# Patient Record
Sex: MALE | Race: WHITE | Employment: OTHER
[De-identification: names, ages, dates, MRNs, and addresses within clinical notes are randomized per-mention and may not be internally consistent; named-entity substitution may affect disease eponyms.]

---

## 2017-01-06 RX ORDER — ATORVASTATIN CALCIUM 80 MG/1
TABLET, FILM COATED ORAL
Qty: 90 TABLET | Refills: 0 | Status: SHIPPED | OUTPATIENT
Start: 2017-01-06 | End: 2017-04-12 | Stop reason: SDUPTHER

## 2017-01-09 ENCOUNTER — CARE COORDINATION (OUTPATIENT)
Dept: CARE COORDINATION | Facility: CLINIC | Age: 72
End: 2017-01-09

## 2017-01-17 ENCOUNTER — OFFICE VISIT (OUTPATIENT)
Dept: NEUROSURGERY | Facility: CLINIC | Age: 72
End: 2017-01-17

## 2017-01-17 VITALS
BODY MASS INDEX: 44.1 KG/M2 | DIASTOLIC BLOOD PRESSURE: 92 MMHG | SYSTOLIC BLOOD PRESSURE: 141 MMHG | WEIGHT: 315 LBS | HEIGHT: 71 IN | HEART RATE: 111 BPM

## 2017-01-17 DIAGNOSIS — M51.9 LUMBAR DISC DISEASE: Primary | ICD-10-CM

## 2017-01-17 PROCEDURE — 99024 POSTOP FOLLOW-UP VISIT: CPT | Performed by: NEUROLOGICAL SURGERY

## 2017-01-17 PROCEDURE — 1036F TOBACCO NON-USER: CPT | Performed by: NEUROLOGICAL SURGERY

## 2017-02-20 ENCOUNTER — OFFICE VISIT (OUTPATIENT)
Dept: UROLOGY | Facility: CLINIC | Age: 72
End: 2017-02-20

## 2017-02-20 VITALS
SYSTOLIC BLOOD PRESSURE: 122 MMHG | BODY MASS INDEX: 42.66 KG/M2 | DIASTOLIC BLOOD PRESSURE: 78 MMHG | HEIGHT: 72 IN | WEIGHT: 315 LBS

## 2017-02-20 DIAGNOSIS — N40.1 BPH WITH OBSTRUCTION/LOWER URINARY TRACT SYMPTOMS: Primary | ICD-10-CM

## 2017-02-20 DIAGNOSIS — N13.8 BPH WITH OBSTRUCTION/LOWER URINARY TRACT SYMPTOMS: Primary | ICD-10-CM

## 2017-02-20 DIAGNOSIS — N39.3 MALE STRESS INCONTINENCE: ICD-10-CM

## 2017-02-20 PROCEDURE — 1123F ACP DISCUSS/DSCN MKR DOCD: CPT | Performed by: UROLOGY

## 2017-02-20 PROCEDURE — 1036F TOBACCO NON-USER: CPT | Performed by: UROLOGY

## 2017-02-20 PROCEDURE — G8484 FLU IMMUNIZE NO ADMIN: HCPCS | Performed by: UROLOGY

## 2017-02-20 PROCEDURE — G8417 CALC BMI ABV UP PARAM F/U: HCPCS | Performed by: UROLOGY

## 2017-02-20 PROCEDURE — G8599 NO ASA/ANTIPLAT THER USE RNG: HCPCS | Performed by: UROLOGY

## 2017-02-20 PROCEDURE — G8427 DOCREV CUR MEDS BY ELIG CLIN: HCPCS | Performed by: UROLOGY

## 2017-02-20 PROCEDURE — 99213 OFFICE O/P EST LOW 20 MIN: CPT | Performed by: UROLOGY

## 2017-02-20 PROCEDURE — 4040F PNEUMOC VAC/ADMIN/RCVD: CPT | Performed by: UROLOGY

## 2017-02-20 PROCEDURE — 3017F COLORECTAL CA SCREEN DOC REV: CPT | Performed by: UROLOGY

## 2017-02-20 PROCEDURE — 51798 US URINE CAPACITY MEASURE: CPT | Performed by: UROLOGY

## 2017-02-20 ASSESSMENT — ENCOUNTER SYMPTOMS
EYE PAIN: 0
BACK PAIN: 0
VOMITING: 0
SHORTNESS OF BREATH: 0
COLOR CHANGE: 0
NAUSEA: 0
WHEEZING: 0
EYE REDNESS: 0
COUGH: 0
ABDOMINAL PAIN: 0

## 2017-02-22 ENCOUNTER — TELEPHONE (OUTPATIENT)
Dept: UROLOGY | Facility: CLINIC | Age: 72
End: 2017-02-22

## 2017-02-22 RX ORDER — SULFAMETHOXAZOLE AND TRIMETHOPRIM 800; 160 MG/1; MG/1
1 TABLET ORAL 2 TIMES DAILY
Qty: 10 TABLET | Refills: 0 | Status: SHIPPED | OUTPATIENT
Start: 2017-02-22 | End: 2017-02-27

## 2017-03-03 ENCOUNTER — PROCEDURE VISIT (OUTPATIENT)
Dept: UROLOGY | Facility: CLINIC | Age: 72
End: 2017-03-03

## 2017-03-03 VITALS
SYSTOLIC BLOOD PRESSURE: 146 MMHG | WEIGHT: 315 LBS | BODY MASS INDEX: 42.66 KG/M2 | HEIGHT: 72 IN | DIASTOLIC BLOOD PRESSURE: 82 MMHG

## 2017-03-03 DIAGNOSIS — N39.3 MALE STRESS INCONTINENCE: Primary | ICD-10-CM

## 2017-03-03 PROCEDURE — 52000 CYSTOURETHROSCOPY: CPT | Performed by: UROLOGY

## 2017-03-03 PROCEDURE — G8599 NO ASA/ANTIPLAT THER USE RNG: HCPCS | Performed by: UROLOGY

## 2017-03-03 PROCEDURE — G8484 FLU IMMUNIZE NO ADMIN: HCPCS | Performed by: UROLOGY

## 2017-03-03 PROCEDURE — 1123F ACP DISCUSS/DSCN MKR DOCD: CPT | Performed by: UROLOGY

## 2017-03-03 PROCEDURE — 1036F TOBACCO NON-USER: CPT | Performed by: UROLOGY

## 2017-03-03 PROCEDURE — 99213 OFFICE O/P EST LOW 20 MIN: CPT | Performed by: UROLOGY

## 2017-03-03 PROCEDURE — G8417 CALC BMI ABV UP PARAM F/U: HCPCS | Performed by: UROLOGY

## 2017-03-03 PROCEDURE — 3017F COLORECTAL CA SCREEN DOC REV: CPT | Performed by: UROLOGY

## 2017-03-03 PROCEDURE — G8427 DOCREV CUR MEDS BY ELIG CLIN: HCPCS | Performed by: UROLOGY

## 2017-03-03 PROCEDURE — 4040F PNEUMOC VAC/ADMIN/RCVD: CPT | Performed by: UROLOGY

## 2017-03-03 ASSESSMENT — ENCOUNTER SYMPTOMS
WHEEZING: 0
EYE PAIN: 0
EYE REDNESS: 0
BACK PAIN: 0
COUGH: 0
NAUSEA: 0
VOMITING: 0
COLOR CHANGE: 0
ABDOMINAL PAIN: 0
SHORTNESS OF BREATH: 0

## 2017-03-09 ENCOUNTER — CARE COORDINATION (OUTPATIENT)
Dept: CARE COORDINATION | Facility: CLINIC | Age: 72
End: 2017-03-09

## 2017-03-13 ENCOUNTER — HOSPITAL ENCOUNTER (OUTPATIENT)
Dept: PHYSICAL THERAPY | Age: 72
Setting detail: THERAPIES SERIES
Discharge: HOME | End: 2017-03-13
Admitting: NEUROLOGICAL SURGERY
Payer: MEDICARE

## 2017-03-13 PROCEDURE — 97113 AQUATIC THERAPY/EXERCISES: CPT

## 2017-03-13 ASSESSMENT — PAIN DESCRIPTION - LOCATION: LOCATION: BACK

## 2017-03-13 ASSESSMENT — PAIN SCALES - GENERAL: PAINLEVEL_OUTOF10: 1

## 2017-03-13 ASSESSMENT — PAIN DESCRIPTION - ORIENTATION: ORIENTATION: LOWER

## 2017-03-16 ENCOUNTER — HOSPITAL ENCOUNTER (OUTPATIENT)
Dept: PHYSICAL THERAPY | Age: 72
Setting detail: THERAPIES SERIES
Discharge: HOME | End: 2017-03-16
Payer: MEDICARE

## 2017-03-16 PROCEDURE — 97113 AQUATIC THERAPY/EXERCISES: CPT

## 2017-03-16 ASSESSMENT — PAIN DESCRIPTION - LOCATION: LOCATION: BACK

## 2017-03-16 ASSESSMENT — PAIN DESCRIPTION - ORIENTATION: ORIENTATION: LOWER

## 2017-03-16 ASSESSMENT — PAIN SCALES - GENERAL: PAINLEVEL_OUTOF10: 2

## 2017-03-17 ENCOUNTER — HOSPITAL ENCOUNTER (OUTPATIENT)
Dept: PHYSICAL THERAPY | Age: 72
Setting detail: THERAPIES SERIES
Discharge: HOME | End: 2017-03-17
Admitting: NEUROLOGICAL SURGERY
Payer: MEDICARE

## 2017-03-17 PROCEDURE — 97113 AQUATIC THERAPY/EXERCISES: CPT

## 2017-03-17 ASSESSMENT — PAIN DESCRIPTION - LOCATION: LOCATION: BACK

## 2017-03-17 ASSESSMENT — PAIN DESCRIPTION - ORIENTATION: ORIENTATION: LOWER;POSTERIOR;RIGHT

## 2017-03-20 ENCOUNTER — HOSPITAL ENCOUNTER (OUTPATIENT)
Dept: PHYSICAL THERAPY | Age: 72
Setting detail: THERAPIES SERIES
Discharge: HOME | End: 2017-03-20
Admitting: NEUROLOGICAL SURGERY
Payer: MEDICARE

## 2017-03-20 PROCEDURE — 97113 AQUATIC THERAPY/EXERCISES: CPT

## 2017-03-20 ASSESSMENT — PAIN SCALES - GENERAL: PAINLEVEL_OUTOF10: 1

## 2017-03-20 ASSESSMENT — PAIN DESCRIPTION - ORIENTATION: ORIENTATION: LOWER;MID

## 2017-03-20 ASSESSMENT — PAIN DESCRIPTION - LOCATION: LOCATION: BACK

## 2017-03-21 ENCOUNTER — OFFICE VISIT (OUTPATIENT)
Dept: NEUROSURGERY | Age: 72
End: 2017-03-21
Payer: MEDICARE

## 2017-03-21 ENCOUNTER — OFFICE VISIT (OUTPATIENT)
Dept: UROLOGY | Age: 72
End: 2017-03-21
Payer: MEDICARE

## 2017-03-21 VITALS
WEIGHT: 315 LBS | HEIGHT: 71 IN | BODY MASS INDEX: 44.1 KG/M2 | HEART RATE: 78 BPM | DIASTOLIC BLOOD PRESSURE: 86 MMHG | SYSTOLIC BLOOD PRESSURE: 142 MMHG

## 2017-03-21 VITALS
DIASTOLIC BLOOD PRESSURE: 78 MMHG | WEIGHT: 315 LBS | BODY MASS INDEX: 42.66 KG/M2 | SYSTOLIC BLOOD PRESSURE: 122 MMHG | HEIGHT: 72 IN

## 2017-03-21 DIAGNOSIS — N39.3 STRESS INCONTINENCE, MALE: ICD-10-CM

## 2017-03-21 DIAGNOSIS — M62.81 MUSCLE WEAKNESS: Primary | ICD-10-CM

## 2017-03-21 DIAGNOSIS — N40.1 BPH WITH OBSTRUCTION/LOWER URINARY TRACT SYMPTOMS: ICD-10-CM

## 2017-03-21 DIAGNOSIS — N13.8 BPH WITH OBSTRUCTION/LOWER URINARY TRACT SYMPTOMS: ICD-10-CM

## 2017-03-21 DIAGNOSIS — M51.9 LUMBAR DISC DISEASE: Primary | ICD-10-CM

## 2017-03-21 PROCEDURE — G8484 FLU IMMUNIZE NO ADMIN: HCPCS | Performed by: PHYSICIAN ASSISTANT

## 2017-03-21 PROCEDURE — 1036F TOBACCO NON-USER: CPT | Performed by: PHYSICIAN ASSISTANT

## 2017-03-21 PROCEDURE — 1036F TOBACCO NON-USER: CPT | Performed by: NEUROLOGICAL SURGERY

## 2017-03-21 PROCEDURE — 99212 OFFICE O/P EST SF 10 MIN: CPT | Performed by: NEUROLOGICAL SURGERY

## 2017-03-21 PROCEDURE — 51784 ANAL/URINARY MUSCLE STUDY: CPT | Performed by: PHYSICIAN ASSISTANT

## 2017-03-21 PROCEDURE — 91122 PR ELECTRICAL STIMULATION: CPT | Performed by: PHYSICIAN ASSISTANT

## 2017-03-21 PROCEDURE — 4040F PNEUMOC VAC/ADMIN/RCVD: CPT | Performed by: PHYSICIAN ASSISTANT

## 2017-03-21 PROCEDURE — 3017F COLORECTAL CA SCREEN DOC REV: CPT | Performed by: PHYSICIAN ASSISTANT

## 2017-03-21 PROCEDURE — G8599 NO ASA/ANTIPLAT THER USE RNG: HCPCS | Performed by: PHYSICIAN ASSISTANT

## 2017-03-21 PROCEDURE — 97750 PHYSICAL PERFORMANCE TEST: CPT | Performed by: PHYSICIAN ASSISTANT

## 2017-03-21 PROCEDURE — G8427 DOCREV CUR MEDS BY ELIG CLIN: HCPCS | Performed by: PHYSICIAN ASSISTANT

## 2017-03-21 PROCEDURE — 97032 APPL MODALITY 1+ESTIM EA 15: CPT | Performed by: PHYSICIAN ASSISTANT

## 2017-03-21 PROCEDURE — G8417 CALC BMI ABV UP PARAM F/U: HCPCS | Performed by: PHYSICIAN ASSISTANT

## 2017-03-21 PROCEDURE — 99214 OFFICE O/P EST MOD 30 MIN: CPT | Performed by: PHYSICIAN ASSISTANT

## 2017-03-21 PROCEDURE — 1123F ACP DISCUSS/DSCN MKR DOCD: CPT | Performed by: PHYSICIAN ASSISTANT

## 2017-03-23 ENCOUNTER — APPOINTMENT (OUTPATIENT)
Dept: PHYSICAL THERAPY | Age: 72
End: 2017-03-23
Payer: MEDICARE

## 2017-03-24 ENCOUNTER — APPOINTMENT (OUTPATIENT)
Dept: PHYSICAL THERAPY | Age: 72
End: 2017-03-24
Payer: MEDICARE

## 2017-03-28 ENCOUNTER — OFFICE VISIT (OUTPATIENT)
Dept: UROLOGY | Age: 72
End: 2017-03-28
Payer: MEDICARE

## 2017-03-28 VITALS — WEIGHT: 315 LBS | SYSTOLIC BLOOD PRESSURE: 138 MMHG | DIASTOLIC BLOOD PRESSURE: 98 MMHG | BODY MASS INDEX: 45.8 KG/M2

## 2017-03-28 DIAGNOSIS — N40.1 BPH WITH OBSTRUCTION/LOWER URINARY TRACT SYMPTOMS: ICD-10-CM

## 2017-03-28 DIAGNOSIS — N39.3 STRESS INCONTINENCE, MALE: Primary | ICD-10-CM

## 2017-03-28 DIAGNOSIS — N13.8 BPH WITH OBSTRUCTION/LOWER URINARY TRACT SYMPTOMS: ICD-10-CM

## 2017-03-28 DIAGNOSIS — M62.81 MUSCLE WEAKNESS: ICD-10-CM

## 2017-03-28 PROCEDURE — G8599 NO ASA/ANTIPLAT THER USE RNG: HCPCS | Performed by: PHYSICIAN ASSISTANT

## 2017-03-28 PROCEDURE — 91122 PR ANAL/URINARY MUSCLE STUDY: CPT | Performed by: PHYSICIAN ASSISTANT

## 2017-03-28 PROCEDURE — 99213 OFFICE O/P EST LOW 20 MIN: CPT | Performed by: PHYSICIAN ASSISTANT

## 2017-03-28 PROCEDURE — 51784 ANAL/URINARY MUSCLE STUDY: CPT | Performed by: PHYSICIAN ASSISTANT

## 2017-03-28 PROCEDURE — G8417 CALC BMI ABV UP PARAM F/U: HCPCS | Performed by: PHYSICIAN ASSISTANT

## 2017-03-28 PROCEDURE — 1123F ACP DISCUSS/DSCN MKR DOCD: CPT | Performed by: PHYSICIAN ASSISTANT

## 2017-03-28 PROCEDURE — 97032 APPL MODALITY 1+ESTIM EA 15: CPT | Performed by: PHYSICIAN ASSISTANT

## 2017-03-28 PROCEDURE — G8484 FLU IMMUNIZE NO ADMIN: HCPCS | Performed by: PHYSICIAN ASSISTANT

## 2017-03-28 PROCEDURE — 4040F PNEUMOC VAC/ADMIN/RCVD: CPT | Performed by: PHYSICIAN ASSISTANT

## 2017-03-28 PROCEDURE — 97750 PHYSICAL PERFORMANCE TEST: CPT | Performed by: PHYSICIAN ASSISTANT

## 2017-03-28 PROCEDURE — 3017F COLORECTAL CA SCREEN DOC REV: CPT | Performed by: PHYSICIAN ASSISTANT

## 2017-03-28 PROCEDURE — 1036F TOBACCO NON-USER: CPT | Performed by: PHYSICIAN ASSISTANT

## 2017-03-28 PROCEDURE — G8427 DOCREV CUR MEDS BY ELIG CLIN: HCPCS | Performed by: PHYSICIAN ASSISTANT

## 2017-03-29 ENCOUNTER — CARE COORDINATION (OUTPATIENT)
Dept: FAMILY MEDICINE CLINIC | Age: 72
End: 2017-03-29

## 2017-03-29 DIAGNOSIS — I10 ESSENTIAL HYPERTENSION: ICD-10-CM

## 2017-03-29 RX ORDER — HYDROCHLOROTHIAZIDE 25 MG/1
25 TABLET ORAL DAILY
Qty: 90 TABLET | Refills: 0 | Status: SHIPPED | OUTPATIENT
Start: 2017-03-29 | End: 2017-07-11 | Stop reason: SDUPTHER

## 2017-03-29 RX ORDER — ALBUTEROL SULFATE 90 UG/1
2 AEROSOL, METERED RESPIRATORY (INHALATION) EVERY 4 HOURS PRN
Qty: 1 INHALER | Refills: 1 | Status: SHIPPED | OUTPATIENT
Start: 2017-03-29 | End: 2018-06-08 | Stop reason: SDUPTHER

## 2017-04-04 ENCOUNTER — PROCEDURE VISIT (OUTPATIENT)
Dept: UROLOGY | Age: 72
End: 2017-04-04
Payer: MEDICARE

## 2017-04-04 VITALS
BODY MASS INDEX: 42.66 KG/M2 | DIASTOLIC BLOOD PRESSURE: 86 MMHG | HEIGHT: 72 IN | WEIGHT: 315 LBS | SYSTOLIC BLOOD PRESSURE: 124 MMHG

## 2017-04-04 DIAGNOSIS — E66.01 MORBID OBESITY DUE TO EXCESS CALORIES (HCC): ICD-10-CM

## 2017-04-04 DIAGNOSIS — M62.81 MUSCLE WEAKNESS: ICD-10-CM

## 2017-04-04 DIAGNOSIS — N40.1 BPH WITH OBSTRUCTION/LOWER URINARY TRACT SYMPTOMS: ICD-10-CM

## 2017-04-04 DIAGNOSIS — N39.3 STRESS INCONTINENCE, MALE: Primary | ICD-10-CM

## 2017-04-04 DIAGNOSIS — N13.8 BPH WITH OBSTRUCTION/LOWER URINARY TRACT SYMPTOMS: ICD-10-CM

## 2017-04-04 PROCEDURE — 97032 APPL MODALITY 1+ESTIM EA 15: CPT | Performed by: PHYSICIAN ASSISTANT

## 2017-04-04 PROCEDURE — G8427 DOCREV CUR MEDS BY ELIG CLIN: HCPCS | Performed by: PHYSICIAN ASSISTANT

## 2017-04-04 PROCEDURE — 51784 ANAL/URINARY MUSCLE STUDY: CPT | Performed by: PHYSICIAN ASSISTANT

## 2017-04-04 PROCEDURE — 97750 PHYSICAL PERFORMANCE TEST: CPT | Performed by: PHYSICIAN ASSISTANT

## 2017-04-04 PROCEDURE — 91122 PR ANAL/URINARY MUSCLE STUDY: CPT | Performed by: PHYSICIAN ASSISTANT

## 2017-04-04 PROCEDURE — 1036F TOBACCO NON-USER: CPT | Performed by: PHYSICIAN ASSISTANT

## 2017-04-04 PROCEDURE — 3017F COLORECTAL CA SCREEN DOC REV: CPT | Performed by: PHYSICIAN ASSISTANT

## 2017-04-04 PROCEDURE — G8417 CALC BMI ABV UP PARAM F/U: HCPCS | Performed by: PHYSICIAN ASSISTANT

## 2017-04-04 PROCEDURE — 4040F PNEUMOC VAC/ADMIN/RCVD: CPT | Performed by: PHYSICIAN ASSISTANT

## 2017-04-04 PROCEDURE — 1123F ACP DISCUSS/DSCN MKR DOCD: CPT | Performed by: PHYSICIAN ASSISTANT

## 2017-04-04 PROCEDURE — G8599 NO ASA/ANTIPLAT THER USE RNG: HCPCS | Performed by: PHYSICIAN ASSISTANT

## 2017-04-04 PROCEDURE — 99213 OFFICE O/P EST LOW 20 MIN: CPT | Performed by: PHYSICIAN ASSISTANT

## 2017-04-11 ENCOUNTER — PROCEDURE VISIT (OUTPATIENT)
Dept: UROLOGY | Age: 72
End: 2017-04-11
Payer: MEDICARE

## 2017-04-11 VITALS
BODY MASS INDEX: 40.43 KG/M2 | SYSTOLIC BLOOD PRESSURE: 122 MMHG | WEIGHT: 315 LBS | DIASTOLIC BLOOD PRESSURE: 82 MMHG | HEIGHT: 74 IN

## 2017-04-11 DIAGNOSIS — M62.81 MUSCLE WEAKNESS: ICD-10-CM

## 2017-04-11 DIAGNOSIS — N39.3 STRESS INCONTINENCE, MALE: Primary | ICD-10-CM

## 2017-04-11 DIAGNOSIS — N40.1 BPH WITH OBSTRUCTION/LOWER URINARY TRACT SYMPTOMS: ICD-10-CM

## 2017-04-11 DIAGNOSIS — N13.8 BPH WITH OBSTRUCTION/LOWER URINARY TRACT SYMPTOMS: ICD-10-CM

## 2017-04-11 PROCEDURE — 91122 PR ANAL/URINARY MUSCLE STUDY: CPT | Performed by: PHYSICIAN ASSISTANT

## 2017-04-11 PROCEDURE — G8417 CALC BMI ABV UP PARAM F/U: HCPCS | Performed by: PHYSICIAN ASSISTANT

## 2017-04-11 PROCEDURE — 1123F ACP DISCUSS/DSCN MKR DOCD: CPT | Performed by: PHYSICIAN ASSISTANT

## 2017-04-11 PROCEDURE — 97750 PHYSICAL PERFORMANCE TEST: CPT | Performed by: PHYSICIAN ASSISTANT

## 2017-04-11 PROCEDURE — 51784 ANAL/URINARY MUSCLE STUDY: CPT | Performed by: PHYSICIAN ASSISTANT

## 2017-04-11 PROCEDURE — 4040F PNEUMOC VAC/ADMIN/RCVD: CPT | Performed by: PHYSICIAN ASSISTANT

## 2017-04-11 PROCEDURE — 1036F TOBACCO NON-USER: CPT | Performed by: PHYSICIAN ASSISTANT

## 2017-04-11 PROCEDURE — 3017F COLORECTAL CA SCREEN DOC REV: CPT | Performed by: PHYSICIAN ASSISTANT

## 2017-04-11 PROCEDURE — 97032 APPL MODALITY 1+ESTIM EA 15: CPT | Performed by: PHYSICIAN ASSISTANT

## 2017-04-11 PROCEDURE — G8599 NO ASA/ANTIPLAT THER USE RNG: HCPCS | Performed by: PHYSICIAN ASSISTANT

## 2017-04-11 PROCEDURE — 99212 OFFICE O/P EST SF 10 MIN: CPT | Performed by: PHYSICIAN ASSISTANT

## 2017-04-11 PROCEDURE — G8427 DOCREV CUR MEDS BY ELIG CLIN: HCPCS | Performed by: PHYSICIAN ASSISTANT

## 2017-04-12 RX ORDER — ATORVASTATIN CALCIUM 80 MG/1
TABLET, FILM COATED ORAL
Qty: 90 TABLET | Refills: 0 | Status: SHIPPED | OUTPATIENT
Start: 2017-04-12 | End: 2017-07-11 | Stop reason: SDUPTHER

## 2017-04-13 ENCOUNTER — TELEPHONE (OUTPATIENT)
Dept: FAMILY MEDICINE CLINIC | Age: 72
End: 2017-04-13

## 2017-04-13 ENCOUNTER — TELEPHONE (OUTPATIENT)
Dept: UROLOGY | Age: 72
End: 2017-04-13

## 2017-04-13 DIAGNOSIS — E66.01 MORBID OBESITY DUE TO EXCESS CALORIES (HCC): ICD-10-CM

## 2017-04-13 DIAGNOSIS — G47.33 OSA ON CPAP: ICD-10-CM

## 2017-04-13 DIAGNOSIS — Z99.89 OSA ON CPAP: ICD-10-CM

## 2017-04-13 DIAGNOSIS — Z95.1 HX OF CABG: ICD-10-CM

## 2017-04-13 DIAGNOSIS — R06.09 DYSPNEA ON EXERTION: Primary | ICD-10-CM

## 2017-04-13 DIAGNOSIS — I25.10 CORONARY ARTERY DISEASE INVOLVING NATIVE CORONARY ARTERY OF NATIVE HEART WITHOUT ANGINA PECTORIS: ICD-10-CM

## 2017-04-17 ENCOUNTER — OFFICE VISIT (OUTPATIENT)
Dept: UROLOGY | Age: 72
End: 2017-04-17
Payer: MEDICARE

## 2017-04-17 VITALS — DIASTOLIC BLOOD PRESSURE: 72 MMHG | SYSTOLIC BLOOD PRESSURE: 107 MMHG | BODY MASS INDEX: 42.63 KG/M2 | WEIGHT: 315 LBS

## 2017-04-17 DIAGNOSIS — N13.8 BPH WITH OBSTRUCTION/LOWER URINARY TRACT SYMPTOMS: Primary | ICD-10-CM

## 2017-04-17 DIAGNOSIS — N39.3 STRESS INCONTINENCE, MALE: ICD-10-CM

## 2017-04-17 DIAGNOSIS — N50.819 TESTICULAR PAIN: ICD-10-CM

## 2017-04-17 DIAGNOSIS — M62.81 MUSCLE WEAKNESS: ICD-10-CM

## 2017-04-17 DIAGNOSIS — N50.89 TESTICULAR SWELLING: ICD-10-CM

## 2017-04-17 DIAGNOSIS — N40.1 BPH WITH OBSTRUCTION/LOWER URINARY TRACT SYMPTOMS: Primary | ICD-10-CM

## 2017-04-17 PROCEDURE — 1123F ACP DISCUSS/DSCN MKR DOCD: CPT | Performed by: UROLOGY

## 2017-04-17 PROCEDURE — 3017F COLORECTAL CA SCREEN DOC REV: CPT | Performed by: UROLOGY

## 2017-04-17 PROCEDURE — 1036F TOBACCO NON-USER: CPT | Performed by: UROLOGY

## 2017-04-17 PROCEDURE — 4040F PNEUMOC VAC/ADMIN/RCVD: CPT | Performed by: UROLOGY

## 2017-04-17 PROCEDURE — G8427 DOCREV CUR MEDS BY ELIG CLIN: HCPCS | Performed by: UROLOGY

## 2017-04-17 PROCEDURE — G8417 CALC BMI ABV UP PARAM F/U: HCPCS | Performed by: UROLOGY

## 2017-04-17 PROCEDURE — G8599 NO ASA/ANTIPLAT THER USE RNG: HCPCS | Performed by: UROLOGY

## 2017-04-17 PROCEDURE — 99214 OFFICE O/P EST MOD 30 MIN: CPT | Performed by: UROLOGY

## 2017-04-17 RX ORDER — LEVOFLOXACIN 500 MG/1
500 TABLET, FILM COATED ORAL DAILY
Qty: 10 TABLET | Refills: 0 | Status: SHIPPED | OUTPATIENT
Start: 2017-04-17 | End: 2017-04-27

## 2017-04-17 ASSESSMENT — ENCOUNTER SYMPTOMS
SHORTNESS OF BREATH: 0
COLOR CHANGE: 0
VOMITING: 0
COUGH: 0
EYE REDNESS: 0
ABDOMINAL PAIN: 0
BACK PAIN: 0
WHEEZING: 0
NAUSEA: 0
EYE PAIN: 0

## 2017-04-20 ENCOUNTER — HOSPITAL ENCOUNTER (OUTPATIENT)
Dept: ULTRASOUND IMAGING | Age: 72
Discharge: HOME | End: 2017-04-20
Payer: MEDICARE

## 2017-04-20 DIAGNOSIS — N50.89 TESTICULAR SWELLING: ICD-10-CM

## 2017-04-20 DIAGNOSIS — N50.819 TESTICULAR PAIN: ICD-10-CM

## 2017-04-20 PROCEDURE — 76870 US EXAM SCROTUM: CPT

## 2017-04-21 ENCOUNTER — HOSPITAL ENCOUNTER (OUTPATIENT)
Dept: PULMONOLOGY | Age: 72
Discharge: HOME | End: 2017-04-21
Payer: MEDICARE

## 2017-04-21 DIAGNOSIS — G47.33 OSA ON CPAP: ICD-10-CM

## 2017-04-21 DIAGNOSIS — E66.01 MORBID OBESITY DUE TO EXCESS CALORIES (HCC): ICD-10-CM

## 2017-04-21 DIAGNOSIS — Z95.1 HX OF CABG: ICD-10-CM

## 2017-04-21 DIAGNOSIS — I25.10 CORONARY ARTERY DISEASE INVOLVING NATIVE CORONARY ARTERY OF NATIVE HEART WITHOUT ANGINA PECTORIS: ICD-10-CM

## 2017-04-21 DIAGNOSIS — Z99.89 OSA ON CPAP: ICD-10-CM

## 2017-04-21 DIAGNOSIS — R06.09 DYSPNEA ON EXERTION: ICD-10-CM

## 2017-04-21 PROCEDURE — 94620 HC 6-MINUTE WALK TEST/PULM STRESS TEST SIMPLE: CPT

## 2017-04-25 ENCOUNTER — PROCEDURE VISIT (OUTPATIENT)
Dept: UROLOGY | Age: 72
End: 2017-04-25
Payer: MEDICARE

## 2017-04-25 VITALS
WEIGHT: 315 LBS | DIASTOLIC BLOOD PRESSURE: 76 MMHG | SYSTOLIC BLOOD PRESSURE: 120 MMHG | BODY MASS INDEX: 42.66 KG/M2 | HEIGHT: 72 IN

## 2017-04-25 DIAGNOSIS — M62.81 MUSCLE WEAKNESS: ICD-10-CM

## 2017-04-25 DIAGNOSIS — N50.89 TESTICULAR SWELLING: ICD-10-CM

## 2017-04-25 DIAGNOSIS — N50.819 TESTICULAR PAIN: ICD-10-CM

## 2017-04-25 DIAGNOSIS — N39.3 STRESS INCONTINENCE, MALE: Primary | ICD-10-CM

## 2017-04-25 PROCEDURE — 3017F COLORECTAL CA SCREEN DOC REV: CPT | Performed by: PHYSICIAN ASSISTANT

## 2017-04-25 PROCEDURE — 4040F PNEUMOC VAC/ADMIN/RCVD: CPT | Performed by: PHYSICIAN ASSISTANT

## 2017-04-25 PROCEDURE — G8417 CALC BMI ABV UP PARAM F/U: HCPCS | Performed by: PHYSICIAN ASSISTANT

## 2017-04-25 PROCEDURE — 91122 PR ANAL/URINARY MUSCLE STUDY: CPT | Performed by: PHYSICIAN ASSISTANT

## 2017-04-25 PROCEDURE — 1036F TOBACCO NON-USER: CPT | Performed by: PHYSICIAN ASSISTANT

## 2017-04-25 PROCEDURE — 99213 OFFICE O/P EST LOW 20 MIN: CPT | Performed by: PHYSICIAN ASSISTANT

## 2017-04-25 PROCEDURE — 97032 APPL MODALITY 1+ESTIM EA 15: CPT | Performed by: PHYSICIAN ASSISTANT

## 2017-04-25 PROCEDURE — 51784 ANAL/URINARY MUSCLE STUDY: CPT | Performed by: PHYSICIAN ASSISTANT

## 2017-04-25 PROCEDURE — G8427 DOCREV CUR MEDS BY ELIG CLIN: HCPCS | Performed by: PHYSICIAN ASSISTANT

## 2017-04-25 PROCEDURE — G8599 NO ASA/ANTIPLAT THER USE RNG: HCPCS | Performed by: PHYSICIAN ASSISTANT

## 2017-04-25 PROCEDURE — 97750 PHYSICAL PERFORMANCE TEST: CPT | Performed by: PHYSICIAN ASSISTANT

## 2017-04-25 PROCEDURE — 1123F ACP DISCUSS/DSCN MKR DOCD: CPT | Performed by: PHYSICIAN ASSISTANT

## 2017-05-01 ENCOUNTER — OFFICE VISIT (OUTPATIENT)
Dept: UROLOGY | Age: 72
End: 2017-05-01
Payer: MEDICARE

## 2017-05-01 VITALS
SYSTOLIC BLOOD PRESSURE: 110 MMHG | DIASTOLIC BLOOD PRESSURE: 74 MMHG | BODY MASS INDEX: 44.83 KG/M2 | TEMPERATURE: 98.5 F | WEIGHT: 315 LBS

## 2017-05-01 DIAGNOSIS — N50.89 TESTICULAR SWELLING: ICD-10-CM

## 2017-05-01 DIAGNOSIS — N50.819 TESTICULAR PAIN: Primary | ICD-10-CM

## 2017-05-01 PROCEDURE — 3017F COLORECTAL CA SCREEN DOC REV: CPT | Performed by: UROLOGY

## 2017-05-01 PROCEDURE — 4040F PNEUMOC VAC/ADMIN/RCVD: CPT | Performed by: UROLOGY

## 2017-05-01 PROCEDURE — G8427 DOCREV CUR MEDS BY ELIG CLIN: HCPCS | Performed by: UROLOGY

## 2017-05-01 PROCEDURE — 1036F TOBACCO NON-USER: CPT | Performed by: UROLOGY

## 2017-05-01 PROCEDURE — 1123F ACP DISCUSS/DSCN MKR DOCD: CPT | Performed by: UROLOGY

## 2017-05-01 PROCEDURE — 99213 OFFICE O/P EST LOW 20 MIN: CPT | Performed by: UROLOGY

## 2017-05-01 PROCEDURE — G8599 NO ASA/ANTIPLAT THER USE RNG: HCPCS | Performed by: UROLOGY

## 2017-05-01 PROCEDURE — G8417 CALC BMI ABV UP PARAM F/U: HCPCS | Performed by: UROLOGY

## 2017-05-01 ASSESSMENT — ENCOUNTER SYMPTOMS
NAUSEA: 0
ABDOMINAL PAIN: 0
COLOR CHANGE: 0
EYE PAIN: 0
COUGH: 0
WHEEZING: 0
VOMITING: 0
BACK PAIN: 0
EYE REDNESS: 0
SHORTNESS OF BREATH: 0

## 2017-05-08 ENCOUNTER — TELEPHONE (OUTPATIENT)
Dept: CARDIOLOGY CLINIC | Age: 72
End: 2017-05-08

## 2017-05-08 DIAGNOSIS — Z99.89 OSA ON CPAP: ICD-10-CM

## 2017-05-08 DIAGNOSIS — I25.10 CORONARY ARTERY DISEASE INVOLVING NATIVE CORONARY ARTERY OF NATIVE HEART WITHOUT ANGINA PECTORIS: ICD-10-CM

## 2017-05-08 DIAGNOSIS — E66.01 MORBID OBESITY DUE TO EXCESS CALORIES (HCC): ICD-10-CM

## 2017-05-08 DIAGNOSIS — Z95.1 HX OF CABG: ICD-10-CM

## 2017-05-08 DIAGNOSIS — G47.33 OSA ON CPAP: ICD-10-CM

## 2017-05-08 DIAGNOSIS — E55.9 VITAMIN D DEFICIENCY DISEASE: ICD-10-CM

## 2017-05-08 DIAGNOSIS — Z98.62 HISTORY OF ANGIOPLASTY: ICD-10-CM

## 2017-05-08 DIAGNOSIS — I10 ESSENTIAL HYPERTENSION: Primary | ICD-10-CM

## 2017-05-08 DIAGNOSIS — E78.2 MIXED HYPERLIPIDEMIA: ICD-10-CM

## 2017-05-09 ENCOUNTER — HOSPITAL ENCOUNTER (OUTPATIENT)
Age: 72
Discharge: HOME | End: 2017-05-09
Payer: MEDICARE

## 2017-05-09 ENCOUNTER — PROCEDURE VISIT (OUTPATIENT)
Dept: UROLOGY | Age: 72
End: 2017-05-09
Payer: MEDICARE

## 2017-05-09 ENCOUNTER — HOSPITAL ENCOUNTER (OUTPATIENT)
Dept: GENERAL RADIOLOGY | Age: 72
Discharge: HOME | End: 2017-05-09
Payer: MEDICARE

## 2017-05-09 VITALS
WEIGHT: 315 LBS | SYSTOLIC BLOOD PRESSURE: 128 MMHG | HEIGHT: 71 IN | BODY MASS INDEX: 44.1 KG/M2 | DIASTOLIC BLOOD PRESSURE: 78 MMHG

## 2017-05-09 DIAGNOSIS — G47.33 OSA ON CPAP: ICD-10-CM

## 2017-05-09 DIAGNOSIS — Z98.62 HISTORY OF ANGIOPLASTY: ICD-10-CM

## 2017-05-09 DIAGNOSIS — N40.1 BPH WITH OBSTRUCTION/LOWER URINARY TRACT SYMPTOMS: ICD-10-CM

## 2017-05-09 DIAGNOSIS — Z99.89 OSA ON CPAP: ICD-10-CM

## 2017-05-09 DIAGNOSIS — I25.10 CORONARY ARTERY DISEASE INVOLVING NATIVE CORONARY ARTERY OF NATIVE HEART WITHOUT ANGINA PECTORIS: ICD-10-CM

## 2017-05-09 DIAGNOSIS — E66.01 MORBID OBESITY DUE TO EXCESS CALORIES (HCC): ICD-10-CM

## 2017-05-09 DIAGNOSIS — E78.2 MIXED HYPERLIPIDEMIA: ICD-10-CM

## 2017-05-09 DIAGNOSIS — M62.81 MUSCLE WEAKNESS: ICD-10-CM

## 2017-05-09 DIAGNOSIS — Z95.1 HX OF CABG: ICD-10-CM

## 2017-05-09 DIAGNOSIS — I10 ESSENTIAL HYPERTENSION: ICD-10-CM

## 2017-05-09 DIAGNOSIS — E55.9 VITAMIN D DEFICIENCY DISEASE: ICD-10-CM

## 2017-05-09 DIAGNOSIS — N13.8 BPH WITH OBSTRUCTION/LOWER URINARY TRACT SYMPTOMS: ICD-10-CM

## 2017-05-09 DIAGNOSIS — N39.3 STRESS INCONTINENCE, MALE: Primary | ICD-10-CM

## 2017-05-09 LAB
ABSOLUTE EOS #: 0.2 K/UL (ref 0–0.4)
ABSOLUTE LYMPH #: 1.3 K/UL (ref 0.9–2.5)
ABSOLUTE MONO #: 0.5 K/UL (ref 0–1)
ALBUMIN SERPL-MCNC: 3.9 G/DL (ref 3.5–5.2)
ALBUMIN/GLOBULIN RATIO: ABNORMAL (ref 1–2.5)
ALP BLD-CCNC: 95 U/L (ref 40–129)
ALT SERPL-CCNC: 19 U/L (ref 5–41)
ANION GAP SERPL CALCULATED.3IONS-SCNC: 15 MMOL/L (ref 9–17)
AST SERPL-CCNC: 29 U/L
BASOPHILS # BLD: 1 %
BASOPHILS ABSOLUTE: 0 K/UL (ref 0–0.2)
BILIRUB SERPL-MCNC: 0.98 MG/DL (ref 0.3–1.2)
BUN BLDV-MCNC: 12 MG/DL (ref 8–23)
BUN/CREAT BLD: 15 (ref 9–20)
CALCIUM SERPL-MCNC: 9.1 MG/DL (ref 8.6–10.4)
CHLORIDE BLD-SCNC: 104 MMOL/L (ref 98–107)
CHOLESTEROL/HDL RATIO: 1.9
CHOLESTEROL: 120 MG/DL
CO2: 19 MMOL/L (ref 20–31)
CREAT SERPL-MCNC: 0.81 MG/DL (ref 0.7–1.2)
DIFFERENTIAL TYPE: YES
EOSINOPHILS RELATIVE PERCENT: 5 %
GFR AFRICAN AMERICAN: >60 ML/MIN
GFR NON-AFRICAN AMERICAN: >60 ML/MIN
GFR SERPL CREATININE-BSD FRML MDRD: ABNORMAL ML/MIN/{1.73_M2}
GFR SERPL CREATININE-BSD FRML MDRD: ABNORMAL ML/MIN/{1.73_M2}
GLUCOSE BLD-MCNC: 114 MG/DL (ref 70–99)
HCT VFR BLD CALC: 39.1 % (ref 41–53)
HDLC SERPL-MCNC: 62 MG/DL
HEMOGLOBIN: 13.3 G/DL (ref 13.5–17.5)
LDL CHOLESTEROL: 35 MG/DL (ref 0–130)
LYMPHOCYTES # BLD: 27 %
MCH RBC QN AUTO: 27.2 PG (ref 26–34)
MCHC RBC AUTO-ENTMCNC: 33.9 G/DL (ref 31–37)
MCV RBC AUTO: 80.2 FL (ref 80–100)
MONOCYTES # BLD: 10 %
PATIENT FASTING?: YES
PDW BLD-RTO: 14.9 % (ref 12.1–15.2)
PLATELET # BLD: 147 K/UL (ref 140–450)
PLATELET ESTIMATE: ABNORMAL
PMV BLD AUTO: ABNORMAL FL (ref 6–12)
POTASSIUM SERPL-SCNC: 4 MMOL/L (ref 3.7–5.3)
RBC # BLD: 4.87 M/UL (ref 4.5–5.9)
RBC # BLD: ABNORMAL 10*6/UL
SEG NEUTROPHILS: 57 %
SEGMENTED NEUTROPHILS ABSOLUTE COUNT: 2.7 K/UL (ref 2.1–6.5)
SODIUM BLD-SCNC: 138 MMOL/L (ref 135–144)
TOTAL PROTEIN: 6.4 G/DL (ref 6.4–8.3)
TRIGL SERPL-MCNC: 115 MG/DL
TSH SERPL DL<=0.05 MIU/L-ACNC: 1.75 MIU/L (ref 0.3–5)
VITAMIN D 25-HYDROXY: 24.8 NG/ML (ref 30–100)
VLDLC SERPL CALC-MCNC: NORMAL MG/DL (ref 1–30)
WBC # BLD: 4.8 K/UL (ref 3.5–11)
WBC # BLD: ABNORMAL 10*3/UL

## 2017-05-09 PROCEDURE — 93005 ELECTROCARDIOGRAM TRACING: CPT

## 2017-05-09 PROCEDURE — 71020 XR CHEST STANDARD TWO VW: CPT

## 2017-05-09 PROCEDURE — 1123F ACP DISCUSS/DSCN MKR DOCD: CPT | Performed by: PHYSICIAN ASSISTANT

## 2017-05-09 PROCEDURE — 97750 PHYSICAL PERFORMANCE TEST: CPT | Performed by: PHYSICIAN ASSISTANT

## 2017-05-09 PROCEDURE — 3017F COLORECTAL CA SCREEN DOC REV: CPT | Performed by: PHYSICIAN ASSISTANT

## 2017-05-09 PROCEDURE — 84443 ASSAY THYROID STIM HORMONE: CPT

## 2017-05-09 PROCEDURE — 4040F PNEUMOC VAC/ADMIN/RCVD: CPT | Performed by: PHYSICIAN ASSISTANT

## 2017-05-09 PROCEDURE — 85025 COMPLETE CBC W/AUTO DIFF WBC: CPT

## 2017-05-09 PROCEDURE — G8599 NO ASA/ANTIPLAT THER USE RNG: HCPCS | Performed by: PHYSICIAN ASSISTANT

## 2017-05-09 PROCEDURE — 80053 COMPREHEN METABOLIC PANEL: CPT

## 2017-05-09 PROCEDURE — 97032 APPL MODALITY 1+ESTIM EA 15: CPT | Performed by: PHYSICIAN ASSISTANT

## 2017-05-09 PROCEDURE — 51784 ANAL/URINARY MUSCLE STUDY: CPT | Performed by: PHYSICIAN ASSISTANT

## 2017-05-09 PROCEDURE — 82306 VITAMIN D 25 HYDROXY: CPT

## 2017-05-09 PROCEDURE — 1036F TOBACCO NON-USER: CPT | Performed by: PHYSICIAN ASSISTANT

## 2017-05-09 PROCEDURE — 36415 COLL VENOUS BLD VENIPUNCTURE: CPT

## 2017-05-09 PROCEDURE — G8427 DOCREV CUR MEDS BY ELIG CLIN: HCPCS | Performed by: PHYSICIAN ASSISTANT

## 2017-05-09 PROCEDURE — 80061 LIPID PANEL: CPT

## 2017-05-09 PROCEDURE — 99212 OFFICE O/P EST SF 10 MIN: CPT | Performed by: PHYSICIAN ASSISTANT

## 2017-05-09 PROCEDURE — 91122 PR ANAL/URINARY MUSCLE STUDY: CPT | Performed by: PHYSICIAN ASSISTANT

## 2017-05-09 PROCEDURE — G8417 CALC BMI ABV UP PARAM F/U: HCPCS | Performed by: PHYSICIAN ASSISTANT

## 2017-05-12 ENCOUNTER — OFFICE VISIT (OUTPATIENT)
Dept: CARDIOLOGY CLINIC | Age: 72
End: 2017-05-12
Payer: MEDICARE

## 2017-05-12 VITALS
DIASTOLIC BLOOD PRESSURE: 84 MMHG | BODY MASS INDEX: 45.19 KG/M2 | SYSTOLIC BLOOD PRESSURE: 120 MMHG | HEART RATE: 63 BPM | WEIGHT: 315 LBS | OXYGEN SATURATION: 96 %

## 2017-05-12 DIAGNOSIS — I25.10 CORONARY ARTERY DISEASE INVOLVING NATIVE CORONARY ARTERY OF NATIVE HEART WITHOUT ANGINA PECTORIS: ICD-10-CM

## 2017-05-12 DIAGNOSIS — Z95.1 HX OF CABG: ICD-10-CM

## 2017-05-12 DIAGNOSIS — E78.2 MIXED HYPERLIPIDEMIA: ICD-10-CM

## 2017-05-12 DIAGNOSIS — R06.02 SOB (SHORTNESS OF BREATH): ICD-10-CM

## 2017-05-12 DIAGNOSIS — E55.9 VITAMIN D DEFICIENCY DISEASE: ICD-10-CM

## 2017-05-12 DIAGNOSIS — Z98.62 HISTORY OF ANGIOPLASTY: Primary | ICD-10-CM

## 2017-05-12 DIAGNOSIS — I10 ESSENTIAL HYPERTENSION: ICD-10-CM

## 2017-05-12 PROCEDURE — 99214 OFFICE O/P EST MOD 30 MIN: CPT | Performed by: INTERNAL MEDICINE

## 2017-05-12 PROCEDURE — 4040F PNEUMOC VAC/ADMIN/RCVD: CPT | Performed by: INTERNAL MEDICINE

## 2017-05-12 PROCEDURE — 3017F COLORECTAL CA SCREEN DOC REV: CPT | Performed by: INTERNAL MEDICINE

## 2017-05-12 PROCEDURE — G8417 CALC BMI ABV UP PARAM F/U: HCPCS | Performed by: INTERNAL MEDICINE

## 2017-05-12 PROCEDURE — G8427 DOCREV CUR MEDS BY ELIG CLIN: HCPCS | Performed by: INTERNAL MEDICINE

## 2017-05-12 PROCEDURE — 1123F ACP DISCUSS/DSCN MKR DOCD: CPT | Performed by: INTERNAL MEDICINE

## 2017-05-12 PROCEDURE — G8599 NO ASA/ANTIPLAT THER USE RNG: HCPCS | Performed by: INTERNAL MEDICINE

## 2017-05-12 PROCEDURE — 1036F TOBACCO NON-USER: CPT | Performed by: INTERNAL MEDICINE

## 2017-05-15 LAB
EKG ATRIAL RATE: 70 BPM
EKG Q-T INTERVAL: 380 MS
EKG QRS DURATION: 106 MS
EKG QTC CALCULATION (BAZETT): 438 MS
EKG R AXIS: 16 DEGREES
EKG T AXIS: 24 DEGREES
EKG VENTRICULAR RATE: 80 BPM

## 2017-05-22 ENCOUNTER — HOSPITAL ENCOUNTER (OUTPATIENT)
Dept: NON INVASIVE DIAGNOSTICS | Age: 72
Discharge: HOME | End: 2017-05-22
Payer: MEDICARE

## 2017-05-22 ENCOUNTER — HOSPITAL ENCOUNTER (OUTPATIENT)
Dept: PULMONOLOGY | Age: 72
Discharge: HOME | End: 2017-05-22
Payer: MEDICARE

## 2017-05-22 DIAGNOSIS — Z98.62 HISTORY OF ANGIOPLASTY: ICD-10-CM

## 2017-05-22 DIAGNOSIS — R06.02 SOB (SHORTNESS OF BREATH): ICD-10-CM

## 2017-05-22 DIAGNOSIS — I10 ESSENTIAL HYPERTENSION: ICD-10-CM

## 2017-05-22 DIAGNOSIS — E78.2 MIXED HYPERLIPIDEMIA: ICD-10-CM

## 2017-05-22 DIAGNOSIS — I25.10 CORONARY ARTERY DISEASE INVOLVING NATIVE CORONARY ARTERY OF NATIVE HEART WITHOUT ANGINA PECTORIS: ICD-10-CM

## 2017-05-22 DIAGNOSIS — Z95.1 HX OF CABG: ICD-10-CM

## 2017-05-22 DIAGNOSIS — E55.9 VITAMIN D DEFICIENCY DISEASE: ICD-10-CM

## 2017-05-22 DIAGNOSIS — F32.A DEPRESSION, UNSPECIFIED DEPRESSION TYPE: ICD-10-CM

## 2017-05-22 LAB
LV EF: 60 %
LVEF MODALITY: NORMAL

## 2017-05-22 PROCEDURE — 6360000002 HC RX W HCPCS: Performed by: INTERNAL MEDICINE

## 2017-05-22 PROCEDURE — 94726 PLETHYSMOGRAPHY LUNG VOLUMES: CPT

## 2017-05-22 PROCEDURE — 94060 EVALUATION OF WHEEZING: CPT

## 2017-05-22 PROCEDURE — 94729 DIFFUSING CAPACITY: CPT

## 2017-05-22 PROCEDURE — 93306 TTE W/DOPPLER COMPLETE: CPT

## 2017-05-22 RX ORDER — ALBUTEROL SULFATE 2.5 MG/3ML
2.5 SOLUTION RESPIRATORY (INHALATION) ONCE
Status: COMPLETED | OUTPATIENT
Start: 2017-05-22 | End: 2017-05-22

## 2017-05-22 RX ORDER — ESCITALOPRAM OXALATE 10 MG/1
10 TABLET ORAL DAILY
Qty: 90 TABLET | Refills: 1 | Status: SHIPPED | OUTPATIENT
Start: 2017-05-22 | End: 2017-12-12 | Stop reason: SDUPTHER

## 2017-05-22 RX ORDER — METOPROLOL SUCCINATE 25 MG/1
TABLET, EXTENDED RELEASE ORAL
Qty: 90 TABLET | Refills: 0 | OUTPATIENT
Start: 2017-05-22

## 2017-05-22 RX ORDER — METOPROLOL SUCCINATE 25 MG/1
25 TABLET, EXTENDED RELEASE ORAL DAILY
Qty: 90 TABLET | Refills: 3 | Status: SHIPPED | OUTPATIENT
Start: 2017-05-22 | End: 2018-06-08 | Stop reason: SDUPTHER

## 2017-05-22 RX ORDER — ESCITALOPRAM OXALATE 10 MG/1
TABLET ORAL
Qty: 90 TABLET | Refills: 0 | OUTPATIENT
Start: 2017-05-22

## 2017-05-22 RX ADMIN — ALBUTEROL SULFATE 2.5 MG: 2.5 SOLUTION RESPIRATORY (INHALATION) at 12:30

## 2017-05-23 ENCOUNTER — OFFICE VISIT (OUTPATIENT)
Dept: FAMILY MEDICINE CLINIC | Age: 72
End: 2017-05-23
Payer: MEDICARE

## 2017-05-23 VITALS
SYSTOLIC BLOOD PRESSURE: 128 MMHG | BODY MASS INDEX: 42.66 KG/M2 | WEIGHT: 315 LBS | RESPIRATION RATE: 18 BRPM | DIASTOLIC BLOOD PRESSURE: 80 MMHG | HEART RATE: 70 BPM | HEIGHT: 72 IN

## 2017-05-23 DIAGNOSIS — Z00.00 ROUTINE GENERAL MEDICAL EXAMINATION AT A HEALTH CARE FACILITY: Primary | ICD-10-CM

## 2017-05-23 DIAGNOSIS — Z23 NEED FOR PROPHYLACTIC VACCINATION AGAINST STREPTOCOCCUS PNEUMONIAE (PNEUMOCOCCUS): ICD-10-CM

## 2017-05-23 DIAGNOSIS — E66.01 MORBID OBESITY DUE TO EXCESS CALORIES (HCC): ICD-10-CM

## 2017-05-23 DIAGNOSIS — E78.2 MIXED HYPERLIPIDEMIA: ICD-10-CM

## 2017-05-23 DIAGNOSIS — T84.218S FRACTURED STERNAL WIRES, SEQUELA: ICD-10-CM

## 2017-05-23 DIAGNOSIS — M54.40 ACUTE RIGHT-SIDED LOW BACK PAIN WITH SCIATICA, SCIATICA LATERALITY UNSPECIFIED: ICD-10-CM

## 2017-05-23 DIAGNOSIS — I10 ESSENTIAL HYPERTENSION: ICD-10-CM

## 2017-05-23 DIAGNOSIS — Z95.1 HX OF CABG: ICD-10-CM

## 2017-05-23 PROCEDURE — G0009 ADMIN PNEUMOCOCCAL VACCINE: HCPCS | Performed by: NURSE PRACTITIONER

## 2017-05-23 PROCEDURE — 90670 PCV13 VACCINE IM: CPT | Performed by: NURSE PRACTITIONER

## 2017-05-23 PROCEDURE — G0438 PPPS, INITIAL VISIT: HCPCS | Performed by: NURSE PRACTITIONER

## 2017-05-23 ASSESSMENT — LIFESTYLE VARIABLES
HOW OFTEN DO YOU HAVE SIX OR MORE DRINKS ON ONE OCCASION: 0
HOW OFTEN DURING THE LAST YEAR HAVE YOU BEEN UNABLE TO REMEMBER WHAT HAPPENED THE NIGHT BEFORE BECAUSE YOU HAD BEEN DRINKING: 0
HOW MANY STANDARD DRINKS CONTAINING ALCOHOL DO YOU HAVE ON A TYPICAL DAY: 0
AUDIT-C TOTAL SCORE: 1
HOW OFTEN DO YOU HAVE A DRINK CONTAINING ALCOHOL: 1
AUDIT TOTAL SCORE: 1
HOW OFTEN DURING THE LAST YEAR HAVE YOU FOUND THAT YOU WERE NOT ABLE TO STOP DRINKING ONCE YOU HAD STARTED: 0
HAVE YOU OR SOMEONE ELSE BEEN INJURED AS A RESULT OF YOUR DRINKING: 0
HOW OFTEN DURING THE LAST YEAR HAVE YOU HAD A FEELING OF GUILT OR REMORSE AFTER DRINKING: 0
HOW OFTEN DURING THE LAST YEAR HAVE YOU NEEDED AN ALCOHOLIC DRINK FIRST THING IN THE MORNING TO GET YOURSELF GOING AFTER A NIGHT OF HEAVY DRINKING: 0
HAS A RELATIVE, FRIEND, DOCTOR, OR ANOTHER HEALTH PROFESSIONAL EXPRESSED CONCERN ABOUT YOUR DRINKING OR SUGGESTED YOU CUT DOWN: 0
HOW OFTEN DURING THE LAST YEAR HAVE YOU FAILED TO DO WHAT WAS NORMALLY EXPECTED FROM YOU BECAUSE OF DRINKING: 0

## 2017-05-23 ASSESSMENT — ANXIETY QUESTIONNAIRES: GAD7 TOTAL SCORE: 2

## 2017-05-26 ENCOUNTER — TELEPHONE (OUTPATIENT)
Dept: CARDIOLOGY CLINIC | Age: 72
End: 2017-05-26

## 2017-05-30 ENCOUNTER — TELEPHONE (OUTPATIENT)
Dept: CARDIOLOGY CLINIC | Age: 72
End: 2017-05-30

## 2017-05-30 DIAGNOSIS — R06.02 SOB (SHORTNESS OF BREATH): Primary | ICD-10-CM

## 2017-05-30 DIAGNOSIS — I25.10 CORONARY ARTERY DISEASE INVOLVING NATIVE CORONARY ARTERY OF NATIVE HEART WITHOUT ANGINA PECTORIS: ICD-10-CM

## 2017-05-30 DIAGNOSIS — I10 ESSENTIAL HYPERTENSION: ICD-10-CM

## 2017-06-05 ENCOUNTER — HOSPITAL ENCOUNTER (OUTPATIENT)
Dept: NUCLEAR MEDICINE | Age: 72
Discharge: HOME | End: 2017-06-05
Payer: MEDICARE

## 2017-06-05 ENCOUNTER — HOSPITAL ENCOUNTER (OUTPATIENT)
Dept: NON INVASIVE DIAGNOSTICS | Age: 72
Discharge: HOME | End: 2017-06-05
Payer: MEDICARE

## 2017-06-05 VITALS — SYSTOLIC BLOOD PRESSURE: 116 MMHG | HEART RATE: 78 BPM | DIASTOLIC BLOOD PRESSURE: 70 MMHG

## 2017-06-05 DIAGNOSIS — R06.02 SOB (SHORTNESS OF BREATH): ICD-10-CM

## 2017-06-05 DIAGNOSIS — I25.10 CORONARY ARTERY DISEASE INVOLVING NATIVE CORONARY ARTERY OF NATIVE HEART WITHOUT ANGINA PECTORIS: ICD-10-CM

## 2017-06-05 DIAGNOSIS — I10 ESSENTIAL HYPERTENSION: ICD-10-CM

## 2017-06-05 PROCEDURE — 3430000000 HC RX DIAGNOSTIC RADIOPHARMACEUTICAL: Performed by: INTERNAL MEDICINE

## 2017-06-05 PROCEDURE — A9500 TC99M SESTAMIBI: HCPCS | Performed by: INTERNAL MEDICINE

## 2017-06-05 PROCEDURE — 6360000002 HC RX W HCPCS: Performed by: INTERNAL MEDICINE

## 2017-06-05 PROCEDURE — 78452 HT MUSCLE IMAGE SPECT MULT: CPT

## 2017-06-05 PROCEDURE — 93017 CV STRESS TEST TRACING ONLY: CPT

## 2017-06-05 PROCEDURE — 2580000003 HC RX 258: Performed by: INTERNAL MEDICINE

## 2017-06-05 RX ORDER — AMINOPHYLLINE DIHYDRATE 25 MG/ML
50 INJECTION, SOLUTION INTRAVENOUS
Status: DISPENSED | OUTPATIENT
Start: 2017-06-05 | End: 2017-06-05

## 2017-06-05 RX ORDER — 0.9 % SODIUM CHLORIDE 0.9 %
10 VIAL (ML) INJECTION PRN
Status: DISCONTINUED | OUTPATIENT
Start: 2017-06-05 | End: 2017-06-06 | Stop reason: HOSPADM

## 2017-06-05 RX ORDER — AMINOPHYLLINE DIHYDRATE 25 MG/ML
100 INJECTION, SOLUTION INTRAVENOUS
Status: ACTIVE | OUTPATIENT
Start: 2017-06-05 | End: 2017-06-05

## 2017-06-05 RX ADMIN — REGADENOSON 0.4 MG: 0.08 INJECTION, SOLUTION INTRAVENOUS at 10:31

## 2017-06-05 RX ADMIN — SODIUM CHLORIDE, PRESERVATIVE FREE 10 ML: 5 INJECTION INTRAVENOUS at 10:31

## 2017-06-05 RX ADMIN — TETRAKIS(2-METHOXYISOBUTYLISOCYANIDE)COPPER(I) TETRAFLUOROBORATE 30 MILLICURIE: 1 INJECTION, POWDER, LYOPHILIZED, FOR SOLUTION INTRAVENOUS at 10:31

## 2017-06-05 RX ADMIN — TETRAKIS(2-METHOXYISOBUTYLISOCYANIDE)COPPER(I) TETRAFLUOROBORATE 10 MILLICURIE: 1 INJECTION, POWDER, LYOPHILIZED, FOR SOLUTION INTRAVENOUS at 09:20

## 2017-06-09 ENCOUNTER — TELEPHONE (OUTPATIENT)
Dept: CARDIOLOGY CLINIC | Age: 72
End: 2017-06-09

## 2017-06-09 RX ORDER — CLOPIDOGREL BISULFATE 75 MG/1
75 TABLET ORAL DAILY
Qty: 30 TABLET | Refills: 3 | Status: SHIPPED | OUTPATIENT
Start: 2017-06-09 | End: 2017-10-18 | Stop reason: SDUPTHER

## 2017-06-09 RX ORDER — ISOSORBIDE MONONITRATE 30 MG/1
30 TABLET, EXTENDED RELEASE ORAL DAILY
Qty: 30 TABLET | Refills: 3 | Status: SHIPPED | OUTPATIENT
Start: 2017-06-09 | End: 2017-10-18 | Stop reason: SDUPTHER

## 2017-06-13 ENCOUNTER — PROCEDURE VISIT (OUTPATIENT)
Dept: UROLOGY | Age: 72
End: 2017-06-13
Payer: MEDICARE

## 2017-06-13 VITALS
HEIGHT: 71 IN | DIASTOLIC BLOOD PRESSURE: 78 MMHG | SYSTOLIC BLOOD PRESSURE: 120 MMHG | BODY MASS INDEX: 44.1 KG/M2 | WEIGHT: 315 LBS

## 2017-06-13 DIAGNOSIS — R35.0 URINARY FREQUENCY: ICD-10-CM

## 2017-06-13 DIAGNOSIS — R39.15 URINARY URGENCY: ICD-10-CM

## 2017-06-13 DIAGNOSIS — M62.81 MUSCLE WEAKNESS: ICD-10-CM

## 2017-06-13 DIAGNOSIS — N39.3 STRESS INCONTINENCE, MALE: Primary | ICD-10-CM

## 2017-06-13 PROCEDURE — 1036F TOBACCO NON-USER: CPT | Performed by: PHYSICIAN ASSISTANT

## 2017-06-13 PROCEDURE — 99213 OFFICE O/P EST LOW 20 MIN: CPT | Performed by: PHYSICIAN ASSISTANT

## 2017-06-13 PROCEDURE — 97750 PHYSICAL PERFORMANCE TEST: CPT | Performed by: PHYSICIAN ASSISTANT

## 2017-06-13 PROCEDURE — G8417 CALC BMI ABV UP PARAM F/U: HCPCS | Performed by: PHYSICIAN ASSISTANT

## 2017-06-13 PROCEDURE — 4040F PNEUMOC VAC/ADMIN/RCVD: CPT | Performed by: PHYSICIAN ASSISTANT

## 2017-06-13 PROCEDURE — G8427 DOCREV CUR MEDS BY ELIG CLIN: HCPCS | Performed by: PHYSICIAN ASSISTANT

## 2017-06-13 PROCEDURE — G8598 ASA/ANTIPLAT THER USED: HCPCS | Performed by: PHYSICIAN ASSISTANT

## 2017-06-13 PROCEDURE — 97032 APPL MODALITY 1+ESTIM EA 15: CPT | Performed by: PHYSICIAN ASSISTANT

## 2017-06-13 PROCEDURE — 91122 PR ANAL/URINARY MUSCLE STUDY: CPT | Performed by: PHYSICIAN ASSISTANT

## 2017-06-13 PROCEDURE — 3017F COLORECTAL CA SCREEN DOC REV: CPT | Performed by: PHYSICIAN ASSISTANT

## 2017-06-13 PROCEDURE — 51784 ANAL/URINARY MUSCLE STUDY: CPT | Performed by: PHYSICIAN ASSISTANT

## 2017-06-13 PROCEDURE — 1123F ACP DISCUSS/DSCN MKR DOCD: CPT | Performed by: PHYSICIAN ASSISTANT

## 2017-07-11 RX ORDER — ATORVASTATIN CALCIUM 80 MG/1
TABLET, FILM COATED ORAL
Qty: 90 TABLET | Refills: 0 | Status: SHIPPED | OUTPATIENT
Start: 2017-07-11 | End: 2017-10-10 | Stop reason: SDUPTHER

## 2017-07-12 DIAGNOSIS — I10 ESSENTIAL HYPERTENSION: ICD-10-CM

## 2017-07-12 RX ORDER — LOSARTAN POTASSIUM 25 MG/1
25 TABLET ORAL DAILY
Qty: 90 TABLET | Refills: 3 | Status: SHIPPED | OUTPATIENT
Start: 2017-07-12 | End: 2018-07-24 | Stop reason: SDUPTHER

## 2017-07-14 ENCOUNTER — OFFICE VISIT (OUTPATIENT)
Dept: CARDIOLOGY CLINIC | Age: 72
End: 2017-07-14
Payer: MEDICARE

## 2017-07-14 VITALS
OXYGEN SATURATION: 96 % | HEART RATE: 88 BPM | BODY MASS INDEX: 44.49 KG/M2 | SYSTOLIC BLOOD PRESSURE: 130 MMHG | WEIGHT: 315 LBS | DIASTOLIC BLOOD PRESSURE: 70 MMHG

## 2017-07-14 DIAGNOSIS — E78.2 MIXED HYPERLIPIDEMIA: ICD-10-CM

## 2017-07-14 DIAGNOSIS — Z95.1 HX OF CABG: ICD-10-CM

## 2017-07-14 DIAGNOSIS — I25.10 CORONARY ARTERY DISEASE INVOLVING NATIVE CORONARY ARTERY OF NATIVE HEART WITHOUT ANGINA PECTORIS: ICD-10-CM

## 2017-07-14 DIAGNOSIS — E55.9 VITAMIN D DEFICIENCY DISEASE: ICD-10-CM

## 2017-07-14 DIAGNOSIS — Z98.62 HISTORY OF ANGIOPLASTY: Primary | ICD-10-CM

## 2017-07-14 PROCEDURE — G8417 CALC BMI ABV UP PARAM F/U: HCPCS | Performed by: INTERNAL MEDICINE

## 2017-07-14 PROCEDURE — G8427 DOCREV CUR MEDS BY ELIG CLIN: HCPCS | Performed by: INTERNAL MEDICINE

## 2017-07-14 PROCEDURE — G8598 ASA/ANTIPLAT THER USED: HCPCS | Performed by: INTERNAL MEDICINE

## 2017-07-14 PROCEDURE — 1123F ACP DISCUSS/DSCN MKR DOCD: CPT | Performed by: INTERNAL MEDICINE

## 2017-07-14 PROCEDURE — 99213 OFFICE O/P EST LOW 20 MIN: CPT | Performed by: INTERNAL MEDICINE

## 2017-07-14 PROCEDURE — 1036F TOBACCO NON-USER: CPT | Performed by: INTERNAL MEDICINE

## 2017-07-14 PROCEDURE — 4040F PNEUMOC VAC/ADMIN/RCVD: CPT | Performed by: INTERNAL MEDICINE

## 2017-07-14 PROCEDURE — 3017F COLORECTAL CA SCREEN DOC REV: CPT | Performed by: INTERNAL MEDICINE

## 2017-07-14 RX ORDER — BUDESONIDE AND FORMOTEROL FUMARATE DIHYDRATE 160; 4.5 UG/1; UG/1
2 AEROSOL RESPIRATORY (INHALATION) 2 TIMES DAILY
Qty: 1 INHALER | Refills: 3 | Status: SHIPPED | OUTPATIENT
Start: 2017-07-14 | End: 2019-12-09

## 2017-10-06 ENCOUNTER — NURSE ONLY (OUTPATIENT)
Dept: FAMILY MEDICINE CLINIC | Age: 72
End: 2017-10-06
Payer: MEDICARE

## 2017-10-06 DIAGNOSIS — Z23 INFLUENZA VACCINE NEEDED: Primary | ICD-10-CM

## 2017-10-06 PROCEDURE — G0008 ADMIN INFLUENZA VIRUS VAC: HCPCS | Performed by: NURSE PRACTITIONER

## 2017-10-06 PROCEDURE — 90686 IIV4 VACC NO PRSV 0.5 ML IM: CPT | Performed by: NURSE PRACTITIONER

## 2017-10-06 NOTE — PROGRESS NOTES
After obtaining consent, and per orders of Donnell Varner CNP, injection of  Fluzone given in Right Deltoid by Sharee Price. Patient instructed to remain in clinic for 20 minutes afterwards, and to report any adverse reaction to me immediately. No reaction noted.

## 2017-10-10 RX ORDER — ATORVASTATIN CALCIUM 80 MG/1
TABLET, FILM COATED ORAL
Qty: 90 TABLET | Refills: 0 | Status: SHIPPED | OUTPATIENT
Start: 2017-10-10 | End: 2018-01-10 | Stop reason: SDUPTHER

## 2017-10-19 RX ORDER — ISOSORBIDE MONONITRATE 30 MG/1
TABLET, EXTENDED RELEASE ORAL
Qty: 30 TABLET | Refills: 3 | Status: SHIPPED | OUTPATIENT
Start: 2017-10-19 | End: 2017-10-19 | Stop reason: SDUPTHER

## 2017-10-19 RX ORDER — CLOPIDOGREL BISULFATE 75 MG/1
TABLET ORAL
Qty: 30 TABLET | Refills: 3 | Status: SHIPPED | OUTPATIENT
Start: 2017-10-19 | End: 2018-11-28 | Stop reason: ALTCHOICE

## 2017-10-19 RX ORDER — ISOSORBIDE MONONITRATE 30 MG/1
TABLET, EXTENDED RELEASE ORAL
Qty: 90 TABLET | Refills: 3 | Status: SHIPPED | OUTPATIENT
Start: 2017-10-19 | End: 2018-11-30 | Stop reason: DRUGHIGH

## 2017-10-30 ENCOUNTER — OFFICE VISIT (OUTPATIENT)
Dept: CARDIOLOGY CLINIC | Age: 72
End: 2017-10-30
Payer: MEDICARE

## 2017-10-30 VITALS
DIASTOLIC BLOOD PRESSURE: 70 MMHG | BODY MASS INDEX: 45.61 KG/M2 | WEIGHT: 315 LBS | SYSTOLIC BLOOD PRESSURE: 120 MMHG | OXYGEN SATURATION: 96 % | HEART RATE: 98 BPM

## 2017-10-30 DIAGNOSIS — R06.02 SOB (SHORTNESS OF BREATH): ICD-10-CM

## 2017-10-30 DIAGNOSIS — E55.9 VITAMIN D DEFICIENCY DISEASE: ICD-10-CM

## 2017-10-30 DIAGNOSIS — I25.10 CORONARY ARTERY DISEASE INVOLVING NATIVE CORONARY ARTERY OF NATIVE HEART WITHOUT ANGINA PECTORIS: ICD-10-CM

## 2017-10-30 DIAGNOSIS — I10 ESSENTIAL HYPERTENSION: Primary | ICD-10-CM

## 2017-10-30 DIAGNOSIS — E78.2 MIXED HYPERLIPIDEMIA: ICD-10-CM

## 2017-10-30 PROCEDURE — G8417 CALC BMI ABV UP PARAM F/U: HCPCS | Performed by: INTERNAL MEDICINE

## 2017-10-30 PROCEDURE — G8484 FLU IMMUNIZE NO ADMIN: HCPCS | Performed by: INTERNAL MEDICINE

## 2017-10-30 PROCEDURE — 4040F PNEUMOC VAC/ADMIN/RCVD: CPT | Performed by: INTERNAL MEDICINE

## 2017-10-30 PROCEDURE — 1123F ACP DISCUSS/DSCN MKR DOCD: CPT | Performed by: INTERNAL MEDICINE

## 2017-10-30 PROCEDURE — G8427 DOCREV CUR MEDS BY ELIG CLIN: HCPCS | Performed by: INTERNAL MEDICINE

## 2017-10-30 PROCEDURE — 99213 OFFICE O/P EST LOW 20 MIN: CPT | Performed by: INTERNAL MEDICINE

## 2017-10-30 PROCEDURE — 1036F TOBACCO NON-USER: CPT | Performed by: INTERNAL MEDICINE

## 2017-10-30 PROCEDURE — 3017F COLORECTAL CA SCREEN DOC REV: CPT | Performed by: INTERNAL MEDICINE

## 2017-10-30 PROCEDURE — G8598 ASA/ANTIPLAT THER USED: HCPCS | Performed by: INTERNAL MEDICINE

## 2017-10-30 NOTE — PROGRESS NOTES
Ov Dr Mohan Bernard 3 mth follow up  Seen DR Lev Damon cleared   Sob still same  No hospitalizations or procedures   Since seen  Sharon Robvikki about half a block twice a day  Sometimes able to go further   Does better in cooler weather  Has back pain too difficulty walking  Sees  in [place] for back  Stop imdur  When walking if no difference stay off  Med  Encourage to use rescue inhaler prior  To walking instead of waiting to get sob  Will see in 1 year.

## 2017-10-30 NOTE — LETTER
oriented to person, place and time. Answered questions appropriately. SKIN:  No unusual skin changes. HEENT:  The pupils are equally round and reactive to light and accommodation. Extraocular movements were intact. Mucous membranes were dry. NECK:  No JVD. Good carotid pulses. No carotid bruits. No lymphadenopathy or thyromegaly. CARDIOVASCULAR EXAM:  S1 and S2 were normal.  No S3 or S4. Soft systolic blowing type murmur. No diastolic murmur. PMI was normal.  No lifts, thrusts or pericardial friction rub. LUNGS:  Quite clear to auscultation and percussion. ABDOMEN:  Soft and nontender. Good bowel sounds. Makayla Sprung EXTREMITIES:  Good femoral, good pedal pulses. Mild pedal edema. He will therefore not do any sternal wiring per discussion with Dr. Ileana Daugherty. His energy level is about the same, limited by his weight and inactivity. He is walking half a block to a block twice a day as he walks down to the bus. His breathing occasionally will make him stop. I asked him to take his albuterol inhaler just before he started his walk to see if he might be able to walk slightly further. I think otherwise his cardiac status is stable. I will see him in one year in followup. If he should have any unusual shortness of breath before then, I will be glad to see him at any time. Thank you very much for allowing me the privilege of seeing Mr. Caron Norris. Any questions on my thoughts, please do not hesitate to contact me.     Sincerely,        Onofre Samaniego    D: 10/30/2017 9:30:55       T: 10/30/2017 14:45:50     [email or web address]  Job#: [id number]     Doc#: [id number]

## 2017-11-01 NOTE — PROGRESS NOTES
Yaw Mendiola M.D. [address] [place] [address] [phone]          2017          Alyce Walter, DAPHNIE  Jael Carbone [address]      RE:  Hany Mathis  : 1945    Dear Aayush Flores:    CHIEF COMPLAINT:  1. Shortness of breath. 2.  Rupture of sternal wires with mobile sternum. HISTORY OF PRESENT ILLNESS:  I met with Mr. Leydi Britton on 10/30/2017. As you know, he has had shortness of breath with minimal walking. I did pulmonary functions tests, which showed some reversibility in his lung functions, and therefore, we placed him on Symbicort at two puffs b.i.d.  I also gave him a rescue inhaler to use as needed. In addition, I sent him to Dr. Barrera Valderrama for consideration for rewiring his sternum. Dr. Shahla Anne felt that it would not be necessary unless he was having more pain in his chest.  Mr. Leydi Britton has adjusted very well to his completely mobile sternum and is functioning quite well. He goes for a walk twice a day down to the bus stop to get his grandson. This is approximately half a block. He walks down and gets a ride back. He states his shortness of breath is about at baseline. His back also limits his activity. He has had no exertional chest pain. Energy level is fairly stable. MEDICATIONS:  His medications today are albuterol p.r.n., Janki aspirin 5 grains daily, Lipitor 80 mg daily, Symbicort two puffs b.i.d., Plavix 75 mg daily, Colace 100 mg p.r.n., Lexapro 10 mg daily, HydroDIURIL 25 mg daily, Imdur 30 mg daily, Cozaar 25 mg daily, metoprolol XL 25 mg daily, Prilosec 20 mg daily, Percocet p.r.n. PHYSICAL EXAMINATION:   VITAL SIGNS:  His blood pressure was 130/70 with a heart rate of 60 and regular. Respiratory rate 18. O2 sat 96%. Weight 227 pounds. GENERAL:  He is a pleasant 51-year-old gentleman. Denied pain. He was oriented to person, place and time.   Answered questions appropriately. SKIN:  No unusual skin changes. HEENT:  The pupils are equally round and reactive to light and accommodation. Extraocular movements were intact. Mucous membranes were dry. NECK:  No JVD. Good carotid pulses. No carotid bruits. No lymphadenopathy or thyromegaly. CARDIOVASCULAR EXAM:  S1 and S2 were normal.  No S3 or S4. Soft systolic blowing type murmur. No diastolic murmur. PMI was normal.  No lifts, thrusts or pericardial friction rub. LUNGS:  Quite clear to auscultation and percussion. ABDOMEN:  Soft and nontender. Good bowel sounds. Donice Britt EXTREMITIES:  Good femoral, good pedal pulses. Mild pedal edema. He will therefore not do any sternal wiring per discussion with Dr. Shahla Anne. His energy level is about the same, limited by his weight and inactivity. He is walking half a block to a block twice a day as he walks down to the bus. His breathing occasionally will make him stop. I asked him to take his albuterol inhaler just before he started his walk to see if he might be able to walk slightly further. I think otherwise his cardiac status is stable. I will see him in one year in followup. If he should have any unusual shortness of breath before then, I will be glad to see him at any time. Thank you very much for allowing me the privilege of seeing Mr. Leydi Britton. Any questions on my thoughts, please do not hesitate to contact me.     Sincerely,        Lucius Thao    D: 10/30/2017 9:30:55       T: 10/30/2017 14:45:50     [email or web address]  Job#: [id number]     Doc#: [id number]

## 2017-11-17 ENCOUNTER — OFFICE VISIT (OUTPATIENT)
Dept: FAMILY MEDICINE CLINIC | Age: 72
End: 2017-11-17
Payer: MEDICARE

## 2017-11-17 VITALS
BODY MASS INDEX: 42.66 KG/M2 | OXYGEN SATURATION: 97 % | WEIGHT: 315 LBS | RESPIRATION RATE: 18 BRPM | HEART RATE: 67 BPM | DIASTOLIC BLOOD PRESSURE: 80 MMHG | SYSTOLIC BLOOD PRESSURE: 128 MMHG | HEIGHT: 72 IN

## 2017-11-17 DIAGNOSIS — I10 ESSENTIAL HYPERTENSION: Primary | ICD-10-CM

## 2017-11-17 DIAGNOSIS — Z13.31 POSITIVE DEPRESSION SCREENING: ICD-10-CM

## 2017-11-17 DIAGNOSIS — Z51.81 ENCOUNTER FOR MONITORING STATIN THERAPY: ICD-10-CM

## 2017-11-17 DIAGNOSIS — Z12.5 SCREENING FOR PROSTATE CANCER: ICD-10-CM

## 2017-11-17 DIAGNOSIS — E78.2 MIXED HYPERLIPIDEMIA: ICD-10-CM

## 2017-11-17 DIAGNOSIS — I25.83 CORONARY ARTERY DISEASE DUE TO LIPID RICH PLAQUE: ICD-10-CM

## 2017-11-17 DIAGNOSIS — Z79.899 ENCOUNTER FOR MONITORING STATIN THERAPY: ICD-10-CM

## 2017-11-17 DIAGNOSIS — I25.10 CORONARY ARTERY DISEASE DUE TO LIPID RICH PLAQUE: ICD-10-CM

## 2017-11-17 PROCEDURE — 1123F ACP DISCUSS/DSCN MKR DOCD: CPT | Performed by: NURSE PRACTITIONER

## 2017-11-17 PROCEDURE — G8431 POS CLIN DEPRES SCRN F/U DOC: HCPCS | Performed by: NURSE PRACTITIONER

## 2017-11-17 PROCEDURE — G8598 ASA/ANTIPLAT THER USED: HCPCS | Performed by: NURSE PRACTITIONER

## 2017-11-17 PROCEDURE — 99214 OFFICE O/P EST MOD 30 MIN: CPT | Performed by: NURSE PRACTITIONER

## 2017-11-17 PROCEDURE — 1036F TOBACCO NON-USER: CPT | Performed by: NURSE PRACTITIONER

## 2017-11-17 PROCEDURE — G8417 CALC BMI ABV UP PARAM F/U: HCPCS | Performed by: NURSE PRACTITIONER

## 2017-11-17 PROCEDURE — 4040F PNEUMOC VAC/ADMIN/RCVD: CPT | Performed by: NURSE PRACTITIONER

## 2017-11-17 PROCEDURE — G8427 DOCREV CUR MEDS BY ELIG CLIN: HCPCS | Performed by: NURSE PRACTITIONER

## 2017-11-17 PROCEDURE — G8484 FLU IMMUNIZE NO ADMIN: HCPCS | Performed by: NURSE PRACTITIONER

## 2017-11-17 PROCEDURE — 3017F COLORECTAL CA SCREEN DOC REV: CPT | Performed by: NURSE PRACTITIONER

## 2017-11-17 NOTE — PROGRESS NOTES
On the basis of positive PHQ-9 screening ( ), the following plan was implemented: patient declines further evaluation/treatment for depression. Patient will follow-up in 6 month(s) with PCP.

## 2017-11-17 NOTE — PROGRESS NOTES
cancer screen colonoscopy  10/26/2025    Zostavax vaccine  Completed    Flu vaccine  Completed    Pneumococcal low/med risk  Completed    Hepatitis C screen  Completed       HPI:   Ryder Wu is a 81 y.o. male who presents today for his medical conditions/complaints as noted below. Ryder Wu is c/o of Check-Up (HTN )      HPI    Check up hypertension no chest pain, no dizziness, hx sternal wires broke with minimal separation but click is present when turning head to right. Do not add salt to diet. On losartan, hctz, metoprolol. Hyperlipidemia on statin tolerating without side effects. lipitor 80 mg daily    Chronic back pain with hx lumbar surgery, no brace. Obesity present. CAD follows with cardiology on ASA and imdur. Obesity limited in activity and deconditioned. Prostate denies LUTS    Past Medical History:   Diagnosis Date    Arthritis     Ascending cholangitis 10/2/14     at [facility] assisted gastric remnant to open    Blood in stool 2011    CAD (coronary artery disease)     Chronic back pain     DDD    Heart disease 5-9-12    History of angioplasty 7/2008    2 stents placed    Hx of CABG     x1 in 2015    Hyperlipidemia     Hypertension     onset age 41    Obesity     Stress incontinence, male 3/21/2017    Transfusion history     Unspecified sleep apnea     cpap-DOES NOT USE MACHINE      Past Surgical History:   Procedure Laterality Date   Randolph Grande  2005    Jaylan Cook BARIATRIC SURGERY  2001    Cecy Hatch -EFRAIN at [address] Left 05/09/12    Left main normal.  Left anterior descending artery:  Plaque disease. Ramus: Plaque disease Circumflex:nondominant, plaque disease. OM1 normal. Right coronoary artery:  dominant & luminal irregularities. Left ventricular ejection fraction 60. Mitral valve normal with no insufficinecy of the mitral valve. Aortic valve normal with no stenosis of the aortic valve.   Edp was normal.    CARDIAC CATHETERIZATION Left 03/04/15    Dr. Ramirez --Severe CAD, 80% in-stent stenosis in the left anterior descending coronary artery in a rather long segment, very eccentric,extending almost to the ostium of the left anterior descending coronary artery. 70% in-stent stenosis of a very large dominant right coronary artery. Unremarkable small circumflex. Normal left ventricular function, ejection fraction of 60%. [address]  2008    2 stents    CHOLECYSTECTOMY  05/30/2014    open [facility] Dr. [place] COLONOSCOPY  10/2015    repeat in 2022    [facility]      2 vessel March 3th 2015    CYSTOSCOPY  05/10/2016    with laser TURP    ERCP  10/2/14    Dr. Na Angelo with laparotomy    HERNIA REPAIR  9/2003    Hiatal    JOINT REPLACEMENT Left 03-    Hip     KNEE ARTHROSCOPY Right 9/26/13    Dr. Manisha Mota [facility] SURGERY  11/02/2016    rt. L3-4, L4-5 decompression.  L4-5 discectomy and fusion    SHOULDER SURGERY Left 2000    arthroscopy    SHOULDER SURGERY Left 2007    replaced humeral head    TOTAL HIP ARTHROPLASTY Right     02/03/2016       Family History   Problem Relation Age of Onset    Diabetes Mother     Heart Disease Mother     Arthritis Mother     High Blood Pressure Mother     High Cholesterol Mother     Cancer Father      lung black lung from coal mines    Heart Disease Brother      leaky heart    Liver Disease Paternal Grandfather      black lung    Obesity Sister      gastric bypass    Cancer Sister      lung, smoker       Social History   Substance Use Topics    Smoking status: Never Smoker    Smokeless tobacco: Never Used    Alcohol use Yes      Comment: Rare; LESS THEN 1X PER MONTH      Current Outpatient Prescriptions   Medication Sig Dispense Refill    clopidogrel (PLAVIX) 75 MG tablet TAKE ONE TABLET BY MOUTH ONCE DAILY 30 tablet 3    atorvastatin (LIPITOR) 80 MG tablet TAKE ONE TABLET BY MOUTH

## 2017-11-19 ASSESSMENT — ENCOUNTER SYMPTOMS
COUGH: 0
RHINORRHEA: 0
SHORTNESS OF BREATH: 0
ABDOMINAL DISTENTION: 0
EYES NEGATIVE: 1
SINUS PRESSURE: 0

## 2017-12-12 DIAGNOSIS — F32.A DEPRESSION, UNSPECIFIED DEPRESSION TYPE: ICD-10-CM

## 2017-12-12 RX ORDER — ESCITALOPRAM OXALATE 10 MG/1
TABLET ORAL
Qty: 90 TABLET | Refills: 1 | Status: SHIPPED | OUTPATIENT
Start: 2017-12-12 | End: 2018-08-15 | Stop reason: SDUPTHER

## 2018-01-10 RX ORDER — ATORVASTATIN CALCIUM 80 MG/1
TABLET, FILM COATED ORAL
Qty: 90 TABLET | Refills: 0 | Status: SHIPPED | OUTPATIENT
Start: 2018-01-10 | End: 2018-04-12 | Stop reason: SDUPTHER

## 2018-02-13 DIAGNOSIS — I10 ESSENTIAL HYPERTENSION: ICD-10-CM

## 2018-02-14 RX ORDER — HYDROCHLOROTHIAZIDE 25 MG/1
TABLET ORAL
Qty: 90 TABLET | Refills: 1 | Status: SHIPPED | OUTPATIENT
Start: 2018-02-14 | End: 2018-09-11 | Stop reason: SDUPTHER

## 2018-02-24 DIAGNOSIS — K21.9 GASTROESOPHAGEAL REFLUX DISEASE, ESOPHAGITIS PRESENCE NOT SPECIFIED: ICD-10-CM

## 2018-02-26 RX ORDER — OMEPRAZOLE 20 MG/1
CAPSULE, DELAYED RELEASE ORAL
Qty: 90 CAPSULE | Refills: 1 | Status: SHIPPED | OUTPATIENT
Start: 2018-02-26 | End: 2018-09-11 | Stop reason: SDUPTHER

## 2018-03-29 ENCOUNTER — HOSPITAL ENCOUNTER (OUTPATIENT)
Dept: GENERAL RADIOLOGY | Age: 73
Discharge: HOME | End: 2018-03-31
Payer: MEDICARE

## 2018-03-29 ENCOUNTER — HOSPITAL ENCOUNTER (OUTPATIENT)
Age: 73
Discharge: HOME | End: 2018-03-31
Payer: MEDICARE

## 2018-03-29 DIAGNOSIS — M25.562 LEFT KNEE PAIN, UNSPECIFIED CHRONICITY: ICD-10-CM

## 2018-03-29 DIAGNOSIS — S89.91XA INJURY OF RIGHT KNEE, INITIAL ENCOUNTER: ICD-10-CM

## 2018-03-29 DIAGNOSIS — S89.92XA INJURY OF LEFT KNEE, INITIAL ENCOUNTER: ICD-10-CM

## 2018-03-29 PROCEDURE — 73562 X-RAY EXAM OF KNEE 3: CPT

## 2018-04-12 RX ORDER — ATORVASTATIN CALCIUM 80 MG/1
TABLET, FILM COATED ORAL
Qty: 90 TABLET | Refills: 3 | Status: SHIPPED | OUTPATIENT
Start: 2018-04-12 | End: 2019-05-06 | Stop reason: SDUPTHER

## 2018-05-18 ENCOUNTER — OFFICE VISIT (OUTPATIENT)
Dept: FAMILY MEDICINE CLINIC | Age: 73
End: 2018-05-18
Payer: MEDICARE

## 2018-05-18 VITALS
RESPIRATION RATE: 18 BRPM | HEART RATE: 73 BPM | OXYGEN SATURATION: 98 % | DIASTOLIC BLOOD PRESSURE: 80 MMHG | BODY MASS INDEX: 44.1 KG/M2 | HEIGHT: 71 IN | WEIGHT: 315 LBS | SYSTOLIC BLOOD PRESSURE: 124 MMHG

## 2018-05-18 DIAGNOSIS — R73.01 IFG (IMPAIRED FASTING GLUCOSE): ICD-10-CM

## 2018-05-18 DIAGNOSIS — E66.01 MORBID OBESITY WITH BMI OF 45.0-49.9, ADULT (HCC): ICD-10-CM

## 2018-05-18 DIAGNOSIS — Z95.1 HX OF CABG: ICD-10-CM

## 2018-05-18 DIAGNOSIS — N40.1 BPH WITH OBSTRUCTION/LOWER URINARY TRACT SYMPTOMS: ICD-10-CM

## 2018-05-18 DIAGNOSIS — Z13.29 THYROID DISORDER SCREENING: ICD-10-CM

## 2018-05-18 DIAGNOSIS — N13.8 BPH WITH OBSTRUCTION/LOWER URINARY TRACT SYMPTOMS: ICD-10-CM

## 2018-05-18 DIAGNOSIS — I10 ESSENTIAL HYPERTENSION: Primary | ICD-10-CM

## 2018-05-18 DIAGNOSIS — E55.9 VITAMIN D DEFICIENCY DISEASE: ICD-10-CM

## 2018-05-18 DIAGNOSIS — I25.10 CORONARY ARTERY DISEASE INVOLVING NATIVE CORONARY ARTERY OF NATIVE HEART WITHOUT ANGINA PECTORIS: ICD-10-CM

## 2018-05-18 DIAGNOSIS — Z12.5 PROSTATE CANCER SCREENING: ICD-10-CM

## 2018-05-18 PROCEDURE — G8427 DOCREV CUR MEDS BY ELIG CLIN: HCPCS | Performed by: NURSE PRACTITIONER

## 2018-05-18 PROCEDURE — 1123F ACP DISCUSS/DSCN MKR DOCD: CPT | Performed by: NURSE PRACTITIONER

## 2018-05-18 PROCEDURE — G8598 ASA/ANTIPLAT THER USED: HCPCS | Performed by: NURSE PRACTITIONER

## 2018-05-18 PROCEDURE — G8417 CALC BMI ABV UP PARAM F/U: HCPCS | Performed by: NURSE PRACTITIONER

## 2018-05-18 PROCEDURE — 3017F COLORECTAL CA SCREEN DOC REV: CPT | Performed by: NURSE PRACTITIONER

## 2018-05-18 PROCEDURE — 1036F TOBACCO NON-USER: CPT | Performed by: NURSE PRACTITIONER

## 2018-05-18 PROCEDURE — 99214 OFFICE O/P EST MOD 30 MIN: CPT | Performed by: NURSE PRACTITIONER

## 2018-05-18 PROCEDURE — 4040F PNEUMOC VAC/ADMIN/RCVD: CPT | Performed by: NURSE PRACTITIONER

## 2018-05-18 RX ORDER — MELOXICAM 15 MG/1
TABLET ORAL DAILY
COMMUNITY
Start: 2018-04-26 | End: 2019-06-04

## 2018-05-20 PROBLEM — E66.01 MORBID OBESITY WITH BMI OF 45.0-49.9, ADULT (HCC): Status: ACTIVE | Noted: 2018-05-20

## 2018-05-20 ASSESSMENT — ENCOUNTER SYMPTOMS
ABDOMINAL DISTENTION: 0
COUGH: 0
SORE THROAT: 0
CHEST TIGHTNESS: 0
SHORTNESS OF BREATH: 0

## 2018-05-23 ENCOUNTER — HOSPITAL ENCOUNTER (OUTPATIENT)
Age: 73
Discharge: HOME | End: 2018-05-23
Payer: MEDICARE

## 2018-05-23 DIAGNOSIS — Z13.29 THYROID DISORDER SCREENING: ICD-10-CM

## 2018-05-23 DIAGNOSIS — I10 ESSENTIAL HYPERTENSION: ICD-10-CM

## 2018-05-23 DIAGNOSIS — E55.9 VITAMIN D DEFICIENCY DISEASE: ICD-10-CM

## 2018-05-23 DIAGNOSIS — Z12.5 PROSTATE CANCER SCREENING: ICD-10-CM

## 2018-05-23 DIAGNOSIS — I25.10 CORONARY ARTERY DISEASE INVOLVING NATIVE CORONARY ARTERY OF NATIVE HEART WITHOUT ANGINA PECTORIS: ICD-10-CM

## 2018-05-23 DIAGNOSIS — R73.01 IFG (IMPAIRED FASTING GLUCOSE): ICD-10-CM

## 2018-05-23 LAB
ABSOLUTE EOS #: 0.4 K/UL (ref 0–0.4)
ABSOLUTE IMMATURE GRANULOCYTE: ABNORMAL K/UL (ref 0–0.3)
ABSOLUTE LYMPH #: 1.2 K/UL (ref 1–4.8)
ABSOLUTE MONO #: 0.4 K/UL (ref 0–1)
ALBUMIN SERPL-MCNC: 4.1 G/DL (ref 3.5–5.2)
ALBUMIN/GLOBULIN RATIO: NORMAL (ref 1–2.5)
ALP BLD-CCNC: 102 U/L (ref 40–129)
ALT SERPL-CCNC: 21 U/L (ref 5–41)
ANION GAP SERPL CALCULATED.3IONS-SCNC: 12 MMOL/L (ref 9–17)
AST SERPL-CCNC: 26 U/L
BASOPHILS # BLD: 0 % (ref 0–2)
BASOPHILS ABSOLUTE: 0 K/UL (ref 0–0.2)
BILIRUB SERPL-MCNC: 0.86 MG/DL (ref 0.3–1.2)
BILIRUBIN DIRECT: 0.25 MG/DL
BILIRUBIN, INDIRECT: 0.61 MG/DL (ref 0–1)
BUN BLDV-MCNC: 20 MG/DL (ref 8–23)
BUN/CREAT BLD: 18 (ref 9–20)
CALCIUM SERPL-MCNC: 9.5 MG/DL (ref 8.6–10.4)
CHLORIDE BLD-SCNC: 99 MMOL/L (ref 98–107)
CHOLESTEROL/HDL RATIO: 2.2
CHOLESTEROL: 126 MG/DL
CO2: 28 MMOL/L (ref 20–31)
CREAT SERPL-MCNC: 1.09 MG/DL (ref 0.7–1.2)
DIFFERENTIAL TYPE: YES
EOSINOPHILS RELATIVE PERCENT: 8 % (ref 0–5)
ESTIMATED AVERAGE GLUCOSE: 126 MG/DL
GFR AFRICAN AMERICAN: >60 ML/MIN
GFR NON-AFRICAN AMERICAN: >60 ML/MIN
GFR SERPL CREATININE-BSD FRML MDRD: ABNORMAL ML/MIN/{1.73_M2}
GFR SERPL CREATININE-BSD FRML MDRD: ABNORMAL ML/MIN/{1.73_M2}
GLOBULIN: NORMAL G/DL (ref 1.5–3.8)
GLUCOSE BLD-MCNC: 129 MG/DL (ref 70–99)
HBA1C MFR BLD: 6 % (ref 4.8–5.9)
HCT VFR BLD CALC: 42.3 % (ref 41–53)
HDLC SERPL-MCNC: 57 MG/DL
HEMOGLOBIN: 14.5 G/DL (ref 13.5–17.5)
IMMATURE GRANULOCYTES: ABNORMAL %
LDL CHOLESTEROL: 41 MG/DL (ref 0–130)
LYMPHOCYTES # BLD: 23 % (ref 13–44)
MCH RBC QN AUTO: 28.5 PG (ref 26–34)
MCHC RBC AUTO-ENTMCNC: 34.2 G/DL (ref 31–37)
MCV RBC AUTO: 83.5 FL (ref 80–100)
MONOCYTES # BLD: 8 % (ref 5–9)
NRBC AUTOMATED: ABNORMAL PER 100 WBC
PATIENT FASTING?: YES
PDW BLD-RTO: 14.6 % (ref 12.1–15.2)
PLATELET # BLD: 178 K/UL (ref 140–450)
PLATELET ESTIMATE: ABNORMAL
PMV BLD AUTO: ABNORMAL FL (ref 6–12)
POTASSIUM SERPL-SCNC: 4.7 MMOL/L (ref 3.7–5.3)
PROSTATE SPECIFIC ANTIGEN: 0.99 UG/L
RBC # BLD: 5.07 M/UL (ref 4.5–5.9)
RBC # BLD: ABNORMAL 10*6/UL
SEG NEUTROPHILS: 61 % (ref 39–75)
SEGMENTED NEUTROPHILS ABSOLUTE COUNT: 3.2 K/UL (ref 2.1–6.5)
SODIUM BLD-SCNC: 139 MMOL/L (ref 135–144)
TOTAL PROTEIN: 6.5 G/DL (ref 6.4–8.3)
TRIGL SERPL-MCNC: 140 MG/DL
TSH SERPL DL<=0.05 MIU/L-ACNC: 2.58 MIU/L (ref 0.3–5)
VITAMIN D 25-HYDROXY: 33.2 NG/ML (ref 30–100)
VLDLC SERPL CALC-MCNC: NORMAL MG/DL (ref 1–30)
WBC # BLD: 5.3 K/UL (ref 3.5–11)
WBC # BLD: ABNORMAL 10*3/UL

## 2018-05-23 PROCEDURE — 80061 LIPID PANEL: CPT

## 2018-05-23 PROCEDURE — 82306 VITAMIN D 25 HYDROXY: CPT

## 2018-05-23 PROCEDURE — G0103 PSA SCREENING: HCPCS

## 2018-05-23 PROCEDURE — 36415 COLL VENOUS BLD VENIPUNCTURE: CPT

## 2018-05-23 PROCEDURE — 80076 HEPATIC FUNCTION PANEL: CPT

## 2018-05-23 PROCEDURE — 83036 HEMOGLOBIN GLYCOSYLATED A1C: CPT

## 2018-05-23 PROCEDURE — 85025 COMPLETE CBC W/AUTO DIFF WBC: CPT

## 2018-05-23 PROCEDURE — 80048 BASIC METABOLIC PNL TOTAL CA: CPT

## 2018-05-23 PROCEDURE — 84443 ASSAY THYROID STIM HORMONE: CPT

## 2018-07-24 DIAGNOSIS — I10 ESSENTIAL HYPERTENSION: ICD-10-CM

## 2018-07-24 RX ORDER — LOSARTAN POTASSIUM 25 MG/1
TABLET ORAL
Qty: 90 TABLET | Refills: 3 | Status: SHIPPED | OUTPATIENT
Start: 2018-07-24 | End: 2019-06-10 | Stop reason: SDUPTHER

## 2018-08-15 DIAGNOSIS — F32.A DEPRESSION, UNSPECIFIED DEPRESSION TYPE: ICD-10-CM

## 2018-08-15 RX ORDER — ESCITALOPRAM OXALATE 10 MG/1
TABLET ORAL
Qty: 90 TABLET | Refills: 1 | Status: SHIPPED | OUTPATIENT
Start: 2018-08-15 | End: 2019-06-10 | Stop reason: SDUPTHER

## 2018-08-22 DIAGNOSIS — R05.9 COUGH: ICD-10-CM

## 2018-08-22 RX ORDER — FLUTICASONE PROPIONATE 50 MCG
SPRAY, SUSPENSION (ML) NASAL
Qty: 2 BOTTLE | Refills: 2 | Status: SHIPPED | OUTPATIENT
Start: 2018-08-22 | End: 2019-11-19 | Stop reason: SDUPTHER

## 2018-09-11 DIAGNOSIS — I10 ESSENTIAL HYPERTENSION: ICD-10-CM

## 2018-09-11 DIAGNOSIS — K21.9 GASTROESOPHAGEAL REFLUX DISEASE, ESOPHAGITIS PRESENCE NOT SPECIFIED: ICD-10-CM

## 2018-09-11 RX ORDER — HYDROCHLOROTHIAZIDE 25 MG/1
TABLET ORAL
Qty: 90 TABLET | Refills: 1 | Status: SHIPPED | OUTPATIENT
Start: 2018-09-11 | End: 2019-04-08 | Stop reason: SDUPTHER

## 2018-09-11 RX ORDER — OMEPRAZOLE 20 MG/1
CAPSULE, DELAYED RELEASE ORAL
Qty: 90 CAPSULE | Refills: 1 | Status: SHIPPED | OUTPATIENT
Start: 2018-09-11 | End: 2019-04-08 | Stop reason: SDUPTHER

## 2018-09-21 ENCOUNTER — TELEPHONE (OUTPATIENT)
Dept: CARDIOLOGY CLINIC | Age: 73
End: 2018-09-21

## 2018-09-21 ENCOUNTER — TELEPHONE (OUTPATIENT)
Dept: FAMILY MEDICINE CLINIC | Age: 73
End: 2018-09-21

## 2018-09-21 DIAGNOSIS — Z95.1 HX OF CABG: ICD-10-CM

## 2018-09-21 DIAGNOSIS — R06.02 SOB (SHORTNESS OF BREATH): ICD-10-CM

## 2018-09-21 DIAGNOSIS — I25.10 CORONARY ARTERY DISEASE INVOLVING NATIVE CORONARY ARTERY OF NATIVE HEART WITHOUT ANGINA PECTORIS: ICD-10-CM

## 2018-09-21 DIAGNOSIS — Z98.62 HISTORY OF ANGIOPLASTY: ICD-10-CM

## 2018-09-21 DIAGNOSIS — E55.9 VITAMIN D DEFICIENCY DISEASE: ICD-10-CM

## 2018-09-21 DIAGNOSIS — I10 ESSENTIAL HYPERTENSION: Primary | ICD-10-CM

## 2018-09-21 DIAGNOSIS — E78.49 OTHER HYPERLIPIDEMIA: ICD-10-CM

## 2018-09-21 DIAGNOSIS — R06.02 SHORTNESS OF BREATH: Primary | ICD-10-CM

## 2018-09-22 ENCOUNTER — HOSPITAL ENCOUNTER (OUTPATIENT)
Age: 73
Discharge: HOME | End: 2018-09-22
Payer: MEDICARE

## 2018-09-22 ENCOUNTER — HOSPITAL ENCOUNTER (OUTPATIENT)
Dept: GENERAL RADIOLOGY | Age: 73
Discharge: HOME | End: 2018-09-24
Payer: MEDICARE

## 2018-09-22 ENCOUNTER — HOSPITAL ENCOUNTER (OUTPATIENT)
Age: 73
Discharge: HOME | End: 2018-09-24
Payer: MEDICARE

## 2018-09-22 DIAGNOSIS — I50.21 ACUTE SYSTOLIC CONGESTIVE HEART FAILURE (HCC): Primary | ICD-10-CM

## 2018-09-22 DIAGNOSIS — R06.02 SHORTNESS OF BREATH: ICD-10-CM

## 2018-09-22 LAB
ANION GAP SERPL CALCULATED.3IONS-SCNC: 15 MMOL/L (ref 9–17)
BNP INTERPRETATION: ABNORMAL
BUN BLDV-MCNC: 21 MG/DL (ref 8–23)
BUN/CREAT BLD: 18 (ref 9–20)
CALCIUM SERPL-MCNC: 9.3 MG/DL (ref 8.6–10.4)
CHLORIDE BLD-SCNC: 105 MMOL/L (ref 98–107)
CO2: 23 MMOL/L (ref 20–31)
CREAT SERPL-MCNC: 1.16 MG/DL (ref 0.7–1.2)
GFR AFRICAN AMERICAN: >60 ML/MIN
GFR NON-AFRICAN AMERICAN: >60 ML/MIN
GFR SERPL CREATININE-BSD FRML MDRD: ABNORMAL ML/MIN/{1.73_M2}
GFR SERPL CREATININE-BSD FRML MDRD: ABNORMAL ML/MIN/{1.73_M2}
GLUCOSE BLD-MCNC: 108 MG/DL (ref 70–99)
POTASSIUM SERPL-SCNC: 4.1 MMOL/L (ref 3.7–5.3)
PRO-BNP: 591 PG/ML
SODIUM BLD-SCNC: 143 MMOL/L (ref 135–144)

## 2018-09-22 PROCEDURE — 83880 ASSAY OF NATRIURETIC PEPTIDE: CPT

## 2018-09-22 PROCEDURE — 36415 COLL VENOUS BLD VENIPUNCTURE: CPT

## 2018-09-22 PROCEDURE — 71046 X-RAY EXAM CHEST 2 VIEWS: CPT

## 2018-09-22 PROCEDURE — 80048 BASIC METABOLIC PNL TOTAL CA: CPT

## 2018-09-22 RX ORDER — FUROSEMIDE 20 MG/1
TABLET ORAL
Qty: 10 TABLET | Refills: 0 | Status: SHIPPED | OUTPATIENT
Start: 2018-09-22 | End: 2018-12-27 | Stop reason: ALTCHOICE

## 2018-09-22 NOTE — TELEPHONE ENCOUNTER
Patient calls c/o shortness of breath recently, hard to bend over feels like breath taken away. Hx copd, concern for chf.     Requested testing labs, xray at hospital  Denies any chest pain. Would like to change pulmonologist from [facility] to someone else Dr. Regi Booth at [facility] requests referral     Will call with results when seen. Checked for results at 5 pm 9/21/18 not done?

## 2018-09-23 NOTE — TELEPHONE ENCOUNTER
Patient did well with lasix breathing is easier. Weight 340.      Breathing is easier, will see me in office Monday [address] at 11:15 am.

## 2018-09-24 ENCOUNTER — OFFICE VISIT (OUTPATIENT)
Dept: FAMILY MEDICINE CLINIC | Age: 73
End: 2018-09-24
Payer: MEDICARE

## 2018-09-24 VITALS
DIASTOLIC BLOOD PRESSURE: 92 MMHG | HEIGHT: 72 IN | BODY MASS INDEX: 42.66 KG/M2 | SYSTOLIC BLOOD PRESSURE: 140 MMHG | OXYGEN SATURATION: 96 % | HEART RATE: 94 BPM | TEMPERATURE: 97.9 F | WEIGHT: 315 LBS

## 2018-09-24 DIAGNOSIS — I50.22 CHRONIC SYSTOLIC CONGESTIVE HEART FAILURE (HCC): ICD-10-CM

## 2018-09-24 DIAGNOSIS — J44.1 COPD WITH ACUTE EXACERBATION (HCC): Primary | ICD-10-CM

## 2018-09-24 PROCEDURE — G8926 SPIRO NO PERF OR DOC: HCPCS | Performed by: NURSE PRACTITIONER

## 2018-09-24 PROCEDURE — 3023F SPIROM DOC REV: CPT | Performed by: NURSE PRACTITIONER

## 2018-09-24 PROCEDURE — G8427 DOCREV CUR MEDS BY ELIG CLIN: HCPCS | Performed by: NURSE PRACTITIONER

## 2018-09-24 PROCEDURE — 99213 OFFICE O/P EST LOW 20 MIN: CPT | Performed by: NURSE PRACTITIONER

## 2018-09-24 PROCEDURE — 1101F PT FALLS ASSESS-DOCD LE1/YR: CPT | Performed by: NURSE PRACTITIONER

## 2018-09-24 PROCEDURE — G8598 ASA/ANTIPLAT THER USED: HCPCS | Performed by: NURSE PRACTITIONER

## 2018-09-24 PROCEDURE — 1036F TOBACCO NON-USER: CPT | Performed by: NURSE PRACTITIONER

## 2018-09-24 PROCEDURE — 1123F ACP DISCUSS/DSCN MKR DOCD: CPT | Performed by: NURSE PRACTITIONER

## 2018-09-24 PROCEDURE — 4040F PNEUMOC VAC/ADMIN/RCVD: CPT | Performed by: NURSE PRACTITIONER

## 2018-09-24 PROCEDURE — G8417 CALC BMI ABV UP PARAM F/U: HCPCS | Performed by: NURSE PRACTITIONER

## 2018-09-24 PROCEDURE — 3017F COLORECTAL CA SCREEN DOC REV: CPT | Performed by: NURSE PRACTITIONER

## 2018-09-24 RX ORDER — PREDNISONE 20 MG/1
40 TABLET ORAL DAILY
Qty: 6 TABLET | Refills: 0 | Status: SHIPPED | OUTPATIENT
Start: 2018-09-24 | End: 2019-06-04 | Stop reason: ALTCHOICE

## 2018-09-24 ASSESSMENT — PATIENT HEALTH QUESTIONNAIRE - PHQ9
SUM OF ALL RESPONSES TO PHQ QUESTIONS 1-9: 1
1. LITTLE INTEREST OR PLEASURE IN DOING THINGS: 0
SUM OF ALL RESPONSES TO PHQ QUESTIONS 1-9: 1
SUM OF ALL RESPONSES TO PHQ9 QUESTIONS 1 & 2: 1
2. FEELING DOWN, DEPRESSED OR HOPELESS: 1

## 2018-09-24 NOTE — PROGRESS NOTES
Use Topics    Smoking status: Never Smoker    Smokeless tobacco: Never Used    Alcohol use Yes      Comment: Rare; LESS THEN 1X PER MONTH        Vitals:    09/24/18 1122 09/24/18 1202   BP: (!) 140/92    Site: Right Upper Arm    Position: Sitting    Cuff Size: Large Adult    Pulse: 94    Temp: 97.9 °F (36.6 °C)    TempSrc: Oral    SpO2: (!) 89% 96%   Weight: (!) 334 lb (151.5 kg)    Height: 6' (1.829 m)      Estimated body mass index is 45.3 kg/m² as calculated from the following:    Height as of this encounter: 6' (1.829 m). Weight as of this encounter: 334 lb (151.5 kg). Physical Exam   Constitutional: He is oriented to person, place, and time. He appears well-developed and well-nourished. No distress. HENT:   Head: Normocephalic and atraumatic. Right Ear: External ear normal.   Left Ear: External ear normal.   Mouth/Throat: Oropharynx is clear and moist. No oropharyngeal exudate. Eyes: Pupils are equal, round, and reactive to light. Neck: Normal range of motion. Neck supple. Cardiovascular: Normal rate, regular rhythm, normal heart sounds, intact distal pulses and normal pulses. No murmur heard. Pulmonary/Chest: Effort normal. No respiratory distress. He has wheezes. Abdominal: Soft. Bowel sounds are normal. He exhibits no mass. There is no tenderness. Musculoskeletal: Normal range of motion. He exhibits edema (pavel legs non pitting to mid shin). Lymphadenopathy:     He has no cervical adenopathy. Neurological: He is alert and oriented to person, place, and time. Skin: Skin is warm and dry. No rash noted. Psychiatric: He has a normal mood and affect. His behavior is normal. Judgment and thought content normal.   Nursing note and vitals reviewed. ASSESSMENT/PLAN:  1. COPD with acute exacerbation (HCC)  Use inhalers  Monitor response  Take prednisone with food. - predniSONE (DELTASONE) 20 MG tablet;  Take 2 tablets by mouth daily Take with food for copd exacerbation

## 2018-09-24 NOTE — PATIENT INSTRUCTIONS
Patient Education        Chronic Obstructive Pulmonary Disease (COPD): Care Instructions  Your Care Instructions    Chronic obstructive pulmonary disease (COPD) is a general term for a group of lung diseases, including emphysema and chronic bronchitis. People with COPD have decreased airflow in and out of the lungs, which makes it hard to breathe. The airways also can get clogged with thick mucus. Cigarette smoking is a major cause of COPD. Although there is no cure for COPD, you can slow its progress. Following your treatment plan and taking care of yourself can help you feel better and live longer. Follow-up care is a key part of your treatment and safety. Be sure to make and go to all appointments, and call your doctor if you are having problems. It's also a good idea to know your test results and keep a list of the medicines you take. How can you care for yourself at home?   Staying healthy    · Do not smoke. This is the most important step you can take to prevent more damage to your lungs. If you need help quitting, talk to your doctor about stop-smoking programs and medicines. These can increase your chances of quitting for good.     · Avoid colds and flu. Get a pneumococcal vaccine shot. If you have had one before, ask your doctor whether you need a second dose. Get the flu vaccine every fall. If you must be around people with colds or the flu, wash your hands often.     · Avoid secondhand smoke, air pollution, and high altitudes. Also avoid cold, dry air and hot, humid air. Stay at home with your windows closed when air pollution is bad.    Medicines and oxygen therapy    · Take your medicines exactly as prescribed. Call your doctor if you think you are having a problem with your medicine.     · You may be taking medicines such as:  ¨ Bronchodilators. These help open your airways and make breathing easier. Bronchodilators are either short-acting (work for 6 to 9 hours) or long-acting (work for 24 hours). You inhale most bronchodilators, so they start to act quickly. Always carry your quick-relief inhaler with you in case you need it while you are away from home. ¨ Corticosteroids (prednisone, budesonide). These reduce airway inflammation. They come in pill or inhaled form. You must take these medicines every day for them to work well.     · A spacer may help you get more inhaled medicine to your lungs. Ask your doctor or pharmacist if a spacer is right for you. If it is, ask how to use it properly.     · Do not take any vitamins, over-the-counter medicine, or herbal products without talking to your doctor first.     · If your doctor prescribed antibiotics, take them as directed. Do not stop taking them just because you feel better. You need to take the full course of antibiotics.     · Oxygen therapy boosts the amount of oxygen in your blood and helps you breathe easier. Use the flow rate your doctor has recommended, and do not change it without talking to your doctor first.   Activity    · Get regular exercise. Walking is an easy way to get exercise. Start out slowly, and walk a little more each day.     · Pay attention to your breathing. You are exercising too hard if you cannot talk while you are exercising.     · Take short rest breaks when doing household chores and other activities.     · Learn breathing methods-such as breathing through pursed lips-to help you become less short of breath.     · If your doctor has not set you up with a pulmonary rehabilitation program, talk to him or her about whether rehab is right for you. Rehab includes exercise programs, education about your disease and how to manage it, help with diet and other changes, and emotional support. Diet    · Eat regular, healthy meals. Use bronchodilators about 1 hour before you eat to make it easier to eat. Eat several small meals instead of three large ones.  Drink beverages at the end of the meal. Avoid foods that are hard to chew.

## 2018-09-25 PROBLEM — J44.1 COPD WITH ACUTE EXACERBATION (HCC): Status: ACTIVE | Noted: 2018-09-25

## 2018-09-25 PROBLEM — I50.22 CHRONIC SYSTOLIC CONGESTIVE HEART FAILURE (HCC): Status: ACTIVE | Noted: 2018-09-25

## 2018-09-25 ASSESSMENT — ENCOUNTER SYMPTOMS
SORE THROAT: 0
CONSTIPATION: 0
COUGH: 1
BLOOD IN STOOL: 0
CHEST TIGHTNESS: 0
SHORTNESS OF BREATH: 1
EYES NEGATIVE: 1
ABDOMINAL DISTENTION: 0
VOICE CHANGE: 0
WHEEZING: 1

## 2018-11-05 ENCOUNTER — OFFICE VISIT (OUTPATIENT)
Dept: PULMONOLOGY | Age: 73
End: 2018-11-05
Payer: MEDICARE

## 2018-11-05 VITALS
RESPIRATION RATE: 16 BRPM | HEART RATE: 73 BPM | HEIGHT: 72 IN | SYSTOLIC BLOOD PRESSURE: 162 MMHG | OXYGEN SATURATION: 95 % | BODY MASS INDEX: 42.66 KG/M2 | WEIGHT: 315 LBS | DIASTOLIC BLOOD PRESSURE: 110 MMHG | TEMPERATURE: 99 F

## 2018-11-05 DIAGNOSIS — I25.10 CORONARY ARTERY DISEASE INVOLVING NATIVE CORONARY ARTERY OF NATIVE HEART WITHOUT ANGINA PECTORIS: ICD-10-CM

## 2018-11-05 DIAGNOSIS — I50.32 CHRONIC DIASTOLIC (CONGESTIVE) HEART FAILURE (HCC): ICD-10-CM

## 2018-11-05 DIAGNOSIS — E66.01 MORBID OBESITY DUE TO EXCESS CALORIES (HCC): ICD-10-CM

## 2018-11-05 DIAGNOSIS — G47.33 OSA (OBSTRUCTIVE SLEEP APNEA): ICD-10-CM

## 2018-11-05 DIAGNOSIS — J44.9 CHRONIC OBSTRUCTIVE PULMONARY DISEASE, UNSPECIFIED COPD TYPE (HCC): Primary | ICD-10-CM

## 2018-11-05 PROCEDURE — G8417 CALC BMI ABV UP PARAM F/U: HCPCS | Performed by: INTERNAL MEDICINE

## 2018-11-05 PROCEDURE — G8926 SPIRO NO PERF OR DOC: HCPCS | Performed by: INTERNAL MEDICINE

## 2018-11-05 PROCEDURE — G8484 FLU IMMUNIZE NO ADMIN: HCPCS | Performed by: INTERNAL MEDICINE

## 2018-11-05 PROCEDURE — G8427 DOCREV CUR MEDS BY ELIG CLIN: HCPCS | Performed by: INTERNAL MEDICINE

## 2018-11-05 PROCEDURE — 3023F SPIROM DOC REV: CPT | Performed by: INTERNAL MEDICINE

## 2018-11-05 PROCEDURE — 1101F PT FALLS ASSESS-DOCD LE1/YR: CPT | Performed by: INTERNAL MEDICINE

## 2018-11-05 PROCEDURE — 3017F COLORECTAL CA SCREEN DOC REV: CPT | Performed by: INTERNAL MEDICINE

## 2018-11-05 PROCEDURE — 99204 OFFICE O/P NEW MOD 45 MIN: CPT | Performed by: INTERNAL MEDICINE

## 2018-11-09 ENCOUNTER — HOSPITAL ENCOUNTER (OUTPATIENT)
Dept: PULMONOLOGY | Age: 73
Discharge: HOME | End: 2018-11-09
Payer: MEDICARE

## 2018-11-09 DIAGNOSIS — J44.9 CHRONIC OBSTRUCTIVE PULMONARY DISEASE, UNSPECIFIED COPD TYPE (HCC): ICD-10-CM

## 2018-11-09 LAB
DISTANCE WALKED: NORMAL FT
SPO2: NORMAL %

## 2018-11-09 PROCEDURE — 94618 PULMONARY STRESS TESTING: CPT

## 2018-11-12 ENCOUNTER — HOSPITAL ENCOUNTER (OUTPATIENT)
Age: 73
Discharge: HOME | End: 2018-11-12
Payer: MEDICARE

## 2018-11-12 DIAGNOSIS — Z95.1 HX OF CABG: ICD-10-CM

## 2018-11-12 DIAGNOSIS — Z98.62 HISTORY OF ANGIOPLASTY: ICD-10-CM

## 2018-11-12 DIAGNOSIS — I10 ESSENTIAL HYPERTENSION: ICD-10-CM

## 2018-11-12 DIAGNOSIS — E55.9 VITAMIN D DEFICIENCY DISEASE: ICD-10-CM

## 2018-11-12 DIAGNOSIS — I25.10 CORONARY ARTERY DISEASE INVOLVING NATIVE CORONARY ARTERY OF NATIVE HEART WITHOUT ANGINA PECTORIS: ICD-10-CM

## 2018-11-12 DIAGNOSIS — R06.02 SOB (SHORTNESS OF BREATH): ICD-10-CM

## 2018-11-12 DIAGNOSIS — E78.49 OTHER HYPERLIPIDEMIA: ICD-10-CM

## 2018-11-12 LAB
ABSOLUTE EOS #: 0.3 K/UL (ref 0–0.4)
ABSOLUTE IMMATURE GRANULOCYTE: ABNORMAL K/UL (ref 0–0.3)
ABSOLUTE LYMPH #: 1.1 K/UL (ref 1–4.8)
ABSOLUTE MONO #: 0.4 K/UL (ref 0–1)
ALBUMIN SERPL-MCNC: 4.2 G/DL (ref 3.5–5.2)
ALBUMIN/GLOBULIN RATIO: ABNORMAL (ref 1–2.5)
ALP BLD-CCNC: 87 U/L (ref 40–129)
ALT SERPL-CCNC: 24 U/L (ref 5–41)
ANION GAP SERPL CALCULATED.3IONS-SCNC: 11 MMOL/L (ref 9–17)
AST SERPL-CCNC: 33 U/L
BASOPHILS # BLD: 1 % (ref 0–2)
BASOPHILS ABSOLUTE: 0 K/UL (ref 0–0.2)
BILIRUB SERPL-MCNC: 1 MG/DL (ref 0.3–1.2)
BUN BLDV-MCNC: 24 MG/DL (ref 8–23)
BUN/CREAT BLD: 25 (ref 9–20)
CALCIUM SERPL-MCNC: 9.3 MG/DL (ref 8.6–10.4)
CHLORIDE BLD-SCNC: 102 MMOL/L (ref 98–107)
CHOLESTEROL/HDL RATIO: 2.4
CHOLESTEROL: 120 MG/DL
CO2: 26 MMOL/L (ref 20–31)
CREAT SERPL-MCNC: 0.97 MG/DL (ref 0.7–1.2)
DIFFERENTIAL TYPE: YES
EOSINOPHILS RELATIVE PERCENT: 6 % (ref 0–5)
GFR AFRICAN AMERICAN: >60 ML/MIN
GFR NON-AFRICAN AMERICAN: >60 ML/MIN
GFR SERPL CREATININE-BSD FRML MDRD: ABNORMAL ML/MIN/{1.73_M2}
GFR SERPL CREATININE-BSD FRML MDRD: ABNORMAL ML/MIN/{1.73_M2}
GLUCOSE BLD-MCNC: 122 MG/DL (ref 70–99)
HCT VFR BLD CALC: 43.3 % (ref 41–53)
HDLC SERPL-MCNC: 49 MG/DL
HEMOGLOBIN: 14.5 G/DL (ref 13.5–17.5)
IMMATURE GRANULOCYTES: ABNORMAL %
LDL CHOLESTEROL: 46 MG/DL (ref 0–130)
LYMPHOCYTES # BLD: 24 % (ref 13–44)
MAGNESIUM: 2 MG/DL (ref 1.6–2.6)
MCH RBC QN AUTO: 29 PG (ref 26–34)
MCHC RBC AUTO-ENTMCNC: 33.4 G/DL (ref 31–37)
MCV RBC AUTO: 86.8 FL (ref 80–100)
MONOCYTES # BLD: 9 % (ref 5–9)
NRBC AUTOMATED: ABNORMAL PER 100 WBC
PATIENT FASTING?: YES
PDW BLD-RTO: 14.2 % (ref 12.1–15.2)
PLATELET # BLD: 162 K/UL (ref 140–450)
PLATELET ESTIMATE: ABNORMAL
PMV BLD AUTO: ABNORMAL FL (ref 6–12)
POTASSIUM SERPL-SCNC: 4.3 MMOL/L (ref 3.7–5.3)
RBC # BLD: 4.98 M/UL (ref 4.5–5.9)
RBC # BLD: ABNORMAL 10*6/UL
SEG NEUTROPHILS: 60 % (ref 39–75)
SEGMENTED NEUTROPHILS ABSOLUTE COUNT: 2.9 K/UL (ref 2.1–6.5)
SODIUM BLD-SCNC: 139 MMOL/L (ref 135–144)
TOTAL PROTEIN: 6.7 G/DL (ref 6.4–8.3)
TRIGL SERPL-MCNC: 125 MG/DL
TSH SERPL DL<=0.05 MIU/L-ACNC: 2.35 MIU/L (ref 0.3–5)
VITAMIN D 25-HYDROXY: 31.3 NG/ML (ref 30–100)
VLDLC SERPL CALC-MCNC: NORMAL MG/DL (ref 1–30)
WBC # BLD: 4.7 K/UL (ref 3.5–11)
WBC # BLD: ABNORMAL 10*3/UL

## 2018-11-12 PROCEDURE — 80061 LIPID PANEL: CPT

## 2018-11-12 PROCEDURE — 83735 ASSAY OF MAGNESIUM: CPT

## 2018-11-12 PROCEDURE — 80053 COMPREHEN METABOLIC PANEL: CPT

## 2018-11-12 PROCEDURE — 82306 VITAMIN D 25 HYDROXY: CPT

## 2018-11-12 PROCEDURE — 36415 COLL VENOUS BLD VENIPUNCTURE: CPT

## 2018-11-12 PROCEDURE — 85025 COMPLETE CBC W/AUTO DIFF WBC: CPT

## 2018-11-12 PROCEDURE — 84443 ASSAY THYROID STIM HORMONE: CPT

## 2018-11-13 ENCOUNTER — HOSPITAL ENCOUNTER (OUTPATIENT)
Age: 73
Discharge: HOME | End: 2018-11-15
Payer: MEDICARE

## 2018-11-13 ENCOUNTER — HOSPITAL ENCOUNTER (OUTPATIENT)
Dept: GENERAL RADIOLOGY | Age: 73
Discharge: HOME | End: 2018-11-15
Payer: MEDICARE

## 2018-11-13 ENCOUNTER — HOSPITAL ENCOUNTER (OUTPATIENT)
Age: 73
Discharge: HOME | End: 2018-11-13
Payer: MEDICARE

## 2018-11-13 DIAGNOSIS — I25.10 CORONARY ARTERY DISEASE INVOLVING NATIVE CORONARY ARTERY OF NATIVE HEART WITHOUT ANGINA PECTORIS: ICD-10-CM

## 2018-11-13 DIAGNOSIS — Z98.62 HISTORY OF ANGIOPLASTY: ICD-10-CM

## 2018-11-13 DIAGNOSIS — R06.02 SOB (SHORTNESS OF BREATH): ICD-10-CM

## 2018-11-13 DIAGNOSIS — Z95.1 HX OF CABG: ICD-10-CM

## 2018-11-13 DIAGNOSIS — I10 ESSENTIAL HYPERTENSION: ICD-10-CM

## 2018-11-13 LAB
EKG ATRIAL RATE: 66 BPM
EKG P AXIS: 83 DEGREES
EKG P-R INTERVAL: 290 MS
EKG Q-T INTERVAL: 396 MS
EKG QRS DURATION: 100 MS
EKG QTC CALCULATION (BAZETT): 415 MS
EKG R AXIS: -32 DEGREES
EKG T AXIS: 50 DEGREES
EKG VENTRICULAR RATE: 66 BPM

## 2018-11-13 PROCEDURE — 93005 ELECTROCARDIOGRAM TRACING: CPT

## 2018-11-13 PROCEDURE — 71046 X-RAY EXAM CHEST 2 VIEWS: CPT

## 2018-11-28 ENCOUNTER — HOSPITAL ENCOUNTER (OUTPATIENT)
Age: 73
Setting detail: SPECIMEN
Discharge: HOME | End: 2018-11-28
Payer: MEDICARE

## 2018-11-28 ENCOUNTER — OFFICE VISIT (OUTPATIENT)
Dept: UROLOGY | Age: 73
End: 2018-11-28
Payer: MEDICARE

## 2018-11-28 VITALS — BODY MASS INDEX: 45.84 KG/M2 | DIASTOLIC BLOOD PRESSURE: 72 MMHG | WEIGHT: 315 LBS | SYSTOLIC BLOOD PRESSURE: 118 MMHG

## 2018-11-28 DIAGNOSIS — R35.0 FREQUENCY OF URINATION: ICD-10-CM

## 2018-11-28 DIAGNOSIS — N13.8 BPH WITH OBSTRUCTION/LOWER URINARY TRACT SYMPTOMS: Primary | ICD-10-CM

## 2018-11-28 DIAGNOSIS — R35.1 NOCTURIA: ICD-10-CM

## 2018-11-28 DIAGNOSIS — R30.0 DYSURIA: ICD-10-CM

## 2018-11-28 DIAGNOSIS — N40.1 BPH WITH OBSTRUCTION/LOWER URINARY TRACT SYMPTOMS: Primary | ICD-10-CM

## 2018-11-28 DIAGNOSIS — N13.8 BPH WITH OBSTRUCTION/LOWER URINARY TRACT SYMPTOMS: ICD-10-CM

## 2018-11-28 DIAGNOSIS — R39.15 URGENCY OF URINATION: ICD-10-CM

## 2018-11-28 DIAGNOSIS — N40.1 BPH WITH OBSTRUCTION/LOWER URINARY TRACT SYMPTOMS: ICD-10-CM

## 2018-11-28 DIAGNOSIS — B37.2 YEAST INFECTION OF THE SKIN: ICD-10-CM

## 2018-11-28 LAB
-: ABNORMAL
AMORPHOUS: ABNORMAL
BACTERIA: ABNORMAL
BILIRUBIN URINE: NEGATIVE
CASTS UA: ABNORMAL /LPF
COLOR: YELLOW
COMMENT UA: ABNORMAL
CRYSTALS, UA: ABNORMAL /HPF
EPITHELIAL CELLS UA: ABNORMAL /HPF (ref 0–5)
GLUCOSE URINE: NEGATIVE
KETONES, URINE: NEGATIVE
LEUKOCYTE ESTERASE, URINE: NEGATIVE
MUCUS: ABNORMAL
NITRITE, URINE: NEGATIVE
OTHER OBSERVATIONS UA: ABNORMAL
PH UA: 6.5 (ref 5–9)
PROTEIN UA: NEGATIVE
RBC UA: ABNORMAL /HPF (ref 0–2)
RENAL EPITHELIAL, UA: ABNORMAL /HPF
SPECIFIC GRAVITY UA: 1.02 (ref 1.01–1.02)
TRICHOMONAS: ABNORMAL
TURBIDITY: CLEAR
URINE HGB: NEGATIVE
UROBILINOGEN, URINE: NORMAL
WBC UA: ABNORMAL /HPF (ref 0–5)
YEAST: ABNORMAL

## 2018-11-28 PROCEDURE — G8598 ASA/ANTIPLAT THER USED: HCPCS | Performed by: NURSE PRACTITIONER

## 2018-11-28 PROCEDURE — 81001 URINALYSIS AUTO W/SCOPE: CPT

## 2018-11-28 PROCEDURE — 51798 US URINE CAPACITY MEASURE: CPT | Performed by: NURSE PRACTITIONER

## 2018-11-28 PROCEDURE — 87086 URINE CULTURE/COLONY COUNT: CPT

## 2018-11-28 PROCEDURE — 87186 SC STD MICRODIL/AGAR DIL: CPT

## 2018-11-28 PROCEDURE — G8417 CALC BMI ABV UP PARAM F/U: HCPCS | Performed by: NURSE PRACTITIONER

## 2018-11-28 PROCEDURE — 87077 CULTURE AEROBIC IDENTIFY: CPT

## 2018-11-28 PROCEDURE — G8484 FLU IMMUNIZE NO ADMIN: HCPCS | Performed by: NURSE PRACTITIONER

## 2018-11-28 PROCEDURE — 4040F PNEUMOC VAC/ADMIN/RCVD: CPT | Performed by: NURSE PRACTITIONER

## 2018-11-28 PROCEDURE — 1036F TOBACCO NON-USER: CPT | Performed by: NURSE PRACTITIONER

## 2018-11-28 PROCEDURE — G8427 DOCREV CUR MEDS BY ELIG CLIN: HCPCS | Performed by: NURSE PRACTITIONER

## 2018-11-28 PROCEDURE — 1101F PT FALLS ASSESS-DOCD LE1/YR: CPT | Performed by: NURSE PRACTITIONER

## 2018-11-28 PROCEDURE — 1123F ACP DISCUSS/DSCN MKR DOCD: CPT | Performed by: NURSE PRACTITIONER

## 2018-11-28 PROCEDURE — 3017F COLORECTAL CA SCREEN DOC REV: CPT | Performed by: NURSE PRACTITIONER

## 2018-11-28 PROCEDURE — 99214 OFFICE O/P EST MOD 30 MIN: CPT | Performed by: NURSE PRACTITIONER

## 2018-11-28 RX ORDER — FLUCONAZOLE 100 MG/1
100 TABLET ORAL DAILY
Qty: 10 TABLET | Refills: 0 | Status: SHIPPED | OUTPATIENT
Start: 2018-11-28 | End: 2018-12-08

## 2018-11-28 RX ORDER — NYSTATIN 100000 [USP'U]/G
POWDER TOPICAL
Qty: 45 G | Refills: 1 | Status: SHIPPED | OUTPATIENT
Start: 2018-11-28 | End: 2019-06-04

## 2018-11-28 ASSESSMENT — ENCOUNTER SYMPTOMS
NAUSEA: 0
COUGH: 0
WHEEZING: 0
EYE PAIN: 0
EYE REDNESS: 0
BACK PAIN: 0
COLOR CHANGE: 0
CONSTIPATION: 0
VOMITING: 0
ABDOMINAL PAIN: 0
SHORTNESS OF BREATH: 0

## 2018-11-28 NOTE — PROGRESS NOTES
Subjective:      Patient ID: Mikey Guzman is a 72 y.o. male. HPI  Patient is a 72 y.o. male in no acute distress and alert and oriented to person, place and time. History  5/2016 PVP greenlight     3/2017 cystoscopy showed normal anatomy. PFR recommended. 6/2017 Completed seven PFR treatments. Daytime frequency improved from q1hr to q1-2 hrs, nocturia improved from 0-3 to 0-1, severe urgency improved to mild, LIBBY improved from 5-10x per day to 3-4x per day. Today  Here today with complaints of frequency, urgency, dysuria, and urge incontinence. He reports his symptoms started one month ago. He denies gross hematuria, fevers, nausea, vomiting, flank or abdominal pain. He denies constipation. He does consume a large amount of coffee daily. He also complaints of a yeast infection to the bilateral groin area. Recent A1c is 6.0. PVR 17ml. He also has untreated sleep apnea and is scheduled for a sleep study in December. Past Medical History:   Diagnosis Date    Arthritis     Ascending cholangitis 10/2/14     at [facility] assisted gastric remnant to open    Blood in stool 2011    CAD (coronary artery disease)     Chronic back pain     DDD    Chronic diastolic (congestive) heart failure ([address] 11/5/2018    Chronic systolic congestive heart failure ([address] 9/25/2018    COPD with acute exacerbation ([facility] [address] [address] 9/25/2018    Heart disease 5-9-12    History of angioplasty 7/2008    2 stents placed    Hx of CABG     x1 in 2015    Hyperlipidemia     Hypertension     onset age 41    Obesity     Stress incontinence, male 3/21/2017    Transfusion history     Unspecified sleep apnea     cpap-DOES NOT USE MACHINE     Past Surgical History:   Procedure Laterality Date   Kimberly Goldstein  2005    Debbi Stafford Press BARIATRIC SURGERY  2001    Chip HENRY at [facility]   [address] Left 05/09/12    Left main normal.  Left anterior descending artery:  Plaque disease.   Ramus: Plaque disease Circumflex:nondominant, plaque disease. OM1 normal. Right coronoary artery:  dominant & luminal irregularities. Left ventricular ejection fraction 60. Mitral valve normal with no insufficinecy of the mitral valve. Aortic valve normal with no stenosis of the aortic valve. Edp was normal.    CARDIAC CATHETERIZATION Left 03/04/15    Dr. Ramirez --Severe CAD, 80% in-stent stenosis in the left anterior descending coronary artery in a rather long segment, very eccentric,extending almost to the ostium of the left anterior descending coronary artery. 70% in-stent stenosis of a very large dominant right coronary artery. Unremarkable small circumflex. Normal left ventricular function, ejection fraction of 60%. [address]  2008    2 stents    CHOLECYSTECTOMY  05/30/2014    open [facility] Dr. Noemy Alegre COLONOSCOPY  10/2015    repeat in 2022    [facility]      2 vessel March 3th 2015    CYSTOSCOPY  05/10/2016    with laser TURP    ERCP  10/2/14    Dr. Jonas Hale with laparotomy    HERNIA REPAIR  9/2003    Hiatal    JOINT REPLACEMENT Left 03-    Hip     KNEE ARTHROSCOPY Right 9/26/13    Dr. Paola Craig - [facility] SURGERY  11/02/2016    rt. L3-4, L4-5 decompression.  L4-5 discectomy and fusion    SHOULDER SURGERY Left 2000    arthroscopy    SHOULDER SURGERY Left 2007    replaced humeral head    TOTAL HIP ARTHROPLASTY Right     02/03/2016     Family History   Problem Relation Age of Onset    Diabetes Mother     Heart Disease Mother     Arthritis Mother     High Blood Pressure Mother     High Cholesterol Mother     Cancer Father         lung black lung from coal mines    Heart Disease Brother         leaky heart    Liver Disease Paternal Grandfather         black lung    Obesity Sister         gastric bypass    Cancer Sister         lung, smoker     Current Outpatient Prescriptions on File Prior to Visit   Medication Sig Dispense Refill   

## 2018-11-29 LAB
CULTURE: ABNORMAL
Lab: ABNORMAL
ORGANISM: ABNORMAL
SPECIMEN DESCRIPTION: ABNORMAL
STATUS: ABNORMAL

## 2018-11-30 ENCOUNTER — TELEPHONE (OUTPATIENT)
Dept: UROLOGY | Age: 73
End: 2018-11-30

## 2018-11-30 ENCOUNTER — OFFICE VISIT (OUTPATIENT)
Dept: CARDIOLOGY CLINIC | Age: 73
End: 2018-11-30
Payer: MEDICARE

## 2018-11-30 VITALS
WEIGHT: 315 LBS | SYSTOLIC BLOOD PRESSURE: 120 MMHG | BODY MASS INDEX: 45.57 KG/M2 | OXYGEN SATURATION: 95 % | DIASTOLIC BLOOD PRESSURE: 86 MMHG | HEART RATE: 111 BPM

## 2018-11-30 DIAGNOSIS — R06.02 SOB (SHORTNESS OF BREATH): Primary | ICD-10-CM

## 2018-11-30 DIAGNOSIS — I27.20 PULMONARY HTN (HCC): ICD-10-CM

## 2018-11-30 PROCEDURE — G8417 CALC BMI ABV UP PARAM F/U: HCPCS | Performed by: INTERNAL MEDICINE

## 2018-11-30 PROCEDURE — 1036F TOBACCO NON-USER: CPT | Performed by: INTERNAL MEDICINE

## 2018-11-30 PROCEDURE — 1101F PT FALLS ASSESS-DOCD LE1/YR: CPT | Performed by: INTERNAL MEDICINE

## 2018-11-30 PROCEDURE — 1123F ACP DISCUSS/DSCN MKR DOCD: CPT | Performed by: INTERNAL MEDICINE

## 2018-11-30 PROCEDURE — G8598 ASA/ANTIPLAT THER USED: HCPCS | Performed by: INTERNAL MEDICINE

## 2018-11-30 PROCEDURE — 99214 OFFICE O/P EST MOD 30 MIN: CPT | Performed by: INTERNAL MEDICINE

## 2018-11-30 PROCEDURE — G8484 FLU IMMUNIZE NO ADMIN: HCPCS | Performed by: INTERNAL MEDICINE

## 2018-11-30 PROCEDURE — 3017F COLORECTAL CA SCREEN DOC REV: CPT | Performed by: INTERNAL MEDICINE

## 2018-11-30 PROCEDURE — G8427 DOCREV CUR MEDS BY ELIG CLIN: HCPCS | Performed by: INTERNAL MEDICINE

## 2018-11-30 PROCEDURE — 4040F PNEUMOC VAC/ADMIN/RCVD: CPT | Performed by: INTERNAL MEDICINE

## 2018-11-30 RX ORDER — ISOSORBIDE MONONITRATE 30 MG/1
30 TABLET, EXTENDED RELEASE ORAL 2 TIMES DAILY
Status: SHIPPED | COMMUNITY
Start: 2018-11-30 | End: 2018-12-27 | Stop reason: ALTCHOICE

## 2018-11-30 RX ORDER — SULFAMETHOXAZOLE AND TRIMETHOPRIM 800; 160 MG/1; MG/1
1 TABLET ORAL 2 TIMES DAILY
Qty: 20 TABLET | Refills: 0 | Status: SHIPPED | OUTPATIENT
Start: 2018-11-30 | End: 2018-12-10

## 2018-11-30 NOTE — LETTER
MEDICATIONS AT HOME:  He is currently on Bactrim DS b.i.d., Imdur 30 mg daily, Diflucan 100 mg daily, Ventolin inhaler daily, Lasix 20 mg p.r.n., Prilosec 20 mg daily, HydroDIURIL 25 mg daily, Flonase daily, Lexapro 10 mg daily, Cozaar 25 mg daily, metoprolol XL 25 mg daily, Lipitor 80 mg daily, Mobic 15 mg daily, Symbicort 2 puffs b.i.d. PAST MEDICAL HISTORY:  1. He had left shoulder surgery in  in [place]. 2.  Bariatric surgery in  at [facility] with a Laura-en-Y repair with a 100-pound weight loss, which he has gained back. 3.  He has sleep apnea, not wearing a CPAP mask, although he is getting another sleep study on . 4.  Back surgery in  by  _____ in [facility]. 5.  He had left hip replacement on 2013 by Dr. Rosalind Avendano. 6.  Right knee arthroscopic surgery on 2013. 7.  Cholecystectomy on 2014.  8. Sphincterotomy in 10/2014 for obstructive bile duct. 9.  Prostate surgery with a GreenLight procedure by Dr. Rhoda Bingham. 10.  He had open heart surgery as stated previously with subsequent breakage of all the sternal wires and therefore, his chest/sternum is loose. 11. Arthroscopic surgery on 2016. 12.  GreenLight procedure on 05/10/2016. 13.  Lumbar disk herniation after lifting a truck off his son on 2016. 14.  Hernia repair in 2003. 15.  Lumbar spine surgery on 2016 with L3-L4 and L4-L5 decompression with diskectomy and fusion. 16.  He had left shoulder replaced humeral head in . 17.  His right hip replacement was on 2016. FAMILY HISTORY:  Mother had CAD and  at 68. Father  at 61 of black lung. SOCIAL HISTORY:  He is 72years old. Has 4 children, with a son that works at [employer], one son is a junkman, 2 daughters. He does not smoke, does not drink alcohol. He is . He has a 8year-old grandson by the name of Goldie Pratt, who stays with him and his wife most of the time.   He

## 2018-12-04 ENCOUNTER — HOSPITAL ENCOUNTER (OUTPATIENT)
Dept: NON INVASIVE DIAGNOSTICS | Age: 73
Discharge: HOME | End: 2018-12-04
Payer: MEDICARE

## 2018-12-04 DIAGNOSIS — R06.02 SOB (SHORTNESS OF BREATH): ICD-10-CM

## 2018-12-04 DIAGNOSIS — I27.20 PULMONARY HTN (HCC): ICD-10-CM

## 2018-12-04 LAB
LV EF: 60 %
LVEF MODALITY: NORMAL

## 2018-12-04 PROCEDURE — 93306 TTE W/DOPPLER COMPLETE: CPT

## 2018-12-12 ENCOUNTER — HOSPITAL ENCOUNTER (OUTPATIENT)
Dept: SLEEP CENTER | Age: 73
Discharge: HOME | End: 2018-12-12
Payer: MEDICARE

## 2018-12-12 DIAGNOSIS — G47.33 OSA (OBSTRUCTIVE SLEEP APNEA): ICD-10-CM

## 2018-12-12 PROCEDURE — 95810 POLYSOM 6/> YRS 4/> PARAM: CPT

## 2018-12-12 ASSESSMENT — SLEEP AND FATIGUE QUESTIONNAIRES
HOW LIKELY ARE YOU TO NOD OFF OR FALL ASLEEP WHILE SITTING AND READING: 3
HOW LIKELY ARE YOU TO NOD OFF OR FALL ASLEEP WHILE WATCHING TV: 1
HOW LIKELY ARE YOU TO NOD OFF OR FALL ASLEEP WHILE SITTING INACTIVE IN A PUBLIC PLACE: 3
HOW LIKELY ARE YOU TO NOD OFF OR FALL ASLEEP WHILE SITTING AND TALKING TO SOMEONE: 0
ESS TOTAL SCORE: 12
HOW LIKELY ARE YOU TO NOD OFF OR FALL ASLEEP WHILE SITTING QUIETLY AFTER LUNCH WITHOUT ALCOHOL: 1
HOW LIKELY ARE YOU TO NOD OFF OR FALL ASLEEP WHILE LYING DOWN TO REST IN THE AFTERNOON WHEN CIRCUMSTANCES PERMIT: 3
HOW LIKELY ARE YOU TO NOD OFF OR FALL ASLEEP WHEN YOU ARE A PASSENGER IN A CAR FOR AN HOUR WITHOUT A BREAK: 1
HOW LIKELY ARE YOU TO NOD OFF OR FALL ASLEEP IN A CAR, WHILE STOPPED FOR A FEW MINUTES IN TRAFFIC: 0

## 2018-12-17 NOTE — PROGRESS NOTES
The patient arrived for a diagnostic sleep study. The procedure was explained and all questions answered. The patient wore a Resmed F10 in a size large during CPAP training pt did well with mask.
evaluation revealed the patient had a total of 22  respiratory events during the night, out of which 9 were complete, 13  were partial obstruction. The apnea-hypopnea index was 3 per hour. The  lowest oxygen saturation during the night being 85%. Movement disorder evaluation revealed severe degree of periodic leg  movements with PLM index accompanied by arousals being 35 per hour. IMPRESSION:  1. Sleep study does not reveal any significant apnea. 2.  Periodic leg movements. 3.  Obesity. 4.  Coronary artery disease. 5.  Heart failure. RECOMMENDATIONS:  1. The patient does not have any significant apnea. The patient should  be encouraged to lose weight. If the patient has significant snoring,  then ENT evaluation may be requested. 2.  If the patient's sleep is not refreshing and he is feeling sleepy  during the daytime, then treatment of periodic leg movements may be  instituted, which may help the quality of sleep and relieve the daytime  symptoms. 3.  The patient's treatment of coronary artery disease and heart failure  should be continued.         Kirill Schilling    D:12/15/2018 10:08:10               T: 12/15/2018 20:28:11      [email or web address]  Job#: [id number]     Doc#: [id number]

## 2018-12-27 ENCOUNTER — OFFICE VISIT (OUTPATIENT)
Dept: UROLOGY | Age: 73
End: 2018-12-27
Payer: MEDICARE

## 2018-12-27 ENCOUNTER — HOSPITAL ENCOUNTER (OUTPATIENT)
Age: 73
Setting detail: SPECIMEN
Discharge: HOME | End: 2018-12-27
Payer: MEDICARE

## 2018-12-27 VITALS
SYSTOLIC BLOOD PRESSURE: 136 MMHG | BODY MASS INDEX: 42.66 KG/M2 | DIASTOLIC BLOOD PRESSURE: 76 MMHG | HEIGHT: 72 IN | WEIGHT: 315 LBS

## 2018-12-27 DIAGNOSIS — N13.8 BPH WITH OBSTRUCTION/LOWER URINARY TRACT SYMPTOMS: Primary | ICD-10-CM

## 2018-12-27 DIAGNOSIS — N13.8 BPH WITH OBSTRUCTION/LOWER URINARY TRACT SYMPTOMS: ICD-10-CM

## 2018-12-27 DIAGNOSIS — N40.1 BPH WITH OBSTRUCTION/LOWER URINARY TRACT SYMPTOMS: ICD-10-CM

## 2018-12-27 DIAGNOSIS — N40.1 BPH WITH OBSTRUCTION/LOWER URINARY TRACT SYMPTOMS: Primary | ICD-10-CM

## 2018-12-27 DIAGNOSIS — R35.1 NOCTURIA: ICD-10-CM

## 2018-12-27 LAB
-: ABNORMAL
AMORPHOUS: ABNORMAL
BACTERIA: ABNORMAL
BILIRUBIN URINE: NEGATIVE
CASTS UA: ABNORMAL /LPF
COLOR: YELLOW
COMMENT UA: ABNORMAL
CRYSTALS, UA: ABNORMAL /HPF
EPITHELIAL CELLS UA: ABNORMAL /HPF (ref 0–5)
GLUCOSE URINE: NEGATIVE
KETONES, URINE: NEGATIVE
LEUKOCYTE ESTERASE, URINE: NEGATIVE
MUCUS: ABNORMAL
NITRITE, URINE: NEGATIVE
OTHER OBSERVATIONS UA: ABNORMAL
PH UA: 6 (ref 5–9)
PROTEIN UA: NEGATIVE
RBC UA: ABNORMAL /HPF (ref 0–2)
RENAL EPITHELIAL, UA: ABNORMAL /HPF
SPECIFIC GRAVITY UA: 1.02 (ref 1.01–1.02)
TRICHOMONAS: ABNORMAL
TURBIDITY: CLEAR
URINE HGB: NEGATIVE
UROBILINOGEN, URINE: NORMAL
WBC UA: ABNORMAL /HPF (ref 0–5)
YEAST: ABNORMAL

## 2018-12-27 PROCEDURE — 87086 URINE CULTURE/COLONY COUNT: CPT

## 2018-12-27 PROCEDURE — 87077 CULTURE AEROBIC IDENTIFY: CPT

## 2018-12-27 PROCEDURE — G8427 DOCREV CUR MEDS BY ELIG CLIN: HCPCS | Performed by: NURSE PRACTITIONER

## 2018-12-27 PROCEDURE — 51798 US URINE CAPACITY MEASURE: CPT | Performed by: NURSE PRACTITIONER

## 2018-12-27 PROCEDURE — G8417 CALC BMI ABV UP PARAM F/U: HCPCS | Performed by: NURSE PRACTITIONER

## 2018-12-27 PROCEDURE — 3017F COLORECTAL CA SCREEN DOC REV: CPT | Performed by: NURSE PRACTITIONER

## 2018-12-27 PROCEDURE — 87186 SC STD MICRODIL/AGAR DIL: CPT

## 2018-12-27 PROCEDURE — 1101F PT FALLS ASSESS-DOCD LE1/YR: CPT | Performed by: NURSE PRACTITIONER

## 2018-12-27 PROCEDURE — 1123F ACP DISCUSS/DSCN MKR DOCD: CPT | Performed by: NURSE PRACTITIONER

## 2018-12-27 PROCEDURE — G8598 ASA/ANTIPLAT THER USED: HCPCS | Performed by: NURSE PRACTITIONER

## 2018-12-27 PROCEDURE — 1036F TOBACCO NON-USER: CPT | Performed by: NURSE PRACTITIONER

## 2018-12-27 PROCEDURE — 4040F PNEUMOC VAC/ADMIN/RCVD: CPT | Performed by: NURSE PRACTITIONER

## 2018-12-27 PROCEDURE — G8484 FLU IMMUNIZE NO ADMIN: HCPCS | Performed by: NURSE PRACTITIONER

## 2018-12-27 PROCEDURE — 99213 OFFICE O/P EST LOW 20 MIN: CPT | Performed by: NURSE PRACTITIONER

## 2018-12-27 PROCEDURE — 81001 URINALYSIS AUTO W/SCOPE: CPT

## 2018-12-27 ASSESSMENT — ENCOUNTER SYMPTOMS
WHEEZING: 0
CONSTIPATION: 0
NAUSEA: 0
EYE REDNESS: 0
BACK PAIN: 0
SHORTNESS OF BREATH: 0
COLOR CHANGE: 0
ABDOMINAL PAIN: 0
COUGH: 0
VOMITING: 0
EYE PAIN: 0

## 2018-12-27 NOTE — PROGRESS NOTES
Bladders[place] performed in office today:  Pt voided - 100 mL, PVR - 28 mL
urine volume, difficulty urinating, discharge, dysuria, flank pain, frequency, hematuria, penile pain, scrotal swelling, testicular pain and urgency. Musculoskeletal: Negative for back pain, joint swelling and myalgias. Skin: Negative for color change, rash and wound. Neurological: Negative for dizziness, tremors and numbness. Hematological: Negative for adenopathy. Does not bruise/bleed easily. Objective:   Physical Exam    Assessment:       Diagnosis Orders   1. BPH with obstruction/lower urinary tract symptoms  Urinalysis with Microscopic    Urine culture clean catch   2. Nocturia  Urinalysis with Microscopic    Urine culture clean catch           Plan: We will recheck a UA C&S today. If this is negative we will start him on Flomax daily. Discussed bladder irritants thoroughly. Patient instructed to avoid/minimize intake of food/ drinks such as: coffee, tea, caffeine, alcohol, carbonated beverages, soda pop, spicy/acidic foods. Was sent home with a extensive list, including non-irritating alternatives.          Bubba Flores, APRN - CNP

## 2018-12-29 LAB
CULTURE: ABNORMAL
Lab: ABNORMAL
ORGANISM: ABNORMAL
SPECIMEN DESCRIPTION: ABNORMAL
STATUS: ABNORMAL

## 2018-12-31 ENCOUNTER — TELEPHONE (OUTPATIENT)
Dept: UROLOGY | Age: 73
End: 2018-12-31

## 2018-12-31 RX ORDER — CEFDINIR 300 MG/1
300 CAPSULE ORAL 2 TIMES DAILY
Qty: 20 CAPSULE | Refills: 0 | Status: SHIPPED | OUTPATIENT
Start: 2018-12-31 | End: 2019-01-10

## 2019-02-04 ENCOUNTER — OFFICE VISIT (OUTPATIENT)
Dept: PULMONOLOGY | Age: 74
End: 2019-02-04
Payer: MEDICARE

## 2019-02-04 VITALS
HEIGHT: 72 IN | TEMPERATURE: 97.7 F | BODY MASS INDEX: 42.66 KG/M2 | SYSTOLIC BLOOD PRESSURE: 128 MMHG | WEIGHT: 315 LBS | RESPIRATION RATE: 20 BRPM | DIASTOLIC BLOOD PRESSURE: 75 MMHG | HEART RATE: 61 BPM

## 2019-02-04 DIAGNOSIS — I50.32 CHRONIC DIASTOLIC (CONGESTIVE) HEART FAILURE (HCC): ICD-10-CM

## 2019-02-04 DIAGNOSIS — E66.01 MORBID OBESITY DUE TO EXCESS CALORIES (HCC): ICD-10-CM

## 2019-02-04 DIAGNOSIS — J44.9 CHRONIC OBSTRUCTIVE PULMONARY DISEASE, UNSPECIFIED COPD TYPE (HCC): Primary | ICD-10-CM

## 2019-02-04 DIAGNOSIS — G47.61 PERIODIC LIMB MOVEMENT SLEEP DISORDER: ICD-10-CM

## 2019-02-04 DIAGNOSIS — I25.10 CORONARY ARTERY DISEASE INVOLVING NATIVE CORONARY ARTERY OF NATIVE HEART WITHOUT ANGINA PECTORIS: ICD-10-CM

## 2019-02-04 DIAGNOSIS — R63.8 OTHER SYMPTOMS AND SIGNS CONCERNING FOOD AND FLUID INTAKE: ICD-10-CM

## 2019-02-04 PROCEDURE — 1101F PT FALLS ASSESS-DOCD LE1/YR: CPT | Performed by: INTERNAL MEDICINE

## 2019-02-04 PROCEDURE — G8926 SPIRO NO PERF OR DOC: HCPCS | Performed by: INTERNAL MEDICINE

## 2019-02-04 PROCEDURE — 1036F TOBACCO NON-USER: CPT | Performed by: INTERNAL MEDICINE

## 2019-02-04 PROCEDURE — G8427 DOCREV CUR MEDS BY ELIG CLIN: HCPCS | Performed by: INTERNAL MEDICINE

## 2019-02-04 PROCEDURE — 3023F SPIROM DOC REV: CPT | Performed by: INTERNAL MEDICINE

## 2019-02-04 PROCEDURE — G8417 CALC BMI ABV UP PARAM F/U: HCPCS | Performed by: INTERNAL MEDICINE

## 2019-02-04 PROCEDURE — 4040F PNEUMOC VAC/ADMIN/RCVD: CPT | Performed by: INTERNAL MEDICINE

## 2019-02-04 PROCEDURE — G8599 NO ASA/ANTIPLAT THER USE RNG: HCPCS | Performed by: INTERNAL MEDICINE

## 2019-02-04 PROCEDURE — 3017F COLORECTAL CA SCREEN DOC REV: CPT | Performed by: INTERNAL MEDICINE

## 2019-02-04 PROCEDURE — 1123F ACP DISCUSS/DSCN MKR DOCD: CPT | Performed by: INTERNAL MEDICINE

## 2019-02-04 PROCEDURE — G8484 FLU IMMUNIZE NO ADMIN: HCPCS | Performed by: INTERNAL MEDICINE

## 2019-02-04 PROCEDURE — 99214 OFFICE O/P EST MOD 30 MIN: CPT | Performed by: INTERNAL MEDICINE

## 2019-02-07 ENCOUNTER — HOSPITAL ENCOUNTER (OUTPATIENT)
Age: 74
Discharge: HOME | End: 2019-02-07
Payer: MEDICARE

## 2019-02-07 DIAGNOSIS — G47.61 PERIODIC LIMB MOVEMENT SLEEP DISORDER: ICD-10-CM

## 2019-02-07 DIAGNOSIS — R63.8 OTHER SYMPTOMS AND SIGNS CONCERNING FOOD AND FLUID INTAKE: ICD-10-CM

## 2019-02-07 LAB — FERRITIN: 34 UG/L (ref 30–400)

## 2019-02-07 PROCEDURE — 82728 ASSAY OF FERRITIN: CPT

## 2019-02-07 PROCEDURE — 36415 COLL VENOUS BLD VENIPUNCTURE: CPT

## 2019-02-21 ENCOUNTER — HOSPITAL ENCOUNTER (OUTPATIENT)
Dept: GENERAL RADIOLOGY | Age: 74
Discharge: HOME | End: 2019-02-23
Payer: MEDICARE

## 2019-02-21 ENCOUNTER — HOSPITAL ENCOUNTER (OUTPATIENT)
Age: 74
Discharge: HOME | End: 2019-02-23
Payer: MEDICARE

## 2019-02-21 DIAGNOSIS — M17.0 OSTEOARTHRITIS OF BOTH KNEES, UNSPECIFIED OSTEOARTHRITIS TYPE: ICD-10-CM

## 2019-02-21 PROCEDURE — 73562 X-RAY EXAM OF KNEE 3: CPT

## 2019-04-03 ENCOUNTER — OFFICE VISIT (OUTPATIENT)
Dept: UROLOGY | Age: 74
End: 2019-04-03
Payer: MEDICARE

## 2019-04-03 ENCOUNTER — HOSPITAL ENCOUNTER (OUTPATIENT)
Age: 74
Setting detail: SPECIMEN
Discharge: HOME | End: 2019-04-03
Payer: MEDICARE

## 2019-04-03 VITALS
BODY MASS INDEX: 42.66 KG/M2 | WEIGHT: 315 LBS | SYSTOLIC BLOOD PRESSURE: 138 MMHG | DIASTOLIC BLOOD PRESSURE: 82 MMHG | HEIGHT: 72 IN

## 2019-04-03 DIAGNOSIS — R39.12 WEAK URINARY STREAM: ICD-10-CM

## 2019-04-03 DIAGNOSIS — N39.43 POST-VOID DRIBBLING: ICD-10-CM

## 2019-04-03 DIAGNOSIS — N40.1 BPH WITH OBSTRUCTION/LOWER URINARY TRACT SYMPTOMS: Primary | ICD-10-CM

## 2019-04-03 DIAGNOSIS — N13.8 BPH WITH OBSTRUCTION/LOWER URINARY TRACT SYMPTOMS: ICD-10-CM

## 2019-04-03 DIAGNOSIS — N13.8 BPH WITH OBSTRUCTION/LOWER URINARY TRACT SYMPTOMS: Primary | ICD-10-CM

## 2019-04-03 DIAGNOSIS — N39.41 URGE INCONTINENCE: ICD-10-CM

## 2019-04-03 DIAGNOSIS — N40.1 BPH WITH OBSTRUCTION/LOWER URINARY TRACT SYMPTOMS: ICD-10-CM

## 2019-04-03 LAB
-: NORMAL
AMORPHOUS: NORMAL
BACTERIA: NORMAL
BILIRUBIN URINE: NEGATIVE
CASTS UA: NORMAL /LPF
COLOR: YELLOW
COMMENT UA: NORMAL
CRYSTALS, UA: NORMAL /HPF
EPITHELIAL CELLS UA: NORMAL /HPF (ref 0–5)
GLUCOSE URINE: NEGATIVE
KETONES, URINE: NEGATIVE
LEUKOCYTE ESTERASE, URINE: NEGATIVE
MUCUS: NORMAL
NITRITE, URINE: NEGATIVE
OTHER OBSERVATIONS UA: NORMAL
PH UA: 7 (ref 5–9)
PROTEIN UA: NEGATIVE
RBC UA: NORMAL /HPF (ref 0–2)
RENAL EPITHELIAL, UA: NORMAL /HPF
SPECIFIC GRAVITY UA: 1.01 (ref 1.01–1.02)
TRICHOMONAS: NORMAL
TURBIDITY: CLEAR
URINE HGB: NEGATIVE
UROBILINOGEN, URINE: NORMAL
WBC UA: NORMAL /HPF (ref 0–5)
YEAST: NORMAL

## 2019-04-03 PROCEDURE — G8417 CALC BMI ABV UP PARAM F/U: HCPCS | Performed by: NURSE PRACTITIONER

## 2019-04-03 PROCEDURE — 3017F COLORECTAL CA SCREEN DOC REV: CPT | Performed by: NURSE PRACTITIONER

## 2019-04-03 PROCEDURE — 1036F TOBACCO NON-USER: CPT | Performed by: NURSE PRACTITIONER

## 2019-04-03 PROCEDURE — 4040F PNEUMOC VAC/ADMIN/RCVD: CPT | Performed by: NURSE PRACTITIONER

## 2019-04-03 PROCEDURE — G8599 NO ASA/ANTIPLAT THER USE RNG: HCPCS | Performed by: NURSE PRACTITIONER

## 2019-04-03 PROCEDURE — 99214 OFFICE O/P EST MOD 30 MIN: CPT | Performed by: NURSE PRACTITIONER

## 2019-04-03 PROCEDURE — 51798 US URINE CAPACITY MEASURE: CPT | Performed by: NURSE PRACTITIONER

## 2019-04-03 PROCEDURE — G8427 DOCREV CUR MEDS BY ELIG CLIN: HCPCS | Performed by: NURSE PRACTITIONER

## 2019-04-03 PROCEDURE — 1123F ACP DISCUSS/DSCN MKR DOCD: CPT | Performed by: NURSE PRACTITIONER

## 2019-04-03 PROCEDURE — 87086 URINE CULTURE/COLONY COUNT: CPT

## 2019-04-03 PROCEDURE — 81001 URINALYSIS AUTO W/SCOPE: CPT

## 2019-04-03 RX ORDER — TAMSULOSIN HYDROCHLORIDE 0.4 MG/1
0.4 CAPSULE ORAL EVERY EVENING
Qty: 30 CAPSULE | Refills: 11 | Status: SHIPPED | OUTPATIENT
Start: 2019-04-03 | End: 2019-07-08 | Stop reason: SDUPTHER

## 2019-04-03 ASSESSMENT — ENCOUNTER SYMPTOMS
EYE REDNESS: 0
VOMITING: 0
ABDOMINAL PAIN: 0
NAUSEA: 0
BACK PAIN: 0
EYE PAIN: 0
SHORTNESS OF BREATH: 0
CONSTIPATION: 0
WHEEZING: 0
COUGH: 0
COLOR CHANGE: 0

## 2019-04-03 NOTE — PROGRESS NOTES
HPI:    Patient is a 72 y.o. male in no acute distress. He is alert and oriented to person, place, and time. History  5/2016 PVP greenlight     3/2017 cystoscopy showed normal anatomy. PFR recommended. 6/2017 Completed seven PFR treatments. Daytime frequency improved from q1hr to q1-2 hrs, nocturia improved from 0-3 to 0-1, severe urgency improved to mild, LIBBY improved from 5-10x per day to 3-4x per day. Today  Here today to follow-up for BPH. UACS was positive for e.coli. He was treated with culture specific omnicef. He is urinating every 3 hours during the day and has nocturia 1-2 times per night. His biggest issues are weak stream, post-void dribbling and urge incontinence. He is wearing one pad per day. IPSS Questionnaire (AUA-7): Over the past month    1)  How often have you had a sensation of not emptying your bladder completely after you finish urinating? 1 - Less than 1 time in 5   2)  How often have you had to urinate again less than two hours after you finished urinating? 2 - Less than half the time   3)  How often have you found you stopped and started again several times when you urinated? 2 - Less than half the time   4) How difficult have you found it to postpone urination? 1 - Less than 1 time in 5   5) How often have you had a weak urinary stream?  2 - Less than half the time   6) How often have you had to push or strain to begin urination? 1 - Less than 1 time in 5   7) How many times did you most typically get up to urinate from the time you went to bed until the time you got up in the morning?   1 - 1 time   Total 10   QOL 3           Past Medical History:   Diagnosis Date    Arthritis     Ascending cholangitis 10/2/14     at [facility] assisted gastric remnant to open    Blood in stool 2011    CAD (coronary artery disease)     Chronic back pain     DDD    Chronic diastolic (congestive) heart failure ([address] 11/5/2018    Chronic systolic congestive heart failure ([address] 9/25/2018    COPD with acute exacerbation ([address] 9/25/2018    Heart disease 5-9-12    History of angioplasty 7/2008    2 stents placed    Hx of CABG     x1 in 2015    Hyperlipidemia     Hypertension     onset age 41    Obesity     Stress incontinence, male 3/21/2017    Transfusion history     Unspecified sleep apnea     cpap-DOES NOT USE MACHINE     Past Surgical History:   Procedure Laterality Date   Jenna Shepard BARIATRIC SURGERY  2001    Coletta Roots [facility] at [facility]   [address] Left 05/09/12    Left main normal.  Left anterior descending artery:  Plaque disease. Ramus: Plaque disease Circumflex:nondominant, plaque disease. OM1 normal. Right coronoary artery:  dominant & luminal irregularities. Left ventricular ejection fraction 60. Mitral valve normal with no insufficinecy of the mitral valve. Aortic valve normal with no stenosis of the aortic valve. Edp was normal.    CARDIAC CATHETERIZATION Left 03/04/15    Dr. Ramirez --Severe CAD, 80% in-stent stenosis in the left anterior descending coronary artery in a rather long segment, very eccentric,extending almost to the ostium of the left anterior descending coronary artery. 70% in-stent stenosis of a very large dominant right coronary artery. Unremarkable small circumflex. Normal left ventricular function, ejection fraction of 60%.     CARDIAC CATHETERIZATION Left 12/05/2018    Dr. Cornelio Levy @ [facility]--Risk Trenerys [place] [address] specific cardiac intervention   [address]  2008    2 stents    CHOLECYSTECTOMY  05/30/2014    open [facility] Dr. Winnie Conway COLONOSCOPY  10/2015    repeat in 2022   [address] GRAFT      2 vessel March 3th 2015    CYSTOSCOPY  05/10/2016    with laser TURP    ERCP  10/2/14    Dr. Herbie Villalta with laparotomy    HERNIA REPAIR  9/2003    Hiatal    JOINT REPLACEMENT Left 03-    Hip     KNEE ARTHROSCOPY Right 9/26/13    Dr. Phil Tucker [address]) 8261-256 MG KIT 1 Applicatorful by Nasal route 2 times daily (Patient taking differently: 1 Applicatorful by Nasal route 2 times daily PRN) 1 kit 3    Multiple Vitamins-Minerals (CENTRUM SILVER ULTRA MENS) TABS Take 1 tablet by mouth daily       No facility-administered encounter medications on file as of 4/3/2019.        Current Outpatient Medications on File Prior to Visit   Medication Sig Dispense Refill    nystatin (MYCOSTATIN) 619673 UNIT/GM powder Apply 3 times daily if needed 45 g 1    VENTOLIN  (90 Base) MCG/ACT inhaler INHALE 2 PUFFS BY MOUTH EVERY 4 HOURS AS NEEDED FOR WHEEZING 1 Inhaler 3    predniSONE (DELTASONE) 20 MG tablet Take 2 tablets by mouth daily Take with food for copd exacerbation 6 tablet 0    omeprazole (PRILOSEC) 20 MG delayed release capsule TAKE 1 CAPSULE BY MOUTH ONCE DAILY 90 capsule 1    hydrochlorothiazide (HYDRODIURIL) 25 MG tablet TAKE 1 TABLET BY MOUTH ONCE DAILY 90 tablet 1    fluticasone (FLONASE) 50 MCG/ACT nasal spray USE ONE SPRAY(S) IN EACH NOSTRIL ONCE DAILY 2 Bottle 2    escitalopram (LEXAPRO) 10 MG tablet TAKE ONE TABLET BY MOUTH ONCE DAILY 90 tablet 1    losartan (COZAAR) 25 MG tablet TAKE ONE TABLET BY MOUTH ONCE DAILY 90 tablet 3    metoprolol succinate (TOPROL XL) 25 MG extended release tablet TAKE ONE TABLET BY MOUTH ONCE DAILY 90 tablet 3    meloxicam (MOBIC) 15 MG tablet Take by mouth daily       atorvastatin (LIPITOR) 80 MG tablet TAKE ONE TABLET BY MOUTH ONCE DAILY 90 tablet 3    budesonide-formoterol (SYMBICORT) 160-4.5 MCG/ACT AERO Inhale 2 puffs into the lungs 2 times daily 1 Inhaler 3    Diclofenac Sodium  MG TB24 daily       NONFORMULARY Indications: vitamin D unknown dosage      NONFORMULARY Indications: Vitamin C takes once a day unknown dosage      docusate sodium (COLACE) 100 MG capsule Take 1 capsule by mouth daily as needed for Constipation 40 capsule 0    aspirin (MARCUS ASPIRIN) 325 MG tablet Take 325 mg by mouth daily  30 tablet 3    Sodium Chloride-Sodium Bicarb (NETI POT SINUS WASH) 6348-087 MG KIT 1 Applicatorful by Nasal route 2 times daily (Patient taking differently: 1 Applicatorful by Nasal route 2 times daily PRN) 1 kit 3    Multiple Vitamins-Minerals (CENTRUM SILVER ULTRA MENS) TABS Take 1 tablet by mouth daily       No current facility-administered medications on file prior to visit. Patient has no known allergies. Family History   Problem Relation Age of Onset    Diabetes Mother     Heart Disease Mother     Arthritis Mother     High Blood Pressure Mother     High Cholesterol Mother     Cancer Father         lung black lung from coal mines    Heart Disease Brother         leaky heart    Liver Disease Paternal Grandfather         black lung    Obesity Sister         gastric bypass    Cancer Sister         lung, smoker     Social History     Tobacco Use   Smoking Status Never Smoker   Smokeless Tobacco Never Used       Social History     Substance and Sexual Activity   Alcohol Use Yes    Comment: Rare; LESS THEN 1X PER MONTH       Review of Systems   Constitutional: Negative for appetite change, chills and fever. Eyes: Negative for pain, redness and visual disturbance. Respiratory: Negative for cough, shortness of breath and wheezing. Cardiovascular: Negative for chest pain and leg swelling. Gastrointestinal: Negative for abdominal pain, constipation, nausea and vomiting. Genitourinary: Negative for decreased urine volume, difficulty urinating, discharge, dysuria, enuresis, flank pain, frequency, hematuria, penile pain, scrotal swelling, testicular pain and urgency. Musculoskeletal: Negative for back pain, joint swelling and myalgias. Skin: Negative for color change, rash and wound. Neurological: Negative for dizziness, tremors and numbness. Hematological: Negative for adenopathy. Does not bruise/bleed easily.        /82   Ht 6' (1.829 m)   Wt (!) 337 lb (152.9 kg)   BMI 45.71 kg/m²       PHYSICAL EXAM:  Constitutional: Patient in no acute distress; Neuro: alert and oriented to person place and time. Psych: Mood and affect normal.  Skin: Normal  Lungs: Respiratory effort normal  Cardiovascular:  Normal peripheral pulses  Abdomen: Soft, non-tender, non-distended with no CVA, flank pain, hepatosplenomegaly or hernia. Kidneys normal.  Bladder non-tender and not distended. Lymphatics: no palpable lymphadenopathy  Penis normal  Urethral meatus normal  Scrotal exam normal  Testicles normal bilaterally  Epididymis normal bilaterally  No evidence of inguinal hernia        Lab Results   Component Value Date    BUN 24 (H) 11/12/2018     Lab Results   Component Value Date    CREATININE 0.97 11/12/2018     Lab Results   Component Value Date    PSA 0.99 05/23/2018    PSA 0.58 08/29/2016    PSA 1.92 10/31/2014       ASSESSMENT:   Diagnosis Orders   1. BPH with obstruction/lower urinary tract symptoms  VA MEASUREMENT,POST-VOID RESIDUAL VOLUME BY US,NON-IMAGING   2. Weak urinary stream  VA MEASUREMENT,POST-VOID RESIDUAL VOLUME BY US,NON-IMAGING   3. Post-void dribbling  VA MEASUREMENT,POST-VOID RESIDUAL VOLUME BY US,NON-IMAGING   4. Urge incontinence  VA MEASUREMENT,POST-VOID RESIDUAL VOLUME BY US,NON-IMAGING           PLAN:  We will start flomax daily. We will also check a UACS. He will return in 3 months or sooner if needed.

## 2019-04-04 LAB
CULTURE: NORMAL
Lab: NORMAL
SPECIMEN DESCRIPTION: NORMAL

## 2019-04-05 ENCOUNTER — TELEPHONE (OUTPATIENT)
Dept: UROLOGY | Age: 74
End: 2019-04-05

## 2019-04-08 DIAGNOSIS — K21.9 GASTROESOPHAGEAL REFLUX DISEASE, ESOPHAGITIS PRESENCE NOT SPECIFIED: ICD-10-CM

## 2019-04-08 DIAGNOSIS — I10 ESSENTIAL HYPERTENSION: ICD-10-CM

## 2019-04-08 RX ORDER — HYDROCHLOROTHIAZIDE 25 MG/1
TABLET ORAL
Qty: 90 TABLET | Refills: 1 | Status: SHIPPED | OUTPATIENT
Start: 2019-04-08 | End: 2019-10-07 | Stop reason: SDUPTHER

## 2019-04-08 RX ORDER — OMEPRAZOLE 20 MG/1
CAPSULE, DELAYED RELEASE ORAL
Qty: 90 CAPSULE | Refills: 1 | Status: SHIPPED | OUTPATIENT
Start: 2019-04-08 | End: 2019-10-07 | Stop reason: SDUPTHER

## 2019-05-06 RX ORDER — ATORVASTATIN CALCIUM 80 MG/1
TABLET, FILM COATED ORAL
Qty: 90 TABLET | Refills: 1 | Status: SHIPPED | OUTPATIENT
Start: 2019-05-06 | End: 2020-01-13

## 2019-06-04 ENCOUNTER — OFFICE VISIT (OUTPATIENT)
Dept: FAMILY MEDICINE CLINIC | Age: 74
End: 2019-06-04
Payer: MEDICARE

## 2019-06-04 VITALS
SYSTOLIC BLOOD PRESSURE: 138 MMHG | HEART RATE: 50 BPM | WEIGHT: 315 LBS | DIASTOLIC BLOOD PRESSURE: 94 MMHG | BODY MASS INDEX: 44.32 KG/M2 | TEMPERATURE: 97.9 F | OXYGEN SATURATION: 94 %

## 2019-06-04 DIAGNOSIS — Z79.899 ENCOUNTER FOR MONITORING STATIN THERAPY: ICD-10-CM

## 2019-06-04 DIAGNOSIS — E78.49 OTHER HYPERLIPIDEMIA: ICD-10-CM

## 2019-06-04 DIAGNOSIS — Z95.1 HX OF CABG: ICD-10-CM

## 2019-06-04 DIAGNOSIS — E66.01 MORBID OBESITY WITH BMI OF 45.0-49.9, ADULT (HCC): ICD-10-CM

## 2019-06-04 DIAGNOSIS — R42 DIZZINESS: ICD-10-CM

## 2019-06-04 DIAGNOSIS — Z51.81 ENCOUNTER FOR MONITORING STATIN THERAPY: ICD-10-CM

## 2019-06-04 DIAGNOSIS — I25.10 CORONARY ARTERY DISEASE INVOLVING NATIVE CORONARY ARTERY OF NATIVE HEART WITHOUT ANGINA PECTORIS: ICD-10-CM

## 2019-06-04 DIAGNOSIS — I10 ESSENTIAL HYPERTENSION: Primary | ICD-10-CM

## 2019-06-04 DIAGNOSIS — Z12.5 PROSTATE CANCER SCREENING: ICD-10-CM

## 2019-06-04 DIAGNOSIS — Z98.84 HX OF GASTRIC BYPASS: ICD-10-CM

## 2019-06-04 DIAGNOSIS — I50.22 CHRONIC SYSTOLIC CONGESTIVE HEART FAILURE (HCC): ICD-10-CM

## 2019-06-04 DIAGNOSIS — M54.17 LUMBOSACRAL RADICULOPATHY AT L4: ICD-10-CM

## 2019-06-04 PROCEDURE — 4040F PNEUMOC VAC/ADMIN/RCVD: CPT | Performed by: NURSE PRACTITIONER

## 2019-06-04 PROCEDURE — G8417 CALC BMI ABV UP PARAM F/U: HCPCS | Performed by: NURSE PRACTITIONER

## 2019-06-04 PROCEDURE — G8510 SCR DEP NEG, NO PLAN REQD: HCPCS | Performed by: NURSE PRACTITIONER

## 2019-06-04 PROCEDURE — 3017F COLORECTAL CA SCREEN DOC REV: CPT | Performed by: NURSE PRACTITIONER

## 2019-06-04 PROCEDURE — G8427 DOCREV CUR MEDS BY ELIG CLIN: HCPCS | Performed by: NURSE PRACTITIONER

## 2019-06-04 PROCEDURE — 1123F ACP DISCUSS/DSCN MKR DOCD: CPT | Performed by: NURSE PRACTITIONER

## 2019-06-04 PROCEDURE — 99214 OFFICE O/P EST MOD 30 MIN: CPT | Performed by: NURSE PRACTITIONER

## 2019-06-04 PROCEDURE — 1036F TOBACCO NON-USER: CPT | Performed by: NURSE PRACTITIONER

## 2019-06-04 PROCEDURE — G8599 NO ASA/ANTIPLAT THER USE RNG: HCPCS | Performed by: NURSE PRACTITIONER

## 2019-06-04 ASSESSMENT — PATIENT HEALTH QUESTIONNAIRE - PHQ9
SUM OF ALL RESPONSES TO PHQ QUESTIONS 1-9: 0
1. LITTLE INTEREST OR PLEASURE IN DOING THINGS: 0
SUM OF ALL RESPONSES TO PHQ QUESTIONS 1-9: 0
2. FEELING DOWN, DEPRESSED OR HOPELESS: 0
SUM OF ALL RESPONSES TO PHQ9 QUESTIONS 1 & 2: 0

## 2019-06-04 ASSESSMENT — ENCOUNTER SYMPTOMS
EYES NEGATIVE: 1
VOMITING: 0
CONSTIPATION: 0
SINUS PRESSURE: 0
SINUS PAIN: 0
DIARRHEA: 0
SHORTNESS OF BREATH: 1
NAUSEA: 0

## 2019-06-04 NOTE — PROGRESS NOTES
2019     Justine Reeves (:  1945) is a 72 y.o. male, here for evaluation of the following medical concerns:    HPI  Routine check up on chronic conditions: morbid obesity BMI 44 with hx gastric bypass, essential htn, cabg, copd, hyperlipidemia      COPD  SOB, uses rescue inhaler BID  No cough, decreased sputum production, throat irritations  Flovent inhaler  Symbicort nebulizer  No recent oral steroids   Hot weather and smoke worsens symptoms; careful in Summer time  PFT done May 22 2017   Saw Dr. Josh Middleton in Nov    HLD  No leg cramping, weakness, sensory loss  Low carb diet  Discussed bad fats; encouraged to stay away from  Lab Results   Component Value Date    LDLCHOLESTEROL 46 2018       HTN  No chest pain, heart palpitations  Has leg swelling  Hx CABG, valve repair  Eye appt to be scheduled for cataract surgery; last annual 1 year ago  Sees Dr. Jim Rosa semi annually     ETOH: 2 beers per month    Frequent urination; sees Dr. Husam Bowling in [place]  Does prostate screening    Dizziness on occasion; lasts a few seconds and goes away on own  No palpitations or chest pain  No headaches    Pt c/o right foot goes numb after driving for 30 minutes. Hx lumbar ddd, morbid obesity and is concerned about having to stop and reposition to feel driving foot. Would like to see pain management to discuss further. Had evaluation in past for back but weight loss was recommended first which pt has not been successful. 10/4/16 CT lumbar spine  ssion       1. Normal alignment. No acute compression fracture. There is severe    degenerative disc disease at L3-L4 and L4-L5.       2. At L4-L5, there is a large right paracentral extrusion that migrates above    the level of the disc space and extends into the right neural foramen. This    results in severe stenosis of the right lateral recess with compression of the    right L4 nerve root.       3.  At L3-L4, there appears to be a right paracentral disc coronary artery of native heart without angina pectoris  No chest pain   Chronic looseness of sternum due to prior surgical wires broke and clicks with movement. 6. Chronic systolic congestive heart failure (HCC)  stable  - Comprehensive Metabolic Panel; Future    7. Morbid obesity with BMI of 45.0-49.9, adult ([facility]    - Comprehensive Metabolic Panel; Future  - CBC With Auto Differential; Future    8. Prostate cancer screening    - Psa screening; Future    9. Hx of gastric bypass  Not taking vitamin  - Vitamin B12 & Folate; Future    10. Lumbosacral radiculopathy at L4  Known chronic  May benefit from physical therapy and wt loss. 11. Dizziness  No prior doppler carotids due to wavering dizziness will do. Also slight bp elevation  bp in 1 week please and start recording at home. - VL DUP CAROTID BILATERAL; Future      Return in about 6 months (around 12/4/2019) for HTN check, hypercholesterolemia. An electronic signature was used to authenticate this note.     --BESS Brooks - CNP on 6/4/2019 at 11:05 AM

## 2019-06-04 NOTE — PATIENT INSTRUCTIONS
SURVEY:    You may be receiving a survey from [employer] regarding your visit today. Please complete the survey to enable us to provide the highest quality of care to you and your family. If you cannot score us a very good on any question, please call the office to discuss how we could have made your experience a very good one. Thank you.

## 2019-06-10 ENCOUNTER — HOSPITAL ENCOUNTER (OUTPATIENT)
Age: 74
Discharge: HOME | End: 2019-06-10
Payer: MEDICARE

## 2019-06-10 DIAGNOSIS — F32.A DEPRESSION, UNSPECIFIED DEPRESSION TYPE: ICD-10-CM

## 2019-06-10 DIAGNOSIS — I10 ESSENTIAL HYPERTENSION: ICD-10-CM

## 2019-06-10 DIAGNOSIS — E66.01 MORBID OBESITY WITH BMI OF 45.0-49.9, ADULT (HCC): ICD-10-CM

## 2019-06-10 DIAGNOSIS — I50.22 CHRONIC SYSTOLIC CONGESTIVE HEART FAILURE (HCC): ICD-10-CM

## 2019-06-10 DIAGNOSIS — E78.49 OTHER HYPERLIPIDEMIA: ICD-10-CM

## 2019-06-10 DIAGNOSIS — Z51.81 ENCOUNTER FOR MONITORING STATIN THERAPY: ICD-10-CM

## 2019-06-10 DIAGNOSIS — Z98.84 HX OF GASTRIC BYPASS: ICD-10-CM

## 2019-06-10 DIAGNOSIS — Z79.899 ENCOUNTER FOR MONITORING STATIN THERAPY: ICD-10-CM

## 2019-06-10 DIAGNOSIS — Z12.5 PROSTATE CANCER SCREENING: ICD-10-CM

## 2019-06-10 DIAGNOSIS — Z95.1 HX OF CABG: ICD-10-CM

## 2019-06-10 LAB
ABSOLUTE EOS #: 0.4 K/UL (ref 0–0.4)
ABSOLUTE IMMATURE GRANULOCYTE: ABNORMAL K/UL (ref 0–0.3)
ABSOLUTE LYMPH #: 1.6 K/UL (ref 1–4.8)
ABSOLUTE MONO #: 0.6 K/UL (ref 0–1)
ALBUMIN SERPL-MCNC: 4.5 G/DL (ref 3.5–5.2)
ALBUMIN/GLOBULIN RATIO: ABNORMAL (ref 1–2.5)
ALP BLD-CCNC: 96 U/L (ref 40–129)
ALT SERPL-CCNC: 25 U/L (ref 5–41)
ANION GAP SERPL CALCULATED.3IONS-SCNC: 15 MMOL/L (ref 9–17)
AST SERPL-CCNC: 30 U/L
BASOPHILS # BLD: 0 % (ref 0–2)
BASOPHILS ABSOLUTE: 0 K/UL (ref 0–0.2)
BILIRUB SERPL-MCNC: 1.02 MG/DL (ref 0.3–1.2)
BUN BLDV-MCNC: 17 MG/DL (ref 8–23)
BUN/CREAT BLD: 17 (ref 9–20)
CALCIUM SERPL-MCNC: 9.7 MG/DL (ref 8.6–10.4)
CHLORIDE BLD-SCNC: 102 MMOL/L (ref 98–107)
CO2: 23 MMOL/L (ref 20–31)
CREAT SERPL-MCNC: 1.03 MG/DL (ref 0.7–1.2)
DIFFERENTIAL TYPE: YES
EOSINOPHILS RELATIVE PERCENT: 7 % (ref 0–5)
FOLATE: >20 NG/ML
GFR AFRICAN AMERICAN: >60 ML/MIN
GFR NON-AFRICAN AMERICAN: >60 ML/MIN
GFR SERPL CREATININE-BSD FRML MDRD: ABNORMAL ML/MIN/{1.73_M2}
GFR SERPL CREATININE-BSD FRML MDRD: ABNORMAL ML/MIN/{1.73_M2}
GLUCOSE BLD-MCNC: 112 MG/DL (ref 70–99)
HCT VFR BLD CALC: 41.3 % (ref 41–53)
HEMOGLOBIN: 14.2 G/DL (ref 13.5–17.5)
IMMATURE GRANULOCYTES: ABNORMAL %
LYMPHOCYTES # BLD: 26 % (ref 13–44)
MCH RBC QN AUTO: 29.4 PG (ref 26–34)
MCHC RBC AUTO-ENTMCNC: 34.3 G/DL (ref 31–37)
MCV RBC AUTO: 85.7 FL (ref 80–100)
MONOCYTES # BLD: 10 % (ref 5–9)
NRBC AUTOMATED: ABNORMAL PER 100 WBC
PDW BLD-RTO: 14.5 % (ref 12.1–15.2)
PLATELET # BLD: 171 K/UL (ref 140–450)
PLATELET ESTIMATE: ABNORMAL
PMV BLD AUTO: ABNORMAL FL (ref 6–12)
POTASSIUM SERPL-SCNC: 4.3 MMOL/L (ref 3.7–5.3)
PROSTATE SPECIFIC ANTIGEN: 1 UG/L
RBC # BLD: 4.82 M/UL (ref 4.5–5.9)
RBC # BLD: ABNORMAL 10*6/UL
SEG NEUTROPHILS: 57 % (ref 39–75)
SEGMENTED NEUTROPHILS ABSOLUTE COUNT: 3.5 K/UL (ref 2.1–6.5)
SODIUM BLD-SCNC: 140 MMOL/L (ref 135–144)
TOTAL PROTEIN: 6.5 G/DL (ref 6.4–8.3)
VITAMIN B-12: 1136 PG/ML (ref 232–1245)
WBC # BLD: 6.1 K/UL (ref 3.5–11)
WBC # BLD: ABNORMAL 10*3/UL

## 2019-06-10 PROCEDURE — 82746 ASSAY OF FOLIC ACID SERUM: CPT

## 2019-06-10 PROCEDURE — 80053 COMPREHEN METABOLIC PANEL: CPT

## 2019-06-10 PROCEDURE — G0103 PSA SCREENING: HCPCS

## 2019-06-10 PROCEDURE — 82607 VITAMIN B-12: CPT

## 2019-06-10 PROCEDURE — 85025 COMPLETE CBC W/AUTO DIFF WBC: CPT

## 2019-06-10 PROCEDURE — 36415 COLL VENOUS BLD VENIPUNCTURE: CPT

## 2019-06-11 RX ORDER — LOSARTAN POTASSIUM 25 MG/1
TABLET ORAL
Qty: 90 TABLET | Refills: 3 | Status: SHIPPED | OUTPATIENT
Start: 2019-06-11 | End: 2020-05-27 | Stop reason: SDUPTHER

## 2019-06-11 RX ORDER — METOPROLOL SUCCINATE 25 MG/1
TABLET, EXTENDED RELEASE ORAL
Qty: 90 TABLET | Refills: 3 | Status: SHIPPED | OUTPATIENT
Start: 2019-06-11 | End: 2020-05-27 | Stop reason: SDUPTHER

## 2019-06-11 RX ORDER — ESCITALOPRAM OXALATE 10 MG/1
TABLET ORAL
Qty: 90 TABLET | Refills: 1 | Status: SHIPPED | OUTPATIENT
Start: 2019-06-11 | End: 2020-01-14 | Stop reason: SDUPTHER

## 2019-06-18 ENCOUNTER — HOSPITAL ENCOUNTER (OUTPATIENT)
Dept: VASCULAR LAB | Age: 74
Discharge: HOME | End: 2019-06-20
Payer: MEDICARE

## 2019-06-18 ENCOUNTER — NURSE ONLY (OUTPATIENT)
Dept: FAMILY MEDICINE CLINIC | Age: 74
End: 2019-06-18

## 2019-06-18 VITALS — DIASTOLIC BLOOD PRESSURE: 88 MMHG | SYSTOLIC BLOOD PRESSURE: 112 MMHG

## 2019-06-18 DIAGNOSIS — I10 ESSENTIAL HYPERTENSION: ICD-10-CM

## 2019-06-18 DIAGNOSIS — Z95.1 HX OF CABG: ICD-10-CM

## 2019-06-18 DIAGNOSIS — I10 ESSENTIAL HYPERTENSION: Primary | ICD-10-CM

## 2019-06-18 DIAGNOSIS — R42 DIZZINESS: ICD-10-CM

## 2019-06-18 PROCEDURE — 93880 EXTRACRANIAL BILAT STUDY: CPT

## 2019-06-18 NOTE — PATIENT INSTRUCTIONS
SURVEY:    You may be receiving a survey from [employer] regarding your visit today. Please complete the survey to enable us to provide the highest quality of care to you and your family. If you cannot score us a very good on any question, please call the office to discuss how we could have made your experience a very good one. Thank you.

## 2019-07-08 ENCOUNTER — OFFICE VISIT (OUTPATIENT)
Dept: UROLOGY | Age: 74
End: 2019-07-08
Payer: MEDICARE

## 2019-07-08 VITALS
WEIGHT: 315 LBS | BODY MASS INDEX: 45.71 KG/M2 | DIASTOLIC BLOOD PRESSURE: 82 MMHG | HEART RATE: 81 BPM | SYSTOLIC BLOOD PRESSURE: 130 MMHG

## 2019-07-08 DIAGNOSIS — R35.1 NOCTURIA: ICD-10-CM

## 2019-07-08 DIAGNOSIS — N40.1 BPH WITH OBSTRUCTION/LOWER URINARY TRACT SYMPTOMS: ICD-10-CM

## 2019-07-08 DIAGNOSIS — Z12.5 SCREENING PSA (PROSTATE SPECIFIC ANTIGEN): ICD-10-CM

## 2019-07-08 DIAGNOSIS — N39.3 STRESS INCONTINENCE: Primary | ICD-10-CM

## 2019-07-08 DIAGNOSIS — R35.0 FREQUENCY OF URINATION: ICD-10-CM

## 2019-07-08 DIAGNOSIS — N13.8 BPH WITH OBSTRUCTION/LOWER URINARY TRACT SYMPTOMS: ICD-10-CM

## 2019-07-08 PROCEDURE — 51798 US URINE CAPACITY MEASURE: CPT | Performed by: NURSE PRACTITIONER

## 2019-07-08 PROCEDURE — 3017F COLORECTAL CA SCREEN DOC REV: CPT | Performed by: NURSE PRACTITIONER

## 2019-07-08 PROCEDURE — G8428 CUR MEDS NOT DOCUMENT: HCPCS | Performed by: NURSE PRACTITIONER

## 2019-07-08 PROCEDURE — 1036F TOBACCO NON-USER: CPT | Performed by: NURSE PRACTITIONER

## 2019-07-08 PROCEDURE — G8417 CALC BMI ABV UP PARAM F/U: HCPCS | Performed by: NURSE PRACTITIONER

## 2019-07-08 PROCEDURE — 4040F PNEUMOC VAC/ADMIN/RCVD: CPT | Performed by: NURSE PRACTITIONER

## 2019-07-08 PROCEDURE — G8599 NO ASA/ANTIPLAT THER USE RNG: HCPCS | Performed by: NURSE PRACTITIONER

## 2019-07-08 PROCEDURE — 1123F ACP DISCUSS/DSCN MKR DOCD: CPT | Performed by: NURSE PRACTITIONER

## 2019-07-08 PROCEDURE — 99214 OFFICE O/P EST MOD 30 MIN: CPT | Performed by: NURSE PRACTITIONER

## 2019-07-08 RX ORDER — TAMSULOSIN HYDROCHLORIDE 0.4 MG/1
0.4 CAPSULE ORAL EVERY EVENING
Qty: 90 CAPSULE | Refills: 3 | Status: SHIPPED | OUTPATIENT
Start: 2019-07-08 | End: 2020-07-10 | Stop reason: SDUPTHER

## 2019-07-08 ASSESSMENT — ENCOUNTER SYMPTOMS
CONSTIPATION: 0
SHORTNESS OF BREATH: 0
BACK PAIN: 0
COLOR CHANGE: 0
ABDOMINAL PAIN: 0
COUGH: 0
EYE REDNESS: 0
WHEEZING: 0
EYE PAIN: 0
VOMITING: 0
NAUSEA: 0

## 2019-07-08 NOTE — PROGRESS NOTES
normal. Right coronoary artery:  dominant & luminal irregularities. Left ventricular ejection fraction 60. Mitral valve normal with no insufficinecy of the mitral valve. Aortic valve normal with no stenosis of the aortic valve. Edp was normal.    CARDIAC CATHETERIZATION Left 03/04/15    Dr. Ramirez --Severe CAD, 80% in-stent stenosis in the left anterior descending coronary artery in a rather long segment, very eccentric,extending almost to the ostium of the left anterior descending coronary artery. 70% in-stent stenosis of a very large dominant right coronary artery. Unremarkable small circumflex. Normal left ventricular function, ejection fraction of 60%.  CARDIAC CATHETERIZATION Left 12/05/2018    Dr. Adolfo Razo @ [facility]--Risk Trenerys [place] [address] specific cardiac intervention   Mayi Baeza  2008    2 stents    CHOLECYSTECTOMY  05/30/2014    open [facility] Dr. Hughes Heimlich COLONOSCOPY  10/2015    repeat in 2022    [address] [place] [address]      2 vessel March 3th 2015    CYSTOSCOPY  05/10/2016    with laser TURP    ERCP  10/2/14    Dr. Gerda Boeck with laparotomy    HERNIA REPAIR  9/2003    Hiatal    JOINT REPLACEMENT Left 03-    Hip     KNEE ARTHROSCOPY Right 9/26/13    Dr. Way  - Areta Lupe SURGERY  11/02/2016    rt. L3-4, L4-5 decompression.  L4-5 discectomy and fusion    SHOULDER SURGERY Left 2000    arthroscopy    SHOULDER SURGERY Left 2007    replaced humeral head    TOTAL HIP ARTHROPLASTY Right     02/03/2016     Outpatient Encounter Medications as of 7/8/2019   Medication Sig Dispense Refill    losartan (COZAAR) 25 MG tablet TAKE 1 TABLET BY MOUTH ONCE DAILY 90 tablet 3    metoprolol succinate (TOPROL XL) 25 MG extended release tablet TAKE 1 TABLET BY MOUTH ONCE DAILY 90 tablet 3    escitalopram (LEXAPRO) 10 MG tablet TAKE 1 TABLET BY MOUTH ONCE DAILY 90 tablet 1    atorvastatin (LIPITOR) 80 MG tablet TAKE 1 TABLET BY MOUTH ONCE DAILY 90 tablet tremors and numbness. Hematological: Negative for adenopathy. Does not bruise/bleed easily. /82 (Site: Right Upper Arm, Position: Sitting, Cuff Size: Large Adult)   Pulse 81   Wt (!) 337 lb (152.9 kg)   BMI 45.71 kg/m²       PHYSICAL EXAM:  Constitutional: Patient in no acute distress; Neuro: alert and oriented to person place and time. Psych: Mood and affect normal.  Skin: Normal  Lungs: Respiratory effort normal  Cardiovascular:  Normal peripheral pulses  Abdomen: Soft, non-tender, non-distended with no CVA, flank pain, hepatosplenomegaly or hernia. Kidneys normal.  Bladder non-tender and not distended. Lymphatics: no palpable lymphadenopathy  Penis normal  Urethral meatus normal  Scrotal exam normal  Testicles normal bilaterally  Epididymis normal bilaterally  No evidence of inguinal hernia      Lab Results   Component Value Date    BUN 17 06/10/2019     Lab Results   Component Value Date    CREATININE 1.03 06/10/2019     Lab Results   Component Value Date    PSA 1.00 06/10/2019    PSA 0.99 05/23/2018    PSA 0.58 08/29/2016       ASSESSMENT:   Diagnosis Orders   1. Stress incontinence     2. Frequency of urination  NM MEASUREMENT,POST-VOID RESIDUAL VOLUME BY US,NON-IMAGING   3. Nocturia  NM MEASUREMENT,POST-VOID RESIDUAL VOLUME BY US,NON-IMAGING   4. BPH with obstruction/lower urinary tract symptoms  NM MEASUREMENT,POST-VOID RESIDUAL VOLUME BY US,NON-IMAGING   5. Screening PSA (prostate specific antigen)  Psa screening           PLAN:  Discussed risks (including worsening symptoms, infection, pain) and benefits of pelvic floor rehab to help with LIBBY. He did have significant improvement the last time he completed PFR, but he is adamantly opposed to this. He will continue flomax daily. We will see him on an annual basis with a PSA prior or sooner if needed for new or worsening symptoms.

## 2019-09-04 ENCOUNTER — TELEPHONE (OUTPATIENT)
Dept: FAMILY MEDICINE CLINIC | Age: 74
End: 2019-09-04

## 2019-09-16 ENCOUNTER — OFFICE VISIT (OUTPATIENT)
Dept: PULMONOLOGY | Age: 74
End: 2019-09-16
Payer: MEDICARE

## 2019-09-16 VITALS
HEIGHT: 72 IN | WEIGHT: 314 LBS | DIASTOLIC BLOOD PRESSURE: 87 MMHG | BODY MASS INDEX: 42.53 KG/M2 | RESPIRATION RATE: 16 BRPM | SYSTOLIC BLOOD PRESSURE: 137 MMHG | HEART RATE: 62 BPM | TEMPERATURE: 96.7 F | OXYGEN SATURATION: 97 %

## 2019-09-16 DIAGNOSIS — I25.10 CORONARY ARTERY DISEASE INVOLVING NATIVE CORONARY ARTERY OF NATIVE HEART WITHOUT ANGINA PECTORIS: ICD-10-CM

## 2019-09-16 DIAGNOSIS — J44.9 CHRONIC OBSTRUCTIVE PULMONARY DISEASE, UNSPECIFIED COPD TYPE (HCC): Primary | ICD-10-CM

## 2019-09-16 DIAGNOSIS — I50.32 CHRONIC DIASTOLIC (CONGESTIVE) HEART FAILURE (HCC): ICD-10-CM

## 2019-09-16 DIAGNOSIS — E66.01 MORBID OBESITY DUE TO EXCESS CALORIES (HCC): ICD-10-CM

## 2019-09-16 DIAGNOSIS — Z95.1 HX OF CABG: ICD-10-CM

## 2019-09-16 DIAGNOSIS — G47.61 PERIODIC LIMB MOVEMENT SLEEP DISORDER: ICD-10-CM

## 2019-09-16 PROCEDURE — G8926 SPIRO NO PERF OR DOC: HCPCS | Performed by: INTERNAL MEDICINE

## 2019-09-16 PROCEDURE — G8427 DOCREV CUR MEDS BY ELIG CLIN: HCPCS | Performed by: INTERNAL MEDICINE

## 2019-09-16 PROCEDURE — 99214 OFFICE O/P EST MOD 30 MIN: CPT | Performed by: INTERNAL MEDICINE

## 2019-09-16 PROCEDURE — 1123F ACP DISCUSS/DSCN MKR DOCD: CPT | Performed by: INTERNAL MEDICINE

## 2019-09-16 PROCEDURE — G8599 NO ASA/ANTIPLAT THER USE RNG: HCPCS | Performed by: INTERNAL MEDICINE

## 2019-09-16 PROCEDURE — 3023F SPIROM DOC REV: CPT | Performed by: INTERNAL MEDICINE

## 2019-09-16 PROCEDURE — 4040F PNEUMOC VAC/ADMIN/RCVD: CPT | Performed by: INTERNAL MEDICINE

## 2019-09-16 PROCEDURE — 3017F COLORECTAL CA SCREEN DOC REV: CPT | Performed by: INTERNAL MEDICINE

## 2019-09-16 PROCEDURE — G8417 CALC BMI ABV UP PARAM F/U: HCPCS | Performed by: INTERNAL MEDICINE

## 2019-09-16 PROCEDURE — 1036F TOBACCO NON-USER: CPT | Performed by: INTERNAL MEDICINE

## 2019-10-07 DIAGNOSIS — I10 ESSENTIAL HYPERTENSION: ICD-10-CM

## 2019-10-07 DIAGNOSIS — K21.9 GASTROESOPHAGEAL REFLUX DISEASE, ESOPHAGITIS PRESENCE NOT SPECIFIED: ICD-10-CM

## 2019-10-07 RX ORDER — OMEPRAZOLE 20 MG/1
CAPSULE, DELAYED RELEASE ORAL
Qty: 90 CAPSULE | Refills: 1 | Status: SHIPPED | OUTPATIENT
Start: 2019-10-07 | End: 2020-04-24

## 2019-10-07 RX ORDER — HYDROCHLOROTHIAZIDE 25 MG/1
TABLET ORAL
Qty: 90 TABLET | Refills: 1 | Status: SHIPPED | OUTPATIENT
Start: 2019-10-07 | End: 2020-04-09

## 2019-10-22 ENCOUNTER — HOSPITAL ENCOUNTER (OUTPATIENT)
Age: 74
Discharge: HOME | End: 2019-10-22
Payer: MEDICARE

## 2019-10-22 LAB
ALBUMIN SERPL-MCNC: 4.2 G/DL (ref 3.5–5.2)
ALBUMIN/GLOBULIN RATIO: NORMAL (ref 1–2.5)
ALP BLD-CCNC: 99 U/L (ref 40–129)
ALT SERPL-CCNC: 30 U/L (ref 5–41)
AST SERPL-CCNC: 37 U/L
BILIRUB SERPL-MCNC: 1.03 MG/DL (ref 0.3–1.2)
BILIRUBIN DIRECT: 0.28 MG/DL
BILIRUBIN, INDIRECT: 0.75 MG/DL (ref 0–1)
BUN BLDV-MCNC: 22 MG/DL (ref 8–23)
CREAT SERPL-MCNC: 1.13 MG/DL (ref 0.7–1.2)
GFR AFRICAN AMERICAN: >60 ML/MIN
GFR NON-AFRICAN AMERICAN: >60 ML/MIN
GFR SERPL CREATININE-BSD FRML MDRD: NORMAL ML/MIN/{1.73_M2}
GFR SERPL CREATININE-BSD FRML MDRD: NORMAL ML/MIN/{1.73_M2}
GLOBULIN: NORMAL G/DL (ref 1.5–3.8)
TOTAL PROTEIN: 6.6 G/DL (ref 6.4–8.3)

## 2019-10-22 PROCEDURE — 82565 ASSAY OF CREATININE: CPT

## 2019-10-22 PROCEDURE — 36415 COLL VENOUS BLD VENIPUNCTURE: CPT

## 2019-10-22 PROCEDURE — 84520 ASSAY OF UREA NITROGEN: CPT

## 2019-10-22 PROCEDURE — 80076 HEPATIC FUNCTION PANEL: CPT

## 2019-10-31 ENCOUNTER — HOSPITAL ENCOUNTER (OUTPATIENT)
Age: 74
Discharge: HOME | End: 2019-11-02
Payer: MEDICARE

## 2019-10-31 ENCOUNTER — HOSPITAL ENCOUNTER (OUTPATIENT)
Dept: GENERAL RADIOLOGY | Age: 74
Discharge: HOME | End: 2019-11-02
Payer: MEDICARE

## 2019-10-31 DIAGNOSIS — M17.0 PRIMARY OSTEOARTHRITIS OF KNEES, BILATERAL: ICD-10-CM

## 2019-10-31 PROCEDURE — 73564 X-RAY EXAM KNEE 4 OR MORE: CPT

## 2019-11-05 ENCOUNTER — TELEPHONE (OUTPATIENT)
Dept: CARDIOLOGY CLINIC | Age: 74
End: 2019-11-05

## 2019-11-05 DIAGNOSIS — E78.5 HYPERLIPIDEMIA, UNSPECIFIED HYPERLIPIDEMIA TYPE: ICD-10-CM

## 2019-11-05 DIAGNOSIS — R06.02 SOB (SHORTNESS OF BREATH): ICD-10-CM

## 2019-11-05 DIAGNOSIS — I10 ESSENTIAL HYPERTENSION: ICD-10-CM

## 2019-11-05 DIAGNOSIS — J44.1 COPD WITH ACUTE EXACERBATION (HCC): ICD-10-CM

## 2019-11-05 DIAGNOSIS — Z98.62 HISTORY OF ANGIOPLASTY: Primary | ICD-10-CM

## 2019-11-05 DIAGNOSIS — I25.10 CORONARY ARTERY DISEASE INVOLVING NATIVE CORONARY ARTERY OF NATIVE HEART WITHOUT ANGINA PECTORIS: ICD-10-CM

## 2019-11-05 DIAGNOSIS — E55.9 VITAMIN D DEFICIENCY DISEASE: ICD-10-CM

## 2019-11-13 ENCOUNTER — APPOINTMENT (OUTPATIENT)
Dept: CT IMAGING | Age: 74
End: 2019-11-13
Payer: OTHER MISCELLANEOUS

## 2019-11-13 ENCOUNTER — APPOINTMENT (OUTPATIENT)
Dept: CT IMAGING | Age: 74
DRG: 551 | End: 2019-11-13
Payer: OTHER MISCELLANEOUS

## 2019-11-13 ENCOUNTER — HOSPITAL ENCOUNTER (EMERGENCY)
Age: 74
Discharge: ANOTHER HOSPITAL | End: 2019-11-13
Attending: INTERNAL MEDICINE
Payer: OTHER MISCELLANEOUS

## 2019-11-13 ENCOUNTER — HOSPITAL ENCOUNTER (INPATIENT)
Age: 74
LOS: 1 days | Discharge: HOME | DRG: 551 | End: 2019-11-15
Attending: EMERGENCY MEDICINE | Admitting: SURGERY
Payer: OTHER MISCELLANEOUS

## 2019-11-13 ENCOUNTER — APPOINTMENT (OUTPATIENT)
Dept: GENERAL RADIOLOGY | Age: 74
DRG: 551 | End: 2019-11-13
Payer: OTHER MISCELLANEOUS

## 2019-11-13 VITALS
WEIGHT: 315 LBS | SYSTOLIC BLOOD PRESSURE: 128 MMHG | DIASTOLIC BLOOD PRESSURE: 93 MMHG | OXYGEN SATURATION: 97 % | HEIGHT: 71 IN | BODY MASS INDEX: 44.1 KG/M2 | TEMPERATURE: 97.8 F | HEART RATE: 91 BPM | RESPIRATION RATE: 16 BRPM
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
## 2019-11-13 DIAGNOSIS — S12.9XXA: Primary | ICD-10-CM
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
## 2019-11-13 DIAGNOSIS — S15.109A: ICD-10-CM

## 2019-11-13 DIAGNOSIS — I10 ESSENTIAL HYPERTENSION: ICD-10-CM

## 2019-11-13 DIAGNOSIS — S12.9XXA: ICD-10-CM

## 2019-11-13 LAB
ABO/RH: NORMAL
ABSOLUTE EOS #: 0.3 K/UL (ref 0–0.4)
ABSOLUTE IMMATURE GRANULOCYTE: NORMAL K/UL (ref 0–0.3)
ABSOLUTE LYMPH #: 1.3 K/UL (ref 1–4.8)
ABSOLUTE MONO #: 0.7 K/UL (ref 0–1)
ALBUMIN SERPL-MCNC: 4.2 G/DL (ref 3.5–5.2)
ALBUMIN/GLOBULIN RATIO: ABNORMAL (ref 1–2.5)
ALLEN TEST: ABNORMAL
ALP BLD-CCNC: 102 U/L (ref 40–129)
ALT SERPL-CCNC: 29 U/L (ref 5–41)
ANION GAP SERPL CALCULATED.3IONS-SCNC: 15 MMOL/L (ref 9–17)
ANION GAP SERPL CALCULATED.3IONS-SCNC: 16 MMOL/L (ref 9–17)
ANTIBODY SCREEN: NEGATIVE
ARM BAND NUMBER: NORMAL
AST SERPL-CCNC: 39 U/L
BASOPHILS # BLD: 0 % (ref 0–2)
BASOPHILS ABSOLUTE: 0 K/UL (ref 0–0.2)
BILIRUB SERPL-MCNC: 1.04 MG/DL (ref 0.3–1.2)
BLOOD BANK SPECIMEN: ABNORMAL
BUN BLDV-MCNC: 17 MG/DL (ref 8–23)
BUN BLDV-MCNC: 17 MG/DL (ref 8–23)
BUN/CREAT BLD: 17 (ref 9–20)
CALCIUM SERPL-MCNC: 9.8 MG/DL (ref 8.6–10.4)
CARBOXYHEMOGLOBIN: 0.9 % (ref 0–5)
CHLORIDE BLD-SCNC: 102 MMOL/L (ref 98–107)
CHLORIDE BLD-SCNC: 99 MMOL/L (ref 98–107)
CO2: 21 MMOL/L (ref 20–31)
CO2: 21 MMOL/L (ref 20–31)
CREAT SERPL-MCNC: 0.87 MG/DL (ref 0.7–1.2)
CREAT SERPL-MCNC: 1.01 MG/DL (ref 0.7–1.2)
DIFFERENTIAL TYPE: YES
EOSINOPHILS RELATIVE PERCENT: 4 % (ref 0–5)
ETHANOL PERCENT: <0.01 %
ETHANOL: <10 MG/DL
EXPIRATION DATE: NORMAL
FIO2: ABNORMAL
GFR AFRICAN AMERICAN: >60 ML/MIN
GFR AFRICAN AMERICAN: >60 ML/MIN
GFR NON-AFRICAN AMERICAN: >60 ML/MIN
GFR NON-AFRICAN AMERICAN: >60 ML/MIN
GFR SERPL CREATININE-BSD FRML MDRD: ABNORMAL ML/MIN/{1.73_M2}
GLUCOSE BLD-MCNC: 153 MG/DL (ref 70–99)
GLUCOSE BLD-MCNC: 167 MG/DL (ref 70–99)
HCG QUALITATIVE: ABNORMAL
HCO3 VENOUS: 23.5 MMOL/L (ref 24–30)
HCT VFR BLD CALC: 43 % (ref 41–53)
HCT VFR BLD CALC: 43.2 % (ref 40.7–50.3)
HEMOGLOBIN: 14.3 G/DL (ref 13–17)
HEMOGLOBIN: 14.6 G/DL (ref 13.5–17.5)
IMMATURE GRANULOCYTES: NORMAL %
INR BLD: 1.1
LYMPHOCYTES # BLD: 14 % (ref 13–44)
MAGNESIUM: 2 MG/DL (ref 1.6–2.6)
MCH RBC QN AUTO: 29.2 PG (ref 25.2–33.5)
MCH RBC QN AUTO: 29.3 PG (ref 26–34)
MCHC RBC AUTO-ENTMCNC: 33.1 G/DL (ref 28.4–34.8)
MCHC RBC AUTO-ENTMCNC: 34 G/DL (ref 31–37)
MCV RBC AUTO: 86.3 FL (ref 80–100)
MCV RBC AUTO: 88.3 FL (ref 82.6–102.9)
METHEMOGLOBIN: ABNORMAL % (ref 0–1.5)
MODE: ABNORMAL
MONOCYTES # BLD: 8 % (ref 5–9)
NEGATIVE BASE EXCESS, VEN: 1.6 MMOL/L (ref 0–2)
NOTIFICATION TIME: ABNORMAL
NOTIFICATION: ABNORMAL
NRBC AUTOMATED: 0 PER 100 WBC
NRBC AUTOMATED: NORMAL PER 100 WBC
O2 DEVICE/FLOW/%: ABNORMAL
O2 SAT, VEN: 91.9 % (ref 60–85)
OXYHEMOGLOBIN: ABNORMAL % (ref 95–98)
PARTIAL THROMBOPLASTIN TIME: 22 SEC (ref 20.5–30.5)
PATIENT TEMP: 37
PCO2, VEN, TEMP ADJ: ABNORMAL MMHG (ref 39–55)
PCO2, VEN: 43.3 (ref 39–55)
PDW BLD-RTO: 14 % (ref 11.8–14.4)
PDW BLD-RTO: 14.6 % (ref 12.1–15.2)
PEEP/CPAP: ABNORMAL
PH VENOUS: 7.35 (ref 7.32–7.42)
PH, VEN, TEMP ADJ: ABNORMAL (ref 7.32–7.42)
PLATELET # BLD: 157 K/UL (ref 138–453)
PLATELET # BLD: 166 K/UL (ref 140–450)
PLATELET ESTIMATE: NORMAL
PMV BLD AUTO: 10.4 FL (ref 8.1–13.5)
PMV BLD AUTO: NORMAL FL (ref 6–12)
PO2, VEN, TEMP ADJ: ABNORMAL MMHG (ref 30–50)
PO2, VEN: 67.2 (ref 30–50)
POSITIVE BASE EXCESS, VEN: ABNORMAL MMOL/L (ref 0–2)
POTASSIUM SERPL-SCNC: 3.5 MMOL/L (ref 3.7–5.3)
POTASSIUM SERPL-SCNC: 4 MMOL/L (ref 3.7–5.3)
PROTHROMBIN TIME: 11.1 SEC (ref 9–12)
PSV: ABNORMAL
PT. POSITION: ABNORMAL
RBC # BLD: 4.89 M/UL (ref 4.21–5.77)
RBC # BLD: 4.98 M/UL (ref 4.5–5.9)
RBC # BLD: NORMAL 10*6/UL
RESPIRATORY RATE: ABNORMAL
SAMPLE SITE: ABNORMAL
SEG NEUTROPHILS: 74 % (ref 39–75)
SEGMENTED NEUTROPHILS ABSOLUTE COUNT: 6.5 K/UL (ref 2.1–6.5)
SET RATE: ABNORMAL
SODIUM BLD-SCNC: 136 MMOL/L (ref 135–144)
SODIUM BLD-SCNC: 138 MMOL/L (ref 135–144)
TEXT FOR RESPIRATORY: ABNORMAL
TOTAL HB: ABNORMAL G/DL (ref 12–16)
TOTAL PROTEIN: 7 G/DL (ref 6.4–8.3)
TOTAL RATE: ABNORMAL
VT: ABNORMAL
WBC # BLD: 8.8 K/UL (ref 3.5–11)
WBC # BLD: 9.1 K/UL (ref 3.5–11.3)
WBC # BLD: NORMAL 10*3/UL

## 2019-11-13 PROCEDURE — 36415 COLL VENOUS BLD VENIPUNCTURE: CPT

## 2019-11-13 PROCEDURE — 72125 CT NECK SPINE W/O DYE: CPT

## 2019-11-13 PROCEDURE — 80051 ELECTROLYTE PANEL: CPT

## 2019-11-13 PROCEDURE — 96375 TX/PRO/DX INJ NEW DRUG ADDON: CPT

## 2019-11-13 PROCEDURE — 96374 THER/PROPH/DIAG INJ IV PUSH: CPT

## 2019-11-13 PROCEDURE — 96376 TX/PRO/DX INJ SAME DRUG ADON: CPT

## 2019-11-13 PROCEDURE — 86900 BLOOD TYPING SEROLOGIC ABO: CPT

## 2019-11-13 PROCEDURE — 82565 ASSAY OF CREATININE: CPT

## 2019-11-13 PROCEDURE — 86850 RBC ANTIBODY SCREEN: CPT

## 2019-11-13 PROCEDURE — 99223 1ST HOSP IP/OBS HIGH 75: CPT | Performed by: NEUROLOGICAL SURGERY

## 2019-11-13 PROCEDURE — 74177 CT ABD & PELVIS W/CONTRAST: CPT

## 2019-11-13 PROCEDURE — 84703 CHORIONIC GONADOTROPIN ASSAY: CPT

## 2019-11-13 PROCEDURE — 6360000004 HC RX CONTRAST MEDICATION: Performed by: STUDENT IN AN ORGANIZED HEALTH CARE EDUCATION/TRAINING PROGRAM

## 2019-11-13 PROCEDURE — 85610 PROTHROMBIN TIME: CPT

## 2019-11-13 PROCEDURE — 2580000003 HC RX 258: Performed by: INTERNAL MEDICINE

## 2019-11-13 PROCEDURE — 99285 EMERGENCY DEPT VISIT HI MDM: CPT

## 2019-11-13 PROCEDURE — 72131 CT LUMBAR SPINE W/O DYE: CPT

## 2019-11-13 PROCEDURE — 72128 CT CHEST SPINE W/O DYE: CPT

## 2019-11-13 PROCEDURE — 80307 DRUG TEST PRSMV CHEM ANLYZR: CPT

## 2019-11-13 PROCEDURE — 86901 BLOOD TYPING SEROLOGIC RH(D): CPT

## 2019-11-13 PROCEDURE — 71045 X-RAY EXAM CHEST 1 VIEW: CPT

## 2019-11-13 PROCEDURE — 70498 CT ANGIOGRAPHY NECK: CPT

## 2019-11-13 PROCEDURE — 84520 ASSAY OF UREA NITROGEN: CPT

## 2019-11-13 PROCEDURE — 70450 CT HEAD/BRAIN W/O DYE: CPT

## 2019-11-13 PROCEDURE — 82805 BLOOD GASES W/O2 SATURATION: CPT

## 2019-11-13 PROCEDURE — 85730 THROMBOPLASTIN TIME PARTIAL: CPT

## 2019-11-13 PROCEDURE — 94761 N-INVAS EAR/PLS OXIMETRY MLT: CPT

## 2019-11-13 PROCEDURE — 6360000002 HC RX W HCPCS: Performed by: INTERNAL MEDICINE

## 2019-11-13 PROCEDURE — 85027 COMPLETE CBC AUTOMATED: CPT

## 2019-11-13 PROCEDURE — 82947 ASSAY GLUCOSE BLOOD QUANT: CPT

## 2019-11-13 PROCEDURE — 2700000000 HC OXYGEN THERAPY PER DAY

## 2019-11-13 PROCEDURE — 80053 COMPREHEN METABOLIC PANEL: CPT

## 2019-11-13 PROCEDURE — 85025 COMPLETE CBC W/AUTO DIFF WBC: CPT

## 2019-11-13 PROCEDURE — 83735 ASSAY OF MAGNESIUM: CPT

## 2019-11-13 PROCEDURE — G0480 DRUG TEST DEF 1-7 CLASSES: HCPCS

## 2019-11-13 RX ORDER — MORPHINE SULFATE 4 MG/ML
4 INJECTION, SOLUTION INTRAMUSCULAR; INTRAVENOUS ONCE
Status: COMPLETED | OUTPATIENT
Start: 2019-11-13 | End: 2019-11-13

## 2019-11-13 RX ORDER — SODIUM CHLORIDE 9 MG/ML
1000 INJECTION, SOLUTION INTRAVENOUS CONTINUOUS
Status: DISCONTINUED | OUTPATIENT
Start: 2019-11-13 | End: 2019-11-13 | Stop reason: HOSPADM

## 2019-11-13 RX ORDER — ONDANSETRON 2 MG/ML
4 INJECTION INTRAMUSCULAR; INTRAVENOUS ONCE
Status: COMPLETED | OUTPATIENT
Start: 2019-11-13 | End: 2019-11-13

## 2019-11-13 RX ORDER — MELOXICAM 15 MG/1
15 TABLET ORAL PRN
Refills: 2 | COMMUNITY
Start: 2019-10-23 | End: 2020-10-13 | Stop reason: ALTCHOICE

## 2019-11-13 RX ADMIN — IOHEXOL 130 ML: 350 INJECTION, SOLUTION INTRAVENOUS at 23:24

## 2019-11-13 RX ADMIN — MORPHINE SULFATE 4 MG: 4 INJECTION INTRAVENOUS at 20:54

## 2019-11-13 RX ADMIN — ONDANSETRON 4 MG: 2 INJECTION, SOLUTION INTRAMUSCULAR; INTRAVENOUS at 18:10

## 2019-11-13 RX ADMIN — MORPHINE SULFATE 4 MG: 4 INJECTION INTRAVENOUS at 18:11

## 2019-11-13 RX ADMIN — SODIUM CHLORIDE 1000 ML: 9 INJECTION, SOLUTION INTRAVENOUS at 20:10

## 2019-11-13 ASSESSMENT — PAIN SCALES - GENERAL
PAINLEVEL_OUTOF10: 6

## 2019-11-13 ASSESSMENT — PAIN DESCRIPTION - LOCATION: LOCATION: NECK

## 2019-11-13 ASSESSMENT — PAIN DESCRIPTION - PAIN TYPE: TYPE: ACUTE PAIN
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
## 2019-11-14 LAB
-: ABNORMAL
AMORPHOUS: ABNORMAL
AMPHETAMINE SCREEN URINE: NEGATIVE
ANION GAP SERPL CALCULATED.3IONS-SCNC: 13 MMOL/L (ref 9–17)
BACTERIA: ABNORMAL
BARBITURATE SCREEN URINE: NEGATIVE
BENZODIAZEPINE SCREEN, URINE: NEGATIVE
BILIRUBIN URINE: NEGATIVE
BUN BLDV-MCNC: 16 MG/DL (ref 8–23)
BUN/CREAT BLD: ABNORMAL (ref 9–20)
BUPRENORPHINE URINE: ABNORMAL
CALCIUM SERPL-MCNC: 8.4 MG/DL (ref 8.6–10.4)
CANNABINOID SCREEN URINE: NEGATIVE
CASTS UA: ABNORMAL /LPF (ref 0–8)
CHLORIDE BLD-SCNC: 104 MMOL/L (ref 98–107)
CO2: 22 MMOL/L (ref 20–31)
COCAINE METABOLITE, URINE: NEGATIVE
COLOR: YELLOW
COMMENT UA: ABNORMAL
CREAT SERPL-MCNC: 0.82 MG/DL (ref 0.7–1.2)
CRYSTALS, UA: ABNORMAL /HPF
DIRECT EXAM: NORMAL
EPITHELIAL CELLS UA: ABNORMAL /HPF (ref 0–5)
GFR AFRICAN AMERICAN: >60 ML/MIN
GFR NON-AFRICAN AMERICAN: >60 ML/MIN
GFR SERPL CREATININE-BSD FRML MDRD: ABNORMAL ML/MIN/{1.73_M2}
GFR SERPL CREATININE-BSD FRML MDRD: ABNORMAL ML/MIN/{1.73_M2}
GLUCOSE BLD-MCNC: 148 MG/DL (ref 70–99)
GLUCOSE URINE: NEGATIVE
HCT VFR BLD CALC: 40.8 % (ref 40.7–50.3)
HEMOGLOBIN: 13.3 G/DL (ref 13–17)
KETONES, URINE: ABNORMAL
LEUKOCYTE ESTERASE, URINE: ABNORMAL
Lab: NORMAL
MCH RBC QN AUTO: 29.6 PG (ref 25.2–33.5)
MCHC RBC AUTO-ENTMCNC: 32.6 G/DL (ref 28.4–34.8)
MCV RBC AUTO: 90.7 FL (ref 82.6–102.9)
MDMA URINE: ABNORMAL
METHADONE SCREEN, URINE: NEGATIVE
METHAMPHETAMINE, URINE: ABNORMAL
MUCUS: ABNORMAL
NITRITE, URINE: POSITIVE
NRBC AUTOMATED: 0 PER 100 WBC
OPIATES, URINE: POSITIVE
OTHER OBSERVATIONS UA: ABNORMAL
OXYCODONE SCREEN URINE: NEGATIVE
PARTIAL THROMBOPLASTIN TIME: 26.7 SEC (ref 20.5–30.5)
PARTIAL THROMBOPLASTIN TIME: 46.8 SEC (ref 20.5–30.5)
PDW BLD-RTO: 14.2 % (ref 11.8–14.4)
PH UA: 5 (ref 5–8)
PHENCYCLIDINE, URINE: NEGATIVE
PLATELET # BLD: 206 K/UL (ref 138–453)
PMV BLD AUTO: 11 FL (ref 8.1–13.5)
POTASSIUM SERPL-SCNC: 3.7 MMOL/L (ref 3.7–5.3)
PROPOXYPHENE, URINE: ABNORMAL
PROTEIN UA: NEGATIVE
RBC # BLD: 4.5 M/UL (ref 4.21–5.77)
RBC UA: ABNORMAL /HPF (ref 0–4)
RENAL EPITHELIAL, UA: ABNORMAL /HPF
SODIUM BLD-SCNC: 139 MMOL/L (ref 135–144)
SPECIFIC GRAVITY UA: 1.03 (ref 1–1.03)
SPECIMEN DESCRIPTION: NORMAL
TEST INFORMATION: ABNORMAL
TRICHOMONAS: ABNORMAL
TRICYCLIC ANTIDEPRESSANTS, UR: ABNORMAL
TURBIDITY: CLEAR
URINE HGB: NEGATIVE
UROBILINOGEN, URINE: NORMAL
WBC # BLD: 7.2 K/UL (ref 3.5–11.3)
WBC UA: ABNORMAL /HPF (ref 0–5)
YEAST: ABNORMAL

## 2019-11-14 PROCEDURE — 6360000002 HC RX W HCPCS: Performed by: STUDENT IN AN ORGANIZED HEALTH CARE EDUCATION/TRAINING PROGRAM

## 2019-11-14 PROCEDURE — 6370000000 HC RX 637 (ALT 250 FOR IP): Performed by: NURSE PRACTITIONER

## 2019-11-14 PROCEDURE — 6370000000 HC RX 637 (ALT 250 FOR IP): Performed by: STUDENT IN AN ORGANIZED HEALTH CARE EDUCATION/TRAINING PROGRAM

## 2019-11-14 PROCEDURE — 1200000000 HC SEMI PRIVATE

## 2019-11-14 PROCEDURE — 81001 URINALYSIS AUTO W/SCOPE: CPT

## 2019-11-14 PROCEDURE — 94640 AIRWAY INHALATION TREATMENT: CPT

## 2019-11-14 PROCEDURE — 87641 MR-STAPH DNA AMP PROBE: CPT

## 2019-11-14 PROCEDURE — 80048 BASIC METABOLIC PNL TOTAL CA: CPT

## 2019-11-14 PROCEDURE — 2580000003 HC RX 258: Performed by: STUDENT IN AN ORGANIZED HEALTH CARE EDUCATION/TRAINING PROGRAM

## 2019-11-14 PROCEDURE — 85027 COMPLETE CBC AUTOMATED: CPT

## 2019-11-14 PROCEDURE — 36415 COLL VENOUS BLD VENIPUNCTURE: CPT

## 2019-11-14 PROCEDURE — 97166 OT EVAL MOD COMPLEX 45 MIN: CPT

## 2019-11-14 PROCEDURE — 85730 THROMBOPLASTIN TIME PARTIAL: CPT

## 2019-11-14 PROCEDURE — 97535 SELF CARE MNGMENT TRAINING: CPT

## 2019-11-14 PROCEDURE — 99223 1ST HOSP IP/OBS HIGH 75: CPT | Performed by: PSYCHIATRY & NEUROLOGY

## 2019-11-14 PROCEDURE — 97162 PT EVAL MOD COMPLEX 30 MIN: CPT

## 2019-11-14 RX ORDER — LOSARTAN POTASSIUM 25 MG/1
25 TABLET ORAL DAILY
Status: DISCONTINUED | OUTPATIENT
Start: 2019-11-14 | End: 2019-11-16 | Stop reason: HOSPADM

## 2019-11-14 RX ORDER — CYCLOBENZAPRINE HCL 10 MG
10 TABLET ORAL 3 TIMES DAILY
Status: DISCONTINUED | OUTPATIENT
Start: 2019-11-14 | End: 2019-11-16 | Stop reason: HOSPADM

## 2019-11-14 RX ORDER — HEPARIN SODIUM 10000 [USP'U]/100ML
12 INJECTION, SOLUTION INTRAVENOUS CONTINUOUS
Status: DISCONTINUED | OUTPATIENT
Start: 2019-11-14 | End: 2019-11-14

## 2019-11-14 RX ORDER — ATORVASTATIN CALCIUM 80 MG/1
80 TABLET, FILM COATED ORAL DAILY
Status: DISCONTINUED | OUTPATIENT
Start: 2019-11-14 | End: 2019-11-16 | Stop reason: HOSPADM

## 2019-11-14 RX ORDER — SODIUM CHLORIDE 0.9 % (FLUSH) 0.9 %
10 SYRINGE (ML) INJECTION PRN
Status: DISCONTINUED | OUTPATIENT
Start: 2019-11-14 | End: 2019-11-16 | Stop reason: HOSPADM

## 2019-11-14 RX ORDER — POTASSIUM CHLORIDE 20 MEQ/1
40 TABLET, EXTENDED RELEASE ORAL ONCE
Status: COMPLETED | OUTPATIENT
Start: 2019-11-14 | End: 2019-11-14

## 2019-11-14 RX ORDER — SODIUM CHLORIDE, SODIUM LACTATE, POTASSIUM CHLORIDE, CALCIUM CHLORIDE 600; 310; 30; 20 MG/100ML; MG/100ML; MG/100ML; MG/100ML
INJECTION, SOLUTION INTRAVENOUS CONTINUOUS
Status: DISCONTINUED | OUTPATIENT
Start: 2019-11-14 | End: 2019-11-14

## 2019-11-14 RX ORDER — OXYCODONE HYDROCHLORIDE 5 MG/1
5 TABLET ORAL EVERY 4 HOURS PRN
Status: DISCONTINUED | OUTPATIENT
Start: 2019-11-14 | End: 2019-11-15

## 2019-11-14 RX ORDER — SENNA PLUS 8.6 MG/1
1 TABLET ORAL NIGHTLY
Status: DISCONTINUED | OUTPATIENT
Start: 2019-11-14 | End: 2019-11-16 | Stop reason: HOSPADM

## 2019-11-14 RX ORDER — IBUPROFEN 400 MG/1
400 TABLET ORAL EVERY 4 HOURS
Status: DISCONTINUED | OUTPATIENT
Start: 2019-11-14 | End: 2019-11-14

## 2019-11-14 RX ORDER — HYDROCHLOROTHIAZIDE 25 MG/1
25 TABLET ORAL DAILY
Status: DISCONTINUED | OUTPATIENT
Start: 2019-11-14 | End: 2019-11-16 | Stop reason: HOSPADM

## 2019-11-14 RX ORDER — ACETAMINOPHEN 500 MG
1000 TABLET ORAL EVERY 8 HOURS
Status: DISCONTINUED | OUTPATIENT
Start: 2019-11-14 | End: 2019-11-16 | Stop reason: HOSPADM

## 2019-11-14 RX ORDER — ACETAMINOPHEN 325 MG/1
650 TABLET ORAL EVERY 4 HOURS PRN
Status: DISCONTINUED | OUTPATIENT
Start: 2019-11-14 | End: 2019-11-15

## 2019-11-14 RX ORDER — PANTOPRAZOLE SODIUM 40 MG/1
40 TABLET, DELAYED RELEASE ORAL
Status: DISCONTINUED | OUTPATIENT
Start: 2019-11-14 | End: 2019-11-16 | Stop reason: HOSPADM

## 2019-11-14 RX ORDER — ALBUTEROL SULFATE 90 UG/1
1 AEROSOL, METERED RESPIRATORY (INHALATION) EVERY 6 HOURS PRN
Status: DISCONTINUED | OUTPATIENT
Start: 2019-11-14 | End: 2019-11-16 | Stop reason: HOSPADM

## 2019-11-14 RX ORDER — ESCITALOPRAM OXALATE 10 MG/1
10 TABLET ORAL DAILY
Status: DISCONTINUED | OUTPATIENT
Start: 2019-11-14 | End: 2019-11-16 | Stop reason: HOSPADM

## 2019-11-14 RX ORDER — MELOXICAM 7.5 MG/1
15 TABLET ORAL NIGHTLY
Status: DISCONTINUED | OUTPATIENT
Start: 2019-11-14 | End: 2019-11-16 | Stop reason: HOSPADM

## 2019-11-14 RX ORDER — FLUTICASONE PROPIONATE 50 MCG
1 SPRAY, SUSPENSION (ML) NASAL DAILY
Status: DISCONTINUED | OUTPATIENT
Start: 2019-11-14 | End: 2019-11-16 | Stop reason: HOSPADM

## 2019-11-14 RX ORDER — POLYETHYLENE GLYCOL 3350 17 G/17G
17 POWDER, FOR SOLUTION ORAL DAILY
Status: DISCONTINUED | OUTPATIENT
Start: 2019-11-14 | End: 2019-11-16 | Stop reason: HOSPADM

## 2019-11-14 RX ORDER — HEPARIN SODIUM 10000 [USP'U]/100ML
6.51 INJECTION, SOLUTION INTRAVENOUS CONTINUOUS
Status: DISCONTINUED | OUTPATIENT
Start: 2019-11-14 | End: 2019-11-15

## 2019-11-14 RX ORDER — TAMSULOSIN HYDROCHLORIDE 0.4 MG/1
0.4 CAPSULE ORAL EVERY EVENING
Status: DISCONTINUED | OUTPATIENT
Start: 2019-11-14 | End: 2019-11-16 | Stop reason: HOSPADM

## 2019-11-14 RX ORDER — HEPARIN SODIUM 1000 [USP'U]/ML
4000 INJECTION, SOLUTION INTRAVENOUS; SUBCUTANEOUS PRN
Status: DISCONTINUED | OUTPATIENT
Start: 2019-11-14 | End: 2019-11-15

## 2019-11-14 RX ORDER — HEPARIN SODIUM 1000 [USP'U]/ML
2000 INJECTION, SOLUTION INTRAVENOUS; SUBCUTANEOUS PRN
Status: DISCONTINUED | OUTPATIENT
Start: 2019-11-14 | End: 2019-11-15

## 2019-11-14 RX ORDER — SODIUM CHLORIDE 0.9 % (FLUSH) 0.9 %
10 SYRINGE (ML) INJECTION EVERY 12 HOURS SCHEDULED
Status: DISCONTINUED | OUTPATIENT
Start: 2019-11-14 | End: 2019-11-16 | Stop reason: HOSPADM

## 2019-11-14 RX ORDER — ONDANSETRON 2 MG/ML
4 INJECTION INTRAMUSCULAR; INTRAVENOUS EVERY 6 HOURS PRN
Status: DISCONTINUED | OUTPATIENT
Start: 2019-11-14 | End: 2019-11-14

## 2019-11-14 RX ORDER — METOPROLOL SUCCINATE 25 MG/1
25 TABLET, EXTENDED RELEASE ORAL DAILY
Status: DISCONTINUED | OUTPATIENT
Start: 2019-11-14 | End: 2019-11-16 | Stop reason: HOSPADM

## 2019-11-14 RX ADMIN — MOMETASONE FUROATE AND FORMOTEROL FUMARATE DIHYDRATE 2 PUFF: 200; 5 AEROSOL RESPIRATORY (INHALATION) at 19:45

## 2019-11-14 RX ADMIN — CYCLOBENZAPRINE 10 MG: 10 TABLET, FILM COATED ORAL at 08:49

## 2019-11-14 RX ADMIN — CYCLOBENZAPRINE 10 MG: 10 TABLET, FILM COATED ORAL at 21:22

## 2019-11-14 RX ADMIN — HEPARIN SODIUM 2000 UNITS: 1000 INJECTION INTRAVENOUS; SUBCUTANEOUS at 17:23

## 2019-11-14 RX ADMIN — HEPARIN SODIUM AND DEXTROSE 6.51 UNITS/KG/HR: 10000; 5 INJECTION INTRAVENOUS at 02:36

## 2019-11-14 RX ADMIN — SODIUM CHLORIDE, POTASSIUM CHLORIDE, SODIUM LACTATE AND CALCIUM CHLORIDE: 600; 310; 30; 20 INJECTION, SOLUTION INTRAVENOUS at 02:28

## 2019-11-14 RX ADMIN — ACETAMINOPHEN 1000 MG: 500 TABLET ORAL at 18:37

## 2019-11-14 RX ADMIN — HYDROCHLOROTHIAZIDE 25 MG: 25 TABLET ORAL at 08:50

## 2019-11-14 RX ADMIN — POLYETHYLENE GLYCOL 3350 17 G: 17 POWDER, FOR SOLUTION ORAL at 08:50

## 2019-11-14 RX ADMIN — PANTOPRAZOLE SODIUM 40 MG: 40 TABLET, DELAYED RELEASE ORAL at 08:50

## 2019-11-14 RX ADMIN — HEPARIN SODIUM 4000 UNITS: 1000 INJECTION INTRAVENOUS; SUBCUTANEOUS at 10:20

## 2019-11-14 RX ADMIN — ATORVASTATIN CALCIUM 80 MG: 80 TABLET, FILM COATED ORAL at 08:49

## 2019-11-14 RX ADMIN — ACETAMINOPHEN 1000 MG: 500 TABLET ORAL at 03:22

## 2019-11-14 RX ADMIN — ACETAMINOPHEN 1000 MG: 500 TABLET ORAL at 10:30

## 2019-11-14 RX ADMIN — SODIUM CHLORIDE, POTASSIUM CHLORIDE, SODIUM LACTATE AND CALCIUM CHLORIDE: 600; 310; 30; 20 INJECTION, SOLUTION INTRAVENOUS at 10:21

## 2019-11-14 RX ADMIN — POTASSIUM CHLORIDE 40 MEQ: 1500 TABLET, EXTENDED RELEASE ORAL at 08:49

## 2019-11-14 RX ADMIN — SODIUM CHLORIDE, PRESERVATIVE FREE 10 ML: 5 INJECTION INTRAVENOUS at 08:53

## 2019-11-14 RX ADMIN — ESCITALOPRAM OXALATE 10 MG: 10 TABLET ORAL at 08:49

## 2019-11-14 RX ADMIN — SODIUM CHLORIDE, PRESERVATIVE FREE 10 ML: 5 INJECTION INTRAVENOUS at 21:20

## 2019-11-14 RX ADMIN — FLUTICASONE PROPIONATE 1 SPRAY: 50 SPRAY, METERED NASAL at 08:49

## 2019-11-14 RX ADMIN — MELOXICAM 15 MG: 7.5 TABLET ORAL at 21:22

## 2019-11-14 RX ADMIN — TAMSULOSIN HYDROCHLORIDE 0.4 MG: 0.4 CAPSULE ORAL at 18:37

## 2019-11-14 RX ADMIN — SENNOSIDES 8.6 MG: 8.6 TABLET, FILM COATED ORAL at 21:22

## 2019-11-14 RX ADMIN — HEPARIN SODIUM AND DEXTROSE 12.51 UNITS/KG/HR: 10000; 5 INJECTION INTRAVENOUS at 19:11

## 2019-11-14 RX ADMIN — LOSARTAN POTASSIUM 25 MG: 25 TABLET, FILM COATED ORAL at 08:50

## 2019-11-14 RX ADMIN — CYCLOBENZAPRINE 10 MG: 10 TABLET, FILM COATED ORAL at 14:06

## 2019-11-14 RX ADMIN — METOPROLOL SUCCINATE 25 MG: 25 TABLET, FILM COATED, EXTENDED RELEASE ORAL at 08:50

## 2019-11-14 ASSESSMENT — PAIN DESCRIPTION - PAIN TYPE
TYPE: ACUTE PAIN

## 2019-11-14 ASSESSMENT — ENCOUNTER SYMPTOMS
VOMITING: 0
RHINORRHEA: 0
GASTROINTESTINAL NEGATIVE: 1
ABDOMINAL PAIN: 0
SHORTNESS OF BREATH: 0
RESPIRATORY NEGATIVE: 1
NAUSEA: 0
BACK PAIN: 1
EYES NEGATIVE: 1

## 2019-11-14 ASSESSMENT — PAIN DESCRIPTION - DESCRIPTORS: DESCRIPTORS: ACHING;DISCOMFORT

## 2019-11-14 ASSESSMENT — PAIN SCALES - GENERAL
PAINLEVEL_OUTOF10: 3
PAINLEVEL_OUTOF10: 4
PAINLEVEL_OUTOF10: 3
PAINLEVEL_OUTOF10: 3
PAINLEVEL_OUTOF10: 5
PAINLEVEL_OUTOF10: 3
PAINLEVEL_OUTOF10: 3
PAINLEVEL_OUTOF10: 4

## 2019-11-14 ASSESSMENT — PAIN DESCRIPTION - LOCATION
LOCATION: NECK

## 2019-11-14 ASSESSMENT — PAIN DESCRIPTION - ORIENTATION
ORIENTATION: MID;LOWER;POSTERIOR
ORIENTATION: LOWER;MID;POSTERIOR

## 2019-11-15 ENCOUNTER — APPOINTMENT (OUTPATIENT)
Dept: GENERAL RADIOLOGY | Age: 74
DRG: 551 | End: 2019-11-15
Payer: OTHER MISCELLANEOUS

## 2019-11-15 ENCOUNTER — APPOINTMENT (OUTPATIENT)
Dept: MRI IMAGING | Age: 74
DRG: 551 | End: 2019-11-15
Payer: OTHER MISCELLANEOUS

## 2019-11-15 VITALS
OXYGEN SATURATION: 92 % | RESPIRATION RATE: 16 BRPM | TEMPERATURE: 98.2 F | WEIGHT: 315 LBS | HEIGHT: 71 IN | SYSTOLIC BLOOD PRESSURE: 125 MMHG | BODY MASS INDEX: 44.1 KG/M2 | DIASTOLIC BLOOD PRESSURE: 64 MMHG | HEART RATE: 97 BPM

## 2019-11-15 LAB
PARTIAL THROMBOPLASTIN TIME: 59.6 SEC (ref 20.5–30.5)
PARTIAL THROMBOPLASTIN TIME: 63.3 SEC (ref 20.5–30.5)

## 2019-11-15 PROCEDURE — 1200000000 HC SEMI PRIVATE

## 2019-11-15 PROCEDURE — 94640 AIRWAY INHALATION TREATMENT: CPT

## 2019-11-15 PROCEDURE — APPNB45 APP NON BILLABLE 31-45 MINUTES: Performed by: NURSE PRACTITIONER

## 2019-11-15 PROCEDURE — 70551 MRI BRAIN STEM W/O DYE: CPT

## 2019-11-15 PROCEDURE — 72141 MRI NECK SPINE W/O DYE: CPT

## 2019-11-15 PROCEDURE — 6370000000 HC RX 637 (ALT 250 FOR IP): Performed by: STUDENT IN AN ORGANIZED HEALTH CARE EDUCATION/TRAINING PROGRAM

## 2019-11-15 PROCEDURE — 72040 X-RAY EXAM NECK SPINE 2-3 VW: CPT

## 2019-11-15 PROCEDURE — 6370000000 HC RX 637 (ALT 250 FOR IP): Performed by: NURSE PRACTITIONER

## 2019-11-15 PROCEDURE — 94760 N-INVAS EAR/PLS OXIMETRY 1: CPT

## 2019-11-15 PROCEDURE — APPNB15 APP NON BILLABLE TIME 0-15 MINS: Performed by: REGISTERED NURSE

## 2019-11-15 PROCEDURE — 93005 ELECTROCARDIOGRAM TRACING: CPT | Performed by: INTERNAL MEDICINE

## 2019-11-15 PROCEDURE — 2580000003 HC RX 258: Performed by: STUDENT IN AN ORGANIZED HEALTH CARE EDUCATION/TRAINING PROGRAM

## 2019-11-15 PROCEDURE — 36415 COLL VENOUS BLD VENIPUNCTURE: CPT

## 2019-11-15 PROCEDURE — 85730 THROMBOPLASTIN TIME PARTIAL: CPT

## 2019-11-15 RX ORDER — GABAPENTIN 300 MG/1
300 CAPSULE ORAL EVERY 8 HOURS
Status: DISCONTINUED | OUTPATIENT
Start: 2019-11-15 | End: 2019-11-16 | Stop reason: HOSPADM

## 2019-11-15 RX ORDER — ACETAMINOPHEN 500 MG
1000 TABLET ORAL EVERY 8 HOURS
Qty: 90 TABLET | Refills: 0 | Status: SHIPPED | OUTPATIENT
Start: 2019-11-15 | End: 2020-03-16 | Stop reason: ALTCHOICE

## 2019-11-15 RX ORDER — GABAPENTIN 300 MG/1
300 CAPSULE ORAL EVERY 8 HOURS
Qty: 30 CAPSULE | Refills: 0 | Status: SHIPPED | OUTPATIENT
Start: 2019-11-15 | End: 2019-12-11

## 2019-11-15 RX ADMIN — ACETAMINOPHEN 1000 MG: 500 TABLET ORAL at 12:55

## 2019-11-15 RX ADMIN — RIVAROXABAN 20 MG: 20 TABLET, FILM COATED ORAL at 10:46

## 2019-11-15 RX ADMIN — LOSARTAN POTASSIUM 25 MG: 25 TABLET, FILM COATED ORAL at 08:57

## 2019-11-15 RX ADMIN — MOMETASONE FUROATE AND FORMOTEROL FUMARATE DIHYDRATE 2 PUFF: 200; 5 AEROSOL RESPIRATORY (INHALATION) at 09:19

## 2019-11-15 RX ADMIN — CYCLOBENZAPRINE 10 MG: 10 TABLET, FILM COATED ORAL at 08:57

## 2019-11-15 RX ADMIN — GABAPENTIN 300 MG: 300 CAPSULE ORAL at 10:44

## 2019-11-15 RX ADMIN — TAMSULOSIN HYDROCHLORIDE 0.4 MG: 0.4 CAPSULE ORAL at 18:25

## 2019-11-15 RX ADMIN — SODIUM CHLORIDE, PRESERVATIVE FREE 10 ML: 5 INJECTION INTRAVENOUS at 08:58

## 2019-11-15 RX ADMIN — CYCLOBENZAPRINE 10 MG: 10 TABLET, FILM COATED ORAL at 15:00

## 2019-11-15 RX ADMIN — ESCITALOPRAM OXALATE 10 MG: 10 TABLET ORAL at 08:58

## 2019-11-15 RX ADMIN — METOPROLOL SUCCINATE 25 MG: 25 TABLET, FILM COATED, EXTENDED RELEASE ORAL at 08:58

## 2019-11-15 RX ADMIN — HYDROCHLOROTHIAZIDE 25 MG: 25 TABLET ORAL at 08:58

## 2019-11-15 RX ADMIN — POLYETHYLENE GLYCOL 3350 17 G: 17 POWDER, FOR SOLUTION ORAL at 08:58

## 2019-11-15 RX ADMIN — PANTOPRAZOLE SODIUM 40 MG: 40 TABLET, DELAYED RELEASE ORAL at 08:58

## 2019-11-15 RX ADMIN — ATORVASTATIN CALCIUM 80 MG: 80 TABLET, FILM COATED ORAL at 08:58

## 2019-11-15 RX ADMIN — GABAPENTIN 300 MG: 300 CAPSULE ORAL at 18:25

## 2019-11-15 ASSESSMENT — PAIN SCALES - GENERAL: PAINLEVEL_OUTOF10: 1

## 2019-11-18 ENCOUNTER — TELEPHONE (OUTPATIENT)
Dept: PRIMARY CARE CLINIC | Age: 74
End: 2019-11-18

## 2019-11-19 ENCOUNTER — OFFICE VISIT (OUTPATIENT)
Dept: PRIMARY CARE CLINIC | Age: 74
End: 2019-11-19
Payer: OTHER MISCELLANEOUS

## 2019-11-19 VITALS
BODY MASS INDEX: 46.72 KG/M2 | SYSTOLIC BLOOD PRESSURE: 116 MMHG | DIASTOLIC BLOOD PRESSURE: 74 MMHG | HEART RATE: 101 BPM | OXYGEN SATURATION: 96 % | WEIGHT: 315 LBS

## 2019-11-19 DIAGNOSIS — S12.101D CLOSED NONDISPLACED FRACTURE OF SECOND CERVICAL VERTEBRA WITH ROUTINE HEALING, UNSPECIFIED FRACTURE MORPHOLOGY, SUBSEQUENT ENCOUNTER: Primary | ICD-10-CM

## 2019-11-19 DIAGNOSIS — S12.201D CLOSED NONDISPLACED FRACTURE OF THIRD CERVICAL VERTEBRA WITH ROUTINE HEALING, UNSPECIFIED FRACTURE MORPHOLOGY, SUBSEQUENT ENCOUNTER: ICD-10-CM

## 2019-11-19 DIAGNOSIS — Z23 INFLUENZA VACCINE NEEDED: ICD-10-CM
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
## 2019-11-19 DIAGNOSIS — R05.9 COUGH: ICD-10-CM

## 2019-11-19 DIAGNOSIS — S12.301D CLOSED NONDISPLACED FRACTURE OF FOURTH CERVICAL VERTEBRA WITH ROUTINE HEALING, UNSPECIFIED FRACTURE MORPHOLOGY, SUBSEQUENT ENCOUNTER: ICD-10-CM

## 2019-11-19 PROCEDURE — 99496 TRANSJ CARE MGMT HIGH F2F 7D: CPT | Performed by: NURSE PRACTITIONER

## 2019-11-19 PROCEDURE — 90686 IIV4 VACC NO PRSV 0.5 ML IM: CPT | Performed by: NURSE PRACTITIONER

## 2019-11-19 PROCEDURE — 1111F DSCHRG MED/CURRENT MED MERGE: CPT | Performed by: NURSE PRACTITIONER

## 2019-11-19 PROCEDURE — 90471 IMMUNIZATION ADMIN: CPT | Performed by: NURSE PRACTITIONER

## 2019-11-19 RX ORDER — FLUTICASONE PROPIONATE 50 MCG
SPRAY, SUSPENSION (ML) NASAL
Qty: 16 G | Refills: 0 | Status: SHIPPED | OUTPATIENT
Start: 2019-11-19 | End: 2020-05-15

## 2019-11-19 RX ORDER — GABAPENTIN 300 MG/1
300 CAPSULE ORAL 3 TIMES DAILY
Qty: 90 CAPSULE | Refills: 0 | Status: SHIPPED | OUTPATIENT
Start: 2019-11-19 | End: 2019-12-03

## 2019-11-21 LAB
EKG ATRIAL RATE: 94 BPM
EKG P AXIS: -74 DEGREES
EKG P-R INTERVAL: 264 MS
EKG Q-T INTERVAL: 372 MS
EKG QRS DURATION: 108 MS
EKG QTC CALCULATION (BAZETT): 465 MS
EKG R AXIS: -19 DEGREES
EKG T AXIS: 33 DEGREES
EKG VENTRICULAR RATE: 94 BPM

## 2019-11-21 PROCEDURE — 93010 ELECTROCARDIOGRAM REPORT: CPT | Performed by: INTERNAL MEDICINE

## 2019-11-21 ASSESSMENT — ENCOUNTER SYMPTOMS
ABDOMINAL DISTENTION: 0
RHINORRHEA: 0
SORE THROAT: 0
EYES NEGATIVE: 1
COUGH: 0

## 2019-11-25 ENCOUNTER — HOSPITAL ENCOUNTER (OUTPATIENT)
Age: 74
Discharge: HOME | End: 2019-11-25
Payer: MEDICARE

## 2019-11-25 DIAGNOSIS — S12.301D CLOSED NONDISPLACED FRACTURE OF FOURTH CERVICAL VERTEBRA WITH ROUTINE HEALING, UNSPECIFIED FRACTURE MORPHOLOGY, SUBSEQUENT ENCOUNTER: ICD-10-CM

## 2019-11-25 DIAGNOSIS — S12.101D CLOSED NONDISPLACED FRACTURE OF SECOND CERVICAL VERTEBRA WITH ROUTINE HEALING, UNSPECIFIED FRACTURE MORPHOLOGY, SUBSEQUENT ENCOUNTER: ICD-10-CM

## 2019-11-25 DIAGNOSIS — S12.201D CLOSED NONDISPLACED FRACTURE OF THIRD CERVICAL VERTEBRA WITH ROUTINE HEALING, UNSPECIFIED FRACTURE MORPHOLOGY, SUBSEQUENT ENCOUNTER: ICD-10-CM

## 2019-11-25 LAB
ANION GAP SERPL CALCULATED.3IONS-SCNC: 13 MMOL/L (ref 9–17)
BUN BLDV-MCNC: 18 MG/DL (ref 8–23)
BUN/CREAT BLD: 15 (ref 9–20)
CALCIUM SERPL-MCNC: 9.7 MG/DL (ref 8.6–10.4)
CHLORIDE BLD-SCNC: 100 MMOL/L (ref 98–107)
CO2: 22 MMOL/L (ref 20–31)
CREAT SERPL-MCNC: 1.23 MG/DL (ref 0.7–1.2)
GFR AFRICAN AMERICAN: >60 ML/MIN
GFR NON-AFRICAN AMERICAN: 58 ML/MIN
GFR SERPL CREATININE-BSD FRML MDRD: ABNORMAL ML/MIN/{1.73_M2}
GFR SERPL CREATININE-BSD FRML MDRD: ABNORMAL ML/MIN/{1.73_M2}
GLUCOSE BLD-MCNC: 130 MG/DL (ref 70–99)
POTASSIUM SERPL-SCNC: 3.9 MMOL/L (ref 3.7–5.3)
SODIUM BLD-SCNC: 135 MMOL/L (ref 135–144)

## 2019-11-25 PROCEDURE — 36415 COLL VENOUS BLD VENIPUNCTURE: CPT

## 2019-11-25 PROCEDURE — 80048 BASIC METABOLIC PNL TOTAL CA: CPT

## 2019-12-03 DIAGNOSIS — S12.201D CLOSED NONDISPLACED FRACTURE OF THIRD CERVICAL VERTEBRA WITH ROUTINE HEALING, UNSPECIFIED FRACTURE MORPHOLOGY, SUBSEQUENT ENCOUNTER: ICD-10-CM

## 2019-12-03 DIAGNOSIS — S12.101D CLOSED NONDISPLACED FRACTURE OF SECOND CERVICAL VERTEBRA WITH ROUTINE HEALING, UNSPECIFIED FRACTURE MORPHOLOGY, SUBSEQUENT ENCOUNTER: ICD-10-CM

## 2019-12-03 DIAGNOSIS — S12.301D CLOSED NONDISPLACED FRACTURE OF FOURTH CERVICAL VERTEBRA WITH ROUTINE HEALING, UNSPECIFIED FRACTURE MORPHOLOGY, SUBSEQUENT ENCOUNTER: ICD-10-CM

## 2019-12-03 RX ORDER — GABAPENTIN 300 MG/1
300 CAPSULE ORAL 2 TIMES DAILY
Qty: 90 CAPSULE | Refills: 0 | Status: SHIPPED | OUTPATIENT
Start: 2019-12-03 | End: 2020-01-07 | Stop reason: ALTCHOICE

## 2019-12-05 ENCOUNTER — HOSPITAL ENCOUNTER (OUTPATIENT)
Age: 74
Discharge: HOME | End: 2019-12-05
Payer: MEDICARE

## 2019-12-05 DIAGNOSIS — I25.10 CORONARY ARTERY DISEASE INVOLVING NATIVE CORONARY ARTERY OF NATIVE HEART WITHOUT ANGINA PECTORIS: ICD-10-CM

## 2019-12-05 DIAGNOSIS — R06.02 SOB (SHORTNESS OF BREATH): ICD-10-CM

## 2019-12-05 DIAGNOSIS — E78.5 HYPERLIPIDEMIA, UNSPECIFIED HYPERLIPIDEMIA TYPE: ICD-10-CM

## 2019-12-05 DIAGNOSIS — E55.9 VITAMIN D DEFICIENCY DISEASE: ICD-10-CM

## 2019-12-05 DIAGNOSIS — J44.1 COPD WITH ACUTE EXACERBATION (HCC): ICD-10-CM

## 2019-12-05 DIAGNOSIS — I10 ESSENTIAL HYPERTENSION: ICD-10-CM

## 2019-12-05 DIAGNOSIS — Z98.62 HISTORY OF ANGIOPLASTY: ICD-10-CM

## 2019-12-05 LAB
ABSOLUTE EOS #: 0.2 K/UL (ref 0–0.4)
ABSOLUTE IMMATURE GRANULOCYTE: NORMAL K/UL (ref 0–0.3)
ABSOLUTE LYMPH #: 1.2 K/UL (ref 1–4.8)
ABSOLUTE MONO #: 0.4 K/UL (ref 0–1)
ALBUMIN SERPL-MCNC: 4.1 G/DL (ref 3.5–5.2)
ALBUMIN/GLOBULIN RATIO: ABNORMAL (ref 1–2.5)
ALP BLD-CCNC: 104 U/L (ref 40–129)
ALT SERPL-CCNC: 23 U/L (ref 5–41)
ANION GAP SERPL CALCULATED.3IONS-SCNC: 12 MMOL/L (ref 9–17)
AST SERPL-CCNC: 31 U/L
BASOPHILS # BLD: 1 % (ref 0–2)
BASOPHILS ABSOLUTE: 0 K/UL (ref 0–0.2)
BILIRUB SERPL-MCNC: 0.98 MG/DL (ref 0.3–1.2)
BUN BLDV-MCNC: 22 MG/DL (ref 8–23)
BUN/CREAT BLD: 22 (ref 9–20)
CALCIUM SERPL-MCNC: 9.8 MG/DL (ref 8.6–10.4)
CHLORIDE BLD-SCNC: 105 MMOL/L (ref 98–107)
CHOLESTEROL/HDL RATIO: 1.8
CHOLESTEROL: 118 MG/DL
CO2: 24 MMOL/L (ref 20–31)
CREAT SERPL-MCNC: 0.98 MG/DL (ref 0.7–1.2)
DIFFERENTIAL TYPE: YES
EOSINOPHILS RELATIVE PERCENT: 5 % (ref 0–5)
GFR AFRICAN AMERICAN: >60 ML/MIN
GFR NON-AFRICAN AMERICAN: >60 ML/MIN
GFR SERPL CREATININE-BSD FRML MDRD: ABNORMAL ML/MIN/{1.73_M2}
GFR SERPL CREATININE-BSD FRML MDRD: ABNORMAL ML/MIN/{1.73_M2}
GLUCOSE BLD-MCNC: 125 MG/DL (ref 70–99)
HCT VFR BLD CALC: 41 % (ref 41–53)
HDLC SERPL-MCNC: 67 MG/DL
HEMOGLOBIN: 13.8 G/DL (ref 13.5–17.5)
IMMATURE GRANULOCYTES: NORMAL %
LDL CHOLESTEROL: 29 MG/DL (ref 0–130)
LYMPHOCYTES # BLD: 25 % (ref 13–44)
MAGNESIUM: 1.9 MG/DL (ref 1.6–2.6)
MCH RBC QN AUTO: 29.4 PG (ref 26–34)
MCHC RBC AUTO-ENTMCNC: 33.7 G/DL (ref 31–37)
MCV RBC AUTO: 87.5 FL (ref 80–100)
MONOCYTES # BLD: 8 % (ref 5–9)
NRBC AUTOMATED: NORMAL PER 100 WBC
PATIENT FASTING?: YES
PDW BLD-RTO: 14.8 % (ref 12.1–15.2)
PLATELET # BLD: 193 K/UL (ref 140–450)
PLATELET ESTIMATE: NORMAL
PMV BLD AUTO: NORMAL FL (ref 6–12)
POTASSIUM SERPL-SCNC: 4.3 MMOL/L (ref 3.7–5.3)
RBC # BLD: 4.68 M/UL (ref 4.5–5.9)
RBC # BLD: NORMAL 10*6/UL
SEG NEUTROPHILS: 61 % (ref 39–75)
SEGMENTED NEUTROPHILS ABSOLUTE COUNT: 2.9 K/UL (ref 2.1–6.5)
SODIUM BLD-SCNC: 141 MMOL/L (ref 135–144)
TOTAL PROTEIN: 7 G/DL (ref 6.4–8.3)
TRIGL SERPL-MCNC: 110 MG/DL
TSH SERPL DL<=0.05 MIU/L-ACNC: 2.05 MIU/L (ref 0.3–5)
VITAMIN D 25-HYDROXY: 31.5 NG/ML (ref 30–100)
VLDLC SERPL CALC-MCNC: NORMAL MG/DL (ref 1–30)
WBC # BLD: 4.7 K/UL (ref 3.5–11)
WBC # BLD: NORMAL 10*3/UL

## 2019-12-05 PROCEDURE — 85025 COMPLETE CBC W/AUTO DIFF WBC: CPT

## 2019-12-05 PROCEDURE — 80053 COMPREHEN METABOLIC PANEL: CPT

## 2019-12-05 PROCEDURE — 80061 LIPID PANEL: CPT

## 2019-12-05 PROCEDURE — 36415 COLL VENOUS BLD VENIPUNCTURE: CPT

## 2019-12-05 PROCEDURE — 84443 ASSAY THYROID STIM HORMONE: CPT

## 2019-12-05 PROCEDURE — 83735 ASSAY OF MAGNESIUM: CPT

## 2019-12-05 PROCEDURE — 82306 VITAMIN D 25 HYDROXY: CPT

## 2019-12-09 ENCOUNTER — OFFICE VISIT (OUTPATIENT)
Dept: CARDIOLOGY CLINIC | Age: 74
End: 2019-12-09
Payer: MEDICARE

## 2019-12-09 VITALS
HEART RATE: 108 BPM | WEIGHT: 315 LBS | BODY MASS INDEX: 47.56 KG/M2 | DIASTOLIC BLOOD PRESSURE: 80 MMHG | OXYGEN SATURATION: 93 % | SYSTOLIC BLOOD PRESSURE: 120 MMHG

## 2019-12-09 DIAGNOSIS — R06.02 SOB (SHORTNESS OF BREATH): ICD-10-CM

## 2019-12-09 DIAGNOSIS — E55.9 VITAMIN D DEFICIENCY DISEASE: ICD-10-CM

## 2019-12-09 DIAGNOSIS — I25.10 CORONARY ARTERY DISEASE INVOLVING NATIVE CORONARY ARTERY OF NATIVE HEART WITHOUT ANGINA PECTORIS: Primary | ICD-10-CM

## 2019-12-09 DIAGNOSIS — I10 ESSENTIAL HYPERTENSION: ICD-10-CM

## 2019-12-09 DIAGNOSIS — E78.5 HYPERLIPIDEMIA, UNSPECIFIED HYPERLIPIDEMIA TYPE: ICD-10-CM

## 2019-12-09 PROCEDURE — G8482 FLU IMMUNIZE ORDER/ADMIN: HCPCS | Performed by: INTERNAL MEDICINE

## 2019-12-09 PROCEDURE — 3017F COLORECTAL CA SCREEN DOC REV: CPT | Performed by: INTERNAL MEDICINE

## 2019-12-09 PROCEDURE — G8427 DOCREV CUR MEDS BY ELIG CLIN: HCPCS | Performed by: INTERNAL MEDICINE

## 2019-12-09 PROCEDURE — G8417 CALC BMI ABV UP PARAM F/U: HCPCS | Performed by: INTERNAL MEDICINE

## 2019-12-09 PROCEDURE — 4040F PNEUMOC VAC/ADMIN/RCVD: CPT | Performed by: INTERNAL MEDICINE

## 2019-12-09 PROCEDURE — 1036F TOBACCO NON-USER: CPT | Performed by: INTERNAL MEDICINE

## 2019-12-09 PROCEDURE — 1123F ACP DISCUSS/DSCN MKR DOCD: CPT | Performed by: INTERNAL MEDICINE

## 2019-12-09 PROCEDURE — G8598 ASA/ANTIPLAT THER USED: HCPCS | Performed by: INTERNAL MEDICINE

## 2019-12-09 PROCEDURE — 1111F DSCHRG MED/CURRENT MED MERGE: CPT | Performed by: INTERNAL MEDICINE

## 2019-12-09 PROCEDURE — 99214 OFFICE O/P EST MOD 30 MIN: CPT | Performed by: INTERNAL MEDICINE

## 2019-12-09 RX ORDER — SPIRONOLACTONE 25 MG/1
25 TABLET ORAL DAILY
Qty: 30 TABLET | Refills: 11 | Status: SHIPPED | OUTPATIENT
Start: 2019-12-09 | End: 2020-05-27 | Stop reason: SDUPTHER

## 2019-12-09 RX ORDER — SPIRONOLACTONE 25 MG/1
25 TABLET ORAL DAILY
COMMUNITY
End: 2019-12-09 | Stop reason: SDUPTHER

## 2019-12-09 RX ORDER — PYRIDOXINE HCL (VITAMIN B6) 100 MG
TABLET ORAL
COMMUNITY
End: 2020-07-10

## 2019-12-09 RX ORDER — DOCUSATE SODIUM 100 MG/1
100 CAPSULE, LIQUID FILLED ORAL 2 TIMES DAILY PRN
Status: ON HOLD | COMMUNITY
End: 2021-01-28 | Stop reason: HOSPADM

## 2019-12-10 ENCOUNTER — HOSPITAL ENCOUNTER (OUTPATIENT)
Dept: PULMONOLOGY | Age: 74
Discharge: HOME | End: 2019-12-10
Payer: MEDICARE

## 2019-12-10 ENCOUNTER — HOSPITAL ENCOUNTER (OUTPATIENT)
Dept: NON INVASIVE DIAGNOSTICS | Age: 74
Discharge: HOME | End: 2019-12-10
Payer: MEDICARE

## 2019-12-10 DIAGNOSIS — R06.02 SOB (SHORTNESS OF BREATH): ICD-10-CM

## 2019-12-10 LAB
LV EF: 55 %
LVEF MODALITY: NORMAL

## 2019-12-10 PROCEDURE — 93306 TTE W/DOPPLER COMPLETE: CPT

## 2019-12-11 ENCOUNTER — CARE COORDINATION (OUTPATIENT)
Dept: CARE COORDINATION | Age: 74
End: 2019-12-11

## 2019-12-16 ENCOUNTER — OFFICE VISIT (OUTPATIENT)
Dept: CARDIOLOGY CLINIC | Age: 74
End: 2019-12-16
Payer: MEDICARE

## 2019-12-16 ENCOUNTER — HOSPITAL ENCOUNTER (OUTPATIENT)
Age: 74
Discharge: HOME | End: 2019-12-16
Payer: MEDICARE

## 2019-12-16 VITALS
WEIGHT: 315 LBS | OXYGEN SATURATION: 97 % | DIASTOLIC BLOOD PRESSURE: 80 MMHG | HEART RATE: 70 BPM | BODY MASS INDEX: 47 KG/M2 | SYSTOLIC BLOOD PRESSURE: 120 MMHG

## 2019-12-16 DIAGNOSIS — R06.02 SOB (SHORTNESS OF BREATH): ICD-10-CM

## 2019-12-16 DIAGNOSIS — I10 ESSENTIAL HYPERTENSION: ICD-10-CM

## 2019-12-16 DIAGNOSIS — R06.02 SOB (SHORTNESS OF BREATH): Primary | ICD-10-CM

## 2019-12-16 LAB
ANION GAP SERPL CALCULATED.3IONS-SCNC: 11 MMOL/L (ref 9–17)
BUN BLDV-MCNC: 22 MG/DL (ref 8–23)
BUN/CREAT BLD: 17 (ref 9–20)
CALCIUM SERPL-MCNC: 9.5 MG/DL (ref 8.6–10.4)
CHLORIDE BLD-SCNC: 106 MMOL/L (ref 98–107)
CO2: 23 MMOL/L (ref 20–31)
CREAT SERPL-MCNC: 1.26 MG/DL (ref 0.7–1.2)
GFR AFRICAN AMERICAN: >60 ML/MIN
GFR NON-AFRICAN AMERICAN: 56 ML/MIN
GFR SERPL CREATININE-BSD FRML MDRD: ABNORMAL ML/MIN/{1.73_M2}
GFR SERPL CREATININE-BSD FRML MDRD: ABNORMAL ML/MIN/{1.73_M2}
GLUCOSE BLD-MCNC: 98 MG/DL (ref 70–99)
POTASSIUM SERPL-SCNC: 4.3 MMOL/L (ref 3.7–5.3)
SODIUM BLD-SCNC: 140 MMOL/L (ref 135–144)

## 2019-12-16 PROCEDURE — G8482 FLU IMMUNIZE ORDER/ADMIN: HCPCS | Performed by: INTERNAL MEDICINE

## 2019-12-16 PROCEDURE — 4040F PNEUMOC VAC/ADMIN/RCVD: CPT | Performed by: INTERNAL MEDICINE

## 2019-12-16 PROCEDURE — G8417 CALC BMI ABV UP PARAM F/U: HCPCS | Performed by: INTERNAL MEDICINE

## 2019-12-16 PROCEDURE — 99212 OFFICE O/P EST SF 10 MIN: CPT | Performed by: INTERNAL MEDICINE

## 2019-12-16 PROCEDURE — G8427 DOCREV CUR MEDS BY ELIG CLIN: HCPCS | Performed by: INTERNAL MEDICINE

## 2019-12-16 PROCEDURE — 80048 BASIC METABOLIC PNL TOTAL CA: CPT

## 2019-12-16 PROCEDURE — 1123F ACP DISCUSS/DSCN MKR DOCD: CPT | Performed by: INTERNAL MEDICINE

## 2019-12-16 PROCEDURE — 1036F TOBACCO NON-USER: CPT | Performed by: INTERNAL MEDICINE

## 2019-12-16 PROCEDURE — 3017F COLORECTAL CA SCREEN DOC REV: CPT | Performed by: INTERNAL MEDICINE

## 2019-12-16 PROCEDURE — 36415 COLL VENOUS BLD VENIPUNCTURE: CPT

## 2019-12-16 PROCEDURE — G8598 ASA/ANTIPLAT THER USED: HCPCS | Performed by: INTERNAL MEDICINE

## 2019-12-16 RX ORDER — ISOSORBIDE MONONITRATE 30 MG/1
30 TABLET, EXTENDED RELEASE ORAL DAILY
Qty: 30 TABLET | Refills: 6 | Status: SHIPPED | OUTPATIENT
Start: 2019-12-16 | End: 2020-05-27 | Stop reason: SDUPTHER

## 2019-12-19 ENCOUNTER — CARE COORDINATION (OUTPATIENT)
Dept: CARE COORDINATION | Age: 74
End: 2019-12-19

## 2019-12-19 RX ORDER — ASPIRIN 325 MG
325 TABLET ORAL DAILY
Status: ON HOLD | COMMUNITY
End: 2021-01-28 | Stop reason: HOSPADM

## 2019-12-27 ENCOUNTER — CARE COORDINATION (OUTPATIENT)
Dept: CARE COORDINATION | Age: 74
End: 2019-12-27

## 2020-01-03 NOTE — TELEPHONE ENCOUNTER
Pt called in to find out if he needs to continue taking gabapentin, states that he doesn't think he needs it. Will call us if the pain comes back.

## 2020-01-07 ENCOUNTER — OFFICE VISIT (OUTPATIENT)
Dept: PRIMARY CARE CLINIC | Age: 75
End: 2020-01-07
Payer: MEDICARE

## 2020-01-07 VITALS
HEIGHT: 72 IN | DIASTOLIC BLOOD PRESSURE: 78 MMHG | BODY MASS INDEX: 42.66 KG/M2 | OXYGEN SATURATION: 97 % | TEMPERATURE: 98.2 F | HEART RATE: 69 BPM | SYSTOLIC BLOOD PRESSURE: 118 MMHG | WEIGHT: 315 LBS

## 2020-01-07 PROCEDURE — 1036F TOBACCO NON-USER: CPT | Performed by: NURSE PRACTITIONER

## 2020-01-07 PROCEDURE — 3023F SPIROM DOC REV: CPT | Performed by: NURSE PRACTITIONER

## 2020-01-07 PROCEDURE — G8926 SPIRO NO PERF OR DOC: HCPCS | Performed by: NURSE PRACTITIONER

## 2020-01-07 PROCEDURE — 4040F PNEUMOC VAC/ADMIN/RCVD: CPT | Performed by: NURSE PRACTITIONER

## 2020-01-07 PROCEDURE — G0439 PPPS, SUBSEQ VISIT: HCPCS | Performed by: NURSE PRACTITIONER

## 2020-01-07 PROCEDURE — G8417 CALC BMI ABV UP PARAM F/U: HCPCS | Performed by: NURSE PRACTITIONER

## 2020-01-07 PROCEDURE — 1123F ACP DISCUSS/DSCN MKR DOCD: CPT | Performed by: NURSE PRACTITIONER

## 2020-01-07 PROCEDURE — G8427 DOCREV CUR MEDS BY ELIG CLIN: HCPCS | Performed by: NURSE PRACTITIONER

## 2020-01-07 PROCEDURE — 3017F COLORECTAL CA SCREEN DOC REV: CPT | Performed by: NURSE PRACTITIONER

## 2020-01-07 PROCEDURE — G8482 FLU IMMUNIZE ORDER/ADMIN: HCPCS | Performed by: NURSE PRACTITIONER

## 2020-01-07 PROCEDURE — 99213 OFFICE O/P EST LOW 20 MIN: CPT | Performed by: NURSE PRACTITIONER

## 2020-01-07 RX ORDER — DOXYCYCLINE HYCLATE 100 MG/1
100 CAPSULE ORAL 2 TIMES DAILY
Qty: 20 CAPSULE | Refills: 0 | Status: SHIPPED | OUTPATIENT
Start: 2020-01-07 | End: 2020-01-17

## 2020-01-07 ASSESSMENT — LIFESTYLE VARIABLES
HOW OFTEN DO YOU HAVE A DRINK CONTAINING ALCOHOL: 1
HOW OFTEN DURING THE LAST YEAR HAVE YOU NEEDED AN ALCOHOLIC DRINK FIRST THING IN THE MORNING TO GET YOURSELF GOING AFTER A NIGHT OF HEAVY DRINKING: 0
AUDIT-C TOTAL SCORE: 1
HOW OFTEN DURING THE LAST YEAR HAVE YOU FOUND THAT YOU WERE NOT ABLE TO STOP DRINKING ONCE YOU HAD STARTED: 0
HOW MANY STANDARD DRINKS CONTAINING ALCOHOL DO YOU HAVE ON A TYPICAL DAY: 0
AUDIT TOTAL SCORE: 1
HOW OFTEN DO YOU HAVE SIX OR MORE DRINKS ON ONE OCCASION: 0
HOW OFTEN DURING THE LAST YEAR HAVE YOU FAILED TO DO WHAT WAS NORMALLY EXPECTED FROM YOU BECAUSE OF DRINKING: 0
HAS A RELATIVE, FRIEND, DOCTOR, OR ANOTHER HEALTH PROFESSIONAL EXPRESSED CONCERN ABOUT YOUR DRINKING OR SUGGESTED YOU CUT DOWN: 0
HAVE YOU OR SOMEONE ELSE BEEN INJURED AS A RESULT OF YOUR DRINKING: 0
HOW OFTEN DURING THE LAST YEAR HAVE YOU BEEN UNABLE TO REMEMBER WHAT HAPPENED THE NIGHT BEFORE BECAUSE YOU HAD BEEN DRINKING: 0
HOW OFTEN DURING THE LAST YEAR HAVE YOU HAD A FEELING OF GUILT OR REMORSE AFTER DRINKING: 0

## 2020-01-07 ASSESSMENT — ENCOUNTER SYMPTOMS
SORE THROAT: 0
RHINORRHEA: 1
WHEEZING: 0
SHORTNESS OF BREATH: 1
CHEST TIGHTNESS: 0
TROUBLE SWALLOWING: 0
COUGH: 1
ABDOMINAL DISTENTION: 0
SINUS PAIN: 1
SINUS PRESSURE: 1

## 2020-01-07 ASSESSMENT — PATIENT HEALTH QUESTIONNAIRE - PHQ9
SUM OF ALL RESPONSES TO PHQ QUESTIONS 1-9: 2
SUM OF ALL RESPONSES TO PHQ QUESTIONS 1-9: 2
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
## 2020-01-08 ENCOUNTER — TELEPHONE (OUTPATIENT)
Dept: NEUROSURGERY | Age: 75
End: 2020-01-08

## 2020-01-08 ENCOUNTER — CARE COORDINATION (OUTPATIENT)
Dept: CARE COORDINATION | Age: 75
End: 2020-01-08

## 2020-01-08 NOTE — TELEPHONE ENCOUNTER
Per your last note in Nov. I am showing that Pt needed a CTA of the neck prior to his appt 1/16, but I am not showing an order in the system. If he needs this testing done before next Thursday can you please put in the request  for the pt.   Thank you

## 2020-01-09 ENCOUNTER — HOSPITAL ENCOUNTER (OUTPATIENT)
Age: 75
Discharge: HOME | End: 2020-01-11
Payer: MEDICARE

## 2020-01-09 ENCOUNTER — HOSPITAL ENCOUNTER (OUTPATIENT)
Dept: GENERAL RADIOLOGY | Age: 75
Discharge: HOME | End: 2020-01-11
Payer: MEDICARE

## 2020-01-09 PROCEDURE — 73502 X-RAY EXAM HIP UNI 2-3 VIEWS: CPT

## 2020-01-13 RX ORDER — ATORVASTATIN CALCIUM 80 MG/1
TABLET, FILM COATED ORAL
Qty: 90 TABLET | Refills: 0 | Status: SHIPPED | OUTPATIENT
Start: 2020-01-13 | End: 2020-04-30

## 2020-01-14 ENCOUNTER — TELEPHONE (OUTPATIENT)
Dept: PRIMARY CARE CLINIC | Age: 75
End: 2020-01-14

## 2020-01-14 RX ORDER — ESCITALOPRAM OXALATE 20 MG/1
20 TABLET ORAL DAILY
Qty: 90 TABLET | Refills: 0 | Status: SHIPPED | OUTPATIENT
Start: 2020-01-14 | End: 2020-04-24

## 2020-01-14 NOTE — TELEPHONE ENCOUNTER
Patient needs a refill lexapro asking for it to be 20 mg       Currently on med list for 10mg (he was at 20 prior)     Patient spouse feels this is a need

## 2020-01-15 ENCOUNTER — CARE COORDINATION (OUTPATIENT)
Dept: CARE COORDINATION | Age: 75
End: 2020-01-15

## 2020-01-16 ENCOUNTER — HOSPITAL ENCOUNTER (OUTPATIENT)
Dept: CT IMAGING | Age: 75
Discharge: HOME | End: 2020-01-18
Payer: MEDICARE

## 2020-01-16 PROCEDURE — 6360000004 HC RX CONTRAST MEDICATION: Performed by: PSYCHIATRY & NEUROLOGY

## 2020-01-16 PROCEDURE — 70498 CT ANGIOGRAPHY NECK: CPT

## 2020-01-16 RX ADMIN — IOPAMIDOL 100 ML: 755 INJECTION, SOLUTION INTRAVENOUS at 13:23

## 2020-01-24 ENCOUNTER — CARE COORDINATION (OUTPATIENT)
Dept: CARE COORDINATION | Age: 75
End: 2020-01-24
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
## 2020-01-31 ENCOUNTER — CARE COORDINATION (OUTPATIENT)
Dept: CARE COORDINATION | Age: 75
End: 2020-01-31

## 2020-02-07 ENCOUNTER — CARE COORDINATION (OUTPATIENT)
Dept: CARE COORDINATION | Age: 75
End: 2020-02-07
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
## 2020-02-17 ENCOUNTER — CARE COORDINATION (OUTPATIENT)
Dept: CARE COORDINATION | Age: 75
End: 2020-02-17

## 2020-02-17 NOTE — CARE COORDINATION
complexity of regimen and stress  Plan for overcoming my barriers:   -Patient to take medications as prescribed.   -Patient to wear neck brace until instructed can come off  -patient to follow up with PCP, CArdiology, Trauma Surgeon, and Neurosurgeon as scheduled  -patient to monitor COPD, follow up COPD zone tool/precautions  -Patient to monitor CHF; weigh self at home; wear compression stockings  Anticipated Goal Completion Date: 03/10/20                Education Reviewed with patient today: See Education Module. Interventions completed today:  -patient, \"getting a new scale today, requested daily am weights    · Patient to reach out to care coordinator at [phone] or MD office with questions. · Reminded patient of walk in care clinic availability/hours; and when to utilize the facility if MD office not available. Care Coordinator Plan of Care: This nurse CC will follow up with patient in 2 weeks  -Will follow up on patient weighing self daily  -Patient getting a new scale today  -will follow up after neuro-endovascular appointment  -will assess patient's readiness for discharge at that call (if no new orders/instructions/concerns)                Care Coordination Interventions    Program Enrollment:  Rising Risk  Referral from Primary Care Provider:  Yes  Suggested Interventions and Community Resources  Cardiac Rehab: In Process (Comment: Referred per Cardiology, on hold due to neck fracture)  Fall Risk Prevention:  [address]:  Completed  Meals on Wheels:  Declined  Medication Assistance Program:  Declined  Social Work:  Declined  Transportation Support:  Declined  Zone Management Tools:  Completed (Comment: CHF, COPD zone tool)         Goals Addressed    None         Prior to Admission medications    Medication Sig Start Date End Date Taking?  Authorizing Provider   escitalopram (LEXAPRO) 20 MG tablet Take 1 tablet by mouth daily 1/14/20   BESS Still - CNP atorvastatin (LIPITOR) 80 MG tablet TAKE 1 TABLET BY MOUTH ONCE DAILY 1/13/20   Jason Herrera MD   Elastic Bandages & Supports ([facility]) [address] each by Does not apply route daily as needed (varicose veins with swelling. s/p vein donation for CABG) 1/7/20   BESS Bowen CNP   aspirin 325 MG tablet Take 325 mg by mouth daily    Historical Provider, MD   isosorbide mononitrate (IMDUR) 30 MG extended release tablet Take 1 tablet by mouth daily 12/16/19   Ken Pat MD   albuterol sulfate  (90 Base) MCG/ACT inhaler INHALE 2 PUFFS BY MOUTH EVERY 4 HOURS AS NEEDED FOR WHEEZING 12/13/19   BESS Bowen CNP   Pyridoxine HCl (B-6) 100 MG TABS Take by mouth    Historical Provider, MD   docusate sodium (COLACE) 100 MG capsule Take 100 mg by mouth 2 times daily    Historical Provider, MD   spironolactone (ALDACTONE) 25 MG tablet Take 1 tablet by mouth daily 12/9/19   Ken Pat MD   fluticasone (FLONASE) 50 MCG/ACT nasal spray USE 1 SPRAY(S) IN EACH NOSTRIL ONCE DAILY 11/19/19   BESS Bowen CNP   acetaminophen (TYLENOL) 500 MG tablet Take 2 tablets by mouth every 8 hours for 15 days 11/15/19 12/9/19  Kuldeep Rangel MD   meloxicam (MOBIC) 15 MG tablet Take 15 mg by mouth nightly 10/23/19   Historical Provider, MD   hydrochlorothiazide (HYDRODIURIL) 25 MG tablet TAKE 1 TABLET BY MOUTH ONCE DAILY 10/7/19   BESS Bowen CNP   omeprazole (PRILOSEC) 20 MG delayed release capsule TAKE 1 CAPSULE BY MOUTH ONCE DAILY 10/7/19   BESS Bowen CNP   tamsulosin (FLOMAX) 0.4 MG capsule Take 1 capsule by mouth every evening 7/8/19   BESS Humphreys CNP   losartan (COZAAR) 25 MG tablet TAKE 1 TABLET BY MOUTH ONCE DAILY 6/11/19   BESS Bowen CNP   metoprolol succinate (TOPROL XL) 25 MG extended release tablet TAKE 1 TABLET BY MOUTH ONCE DAILY 6/11/19   Jack Maribeth, APRN - CNP       Future Appointments   Date Time Provider Department

## 2020-02-26 ENCOUNTER — OFFICE VISIT (OUTPATIENT)
Dept: NEUROLOGY | Age: 75
End: 2020-02-26
Payer: MEDICARE

## 2020-02-26 VITALS
HEART RATE: 71 BPM | BODY MASS INDEX: 47.53 KG/M2 | SYSTOLIC BLOOD PRESSURE: 151 MMHG | DIASTOLIC BLOOD PRESSURE: 84 MMHG | WEIGHT: 315 LBS

## 2020-02-26 PROCEDURE — 1123F ACP DISCUSS/DSCN MKR DOCD: CPT | Performed by: PSYCHIATRY & NEUROLOGY

## 2020-02-26 PROCEDURE — 1036F TOBACCO NON-USER: CPT | Performed by: PSYCHIATRY & NEUROLOGY

## 2020-02-26 PROCEDURE — 3017F COLORECTAL CA SCREEN DOC REV: CPT | Performed by: PSYCHIATRY & NEUROLOGY

## 2020-02-26 PROCEDURE — G8482 FLU IMMUNIZE ORDER/ADMIN: HCPCS | Performed by: PSYCHIATRY & NEUROLOGY

## 2020-02-26 PROCEDURE — 4040F PNEUMOC VAC/ADMIN/RCVD: CPT | Performed by: PSYCHIATRY & NEUROLOGY

## 2020-02-26 PROCEDURE — G8417 CALC BMI ABV UP PARAM F/U: HCPCS | Performed by: PSYCHIATRY & NEUROLOGY

## 2020-02-26 PROCEDURE — 99214 OFFICE O/P EST MOD 30 MIN: CPT | Performed by: PSYCHIATRY & NEUROLOGY

## 2020-02-26 PROCEDURE — G8427 DOCREV CUR MEDS BY ELIG CLIN: HCPCS | Performed by: PSYCHIATRY & NEUROLOGY

## 2020-02-27 ASSESSMENT — ENCOUNTER SYMPTOMS
COLOR CHANGE: 0
COUGH: 0
PHOTOPHOBIA: 0
VOMITING: 0
SHORTNESS OF BREATH: 0
NAUSEA: 0
VOICE CHANGE: 0

## 2020-02-28 NOTE — PROGRESS NOTES
MOUTH ONCE DAILY 90 capsule 1    tamsulosin (FLOMAX) 0.4 MG capsule Take 1 capsule by mouth every evening 90 capsule 3    losartan (COZAAR) 25 MG tablet TAKE 1 TABLET BY MOUTH ONCE DAILY 90 tablet 3    metoprolol succinate (TOPROL XL) 25 MG extended release tablet TAKE 1 TABLET BY MOUTH ONCE DAILY 90 tablet 3    acetaminophen (TYLENOL) 500 MG tablet Take 2 tablets by mouth every 8 hours for 15 days 90 tablet 0     No current facility-administered medications for this visit. Past Medical History:   Diagnosis Date    Arthritis     Ascending [place] [facility] open    Blood in stool 2011    CAD (coronary artery disease)     Chronic back pain     DDD    Chronic diastolic (congestive) heart failure ([facility] [address] 11/5/2018    Chronic systolic congestive heart failure ([facility] [address] 9/25/2018    COPD with acute exacerbation ([facility] [address] 9/25/2018    Heart disease 5-9-12    History of angioplasty 7/2008    2 stents placed    Hx of CABG     x1 in 2015    Hyperlipidemia     Hypertension     onset age 41    Obesity     Stress incontinence, male 3/21/2017    Transfusion history     Unspecified sleep apnea     cpap-DOES NOT USE MACHINE      Past Surgical History:   Procedure Laterality Date   Rima Harris  2005    Mya Wagner BARIATRIC SURGERY  2001    Gabriela HENRY at [facility]   [address] Left 05/09/12    Left main normal.  Left anterior descending artery:  Plaque disease. Ramus: Plaque disease Circumflex:nondominant, plaque disease. OM1 normal. Right coronoary artery:  dominant & luminal irregularities. Left ventricular ejection fraction 60. Mitral valve normal with no insufficinecy of the mitral valve. Aortic valve normal with no stenosis of the aortic valve.   Edp was normal.    CARDIAC CATHETERIZATION Left 03/04/15    Dr. Ramirez --Severe CAD, 80% in-stent stenosis in the left anterior descending coronary artery in a

## 2020-03-03 ENCOUNTER — CARE COORDINATION (OUTPATIENT)
Dept: CARE COORDINATION | Age: 75
End: 2020-03-03

## 2020-03-03 NOTE — CARE COORDINATION
Ambulatory Care Coordination Note  3/3/2020  CM Risk Score: 3  Charlson 10 Year Mortality Risk Score: 98%     ACC: Toni Perez RN    Summary Note:     Date Care Coordination Episode Started:12/11/19    Reason for Call Today:     -Follow up Neurology appointment  -Follow up CHF  -Follow up COPD  -Assess for further Care Coordination needs    Reason patient is in Care Coordination:     --PCP referred  -CHF, COPD Education  -[facility] November 2019    Topics Discussed Today:     1)Neurology Appointment: Assessment/plan reviewed:      Recommendations:      Return in about 61 weeks (around 4/14/2021) for mra head prior to visit. Orders Placed This Encounter   Procedures    MRA Head WO Contrast       Standing Status:   Future       Standing Expiration Date:   8/26/2021       Scheduling Instructions:         One year followup spring 2021       Order Specific Question:   Reason for exam:       Answer:   vertebral artery stenosis      1. Cont aspirin daily  2,. Cont statins goal ldl<70  3. Recommend mra head in one year to confirm stability of imaging if no new neurologi    Reviewed Neurology plan/instructions with patient:   -patient denies any questions today. Follow up neck pain/neck brace:   -states, \"not needing neck brace\". -STates, \"he will wear it if his neck is sore that day; but not very frequently\". 2)COPD:   -denies any cough/congestion  -follow up with pulmonology 03/16/20; patient is aware    3)CHF:   -patient wearing compression stocks, tolerating well.  -wearing daily, rinsing them out, hanging up to dry  Reviewed CHF Precautions with patient:  -Patient verbalizes understanding to call MD office with weight increase of three pounds overnight or five pounds in a week. Patient to weigh daily; and record.  Should weigh first thing in the morning, wearing the same type of clothes, after you urinate, before you eat breakfast.  Reviewed heart failure zones; and when should update cardiologist. Denies any questions/concerns.  -follow up with Cardiology 03/16/20 patient aware    Assessments completed today:     -  Congestive Heart Failure Assessment    Are you currently restricting fluids?:  No Restriction  Do you understand a low sodium diet?:  Yes  Do you understand how to read food labels?:  Yes  Do you salt your food before tasting it?:  No     No patient-reported symptoms      Symptoms:   CHF associated fatigue: Neg, CHF associated leg swelling: Neg, CHF associated weakness: Neg (Comment: wearing compression stockings daily)      Symptom course:  stable  Weight trend:  stable (Comment: patient is now weighing daily)     ,   COPD Assessment    Does the patient understand envrionmental exposure?:  No  Is the patient able to verbalize Rescue vs. Long Acting medications?:  No  Does the patient have a nebulizer?:  No  Does the patient use a space with inhaled medications?:  No     No patient-reported symptoms         Symptoms:      Symptom course:  stable  Increase use of rapid acting/rescue inhaled medications?:  No  Change in chronic cough?:  No/At Baseline  Change in sputum?:  No/At Baseline  Have you had a recent diagnosis of pneumonia either by PCP or at a hospital?:  No      and   General Assessment    Do you have any symptoms that are causing concern?:  No           Care Coordinator plan of care:   -will send for graduation approval  -Will send for care coordination graduation approval today:   -patient denies any other new needs/concerns  -Patient denies any questions regarding his care. -Patient has all of his medications; and is taking these properly.  -Patient is compliant with follow up appointments.  -Patient denies any new resources needs    Goals REviewed and completed. Education REviewed and completed. Care Coordination Interventions    Program Enrollment:  Rising Risk  Referral from Primary Care Provider:  Yes  Suggested Interventions and Community Resources  Cardiac Rehab:   In Process (Comment: Referred per Cardiology, on hold due to neck fracture)  Fall Risk Prevention:  [address]:  Completed  Meals on Wheels:  Declined  Medication Assistance Program:  Declined  Social Work:  Declined  Transportation Support:  Declined  Zone Management Tools:  Completed (Comment: CHF, COPD zone tool)           Prior to Admission medications    Medication Sig Start Date End Date Taking?  Authorizing Provider   escitalopram (LEXAPRO) 20 MG tablet Take 1 tablet by mouth daily 1/14/20   BESS Villegas CNP   atorvastatin (LIPITOR) 80 MG tablet TAKE 1 TABLET BY MOUTH ONCE DAILY 1/13/20   Lionel Rush MD   Elastic Bandages & Supports ([facility]) [address] 1 each by Does not apply route daily as needed (varicose veins with swelling. s/p vein donation for CABG) 1/7/20   BESS Villegas CNP   aspirin 325 MG tablet Take 325 mg by mouth daily    Historical Provider, MD   isosorbide mononitrate (IMDUR) 30 MG extended release tablet Take 1 tablet by mouth daily 12/16/19   Cehli Gonzalez MD   albuterol sulfate  (90 Base) MCG/ACT inhaler INHALE 2 PUFFS BY MOUTH EVERY 4 HOURS AS NEEDED FOR WHEEZING 12/13/19   BESS Villegas CNP   Pyridoxine HCl (B-6) 100 MG TABS Take by mouth    Historical Provider, MD   docusate sodium (COLACE) 100 MG capsule Take 100 mg by mouth 2 times daily    Historical Provider, MD   spironolactone (ALDACTONE) 25 MG tablet Take 1 tablet by mouth daily 12/9/19   Cheli Gonzalez MD   fluticasone (FLONASE) 50 MCG/ACT nasal spray USE 1 SPRAY(S) IN EACH NOSTRIL ONCE DAILY 11/19/19   BESS Villegas CNP   acetaminophen (TYLENOL) 500 MG tablet Take 2 tablets by mouth every 8 hours for 15 days 11/15/19 12/9/19  Leonel Wyatt MD   meloxicam (MOBIC) 15 MG tablet Take 15 mg by mouth nightly 10/23/19   Historical Provider, MD   hydrochlorothiazide (HYDRODIURIL) 25 MG tablet TAKE 1 TABLET BY MOUTH ONCE DAILY 10/7/19   BESS Villegas

## 2020-03-13 ENCOUNTER — HOSPITAL ENCOUNTER (OUTPATIENT)
Age: 75
Discharge: HOME | End: 2020-03-13
Payer: MEDICARE

## 2020-03-13 LAB
ABSOLUTE EOS #: 0.5 K/UL (ref 0–0.4)
ABSOLUTE IMMATURE GRANULOCYTE: ABNORMAL K/UL (ref 0–0.3)
ABSOLUTE LYMPH #: 1.4 K/UL (ref 1–4.8)
ABSOLUTE MONO #: 0.4 K/UL (ref 0–1)
ALBUMIN SERPL-MCNC: 4.4 G/DL (ref 3.5–5.2)
ALBUMIN/GLOBULIN RATIO: ABNORMAL (ref 1–2.5)
ALP BLD-CCNC: 100 U/L (ref 40–129)
ALT SERPL-CCNC: 24 U/L (ref 5–41)
ANION GAP SERPL CALCULATED.3IONS-SCNC: 14 MMOL/L (ref 9–17)
AST SERPL-CCNC: 28 U/L
BASOPHILS # BLD: 1 % (ref 0–2)
BASOPHILS ABSOLUTE: 0.1 K/UL (ref 0–0.2)
BILIRUB SERPL-MCNC: 0.97 MG/DL (ref 0.3–1.2)
BUN BLDV-MCNC: 19 MG/DL (ref 8–23)
BUN/CREAT BLD: 17 (ref 9–20)
CALCIUM SERPL-MCNC: 10.1 MG/DL (ref 8.6–10.4)
CHLORIDE BLD-SCNC: 101 MMOL/L (ref 98–107)
CHOLESTEROL/HDL RATIO: 2.2
CHOLESTEROL: 140 MG/DL
CO2: 26 MMOL/L (ref 20–31)
CREAT SERPL-MCNC: 1.09 MG/DL (ref 0.7–1.2)
DIFFERENTIAL TYPE: YES
EOSINOPHILS RELATIVE PERCENT: 9 % (ref 0–5)
GFR AFRICAN AMERICAN: >60 ML/MIN
GFR NON-AFRICAN AMERICAN: >60 ML/MIN
GFR SERPL CREATININE-BSD FRML MDRD: ABNORMAL ML/MIN/{1.73_M2}
GFR SERPL CREATININE-BSD FRML MDRD: ABNORMAL ML/MIN/{1.73_M2}
GLUCOSE BLD-MCNC: 128 MG/DL (ref 70–99)
HCT VFR BLD CALC: 44.1 % (ref 41–53)
HDLC SERPL-MCNC: 64 MG/DL
HEMOGLOBIN: 14.8 G/DL (ref 13.5–17.5)
IMMATURE GRANULOCYTES: ABNORMAL %
LDL CHOLESTEROL: 50 MG/DL (ref 0–130)
LYMPHOCYTES # BLD: 24 % (ref 13–44)
MAGNESIUM: 1.9 MG/DL (ref 1.6–2.6)
MCH RBC QN AUTO: 29 PG (ref 26–34)
MCHC RBC AUTO-ENTMCNC: 33.5 G/DL (ref 31–37)
MCV RBC AUTO: 86.5 FL (ref 80–100)
MONOCYTES # BLD: 8 % (ref 5–9)
NRBC AUTOMATED: ABNORMAL PER 100 WBC
PATIENT FASTING?: YES
PDW BLD-RTO: 14.1 % (ref 12.1–15.2)
PLATELET # BLD: 173 K/UL (ref 140–450)
PLATELET ESTIMATE: ABNORMAL
PMV BLD AUTO: ABNORMAL FL (ref 6–12)
POTASSIUM SERPL-SCNC: 4.7 MMOL/L (ref 3.7–5.3)
RBC # BLD: 5.1 M/UL (ref 4.5–5.9)
RBC # BLD: ABNORMAL 10*6/UL
SEG NEUTROPHILS: 58 % (ref 39–75)
SEGMENTED NEUTROPHILS ABSOLUTE COUNT: 3.4 K/UL (ref 2.1–6.5)
SODIUM BLD-SCNC: 141 MMOL/L (ref 135–144)
TOTAL PROTEIN: 7.2 G/DL (ref 6.4–8.3)
TRIGL SERPL-MCNC: 128 MG/DL
TSH SERPL DL<=0.05 MIU/L-ACNC: 1.78 MIU/L (ref 0.3–5)
VITAMIN D 25-HYDROXY: 27.5 NG/ML (ref 30–100)
VLDLC SERPL CALC-MCNC: NORMAL MG/DL (ref 1–30)
WBC # BLD: 5.8 K/UL (ref 3.5–11)
WBC # BLD: ABNORMAL 10*3/UL

## 2020-03-13 PROCEDURE — 83735 ASSAY OF MAGNESIUM: CPT

## 2020-03-13 PROCEDURE — 80061 LIPID PANEL: CPT

## 2020-03-13 PROCEDURE — 85025 COMPLETE CBC W/AUTO DIFF WBC: CPT

## 2020-03-13 PROCEDURE — 36415 COLL VENOUS BLD VENIPUNCTURE: CPT

## 2020-03-13 PROCEDURE — 80053 COMPREHEN METABOLIC PANEL: CPT

## 2020-03-13 PROCEDURE — 84443 ASSAY THYROID STIM HORMONE: CPT

## 2020-03-13 PROCEDURE — 82306 VITAMIN D 25 HYDROXY: CPT

## 2020-03-16 ENCOUNTER — OFFICE VISIT (OUTPATIENT)
Dept: CARDIOLOGY CLINIC | Age: 75
End: 2020-03-16
Payer: MEDICARE

## 2020-03-16 ENCOUNTER — HOSPITAL ENCOUNTER (OUTPATIENT)
Age: 75
Discharge: HOME | End: 2020-03-16
Payer: MEDICARE

## 2020-03-16 VITALS
WEIGHT: 315 LBS | BODY MASS INDEX: 47.31 KG/M2 | HEART RATE: 87 BPM | SYSTOLIC BLOOD PRESSURE: 130 MMHG | OXYGEN SATURATION: 94 % | DIASTOLIC BLOOD PRESSURE: 80 MMHG

## 2020-03-16 LAB
EKG ATRIAL RATE: 90 BPM
EKG P AXIS: 90 DEGREES
EKG P-R INTERVAL: 296 MS
EKG Q-T INTERVAL: 362 MS
EKG QRS DURATION: 106 MS
EKG QTC CALCULATION (BAZETT): 442 MS
EKG R AXIS: -13 DEGREES
EKG T AXIS: 49 DEGREES
EKG VENTRICULAR RATE: 90 BPM

## 2020-03-16 PROCEDURE — G8482 FLU IMMUNIZE ORDER/ADMIN: HCPCS | Performed by: INTERNAL MEDICINE

## 2020-03-16 PROCEDURE — 1036F TOBACCO NON-USER: CPT | Performed by: INTERNAL MEDICINE

## 2020-03-16 PROCEDURE — 93010 ELECTROCARDIOGRAM REPORT: CPT | Performed by: INTERNAL MEDICINE

## 2020-03-16 PROCEDURE — G8417 CALC BMI ABV UP PARAM F/U: HCPCS | Performed by: INTERNAL MEDICINE

## 2020-03-16 PROCEDURE — G8428 CUR MEDS NOT DOCUMENT: HCPCS | Performed by: INTERNAL MEDICINE

## 2020-03-16 PROCEDURE — 99213 OFFICE O/P EST LOW 20 MIN: CPT | Performed by: INTERNAL MEDICINE

## 2020-03-16 PROCEDURE — 1123F ACP DISCUSS/DSCN MKR DOCD: CPT | Performed by: INTERNAL MEDICINE

## 2020-03-16 PROCEDURE — 93005 ELECTROCARDIOGRAM TRACING: CPT

## 2020-03-16 PROCEDURE — 4040F PNEUMOC VAC/ADMIN/RCVD: CPT | Performed by: INTERNAL MEDICINE

## 2020-03-16 PROCEDURE — 3017F COLORECTAL CA SCREEN DOC REV: CPT | Performed by: INTERNAL MEDICINE

## 2020-03-16 RX ORDER — MULTIVIT-MIN/IRON/FOLIC ACID/K 18-600-40
CAPSULE ORAL DAILY
COMMUNITY

## 2020-03-16 NOTE — PROGRESS NOTES
Ov Dr. Andrew Bell for 3 month follow up  No chest pain heaviness   No new sob   Has not started cardia rehab   No edema-wears support socks  Going to [place] 2x week     Will set up for PFT's  Follow up in one year
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
## 2020-04-30 RX ORDER — ATORVASTATIN CALCIUM 80 MG/1
TABLET, FILM COATED ORAL
Qty: 90 TABLET | Refills: 0 | Status: SHIPPED | OUTPATIENT
Start: 2020-04-30 | End: 2020-05-27 | Stop reason: SDUPTHER

## 2020-05-15 RX ORDER — FLUTICASONE PROPIONATE 50 MCG
SPRAY, SUSPENSION (ML) NASAL
Qty: 16 G | Refills: 0 | Status: SHIPPED | OUTPATIENT
Start: 2020-05-15 | End: 2020-10-21

## 2020-05-27 ENCOUNTER — OFFICE VISIT (OUTPATIENT)
Dept: PRIMARY CARE CLINIC | Age: 75
End: 2020-05-27
Payer: MEDICARE

## 2020-05-27 VITALS
DIASTOLIC BLOOD PRESSURE: 84 MMHG | BODY MASS INDEX: 42.66 KG/M2 | HEIGHT: 72 IN | OXYGEN SATURATION: 97 % | SYSTOLIC BLOOD PRESSURE: 134 MMHG | TEMPERATURE: 98 F | HEART RATE: 94 BPM | WEIGHT: 315 LBS

## 2020-05-27 PROBLEM — R73.01 IFG (IMPAIRED FASTING GLUCOSE): Status: ACTIVE | Noted: 2020-05-27

## 2020-05-27 LAB — HBA1C MFR BLD: 6.2 %

## 2020-05-27 PROCEDURE — 1123F ACP DISCUSS/DSCN MKR DOCD: CPT | Performed by: NURSE PRACTITIONER

## 2020-05-27 PROCEDURE — 4040F PNEUMOC VAC/ADMIN/RCVD: CPT | Performed by: NURSE PRACTITIONER

## 2020-05-27 PROCEDURE — 1036F TOBACCO NON-USER: CPT | Performed by: NURSE PRACTITIONER

## 2020-05-27 PROCEDURE — 99214 OFFICE O/P EST MOD 30 MIN: CPT | Performed by: NURSE PRACTITIONER

## 2020-05-27 PROCEDURE — G8417 CALC BMI ABV UP PARAM F/U: HCPCS | Performed by: NURSE PRACTITIONER

## 2020-05-27 PROCEDURE — G8427 DOCREV CUR MEDS BY ELIG CLIN: HCPCS | Performed by: NURSE PRACTITIONER

## 2020-05-27 PROCEDURE — 3017F COLORECTAL CA SCREEN DOC REV: CPT | Performed by: NURSE PRACTITIONER

## 2020-05-27 PROCEDURE — G8926 SPIRO NO PERF OR DOC: HCPCS | Performed by: NURSE PRACTITIONER

## 2020-05-27 PROCEDURE — 3023F SPIROM DOC REV: CPT | Performed by: NURSE PRACTITIONER

## 2020-05-27 PROCEDURE — 83036 HEMOGLOBIN GLYCOSYLATED A1C: CPT | Performed by: NURSE PRACTITIONER

## 2020-05-27 RX ORDER — METOPROLOL SUCCINATE 25 MG/1
25 TABLET, EXTENDED RELEASE ORAL DAILY
Qty: 90 TABLET | Refills: 3 | Status: SHIPPED | OUTPATIENT
Start: 2020-05-27 | End: 2020-07-06

## 2020-05-27 RX ORDER — ALBUTEROL SULFATE 2.5 MG/3ML
2.5 SOLUTION RESPIRATORY (INHALATION) 4 TIMES DAILY
Qty: 120 EACH | Refills: 3 | Status: SHIPPED | OUTPATIENT
Start: 2020-05-27 | End: 2022-01-17

## 2020-05-27 RX ORDER — ATORVASTATIN CALCIUM 80 MG/1
80 TABLET, FILM COATED ORAL DAILY
Qty: 90 TABLET | Refills: 2 | Status: SHIPPED | OUTPATIENT
Start: 2020-05-27 | End: 2021-08-09

## 2020-05-27 RX ORDER — ISOSORBIDE MONONITRATE 30 MG/1
30 TABLET, EXTENDED RELEASE ORAL DAILY
Qty: 90 TABLET | Refills: 2 | Status: SHIPPED | OUTPATIENT
Start: 2020-05-27 | End: 2021-02-19

## 2020-05-27 RX ORDER — NEBULIZER ACCESSORIES
1 KIT MISCELLANEOUS DAILY PRN
Qty: 1 KIT | Refills: 0 | Status: SHIPPED | OUTPATIENT
Start: 2020-05-27 | End: 2020-10-13 | Stop reason: ALTCHOICE

## 2020-05-27 RX ORDER — SPIRONOLACTONE 25 MG/1
25 TABLET ORAL DAILY
Qty: 90 TABLET | Refills: 2 | Status: SHIPPED | OUTPATIENT
Start: 2020-05-27 | End: 2020-12-15

## 2020-05-27 RX ORDER — LOSARTAN POTASSIUM 25 MG/1
25 TABLET ORAL DAILY
Qty: 90 TABLET | Refills: 3 | Status: SHIPPED | OUTPATIENT
Start: 2020-05-27 | End: 2020-07-06

## 2020-05-27 ASSESSMENT — ENCOUNTER SYMPTOMS
DIARRHEA: 0
EYES NEGATIVE: 1
SORE THROAT: 0
RHINORRHEA: 0
COUGH: 0
CONSTIPATION: 0
SHORTNESS OF BREATH: 1

## 2020-05-27 NOTE — PATIENT INSTRUCTIONS
You may be receiving a survey from [employer] regarding your visit today. You may get this in the mail, through your MyChart or in your email. Please complete the survey to enable us to provide the highest quality of care to you and your family. If you cannot score us as very good ( 5 Stars) on any question, please feel free to call the office to discuss how we could have made your experience exceptional.     Thank You! Silva Blanton, BESS-CNP  [address]  [place], [place]

## 2020-05-27 NOTE — PROGRESS NOTES
2020     Luciana Crowley (:  1945) is a 69 y.o. male, here for evaluation of the following medical concerns:    HPI  Chronic condition check up. Overall doing well  Social distancing with covid-19 precautions. ifg- glyco    Copd- using albuterol inhaler. Has nebulizer at home but needs new tubing and medications. No hx glaucoma will try duoneb and monitor response. In second floor apartment hard with knees and short of breath. Has air conditioning in apartment. BPH- Dr. David Rivera- remains on flomax. Hypertension on losartan , metoprolol and hctz, spironolactone  = rare dizziness, no chest chest pain. Hypercholesterolemia- on statin lipitor 80 mg daily  Lab Results   Component Value Date    LDLCHOLESTEROL 50 2020         Depression on lexapro working well, even mood. mobic taken for osteoarthritis of knees follow with Dr. Fleming Courser. Injections done in both knees twice. Lab Results   Component Value Date     2020    K 4.7 2020     2020    CO2 26 2020    BUN 19 2020    CREATININE 1.09 2020    GLUCOSE 128 2020    GLUCOSE 132 2012    CALCIUM 10.1 2020        GERD on omeprazole with good control          Review of Systems   Constitutional: Negative for activity change, chills and fever. HENT: Negative for congestion, rhinorrhea and sore throat. Eyes: Negative. Respiratory: Positive for shortness of breath. Negative for cough. Cardiovascular: Positive for leg swelling. Negative for chest pain and palpitations. Gastrointestinal: Negative for constipation and diarrhea. Endocrine: Positive for polydipsia. Negative for cold intolerance, heat intolerance, polyphagia and polyuria. Genitourinary: Negative for difficulty urinating and frequency. Musculoskeletal: Positive for arthralgias (knees bilaterally) and gait problem. Skin: Negative for rash and wound. Neurological: Positive for dizziness. FOR WHEEZING Yes Tre Mosqueda APRN - CNP   Pyridoxine HCl (B-6) 100 MG TABS Take by mouth Yes Historical Provider, MD   docusate sodium (COLACE) 100 MG capsule Take 100 mg by mouth 2 times daily Yes Historical Provider, MD   meloxicam (MOBIC) 15 MG tablet Take 15 mg by mouth nightly Yes Historical Provider, MD   tamsulosin (FLOMAX) 0.4 MG capsule Take 1 capsule by mouth every evening Yes BESS Esposito - CNP        Social History     Tobacco Use    Smoking status: Never Smoker    Smokeless tobacco: Never Used   Substance Use Topics    Alcohol use: Yes     Comment: Rare; LESS THEN 1X PER MONTH        Vitals:    05/27/20 0927 05/27/20 0931   BP: (!) 130/92 134/84   Site: Left Upper Arm Right Upper Arm   Position: Sitting    Cuff Size: Large Adult    Pulse: 94    Temp: 98 °F (36.7 °C)    SpO2: 97%    Weight: (!) 347 lb (157.4 kg)    Height: 5' 11.5\" (1.816 m)      Estimated body mass index is 47.72 kg/m² as calculated from the following:    Height as of this encounter: 5' 11.5\" (1.816 m). Weight as of this encounter: 347 lb (157.4 kg). Physical Exam  Vitals signs and nursing note reviewed. Constitutional:       General: He is not in acute distress. Appearance: Normal appearance. He is well-developed. HENT:      Head: Normocephalic and atraumatic. Right Ear: Tympanic membrane normal.      Left Ear: Tympanic membrane and external ear normal.      Nose: No congestion. Mouth/Throat:      Mouth: Mucous membranes are moist.      Pharynx: No oropharyngeal exudate. Eyes:      Pupils: Pupils are equal, round, and reactive to light. Neck:      Musculoskeletal: Normal range of motion and neck supple. Vascular: No carotid bruit (neg pavel). Cardiovascular:      Rate and Rhythm: Normal rate and regular rhythm. Pulses: Normal pulses. Heart sounds: Normal heart sounds. No murmur. Pulmonary:      Effort: Pulmonary effort is normal. No respiratory distress.       Breath sounds:

## 2020-07-07 ENCOUNTER — HOSPITAL ENCOUNTER (OUTPATIENT)
Age: 75
Discharge: HOME | End: 2020-07-07
Payer: MEDICARE

## 2020-07-07 LAB — PROSTATE SPECIFIC ANTIGEN: 0.55 UG/L

## 2020-07-07 PROCEDURE — 36415 COLL VENOUS BLD VENIPUNCTURE: CPT

## 2020-07-07 PROCEDURE — G0103 PSA SCREENING: HCPCS

## 2020-07-10 ENCOUNTER — OFFICE VISIT (OUTPATIENT)
Dept: UROLOGY | Age: 75
End: 2020-07-10
Payer: MEDICARE

## 2020-07-10 ENCOUNTER — HOSPITAL ENCOUNTER (OUTPATIENT)
Age: 75
Setting detail: SPECIMEN
Discharge: HOME | End: 2020-07-10
Payer: MEDICARE

## 2020-07-10 VITALS
HEIGHT: 72 IN | WEIGHT: 315 LBS | SYSTOLIC BLOOD PRESSURE: 140 MMHG | DIASTOLIC BLOOD PRESSURE: 90 MMHG | BODY MASS INDEX: 42.66 KG/M2 | TEMPERATURE: 97.1 F

## 2020-07-10 LAB
-: NORMAL
AMORPHOUS: NORMAL
BACTERIA: NORMAL
BILIRUBIN URINE: NEGATIVE
CASTS UA: NORMAL /LPF
COLOR: YELLOW
COMMENT UA: NORMAL
CRYSTALS, UA: NORMAL /HPF
EPITHELIAL CELLS UA: NORMAL /HPF (ref 0–5)
GLUCOSE URINE: NEGATIVE
KETONES, URINE: NEGATIVE
LEUKOCYTE ESTERASE, URINE: NEGATIVE
MUCUS: NORMAL
NITRITE, URINE: NEGATIVE
OTHER OBSERVATIONS UA: NORMAL
PH UA: 6 (ref 5–9)
PROTEIN UA: NEGATIVE
RBC UA: NORMAL /HPF (ref 0–2)
RENAL EPITHELIAL, UA: NORMAL /HPF
SPECIFIC GRAVITY UA: 1.01 (ref 1.01–1.02)
TRICHOMONAS: NORMAL
TURBIDITY: CLEAR
URINE HGB: NEGATIVE
UROBILINOGEN, URINE: NORMAL
WBC UA: NORMAL /HPF (ref 0–5)
YEAST: NORMAL

## 2020-07-10 PROCEDURE — 81001 URINALYSIS AUTO W/SCOPE: CPT

## 2020-07-10 PROCEDURE — 4040F PNEUMOC VAC/ADMIN/RCVD: CPT | Performed by: NURSE PRACTITIONER

## 2020-07-10 PROCEDURE — 87086 URINE CULTURE/COLONY COUNT: CPT

## 2020-07-10 PROCEDURE — G8417 CALC BMI ABV UP PARAM F/U: HCPCS | Performed by: NURSE PRACTITIONER

## 2020-07-10 PROCEDURE — 51798 US URINE CAPACITY MEASURE: CPT | Performed by: NURSE PRACTITIONER

## 2020-07-10 PROCEDURE — 1123F ACP DISCUSS/DSCN MKR DOCD: CPT | Performed by: NURSE PRACTITIONER

## 2020-07-10 PROCEDURE — 1036F TOBACCO NON-USER: CPT | Performed by: NURSE PRACTITIONER

## 2020-07-10 PROCEDURE — G8427 DOCREV CUR MEDS BY ELIG CLIN: HCPCS | Performed by: NURSE PRACTITIONER

## 2020-07-10 PROCEDURE — 3017F COLORECTAL CA SCREEN DOC REV: CPT | Performed by: NURSE PRACTITIONER

## 2020-07-10 PROCEDURE — 99213 OFFICE O/P EST LOW 20 MIN: CPT | Performed by: NURSE PRACTITIONER

## 2020-07-10 RX ORDER — TAMSULOSIN HYDROCHLORIDE 0.4 MG/1
0.4 CAPSULE ORAL EVERY EVENING
Qty: 90 CAPSULE | Refills: 3 | Status: SHIPPED | OUTPATIENT
Start: 2020-07-10 | End: 2021-11-15 | Stop reason: SDUPTHER

## 2020-07-10 RX ORDER — ASCORBIC ACID 500 MG
500 TABLET ORAL DAILY
COMMUNITY

## 2020-07-10 ASSESSMENT — ENCOUNTER SYMPTOMS
ABDOMINAL PAIN: 0
COUGH: 0
SHORTNESS OF BREATH: 0
VOMITING: 0
EYE PAIN: 0
WHEEZING: 0
CONSTIPATION: 0
EYE REDNESS: 0
NAUSEA: 0
COLOR CHANGE: 0
BACK PAIN: 0

## 2020-07-10 NOTE — PATIENT INSTRUCTIONS
SURVEY:    You may be receiving a survey from [employer] regarding your visit today. Please complete the survey to enable us to provide the highest quality of care to you and your family. If you cannot score us a very good on any question, please call the office to discuss how we could have made your experience a very good one. Thank you.

## 2020-07-10 NOTE — PROGRESS NOTES
HPI:    Patient is a 69 y.o. male in no acute distress. He is alert and oriented to person, place, and time. History  5/2016 PVP greenlight     3/2017 cystoscopy showed normal anatomy. PFR recommended. 6/2017 Completed seven PFR treatments. Daytime frequency improved from q1hr to q1-2 hrs, nocturia improved from 0-3 to 0-1, severe urgency improved to mild, LIBBY improved from 5-10x per day to 3-4x per day. Today  Here today to follow-up for frequency, incontinence and review recent PSA results. Most recent PSA is 0.55. He is getting up 1-2 times per night. He is urinating every 2 hours during the day. He does have urgency that is new within the last month, but he also started spironolactone on last month. He is using 1 pads per day. This has decreased from 2 - 3/day. He denies dysuria or gross hematuria. Past Medical History:   Diagnosis Date    Arthritis     Ascending cholangitis 10/2/14     [facility] assisted gastric remnant to open    Blood in stool 2011    CAD (coronary artery disease)     Chronic back pain     DDD    Chronic diastolic (congestive) heart failure ([address] 11/5/2018    Chronic systolic congestive heart failure ([address] 9/25/2018    COPD with acute exacerbation ([address] 9/25/2018    Heart disease 5-9-12    History of angioplasty 7/2008    2 stents placed    Hx of CABG     x1 in 2015    Hyperlipidemia     Hypertension     onset age 41    Obesity     Stress incontinence, male 3/21/2017    Transfusion history     Unspecified sleep apnea     cpap-DOES NOT USE MACHINE     Past Surgical History:   Procedure Laterality Date   Patsy Gordon  [address] Dr. Malinda Goetz BARIATRIC SURGERY  2001    [place] HENRY at [facility]   [address] Left 05/09/12    Left main normal.  Left anterior descending artery:  Plaque disease. Ramus: Plaque disease Circumflex:nondominant, plaque disease.   OM1 normal. Right coronoary artery:  dominant & luminal irregularities. Left ventricular ejection fraction 60. Mitral valve normal with no insufficinecy of the mitral valve. Aortic valve normal with no stenosis of the aortic valve. Edp was normal.    CARDIAC CATHETERIZATION Left 03/04/15    Dr. Ramirez --Severe CAD, 80% in-stent stenosis in the left anterior descending coronary artery in a rather long segment, very eccentric,extending almost to the ostium of the left anterior descending coronary artery. 70% in-stent stenosis of a very large dominant right coronary artery. Unremarkable small circumflex. Normal left ventricular function, ejection fraction of 60%.  CARDIAC CATHETERIZATION Left 12/05/2018    Dr. Cesar Canales @ [facility]--Risk [address] specific cardiac intervention   [address]  2008    2 unknown heart stents mri 1.5t only    CHOLECYSTECTOMY  05/30/2014    open [facility] Dr. Lewis Doty COLONOSCOPY  10/2015    repeat in 2022    [address]      2 vessel March 3th 2015    CYSTOSCOPY  05/10/2016    with laser TURP    ERCP  10/2/14    Dr. Virgil Fontenot with laparotomy    HERNIA REPAIR  9/2003    [facility]    JOINT REPLACEMENT Left 03-    Hip     KNEE ARTHROSCOPY Right 9/26/13    Dr. Lisa Rose Picking SURGERY  11/02/2016    rt. L3-4, L4-5 decompression.  L4-5 discectomy and fusion    SHOULDER SURGERY Left 2000    arthroscopy    SHOULDER SURGERY Left 2007    replaced humeral head    TOTAL HIP ARTHROPLASTY Right     02/03/2016     Outpatient Encounter Medications as of 7/10/2020   Medication Sig Dispense Refill    vitamin C (ASCORBIC ACID) 500 MG tablet Take 500 mg by mouth daily      metoprolol succinate (TOPROL XL) 25 MG extended release tablet Take 1 tablet by mouth daily 90 tablet 2    losartan (COZAAR) 25 MG tablet Take 1 tablet by mouth daily 90 tablet 1    Respiratory Therapy Supplies (NEBULIZER/TUBING/MOUTHPIECE) KIT 1 kit by Does not apply route daily as needed (shortness of breath) 1 kit 0    albuterol (PROVENTIL) (2.5 MG/3ML) 0.083% nebulizer solution Take 3 mLs by nebulization 4 times daily 120 each 3    ipratropium (ATROVENT) 0.02 % nebulizer solution Take 2.5 mLs by nebulization 4 times daily 187.5 mL 3    atorvastatin (LIPITOR) 80 MG tablet Take 1 tablet by mouth daily 90 tablet 2    isosorbide mononitrate (IMDUR) 30 MG extended release tablet Take 1 tablet by mouth daily 90 tablet 2    spironolactone (ALDACTONE) 25 MG tablet Take 1 tablet by mouth daily 90 tablet 2    fluticasone (FLONASE) 50 MCG/ACT nasal spray Use 1 spray(s) in each nostril once daily 16 g 0    escitalopram (LEXAPRO) 20 MG tablet Take 1 tablet by mouth once daily 90 tablet 1    omeprazole (PRILOSEC) 20 MG delayed release capsule Take 1 capsule by mouth once daily 90 capsule 1    hydroCHLOROthiazide (HYDRODIURIL) 25 MG tablet Take 1 tablet by mouth once daily 90 tablet 2    Vitamin D, Cholecalciferol, 25 MCG (1000 UT) TABS Take by mouth daily       aspirin 325 MG tablet Take 325 mg by mouth daily      albuterol sulfate  (90 Base) MCG/ACT inhaler INHALE 2 PUFFS BY MOUTH EVERY 4 HOURS AS NEEDED FOR WHEEZING 18 g 2    docusate sodium (COLACE) 100 MG capsule Take 100 mg by mouth 2 times daily      meloxicam (MOBIC) 15 MG tablet Take 15 mg by mouth as needed   2    tamsulosin (FLOMAX) 0.4 MG capsule Take 1 capsule by mouth every evening 90 capsule 3    [DISCONTINUED] Pyridoxine HCl (B-6) 100 MG TABS Take by mouth       No facility-administered encounter medications on file as of 7/10/2020.        Current Outpatient Medications on File Prior to Visit   Medication Sig Dispense Refill    vitamin C (ASCORBIC ACID) 500 MG tablet Take 500 mg by mouth daily      metoprolol succinate (TOPROL XL) 25 MG extended release tablet Take 1 tablet by mouth daily 90 tablet 2    losartan (COZAAR) 25 MG tablet Take 1 tablet by mouth daily 90 tablet 1    Respiratory Therapy Supplies (NEBULIZER/TUBING/MOUTHPIECE) KIT 1 kit by Does not apply route daily as needed (shortness of breath) 1 kit 0    albuterol (PROVENTIL) (2.5 MG/3ML) 0.083% nebulizer solution Take 3 mLs by nebulization 4 times daily 120 each 3    ipratropium (ATROVENT) 0.02 % nebulizer solution Take 2.5 mLs by nebulization 4 times daily 187.5 mL 3    atorvastatin (LIPITOR) 80 MG tablet Take 1 tablet by mouth daily 90 tablet 2    isosorbide mononitrate (IMDUR) 30 MG extended release tablet Take 1 tablet by mouth daily 90 tablet 2    spironolactone (ALDACTONE) 25 MG tablet Take 1 tablet by mouth daily 90 tablet 2    fluticasone (FLONASE) 50 MCG/ACT nasal spray Use 1 spray(s) in each nostril once daily 16 g 0    escitalopram (LEXAPRO) 20 MG tablet Take 1 tablet by mouth once daily 90 tablet 1    omeprazole (PRILOSEC) 20 MG delayed release capsule Take 1 capsule by mouth once daily 90 capsule 1    hydroCHLOROthiazide (HYDRODIURIL) 25 MG tablet Take 1 tablet by mouth once daily 90 tablet 2    Vitamin D, Cholecalciferol, 25 MCG (1000 UT) TABS Take by mouth daily       aspirin 325 MG tablet Take 325 mg by mouth daily      albuterol sulfate  (90 Base) MCG/ACT inhaler INHALE 2 PUFFS BY MOUTH EVERY 4 HOURS AS NEEDED FOR WHEEZING 18 g 2    docusate sodium (COLACE) 100 MG capsule Take 100 mg by mouth 2 times daily      meloxicam (MOBIC) 15 MG tablet Take 15 mg by mouth as needed   2    tamsulosin (FLOMAX) 0.4 MG capsule Take 1 capsule by mouth every evening 90 capsule 3     No current facility-administered medications on file prior to visit. Patient has no known allergies.   Family History   Problem Relation Age of Onset    Diabetes Mother     Heart Disease Mother     Arthritis Mother     High Blood Pressure Mother     High Cholesterol Mother     Cancer Father         lung black lung from coal mines    Heart Disease Brother         leaky heart    Liver Disease Paternal Grandfather         black lung    Obesity Sister         gastric bypass  Cancer Sister         lung, smoker     Social History     Tobacco Use   Smoking Status Never Smoker   Smokeless Tobacco Never Used       Social History     Substance and Sexual Activity   Alcohol Use Yes    Comment: Rare; LESS THEN 1X PER MONTH       Review of Systems   Constitutional: Negative for appetite change, chills and fever. Eyes: Negative for pain, redness and visual disturbance. Respiratory: Negative for cough, shortness of breath and wheezing. Cardiovascular: Negative for chest pain and leg swelling. Gastrointestinal: Negative for abdominal pain, constipation, nausea and vomiting. Genitourinary: Positive for urgency. Negative for decreased urine volume, difficulty urinating, discharge, dysuria, enuresis, flank pain, frequency, hematuria, penile pain, scrotal swelling and testicular pain. Musculoskeletal: Negative for back pain, joint swelling and myalgias. Skin: Negative for color change, rash and wound. Neurological: Negative for dizziness, tremors and numbness. Hematological: Negative for adenopathy. Does not bruise/bleed easily. BP (!) 140/90 (Site: Right Upper Arm, Position: Sitting, Cuff Size: Large Adult)   Temp 97.1 °F (36.2 °C)   Ht 5' 11.5\" (1.816 m)   Wt (!) 348 lb (157.9 kg)   BMI 47.86 kg/m²       PHYSICAL EXAM:  Constitutional: Patient in no acute distress; Neuro: alert and oriented to person place and time. Psych: Mood and affect normal.  Skin: Normal  Lungs: Respiratory effort normal  Cardiovascular:  Normal peripheral pulses  Abdomen: Soft, non-tender, non-distended with no CVA, flank pain, hepatosplenomegaly or hernia. Kidneys normal.  Bladder non-tender and not distended.   Lymphatics: no palpable lymphadenopathy  Penis normal  Urethral meatus normal  Scrotal exam normal  Testicles normal bilaterally        Lab Results   Component Value Date    BUN 19 03/13/2020     Lab Results   Component Value Date    CREATININE 1.09 03/13/2020     Lab Results Component Value Date    PSA 0.55 07/07/2020    PSA 1.00 06/10/2019    PSA 0.99 05/23/2018       ASSESSMENT:   Diagnosis Orders   1. BPH with obstruction/lower urinary tract symptoms  [place] MEASUREMENT,POST-VOID RESIDUAL VOLUME BY US,NON-IMAGING    Culture, Urine    Urinalysis with Microscopic   2. Frequency of urination  [place] MEASUREMENT,POST-VOID RESIDUAL VOLUME BY US,NON-IMAGING    Culture, Urine    Urinalysis with Microscopic   3. Nocturia  [place] MEASUREMENT,POST-VOID RESIDUAL VOLUME BY US,NON-IMAGING    Culture, Urine    Urinalysis with Microscopic   4. Urgency of urination  [place] MEASUREMENT,POST-VOID RESIDUAL VOLUME BY US,NON-IMAGING    Culture, Urine    Urinalysis with Microscopic   5. Screening PSA (prostate specific antigen)  Psa screening           PLAN:  We will check a UA C&S due to new urgency, but it is likely due to new diuretic use. He will continue Flomax daily. We will see him in 1 year with a PSA prior or sooner if needed for new or worsening symptoms.

## 2020-07-11 LAB
CULTURE: NORMAL
Lab: NORMAL
SPECIMEN DESCRIPTION: NORMAL

## 2020-07-13 ENCOUNTER — TELEPHONE (OUTPATIENT)
Dept: UROLOGY | Age: 75
End: 2020-07-13

## 2020-07-13 NOTE — TELEPHONE ENCOUNTER
----- Message from BESS Ritter - CNP sent at 7/13/2020  9:31 AM EDT -----  Call pt - urine cx reviewed and negative for UTI & for significant microhematuria

## 2020-08-12 ENCOUNTER — TELEPHONE (OUTPATIENT)
Dept: PRIMARY CARE CLINIC | Age: 75
End: 2020-08-12

## 2020-08-12 NOTE — TELEPHONE ENCOUNTER
Pt would like referred to Dr. Lanny Norman in [place], he is through [facility]. Unable to find provider in database.     Ph# is [phone]

## 2020-10-13 ENCOUNTER — OFFICE VISIT (OUTPATIENT)
Dept: PRIMARY CARE CLINIC | Age: 75
End: 2020-10-13
Payer: MEDICARE

## 2020-10-13 VITALS
TEMPERATURE: 97.6 F | HEART RATE: 83 BPM | OXYGEN SATURATION: 97 % | DIASTOLIC BLOOD PRESSURE: 72 MMHG | SYSTOLIC BLOOD PRESSURE: 110 MMHG | BODY MASS INDEX: 47.31 KG/M2 | WEIGHT: 315 LBS

## 2020-10-13 PROCEDURE — G8417 CALC BMI ABV UP PARAM F/U: HCPCS | Performed by: NURSE PRACTITIONER

## 2020-10-13 PROCEDURE — 1036F TOBACCO NON-USER: CPT | Performed by: NURSE PRACTITIONER

## 2020-10-13 PROCEDURE — 1123F ACP DISCUSS/DSCN MKR DOCD: CPT | Performed by: NURSE PRACTITIONER

## 2020-10-13 PROCEDURE — 90694 VACC AIIV4 NO PRSRV 0.5ML IM: CPT | Performed by: NURSE PRACTITIONER

## 2020-10-13 PROCEDURE — 3017F COLORECTAL CA SCREEN DOC REV: CPT | Performed by: NURSE PRACTITIONER

## 2020-10-13 PROCEDURE — G8427 DOCREV CUR MEDS BY ELIG CLIN: HCPCS | Performed by: NURSE PRACTITIONER

## 2020-10-13 PROCEDURE — 3023F SPIROM DOC REV: CPT | Performed by: NURSE PRACTITIONER

## 2020-10-13 PROCEDURE — 4040F PNEUMOC VAC/ADMIN/RCVD: CPT | Performed by: NURSE PRACTITIONER

## 2020-10-13 PROCEDURE — G8484 FLU IMMUNIZE NO ADMIN: HCPCS | Performed by: NURSE PRACTITIONER

## 2020-10-13 PROCEDURE — 99214 OFFICE O/P EST MOD 30 MIN: CPT | Performed by: NURSE PRACTITIONER

## 2020-10-13 PROCEDURE — G0008 ADMIN INFLUENZA VIRUS VAC: HCPCS | Performed by: NURSE PRACTITIONER

## 2020-10-13 PROCEDURE — G8926 SPIRO NO PERF OR DOC: HCPCS | Performed by: NURSE PRACTITIONER

## 2020-10-13 RX ORDER — UMECLIDINIUM BROMIDE AND VILANTEROL TRIFENATATE 62.5; 25 UG/1; UG/1
1 POWDER RESPIRATORY (INHALATION) DAILY
Status: ON HOLD | COMMUNITY
Start: 2020-08-28 | End: 2021-01-27

## 2020-10-13 RX ORDER — MULTIVITAMIN,THERAPEUTIC
1 TABLET ORAL DAILY
COMMUNITY

## 2020-10-13 ASSESSMENT — ENCOUNTER SYMPTOMS
SHORTNESS OF BREATH: 1
WHEEZING: 1
EYES NEGATIVE: 1
RHINORRHEA: 0
ABDOMINAL DISTENTION: 0

## 2020-10-13 NOTE — PATIENT INSTRUCTIONS
You may be receiving a survey from [employer] regarding your visit today. You may get this in the mail, through your MyChart or in your email. Please complete the survey to enable us to provide the highest quality of care to you and your family. If you cannot score us as very good ( 5 Stars) on any question, please feel free to call the office to discuss how we could have made your experience exceptional.     Thank You! Joel Metz, APRN-CNP  [address] BELEN Muller RMA    Need to get Tdap  Vaccine updated (tetanus)     Consider trelegy as inhaler option.

## 2020-10-13 NOTE — PROGRESS NOTES
10/13/2020     Ledy Patton (:  1945) is a 69 y.o. male, here for evaluation of the following medical concerns:      HPI     69year old  male lives locally here in [place] on 2nd floor apartment which is hard to manage due to deconditioning. EF 55% with LVH for echo done 2019    htn well controlled, no chest pain or dizziness. [facility] kvng-zostavax pt felt he received. Chronic diastolic heart failure well controlled. Pt does not monitor weight. Wt Readings from Last 3 Encounters:   10/13/20 (!) 344 lb (156 kg)   07/10/20 (!) 348 lb (157.9 kg)   20 (!) 347 lb (157.4 kg)       Osteoarthritis both knees, weight loss discussed would help. Hypercholesterolemia on statin  Lab Results   Component Value Date    LDLCHOLESTEROL 50 2020       Anxiety on SSRI , remains on lexapro and tolerating well without side effects. Review of Systems   Constitutional: Negative for activity change, chills and fever. HENT: Negative for congestion, rhinorrhea and tinnitus. Eyes: Negative. Respiratory: Positive for shortness of breath and wheezing. Cardiovascular: Negative for chest pain. Gastrointestinal: Negative for abdominal distention. Endocrine: Negative for cold intolerance and heat intolerance. Musculoskeletal: Negative for arthralgias. Skin: Negative for rash. Neurological: Negative for dizziness and headaches. Psychiatric/Behavioral: Negative for agitation. Prior to Visit Medications    Medication Sig Taking?  Authorizing Provider   umeclidinium-vilanterol (ANORO ELLIPTA) 62.5-25 MCG/INH AEPB inhaler Inhale 1 puff into the lungs daily Yes Historical Provider, MD   Multiple Vitamin (THERA/BETA-CAROTENE) TABS Take 1 tablet by mouth daily Yes Historical Provider, MD   vitamin C (ASCORBIC ACID) 500 MG tablet Take 500 mg by mouth daily Yes Historical Provider, MD   tamsulosin (FLOMAX) 0.4 MG capsule Take 1 capsule by mouth every evening Yes BESS Hollis CNP   metoprolol succinate (TOPROL XL) 25 MG extended release tablet Take 1 tablet by mouth daily Yes BESS Jean Baptiste CNP   losartan (COZAAR) 25 MG tablet Take 1 tablet by mouth daily Yes BESS Jean Baptiste CNP   albuterol (PROVENTIL) (2.5 MG/3ML) 0.083% nebulizer solution Take 3 mLs by nebulization 4 times daily Yes BESS Jean Baptiste CNP   ipratropium (ATROVENT) 0.02 % nebulizer solution Take 2.5 mLs by nebulization 4 times daily Yes BESS Jean Baptiste CNP   atorvastatin (LIPITOR) 80 MG tablet Take 1 tablet by mouth daily Yes BESS Jean Baptiste CNP   isosorbide mononitrate (IMDUR) 30 MG extended release tablet Take 1 tablet by mouth daily Yes BESS Jean Baptiste CNP   spironolactone (ALDACTONE) 25 MG tablet Take 1 tablet by mouth daily Yes BESS Jean Baptiste CNP   fluticasone (FLONASE) 50 MCG/ACT nasal spray Use 1 spray(s) in each nostril once daily Yes BESS Jean Baptiste CNP   escitalopram (LEXAPRO) 20 MG tablet Take 1 tablet by mouth once daily Yes BESS Jean Baptiste CNP   omeprazole (PRILOSEC) 20 MG delayed release capsule Take 1 capsule by mouth once daily Yes BESS Jean Baptiste CNP   hydroCHLOROthiazide (HYDRODIURIL) 25 MG tablet Take 1 tablet by mouth once daily Yes BESS Jean Baptiste CNP   Vitamin D, Cholecalciferol, 25 MCG (1000 UT) TABS Take by mouth daily  Yes Historical Provider, MD   aspirin 325 MG tablet Take 325 mg by mouth daily Yes Historical Provider, MD   albuterol sulfate  (90 Base) MCG/ACT inhaler INHALE 2 PUFFS BY MOUTH EVERY 4 HOURS AS NEEDED FOR WHEEZING Yes BESS Jean Baptiste CNP   docusate sodium (COLACE) 100 MG capsule Take 100 mg by mouth 2 times daily Yes Historical Provider, MD        Social History     Tobacco Use    Smoking status: Never Smoker    Smokeless tobacco: Never Used   Substance Use Topics    Alcohol use: Yes     Comment: Rare; LESS THEN 1X PER MONTH        Vitals:    10/13/20 0851   BP: 110/72 Site: Right Upper Arm   Position: Sitting   Cuff Size: Large Adult   Pulse: 83   Temp: 97.6 °F (36.4 °C)   TempSrc: Infrared   SpO2: 97%   Weight: (!) 344 lb (156 kg)     Estimated body mass index is 47.31 kg/m² as calculated from the following:    Height as of 7/10/20: 5' 11.5\" (1.816 m). Weight as of this encounter: 344 lb (156 kg). Physical Exam  Vitals signs and nursing note reviewed. Constitutional:       General: He is not in acute distress. Appearance: Normal appearance. He is well-developed. Comments: Large framed male of stated age. HENT:      Head: Normocephalic and atraumatic. Right Ear: Tympanic membrane normal.      Left Ear: Tympanic membrane and external ear normal.      Nose: No congestion. Mouth/Throat:      Mouth: Mucous membranes are moist.      Pharynx: No oropharyngeal exudate. Eyes:      Pupils: Pupils are equal, round, and reactive to light. Neck:      Musculoskeletal: Normal range of motion and neck supple. Vascular: No carotid bruit (neg pavel). Cardiovascular:      Rate and Rhythm: Normal rate and regular rhythm. Pulses: Normal pulses. Heart sounds: Normal heart sounds. No murmur. Pulmonary:      Effort: Pulmonary effort is normal. No respiratory distress. Breath sounds: Normal breath sounds. Abdominal:      Palpations: Abdomen is soft. There is no mass. Tenderness: There is no abdominal tenderness. Musculoskeletal: Normal range of motion. General: No tenderness. Right lower leg: No edema. Left lower leg: No edema. Lymphadenopathy:      Cervical: No cervical adenopathy. Skin:     General: Skin is warm. Findings: No rash. Neurological:      Mental Status: He is alert and oriented to person, place, and time. Psychiatric:         Behavior: Behavior normal.         Thought Content: Thought content normal.         Judgment: Judgment normal.         ASSESSMENT/PLAN:  1.  Chronic obstructive pulmonary disease, unspecified COPD type [facility])  Established with pulmonology  On Anoro    2. Chronic diastolic (congestive) heart failure (HCC)  Stable  On spironolactone    3. Morbid obesity due to excess calories (HCC)  Reduce calories  Considering pulmonary rehab. PFT done recently    4. Hypercholesterolemia  On statin  Lab Results   Component Value Date    LDLCHOLESTEROL 50 03/13/2020     At goal     5. Primary osteoarthritis of both knees  Chronic       6. Anxiety  Controlled with SSRI    7. Need for influenza vaccination  Given today  - INFLUENZA, QUADV, ADJUVANTED, 65 YRS =, IM, PF, PREFILL SYR, 0.5ML (FLUAD)      Return in about 6 months (around 4/13/2021) for medicare visit-AWV after 1/7/2020. An electronic signature was used to authenticate this note.     --BESS Quintana - CNP on 10/16/2020 at 12:16 AM

## 2020-10-21 RX ORDER — FLUTICASONE PROPIONATE 50 MCG
1 SPRAY, SUSPENSION (ML) NASAL DAILY
Qty: 16 G | Refills: 2 | Status: SHIPPED | OUTPATIENT
Start: 2020-10-21

## 2020-10-21 NOTE — TELEPHONE ENCOUNTER
Last OV: 10/13/2020 for COPD,HTN,Hyperlipidemia,Anxiety, Osteoarthritis of both knees. Next scheduled apt: 4/13/2021    Patient is requesting a refill of Flonase thru [facility].

## 2020-10-27 RX ORDER — OMEPRAZOLE 20 MG/1
CAPSULE, DELAYED RELEASE ORAL
Qty: 90 CAPSULE | Refills: 0 | Status: SHIPPED | OUTPATIENT
Start: 2020-10-27 | End: 2020-11-01

## 2020-10-28 ENCOUNTER — TELEPHONE (OUTPATIENT)
Dept: FAMILY MEDICINE CLINIC | Age: 75
End: 2020-10-28

## 2020-10-28 NOTE — TELEPHONE ENCOUNTER
Black box warning (at risk to harm/affect kidneys at high dose chronically )      on use of PPI (omeprazole) at high dose for > 90 days, pt does have hx gastric bypass in 2001, which his risk for nutritional deficiencies are higher and ability to absorb this med may be delayed. Need to try the 20 mg daily dose  Pt does not have hx gastric ulcer  If he is having recurrent GERD then EGD would be indicated to investigate further. With hx gastric bypass, maalox or mylanta usually better option for sporatic GERD. If worsening s/s would refer to general surgeon for further eval.     Higher dose Omeprazole not indicated prescription stays as it, inform pt of details please.

## 2020-10-28 NOTE — TELEPHONE ENCOUNTER
[place] - from [facility] carlos a called in from the pharmacy stating the patient has a script for omeprazole 20mg but has always been on 40.      The patient wants 40 mg     Please advise

## 2020-10-28 NOTE — TELEPHONE ENCOUNTER
Spoke to pharmacy    Dr. Bladimir Norton prescribed this as 36 and they talked to him today and he stated he was leaving the prescription at 40 mg he feels its appropriate.      I informed her that I would notify PCP as well he is getting this from ENT

## 2020-11-01 RX ORDER — AZITHROMYCIN 500 MG/1
500 TABLET, FILM COATED ORAL DAILY
COMMUNITY
Start: 2020-10-30 | End: 2020-11-06

## 2020-11-01 RX ORDER — MELOXICAM 15 MG/1
TABLET ORAL
Status: ON HOLD | COMMUNITY
Start: 2020-10-16 | End: 2021-01-28 | Stop reason: HOSPADM

## 2020-11-01 RX ORDER — BUDESONIDE AND FORMOTEROL FUMARATE DIHYDRATE 160; 4.5 UG/1; UG/1
2 AEROSOL RESPIRATORY (INHALATION) EVERY 12 HOURS
COMMUNITY
Start: 2020-10-30 | End: 2021-04-29 | Stop reason: SDUPTHER

## 2020-11-01 RX ORDER — OMEPRAZOLE 40 MG/1
CAPSULE, DELAYED RELEASE ORAL
COMMUNITY
Start: 2020-10-12 | End: 2022-03-14 | Stop reason: ALTCHOICE

## 2020-11-01 RX ORDER — MONTELUKAST SODIUM 10 MG/1
10 TABLET ORAL DAILY
COMMUNITY
Start: 2020-10-30 | End: 2022-06-14

## 2020-11-01 RX ORDER — PREDNISONE 10 MG/1
50 TABLET ORAL DAILY
COMMUNITY
Start: 2020-10-30 | End: 2020-11-04

## 2020-11-09 RX ORDER — ESCITALOPRAM OXALATE 20 MG/1
TABLET ORAL
Qty: 90 TABLET | Refills: 1 | Status: SHIPPED | OUTPATIENT
Start: 2020-11-09 | End: 2021-06-28
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
## 2020-12-15 RX ORDER — SPIRONOLACTONE 25 MG/1
TABLET ORAL
Qty: 30 TABLET | Refills: 0 | Status: SHIPPED | OUTPATIENT
Start: 2020-12-15 | End: 2021-02-22

## 2020-12-17 ENCOUNTER — HOSPITAL ENCOUNTER (OUTPATIENT)
Age: 75
Discharge: HOME | End: 2020-12-17
Payer: MEDICARE

## 2020-12-17 ENCOUNTER — HOSPITAL ENCOUNTER (OUTPATIENT)
Dept: GENERAL RADIOLOGY | Age: 75
Discharge: HOME | End: 2020-12-19
Payer: MEDICARE

## 2020-12-17 ENCOUNTER — HOSPITAL ENCOUNTER (OUTPATIENT)
Dept: PREADMISSION TESTING | Age: 75
Setting detail: SPECIMEN
Discharge: HOME | End: 2020-12-17
Payer: MEDICARE

## 2020-12-17 ENCOUNTER — HOSPITAL ENCOUNTER (OUTPATIENT)
Age: 75
Discharge: HOME | End: 2020-12-19
Payer: MEDICARE

## 2020-12-17 VITALS — BODY MASS INDEX: 44.1 KG/M2 | WEIGHT: 315 LBS | HEIGHT: 71 IN

## 2020-12-17 LAB
-: ABNORMAL
ABSOLUTE EOS #: 0.3 K/UL (ref 0–0.4)
ABSOLUTE IMMATURE GRANULOCYTE: NORMAL K/UL (ref 0–0.3)
ABSOLUTE LYMPH #: 1.2 K/UL (ref 1–4.8)
ABSOLUTE MONO #: 0.5 K/UL (ref 0–1)
ALBUMIN SERPL-MCNC: 4.1 G/DL (ref 3.5–5.2)
ALBUMIN/GLOBULIN RATIO: ABNORMAL (ref 1–2.5)
ALP BLD-CCNC: 115 U/L (ref 40–129)
ALT SERPL-CCNC: 28 U/L (ref 5–41)
AMORPHOUS: ABNORMAL
ANION GAP SERPL CALCULATED.3IONS-SCNC: 12 MMOL/L (ref 9–17)
AST SERPL-CCNC: 35 U/L
BACTERIA: ABNORMAL
BASOPHILS # BLD: 1 % (ref 0–2)
BASOPHILS ABSOLUTE: 0 K/UL (ref 0–0.2)
BILIRUB SERPL-MCNC: 0.8 MG/DL (ref 0.3–1.2)
BILIRUBIN URINE: NEGATIVE
BUN BLDV-MCNC: 17 MG/DL (ref 8–23)
BUN/CREAT BLD: 16 (ref 9–20)
CALCIUM SERPL-MCNC: 9.1 MG/DL (ref 8.6–10.4)
CASTS UA: ABNORMAL /LPF
CHLORIDE BLD-SCNC: 103 MMOL/L (ref 98–107)
CO2: 26 MMOL/L (ref 20–31)
COLOR: ABNORMAL
COMMENT UA: ABNORMAL
CREAT SERPL-MCNC: 1.08 MG/DL (ref 0.7–1.2)
CRYSTALS, UA: ABNORMAL /HPF
DIFFERENTIAL TYPE: YES
EOSINOPHILS RELATIVE PERCENT: 5 % (ref 0–5)
EPITHELIAL CELLS UA: ABNORMAL /HPF
GFR AFRICAN AMERICAN: >60 ML/MIN
GFR NON-AFRICAN AMERICAN: >60 ML/MIN
GFR SERPL CREATININE-BSD FRML MDRD: ABNORMAL ML/MIN/{1.73_M2}
GFR SERPL CREATININE-BSD FRML MDRD: ABNORMAL ML/MIN/{1.73_M2}
GLUCOSE BLD-MCNC: 138 MG/DL (ref 70–99)
GLUCOSE URINE: NEGATIVE
HCT VFR BLD CALC: 43.4 % (ref 41–53)
HEMOGLOBIN: 14.7 G/DL (ref 13.5–17.5)
IMMATURE GRANULOCYTES: NORMAL %
KETONES, URINE: ABNORMAL
LEUKOCYTE ESTERASE, URINE: ABNORMAL
LYMPHOCYTES # BLD: 22 % (ref 13–44)
MCH RBC QN AUTO: 28.7 PG (ref 26–34)
MCHC RBC AUTO-ENTMCNC: 33.9 G/DL (ref 31–37)
MCV RBC AUTO: 84.5 FL (ref 80–100)
MONOCYTES # BLD: 9 % (ref 5–9)
MUCUS: ABNORMAL
NITRITE, URINE: NEGATIVE
NRBC AUTOMATED: NORMAL PER 100 WBC
OTHER OBSERVATIONS UA: ABNORMAL
PDW BLD-RTO: 14.6 % (ref 12.1–15.2)
PH UA: 5 (ref 5–8)
PLATELET # BLD: 184 K/UL (ref 140–450)
PLATELET ESTIMATE: NORMAL
PMV BLD AUTO: NORMAL FL (ref 6–12)
POTASSIUM SERPL-SCNC: 4.2 MMOL/L (ref 3.7–5.3)
PROTEIN UA: ABNORMAL
RBC # BLD: 5.14 M/UL (ref 4.5–5.9)
RBC # BLD: NORMAL 10*6/UL
RBC UA: ABNORMAL /HPF (ref 0–2)
RENAL EPITHELIAL, UA: ABNORMAL /HPF
SEG NEUTROPHILS: 63 % (ref 39–75)
SEGMENTED NEUTROPHILS ABSOLUTE COUNT: 3.4 K/UL (ref 2.1–6.5)
SODIUM BLD-SCNC: 141 MMOL/L (ref 135–144)
SPECIFIC GRAVITY UA: 1.02 (ref 1–1.03)
TOTAL PROTEIN: 7.1 G/DL (ref 6.4–8.3)
TRICHOMONAS: ABNORMAL
TURBIDITY: CLEAR
URINE HGB: NEGATIVE
UROBILINOGEN, URINE: ABNORMAL
WBC # BLD: 5.4 K/UL (ref 3.5–11)
WBC # BLD: NORMAL 10*3/UL
WBC UA: ABNORMAL /HPF
YEAST: ABNORMAL

## 2020-12-17 PROCEDURE — 73564 X-RAY EXAM KNEE 4 OR MORE: CPT

## 2020-12-17 PROCEDURE — 87086 URINE CULTURE/COLONY COUNT: CPT

## 2020-12-17 PROCEDURE — 71046 X-RAY EXAM CHEST 2 VIEWS: CPT

## 2020-12-17 PROCEDURE — 97530 THERAPEUTIC ACTIVITIES: CPT

## 2020-12-17 PROCEDURE — 36415 COLL VENOUS BLD VENIPUNCTURE: CPT

## 2020-12-17 PROCEDURE — 80053 COMPREHEN METABOLIC PANEL: CPT

## 2020-12-17 PROCEDURE — 85025 COMPLETE CBC W/AUTO DIFF WBC: CPT

## 2020-12-17 PROCEDURE — 81001 URINALYSIS AUTO W/SCOPE: CPT

## 2020-12-17 PROCEDURE — 93005 ELECTROCARDIOGRAM TRACING: CPT

## 2020-12-17 NOTE — PRE-PROCEDURE INSTRUCTIONS
Phone: [phone]        Fax: [phone] [facility]  Physical Therapy  Pre-Op Visit/Discharge Summary  Date: 2020  Patient Name: Christopher Hancock        : 1945  (70 y.o.)  Attending Physician: Dr. Danielito Rider  Diagnosis:R TKA  Reason for Referral:  Pre-operative Education and Gait Training  Objective Assessment:    X  Strength of uninvolved extremities are all within functional limits   ? Other:    Weight Bearing Status:  X Right Extremity  ? Left Extremity  ? Non-weight bearing  ? Partial weight bearing       ? Toe touch weight bearing X Weight bearing as tolerated    Treatment:   X  Assess pt for proper walker fit with regard to walker height      X  Instructed on gait training at level ground      X  Pt instructed on Post-op Exercises of Quad Sets, Heelslides, Glut Sets and Ankle Pumps for ROM, strength and Circulatory benefits to be completed one time per hour   X   Pt Educated on Post-Op Physical Therapy        Treatment Goals:  X  Met ? Not Met 2020   X Patient will be independent with Post Op Exercises  X Pt will be amb with appropriate AD on level surface independently prior to surgical procedure   X  Pt will understand the focus/purpose of Post-Op Physical Therapy       Treatment Plan:   X  Gait training, as noted above    X  Exercise education, as stated above     Rehab Potential: ?  Poor   ? Fair   X  Good   ? Excellent     If there are any questions regarding the plan of care, please do not hesitate to contact the center. Thank you for your referral.      Therapists Signature:  Mani Guadarrama PTA                           Date: 2020  Time In: 1025  Time Out[de-identified] 0565      To be completed by the referring physicians   ? Approve the treatment plan as indicated   ?   Comments:   Physicians Signature:                        Date: 2020

## 2020-12-18 LAB
CULTURE: NORMAL
EKG ATRIAL RATE: 84 BPM
EKG P AXIS: 74 DEGREES
EKG P-R INTERVAL: 306 MS
EKG Q-T INTERVAL: 398 MS
EKG QRS DURATION: 106 MS
EKG QTC CALCULATION (BAZETT): 470 MS
EKG R AXIS: -77 DEGREES
EKG T AXIS: 67 DEGREES
EKG VENTRICULAR RATE: 84 BPM
Lab: NORMAL
SPECIMEN DESCRIPTION: NORMAL

## 2020-12-18 PROCEDURE — 93010 ELECTROCARDIOGRAM REPORT: CPT | Performed by: INTERNAL MEDICINE

## 2021-01-21 RX ORDER — ALBUTEROL SULFATE 2.5 MG/3ML
2.5 SOLUTION RESPIRATORY (INHALATION) 4 TIMES DAILY
Status: CANCELLED | OUTPATIENT
Start: 2021-01-21

## 2021-01-21 RX ORDER — LOSARTAN POTASSIUM 50 MG/1
25 TABLET ORAL DAILY
Status: CANCELLED | OUTPATIENT
Start: 2021-01-21

## 2021-01-21 RX ORDER — FLUTICASONE PROPIONATE 50 MCG
1 SPRAY, SUSPENSION (ML) NASAL DAILY
Status: CANCELLED | OUTPATIENT
Start: 2021-01-21

## 2021-01-22 ENCOUNTER — ANESTHESIA EVENT (OUTPATIENT)
Dept: OPERATING ROOM | Age: 76
End: 2021-01-22
Payer: MEDICARE

## 2021-01-22 NOTE — PROGRESS NOTES
email received from [facility] that pt will be discharged home with the [facility] program.

## 2021-01-27 ENCOUNTER — HOSPITAL ENCOUNTER (OUTPATIENT)
Dept: PREADMISSION TESTING | Age: 76
Setting detail: SPECIMEN
Discharge: HOME | End: 2021-01-27
Payer: MEDICARE

## 2021-01-27 ENCOUNTER — HOSPITAL ENCOUNTER (OUTPATIENT)
Age: 76
Discharge: HOME | End: 2021-01-28
Attending: ORTHOPAEDIC SURGERY | Admitting: ORTHOPAEDIC SURGERY
Payer: MEDICARE

## 2021-01-27 ENCOUNTER — ANESTHESIA (OUTPATIENT)
Dept: OPERATING ROOM | Age: 76
End: 2021-01-27
Payer: MEDICARE

## 2021-01-27 ENCOUNTER — APPOINTMENT (OUTPATIENT)
Dept: GENERAL RADIOLOGY | Age: 76
End: 2021-01-27
Attending: ORTHOPAEDIC SURGERY
Payer: MEDICARE

## 2021-01-27 VITALS
TEMPERATURE: 98.6 F | SYSTOLIC BLOOD PRESSURE: 139 MMHG | OXYGEN SATURATION: 100 % | RESPIRATION RATE: 20 BRPM | DIASTOLIC BLOOD PRESSURE: 91 MMHG

## 2021-01-27 DIAGNOSIS — Z96.651 PRESENCE OF RIGHT ARTIFICIAL KNEE JOINT: ICD-10-CM

## 2021-01-27 DIAGNOSIS — M17.11 ARTHRITIS OF RIGHT KNEE: Primary | ICD-10-CM

## 2021-01-27 LAB
ABO/RH: NORMAL
ANTIBODY SCREEN: NEGATIVE
ARM BAND NUMBER: NORMAL
EXPIRATION DATE: NORMAL
SARS-COV-2, RAPID: NOT DETECTED
SARS-COV-2: NORMAL
SARS-COV-2: NORMAL
SOURCE: NORMAL

## 2021-01-27 PROCEDURE — 1200000000 HC SEMI PRIVATE

## 2021-01-27 PROCEDURE — 6360000002 HC RX W HCPCS: Performed by: NURSE ANESTHETIST, CERTIFIED REGISTERED

## 2021-01-27 PROCEDURE — 3600000006 HC SURGERY LEVEL 6 BASE: Performed by: ORTHOPAEDIC SURGERY

## 2021-01-27 PROCEDURE — 2580000003 HC RX 258: Performed by: ORTHOPAEDIC SURGERY

## 2021-01-27 PROCEDURE — 2500000003 HC RX 250 WO HCPCS: Performed by: ORTHOPAEDIC SURGERY

## 2021-01-27 PROCEDURE — 36415 COLL VENOUS BLD VENIPUNCTURE: CPT

## 2021-01-27 PROCEDURE — 6360000002 HC RX W HCPCS: Performed by: ORTHOPAEDIC SURGERY

## 2021-01-27 PROCEDURE — U0002 COVID-19 LAB TEST NON-CDC: HCPCS

## 2021-01-27 PROCEDURE — 6370000000 HC RX 637 (ALT 250 FOR IP): Performed by: ORTHOPAEDIC SURGERY

## 2021-01-27 PROCEDURE — 2720000010 HC SURG SUPPLY STERILE: Performed by: ORTHOPAEDIC SURGERY

## 2021-01-27 PROCEDURE — 7100000001 HC PACU RECOVERY - ADDTL 15 MIN: Performed by: ORTHOPAEDIC SURGERY

## 2021-01-27 PROCEDURE — 94761 N-INVAS EAR/PLS OXIMETRY MLT: CPT

## 2021-01-27 PROCEDURE — 86900 BLOOD TYPING SEROLOGIC ABO: CPT

## 2021-01-27 PROCEDURE — C1713 ANCHOR/SCREW BN/BN,TIS/BN: HCPCS | Performed by: ORTHOPAEDIC SURGERY

## 2021-01-27 PROCEDURE — C9803 HOPD COVID-19 SPEC COLLECT: HCPCS

## 2021-01-27 PROCEDURE — 3700000000 HC ANESTHESIA ATTENDED CARE: Performed by: ORTHOPAEDIC SURGERY

## 2021-01-27 PROCEDURE — 86850 RBC ANTIBODY SCREEN: CPT

## 2021-01-27 PROCEDURE — 2500000003 HC RX 250 WO HCPCS: Performed by: NURSE ANESTHETIST, CERTIFIED REGISTERED

## 2021-01-27 PROCEDURE — 3700000001 HC ADD 15 MINUTES (ANESTHESIA): Performed by: ORTHOPAEDIC SURGERY

## 2021-01-27 PROCEDURE — C9290 INJ, BUPIVACAINE LIPOSOME: HCPCS | Performed by: NURSE ANESTHETIST, CERTIFIED REGISTERED

## 2021-01-27 PROCEDURE — 94664 DEMO&/EVAL PT USE INHALER: CPT

## 2021-01-27 PROCEDURE — 76942 ECHO GUIDE FOR BIOPSY: CPT | Performed by: NURSE ANESTHETIST, CERTIFIED REGISTERED

## 2021-01-27 PROCEDURE — 3600000016 HC SURGERY LEVEL 6 ADDTL 15MIN: Performed by: ORTHOPAEDIC SURGERY

## 2021-01-27 PROCEDURE — 73560 X-RAY EXAM OF KNEE 1 OR 2: CPT

## 2021-01-27 PROCEDURE — 86901 BLOOD TYPING SEROLOGIC RH(D): CPT

## 2021-01-27 PROCEDURE — 7100000000 HC PACU RECOVERY - FIRST 15 MIN: Performed by: ORTHOPAEDIC SURGERY

## 2021-01-27 PROCEDURE — C1776 JOINT DEVICE (IMPLANTABLE): HCPCS | Performed by: ORTHOPAEDIC SURGERY

## 2021-01-27 PROCEDURE — 88311 DECALCIFY TISSUE: CPT

## 2021-01-27 PROCEDURE — 88305 TISSUE EXAM BY PATHOLOGIST: CPT

## 2021-01-27 PROCEDURE — 2709999900 HC NON-CHARGEABLE SUPPLY: Performed by: ORTHOPAEDIC SURGERY

## 2021-01-27 PROCEDURE — 97166 OT EVAL MOD COMPLEX 45 MIN: CPT

## 2021-01-27 PROCEDURE — 97162 PT EVAL MOD COMPLEX 30 MIN: CPT

## 2021-01-27 DEVICE — ANCHOR SUT L16.3MM DIA6.5MM TI W/ 3 SZ 2 FIBERWIRE CRKSCR
Type: IMPLANTABLE DEVICE | Site: KNEE | Status: NON-FUNCTIONAL
Removed: 2021-02-09

## 2021-01-27 DEVICE — CEMENT BNE RADIOPAQUE FAST SET ACRYL RESIN HI VISC SIMPLEXHV: Type: IMPLANTABLE DEVICE | Site: KNEE | Status: FUNCTIONAL

## 2021-01-27 DEVICE — INSERT TIB BEAR SZ 7 THK9MM KNEE X3 POST STBL TRIATHLON
Type: IMPLANTABLE DEVICE | Site: KNEE | Status: NON-FUNCTIONAL
Removed: 2021-02-09

## 2021-01-27 DEVICE — BASEPLATE TIB SZ 7 KNEE TRITANIUM CEM PRI LO PROF TRIATHLON: Type: IMPLANTABLE DEVICE | Site: KNEE | Status: FUNCTIONAL

## 2021-01-27 DEVICE — COMPONENT FEM SZ 6 R KNEE POST STBL CEM TRIATHLON: Type: IMPLANTABLE DEVICE | Site: KNEE | Status: FUNCTIONAL

## 2021-01-27 DEVICE — IMPLANT PAT DIA35MM THK10MM X3 ASYM TRIATHLON: Type: IMPLANTABLE DEVICE | Site: KNEE | Status: FUNCTIONAL

## 2021-01-27 RX ORDER — SODIUM CHLORIDE 9 MG/ML
INJECTION, SOLUTION INTRAVENOUS CONTINUOUS
Status: DISCONTINUED | OUTPATIENT
Start: 2021-01-27 | End: 2021-01-28 | Stop reason: HOSPADM

## 2021-01-27 RX ORDER — PROPOFOL 10 MG/ML
INJECTION, EMULSION INTRAVENOUS CONTINUOUS PRN
Status: DISCONTINUED | OUTPATIENT
Start: 2021-01-27 | End: 2021-01-27 | Stop reason: SDUPTHER

## 2021-01-27 RX ORDER — MORPHINE SULFATE 2 MG/ML
2 INJECTION, SOLUTION INTRAMUSCULAR; INTRAVENOUS
Status: DISCONTINUED | OUTPATIENT
Start: 2021-01-27 | End: 2021-01-28 | Stop reason: HOSPADM

## 2021-01-27 RX ORDER — VITAMIN B COMPLEX
25 TABLET ORAL DAILY
Status: DISCONTINUED | OUTPATIENT
Start: 2021-01-27 | End: 2021-01-28 | Stop reason: HOSPADM

## 2021-01-27 RX ORDER — ASCORBIC ACID 500 MG
500 TABLET ORAL DAILY
Status: DISCONTINUED | OUTPATIENT
Start: 2021-01-27 | End: 2021-01-28 | Stop reason: HOSPADM

## 2021-01-27 RX ORDER — ISOSORBIDE MONONITRATE 30 MG/1
30 TABLET, EXTENDED RELEASE ORAL DAILY
Status: DISCONTINUED | OUTPATIENT
Start: 2021-01-27 | End: 2021-01-28 | Stop reason: HOSPADM

## 2021-01-27 RX ORDER — SODIUM CHLORIDE, SODIUM LACTATE, POTASSIUM CHLORIDE, CALCIUM CHLORIDE 600; 310; 30; 20 MG/100ML; MG/100ML; MG/100ML; MG/100ML
INJECTION, SOLUTION INTRAVENOUS CONTINUOUS
Status: DISCONTINUED | OUTPATIENT
Start: 2021-01-27 | End: 2021-01-27

## 2021-01-27 RX ORDER — LIDOCAINE HYDROCHLORIDE 10 MG/ML
INJECTION, SOLUTION EPIDURAL; INFILTRATION; INTRACAUDAL; PERINEURAL PRN
Status: DISCONTINUED | OUTPATIENT
Start: 2021-01-27 | End: 2021-01-27 | Stop reason: SDUPTHER

## 2021-01-27 RX ORDER — ATORVASTATIN CALCIUM 40 MG/1
80 TABLET, FILM COATED ORAL DAILY
Status: DISCONTINUED | OUTPATIENT
Start: 2021-01-27 | End: 2021-01-28 | Stop reason: HOSPADM

## 2021-01-27 RX ORDER — ESCITALOPRAM OXALATE 10 MG/1
20 TABLET ORAL DAILY
Status: DISCONTINUED | OUTPATIENT
Start: 2021-01-27 | End: 2021-01-28 | Stop reason: HOSPADM

## 2021-01-27 RX ORDER — BACITRACIN 50000 [USP'U]/1
INJECTION, POWDER, LYOPHILIZED, FOR SOLUTION INTRAMUSCULAR PRN
Status: DISCONTINUED | OUTPATIENT
Start: 2021-01-27 | End: 2021-01-27 | Stop reason: ALTCHOICE

## 2021-01-27 RX ORDER — METOPROLOL SUCCINATE 25 MG/1
25 TABLET, EXTENDED RELEASE ORAL DAILY
Status: DISCONTINUED | OUTPATIENT
Start: 2021-01-27 | End: 2021-01-28 | Stop reason: HOSPADM

## 2021-01-27 RX ORDER — BUPIVACAINE HYDROCHLORIDE 5 MG/ML
INJECTION, SOLUTION EPIDURAL; INTRACAUDAL
Status: COMPLETED | OUTPATIENT
Start: 2021-01-27 | End: 2021-01-27

## 2021-01-27 RX ORDER — SPIRONOLACTONE 25 MG/1
25 TABLET ORAL DAILY
Status: DISCONTINUED | OUTPATIENT
Start: 2021-01-27 | End: 2021-01-28 | Stop reason: HOSPADM

## 2021-01-27 RX ORDER — PROPOFOL 10 MG/ML
INJECTION, EMULSION INTRAVENOUS PRN
Status: DISCONTINUED | OUTPATIENT
Start: 2021-01-27 | End: 2021-01-27 | Stop reason: SDUPTHER

## 2021-01-27 RX ORDER — PNV NO.95/FERROUS FUM/FOLIC AC 28MG-0.8MG
65 TABLET ORAL DAILY
COMMUNITY
Start: 2020-12-17

## 2021-01-27 RX ORDER — SODIUM CHLORIDE 0.9 % (FLUSH) 0.9 %
10 SYRINGE (ML) INJECTION EVERY 12 HOURS SCHEDULED
Status: DISCONTINUED | OUTPATIENT
Start: 2021-01-27 | End: 2021-01-27 | Stop reason: HOSPADM

## 2021-01-27 RX ORDER — FENTANYL CITRATE 50 UG/ML
INJECTION, SOLUTION INTRAMUSCULAR; INTRAVENOUS PRN
Status: DISCONTINUED | OUTPATIENT
Start: 2021-01-27 | End: 2021-01-27 | Stop reason: SDUPTHER

## 2021-01-27 RX ORDER — TRANEXAMIC ACID 100 MG/ML
INJECTION, SOLUTION INTRAVENOUS PRN
Status: DISCONTINUED | OUTPATIENT
Start: 2021-01-27 | End: 2021-01-27 | Stop reason: ALTCHOICE

## 2021-01-27 RX ORDER — MULTIVITAMIN WITH IRON
1 TABLET ORAL DAILY
Status: DISCONTINUED | OUTPATIENT
Start: 2021-01-27 | End: 2021-01-28 | Stop reason: HOSPADM

## 2021-01-27 RX ORDER — ROPIVACAINE HYDROCHLORIDE 5 MG/ML
INJECTION, SOLUTION EPIDURAL; INFILTRATION; PERINEURAL
Status: COMPLETED | OUTPATIENT
Start: 2021-01-27 | End: 2021-01-27

## 2021-01-27 RX ORDER — MIDAZOLAM HYDROCHLORIDE 2 MG/2ML
INJECTION, SOLUTION INTRAMUSCULAR; INTRAVENOUS PRN
Status: DISCONTINUED | OUTPATIENT
Start: 2021-01-27 | End: 2021-01-27 | Stop reason: SDUPTHER

## 2021-01-27 RX ORDER — ACETAMINOPHEN 325 MG/1
650 TABLET ORAL EVERY 6 HOURS
Status: DISCONTINUED | OUTPATIENT
Start: 2021-01-27 | End: 2021-01-28 | Stop reason: HOSPADM

## 2021-01-27 RX ORDER — BUDESONIDE AND FORMOTEROL FUMARATE DIHYDRATE 160; 4.5 UG/1; UG/1
2 AEROSOL RESPIRATORY (INHALATION) EVERY 12 HOURS
Status: DISCONTINUED | OUTPATIENT
Start: 2021-01-27 | End: 2021-01-28 | Stop reason: HOSPADM

## 2021-01-27 RX ORDER — HYDROCHLOROTHIAZIDE 25 MG/1
25 TABLET ORAL DAILY
Status: DISCONTINUED | OUTPATIENT
Start: 2021-01-27 | End: 2021-01-28 | Stop reason: HOSPADM

## 2021-01-27 RX ORDER — SODIUM CHLORIDE 0.9 % (FLUSH) 0.9 %
10 SYRINGE (ML) INJECTION EVERY 12 HOURS SCHEDULED
Status: DISCONTINUED | OUTPATIENT
Start: 2021-01-27 | End: 2021-01-28 | Stop reason: HOSPADM

## 2021-01-27 RX ORDER — SODIUM CHLORIDE 0.9 % (FLUSH) 0.9 %
10 SYRINGE (ML) INJECTION PRN
Status: DISCONTINUED | OUTPATIENT
Start: 2021-01-27 | End: 2021-01-27 | Stop reason: HOSPADM

## 2021-01-27 RX ORDER — MONTELUKAST SODIUM 10 MG/1
10 TABLET ORAL DAILY
Status: DISCONTINUED | OUTPATIENT
Start: 2021-01-27 | End: 2021-01-28 | Stop reason: HOSPADM

## 2021-01-27 RX ORDER — SODIUM CHLORIDE 0.9 % (FLUSH) 0.9 %
10 SYRINGE (ML) INJECTION PRN
Status: DISCONTINUED | OUTPATIENT
Start: 2021-01-27 | End: 2021-01-28 | Stop reason: HOSPADM

## 2021-01-27 RX ORDER — SENNA AND DOCUSATE SODIUM 50; 8.6 MG/1; MG/1
1 TABLET, FILM COATED ORAL 2 TIMES DAILY
Status: DISCONTINUED | OUTPATIENT
Start: 2021-01-27 | End: 2021-01-28 | Stop reason: HOSPADM

## 2021-01-27 RX ORDER — TAMSULOSIN HYDROCHLORIDE 0.4 MG/1
0.4 CAPSULE ORAL EVERY EVENING
Status: DISCONTINUED | OUTPATIENT
Start: 2021-01-27 | End: 2021-01-28 | Stop reason: HOSPADM

## 2021-01-27 RX ADMIN — SODIUM CHLORIDE, POTASSIUM CHLORIDE, SODIUM LACTATE AND CALCIUM CHLORIDE: 600; 310; 30; 20 INJECTION, SOLUTION INTRAVENOUS at 13:17

## 2021-01-27 RX ADMIN — MORPHINE SULFATE 2 MG: 2 INJECTION, SOLUTION INTRAMUSCULAR; INTRAVENOUS at 20:28

## 2021-01-27 RX ADMIN — BUPIVACAINE HYDROCHLORIDE 30 ML: 5 INJECTION, SOLUTION EPIDURAL; INTRACAUDAL; PERINEURAL at 12:27

## 2021-01-27 RX ADMIN — BUPIVACAINE 20 ML: 13.3 INJECTION, SUSPENSION, LIPOSOMAL INFILTRATION at 12:27

## 2021-01-27 RX ADMIN — ISOSORBIDE MONONITRATE 30 MG: 30 TABLET, EXTENDED RELEASE ORAL at 17:28

## 2021-01-27 RX ADMIN — DOCUSATE SODIUM 50 MG AND SENNOSIDES 8.6 MG 1 TABLET: 8.6; 5 TABLET, FILM COATED ORAL at 20:34

## 2021-01-27 RX ADMIN — ACETAMINOPHEN 650 MG: 325 TABLET, FILM COATED ORAL at 21:31

## 2021-01-27 RX ADMIN — ACETAMINOPHEN 650 MG: 325 TABLET, FILM COATED ORAL at 17:28

## 2021-01-27 RX ADMIN — DEXTROSE MONOHYDRATE 3000 MG: 50 INJECTION, SOLUTION INTRAVENOUS at 20:34

## 2021-01-27 RX ADMIN — ESCITALOPRAM OXALATE 20 MG: 10 TABLET ORAL at 17:28

## 2021-01-27 RX ADMIN — BUDESONIDE AND FORMOTEROL FUMARATE DIHYDRATE 2 PUFF: 160; 4.5 AEROSOL RESPIRATORY (INHALATION) at 20:43

## 2021-01-27 RX ADMIN — PROPOFOL 100 MCG/KG/MIN: 10 INJECTION, EMULSION INTRAVENOUS at 12:32

## 2021-01-27 RX ADMIN — ATORVASTATIN CALCIUM 80 MG: 40 TABLET, FILM COATED ORAL at 17:28

## 2021-01-27 RX ADMIN — MIDAZOLAM HYDROCHLORIDE 2 MG: 1 INJECTION, SOLUTION INTRAMUSCULAR; INTRAVENOUS at 11:55

## 2021-01-27 RX ADMIN — PROPOFOL 50 MG: 10 INJECTION, EMULSION INTRAVENOUS at 12:32

## 2021-01-27 RX ADMIN — LIDOCAINE HYDROCHLORIDE 100 MG: 10 INJECTION, SOLUTION EPIDURAL; INFILTRATION; INTRACAUDAL; PERINEURAL at 12:32

## 2021-01-27 RX ADMIN — HYDROCHLOROTHIAZIDE 25 MG: 25 TABLET ORAL at 17:28

## 2021-01-27 RX ADMIN — OXYCODONE HYDROCHLORIDE AND ACETAMINOPHEN 500 MG: 500 TABLET ORAL at 17:28

## 2021-01-27 RX ADMIN — Medication 1000 UNITS: at 17:28

## 2021-01-27 RX ADMIN — SODIUM CHLORIDE, POTASSIUM CHLORIDE, SODIUM LACTATE AND CALCIUM CHLORIDE: 600; 310; 30; 20 INJECTION, SOLUTION INTRAVENOUS at 10:25

## 2021-01-27 RX ADMIN — TAMSULOSIN HYDROCHLORIDE 0.4 MG: 0.4 CAPSULE ORAL at 17:28

## 2021-01-27 RX ADMIN — ASPIRIN 325 MG: 325 TABLET, COATED ORAL at 20:34

## 2021-01-27 RX ADMIN — SODIUM CHLORIDE: 9 INJECTION, SOLUTION INTRAVENOUS at 17:28

## 2021-01-27 RX ADMIN — FENTANYL CITRATE 25 MCG: 50 INJECTION, SOLUTION INTRAMUSCULAR; INTRAVENOUS at 11:55

## 2021-01-27 RX ADMIN — Medication 10 ML: at 20:34

## 2021-01-27 RX ADMIN — MONTELUKAST SODIUM 10 MG: 10 TABLET, FILM COATED ORAL at 17:28

## 2021-01-27 RX ADMIN — DEXTROSE MONOHYDRATE 3000 MG: 50 INJECTION, SOLUTION INTRAVENOUS at 12:20

## 2021-01-27 RX ADMIN — METOPROLOL SUCCINATE 25 MG: 25 TABLET, EXTENDED RELEASE ORAL at 17:28

## 2021-01-27 RX ADMIN — SPIRONOLACTONE 25 MG: 25 TABLET, FILM COATED ORAL at 17:28

## 2021-01-27 RX ADMIN — ROPIVACAINE HYDROCHLORIDE 15 ML: 5 INJECTION, SOLUTION EPIDURAL; INFILTRATION; PERINEURAL at 12:27

## 2021-01-27 RX ADMIN — THERA TABS 1 TABLET: TAB at 17:28

## 2021-01-27 ASSESSMENT — PAIN SCALES - GENERAL
PAINLEVEL_OUTOF10: 0

## 2021-01-27 ASSESSMENT — PAIN DESCRIPTION - PAIN TYPE: TYPE: ACUTE PAIN

## 2021-01-27 ASSESSMENT — ENCOUNTER SYMPTOMS: SHORTNESS OF BREATH: 1

## 2021-01-27 NOTE — PROGRESS NOTES
[facility]  Occupational Therapy  Evaluation  Date: 2021  Patient Name: Keyon Chase        MRN: [id number]    : 1945  (80 y.o.)  Gender: male   Referring Practitioner: Dr. Leidy Burnette  Diagnosis: R TKA  Additional Pertinent Hx: Patient is admitted for an elective R TKA  Past Medical History:   Diagnosis Date    Arthritis     Ascending cholangitis 10/2/14     at [facility] assisted gastric remnant to open    Blood in stool     CAD (coronary artery disease)     Chronic back pain     DDD    Chronic diastolic (congestive) heart failure ([address] 2018    Chronic systolic congestive heart failure ([address] 2018    COPD with acute exacerbation ([facility] [address] 2018    Heart disease 12    History of angioplasty 2008    2 stents placed    Hx of CABG     x1 in     Hyperlipidemia     Hypertension     onset age 41    Obesity     Stress incontinence, male 3/21/2017    Transfusion history     Unspecified sleep apnea     cpap-DOES NOT USE MACHINE     Past Surgical History:   Procedure Laterality Date   Marv Chowdary      Alyssa Robles BARIATRIC SURGERY      Ming HENRY at [facility] bariatric [facility]   [address] Left 12    Left main normal.  Left anterior descending artery:  Plaque disease. Ramus: Plaque disease Circumflex:nondominant, plaque disease. OM1 normal. Right coronoary artery:  dominant & luminal irregularities. Left ventricular ejection fraction 60. Mitral valve normal with no insufficinecy of the mitral valve. Aortic valve normal with no stenosis of the aortic valve.   Edp was normal.    CARDIAC CATHETERIZATION Left 03/04/15 Ambulation Assistance: Independent  Transfer Assistance: Independent  Active : Yes  Prior Function  ADL Assistance: Independent  Homemaking Assistance: Independent  Ambulation Assistance: Independent  Transfer Assistance: Independent    Objective                     Gross LUE Strength: WFL  Gross RUE Strength: WFL  ADL  Feeding: Independent  Grooming: Independent  UE Bathing: Setup  LE Bathing: Contact guard assistance  UE Dressing: Setup  LE Dressing: Contact guard assistance  Toileting: Contact guard assistance   Supine to Sit: Minimal assistance  Sit to Supine: Minimal assistance       Balance  Sitting Balance: Independent  Standing Balance: Contact guard assistance                 Assessment: Patient demonstrated ability to complete bed mobility as noted above. Ambulated to bedside chair, approx 5 feet with CGA. No LOB or SOB noted. Patient is anxious to return home in the morning. Recommend OT services to address all above mentioned deficits in order to [facility] safety and independence prior to returning home. Goals  Short term goals  Time Frame for Short term goals: Patient to complete UB/LB bathing with set up only. Short term goal 1: Patient to complete UB/LB dressing with set up only. Short term goal 2: Patient to demonstrate ability to transfer to bathroom in order to complete toileting task and hygiene independently. Short term goal 3: Patient to tolerate static standing x 5 minutes without LOB in order to complete self care tasks.     Plan       Times per day: Daily(1-2x day)  Weeks: 3 days (1-)          Time In: 1630  Time Out: 1655  Timed Coded Minutes: 0  Total Treatment Time: Son 21, OTR/L  1/27/2021

## 2021-01-27 NOTE — ANESTHESIA POSTPROCEDURE EVALUATION
Department of Anesthesiology  Postprocedure Note    Patient: Roxy Mariscal  MRN: [id number]  YOB: 1945  Date of evaluation: 1/27/2021  Time:  2:53 PM     Procedure Summary     Date: 01/27/21 Room / Location: [address]    Anesthesia Start: 6337 Anesthesia Stop: 6890    Procedure: RIGHT TOTAL KNEE ARTHOPLASTY COMPLICATED BY OBSEITY NEEDS A MARIANNE (Right Knee) Diagnosis: (RIGHT KNEE OSTEOPATHY ARTHRITIS)    Surgeons: Nicolette Clemons DO Responsible Provider: BESS Concepcion CRNA    Anesthesia Type: spinal, regional ASA Status: 3          Anesthesia Type: spinal, regional    Christiano Phase I: Christiano Score: 10    Christiano Phase II:      Last vitals: Reviewed and per EMR flowsheets.        Anesthesia Post Evaluation    Patient location during evaluation: PACU  Patient participation: complete - patient participated  Level of consciousness: awake and alert  Pain score: 0  Airway patency: patent  Nausea & Vomiting: no nausea and no vomiting  Complications: no  Cardiovascular status: blood pressure returned to baseline and hemodynamically stable  Respiratory status: acceptable, room air and spontaneous ventilation  Hydration status: euvolemic

## 2021-01-27 NOTE — BRIEF OP NOTE
Brief Postoperative Note      Patient: Marissa Langston  YOB: 1945  MRN: [id number]    Date of Procedure: 1/27/2021    Pre-Op Diagnosis: RIGHT KNEE OSTEOPATHY ARTHRITIS    Post-Op Diagnosis: Same       Procedure(s):  RIGHT TOTAL KNEE ARTHOPLASTY COMPLICATED BY OBSEITY NEEDS A MARIANNE    Surgeon(s):  Micah Benitez DO    Assistant:  Surgical Assistant: Boston Ansari    Anesthesia: Regional    Estimated Blood Loss (mL): Minimal    Complications: None    Specimens:   * No specimens in log *    Implants:  Implant Name Type Inv.  Item Serial No.  Lot No. LRB No. Used Action   CEMENT BNE RADIOPAQUE FAST SET ACRYL RESIN HI VISC SIMPLEXHV  CEMENT BNE RADIOPAQUE FAST SET ACRYL RESIN HI VISC SIMPLEXHV  [employer] [facility] [id number] Right 2 Implanted   COMPONENT FEM SZ 6 R KNEE POST STBL MARYA TRIATHLON  COMPONENT FEM SZ 6 R KNEE POST STBL MARYA TRIATHLON  [employer] [facility] [id number] Right 1 Implanted   BASEPLATE TIB SZ 7 KNEE TRITANIUM MARYA CADEN LO PROF TRIATHLON  BASEPLATE TIB SZ 7 KNEE TRITANIUM MARYA CADEN LO PROF TRIATHLON  [employer] [facility] [id number] Right 1 Implanted   INSERT TIB BEAR SZ 7 THK9MM KNEE X3 POST STBL TRIATHLON  INSERT TIB BEAR SZ 7 THK9MM KNEE X3 POST STBL TRIATHLON  [employer] [facility] [id number] Right 1 Implanted   IMPLANT PAT [id number] X3 ASYM TRIATHLON  IMPLANT PAT [id number] X3 ASYM TRIATHLON  [employer] [facility] [id number] Right 1 Implanted   ANCHOR SUT L16.3MM DIA6.5MM TI W/ 3 SZ 2 FIBERWIRE CRKSCR  ANCHOR SUT L16.3MM DIA6.5MM TI W/ 3 SZ 2 FIBERWIRE CRKSCR  [address] [facility] [id number] Right 1 Implanted         Drains: * No LDAs found *    Findings: see dictation    Electronically signed by Mireya Salazar DO on 1/27/2021 at 2:33 PM

## 2021-01-27 NOTE — PROGRESS NOTES
[facility]  [facility]  Evaluation  Date: 2021  Patient Name: Zena Ramirez        MRN: [id number]    : 1945  (80 y.o.)  Gender: male   Referring Practitioner: Dr. Andi Olmedo  Diagnosis: Right TKR  Additional Pertinent Hx: Patient admitted for elective right TKR and referred to PT to assess functional mobility and assist with discharge planning   Past Medical History:   Diagnosis Date    Arthritis     Ascending cholangitis 10/2/14     at [facility] assisted gastric remnant to open    Blood in stool     CAD (coronary artery disease)     Chronic back pain     DDD    Chronic diastolic (congestive) heart failure ([address] 2018    Chronic systolic congestive heart failure ([address] 2018    COPD with acute exacerbation ([facility] 2018    Heart disease 12    History of angioplasty 2008    2 stents placed    Hx of CABG     x1 in     Hyperlipidemia     Hypertension     onset age 41    Obesity     Stress incontinence, male 3/21/2017    Transfusion history     Unspecified sleep apnea     cpap-DOES NOT USE MACHINE     Past Surgical History:   Procedure Laterality Date   Kenard Patch  2005    [place] Handler Dr. Cammy Mason BARIATRIC SURGERY      Patience HENRY at [facility] operated by [facility] bariatric hospital   [address] Left 12    Left main normal.  Left anterior descending artery:  Plaque disease. Ramus: Plaque disease Circumflex:nondominant, plaque disease. OM1 normal. Right coronoary artery:  dominant & luminal irregularities. Left ventricular ejection fraction 60. Mitral valve normal with no insufficinecy of the mitral valve. Aortic valve normal with no stenosis of the aortic valve.   Edp was normal.    CARDIAC CATHETERIZATION Left 03/04/15 Dr. Ramirez --Severe CAD, 80% in-stent stenosis in the left anterior descending coronary artery in a rather long segment, very eccentric,extending almost to the ostium of the left anterior descending coronary artery. 70% in-stent stenosis of a very large dominant right coronary artery. Unremarkable small circumflex. Normal left ventricular function, ejection fraction of 60%.  CARDIAC CATHETERIZATION Left 12/05/2018    Dr. Cassandra Moreno @ [facility]--[facility] [place] [address] specific cardiac intervention   [address]  2008    2 unknown heart stents mri 1.5t only    CHOLECYSTECTOMY  05/30/2014    open [facility] Raymond Lucia COLONOSCOPY  10/2015    repeat in 2022    [facility]      2 vessel March 3th 2015    CYSTOSCOPY  05/10/2016    with laser TURP    ERCP  10/2/14    Dr. Oscar Pratt with laparotomy    HERNIA REPAIR  9/2003    Hiatal    JOINT REPLACEMENT Left 03-    bilateral hips    KNEE ARTHROSCOPY Right 9/26/13    Dr. Flavio Benjamin Fusi SURGERY  11/02/2016    rt. L3-4, L4-5 decompression.  L4-5 discectomy and fusion    SHOULDER SURGERY Left 2000    arthroscopy    SHOULDER SURGERY Left 2007    replaced humeral head    TOTAL HIP ARTHROPLASTY Right     02/03/2016       Restrictions  Other position/activity restrictions: FWB RLE      Subjective         Pain Level: 0    Orientation  Overall Orientation Status: Within Normal Limits    Home Living  Type of Home: House  Home Layout: Two level(living on the main level only)  Entrance Stairs - Number of Steps: 4-5  Entrance Stairs - Rails: Right  Home Access: Stairs to enter with rails  Home Equipment: Rolling walker, Cane    Prior Level of Function  Prior Function  ADL Assistance: Independent  Homemaking Assistance: Independent  Ambulation Assistance: Independent  Transfer Assistance: Independent      Objective                 PROM RLE (degrees)  RLE PROM: Exceptions R Knee Flexion 0-145: 50 deg seated knee flexion  R Knee Extension 0: 10 deg  RUE AROM (degrees)  RUE AROM : WNL  LUE AROM (degrees)  LUE AROM : WNL  Strength RLE  Comment: 3/5 as patient able to SLR; fair quad contraction  Strength LLE  Strength LLE: WFL  Strength RUE  Strength RUE: WFL  Strength LUE  Strength LUE: WFL             Supine to Sit: Minimal assistance  Scooting: Supervision  Sit to Stand: Minimal Assistance(x2 for safety)  Stand to sit: Contact guard assistance           Ambulation 1  Surface: level tile  Device: Rolling Walker  Assistance: Contact guard assistance, Minimal assistance(x2 for safety)  Distance: 3-5 ft in room from bed to chair  Balance  Sitting - Static: Good  Sitting - Dynamic: Good  Standing - Static: Fair, +    Assessment  Activity Tolerance: Patient Tolerated treatment well   Chart Reviewed: Yes  Assessment: Patient admitted for elective right TKR and referred to PT for FWB ambulation. Patient able to complete basic transfers and short distance ambulation using wh walker and 2 CG assist for safety.   Patient will require 1-2 additional visits to complete stair ambulation and exrecise education  Prognosis: Good  Discharge Recommendations: Home with Home health PT     Type of devices: Nurse notified, Left in chair, Gait belt, Call light within reach     Plan  Times per week: Daily  Times per day: Twice a day  Current Treatment Recommendations: Strengthening, Functional Mobility Training, Transfer Training, Gait Training, Stair training    Goals  Short term goals  Time Frame for Short term goals: 3 days - expires   Short term goal 1: Independent transfers in/out of bed and chair to safely return home  Short term goal 2: Mod independent ambulation with wh walker x 50-75 ft to safely negotiate home environment  Short term goal 3: Up/down steps with 1 HR and supervision to safely enter/exit home Short term goal 4: Educate on home ex  for ankle pumps and quad sets as well as activity level of ice/ and elevation            Time In: 1630  Time Out: 1655  Timed Coded Minutes: 0  Total Treatment Time: 25     NO RODRIGUEZ, PT 1/27/2021

## 2021-01-27 NOTE — PLAN OF CARE
[facility]    Inpatient Occupational Therapy  Plan of Care  OT Orders Received and Evaluation Complete  Date: 2021  Patient Name: Peter Moreno        MRN: [id number]    : 1945  (70 y.o.)  Referring Practitioner: Dr. Baudilio Rangel  Onset Date: 21  Referral Date: 2021  Diagnosis: R TKA  Treatment Diagnosis: weakness    Identified Problem Areas  Performance deficits / Impairments: Decreased functional mobility , Decreased strength, Decreased high-level IADLs, Decreased balance  Assessment: Patient demonstrated ability to complete bed mobility as noted above. Ambulated to bedside chair, approx 5 feet with CGA. No LOB or SOB noted. Patient is anxious to return home in the morning. Recommend OT services to address all above mentioned deficits in order to [facility] safety and independence prior to returning home. Prognosis: Good     Justification for Skilled Services:  [x]  Complete Daily Tasks Safely  [x] Improve Balance   [x]  Return to Prior Level of Function  []  Family/Caregiver Education    [] Improve UE strength  [x] Patient Education: [x]Adaptive Equipment   [x]Home Exercise Program and Progression    Treatment Plan  Plan  Times per day: Daily(1-2x day)  [] Modalities:  [x] Therapeutic Exercise   [x] Therapeutic Activity  [] Splinting:     [] Home Safety Evaluation         [x] ADL Retraining                       [] Muscle Re-education [] Cognitive Retraining            [] Sensory Integration  [x] Patient Education [x] Home Exercise Program [] Fine Motor Coordination  Discharge Recommendations: Home independently    GOALS  Short term goals  Time Frame for Short term goals: Patient to complete UB/LB bathing with set up only. Short term goal 1: Patient to complete UB/LB dressing with set up only. Short term goal 2: Patient to demonstrate ability to transfer to bathroom in order to complete toileting task and hygiene independently. Short term goal 3: Patient to tolerate static standing x 5 minutes without LOB in order to complete self care tasks. Upon Swingbed Transfer this Plan of Care will be followed until revision is completed.     Roseline Chau OTR/JEAN-PAUL                    Date: 1/27/2021

## 2021-01-27 NOTE — PLAN OF CARE
[facility]  [facility]  Plan of Care  Date: 2021  Patient Name: Alisha Dallas        : 1945  (70 y.o.)  Referring Practitioner: Dr. Pavan Ibarra  Admission Date: 2021  Referral Date : 21  Diagnosis: Right TKR  Treatment Diagnosis: RightTKR;  difficulty with amb  PT Orders Received and Evaluation Complete  Identified Problem Areas:  Prognosis: Good    Justification for Skilled Services:  [] Reduce Falls   [x] Improve Ambulation  [x]  Complete Daily Tasks Safely   [x] Improve Balance   [x] Improve LE strength  []  Return to Prior Level of Function  [x] Improve Functional Mobility   [x]  Family/Caregiver Education  [x] Patient Education: [x]Assistive Devices [x]Home Exercise Program and Progression    Plan  Times per week: Daily  Times per day: Twice a day  [] Modalities:  [x] Therapeutic Exercise   [x] Gait Training  [x] Therapeutic Activity    [] Home Safety Evaluation         [] Massage                        [] Neuromuscular Re-education [] Back Education             [x] Patient Education [] Home Exercise Program  Discharge Recommendations: Home with Home health PT    Rehab Potential:  [x]  Good [] Fair   []  Poor    Goals  Short term goals  Time Frame for Short term goals: 3 days - expires   Short term goal 1: Independent transfers in/out of bed and chair to safely return home  Short term goal 2: Mod independent ambulation with wh walker x 50-75 ft to safely negotiate home environment  Short term goal 3: Up/down steps with 1 HR and supervision to safely enter/exit home  Short term goal 4: Educate on home ex  for ankle pumps and quad sets as well as activity level of ice/ and elevation         Upon Swingbed Transfer this Plan of Care will be followed until revision is complete.     NO RODRIGUEZ, PT   Date: 2021
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
## 2021-01-27 NOTE — ANESTHESIA PROCEDURE NOTES
Peripheral Block    Patient location during procedure: procedure area  Start time: 1/27/2021 12:11 PM  End time: 1/27/2021 12:25 PM  Staffing  Performed: resident/CRNA   Resident/CRNA: BESS Lynn CRNA  Preanesthetic Checklist  Completed: patient identified, IV checked, site marked, risks and benefits discussed, surgical consent, monitors and equipment checked, pre-op evaluation, timeout performed, anesthesia consent given, oxygen available and patient being monitored  Peripheral Block  Patient position: supine  Prep: ChloraPrep  Patient monitoring: cardiac monitor, continuous pulse ox, frequent blood pressure checks and IV access  Block type: Anterior knee, iPacks and Saphenous  Laterality: right  Injection technique: single-shot  Guidance: ultrasound guided  Provider prep: mask and sterile gloves  Needle  Needle type:  Other   Needle gauge: 21 G  Needle length: 11 cm  Needle localization: ultrasound guidanceOther needle type: pajunk  Assessment  Injection assessment: negative aspiration for heme, no paresthesia on injection and local visualized surrounding nerve on ultrasound  Slow fractionated injection: yes  Hemodynamics: stable  Medications Administered  Ropivacaine (NAROPIN) injection 0.5%, 15 mL  bupivacaine liposome (EXPAREL) injection 1.3%, 20 mL  bupivacaine (MARCAINE) PF injection 0.5%, 30 mL  Reason for block: post-op pain management and at surgeon's request

## 2021-01-27 NOTE — ANESTHESIA PROCEDURE NOTES
Spinal Block    Patient location during procedure: procedural area  Start time: 1/27/2021 12:00 PM  End time: 1/27/2021 12:10 PM  Reason for block: primary anesthetic and at surgeon's request  Staffing  Performed: resident/CRNA   Resident/CRNA: BESS Tiwari CRNA  Preanesthetic Checklist  Completed: patient identified, IV checked, site marked, risks and benefits discussed, surgical consent, monitors and equipment checked, pre-op evaluation, timeout performed, anesthesia consent given, oxygen available and patient being monitored  Spinal Block  Patient position: sitting  Prep: ChloraPrep  Patient monitoring: cardiac monitor, continuous pulse ox and frequent blood pressure checks  Approach: midline  Provider prep: mask and sterile gloves  Local infiltration: lidocaine  Agent: bupivacaine  Dose: 1.8  Dose: 1.8  Needle  Needle type: Pencan   Needle gauge: 25 G  Assessment  Sensory level: T8  Swirl obtained: Yes  CSF: clear  Attempts: 1  Hemodynamics: stable

## 2021-01-28 VITALS
SYSTOLIC BLOOD PRESSURE: 120 MMHG | WEIGHT: 315 LBS | OXYGEN SATURATION: 96 % | DIASTOLIC BLOOD PRESSURE: 79 MMHG | HEART RATE: 75 BPM | BODY MASS INDEX: 44.1 KG/M2 | HEIGHT: 71 IN | TEMPERATURE: 98.1 F | RESPIRATION RATE: 18 BRPM

## 2021-01-28 PROBLEM — Z96.651 PRESENCE OF RIGHT ARTIFICIAL KNEE JOINT: Status: ACTIVE | Noted: 2021-01-28

## 2021-01-28 PROBLEM — M17.11 ARTHRITIS OF RIGHT KNEE: Status: ACTIVE | Noted: 2021-01-28

## 2021-01-28 LAB
HCT VFR BLD CALC: 36.4 % (ref 41–53)
HEMOGLOBIN: 12.1 G/DL (ref 13.5–17.5)
MCH RBC QN AUTO: 28.6 PG (ref 26–34)
MCHC RBC AUTO-ENTMCNC: 33.3 G/DL (ref 31–37)
MCV RBC AUTO: 86 FL (ref 80–100)
NRBC AUTOMATED: ABNORMAL PER 100 WBC
PDW BLD-RTO: 15.1 % (ref 12.1–15.2)
PLATELET # BLD: 164 K/UL (ref 140–450)
PMV BLD AUTO: ABNORMAL FL (ref 6–12)
RBC # BLD: 4.23 M/UL (ref 4.5–5.9)
WBC # BLD: 9.1 K/UL (ref 3.5–11)

## 2021-01-28 PROCEDURE — 85027 COMPLETE CBC AUTOMATED: CPT

## 2021-01-28 PROCEDURE — 2580000003 HC RX 258: Performed by: ORTHOPAEDIC SURGERY

## 2021-01-28 PROCEDURE — 6370000000 HC RX 637 (ALT 250 FOR IP): Performed by: ORTHOPAEDIC SURGERY

## 2021-01-28 PROCEDURE — 96376 TX/PRO/DX INJ SAME DRUG ADON: CPT

## 2021-01-28 PROCEDURE — 97116 GAIT TRAINING THERAPY: CPT

## 2021-01-28 PROCEDURE — 6360000002 HC RX W HCPCS: Performed by: ORTHOPAEDIC SURGERY

## 2021-01-28 PROCEDURE — 97110 THERAPEUTIC EXERCISES: CPT

## 2021-01-28 PROCEDURE — 94640 AIRWAY INHALATION TREATMENT: CPT

## 2021-01-28 PROCEDURE — 36415 COLL VENOUS BLD VENIPUNCTURE: CPT

## 2021-01-28 PROCEDURE — 96374 THER/PROPH/DIAG INJ IV PUSH: CPT

## 2021-01-28 PROCEDURE — 94761 N-INVAS EAR/PLS OXIMETRY MLT: CPT

## 2021-01-28 RX ORDER — ASPIRIN 325 MG
325 TABLET, DELAYED RELEASE (ENTERIC COATED) ORAL 2 TIMES DAILY WITH MEALS
Qty: 80 TABLET | Refills: 0 | Status: SHIPPED | OUTPATIENT
Start: 2021-01-28 | End: 2022-03-14 | Stop reason: DRUGHIGH

## 2021-01-28 RX ORDER — OXYCODONE HYDROCHLORIDE AND ACETAMINOPHEN 5; 325 MG/1; MG/1
2 TABLET ORAL EVERY 6 HOURS PRN
Status: DISCONTINUED | OUTPATIENT
Start: 2021-01-28 | End: 2021-01-28 | Stop reason: HOSPADM

## 2021-01-28 RX ORDER — DOCUSATE SODIUM 100 MG/1
100 CAPSULE, LIQUID FILLED ORAL 2 TIMES DAILY
Qty: 40 CAPSULE | Refills: 0 | Status: SHIPPED | OUTPATIENT
Start: 2021-01-28

## 2021-01-28 RX ORDER — OXYCODONE HYDROCHLORIDE AND ACETAMINOPHEN 5; 325 MG/1; MG/1
1 TABLET ORAL EVERY 6 HOURS PRN
Status: DISCONTINUED | OUTPATIENT
Start: 2021-01-28 | End: 2021-01-28 | Stop reason: HOSPADM

## 2021-01-28 RX ORDER — OXYCODONE HYDROCHLORIDE AND ACETAMINOPHEN 5; 325 MG/1; MG/1
1-2 TABLET ORAL EVERY 6 HOURS PRN
Qty: 40 TABLET | Refills: 0 | Status: SHIPPED | OUTPATIENT
Start: 2021-01-28 | End: 2021-01-31

## 2021-01-28 RX ADMIN — SODIUM CHLORIDE: 9 INJECTION, SOLUTION INTRAVENOUS at 03:45

## 2021-01-28 RX ADMIN — HYDROCHLOROTHIAZIDE 25 MG: 25 TABLET ORAL at 08:02

## 2021-01-28 RX ADMIN — BUDESONIDE AND FORMOTEROL FUMARATE DIHYDRATE 2 PUFF: 160; 4.5 AEROSOL RESPIRATORY (INHALATION) at 10:25

## 2021-01-28 RX ADMIN — ESCITALOPRAM OXALATE 20 MG: 10 TABLET ORAL at 08:03

## 2021-01-28 RX ADMIN — SPIRONOLACTONE 25 MG: 25 TABLET, FILM COATED ORAL at 08:03

## 2021-01-28 RX ADMIN — ACETAMINOPHEN 650 MG: 325 TABLET, FILM COATED ORAL at 03:45

## 2021-01-28 RX ADMIN — MORPHINE SULFATE 2 MG: 2 INJECTION, SOLUTION INTRAMUSCULAR; INTRAVENOUS at 00:22

## 2021-01-28 RX ADMIN — DOCUSATE SODIUM 50 MG AND SENNOSIDES 8.6 MG 1 TABLET: 8.6; 5 TABLET, FILM COATED ORAL at 08:03

## 2021-01-28 RX ADMIN — METOPROLOL SUCCINATE 25 MG: 25 TABLET, EXTENDED RELEASE ORAL at 08:03

## 2021-01-28 RX ADMIN — ISOSORBIDE MONONITRATE 30 MG: 30 TABLET, EXTENDED RELEASE ORAL at 08:03

## 2021-01-28 RX ADMIN — Medication 10 ML: at 09:00

## 2021-01-28 RX ADMIN — TIOTROPIUM BROMIDE INHALATION SPRAY 2 PUFF: 3.12 SPRAY, METERED RESPIRATORY (INHALATION) at 10:25

## 2021-01-28 RX ADMIN — MORPHINE SULFATE 2 MG: 2 INJECTION, SOLUTION INTRAMUSCULAR; INTRAVENOUS at 07:04

## 2021-01-28 RX ADMIN — ASPIRIN 325 MG: 325 TABLET, COATED ORAL at 08:03

## 2021-01-28 RX ADMIN — MONTELUKAST SODIUM 10 MG: 10 TABLET, FILM COATED ORAL at 08:03

## 2021-01-28 RX ADMIN — OXYCODONE HYDROCHLORIDE AND ACETAMINOPHEN 500 MG: 500 TABLET ORAL at 08:03

## 2021-01-28 RX ADMIN — ATORVASTATIN CALCIUM 80 MG: 40 TABLET, FILM COATED ORAL at 08:03

## 2021-01-28 RX ADMIN — THERA TABS 1 TABLET: TAB at 08:03

## 2021-01-28 RX ADMIN — OXYCODONE AND ACETAMINOPHEN 2 TABLET: 5; 325 TABLET ORAL at 09:28

## 2021-01-28 RX ADMIN — Medication 1000 UNITS: at 08:03

## 2021-01-28 RX ADMIN — DEXTROSE MONOHYDRATE 3000 MG: 50 INJECTION, SOLUTION INTRAVENOUS at 03:45

## 2021-01-28 ASSESSMENT — PAIN SCALES - GENERAL
PAINLEVEL_OUTOF10: 6
PAINLEVEL_OUTOF10: 6
PAINLEVEL_OUTOF10: 2
PAINLEVEL_OUTOF10: 3

## 2021-01-28 ASSESSMENT — PAIN DESCRIPTION - ORIENTATION: ORIENTATION: RIGHT

## 2021-01-28 ASSESSMENT — PAIN DESCRIPTION - LOCATION: LOCATION: KNEE

## 2021-01-28 NOTE — PROGRESS NOTES
Discharge instructions provided to patient at bedside. Verbalized understanding of follow up appointment, wound care, diet, activity, medications and reasons to return to ED/call physician. All questions answered. Copy of discharge instructions provided. Richa and Aquacel sent with patient. Assisted to wheelchair and taken to personal car. Denies further needs.

## 2021-01-28 NOTE — PROGRESS NOTES
RN phones [facility], leaves a message that pt is being discharged home today. RN asks that office contact pt and provides his contact number in regards to when the therapist will be coming out to see pt tomorrow. Pt informed of this.

## 2021-01-28 NOTE — PROGRESS NOTES
Pharmacist from [facility] calls to question script. This nurse instructs pharmacist to follow instructions written on prescription. 1-2 tablets q 6 hours as needed x 3 days which is a total of 24 tablets. Pharmacist agreeable.

## 2021-01-28 NOTE — OP NOTE
[place] [address]                                OPERATIVE REPORT    PATIENT NAME: Cristofer Montes                       :        1945  MED REC NO:   [id number]                              ROOM:       4603  ACCOUNT NO:   [de-identified]                           ADMIT DATE: 2021  PROVIDER:     Luzma Salazar    DATE OF PROCEDURE:  2021    PREOPERATIVE DIAGNOSIS:  Right knee osteoarthritis, complicated by  morbid obesity. POSTOPERATIVE DIAGNOSIS:  Right knee osteoarthritis, complicated by  morbid obesity. PROCEDURE:  Right total knee arthroplasty, complicated by morbid  obesity. SURGEON:  Luzma Christiansen. Martin, DO    ANESTHESIA:  [place] with regional block as well as spinal.    ESTIMATED BLOOD LOSS:  None. SPECIMENS:  Bone and soft tissue. DRAINS:  None. COMPLICATIONS:  None. IMPLANT:  Ani Triathlon total knee system with a size 6 right  posterior stabilized femur, size 7 tibial baseplate, a 7 x 9 mm  posterior stabilized X3 polyethylene, and a 35 asymmetric patella, a 6.5  mm metallic titanium anchor corkscrew. HISTORY/OPERATIVE INDICATIONS:  The patient is a 37-year-old white male,  who presents complaining of persistent and progressive pain about the  right knee area, progressive deformity. He is found to have clinically  significant osteoarthritis. This does affect his activities of daily  living and quality of life. He does appear to be clinically indicated  cost effective for total knee arthroplasty. Please see the history and  physical, preoperative progress notes for further detail. INTRAOPERATIVE PATHOLOGY:  This case was complicated time efficiency and  exposure secondary to this patient's morbid obesity with a BMI above 45.   Total knee arthroplasty is done with restoration of limb stability, alignment. The patient did tolerate the procedure well under sterile  prep. Restoration of anatomy is attained. OPERATIVE REPORT:  After informed consent is obtained, the risks,  complications, reasonable expectations had been previously discussed,  the patient is taken to the operative suite, placed on the operating  room table in the supine position. At this point, a spinal anesthetic  is induced. This was preceded by regional blockade. The patient is  then appropriately positioned on the table. He is given a well-padded  tourniquet to the right thigh. Sandbag positioned on the table. The  knee is sterilely prepped and draped in the usual surgical fashion, at  which time site verification process undertaken with a time-out  procedure. Limb is exsanguinated with an Esmarch, tourniquet inflated  to 300 mmHg. A midline longitudinal incision is made. Medial  parapatellar approach is utilized. Anterior fat pad, medial and lateral  menisci are removed. Patella is cut free. Knee is flexed. Femoral  initiation drill is applied. Distal cutting jig is appropriately set  and the distal cut is made. The knee is sized accordingly. Four-in-one  cutting block is utilized for the anterior posterior cuts, anterior  posterior chamfer cuts. The box cutting jig is then utilized for the  box for the posterior stabilized component. The focus was then to the  tibia. Intramedullary guiding is performed here. The block is pinned  into position. The cut is made. The remainder of the posterior horns   medial and lateral menisci, posterior osteophytes were removed. The  tibia is sized to a size 7. Dionicio Amada is punched. Trialing done, found to  be most adequate with 9 mm trial polyethylene. At this point, the  trials are removed. The patella was sized to 35 asymmetric. Peg holes  are drilled. This did track quite nicely through the arc of motion.   At this point, the knee is prepared for cementation with pulsed lavage  irrigation. Tibia is cemented followed by the femur, placement of  polyethylene and finally cementation of the patella. Excess cement is  removed. The cement is allowed to harden. The knee is taken through an  arc of motion with restoration of limb stability and alignment. The  procedure is deemed adequate and complete. Of note, there is a small  patellar tendon avulsion at the tibial tubercle. A 6.5 mm metallic  titanium anchor corkscrew is placed to secure the tendon  prophylactically. This is from Arthrex. At this point, a dilute  Betadine soak is performed. This is lavaged away. The knee is injected  with 2 gm of tranexamic acid. The fascial layer is closed with #2  Quill. Lavage again performed. Subcutaneous tissue is closed with 2-0  Quill. Skin is closed with 2-0 Monoderm. Wound is dressed with a Skin  Affix, an Aquacel Ag dressing, an ABD, Webril and an Ace wrap. The  patient is subsequently transferred to [facility] and taken to the PACU in  stable condition. He will be hospitalized this day. ALICIA SHIPMAN    D: 01/27/2021 14:37:24       T: 01/27/2021 14:43:49     [email or web address]  Job#: [id number]     Doc#: [id number]    CC:  Giovanna Singh.  Martin Bailey

## 2021-01-28 NOTE — PROGRESS NOTES
S/P R TKA POD #1:    Pain well controlled. Denies CP or SOB. No new complaints. Exam:  VSS, R thigh and leg supple with expected swelling. Dressing C/D/I. N/V/M intact distally. Mechanical DVT prevention in place. AM labs reviewed. CBC:   Lab Results   Component Value Date    WBC 9.1 01/28/2021    RBC 4.23 01/28/2021    RBC 4.72 05/09/2012    HGB 12.1 01/28/2021    HCT 36.4 01/28/2021    MCV 86.0 01/28/2021    MCH 28.6 01/28/2021    MCHC 33.3 01/28/2021    RDW 15.1 01/28/2021     01/28/2021     05/09/2012    MPV NOT REPORTED 01/28/2021     BMP:    Lab Results   Component Value Date     12/17/2020    K 4.2 12/17/2020     12/17/2020    CO2 26 12/17/2020    BUN 17 12/17/2020    LABALBU 4.1 12/17/2020    CREATININE 1.08 12/17/2020    CALCIUM 9.1 12/17/2020    GFRAA >60 12/17/2020    LABGLOM >60 12/17/2020    GLUCOSE 138 12/17/2020    GLUCOSE 132 05/09/2012       Xrays in RR show stable postion and alignment of the TKA. Impression:  1. S/P R TKA POD #1    Plan:  1. Initiate DVT Lovenox plus mechanical prophylaxis. 2.  Perioperative antibiotics per SCIP protocol. 3.  Begin daily  dressing care. 4.  Discharge planning. 5.  Cryocuff per protocol. 6.  Discharge planning for post inpt needs. 7.  PT and OT services as ordered. 8.  All questions answered at the bedside.

## 2021-01-28 NOTE — PROGRESS NOTES
Quality flow rounds held on 1/28/21     Pasquale Calix is admitted for  Right total knee    Length of stay 1. Education:    Needed Education: weight bearing status, wound care,meds, follow up,diet      Do you have any questions regarding your plan of care while at the hospital? denies    Planned Disposition:               [x]  Home when able - with [facility] program               [] Swing Bed                [] ECF/SNF               [] Other/TBD    Barriers to Discharge:    Can you afford your medications? yes   Do you have transportation to follow up appointments? Drives self or wife drives    Do you need any new equipment at home? none   Current equipment includes Rolling walker, Cane shower chair    Do you have a living will or durable power of  for healthcare? denies               If yes do we have a copy on file? n/a    Do you or your family have any questions or concerns we haven't already discussed? Denies    Lives with wife, denies needs at time of discharge. Pt states he is planning to use [facility] program but is unsure of when the therapist is coming out. RN to contact [facility]. Irina Cordova and writer present for rounding.

## 2021-01-28 NOTE — PROGRESS NOTES
Phone: Ricky  Date: 2021  Fax: [phone]      Physical Therapy    Daily Note    Patient Name: Marissa Langston      : 1945  (70 y.o.)  MRN: [id number]     Pt is PROGRESSING toward goals and increased independence of mobility this treatment session  Discharge Recommendations: Home with Home health PT     Assessment        Supine to Sit: Minimal assistance     Scooting: Supervision    Sit to Stand: Supervision, Modified independent  Stand to sit: Supervision, Modified independent                   Ambulation 1  Surface: level tile  Device: Rolling Walker  Assistance: Stand by assistance, Supervision  Distance: 75 ftx2  Comments: patient room to therapy and back to patient room        Stairs  # Steps : 5(x2 laps)  Stairs Height: 4\"(and 6\")  Rails: Bilateral  Assistance: Minimal assistance, Contact guard assistance    Assessment: Patient in chair upon arrival to room. Sit to stand from chair is supervision. Requests need for bathroom. Ambulates with wheeled walker into bathroom. Independent on and off commode. Good standing balance to wash his hands. He is then able to ambulate to therapy room without LOB. Completes seated exercises as outlined above. Up/down steps 4\" and 6\" in height with B handrails with min assist/CGA. Ambulates back to room following to sit up in chair. Plans to discharge home later today and will continue with home care PT.   Safety Devices  Type of devices: Call light within reach, Gait belt, Left in chair          Time In: 0840  Time Out: 0911  Timed Coded Minutes: 31  Total Treatment Time: 31    Exercises:  See Flowsheets    Plan  Cont Per Plan Of Care    Goals  Short Term Goals  Time Frame for Short term goals: 3 days - expires   Short term goal 1: Independent transfers in/out of bed and chair to safely return home-MET  Short term goal 2: Mod independent ambulation with wh walker x 50-75 ft to safely negotiate home environment Short term goal 3: Up/down steps with 1 HR and supervision to safely enter/exit home  Short term goal 4: Educate on home ex  for ankle pumps and quad sets as well as activity level of ice/ and elevation-MET       [id number] License Number: [id number]    Date: 1/28/2021

## 2021-01-28 NOTE — PROGRESS NOTES
Met with Patient during quality flow rounding this a.m. Patient is a 70year old , white male, admitted with a diagnosis of Arthritis of his Right knee. Patient is alert and oriented, pleasant and cooperative with this assessment. States that his discharge plan is to return home with [facility] to follow for home therapy. Patient resides in [place] with his wife. He uses a cane, shower chair and a walker for assistance at home. Patient uses no outside services or resources at this time. He ordinarily drives himself but will rely on family to do so until he is able to drive again, per MD.     PCP is Zoie Nava CNP. Patient reports no difficulty with affording his medications. Discharge plan is home with [facility] to follow.  to verify time of follow up home visit for Patient. Patient is a 'Full Code' status and does have the paperwork for Advanced Directives but has yet to complete. Encouraged Patient to allow LSW to assist both he and his wife at their earliest convenience. No further anticipated discharge planning needs are identified by Patient at this time. LSW to monitor for needs and assist with discharge planning as appropriate.     Asya. Kady Murphy 71, Ayla Garcia  1/28/2021

## 2021-01-29 ENCOUNTER — TELEPHONE (OUTPATIENT)
Dept: PRIMARY CARE CLINIC | Age: 76
End: 2021-01-29

## 2021-01-29 NOTE — TELEPHONE ENCOUNTER
[facility] Transitions Initial Follow Up Call    Outreach made within 2 business days of discharge: Yes    Patient: Mike Levy Patient : 1945   MRN: [id number]  Reason for Admission: There are no discharge diagnoses documented for the most recent discharge. Discharge Date: 21       Spoke with: Becca Or    Discharge department/facility: [facility] Patient Contact:  Was patient able to fill all prescriptions: Yes  Was patient instructed to bring all medications to the follow-up visit: Yes  Is patient taking all medications as directed in the discharge summary?  Yes  Does patient understand their discharge instructions: Yes  Does patient have questions or concerns that need addressed prior to 7-14 day follow up office visit: no    Scheduled appointment with PCP within 7-14 days    Follow Up  Future Appointments   Date Time Provider Gerry Marin   3/3/2021 10:00 AM Rahul Willoughby MD Neuro Endo [facility]   3/15/2021  9:00 AM Lior Miller MD [address]   2021  9:00 AM Zeus Carlin APRN - CNP nw pc [facility]   2021 10:30 AM Tara Mcfarlane MD TIFF UROLOGY [facility]       Isabella Miles MA

## 2021-02-01 LAB — SURGICAL PATHOLOGY REPORT: NORMAL

## 2021-02-02 ENCOUNTER — OFFICE VISIT (OUTPATIENT)
Dept: PRIMARY CARE CLINIC | Age: 76
End: 2021-02-02
Payer: MEDICARE

## 2021-02-02 VITALS
OXYGEN SATURATION: 96 % | DIASTOLIC BLOOD PRESSURE: 88 MMHG | SYSTOLIC BLOOD PRESSURE: 132 MMHG | HEART RATE: 58 BPM | TEMPERATURE: 97 F | HEIGHT: 71 IN | BODY MASS INDEX: 44.1 KG/M2 | WEIGHT: 315 LBS

## 2021-02-02 DIAGNOSIS — F32.A DEPRESSION, UNSPECIFIED DEPRESSION TYPE: ICD-10-CM

## 2021-02-02 DIAGNOSIS — Z96.651 S/P TOTAL KNEE ARTHROPLASTY, RIGHT: Primary | ICD-10-CM

## 2021-02-02 DIAGNOSIS — H61.21 HEARING LOSS OF RIGHT EAR DUE TO CERUMEN IMPACTION: ICD-10-CM

## 2021-02-02 DIAGNOSIS — M17.0 PRIMARY OSTEOARTHRITIS OF BOTH KNEES: ICD-10-CM

## 2021-02-02 PROCEDURE — 99496 TRANSJ CARE MGMT HIGH F2F 7D: CPT | Performed by: NURSE PRACTITIONER

## 2021-02-02 PROCEDURE — 1111F DSCHRG MED/CURRENT MED MERGE: CPT | Performed by: NURSE PRACTITIONER

## 2021-02-02 PROCEDURE — 69210 REMOVE IMPACTED EAR WAX UNI: CPT | Performed by: NURSE PRACTITIONER

## 2021-02-02 SDOH — ECONOMIC STABILITY: FOOD INSECURITY: WITHIN THE PAST 12 MONTHS, THE FOOD YOU BOUGHT JUST DIDN'T LAST AND YOU DIDN'T HAVE MONEY TO GET MORE.: NEVER TRUE

## 2021-02-02 SDOH — ECONOMIC STABILITY: TRANSPORTATION INSECURITY
IN THE PAST 12 MONTHS, HAS LACK OF TRANSPORTATION KEPT YOU FROM MEETINGS, WORK, OR FROM GETTING THINGS NEEDED FOR DAILY LIVING?: NO

## 2021-02-02 SDOH — ECONOMIC STABILITY: TRANSPORTATION INSECURITY
IN THE PAST 12 MONTHS, HAS THE LACK OF TRANSPORTATION KEPT YOU FROM MEDICAL APPOINTMENTS OR FROM GETTING MEDICATIONS?: NO

## 2021-02-02 ASSESSMENT — ENCOUNTER SYMPTOMS
EYES NEGATIVE: 1
CHEST TIGHTNESS: 0
RHINORRHEA: 0
ABDOMINAL DISTENTION: 0
COUGH: 0

## 2021-02-02 ASSESSMENT — PATIENT HEALTH QUESTIONNAIRE - PHQ9
SUM OF ALL RESPONSES TO PHQ QUESTIONS 1-9: 2
SUM OF ALL RESPONSES TO PHQ QUESTIONS 1-9: 2
2. FEELING DOWN, DEPRESSED OR HOPELESS: 0
SUM OF ALL RESPONSES TO PHQ QUESTIONS 1-9: 2
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
## 2021-02-02 NOTE — PATIENT INSTRUCTIONS
SURVEY:    You may be receiving a survey from [employer] regarding your visit today. Please complete the survey to enable us to provide the highest quality of care to you and your family. If you cannot score us a very good on any question, please call the office to discuss how we could of made your experience a very good one. Thank you.

## 2021-02-08 ENCOUNTER — HOSPITAL ENCOUNTER (INPATIENT)
Age: 76
LOS: 3 days | Discharge: HOME | DRG: 908 | End: 2021-02-11
Attending: FAMILY MEDICINE | Admitting: INTERNAL MEDICINE
Payer: MEDICARE

## 2021-02-08 DIAGNOSIS — T81.31XA POSTOPERATIVE WOUND DEHISCENCE, INITIAL ENCOUNTER: Primary | ICD-10-CM

## 2021-02-08 LAB
ABSOLUTE EOS #: 0.3 K/UL (ref 0–0.4)
ABSOLUTE IMMATURE GRANULOCYTE: ABNORMAL K/UL (ref 0–0.3)
ABSOLUTE LYMPH #: 1 K/UL (ref 1–4.8)
ABSOLUTE MONO #: 0.6 K/UL (ref 0–1)
ALBUMIN SERPL-MCNC: 3.7 G/DL (ref 3.5–5.2)
ALBUMIN/GLOBULIN RATIO: ABNORMAL (ref 1–2.5)
ALP BLD-CCNC: 103 U/L (ref 40–129)
ALT SERPL-CCNC: 20 U/L (ref 5–41)
ANION GAP SERPL CALCULATED.3IONS-SCNC: 9 MMOL/L (ref 9–17)
AST SERPL-CCNC: 36 U/L
BASOPHILS # BLD: 0 % (ref 0–2)
BASOPHILS ABSOLUTE: 0 K/UL (ref 0–0.2)
BILIRUB SERPL-MCNC: 0.68 MG/DL (ref 0.3–1.2)
BUN BLDV-MCNC: 23 MG/DL (ref 8–23)
BUN/CREAT BLD: 21 (ref 9–20)
CALCIUM SERPL-MCNC: 10.1 MG/DL (ref 8.6–10.4)
CHLORIDE BLD-SCNC: 101 MMOL/L (ref 98–107)
CO2: 27 MMOL/L (ref 20–31)
CREAT SERPL-MCNC: 1.1 MG/DL (ref 0.7–1.2)
DIFFERENTIAL TYPE: YES
EOSINOPHILS RELATIVE PERCENT: 4 % (ref 0–5)
GFR AFRICAN AMERICAN: >60 ML/MIN
GFR NON-AFRICAN AMERICAN: >60 ML/MIN
GFR SERPL CREATININE-BSD FRML MDRD: ABNORMAL ML/MIN/{1.73_M2}
GFR SERPL CREATININE-BSD FRML MDRD: ABNORMAL ML/MIN/{1.73_M2}
GLUCOSE BLD-MCNC: 131 MG/DL (ref 70–99)
HCT VFR BLD CALC: 36.7 % (ref 41–53)
HEMOGLOBIN: 12.4 G/DL (ref 13.5–17.5)
IMMATURE GRANULOCYTES: ABNORMAL %
LYMPHOCYTES # BLD: 12 % (ref 13–44)
MCH RBC QN AUTO: 28.8 PG (ref 26–34)
MCHC RBC AUTO-ENTMCNC: 33.9 G/DL (ref 31–37)
MCV RBC AUTO: 85.1 FL (ref 80–100)
MONOCYTES # BLD: 7 % (ref 5–9)
NRBC AUTOMATED: ABNORMAL PER 100 WBC
PDW BLD-RTO: 14.6 % (ref 12.1–15.2)
PLATELET # BLD: 217 K/UL (ref 140–450)
PLATELET ESTIMATE: ABNORMAL
PMV BLD AUTO: ABNORMAL FL (ref 6–12)
POTASSIUM SERPL-SCNC: 4.9 MMOL/L (ref 3.7–5.3)
RBC # BLD: 4.31 M/UL (ref 4.5–5.9)
RBC # BLD: ABNORMAL 10*6/UL
SARS-COV-2, RAPID: NOT DETECTED
SARS-COV-2: NORMAL
SARS-COV-2: NORMAL
SEG NEUTROPHILS: 77 % (ref 39–75)
SEGMENTED NEUTROPHILS ABSOLUTE COUNT: 6.4 K/UL (ref 2.1–6.5)
SODIUM BLD-SCNC: 137 MMOL/L (ref 135–144)
SOURCE: NORMAL
TOTAL PROTEIN: 6.4 G/DL (ref 6.4–8.3)
WBC # BLD: 8.3 K/UL (ref 3.5–11)
WBC # BLD: ABNORMAL 10*3/UL

## 2021-02-08 PROCEDURE — U0002 COVID-19 LAB TEST NON-CDC: HCPCS

## 2021-02-08 PROCEDURE — 85025 COMPLETE CBC W/AUTO DIFF WBC: CPT

## 2021-02-08 PROCEDURE — 1200000000 HC SEMI PRIVATE

## 2021-02-08 PROCEDURE — 6360000002 HC RX W HCPCS: Performed by: FAMILY MEDICINE

## 2021-02-08 PROCEDURE — 2580000003 HC RX 258: Performed by: FAMILY MEDICINE

## 2021-02-08 PROCEDURE — 94664 DEMO&/EVAL PT USE INHALER: CPT

## 2021-02-08 PROCEDURE — 6370000000 HC RX 637 (ALT 250 FOR IP): Performed by: INTERNAL MEDICINE

## 2021-02-08 PROCEDURE — 94761 N-INVAS EAR/PLS OXIMETRY MLT: CPT

## 2021-02-08 PROCEDURE — 6360000002 HC RX W HCPCS

## 2021-02-08 PROCEDURE — C9803 HOPD COVID-19 SPEC COLLECT: HCPCS

## 2021-02-08 PROCEDURE — 6360000002 HC RX W HCPCS: Performed by: INTERNAL MEDICINE

## 2021-02-08 PROCEDURE — 99284 EMERGENCY DEPT VISIT MOD MDM: CPT

## 2021-02-08 PROCEDURE — 80053 COMPREHEN METABOLIC PANEL: CPT

## 2021-02-08 PROCEDURE — 2580000003 HC RX 258: Performed by: INTERNAL MEDICINE

## 2021-02-08 RX ORDER — SODIUM CHLORIDE 0.9 % (FLUSH) 0.9 %
10 SYRINGE (ML) INJECTION EVERY 12 HOURS SCHEDULED
Status: DISCONTINUED | OUTPATIENT
Start: 2021-02-08 | End: 2021-02-11 | Stop reason: HOSPADM

## 2021-02-08 RX ORDER — CEFAZOLIN SODIUM 2 G/50ML
2000 SOLUTION INTRAVENOUS ONCE
Status: DISCONTINUED | OUTPATIENT
Start: 2021-02-08 | End: 2021-02-08

## 2021-02-08 RX ORDER — ACETAMINOPHEN 650 MG/1
650 SUPPOSITORY RECTAL EVERY 6 HOURS PRN
Status: DISCONTINUED | OUTPATIENT
Start: 2021-02-08 | End: 2021-02-11 | Stop reason: HOSPADM

## 2021-02-08 RX ORDER — MORPHINE SULFATE 2 MG/ML
2 INJECTION, SOLUTION INTRAMUSCULAR; INTRAVENOUS EVERY 4 HOURS PRN
Status: DISCONTINUED | OUTPATIENT
Start: 2021-02-08 | End: 2021-02-11 | Stop reason: HOSPADM

## 2021-02-08 RX ORDER — PANTOPRAZOLE SODIUM 40 MG/1
40 TABLET, DELAYED RELEASE ORAL
Status: DISCONTINUED | OUTPATIENT
Start: 2021-02-09 | End: 2021-02-11 | Stop reason: HOSPADM

## 2021-02-08 RX ORDER — ESCITALOPRAM OXALATE 10 MG/1
20 TABLET ORAL DAILY
Status: DISCONTINUED | OUTPATIENT
Start: 2021-02-09 | End: 2021-02-11 | Stop reason: HOSPADM

## 2021-02-08 RX ORDER — ALBUTEROL SULFATE 2.5 MG/3ML
2.5 SOLUTION RESPIRATORY (INHALATION) 2 TIMES DAILY
Status: DISCONTINUED | OUTPATIENT
Start: 2021-02-08 | End: 2021-02-11 | Stop reason: HOSPADM

## 2021-02-08 RX ORDER — ASCORBIC ACID 500 MG
500 TABLET ORAL DAILY
Status: DISCONTINUED | OUTPATIENT
Start: 2021-02-09 | End: 2021-02-11 | Stop reason: HOSPADM

## 2021-02-08 RX ORDER — DOCUSATE SODIUM 100 MG/1
100 CAPSULE, LIQUID FILLED ORAL 2 TIMES DAILY
Status: DISCONTINUED | OUTPATIENT
Start: 2021-02-08 | End: 2021-02-11 | Stop reason: HOSPADM

## 2021-02-08 RX ORDER — TAMSULOSIN HYDROCHLORIDE 0.4 MG/1
0.4 CAPSULE ORAL EVERY EVENING
Status: DISCONTINUED | OUTPATIENT
Start: 2021-02-08 | End: 2021-02-11 | Stop reason: HOSPADM

## 2021-02-08 RX ORDER — BUDESONIDE AND FORMOTEROL FUMARATE DIHYDRATE 160; 4.5 UG/1; UG/1
2 AEROSOL RESPIRATORY (INHALATION) EVERY 12 HOURS
Status: DISCONTINUED | OUTPATIENT
Start: 2021-02-08 | End: 2021-02-11 | Stop reason: HOSPADM

## 2021-02-08 RX ORDER — MORPHINE SULFATE 2 MG/ML
2 INJECTION, SOLUTION INTRAMUSCULAR; INTRAVENOUS EVERY 4 HOURS PRN
Status: DISCONTINUED | OUTPATIENT
Start: 2021-02-08 | End: 2021-02-09 | Stop reason: SDUPTHER

## 2021-02-08 RX ORDER — ISOSORBIDE MONONITRATE 30 MG/1
30 TABLET, EXTENDED RELEASE ORAL DAILY
Status: DISCONTINUED | OUTPATIENT
Start: 2021-02-08 | End: 2021-02-11 | Stop reason: HOSPADM

## 2021-02-08 RX ORDER — SODIUM CHLORIDE 0.9 % (FLUSH) 0.9 %
10 SYRINGE (ML) INJECTION PRN
Status: DISCONTINUED | OUTPATIENT
Start: 2021-02-08 | End: 2021-02-11 | Stop reason: HOSPADM

## 2021-02-08 RX ORDER — VITAMIN B COMPLEX
25 TABLET ORAL DAILY
Status: DISCONTINUED | OUTPATIENT
Start: 2021-02-08 | End: 2021-02-11 | Stop reason: HOSPADM

## 2021-02-08 RX ORDER — METOPROLOL SUCCINATE 25 MG/1
25 TABLET, EXTENDED RELEASE ORAL DAILY
Status: DISCONTINUED | OUTPATIENT
Start: 2021-02-09 | End: 2021-02-11 | Stop reason: HOSPADM

## 2021-02-08 RX ORDER — VANCOMYCIN HYDROCHLORIDE 1 G/20ML
INJECTION, POWDER, LYOPHILIZED, FOR SOLUTION INTRAVENOUS
Status: COMPLETED
Start: 2021-02-08 | End: 2021-02-08

## 2021-02-08 RX ORDER — LOSARTAN POTASSIUM 50 MG/1
25 TABLET ORAL DAILY
Status: DISCONTINUED | OUTPATIENT
Start: 2021-02-09 | End: 2021-02-11 | Stop reason: HOSPADM

## 2021-02-08 RX ORDER — VANCOMYCIN HYDROCHLORIDE 500 MG/10ML
INJECTION, POWDER, LYOPHILIZED, FOR SOLUTION INTRAVENOUS
Status: COMPLETED
Start: 2021-02-08 | End: 2021-02-08

## 2021-02-08 RX ORDER — SODIUM CHLORIDE 9 MG/ML
INJECTION, SOLUTION INTRAVENOUS CONTINUOUS
Status: DISCONTINUED | OUTPATIENT
Start: 2021-02-08 | End: 2021-02-09

## 2021-02-08 RX ORDER — SPIRONOLACTONE 25 MG/1
25 TABLET ORAL DAILY
Status: DISCONTINUED | OUTPATIENT
Start: 2021-02-09 | End: 2021-02-11 | Stop reason: HOSPADM

## 2021-02-08 RX ORDER — ALBUTEROL SULFATE 90 UG/1
2 AEROSOL, METERED RESPIRATORY (INHALATION) EVERY 4 HOURS PRN
Status: DISCONTINUED | OUTPATIENT
Start: 2021-02-08 | End: 2021-02-11 | Stop reason: HOSPADM

## 2021-02-08 RX ORDER — FERROUS SULFATE 325(65) MG
325 TABLET ORAL 2 TIMES DAILY
Status: DISCONTINUED | OUTPATIENT
Start: 2021-02-08 | End: 2021-02-11 | Stop reason: HOSPADM

## 2021-02-08 RX ORDER — MONTELUKAST SODIUM 10 MG/1
10 TABLET ORAL DAILY
Status: DISCONTINUED | OUTPATIENT
Start: 2021-02-08 | End: 2021-02-11 | Stop reason: HOSPADM

## 2021-02-08 RX ORDER — ATORVASTATIN CALCIUM 40 MG/1
80 TABLET, FILM COATED ORAL DAILY
Status: DISCONTINUED | OUTPATIENT
Start: 2021-02-09 | End: 2021-02-11 | Stop reason: HOSPADM

## 2021-02-08 RX ORDER — HYDROCHLOROTHIAZIDE 25 MG/1
25 TABLET ORAL DAILY
Status: DISCONTINUED | OUTPATIENT
Start: 2021-02-09 | End: 2021-02-11 | Stop reason: HOSPADM

## 2021-02-08 RX ORDER — ONDANSETRON 2 MG/ML
4 INJECTION INTRAMUSCULAR; INTRAVENOUS EVERY 6 HOURS PRN
Status: DISCONTINUED | OUTPATIENT
Start: 2021-02-08 | End: 2021-02-11 | Stop reason: HOSPADM

## 2021-02-08 RX ORDER — ACETAMINOPHEN 325 MG/1
650 TABLET ORAL EVERY 6 HOURS PRN
Status: DISCONTINUED | OUTPATIENT
Start: 2021-02-08 | End: 2021-02-11 | Stop reason: HOSPADM

## 2021-02-08 RX ORDER — POLYETHYLENE GLYCOL 3350 17 G/17G
17 POWDER, FOR SOLUTION ORAL DAILY PRN
Status: DISCONTINUED | OUTPATIENT
Start: 2021-02-08 | End: 2021-02-11 | Stop reason: HOSPADM

## 2021-02-08 RX ORDER — PROMETHAZINE HYDROCHLORIDE 25 MG/1
12.5 TABLET ORAL EVERY 6 HOURS PRN
Status: DISCONTINUED | OUTPATIENT
Start: 2021-02-08 | End: 2021-02-11 | Stop reason: HOSPADM

## 2021-02-08 RX ADMIN — SODIUM CHLORIDE, PRESERVATIVE FREE 10 ML: 5 INJECTION INTRAVENOUS at 23:01

## 2021-02-08 RX ADMIN — PIPERACILLIN SODIUM AND TAZOBACTAM SODIUM 3375 MG: 3; .375 INJECTION, POWDER, LYOPHILIZED, FOR SOLUTION INTRAVENOUS at 18:31

## 2021-02-08 RX ADMIN — SODIUM CHLORIDE: 9 INJECTION, SOLUTION INTRAVENOUS at 18:27

## 2021-02-08 RX ADMIN — VANCOMYCIN HYDROCHLORIDE 500 MG: 500 INJECTION, POWDER, LYOPHILIZED, FOR SOLUTION INTRAVENOUS at 19:43

## 2021-02-08 RX ADMIN — VANCOMYCIN HYDROCHLORIDE 1000 MG: 1 INJECTION, POWDER, LYOPHILIZED, FOR SOLUTION INTRAVENOUS at 19:43

## 2021-02-08 RX ADMIN — TIOTROPIUM BROMIDE INHALATION SPRAY 2 PUFF: 3.12 SPRAY, METERED RESPIRATORY (INHALATION) at 21:15

## 2021-02-08 RX ADMIN — BUDESONIDE AND FORMOTEROL FUMARATE DIHYDRATE 2 PUFF: 160; 4.5 AEROSOL RESPIRATORY (INHALATION) at 21:15

## 2021-02-08 RX ADMIN — WATER 1000 MG: 1 INJECTION INTRAMUSCULAR; INTRAVENOUS; SUBCUTANEOUS at 15:29

## 2021-02-08 RX ADMIN — ALBUTEROL SULFATE 2.5 MG: 2.5 SOLUTION RESPIRATORY (INHALATION) at 21:15

## 2021-02-08 RX ADMIN — FERROUS SULFATE TAB 325 MG (65 MG ELEMENTAL FE) 325 MG: 325 (65 FE) TAB at 20:36

## 2021-02-08 RX ADMIN — DOCUSATE SODIUM 100 MG: 100 CAPSULE, LIQUID FILLED ORAL at 20:36

## 2021-02-08 RX ADMIN — TAMSULOSIN HYDROCHLORIDE 0.4 MG: 0.4 CAPSULE ORAL at 20:36

## 2021-02-08 SDOH — HEALTH STABILITY: MENTAL HEALTH: HOW OFTEN DO YOU HAVE A DRINK CONTAINING ALCOHOL?: NEVER

## 2021-02-08 ASSESSMENT — PAIN SCALES - GENERAL
PAINLEVEL_OUTOF10: 2
PAINLEVEL_OUTOF10: 2
PAINLEVEL_OUTOF10: 0

## 2021-02-08 ASSESSMENT — PAIN DESCRIPTION - DESCRIPTORS: DESCRIPTORS: THROBBING

## 2021-02-08 ASSESSMENT — PAIN DESCRIPTION - LOCATION: LOCATION: KNEE

## 2021-02-08 ASSESSMENT — PAIN DESCRIPTION - FREQUENCY: FREQUENCY: CONTINUOUS
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
## 2021-02-08 NOTE — PROGRESS NOTES
Images taken of lt knee per ED report when patient arrived to floor. Dr Sweet Monday in and round  on patient/ dressed wound. Knee immobilizer reapplied by physician after dressing wound. Patient reports pain \"not bad at all\" and rates it as a \"1 or so\".  Electronically signed by Fernando Lopez RN on 2/8/2021 at 6:53 PM

## 2021-02-09 ENCOUNTER — ANESTHESIA EVENT (OUTPATIENT)
Dept: OPERATING ROOM | Age: 76
DRG: 908 | End: 2021-02-09
Payer: MEDICARE

## 2021-02-09 ENCOUNTER — APPOINTMENT (OUTPATIENT)
Dept: GENERAL RADIOLOGY | Age: 76
DRG: 908 | End: 2021-02-09
Payer: MEDICARE

## 2021-02-09 ENCOUNTER — ANESTHESIA (OUTPATIENT)
Dept: OPERATING ROOM | Age: 76
DRG: 908 | End: 2021-02-09
Payer: MEDICARE

## 2021-02-09 VITALS
DIASTOLIC BLOOD PRESSURE: 78 MMHG | TEMPERATURE: 98.6 F | SYSTOLIC BLOOD PRESSURE: 124 MMHG | OXYGEN SATURATION: 100 % | RESPIRATION RATE: 31 BRPM

## 2021-02-09 LAB
ABSOLUTE EOS #: 0.2 K/UL (ref 0–0.4)
ABSOLUTE IMMATURE GRANULOCYTE: ABNORMAL K/UL (ref 0–0.3)
ABSOLUTE LYMPH #: 0.9 K/UL (ref 1–4.8)
ABSOLUTE MONO #: 0.5 K/UL (ref 0–1)
ANION GAP SERPL CALCULATED.3IONS-SCNC: 10 MMOL/L (ref 9–17)
BASOPHILS # BLD: 1 % (ref 0–2)
BASOPHILS ABSOLUTE: 0 K/UL (ref 0–0.2)
BUN BLDV-MCNC: 18 MG/DL (ref 8–23)
BUN/CREAT BLD: 17 (ref 9–20)
CALCIUM SERPL-MCNC: 9.7 MG/DL (ref 8.6–10.4)
CHLORIDE BLD-SCNC: 102 MMOL/L (ref 98–107)
CO2: 24 MMOL/L (ref 20–31)
CREAT SERPL-MCNC: 1.06 MG/DL (ref 0.7–1.2)
DIFFERENTIAL TYPE: YES
EOSINOPHILS RELATIVE PERCENT: 4 % (ref 0–5)
GFR AFRICAN AMERICAN: >60 ML/MIN
GFR NON-AFRICAN AMERICAN: >60 ML/MIN
GFR SERPL CREATININE-BSD FRML MDRD: ABNORMAL ML/MIN/{1.73_M2}
GFR SERPL CREATININE-BSD FRML MDRD: ABNORMAL ML/MIN/{1.73_M2}
GLUCOSE BLD-MCNC: 139 MG/DL (ref 70–99)
HCT VFR BLD CALC: 36 % (ref 41–53)
HEMOGLOBIN: 12 G/DL (ref 13.5–17.5)
IMMATURE GRANULOCYTES: ABNORMAL %
LYMPHOCYTES # BLD: 15 % (ref 13–44)
MCH RBC QN AUTO: 28.7 PG (ref 26–34)
MCHC RBC AUTO-ENTMCNC: 33.4 G/DL (ref 31–37)
MCV RBC AUTO: 85.9 FL (ref 80–100)
MONOCYTES # BLD: 8 % (ref 5–9)
NRBC AUTOMATED: ABNORMAL PER 100 WBC
PDW BLD-RTO: 15.2 % (ref 12.1–15.2)
PLATELET # BLD: 211 K/UL (ref 140–450)
PLATELET ESTIMATE: ABNORMAL
PMV BLD AUTO: ABNORMAL FL (ref 6–12)
POTASSIUM SERPL-SCNC: 3.8 MMOL/L (ref 3.7–5.3)
RBC # BLD: 4.19 M/UL (ref 4.5–5.9)
RBC # BLD: ABNORMAL 10*6/UL
SEG NEUTROPHILS: 72 % (ref 39–75)
SEGMENTED NEUTROPHILS ABSOLUTE COUNT: 4.7 K/UL (ref 2.1–6.5)
SODIUM BLD-SCNC: 136 MMOL/L (ref 135–144)
WBC # BLD: 6.4 K/UL (ref 3.5–11)
WBC # BLD: ABNORMAL 10*3/UL

## 2021-02-09 PROCEDURE — 85025 COMPLETE CBC W/AUTO DIFF WBC: CPT

## 2021-02-09 PROCEDURE — 2580000003 HC RX 258: Performed by: NURSE ANESTHETIST, CERTIFIED REGISTERED

## 2021-02-09 PROCEDURE — 6360000002 HC RX W HCPCS: Performed by: NURSE ANESTHETIST, CERTIFIED REGISTERED

## 2021-02-09 PROCEDURE — 94761 N-INVAS EAR/PLS OXIMETRY MLT: CPT

## 2021-02-09 PROCEDURE — 2580000003 HC RX 258: Performed by: INTERNAL MEDICINE

## 2021-02-09 PROCEDURE — 0JDN0ZZ EXTRACTION OF RIGHT LOWER LEG SUBCUTANEOUS TISSUE AND FASCIA, OPEN APPROACH: ICD-10-PCS | Performed by: ORTHOPAEDIC SURGERY

## 2021-02-09 PROCEDURE — 3600000015 HC SURGERY LEVEL 5 ADDTL 15MIN: Performed by: ORTHOPAEDIC SURGERY

## 2021-02-09 PROCEDURE — 7100000001 HC PACU RECOVERY - ADDTL 15 MIN: Performed by: ORTHOPAEDIC SURGERY

## 2021-02-09 PROCEDURE — 3700000001 HC ADD 15 MINUTES (ANESTHESIA): Performed by: ORTHOPAEDIC SURGERY

## 2021-02-09 PROCEDURE — 2580000003 HC RX 258: Performed by: ORTHOPAEDIC SURGERY

## 2021-02-09 PROCEDURE — 6370000000 HC RX 637 (ALT 250 FOR IP): Performed by: ORTHOPAEDIC SURGERY

## 2021-02-09 PROCEDURE — 6360000002 HC RX W HCPCS: Performed by: INTERNAL MEDICINE

## 2021-02-09 PROCEDURE — 0LUQ0KZ SUPPLEMENT RIGHT KNEE TENDON WITH NONAUTOLOGOUS TISSUE SUBSTITUTE, OPEN APPROACH: ICD-10-PCS | Performed by: ORTHOPAEDIC SURGERY

## 2021-02-09 PROCEDURE — 3600000005 HC SURGERY LEVEL 5 BASE: Performed by: ORTHOPAEDIC SURGERY

## 2021-02-09 PROCEDURE — 6360000002 HC RX W HCPCS: Performed by: ORTHOPAEDIC SURGERY

## 2021-02-09 PROCEDURE — 36415 COLL VENOUS BLD VENIPUNCTURE: CPT

## 2021-02-09 PROCEDURE — 6370000000 HC RX 637 (ALT 250 FOR IP): Performed by: INTERNAL MEDICINE

## 2021-02-09 PROCEDURE — C1713 ANCHOR/SCREW BN/BN,TIS/BN: HCPCS | Performed by: ORTHOPAEDIC SURGERY

## 2021-02-09 PROCEDURE — 3E0T3BZ INTRODUCTION OF ANESTHETIC AGENT INTO PERIPHERAL NERVES AND PLEXI, PERCUTANEOUS APPROACH: ICD-10-PCS | Performed by: NURSE ANESTHETIST, CERTIFIED REGISTERED

## 2021-02-09 PROCEDURE — 3700000000 HC ANESTHESIA ATTENDED CARE: Performed by: ORTHOPAEDIC SURGERY

## 2021-02-09 PROCEDURE — 64447 NJX AA&/STRD FEMORAL NRV IMG: CPT | Performed by: NURSE ANESTHETIST, CERTIFIED REGISTERED

## 2021-02-09 PROCEDURE — 1200000000 HC SEMI PRIVATE

## 2021-02-09 PROCEDURE — 73560 X-RAY EXAM OF KNEE 1 OR 2: CPT

## 2021-02-09 PROCEDURE — 0SPC09Z REMOVAL OF LINER FROM RIGHT KNEE JOINT, OPEN APPROACH: ICD-10-PCS | Performed by: ORTHOPAEDIC SURGERY

## 2021-02-09 PROCEDURE — 7100000000 HC PACU RECOVERY - FIRST 15 MIN: Performed by: ORTHOPAEDIC SURGERY

## 2021-02-09 PROCEDURE — 0SUV09Z SUPPLEMENT RIGHT KNEE JOINT, TIBIAL SURFACE WITH LINER, OPEN APPROACH: ICD-10-PCS | Performed by: ORTHOPAEDIC SURGERY

## 2021-02-09 PROCEDURE — 80048 BASIC METABOLIC PNL TOTAL CA: CPT

## 2021-02-09 PROCEDURE — 94640 AIRWAY INHALATION TREATMENT: CPT

## 2021-02-09 PROCEDURE — 2500000003 HC RX 250 WO HCPCS: Performed by: ORTHOPAEDIC SURGERY

## 2021-02-09 PROCEDURE — 2500000003 HC RX 250 WO HCPCS: Performed by: NURSE ANESTHETIST, CERTIFIED REGISTERED

## 2021-02-09 PROCEDURE — 0LQQ0ZZ REPAIR RIGHT KNEE TENDON, OPEN APPROACH: ICD-10-PCS | Performed by: ORTHOPAEDIC SURGERY

## 2021-02-09 PROCEDURE — 2709999900 HC NON-CHARGEABLE SUPPLY: Performed by: ORTHOPAEDIC SURGERY

## 2021-02-09 PROCEDURE — C1776 JOINT DEVICE (IMPLANTABLE): HCPCS | Performed by: ORTHOPAEDIC SURGERY

## 2021-02-09 DEVICE — ANCHOR SUT L16.3MM DIA6.5MM TI W/ 3 SZ 2 FIBERWIRE CRKSCR: Type: IMPLANTABLE DEVICE | Site: KNEE | Status: FUNCTIONAL

## 2021-02-09 DEVICE — INSERT TIB BEAR SZ 7 THK9MM KNEE X3 POST STBL TRIATHLON: Type: IMPLANTABLE DEVICE | Site: KNEE | Status: FUNCTIONAL

## 2021-02-09 RX ORDER — SODIUM CHLORIDE 0.9 % (FLUSH) 0.9 %
10 SYRINGE (ML) INJECTION PRN
Status: DISCONTINUED | OUTPATIENT
Start: 2021-02-09 | End: 2021-02-11 | Stop reason: HOSPADM

## 2021-02-09 RX ORDER — SODIUM CHLORIDE 0.9 % (FLUSH) 0.9 %
10 SYRINGE (ML) INJECTION EVERY 12 HOURS SCHEDULED
Status: DISCONTINUED | OUTPATIENT
Start: 2021-02-09 | End: 2021-02-11 | Stop reason: HOSPADM

## 2021-02-09 RX ORDER — SENNA AND DOCUSATE SODIUM 50; 8.6 MG/1; MG/1
1 TABLET, FILM COATED ORAL 2 TIMES DAILY
Status: DISCONTINUED | OUTPATIENT
Start: 2021-02-09 | End: 2021-02-11 | Stop reason: HOSPADM

## 2021-02-09 RX ORDER — ACETAMINOPHEN 325 MG/1
650 TABLET ORAL EVERY 6 HOURS
Status: DISCONTINUED | OUTPATIENT
Start: 2021-02-09 | End: 2021-02-10

## 2021-02-09 RX ORDER — LIDOCAINE HYDROCHLORIDE 10 MG/ML
5 INJECTION, SOLUTION INFILTRATION; PERINEURAL ONCE
Status: DISCONTINUED | OUTPATIENT
Start: 2021-02-09 | End: 2021-02-11 | Stop reason: HOSPADM

## 2021-02-09 RX ORDER — OXYCODONE HYDROCHLORIDE AND ACETAMINOPHEN 5; 325 MG/1; MG/1
1 TABLET ORAL EVERY 4 HOURS PRN
Status: DISCONTINUED | OUTPATIENT
Start: 2021-02-09 | End: 2021-02-11 | Stop reason: HOSPADM

## 2021-02-09 RX ORDER — METOCLOPRAMIDE HYDROCHLORIDE 5 MG/ML
INJECTION INTRAMUSCULAR; INTRAVENOUS PRN
Status: DISCONTINUED | OUTPATIENT
Start: 2021-02-09 | End: 2021-02-09 | Stop reason: SDUPTHER

## 2021-02-09 RX ORDER — ONDANSETRON 2 MG/ML
INJECTION INTRAMUSCULAR; INTRAVENOUS PRN
Status: DISCONTINUED | OUTPATIENT
Start: 2021-02-09 | End: 2021-02-09 | Stop reason: SDUPTHER

## 2021-02-09 RX ORDER — SODIUM CHLORIDE 9 MG/ML
INJECTION, SOLUTION INTRAVENOUS CONTINUOUS
Status: DISCONTINUED | OUTPATIENT
Start: 2021-02-09 | End: 2021-02-10

## 2021-02-09 RX ORDER — LIDOCAINE HYDROCHLORIDE 10 MG/ML
INJECTION, SOLUTION EPIDURAL; INFILTRATION; INTRACAUDAL; PERINEURAL PRN
Status: DISCONTINUED | OUTPATIENT
Start: 2021-02-09 | End: 2021-02-09 | Stop reason: SDUPTHER

## 2021-02-09 RX ORDER — PHENYLEPHRINE HYDROCHLORIDE 10 MG/ML
INJECTION INTRAVENOUS PRN
Status: DISCONTINUED | OUTPATIENT
Start: 2021-02-09 | End: 2021-02-09 | Stop reason: SDUPTHER

## 2021-02-09 RX ORDER — BUPIVACAINE HYDROCHLORIDE 7.5 MG/ML
INJECTION, SOLUTION INTRASPINAL PRN
Status: DISCONTINUED | OUTPATIENT
Start: 2021-02-09 | End: 2021-02-09 | Stop reason: SDUPTHER

## 2021-02-09 RX ORDER — MIDAZOLAM HYDROCHLORIDE 2 MG/2ML
INJECTION, SOLUTION INTRAMUSCULAR; INTRAVENOUS PRN
Status: DISCONTINUED | OUTPATIENT
Start: 2021-02-09 | End: 2021-02-09 | Stop reason: SDUPTHER

## 2021-02-09 RX ORDER — ROPIVACAINE HYDROCHLORIDE 5 MG/ML
INJECTION, SOLUTION EPIDURAL; INFILTRATION; PERINEURAL
Status: DISCONTINUED | OUTPATIENT
Start: 2021-02-09 | End: 2021-02-09 | Stop reason: SDUPTHER

## 2021-02-09 RX ORDER — SODIUM CHLORIDE, SODIUM LACTATE, POTASSIUM CHLORIDE, CALCIUM CHLORIDE 600; 310; 30; 20 MG/100ML; MG/100ML; MG/100ML; MG/100ML
INJECTION, SOLUTION INTRAVENOUS CONTINUOUS PRN
Status: DISCONTINUED | OUTPATIENT
Start: 2021-02-09 | End: 2021-02-09 | Stop reason: SDUPTHER

## 2021-02-09 RX ORDER — PROPOFOL 10 MG/ML
INJECTION, EMULSION INTRAVENOUS CONTINUOUS PRN
Status: DISCONTINUED | OUTPATIENT
Start: 2021-02-09 | End: 2021-02-09 | Stop reason: SDUPTHER

## 2021-02-09 RX ORDER — OXYCODONE HYDROCHLORIDE AND ACETAMINOPHEN 5; 325 MG/1; MG/1
2 TABLET ORAL EVERY 4 HOURS PRN
Status: DISCONTINUED | OUTPATIENT
Start: 2021-02-09 | End: 2021-02-11 | Stop reason: HOSPADM

## 2021-02-09 RX ORDER — TRANEXAMIC ACID 100 MG/ML
INJECTION, SOLUTION INTRAVENOUS PRN
Status: DISCONTINUED | OUTPATIENT
Start: 2021-02-09 | End: 2021-02-09 | Stop reason: HOSPADM

## 2021-02-09 RX ADMIN — BUPIVACAINE HYDROCHLORIDE 1.6 ML: 7.5 INJECTION, SOLUTION SUBARACHNOID at 17:09

## 2021-02-09 RX ADMIN — DOCUSATE SODIUM 50MG AND SENNOSIDES 8.6MG 1 TABLET: 8.6; 5 TABLET, FILM COATED ORAL at 22:25

## 2021-02-09 RX ADMIN — BUDESONIDE AND FORMOTEROL FUMARATE DIHYDRATE 2 PUFF: 160; 4.5 AEROSOL RESPIRATORY (INHALATION) at 21:09

## 2021-02-09 RX ADMIN — LIDOCAINE HYDROCHLORIDE 5 ML: 10 INJECTION, SOLUTION EPIDURAL; INFILTRATION; INTRACAUDAL; PERINEURAL at 17:06

## 2021-02-09 RX ADMIN — MIDAZOLAM HYDROCHLORIDE 1 MG: 1 INJECTION, SOLUTION INTRAMUSCULAR; INTRAVENOUS at 17:16

## 2021-02-09 RX ADMIN — OXYCODONE HYDROCHLORIDE AND ACETAMINOPHEN 500 MG: 500 TABLET ORAL at 09:24

## 2021-02-09 RX ADMIN — ONDANSETRON 4 MG: 2 INJECTION INTRAMUSCULAR; INTRAVENOUS at 17:05

## 2021-02-09 RX ADMIN — MIDAZOLAM HYDROCHLORIDE 1 MG: 1 INJECTION, SOLUTION INTRAMUSCULAR; INTRAVENOUS at 17:00

## 2021-02-09 RX ADMIN — FERROUS SULFATE TAB 325 MG (65 MG ELEMENTAL FE) 325 MG: 325 (65 FE) TAB at 09:24

## 2021-02-09 RX ADMIN — CHOLECALCIFEROL TAB 25 MCG (1000 UNIT) 1000 UNITS: 25 TAB at 09:24

## 2021-02-09 RX ADMIN — METOCLOPRAMIDE 10 MG: 5 INJECTION, SOLUTION INTRAMUSCULAR; INTRAVENOUS at 17:06

## 2021-02-09 RX ADMIN — SPIRONOLACTONE 25 MG: 25 TABLET, FILM COATED ORAL at 09:24

## 2021-02-09 RX ADMIN — SODIUM CHLORIDE, POTASSIUM CHLORIDE, SODIUM LACTATE AND CALCIUM CHLORIDE: 600; 310; 30; 20 INJECTION, SOLUTION INTRAVENOUS at 17:30

## 2021-02-09 RX ADMIN — ACETAMINOPHEN 650 MG: 325 TABLET, FILM COATED ORAL at 22:26

## 2021-02-09 RX ADMIN — METOPROLOL SUCCINATE 25 MG: 25 TABLET, EXTENDED RELEASE ORAL at 09:24

## 2021-02-09 RX ADMIN — DOCUSATE SODIUM 100 MG: 100 CAPSULE, LIQUID FILLED ORAL at 22:26

## 2021-02-09 RX ADMIN — BUDESONIDE AND FORMOTEROL FUMARATE DIHYDRATE 2 PUFF: 160; 4.5 AEROSOL RESPIRATORY (INHALATION) at 09:08

## 2021-02-09 RX ADMIN — VANCOMYCIN HYDROCHLORIDE 1500 MG: 1 INJECTION, POWDER, LYOPHILIZED, FOR SOLUTION INTRAVENOUS at 22:21

## 2021-02-09 RX ADMIN — PIPERACILLIN SODIUM AND TAZOBACTAM SODIUM 3375 MG: 3; .375 INJECTION, POWDER, LYOPHILIZED, FOR SOLUTION INTRAVENOUS at 09:23

## 2021-02-09 RX ADMIN — ALBUTEROL SULFATE 2.5 MG: 2.5 SOLUTION RESPIRATORY (INHALATION) at 21:09

## 2021-02-09 RX ADMIN — ASPIRIN 325 MG: 325 TABLET, COATED ORAL at 22:25

## 2021-02-09 RX ADMIN — DOCUSATE SODIUM 100 MG: 100 CAPSULE, LIQUID FILLED ORAL at 09:23

## 2021-02-09 RX ADMIN — PIPERACILLIN SODIUM AND TAZOBACTAM SODIUM 3375 MG: 3; .375 INJECTION, POWDER, LYOPHILIZED, FOR SOLUTION INTRAVENOUS at 17:16

## 2021-02-09 RX ADMIN — ALBUTEROL SULFATE 2.5 MG: 2.5 SOLUTION RESPIRATORY (INHALATION) at 09:07

## 2021-02-09 RX ADMIN — TIOTROPIUM BROMIDE INHALATION SPRAY 2 PUFF: 3.12 SPRAY, METERED RESPIRATORY (INHALATION) at 21:09

## 2021-02-09 RX ADMIN — LOSARTAN POTASSIUM 25 MG: 50 TABLET ORAL at 09:24

## 2021-02-09 RX ADMIN — ESCITALOPRAM OXALATE 20 MG: 10 TABLET ORAL at 09:23

## 2021-02-09 RX ADMIN — FERROUS SULFATE TAB 325 MG (65 MG ELEMENTAL FE) 325 MG: 325 (65 FE) TAB at 22:25

## 2021-02-09 RX ADMIN — LIDOCAINE HYDROCHLORIDE 2 ML: 10 INJECTION, SOLUTION EPIDURAL; INFILTRATION; INTRACAUDAL; PERINEURAL at 17:09

## 2021-02-09 RX ADMIN — PHENYLEPHRINE HYDROCHLORIDE 100 MCG: 10 INJECTION INTRAVENOUS at 17:15

## 2021-02-09 RX ADMIN — ISOSORBIDE MONONITRATE 30 MG: 30 TABLET, EXTENDED RELEASE ORAL at 09:24

## 2021-02-09 RX ADMIN — PIPERACILLIN SODIUM AND TAZOBACTAM SODIUM 3375 MG: 3; .375 INJECTION, POWDER, LYOPHILIZED, FOR SOLUTION INTRAVENOUS at 01:07

## 2021-02-09 RX ADMIN — SODIUM CHLORIDE: 9 INJECTION, SOLUTION INTRAVENOUS at 22:39

## 2021-02-09 RX ADMIN — SODIUM CHLORIDE, PRESERVATIVE FREE 10 ML: 5 INJECTION INTRAVENOUS at 09:27

## 2021-02-09 RX ADMIN — PROPOFOL 75 MCG/KG/MIN: 10 INJECTION, EMULSION INTRAVENOUS at 17:06

## 2021-02-09 RX ADMIN — HYDROCHLOROTHIAZIDE 25 MG: 25 TABLET ORAL at 09:24

## 2021-02-09 RX ADMIN — ROPIVACAINE HYDROCHLORIDE 15 ML: 5 INJECTION, SOLUTION EPIDURAL; INFILTRATION; PERINEURAL at 20:00

## 2021-02-09 RX ADMIN — Medication 10 ML: at 22:25

## 2021-02-09 RX ADMIN — MONTELUKAST SODIUM 10 MG: 10 TABLET, FILM COATED ORAL at 09:24

## 2021-02-09 RX ADMIN — ATORVASTATIN CALCIUM 80 MG: 40 TABLET, FILM COATED ORAL at 09:24

## 2021-02-09 RX ADMIN — TIOTROPIUM BROMIDE INHALATION SPRAY 2 PUFF: 3.12 SPRAY, METERED RESPIRATORY (INHALATION) at 09:07

## 2021-02-09 RX ADMIN — TAMSULOSIN HYDROCHLORIDE 0.4 MG: 0.4 CAPSULE ORAL at 22:25

## 2021-02-09 ASSESSMENT — PAIN SCALES - GENERAL
PAINLEVEL_OUTOF10: 1
PAINLEVEL_OUTOF10: 0
PAINLEVEL_OUTOF10: 2
PAINLEVEL_OUTOF10: 0
PAINLEVEL_OUTOF10: 0
PAINLEVEL_OUTOF10: 2
PAINLEVEL_OUTOF10: 0
PAINLEVEL_OUTOF10: 1
PAINLEVEL_OUTOF10: 0
PAINLEVEL_OUTOF10: 0
PAINLEVEL_OUTOF10: 1

## 2021-02-09 ASSESSMENT — ENCOUNTER SYMPTOMS: SHORTNESS OF BREATH: 1

## 2021-02-09 ASSESSMENT — PAIN DESCRIPTION - DESCRIPTORS: DESCRIPTORS: DULL

## 2021-02-09 ASSESSMENT — PAIN DESCRIPTION - LOCATION
LOCATION: KNEE
LOCATION: KNEE

## 2021-02-09 NOTE — ED PROVIDER NOTES
[address] [facility]  eMERGENCY dEPARTMENT eNCOUnter          [address] [facility]       Chief Complaint   Patient presents with    Post-op Problem     Patient arrives to ER today with complaints of a right knee evisceration following a knee replacement on 1/27 done here by Dr. Caroline Nunes. Patient reports that he was going to stand up and saw blood running down his leg and found that his stitches had opened up. Nurses Notes reviewed and I agree except as noted in the HPI. HISTORY OF PRESENT ILLNESS    Aurora Panchal is a 70 y.o. male who presents to the emergency room via private vehicle, patient complaining of dehiscence of surgical wound in his right knee, states he was post to be admitted directly tonight to this facility for planned surgery in 2 days, had seen his orthopedic surgeon this morning, however had gone home and had bent his knee though denies falling onto it or striking it otherwise, and had wound dehiscence. Patient denies other injury. Patient rates his pain 2 out of 10, throbbing. REVIEW OF SYSTEMS     Review of Systems   Skin: Positive for wound. All other systems reviewed and are negative. PAST MEDICAL HISTORY    has a past medical history of Arthritis, Ascending cholangitis, Blood in stool, CAD (coronary artery disease), Chronic back pain, Chronic diastolic (congestive) heart failure (HCC), Chronic systolic congestive heart failure ([address] COPD with acute exacerbation ([address] Heart disease, History of angioplasty, Hx of CABG, Hyperlipidemia, Hypertension, Obesity, Stress incontinence, male, Transfusion history, and Unspecified sleep apnea.     SURGICAL HISTORY has a past surgical history that includes shoulder surgery (Left, 2000); Bariatric Surgery (2001); hernia repair (09/2003); back surgery (2005); shoulder surgery (Left, 2007); Cardiac surgery (2008); Cardiac catheterization (Left, 05/09/2012); Knee arthroscopy (Right, 09/26/2013); ERCP (10/02/2014); Cholecystectomy (05/30/2014); Cardiac catheterization (Left, 03/04/2015); Coronary artery bypass graft; Total hip arthroplasty (Right); Liver surgery; Colonoscopy (10/2015); Cystocopy (05/10/2016); Lumbar spine surgery (11/02/2016); Cardiac catheterization (Left, 12/05/2018); joint replacement (Bilateral, 03/20/2013); Total knee arthroplasty (Right, 01/27/2021); joint replacement (Right, 01/27/2021); and Anterior cruciate ligament repair (Right, 2/9/2021).     CURRENT MEDICATIONS       Discharge Medication List as of 2/11/2021  2:39 PM      CONTINUE these medications which have NOT CHANGED    Details   docusate sodium (COLACE) 100 MG capsule Take 1 capsule by mouth 2 times daily, Disp-40 capsule, R-0Normal      aspirin 325 MG EC tablet Take 1 tablet by mouth 2 times daily (with meals), Disp-80 tablet, R-0Normal      Ferrous Sulfate (IRON) 325 (65 Fe) MG TABS 65 mg 2 times dailyHistorical Med      losartan (COZAAR) 25 MG tablet Take 1 tablet by mouth once daily, Disp-90 tablet, R-1Normal      spironolactone (ALDACTONE) 25 MG tablet Take 1 tablet by mouth once daily, Disp-30 tablet, R-0Normal      escitalopram (LEXAPRO) 20 MG tablet Take 1 tablet by mouth once daily, Disp-90 tablet, R-1Normal      budesonide-formoterol (SYMBICORT) 160-4.5 MCG/ACT AERO Inhale 2 puffs into the lungs every 12 hoursHistorical Med      montelukast (SINGULAIR) 10 MG tablet Take 10 mg by mouth dailyHistorical Med      tiotropium (SPIRIVA RESPIMAT) 2.5 MCG/ACT AERS inhaler Inhale 2 puffs into the lungs 2 times daily Historical Med      fluticasone (FLONASE) 50 MCG/ACT nasal spray 1 spray by Nasal route daily, Disp-16 g, R-2Normal Multiple Vitamin (THERA/BETA-CAROTENE) TABS Take 1 tablet by mouth dailyHistorical Med      vitamin C (ASCORBIC ACID) 500 MG tablet Take 500 mg by mouth dailyHistorical Med      tamsulosin (FLOMAX) 0.4 MG capsule Take 1 capsule by mouth every evening, Disp-90 capsule, R-3Normal      metoprolol succinate (TOPROL XL) 25 MG extended release tablet Take 1 tablet by mouth daily, Disp-90 tablet, R-2Normal      albuterol (PROVENTIL) (2.5 MG/3ML) 0.083% nebulizer solution Take 3 mLs by nebulization 4 times daily, Disp-120 each, R-3Normal      ipratropium (ATROVENT) 0.02 % nebulizer solution Take 2.5 mLs by nebulization 4 times daily, Disp-187.5 mL, R-3Normal      atorvastatin (LIPITOR) 80 MG tablet Take 1 tablet by mouth daily, Disp-90 tablet, R-2Normal      isosorbide mononitrate (IMDUR) 30 MG extended release tablet Take 1 tablet by mouth daily, Disp-90 tablet, R-2Normal      hydroCHLOROthiazide (HYDRODIURIL) 25 MG tablet Take 1 tablet by mouth once daily, Disp-90 tablet, R-2Normal      Vitamin D, Cholecalciferol, 25 MCG (1000 UT) TABS Take by mouth daily Historical Med      albuterol sulfate  (90 Base) MCG/ACT inhaler INHALE 2 PUFFS BY MOUTH EVERY 4 HOURS AS NEEDED FOR WHEEZING, Disp-18 g, R-2Normal      omeprazole (PRILOSEC) 40 MG delayed release capsule TAKE 1 CAPSULE BY MOUTH ONCE DAILY 30 MINUTES BEFORE BREAKFAST. Historical Med             ALLERGIES     has No Known Allergies. FAMILY HISTORY     He indicated that his mother is . He indicated that his father is . He indicated that all of his four sisters are alive. He indicated that all of his three brothers are alive. He indicated that his maternal grandmother is . He indicated that his maternal grandfather is . He indicated that his paternal grandmother is . He indicated that his paternal grandfather is . family history includes Arthritis in his mother; Cancer in his father and sister; Diabetes in his mother; Heart Disease in his brother and mother; High Blood Pressure in his mother; High Cholesterol in his mother; Liver Disease in his paternal grandfather; Obesity in his sister. SOCIAL HISTORY      reports that he has never smoked. He has never used smokeless tobacco. He reports that he does not drink alcohol or use drugs. PHYSICAL EXAM     INITIAL VITALS:  height is 6' (1.829 m) and weight is 336 lb 4.8 oz (152.5 kg) (abnormal). His oral temperature is 97.8 °F (36.6 °C). His blood pressure is 118/78 and his pulse is 76. His respiration is 15 and oxygen saturation is 98%. Physical Exam   Constitutional: Patient is oriented to person, place, and time. Patient appears well-developed and well-nourished. Patient is active and cooperative. HENT:   Head: Normocephalic and atraumatic. Head is without contusion. Right Ear: Hearing and external ear normal. No drainage. Left Ear: Hearing and external ear normal. No drainage. Nose: Nose normal. No nasal deformity. No epistaxis. Mouth/Throat: Mucous membranes are not dry. Eyes: EOMI. Conjunctivae, sclera, and lids are normal. Right eye exhibits no discharge. Left eye exhibits no discharge. Neck: Full passive range of motion without pain and phonation normal.   Cardiovascular:  Normal rate, regular rhythm and intact distal pulses. Noted bilateral lower extremity edema with 1+ pitting, cool to the touch with some delayed capillary refill  Pulses: Right DP 1+   Pulmonary/Chest: Effort normal. No tachypnea and no bradypnea. Abdominal: BMI 45, soft  Musculoskeletal:   Focused examination patient's right knee shows dehiscence of surgical wound over the anterior right knee, 1 measuring 4 cm at its widest and 12 cm in length, an elliptical pattern. There is no active bleeding no noted dried blood down patient's leg. Except as otherwise noted, negative acute trauma or deformity,  apparent full range of motion and normal strength all extremities appropriate to age. Neurological: Patient is alert and oriented to person, place, and time. patient displays no tremor. Patient displays no seizure activity. .    Skin: Skin is warm and dry. Patient is not diaphoretic. Psychiatric: Patient has a normal mood and pleasant affect. Patient speech is normal and behavior is normal. Cognition and memory are normal.    DIFFERENTIAL DIAGNOSIS:   Wound dehiscence    DIAGNOSTIC RESULTS           RADIOLOGY: non-plain film images(s) such as CT, Ultrasound and MRI are read by the radiologist.  XR KNEE RIGHT (1-2 VIEWS)   Final Result      No change.              LABS:   Labs Reviewed   CBC WITH AUTO DIFFERENTIAL - Abnormal; Notable for the following components:       Result Value    RBC 4.31 (*)     Hemoglobin 12.4 (*)     Hematocrit 36.7 (*)     Seg Neutrophils 77 (*)     Lymphocytes 12 (*)     All other components within normal limits   COMPREHENSIVE METABOLIC PANEL W/ REFLEX TO MG FOR LOW K - Abnormal; Notable for the following components:    Glucose 131 (*)     Bun/Cre Ratio 21 (*)     All other components within normal limits   BASIC METABOLIC PANEL W/ REFLEX TO MG FOR LOW K - Abnormal; Notable for the following components:    Glucose 139 (*)     All other components within normal limits   CBC WITH AUTO DIFFERENTIAL - Abnormal; Notable for the following components:    RBC 4.19 (*)     Hemoglobin 12.0 (*)     Hematocrit 36.0 (*)     Absolute Lymph # 0.90 (*)     All other components within normal limits   BASIC METABOLIC PANEL - Abnormal; Notable for the following components:    Glucose 176 (*)     Anion Gap 8 (*)     All other components within normal limits   CBC - Abnormal; Notable for the following components:    RBC 4.31 (*)     Hemoglobin 12.3 (*)     Hematocrit 36.9 (*)     All other components within normal limits CBC - Abnormal; Notable for the following components:    RBC 4.07 (*)     Hemoglobin 11.6 (*)     Hematocrit 35.1 (*)     All other components within normal limits   BASIC METABOLIC PANEL - Abnormal; Notable for the following components:    Glucose 132 (*)     All other components within normal limits   COVID-19   VANCOMYCIN, TROUGH   CBC WITH AUTO DIFFERENTIAL       EMERGENCY DEPARTMENT COURSE:   Vitals:    Vitals:    02/10/21 2036 02/11/21 0545 02/11/21 0815 02/11/21 0902   BP:   118/78    Pulse:   76    Resp: 18 16 18 15   Temp:   97.8 °F (36.6 °C)    TempSrc:   Oral    SpO2: 97% 97% 96% 98%   Weight:       Height:         Patient history and physical exam taken at bedside, discussed patient symptoms and exam findings, after visualizing and measuring wound size, we did place sterile dressing top of wound, will contact patient's orthopedic surgeon to discuss case. Case was discussed with Dr. Janet Coy, orthopedic surgery, regarding patient's presentation current work-up, advises gentle packing and wrapping the wound, admit through hospitalist    Case was discussed with Dr. Valerie Rodríguez, hospitalist, guarding patient's presentation and current work-up including conversation with orthopedic surgery, accepted for admission    Discussed with patient my conversation with both his orthopedic surgeon as well as hospitalist, as patient was said to be direct admitted later this evening for planned surgery in 2 days, go ahead and with admission at this time, patient acknowledges    FINAL IMPRESSION      1.  Postoperative wound dehiscence, initial encounter          DISPOSITION/PLAN   admit    PATIENT REFERRED TO:  Igor Clay DO  [address]  [address] [place] [address]  [phone]    Go on 2/25/2021  at [address] [place] [address] in the [facility] for , post surgery check up      DISCHARGE MEDICATIONS:  Discharge Medication List as of 2/11/2021  2:39 PM      START taking these medications    Details doxycycline hyclate (VIBRA-TABS) 100 MG tablet Take 1 tablet by mouth every 12 hours for 10 days, Disp-20 tablet, R-0Normal      cefTRIAXone (ROCEPHIN) 500 MG injection 2g IV every 24 hrs. For 14 doses. , Disp-2000 mg, R-0Print      cefTRIAXone (ROCEPHIN) infusion Infuse 2,000 mg intravenously every 24 hours for 14 days Compound per protocol, Disp-28 g, R-0Print                 Summation      Patient Course:  admit    ED Medications administered this visit:    Medications   ceFAZolin (ANCEF) 1,000 mg in sterile water 10 mL IV syringe (1,000 mg Intravenous Given 2/8/21 1529)   piperacillin-tazobactam (ZOSYN) 3,375 mg in dextrose 5 % 50 mL IVPB extended infusion (mini-bag) (0 mg Intravenous Stopped 2/11/21 0500)   vancomycin (VANCOCIN) 500 MG injection (500 mg  Given 2/8/21 1943)   vancomycin (VANCOCIN) 1 g injection (1,000 mg  Given 2/8/21 1943)   vancomycin (VANCOCIN) 1 g injection (1,000 mg  Given 2/8/21 1943)   vancomycin (VANCOCIN) 1,500 mg in dextrose 5 % 500 mL IVPB (0 mg Intravenous Stopped 2/11/21 0008)       New Prescriptions from this visit:    Discharge Medication List as of 2/11/2021  2:39 PM      START taking these medications    Details   doxycycline hyclate (VIBRA-TABS) 100 MG tablet Take 1 tablet by mouth every 12 hours for 10 days, Disp-20 tablet, R-0Normal      cefTRIAXone (ROCEPHIN) 500 MG injection 2g IV every 24 hrs. For 14 doses. , Disp-2000 mg, R-0Print      cefTRIAXone (ROCEPHIN) infusion Infuse 2,000 mg intravenously every 24 hours for 14 days Compound per protocol, Disp-28 g, R-0Print             Follow-up:  William Franco DO  [address] [address]  [phone]    Go on 2/25/2021  at [address] [place] [address] in the [facility] for , post surgery check up        Final Impression:   1.  Postoperative wound dehiscence, initial encounter (Please note that portions of this note were completed with a voice recognition program.  Efforts were made to edit the dictations but occasionally words are mis-transcribed.)    Joan Apley, MD Joan Apley, MD  02/09/21 Aravind Isidro MD  02/16/21 0031

## 2021-02-09 NOTE — PLAN OF CARE
Problem: Pain:  Description: Pain management should include both nonpharmacologic and pharmacologic interventions. Goal: Pain level will decrease  Description: Pain level will decrease  2/9/2021 0953 by Jesús Abraham RN  Outcome: Met This Shift  2/9/2021 0224 by Juan Sage RN  Outcome: Met This Shift     Problem: Gas Exchange - Impaired:  Description: For patients with no evidence of hypoxia, avoid the routine use of supplemental oxygen therapy.   Goal: Levels of oxygenation will improve  Description: Levels of oxygenation will improve  Outcome: Met This Shift     Problem: Mood - Altered:  Goal: Mood stable  Description: Mood stable  2/9/2021 0953 by Jesús Abraham RN  Outcome: Met This Shift  2/9/2021 0224 by Juan Sage RN  Outcome: Met This Shift     Problem: Tissue Perfusion - Cardiopulmonary, Altered:  Goal: Absence of angina  Description: Absence of angina  2/9/2021 0224 by Juan Sage RN  Outcome: Met This Shift     Problem: Breathing Pattern - Ineffective:  Goal: Ability to achieve and maintain a regular respiratory rate will improve  Description: Ability to achieve and maintain a regular respiratory rate will improve  Outcome: Met This Shift     Problem: Respiratory:  Goal: Levels of oxygenation will improve  Description: Levels of oxygenation will improve  Outcome: Met This Shift     Problem: Sensory:  Goal: Pain level will decrease  Description: Pain level will decrease  2/9/2021 0953 by Jesús Abraham RN  Outcome: Met This Shift  2/9/2021 0224 by Juan Sage RN  Outcome: Met This Shift     Problem: Skin Integrity:  Goal: Absence of new skin breakdown  Description: Absence of new skin breakdown  2/9/2021 0224 by Juan Sage RN  Outcome: Met This Shift     Problem: Falls - Risk of:  Goal: Will remain free from falls  Description: Will remain free from falls  2/9/2021 0953 by Jesús Abraham RN  Outcome: Met This Shift  2/9/2021 0224 by Juan Sage RN  Outcome: Met This Shift Goal: Absence of physical injury  Description: Absence of physical injury  2/9/2021 0953 by Jona Farley RN  Outcome: Met This Shift  2/9/2021 0224 by Kenyon Cooney RN  Outcome: Met This Shift     Problem: Venous Thromboembolism:  Goal: Will show no signs or symptoms of venous thromboembolism  Description: Will show no signs or symptoms of venous thromboembolism  2/9/2021 0224 by Kenyon Cooney RN  Outcome: Ongoing  Goal: Absence of signs or symptoms of impaired coagulation  Description: Absence of signs or symptoms of impaired coagulation  2/9/2021 0224 by Kenyon Cooney RN  Outcome: Ongoing     Problem:  Activity Intolerance:  Goal: Ability to tolerate increased activity will improve  Description: Ability to tolerate increased activity will improve  2/9/2021 0953 by Jona Farley RN  Outcome: Not Met This Shift  2/9/2021 0224 by Kenyon Cooney RN  Outcome: Ongoing     Problem: Mobility - Impaired:  Goal: Mobility will improve  Description: Mobility will improve  Outcome: Not Met This Shift     Problem: Skin Integrity:  Goal: Will show no infection signs and symptoms  Description: Will show no infection signs and symptoms  2/9/2021 0953 by Jona Farley RN  Outcome: Not Met This Shift  2/9/2021 0224 by Kenyon Cooney RN  Outcome: Met This Shift

## 2021-02-09 NOTE — CONSULTS
Pharmacy Vancomycin Consult     Vancomycin Day: 2  Current Dosin mg q24 hours    Temp max:  99 F    Recent Labs     21  1554 21  0544   BUN 23 18   CREATININE 1.10 1.06   WBC 8.3 6.4       Intake/Output Summary (Last 24 hours) at 2021 0931  Last data filed at 2021 0931  Gross per 24 hour   Intake 280 ml   Output 450 ml   Net -170 ml     Ht Readings from Last 1 Encounters:   21 6' (1.829 m)        Wt Readings from Last 1 Encounters:   21 (!) 334 lb 1.6 oz (151.5 kg)       Body mass index is 45.31 kg/m². Estimated Creatinine Clearance: 94 mL/min (based on SCr of 1.06 mg/dL). Assessment/Plan:  Vancomycin was initiated with a one time loading dose of 2500 mg x1, followed by 2000 mg IV every 24 hours, yesterday. Timing of trough level will be determined based on culture results, renal function, and clinical response. Based on my pharmacokinetic dosing information patient that are mobidly obese greater than 130 kg (151.5 kg) and CrCl>80 ML/MIN and age> 65 consider every 12 hours as the frequency. Greater than 150 kg and CrCl=68.1 using IBW= 1500 mg q12h. Will have to adjust dosing and order as soon as zosyn is finished running at 2130 tonight. Will order trough prior to the 4th dose at 900 on 21.    Electronically signed by Coco Fonseca, [address] [place] [address] on 2021 at 9:40 AM

## 2021-02-09 NOTE — H&P
History & Physical    Patient:  John Wilson  YOB: 1945  Date of Service: 2/9/2021  MRN: [id number]   Acct:   [de-identified]   Primary Care Physician: BESS Morrison CNP    Chief Complaint:   Chief Complaint   Patient presents with    Post-op Problem     Patient arrives to ER today with complaints of a right knee evisceration following a knee replacement on 1/27 done here by Dr. Naina Cabrera. Patient reports that he was going to stand up and saw blood running down his leg and found that his stitches had opened up. History of Present Illness: The patient is a 70 y.o. male with multiple medical problems, who underwent right total knee arthroplasty on 1/27/2021 by Haven Bernard presented to the emergency room for evaluation of open wound at the surgical site. Patient states that yesterday he was moving across the room with his walker, became unsteady and lowered himself down which resulted in  bending of the right knee. He felt that something did not feel right in the knee and went to see  for evaluation. He had x-ray of the knee revealing extensive medial retinacular disruption and some cellulitis of the knee. I was contacted by Haven Bernard and asked to admit the patient directly for IV antibiotics and orthopedic consultation for surgical repair. Apparently, patient went home and used the bathroom, got up off the toilet seat and while walking to the living room noticed blood gushing out from his knee . He looked down and noticed that his surgical wound was wide open. At that point he came to the emergency room for evaluation. Patient reports minimal pain in the knee. ER evaluation revealed stable vitals. BMP, LFT, CBC unremarkable. No further imagings were obtained. He was treated with 1 g IV Ancef, the wound was dressed and his right lower extremity was placed in a knee immobilizer. This morning patient has no complaints. Per RN, he had 7 rounds of Zelphia Dotter however was asymptomatic. He denies any chest pain, palpitations, shortness of breath. Past Medical History:        Diagnosis Date    Arthritis     Ascending cholangitis 10/2/14     at [facility] assisted gastric remnant to open    Blood in stool 2011    CAD (coronary artery disease)     Chronic back pain     DDD    Chronic diastolic (congestive) heart failure ([address] 11/5/2018    Chronic systolic congestive heart failure ([address] 9/25/2018    COPD with acute exacerbation ([facility] 9/25/2018    Heart disease 5-9-12    History of angioplasty 7/2008    2 stents placed    Hx of CABG     x1 in 2015    Hyperlipidemia     Hypertension     onset age 41    Obesity     Stress incontinence, male 3/21/2017    Transfusion history     Unspecified sleep apnea     cpap-DOES NOT USE MACHINE       Past Surgical History:        Procedure Laterality Date   [facility]  2005    FAUSKE Dr. Suzzanna Opitz BARIATRIC SURGERY  2001    Clotilde Preston -Y at [facility]   [address] Left 05/09/2012    Left main normal.  Left anterior descending artery:  Plaque disease. Ramus: Plaque disease Circumflex:nondominant, plaque disease. OM1 normal. Right coronoary artery:  dominant & luminal irregularities. Left ventricular ejection fraction 60. Mitral valve normal with no insufficinecy of the mitral valve. Aortic valve normal with no stenosis of the aortic valve. Edp was normal.    CARDIAC CATHETERIZATION Left 03/04/2015    Dr. Ramirez --Severe CAD, 80% in-stent stenosis in the left anterior descending coronary artery in a rather long segment, very eccentric,extending almost to the ostium of the left anterior descending coronary artery. 70% in-stent stenosis of a very large dominant right coronary artery. Unremarkable small circumflex. Normal left ventricular function, ejection fraction of 60%.  CARDIAC CATHETERIZATION Left 12/05/2018    Dr. Dickson Batres @ [facility]--Risk [place] [address] specific cardiac intervention   [address]  2008    2 unknown heart stents mri 1.5t only    CHOLECYSTECTOMY  05/30/2014    open [facility] Dr. Ori Savage COLONOSCOPY  10/2015    repeat in 2022    [facility]      2 vessel March 3th 2015    CYSTOSCOPY  05/10/2016    with laser TURP    ERCP  10/02/2014    Dr. William Jacobsen with laparotomy    HERNIA REPAIR  09/2003    Hiatal    JOINT REPLACEMENT Bilateral 03/20/2013    bilateral hips    JOINT REPLACEMENT Right 01/27/2021    KNEE ARTHROSCOPY Right 09/26/2013    Dr. Pavan Ibarra [facility]  11/02/2016    rt. L3-4, L4-5 decompression. L4-5 discectomy and fusion    SHOULDER SURGERY Left 2000    arthroscopy    SHOULDER SURGERY Left 2007    replaced humeral head    TOTAL HIP ARTHROPLASTY Right     02/03/2016    TOTAL KNEE ARTHROPLASTY Right 01/27/2021    RIGHT TOTAL KNEE ARTHOPLASTY COMPLICATED BY OBSEITY NEEDS A MARIANNE performed by Martinagiovanni Beck,  at [facility] Medications:   No current facility-administered medications on file prior to encounter.       Current Outpatient Medications on File Prior to Encounter   Medication Sig Dispense Refill    docusate sodium (COLACE) 100 MG capsule Take 1 capsule by mouth 2 times daily 40 capsule 0    aspirin 325 MG EC tablet Take 1 tablet by mouth 2 times daily (with meals) 80 tablet 0    Ferrous Sulfate (IRON) 325 (65 Fe) MG TABS 65 mg 2 times daily      losartan (COZAAR) 25 MG tablet Take 1 tablet by mouth once daily 90 tablet 1    spironolactone (ALDACTONE) 25 MG tablet Take 1 tablet by mouth once daily 30 tablet 0    escitalopram (LEXAPRO) 20 MG tablet Take 1 tablet by mouth once daily 90 tablet 1    budesonide-formoterol (SYMBICORT) 160-4.5 MCG/ACT AERO Inhale 2 puffs into the lungs every 12 hours  montelukast (SINGULAIR) 10 MG tablet Take 10 mg by mouth daily      tiotropium (SPIRIVA RESPIMAT) 2.5 MCG/ACT AERS inhaler Inhale 2 puffs into the lungs 2 times daily       fluticasone (FLONASE) 50 MCG/ACT nasal spray 1 spray by Nasal route daily (Patient taking differently: 1 spray by Nasal route as needed ) 16 g 2    Multiple Vitamin (THERA/BETA-CAROTENE) TABS Take 1 tablet by mouth daily      vitamin C (ASCORBIC ACID) 500 MG tablet Take 500 mg by mouth daily      tamsulosin (FLOMAX) 0.4 MG capsule Take 1 capsule by mouth every evening 90 capsule 3    metoprolol succinate (TOPROL XL) 25 MG extended release tablet Take 1 tablet by mouth daily 90 tablet 2    albuterol (PROVENTIL) (2.5 MG/3ML) 0.083% nebulizer solution Take 3 mLs by nebulization 4 times daily (Patient taking differently: Take 2.5 mg by nebulization 2 times daily ) 120 each 3    ipratropium (ATROVENT) 0.02 % nebulizer solution Take 2.5 mLs by nebulization 4 times daily (Patient taking differently: Take 0.5 mg by nebulization 2 times daily ) 187.5 mL 3    atorvastatin (LIPITOR) 80 MG tablet Take 1 tablet by mouth daily 90 tablet 2    isosorbide mononitrate (IMDUR) 30 MG extended release tablet Take 1 tablet by mouth daily 90 tablet 2    hydroCHLOROthiazide (HYDRODIURIL) 25 MG tablet Take 1 tablet by mouth once daily 90 tablet 2    Vitamin D, Cholecalciferol, 25 MCG (1000 UT) TABS Take by mouth daily       albuterol sulfate  (90 Base) MCG/ACT inhaler INHALE 2 PUFFS BY MOUTH EVERY 4 HOURS AS NEEDED FOR WHEEZING 18 g 2    omeprazole (PRILOSEC) 40 MG delayed release capsule TAKE 1 CAPSULE BY MOUTH ONCE DAILY 30 MINUTES BEFORE BREAKFAST. Allergies:  Patient has no known allergies. Social History:    reports that he has never smoked. He has never used smokeless tobacco. He reports that he does not drink alcohol or use drugs.     Family History:       Problem Relation Age of Onset    Diabetes Mother  Heart Disease Mother     Arthritis Mother     High Blood Pressure Mother     High Cholesterol Mother     Cancer Father         lung black lung from coal mines    Heart Disease Brother         leaky heart    Liver Disease Paternal Grandfather         black lung    Obesity Sister         gastric bypass    Cancer Sister         lung, smoker       Review of systems:  Constitutional: no fever, no night sweats, no fatigue  Head: no headache, no head injury, no migranes. Eye: no blurring of vision, no double vision. Ears: no hearing difficulty, no tinnitus  Mouth/throat: no ulceration, dental caries, dysphagia  Lungs: no cough, no shortness of breath, no wheeze  CVS: no palpitation, no chest pain, no shortness of breath  GI: no abdominal pain, no nausea , no vomiting, no constipation  LISA: no dysuria, frequency and urgency, no hematuria  Musculoskeletal: Positive for right knee mild pain, dehiscent wound  Endocrine: no polyuria, polydypsia, no cold or heat intolerence  Hematology: Positive for mild anemia, no easy brusing or bleeding, no hx of clotting disorder  Dermatology: no skin rash  Psychiatry: no depression, positive history of anxiety  Neurology: no syncope, no seizures, no numbness or tingling of hands, no numbness or tingling of feet, no paresis      Vitals:   Vitals:    02/09/21 0315   BP: 137/77   Pulse: 88   Resp: 16   Temp: 98.4 °F (36.9 °C)   SpO2: 96%      BMI: Body mass index is 45.31 kg/m².     Physical Exam:  General Appearance: alert and oriented to person, place and time, in no acute distress  Cardiovascular: normal rate, regular rhythm, normal S1 and S2, no murmurs, rubs, clicks, or gallops, distal pulses intact  Pulmonary/Chest: clear to auscultation bilaterally- no wheezes, rales or rhonchi, normal air movement, no respiratory distress  Abdomen: Obese, soft, non-tender, non-distended, normal bowel sounds Sodium 137 135 - 144 mmol/L    Potassium 4.9 3.7 - 5.3 mmol/L    Chloride 101 98 - 107 mmol/L    CO2 27 20 - 31 mmol/L    Anion Gap 9 9 - 17 mmol/L    Alkaline Phosphatase 103 40 - 129 U/L    ALT 20 5 - 41 U/L    AST 36 <40 U/L    Total Bilirubin 0.68 0.30 - 1.20 mg/dL    Total Protein 6.4 6.4 - 8.3 g/dL    Albumin 3.7 3.5 - 5.2 g/dL    Albumin/Globulin Ratio NOT REPORTED 1.0 - 2.5    GFR Non-African American >60 >60 mL/min    GFR African American >60 >60 mL/min    GFR Comment          GFR Staging NOT REPORTED          Assessment/ Plan:    1. Right knee dehiscent wound. S/p TKA on 1/27/21 - patient evaluated by orthopedic surgeon Victor M Rendon. Continue wound care, immobilizer, await surgical intervention for reconstructive surgery  2. Right knee cellulitis -on empiric IV vancomycin and Zosyn  3. Severe CAD, s/p CABG on 3/30/2015 - asymptomatic, continue on aspirin, Toprol-XL, Imdur, statins  4. Hypertension -blood pressure is stable, continue on current meds  5. Hyperlipidemia -on atorvastatin  6. BPH with LUTS  7. Morbid obesity  8. Obstructive sleep apnea  9. COPD - continue on nebulizers, no signs of exacerbation  10. Chronic diastolic CHF -compensated, continue medical management  11. History of anxiety disorder -on Lexapro         Advanced Care Plan    ( )  I confirmed that the patient's Advanced Care Plan is present, code status documented, or surrogate decision maker is listed in the patient's medical record. ( x)  The patient's advanced care plan is not present because:  (select)              (x ) I confirmed today that the patient does not wish or was not able to name a surrogate decision maker or provide an [address]              ( ) Hospice care is currently being provided or has been provided this calender year. ( )  I did not confirm today the presence of an [address] or surrogate decision maker documented within the patient's medical record. (Does not satisfy MIPS performance).       Documentation of Current Medications in the Medical Record    (x)  I have utilized all available immediate resources to obtain, update, or review the patient's current medications.  If Yes, Stop Here  ( ) The patient is not eligivel for medications reconciliation; the patient is in an emergent medical situation where delaying treatment would jeopardize the patient's health. ( ) I did not confirm, update or review the patient's current list of medications today.  (does not satisfy MIPS performance)        Medical Necessity: Inpatient admission is appropriate for this patient secondary to the need of IV antibiotics, wound care, evaluation by orthopedic surgeon    Estimated length of stay: 2  days. The beneficiary may reasonably be expected to be discharged or transferred to a hospital within 96 hours after admission.     DVT prophylaxis:   [] Lovenox   [] SCDs   [] SQ Heparin   [] Encourage ambulation, low risk for DVT, no chemical or mechanical    prophylaxis necessary      [] Already on Anticoagulation    Anticipated Disposition upon discharge:   [] Home   [] Home with Home Health   [] Jack Shravan   [] [address]      Electronically signed by Rhonda Brunner MD on 2/9/2021 at 5:48 AM

## 2021-02-09 NOTE — ANESTHESIA PRE PROCEDURE
0.4 MG capsule Take 1 capsule by mouth every evening 7/10/20  Yes BESS Womack CNP   metoprolol succinate (TOPROL XL) 25 MG extended release tablet Take 1 tablet by mouth daily 7/6/20  Yes BESS Meyer CNP   albuterol (PROVENTIL) (2.5 MG/3ML) 0.083% nebulizer solution Take 3 mLs by nebulization 4 times daily  Patient taking differently: Take 2.5 mg by nebulization 2 times daily  5/27/20  Yes BESS Meyer CNP   ipratropium (ATROVENT) 0.02 % nebulizer solution Take 2.5 mLs by nebulization 4 times daily  Patient taking differently: Take 0.5 mg by nebulization 2 times daily  5/27/20  Yes BESS Meyer CNP   atorvastatin (LIPITOR) 80 MG tablet Take 1 tablet by mouth daily 5/27/20  Yes BESS Meyer CNP   isosorbide mononitrate (IMDUR) 30 MG extended release tablet Take 1 tablet by mouth daily 5/27/20  Yes BESS Meyer CNP   hydroCHLOROthiazide (HYDRODIURIL) 25 MG tablet Take 1 tablet by mouth once daily 4/9/20  Yes BESS Meyer CNP   Vitamin D, Cholecalciferol, 25 MCG (1000 UT) TABS Take by mouth daily    Yes Historical Provider, MD   albuterol sulfate  (90 Base) MCG/ACT inhaler INHALE 2 PUFFS BY MOUTH EVERY 4 HOURS AS NEEDED FOR WHEEZING 12/13/19  Yes BESS Meyer CNP   omeprazole (PRILOSEC) 40 MG delayed release capsule TAKE 1 CAPSULE BY MOUTH ONCE DAILY 30 MINUTES BEFORE BREAKFAST.  10/12/20   Historical Provider, MD       Current medications:    Current Facility-Administered Medications   Medication Dose Route Frequency Provider Last Rate Last Admin    vancomycin (VANCOCIN) 1,500 mg in dextrose 5 % 500 mL IVPB  1,500 mg Intravenous Q12H Randolph Sanchez MD        albuterol sulfate  (90 Base) MCG/ACT inhaler 2 puff  2 puff Inhalation Q4H PRN Randolph Sanchez MD        aspirin EC tablet 325 mg  325 mg Oral BID  Randolph Sanchez MD        atorvastatin (LIPITOR) tablet 80 mg  80 mg Oral Daily Randolph Sanchez MD   80 mg at 02/09/21 0924    budesonide-formoterol (SYMBICORT) 160-4.5 MCG/ACT inhaler 2 puff  2 puff Inhalation Q12H Lashell Foote MD   2 puff at 02/09/21 0908    docusate sodium (COLACE) capsule 100 mg  100 mg Oral BID Lashell Foote MD   100 mg at 02/09/21 0923    escitalopram (LEXAPRO) tablet 20 mg  20 mg Oral Daily Lashell Foote MD   20 mg at 02/09/21 3393    ferrous sulfate (IRON 325) tablet 325 mg  325 mg Oral BID Lashell Foote MD   325 mg at 02/09/21 0924    hydroCHLOROthiazide (HYDRODIURIL) tablet 25 mg  25 mg Oral Daily Lashell Foote MD   25 mg at 02/09/21 0924    ipratropium (ATROVENT) 0.02 % nebulizer solution 0.5 mg  0.5 mg Nebulization BID Lashell Foote MD        albuterol (PROVENTIL) nebulizer solution 2.5 mg  2.5 mg Nebulization BID Lashell Foote MD   2.5 mg at 02/09/21 0907    isosorbide mononitrate (IMDUR) extended release tablet 30 mg  30 mg Oral Daily Lashell Foote MD   30 mg at 02/09/21 0924    losartan (COZAAR) tablet 25 mg  25 mg Oral Daily Lashell Foote MD   25 mg at 02/09/21 0924    metoprolol succinate (TOPROL XL) extended release tablet 25 mg  25 mg Oral Daily Lashell Foote MD   25 mg at 02/09/21 0924    montelukast (SINGULAIR) tablet 10 mg  10 mg Oral Daily Lashell Foote MD   10 mg at 02/09/21 0924    pantoprazole (PROTONIX) tablet 40 mg  40 mg Oral QAM AC Lashell Foote MD        spironolactone (ALDACTONE) tablet 25 mg  25 mg Oral Daily Lashell Foote MD   25 mg at 02/09/21 0924    tamsulosin (FLOMAX) capsule 0.4 mg  0.4 mg Oral QPM Lashell Foote MD   0.4 mg at 02/08/21 2036    tiotropium (SPIRIVA RESPIMAT) 2.5 MCG/ACT inhaler 2 puff  2 puff Inhalation BID Lashell Foote MD   2 puff at 02/09/21 0907    ascorbic acid (VITAMIN C) tablet 500 mg  500 mg Oral Daily Lashell Foote MD   500 mg at 02/09/21 7691    Vitamin D (CHOLECALCIFEROL) tablet 1,000 Units  25 mcg Oral Daily Lashell Foote MD   1,000 Units at 02/09/21 3059    piperacillin-tazobactam (ZOSYN) 3,375 mg in dextrose 5 % 50 mL IVPB extended infusion (mini-bag)  3,375 mg Intravenous Q8H Pavel Quintanilla MD   Stopped at 02/09/21 1345    morphine (PF) injection 2 mg  2 mg Intravenous Q4H PRN Pavel Quintanilla MD        sodium chloride flush 0.9 % injection 10 mL  10 mL Intravenous 2 times per day Pavel Quintanilla MD   10 mL at 02/09/21 0927    sodium chloride flush 0.9 % injection 10 mL  10 mL Intravenous PRN Pavel Quintanilla MD   10 mL at 02/08/21 2301    promethazine (PHENERGAN) tablet 12.5 mg  12.5 mg Oral Q6H PRN Pavel Quintanilla MD        Or    ondansetron (ZOFRAN) injection 4 mg  4 mg Intravenous Q6H PRN Pavel Quintanilla MD        polyethylene glycol (GLYCOLAX) packet 17 g  17 g Oral Daily PRN Pavel Quintanilla MD        acetaminophen (TYLENOL) tablet 650 mg  650 mg Oral Q6H PRN Pavel Quintanilla MD        Or    acetaminophen (TYLENOL) suppository 650 mg  650 mg Rectal Q6H PRN Pavel Quintanilla MD        0.9 % sodium chloride infusion   Intravenous Continuous Pavel Quintanilla MD 50 mL/hr at 02/08/21 1827 New Bag at 02/08/21 1827    vancomycin (VANCOCIN) intermittent dosing (placeholder)   Other RX Placeholder Pavel Quintanilla MD        vancomycin (VANCOCIN) 2,500 mg in dextrose 5 % 500 mL IVPB  2,500 mg Intravenous Once Pavel Quintanilal MD   Stopped at 02/08/21 1945       Allergies:  No Known Allergies    Problem List:    Patient Active Problem List   Diagnosis Code    History of angioplasty Z98.62    HTN (hypertension) I10    CAD (coronary artery disease) I25.10    Obesity E66.9    SYD (acute kidney injury) ([facility] N17.9    Hyperlipidemia E78.5    Hx of CABG Z95.1    BPH with obstruction/lower urinary tract symptoms N40.1, N13.8    Fractured sternal wires ([facility] T84.218A    LORI on CPAP G47.33, Z99.89    Bilateral knee pain M25.561, M25.562    Low back pain M54.5    Lumbar disc herniation M51.26    Stress incontinence, male N39.3    Muscle weakness M62.81 valve. Aortic valve normal with no stenosis of the aortic valve. Edp was normal.    CARDIAC CATHETERIZATION Left 03/04/2015    Dr. Ramirez --Severe CAD, 80% in-stent stenosis in the left anterior descending coronary artery in a rather long segment, very eccentric,extending almost to the ostium of the left anterior descending coronary artery. 70% in-stent stenosis of a very large dominant right coronary artery. Unremarkable small circumflex. Normal left ventricular function, ejection fraction of 60%.  CARDIAC CATHETERIZATION Left 12/05/2018    Dr. Vinayak Hugo @ [facility]--Risk [address] specific cardiac intervention   [address]  2008    2 unknown heart stents mri 1.5t only    CHOLECYSTECTOMY  05/30/2014    open [facility] Dr. Pollo Velazco COLONOSCOPY  10/2015    repeat in 2022    [facility]      2 vessel March 3th 2015    CYSTOSCOPY  05/10/2016    with laser TURP    ERCP  10/02/2014    Dr. Sanjuana Solitario with laparotomy    HERNIA REPAIR  09/2003    Hiatal    JOINT REPLACEMENT Bilateral 03/20/2013    bilateral hips    JOINT REPLACEMENT Right 01/27/2021    KNEE ARTHROSCOPY Right 09/26/2013    Dr. Ema Walker - [facility] [place] SURGERY  11/02/2016    rt. L3-4, L4-5 decompression.  L4-5 discectomy and fusion    SHOULDER SURGERY Left 2000    arthroscopy    SHOULDER SURGERY Left 2007    replaced humeral head    TOTAL HIP ARTHROPLASTY Right     02/03/2016    TOTAL KNEE ARTHROPLASTY Right 01/27/2021    RIGHT TOTAL KNEE ARTHOPLASTY COMPLICATED BY OBSEITY NEEDS A MARIANNE performed by Benoit Jeffers DO at [facility] OR       Social History:    Social History     Tobacco Use    Smoking status: Never Smoker    Smokeless tobacco: Never Used   Substance Use Topics    Alcohol use: Never     Frequency: Never     Comment: Rare; LESS THEN 1X PER MONTH                                Counseling given: Not Answered      Vital Signs (Current):   Vitals:    02/09/21 0723 02/09/21 9486 02/09/21 0922 02/09/21 1200   BP: 121/83   131/68   Pulse: 88   89   Resp: 18 16 16 16   Temp: 36.9 °C (98.4 °F)   36.8 °C (98.3 °F)   TempSrc: Oral   Oral   SpO2:  96% 96% 94%   Weight:       Height:                                                  BP Readings from Last 3 Encounters:   02/09/21 131/68   02/02/21 132/88   01/28/21 120/79       NPO Status: Time of last liquid consumption: 0935(small sip of water with am meds)                        Time of last solid consumption: 1730                        Date of last liquid consumption: 02/09/21                        Date of last solid food consumption: 02/08/21    BMI:   Wt Readings from Last 3 Encounters:   02/09/21 (!) 334 lb 1.6 oz (151.5 kg)   02/02/21 (!) 337 lb 9.6 oz (153.1 kg)   01/28/21 (!) 341 lb (154.7 kg)     Body mass index is 45.31 kg/m². CBC:   Lab Results   Component Value Date    WBC 6.4 02/09/2021    RBC 4.19 02/09/2021    RBC 4.72 05/09/2012    HGB 12.0 02/09/2021    HCT 36.0 02/09/2021    MCV 85.9 02/09/2021    RDW 15.2 02/09/2021     02/09/2021     05/09/2012       CMP:   Lab Results   Component Value Date     02/09/2021    K 3.8 02/09/2021     02/09/2021    CO2 24 02/09/2021    BUN 18 02/09/2021    CREATININE 1.06 02/09/2021    GFRAA >60 02/09/2021    LABGLOM >60 02/09/2021    GLUCOSE 139 02/09/2021    GLUCOSE 132 05/09/2012    PROT 6.4 02/08/2021    CALCIUM 9.7 02/09/2021    BILITOT 0.68 02/08/2021    ALKPHOS 103 02/08/2021    AST 36 02/08/2021    ALT 20 02/08/2021       POC Tests: No results for input(s): POCGLU, POCNA, POCK, POCCL, POCBUN, POCHEMO, POCHCT in the last 72 hours.     Coags:   Lab Results   Component Value Date    PROTIME 11.1 11/13/2019    PROTIME 11.7 05/09/2012    INR 1.1 11/13/2019    APTT 59.6 11/15/2019       HCG (If Applicable): No results found for: PREGTESTUR, PREGSERUM, HCG, HCGQUANT     ABGs: No results found for: PHART, PO2ART, QAI6SOQ, SHR5FTA, BEART, [id number]     Type & Screen (If Applicable):  No results found for: LABABO, [address]    Drug/Infectious Status (If Applicable):  Lab Results   Component Value Date    HEPCAB NONREACTIVE 09/12/2014       COVID-19 Screening (If Applicable):   Lab Results   Component Value Date    COVID19 Not Detected 02/08/2021         Anesthesia Evaluation  Patient summary reviewed and Nursing notes reviewed no history of anesthetic complications:   Airway: Mallampati: II  TM distance: >3 FB   Neck ROM: full  Mouth opening: > = 3 FB Dental: normal exam         Pulmonary:normal exam  breath sounds clear to auscultation  (+) COPD:  shortness of breath:  sleep apnea:                             Cardiovascular:    (+) hypertension:, CAD:, CABG/stent:, CHF:,       ECG reviewed  Rhythm: regular  Rate: normal                    Neuro/Psych:   Negative Neuro/Psych ROS              GI/Hepatic/Renal: Neg GI/Hepatic/Renal ROS            Endo/Other: Negative Endo/Other ROS                    Abdominal:   (+) obese,         Vascular: negative vascular ROS. Anesthesia Plan      regional     ASA 4 - emergent       Induction: intravenous. MIPS: Postoperative opioids intended and Prophylactic antiemetics administered. Anesthetic plan and risks discussed with patient. Use of blood products discussed with patient whom. Plan discussed with attending.                   Fish Seay, BESS - CRNA   2/9/2021

## 2021-02-09 NOTE — PROGRESS NOTES
SW met with pt this morning to complete assessment during quality rounds with RN case manager. Pt is alert and oriented and cooperative with assessment. Pt is a 70year old  male admitted for dehiscence of operative wound. Pt is scheduled for surgery this afternoon at 5 pm. Pt lives with his spouse in their home in [place]. Pt has a cane, walker, shower chair and nebulizer to use at home. Pt is receiving services through [facility] [address] [place] [address] health program. Pt was driving prior to his surgery on his knee but relies on family now to assist as his wife does not drive either. Pt is a full code and follows with Papito Roche CNP as PCP. Pt does not have advance directives and states that he plans to complete these as an outpatient when he is ready. Pt reports that his medications are affordable. Pt would like to go home when he is medically stable. Plan of care with IV antibiotics and therapy needs unknown at this time. Discussed with pt potential of needing to stay somewhere for skilled care and discussed list of these options. Pt to consider this in case it is needed. DIAN will continue to follow to help arrange discharge plans as appropriate.  Maximus CHAMBERS 2/9/2021

## 2021-02-09 NOTE — CONSULTS
[place] [address]                                  CONSULTATION    PATIENT NAME: Abel Sewell                       :        1945  MED REC NO:   [id number]                              ROOM:       4612  ACCOUNT NO:   [de-identified]                           ADMIT DATE: 2021  PROVIDER:     Evelia Licona. Martin    CONSULT DATE:  2021    HISTORY OF PRESENT ILLNESS:  The patient is a 53-year-old white male  well known to myself, having been seen in my office this day. He is  status post right total knee arthroplasty, complicated by morbid  obesity. This was on 2021. He subsequently has been doing home  physical therapy. Yesterday, he did step awkwardly, essentially jamming  himself, felt something give way in the knee, did notice something was  not quite right. Physical therapy did see him in a home eval today and  recommended he be seen. He was seen in my office where he was found to  have an extensor medial retinacular disruption. We did make  recommendation for admission to the hospitalist service as he did have a  cellulitic component. He then decided to go home, did use the bathroom,  got up off the toilet and was walking and noted blood spots on the rug  behind him. He then found that his wound was open, he wound up in the  emergency department setting. The emergency department staff did call. We did make recommendation for the admission as described, which was  already under way, and the patient is seen, evaluated this day. He  reports no significant pain. No fever or chills. He has had no further  care. He did receive 1 gm of Ancef in the ER. He was given a small  dressing and placed in a knee immobilizer.     PAST MEDICAL HISTORY:  His past medical history is significant for  coronary artery disease with history of angioplasty, hypertension, morbid obesity, acute kidney injury, hyperlipidemia, history of open  heart surgery, benign prostatic hypertrophy, vitamin D deficiency,  shortness of breath, COPD, heart failure, which is described here as  both systolic and diastolic, vertebral artery occlusion. PAST SURGICAL HISTORY:  His past surgical history is significant for the  total knee arthroplasty as described as well as total hip arthroplasty  per myself, the coronary artery bypass grafting. MEDICATIONS:  Please see the list.  Of note, this patient is taking  aspirin daily, no other anticoagulation. PHYSICAL EXAMINATION:  GENERAL:  His exam here today reveals the patient to be alert, oriented,  in no acute distress. VITAL SIGNS:  He is afebrile. Vital signs are stable otherwise. His  temperature max 99. EXTREMITIES:  Examination of the right knee by picture does reveal an  open dehiscence. His arc of motion of the knee is not assessed  secondary to the dehiscent scenario; this is described by the ER as 5 x  14 cm. There is no active discharge. No deformity otherwise  appreciable. He does have a cellulitic component of the leg with  moderate edema. He does have venous stasis dermatitis of bilateral  lower extremities. Calf and thigh otherwise supple. Left knee is  benign. The venous stasis is much less significant on the left side. His hip gentle log roll was intact without difficulty. Calf and thigh  otherwise supple. LABORATORY DATA:  His labs this day reveal a white count of 8.3, H and H  12.4 and 36.7. Labs are otherwise unremarkable. His x-rays are done in my office and do reveal some lateral tilt of the  patella with superior migration of the patella, question the exact  ramification of this given the likely extensor mechanism retinacular  dehiscence. No x-rays are performed here today. IMPRESSION:  1. Right knee wound dehiscence with both superficial and deep  involvement, status post total knee arthroplasty. 2. Morbid obesity due to excessive calories. 3.  History of multiple medical comorbidities as described. 4.  Cellulitis. TREATMENT PLAN:  1. The findings were discussed. We did recommend surgical intervention  as was recommended in the office setting. This will be right knee  incision and debridement with polyethylene exchange, extensor mechanism  reconstruction with the possibility of allograft. We will plan this in  a more timely fashion for tomorrow, 02/09/2021. We did discuss the  surgery at some length once again including risks, complications, and  reasonable expectations. This is certainly a very concerning finding  and with his other comorbidities would put him at a high risk for  prosthetic joint infection. We will certainly plan on the polyethylene  exchange at this point. This was a possibility before. 2.  We will notify the OR and define all of the appropriate supplies and  resources. 3.  All of his questions are answered at length. 4.  A pseudo-Womack type compressive dressing is applied. He will  continue the immobilizer. 5.  Antibiotic q.8 hours, 1 gm would be insufficient. We will bump this  up to 3 gm based upon weight. 6.  All questions are again answered at length. ALICIA SHIPMAN    D: 02/08/2021 17:32:43       T: 02/08/2021 17:36:29     [id number]  Job#: [id number]     Doc#: [id number]    CC:  Andrea Myers

## 2021-02-09 NOTE — PROGRESS NOTES
Quality flow rounds held on 2/9/21     Chencho Faust is admitted for  Dehiscence of operative wound. Length of stay 1. Education:    Needed Education: wound care, weight bearing status, meds, follow up,diet      Do you have any questions regarding your plan of care while at the hospital? denies    Planned Disposition:               [x]  Home when able                [] Swing Bed                [] ECF/SNF               [] Other/TBD    Barriers to Discharge:    Can you afford your medications? yes   Do you have transportation to follow up appointments? Drove self prior to surgery, family will provide transportation if needed, wife does not drive   Do you need any new equipment at home? Wound care supplies   Current equipment includes   walker, cane, shower chair, nebulizer    Do you have a living will or durable power of  for healthcare? denies               If yes do we have a copy on file? n/a    Do you or your family have any questions or concerns we haven't already discussed? Denies    Ananda Prabhakar and writer present for rounding. Lives with wife. Pt is requesting to be discharged home, discussion with pt regarding skilled care if needed.

## 2021-02-09 NOTE — PROGRESS NOTES
Pt awake watching TV. Reports pain 1-2 in the rt knee. Declines pain medication at this time. Call light in reach.  Electronically signed by Eric Mcdowell RN on 2/9/2021 at 7:27 AM

## 2021-02-09 NOTE — PROGRESS NOTES
Pt has no pain with repositioning in bed, pt is alert and oriented, awake watching TV. Call light is within reach, denies any further needs at this time.

## 2021-02-10 LAB
ANION GAP SERPL CALCULATED.3IONS-SCNC: 8 MMOL/L (ref 9–17)
BUN BLDV-MCNC: 16 MG/DL (ref 8–23)
BUN/CREAT BLD: 16 (ref 9–20)
CALCIUM SERPL-MCNC: 9.6 MG/DL (ref 8.6–10.4)
CHLORIDE BLD-SCNC: 104 MMOL/L (ref 98–107)
CO2: 24 MMOL/L (ref 20–31)
CREAT SERPL-MCNC: 0.99 MG/DL (ref 0.7–1.2)
GFR AFRICAN AMERICAN: >60 ML/MIN
GFR NON-AFRICAN AMERICAN: >60 ML/MIN
GFR SERPL CREATININE-BSD FRML MDRD: ABNORMAL ML/MIN/{1.73_M2}
GFR SERPL CREATININE-BSD FRML MDRD: ABNORMAL ML/MIN/{1.73_M2}
GLUCOSE BLD-MCNC: 176 MG/DL (ref 70–99)
HCT VFR BLD CALC: 36.9 % (ref 41–53)
HEMOGLOBIN: 12.3 G/DL (ref 13.5–17.5)
MCH RBC QN AUTO: 28.5 PG (ref 26–34)
MCHC RBC AUTO-ENTMCNC: 33.3 G/DL (ref 31–37)
MCV RBC AUTO: 85.7 FL (ref 80–100)
NRBC AUTOMATED: ABNORMAL PER 100 WBC
PDW BLD-RTO: 15.2 % (ref 12.1–15.2)
PLATELET # BLD: 209 K/UL (ref 140–450)
PMV BLD AUTO: ABNORMAL FL (ref 6–12)
POTASSIUM SERPL-SCNC: 4.1 MMOL/L (ref 3.7–5.3)
RBC # BLD: 4.31 M/UL (ref 4.5–5.9)
SODIUM BLD-SCNC: 136 MMOL/L (ref 135–144)
WBC # BLD: 8.2 K/UL (ref 3.5–11)

## 2021-02-10 PROCEDURE — 94640 AIRWAY INHALATION TREATMENT: CPT

## 2021-02-10 PROCEDURE — 6360000002 HC RX W HCPCS: Performed by: ORTHOPAEDIC SURGERY

## 2021-02-10 PROCEDURE — 85027 COMPLETE CBC AUTOMATED: CPT

## 2021-02-10 PROCEDURE — 97165 OT EVAL LOW COMPLEX 30 MIN: CPT

## 2021-02-10 PROCEDURE — 2580000003 HC RX 258: Performed by: ORTHOPAEDIC SURGERY

## 2021-02-10 PROCEDURE — 80048 BASIC METABOLIC PNL TOTAL CA: CPT

## 2021-02-10 PROCEDURE — 6370000000 HC RX 637 (ALT 250 FOR IP): Performed by: ORTHOPAEDIC SURGERY

## 2021-02-10 PROCEDURE — 94761 N-INVAS EAR/PLS OXIMETRY MLT: CPT

## 2021-02-10 PROCEDURE — 1200000000 HC SEMI PRIVATE

## 2021-02-10 PROCEDURE — 97162 PT EVAL MOD COMPLEX 30 MIN: CPT

## 2021-02-10 PROCEDURE — 36415 COLL VENOUS BLD VENIPUNCTURE: CPT

## 2021-02-10 RX ORDER — CEFTRIAXONE 500 MG/1
INJECTION, POWDER, FOR SOLUTION INTRAMUSCULAR; INTRAVENOUS
Qty: 2000 MG | Refills: 0 | Status: ON HOLD | OUTPATIENT
Start: 2021-02-10 | End: 2021-06-26 | Stop reason: HOSPADM

## 2021-02-10 RX ORDER — ACETAMINOPHEN 325 MG/1
650 TABLET ORAL EVERY 6 HOURS PRN
Status: DISCONTINUED | OUTPATIENT
Start: 2021-02-10 | End: 2021-02-11 | Stop reason: HOSPADM

## 2021-02-10 RX ADMIN — TAMSULOSIN HYDROCHLORIDE 0.4 MG: 0.4 CAPSULE ORAL at 21:09

## 2021-02-10 RX ADMIN — ESCITALOPRAM OXALATE 20 MG: 10 TABLET ORAL at 09:32

## 2021-02-10 RX ADMIN — SPIRONOLACTONE 25 MG: 25 TABLET, FILM COATED ORAL at 09:32

## 2021-02-10 RX ADMIN — BUDESONIDE AND FORMOTEROL FUMARATE DIHYDRATE 2 PUFF: 160; 4.5 AEROSOL RESPIRATORY (INHALATION) at 09:14

## 2021-02-10 RX ADMIN — SODIUM CHLORIDE, PRESERVATIVE FREE 10 ML: 5 INJECTION INTRAVENOUS at 21:10

## 2021-02-10 RX ADMIN — FERROUS SULFATE TAB 325 MG (65 MG ELEMENTAL FE) 325 MG: 325 (65 FE) TAB at 21:09

## 2021-02-10 RX ADMIN — CHOLECALCIFEROL TAB 25 MCG (1000 UNIT) 1000 UNITS: 25 TAB at 09:32

## 2021-02-10 RX ADMIN — SODIUM CHLORIDE, PRESERVATIVE FREE 10 ML: 5 INJECTION INTRAVENOUS at 22:14

## 2021-02-10 RX ADMIN — Medication 10 ML: at 02:08

## 2021-02-10 RX ADMIN — ASPIRIN 325 MG: 325 TABLET, COATED ORAL at 21:09

## 2021-02-10 RX ADMIN — MONTELUKAST SODIUM 10 MG: 10 TABLET, FILM COATED ORAL at 09:32

## 2021-02-10 RX ADMIN — ISOSORBIDE MONONITRATE 30 MG: 30 TABLET, EXTENDED RELEASE ORAL at 09:32

## 2021-02-10 RX ADMIN — Medication 10 ML: at 18:25

## 2021-02-10 RX ADMIN — FERROUS SULFATE TAB 325 MG (65 MG ELEMENTAL FE) 325 MG: 325 (65 FE) TAB at 09:32

## 2021-02-10 RX ADMIN — VANCOMYCIN HYDROCHLORIDE 1500 MG: 1 INJECTION, POWDER, LYOPHILIZED, FOR SOLUTION INTRAVENOUS at 22:08

## 2021-02-10 RX ADMIN — OXYCODONE AND ACETAMINOPHEN 2 TABLET: 5; 325 TABLET ORAL at 05:22

## 2021-02-10 RX ADMIN — HYDROCHLOROTHIAZIDE 25 MG: 25 TABLET ORAL at 09:32

## 2021-02-10 RX ADMIN — DOCUSATE SODIUM 100 MG: 100 CAPSULE, LIQUID FILLED ORAL at 09:32

## 2021-02-10 RX ADMIN — BUDESONIDE AND FORMOTEROL FUMARATE DIHYDRATE 2 PUFF: 160; 4.5 AEROSOL RESPIRATORY (INHALATION) at 20:38

## 2021-02-10 RX ADMIN — METOPROLOL SUCCINATE 25 MG: 25 TABLET, EXTENDED RELEASE ORAL at 09:32

## 2021-02-10 RX ADMIN — OXYCODONE AND ACETAMINOPHEN 2 TABLET: 5; 325 TABLET ORAL at 21:12

## 2021-02-10 RX ADMIN — VANCOMYCIN HYDROCHLORIDE 1500 MG: 1 INJECTION, POWDER, LYOPHILIZED, FOR SOLUTION INTRAVENOUS at 09:33

## 2021-02-10 RX ADMIN — OXYCODONE HYDROCHLORIDE AND ACETAMINOPHEN 500 MG: 500 TABLET ORAL at 09:33

## 2021-02-10 RX ADMIN — ACETAMINOPHEN 650 MG: 325 TABLET, FILM COATED ORAL at 02:07

## 2021-02-10 RX ADMIN — DOCUSATE SODIUM 50MG AND SENNOSIDES 8.6MG 1 TABLET: 8.6; 5 TABLET, FILM COATED ORAL at 21:09

## 2021-02-10 RX ADMIN — LOSARTAN POTASSIUM 25 MG: 50 TABLET ORAL at 09:32

## 2021-02-10 RX ADMIN — MORPHINE SULFATE 2 MG: 2 INJECTION, SOLUTION INTRAMUSCULAR; INTRAVENOUS at 02:17

## 2021-02-10 RX ADMIN — PIPERACILLIN SODIUM AND TAZOBACTAM SODIUM 3375 MG: 3; .375 INJECTION, POWDER, LYOPHILIZED, FOR SOLUTION INTRAVENOUS at 02:06

## 2021-02-10 RX ADMIN — ASPIRIN 325 MG: 325 TABLET, COATED ORAL at 09:32

## 2021-02-10 RX ADMIN — PIPERACILLIN SODIUM AND TAZOBACTAM SODIUM 3375 MG: 3; .375 INJECTION, POWDER, LYOPHILIZED, FOR SOLUTION INTRAVENOUS at 18:25

## 2021-02-10 RX ADMIN — TIOTROPIUM BROMIDE INHALATION SPRAY 2 PUFF: 3.12 SPRAY, METERED RESPIRATORY (INHALATION) at 09:13

## 2021-02-10 RX ADMIN — Medication 10 ML: at 22:15

## 2021-02-10 RX ADMIN — PIPERACILLIN SODIUM AND TAZOBACTAM SODIUM 3375 MG: 3; .375 INJECTION, POWDER, LYOPHILIZED, FOR SOLUTION INTRAVENOUS at 09:33

## 2021-02-10 RX ADMIN — ATORVASTATIN CALCIUM 80 MG: 40 TABLET, FILM COATED ORAL at 09:32

## 2021-02-10 RX ADMIN — ALBUTEROL SULFATE 2.5 MG: 2.5 SOLUTION RESPIRATORY (INHALATION) at 09:13

## 2021-02-10 RX ADMIN — Medication 10 ML: at 02:17

## 2021-02-10 RX ADMIN — ALBUTEROL SULFATE 2.5 MG: 2.5 SOLUTION RESPIRATORY (INHALATION) at 20:36

## 2021-02-10 RX ADMIN — TIOTROPIUM BROMIDE INHALATION SPRAY 2 PUFF: 3.12 SPRAY, METERED RESPIRATORY (INHALATION) at 20:38

## 2021-02-10 RX ADMIN — PANTOPRAZOLE SODIUM 40 MG: 40 TABLET, DELAYED RELEASE ORAL at 05:22

## 2021-02-10 RX ADMIN — DOCUSATE SODIUM 50MG AND SENNOSIDES 8.6MG 1 TABLET: 8.6; 5 TABLET, FILM COATED ORAL at 09:32

## 2021-02-10 RX ADMIN — DOCUSATE SODIUM 100 MG: 100 CAPSULE, LIQUID FILLED ORAL at 21:09

## 2021-02-10 ASSESSMENT — PAIN SCALES - GENERAL
PAINLEVEL_OUTOF10: 4
PAINLEVEL_OUTOF10: 6
PAINLEVEL_OUTOF10: 4

## 2021-02-10 ASSESSMENT — PAIN DESCRIPTION - FREQUENCY: FREQUENCY: INTERMITTENT

## 2021-02-10 ASSESSMENT — PAIN DESCRIPTION - LOCATION
LOCATION: KNEE

## 2021-02-10 ASSESSMENT — PAIN DESCRIPTION - PROGRESSION: CLINICAL_PROGRESSION: GRADUALLY IMPROVING

## 2021-02-10 NOTE — PLAN OF CARE
Problem: Pain:  Goal: Pain level will decrease  Description: Pain level will decrease  Outcome: Ongoing     Problem: Discharge Planning:  Goal: Discharged to appropriate level of care  Description: Discharged to appropriate level of care  Outcome: Ongoing     Problem:  Activity Intolerance:  Goal: Ability to tolerate increased activity will improve  Description: Ability to tolerate increased activity will improve  Outcome: Ongoing     Problem: Sensory:  Goal: Pain level will decrease  Description: Pain level will decrease  Outcome: Ongoing

## 2021-02-10 NOTE — PROGRESS NOTES
Dr. Ema Walker called to check on patient. Update given. Pt is to keep immobilizer on for PT at all times.

## 2021-02-10 NOTE — PLAN OF CARE
[facility]    Inpatient Occupational Therapy  Plan of Care  OT Orders Received and Evaluation Complete  Date: 2/10/2021  Patient Name: Aura Pennington        MRN: [id number]    : 1945  (70 y.o.)  Referring Practitioner: Dr. Kurtis Bragg  Onset Date: 21  Referral Date:      Treatment Diagnosis: weakness    Identified Problem Areas  Performance deficits / Impairments: Decreased functional mobility , Decreased endurance, Decreased ADL status, Decreased high-level IADLs  Assessment: Patient demonstrated the above mentioned deficits during evaluation. Currently wearing immobilizer on the R knee. Required minimal assist with sit to stand due to inability to bend knee and lower surface. No pain per report. Discussed adaptive equipment for LE bathing and dressing. Patient states \"my wife will help me. \"  Returns to bedside chair with call light in reach. Justification for Skilled Services:  [x]  Complete Daily Tasks Safely  [x] Improve Balance   [x]  Return to Prior Level of Function  []  Family/Caregiver Education    [] Improve UE strength  [x] Patient Education: [x]Adaptive Equipment   [x]Home Exercise Program and Progression    Treatment Plan  Plan  Times per week: 2-3  Times per day: Daily(1-2x day)  [] Modalities:  [x] Therapeutic Exercise   [x] Therapeutic Activity  [] Splinting:     [] Home Safety Evaluation         [x] ADL Retraining                       [] Muscle Re-education [] Cognitive Retraining            [] Sensory Integration  [x] Patient Education [x] Home Exercise Program [] Fine Motor Coordination  Discharge Recommendations: Home with assist PRN    GOALS  Short term goals  Time Frame for Short term goals: 3 days (2021)  Short term goal 1: Patient to be modified independent with LB bathing and dressing. Short term goal 2: Patient tolerate static standing x 5 minutes without LOB, maintaining 50% % of time. Short term goal 3: Patient to demonstrate ability to ambulate to the bathroom, completing toilet hygiene and task with CGA only. Short term goal 4: Patient to be educated on AE for LE bathing and dressing. Long term goals  Time Frame for Long term goals : STG=LTG    Upon Swingbed Transfer this Plan of Care will be followed until revision is completed.     Roseline Chau OTR/JEAN-PAUL                    Date: 2/10/2021

## 2021-02-10 NOTE — OP NOTE
the above procedure. The procedure is undertaken this evening. INTRAOPERATIVE PATHOLOGY:  Upon dissection of the knee, there is noted  to be a dehiscence measuring approximately 13 to 14 mm in length by  approximately 3.5 to 4 cm in width. OPERATIVE PROCEDURE:  The knee is thus opened. The skin ellipticized. There is noted to be communication with the articular space. A thorough  lavage with 9 liters of saline coupled with bacitracin is accomplished. The tissue is debrided accordingly. The knee is flexed, the Pathology  encountered. Any suture that is visible is removed. The polyethylene  is removed. The posterior aspect posterior capsule is lavaged with the  same irrigant. At this point, the new polyethylene is placed. This is  found to be stable and of adequate positioning, stable throughout the  arc of motion. As such, a 3.5 mm drill is utilized to place a 6.5 mm  anchor in the standard fashion. The handle from this anchor is then  utilized to remove the previous anchor from the index procedure. At  this point, the second anchor is placed accordingly. Sutures were then  taken up into the patellar tendon in a modified Krackow technique. These are several sutures. There is a sprig of the tendon that is more  to the lateral aspect and one more to the medial.  These were then  coalesced together with the suture repair. After all sutures are  placed, the repair is made. This did draw down quite nicely to the  tibial tubercle. At this point, the knee is lavaged. Once again,  dilute Betadine soak is performed. This is lavaged away. The extensor  retinaculum is closed with the #2 Quill. Inferiorly, the tissue is  subadequate for repair and as such, the decision for the ArthroFLEX  graft is made. This is reconstituted accordingly. This is then brought  and fashioned for the inferior capsular area.   This is first secured to the more lateral aspect and then stretched medially and secured with 0  PDS. After adequate repair here is achieved, the area is lavaged again. Of note, the knee is injected with 2 gm tranexamic acid. Subcutaneous  tissues are closed with 2-0 Quill. Skin is closed with sterile skin  staples. Wound is dressed with Aquacel Ag dressing, ABD, Webril from  the mid side of the ankle, all overdressed with an Ace wrap. The  patient is placed back in his knee immobilizer, is transferred to the  [facility], and taken to PACU in stable condition. He will remain  hospitalized this day. ALICIA SHIPMAN    D: 02/09/2021 19:29:59       T: 02/09/2021 19:35:13     [id number]  Job#: [id number]     Doc#: [id number]    CC:  Ifrah Moreno.  Martin Riggs

## 2021-02-10 NOTE — PROGRESS NOTES
Hospitalist Progress Note  2/10/2021 10:44 AM  Subjective:   Admit Date: 2/8/2021  PCP: BESS Bustamante - CNP    Interval History:     Quan Sapp states that he is doing much better today. He had a surgery on his knee yesterday by .   The pain is minimal.  He has no other complaints such as chest pain, shortness of breath, cough, abdominal pain/distention, nausea/vomiting/diarrhea    Diet: DIET GENERAL;  Medications:   Scheduled Meds:   vancomycin  1,500 mg Intravenous Q12H    sodium chloride flush  10 mL Intravenous 2 times per day    sennosides-docusate sodium  1 tablet Oral BID    aspirin  325 mg Oral BID    lidocaine 1 % injection  5 mL Intradermal Once    sodium chloride flush  10 mL Intravenous 2 times per day    atorvastatin  80 mg Oral Daily    budesonide-formoterol  2 puff Inhalation Q12H    docusate sodium  100 mg Oral BID    escitalopram  20 mg Oral Daily    ferrous sulfate  325 mg Oral BID    hydroCHLOROthiazide  25 mg Oral Daily    ipratropium  0.5 mg Nebulization BID    albuterol  2.5 mg Nebulization BID    isosorbide mononitrate  30 mg Oral Daily    losartan  25 mg Oral Daily    metoprolol succinate  25 mg Oral Daily    montelukast  10 mg Oral Daily    pantoprazole  40 mg Oral QAM AC    spironolactone  25 mg Oral Daily    tamsulosin  0.4 mg Oral QPM    tiotropium  2 puff Inhalation BID    vitamin C  500 mg Oral Daily    Vitamin D  25 mcg Oral Daily    piperacillin-tazobactam  3,375 mg Intravenous Q8H    sodium chloride flush  10 mL Intravenous 2 times per day    vancomycin (VANCOCIN) intermittent dosing (placeholder)   Other RX Placeholder    vancomycin  2,500 mg Intravenous Once     Continuous Infusions:   sodium chloride 80 mL/hr at 02/09/21 2591 PRN Medications: acetaminophen, sodium chloride flush, magnesium hydroxide, oxyCODONE-acetaminophen, oxyCODONE-acetaminophen, sodium chloride flush, albuterol sulfate HFA, morphine, sodium chloride flush, promethazine **OR** ondansetron, polyethylene glycol, acetaminophen **OR** acetaminophen    Objective:   Vitals: /83   Pulse 85   Temp 97.9 °F (36.6 °C) (Oral)   Resp 16   Ht 6' (1.829 m)   Wt (!) 336 lb 4.8 oz (152.5 kg)   SpO2 99%   BMI 45.61 kg/m²   BMI: Body mass index is 45.61 kg/m². CBC:   Recent Labs     02/08/21  1554 02/09/21  0544 02/10/21  0457   WBC 8.3 6.4 8.2   HGB 12.4* 12.0* 12.3*    211 209     BMP:    Recent Labs     02/08/21  1554 02/09/21  0544 02/10/21  0457    136 136   K 4.9 3.8 4.1    102 104   CO2 27 24 24   BUN 23 18 16   CREATININE 1.10 1.06 0.99   GLUCOSE 131* 139* 176*     Hepatic:   Recent Labs     02/08/21  1554   AST 36   ALT 20   BILITOT 0.68   ALKPHOS 103       Physical Exam:    General Appearance: Up in chair, alert and oriented to person, place and time, in no acute distress  Cardiovascular: normal rate, regular rhythm, normal S1 and S2, no murmurs  Pulmonary/Chest: clear to auscultation bilaterally- no wheezes, rales or rhonchi, normal air movement  Abdomen: Obese, soft, non-tender, non-distended, normal bowel sounds   Extremities: Right lower extremity is wrapped, in immobilizer, Ace wraps all the way to his foot  Skin: warm and dry, no rash   Neurological: alert, oriented, normal speech, no focal findings or movement disorder noted      Assessment and Plan:     1. Right knee dehiscent wound. S/p TKA on 1/27/21 - s/p  I&D with polyethylene exchange, extensor retinacular repair, patellar tendon repair, allograft dermal graft on 2 /9 by . Continue wound care, immobilizer, pain management  2.   Right knee cellulitis - on empiric IV vancomycin and Zosyn 3.  Severe CAD, s/p CABG on 3/30/2015 - asymptomatic, continue on aspirin, Toprol-XL, Imdur, statins  4. Hypertension -blood pressure is stable, continue on current meds  5. Hyperlipidemia -on atorvastatin  6. BPH with LUTS  7. Morbid obesity  8. Obstructive sleep apnea  9. COPD - continue on nebulizers, no signs of exacerbation  10. Chronic diastolic CHF -compensated, continue medical management  11.   History of anxiety disorder -on Lexapro           Patient continues to require inpatient admission related to need for IV antibiotics, wound care      Electronically signed by Malick Machado MD on 2/10/2021 at 10:44 AM    Rounding Hospitalist

## 2021-02-10 NOTE — PROGRESS NOTES
Pt returns to room 255 at this time. A&O x3 and denies pain. Vitals stable. Spouse at bedside at this time. Will continue to monitor.

## 2021-02-10 NOTE — PLAN OF CARE
[facility]  Inpatient Physical Therapy  Plan of Care  Date: 2/10/2021  Patient Name: Sid Shah        : 1945  (54 y.o.)  Referring Practitioner: Dr. April Real  Admission Date: 2021  Referral Date : 21  Diagnosis: R patellar tendon repair  Treatment Diagnosis: Generalized weakness  PT Orders Received and Evaluation Complete  Identified Problem Areas: Body structures, Functions, Activity limitations: Decreased functional mobility , Decreased ADL status, Decreased ROM, Decreased strength, Decreased balance, Decreased high-level IADLs, Increased pain  Prognosis: Good    Justification for Skilled Services:  [] Reduce Falls   [x] Improve Ambulation  [x]  Complete Daily Tasks Safely   [x] Improve Balance   [] Improve LE strength  [x]  Return to Prior Level of Function  [x] Improve Functional Mobility   []  Family/Caregiver Education  [x] Patient Education: []Assistive Devices []Home Exercise Program and Progression    Plan  Times per week: daily  Times per day: (1-2 xday)  [] Modalities:  [x] Therapeutic Exercise   [x] Gait Training  [x] Therapeutic Activity    [] Home Safety Evaluation         [] Massage                        [x] Neuromuscular Re-education [] Back Education             [x] Patient Education [] Home Exercise Program       Rehab Potential:  [x]  Good [] Fair   []  Poor    Goals  Short term goals  Time Frame for Short term goals: 3 days  Short term goal 1: Pt to demonstrate ability to perform all bed mobility and transfers with mod independence to help with return to home. Short term goal 2: Pt to demonstrate ability to ascend/descend 4 steps with L handrail to ensure safety for return to home. Short term goal 3: Pt to amb with rolling walker safely with mod independence for 50ft for home amb. Upon Swingbed Transfer this Plan of Care will be followed until revision is complete.     Michael Albert, PT   Date: 2/10/2021

## 2021-02-10 NOTE — PROGRESS NOTES
Pt awake in bed, vital signs and assessment completed. Right knee immobilizer in place. Dressing to right knee remains dry and intact. Pt denies any pain currently. Hebert cath draining clear yellow urine. Pt denies any further needs at this time. Call light in reach.

## 2021-02-10 NOTE — PROGRESS NOTES
Pt to have IV antibiotics once a day. SW spoke with pt and he states that [facility] would be best as his wife does not drive and he would have to find assistance. Pt chooses [facility] for service and SW made referral to Eduardo Thomas at [facility]. [facility] to supply IV medication and supplies. Referral completed to them. SW to follow up to find out about coverage for home IV. Phone number for [facility] is [phone].  Alberto CHAMBERS 2/10/2021

## 2021-02-10 NOTE — PROGRESS NOTES
S/P R knee I + D, Patellar tendon repair #1:    Pain well controlled. Denies CP or SOB. No new complaints. Exam:  VSS, R thigh and leg supple with expected swelling. Dressing C/D/I. N/V/M intact distally. Mechanical DVT prevention in place. AM labs reviewed. CBC:   Lab Results   Component Value Date    WBC 8.2 02/10/2021    RBC 4.31 02/10/2021    RBC 4.72 05/09/2012    HGB 12.3 02/10/2021    HCT 36.9 02/10/2021    MCV 85.7 02/10/2021    MCH 28.5 02/10/2021    MCHC 33.3 02/10/2021    RDW 15.2 02/10/2021     02/10/2021     05/09/2012    MPV NOT REPORTED 02/10/2021     BMP:    Lab Results   Component Value Date     02/10/2021    K 4.1 02/10/2021     02/10/2021    CO2 24 02/10/2021    BUN 16 02/10/2021    LABALBU 3.7 02/08/2021    CREATININE 0.99 02/10/2021    CALCIUM 9.6 02/10/2021    GFRAA >60 02/10/2021    LABGLOM >60 02/10/2021    GLUCOSE 176 02/10/2021    GLUCOSE 132 05/09/2012       Xrays in RR show stable postion and alignment of the TKA. Impression:  1. S/P R TKA POD #1  2. Anemia secondary to acute periop blood loss. Plan:  2. Continue antibiotics for cellulitis. Will plan for home Rocephin 2g IV q 24 hrs. for 2 weeks. 3.  Begin daily dressing care. 4.  Discharge planning. 6.  Discharge planning for post inpt needs. 7.  PT and OT services as ordered. 8.  All questions answered at the bedside.

## 2021-02-10 NOTE — PROGRESS NOTES
[facility]  Physical Therapy  Evaluation  Date: 2/10/2021  Patient Name: Marielena Mcmanus        MRN: [id number]    : 1945  [de-identified]70 y.o.)  Gender: male   Referring Practitioner: Dr. Marly Abreu  Diagnosis: R patellar tendon repair  Additional Pertinent Hx: Pt had R TKA 2 weeks ago. He had a fall at home and tried to catch himself on surgical LE. He felt knee buckle. Pt had scheduled for surgery but knee began bleeding, so tendon repair surgery was initiated earlier than scheduled. Pt reports mild pain while performing ankle pumps at anterior knee. Past Medical History:   Diagnosis Date    Arthritis     Ascending cholangitis 10/2/14     at [facility] assisted gastric remnant to open    Blood in stool     CAD (coronary artery disease)     Chronic back pain     DDD    Chronic diastolic (congestive) heart failure ([address] 2018    Chronic systolic congestive heart failure ([address] 2018    COPD with acute exacerbation ([address] 2018    Heart disease 5-9-12    History of angioplasty 2008    2 stents placed    Hx of CABG     x1 in     Hyperlipidemia     Hypertension     onset age 41    Obesity     Stress incontinence, male 3/21/2017    Transfusion history     Unspecified sleep apnea     cpap-DOES NOT USE MACHINE     Past Surgical History:   Procedure Laterality Date   [address]      Gabriel Oconnell BARIATRIC SURGERY      Francia HENRY at [facility]   [address] Left 2012    Left main normal.  Left anterior descending artery:  Plaque disease. Ramus: Plaque disease Circumflex:nondominant, plaque disease. OM1 normal. Right coronoary artery:  dominant & luminal irregularities. Left ventricular ejection fraction 60. Mitral valve normal with no insufficinecy of the mitral valve. Aortic valve normal with no stenosis of the aortic valve.   Edp was normal.    CARDIAC CATHETERIZATION Left 2015 Dr. Ramirez --Severe CAD, 80% in-stent stenosis in the left anterior descending coronary artery in a rather long segment, very eccentric,extending almost to the ostium of the left anterior descending coronary artery. 70% in-stent stenosis of a very large dominant right coronary artery. Unremarkable small circumflex. Normal left ventricular function, ejection fraction of 60%.  CARDIAC CATHETERIZATION Left 12/05/2018    Dr. Dona Hopper @ [facility]--Risk [address] specific cardiac intervention   [address]  2008    2 unknown heart stents mri 1.5t only    CHOLECYSTECTOMY  05/30/2014    open [facility] Dr. Rodney Becerril COLONOSCOPY  10/2015    repeat in 2022    [address] [facility]      2 vessel March 3th 2015    CYSTOSCOPY  05/10/2016    with laser TURP    ERCP  10/02/2014    Dr. Linda Shah with laparotomy    HERNIA REPAIR  09/2003    Hiatal    JOINT REPLACEMENT Bilateral 03/20/2013    bilateral hips    JOINT REPLACEMENT Right 01/27/2021    KNEE ARTHROSCOPY Right 09/26/2013    Dr. Jayesh Marx - [facility]  11/02/2016    rt. L3-4, L4-5 decompression.  L4-5 discectomy and fusion    SHOULDER SURGERY Left 2000    arthroscopy    SHOULDER SURGERY Left 2007    replaced humeral head    TOTAL HIP ARTHROPLASTY Right     02/03/2016    TOTAL KNEE ARTHROPLASTY Right 01/27/2021    RIGHT TOTAL KNEE ARTHOPLASTY COMPLICATED BY OBSEITY NEEDS A MARIANNE performed by Maxwell Snell DO at [facility] OR       Restrictions  Restrictions/Precautions: Weight Bearing  Right Lower Extremity Weight Bearing: Partial Weight Bearing      Subjective         Pain Level: 0    Orientation       Home Living  Type of Home: House  Home Layout: Able to Live on Main level with bedroom/bathroom  Entrance Stairs - Number of Steps: 4  Entrance Stairs - Rails: Left  Home Access: Stairs to enter with rails  Home Equipment: 4 wheeled walker, Lift chair    Prior Level of Function Additional Comments: Patient was recieving home care for therapy of the R knee prior to entering hospital.  Prior Function  ADL Assistance: Independent  Homemaking Assistance: Independent  Ambulation Assistance: Independent  Transfer Assistance: Independent  Additional Comments: Patient was recieving home care for therapy of the R knee prior to entering hospital.      Objective                 PROM RLE (degrees)  RLE General PROM: In immobilizer, no ROM to R knee  RUE AROM (degrees)  RUE AROM : WFL  LUE AROM (degrees)  LUE AROM : WFL  Strength RLE  Comment: In immobilizer, held testing  Strength LLE  Strength LLE: WFL             Supine to Sit: Moderate assistance  Scooting: Independent  Sit to Stand: Minimal Assistance  Stand to sit: Minimal Assistance           Ambulation 1  Surface: level tile  Device: Rolling Walker  Other Apparatus: Knee Immobilizer  Assistance: Contact guard assistance  Quality of Gait: Step to pattern  Distance: 20ft       Assessment      Body structures, Functions, Activity limitations: Decreased functional mobility , Decreased ADL status, Decreased ROM, Decreased strength, Decreased balance, Decreased high-level IADLs, Increased pain  Chart Reviewed: Yes  Assessment: Pt in bed on arrival with L knee immobilizer in place. Pt reports about 1-2/10 anterior knee pain. Pt requires Mod A x1 for LE negotiation when moving supine to sit. Pt sit to stand transfer with rolling walker with Min A x1. Pt able to amb around room to door approx. 20 ft. Finished with patient returning to chair with stand to sit transfer Min A with verbal cuing to reach behind for seat with both UE. Pt does not report increased pain. Finished with pt in chair, locks in place, call light within reach and all needs met. Immobilizer in place throughout session.   Prognosis: Good           Plan  Times per week: daily  Times per day: (1-2 xday)  Current Treatment Recommendations: Transfer Training, Gait Training, Stair training

## 2021-02-10 NOTE — PROGRESS NOTES
[facility]  Evaluation  Date: 2/10/2021  Patient Name: Joyce Macias        MRN: [id number]    : 1945  (80 y.o.)  Gender: male   Referring Practitioner: Dr. Kailee Marquez     Additional Pertinent Hx: Patient is admitted due to complications s/p knee replacement  Past Medical History:   Diagnosis Date    Arthritis     Ascending cholangitis 10/2/14     at [facility] assisted gastric remnant to open    Blood in stool     CAD (coronary artery disease)     Chronic back pain     DDD    Chronic diastolic (congestive) heart failure ([address] 2018    Chronic systolic congestive heart failure ([address] 2018    COPD with acute exacerbation ([facility] 2018    Heart disease 12    History of angioplasty 2008    2 stents placed    Hx of CABG     x1 in     Hyperlipidemia     Hypertension     onset age 41    Obesity     Stress incontinence, male 3/21/2017    Transfusion history     Unspecified sleep apnea     cpap-DOES NOT USE MACHINE     Past Surgical History:   Procedure Laterality Date   Sophia Rosenberg      [facility]      Perry HENRY at [facility]   [address] Left 2012    Left main normal.  Left anterior descending artery:  Plaque disease. Ramus: Plaque disease Circumflex:nondominant, plaque disease. OM1 normal. Right coronoary artery:  dominant & luminal irregularities. Left ventricular ejection fraction 60. Mitral valve normal with no insufficinecy of the mitral valve. Aortic valve normal with no stenosis of the aortic valve.   Edp was normal.    CARDIAC CATHETERIZATION Left 2015 Home Layout: Able to Live on Main level with bedroom/bathroom  Home Access: Stairs to enter with rails  Entrance Stairs - Number of Steps: 4  Entrance Stairs - Rails: Left  Bathroom Shower/Tub: Tub/Shower unit, Shower chair with back  [employer]: Grab bars in shower  Home Equipment: 4 wheeled walker, Lift chair  ADL Assistance: [address]: Independent  Ambulation Assistance: Independent  Transfer Assistance: Independent  Active : Yes  Additional Comments: Patient was recieving home care for therapy of the R knee prior to entering hospital.  Prior Function  ADL Assistance: Independent  Homemaking Assistance: Independent  Ambulation Assistance: Independent  Transfer Assistance: Independent  Additional Comments: Patient was recieving home care for therapy of the R knee prior to entering hospital.    Objective                     Gross LUE Strength: WFL  Gross RUE Strength: WFL  ADL  Feeding: Independent  Grooming: Setup  UE Bathing: Setup  LE Bathing: Minimal assistance  UE Dressing: Setup  LE Dressing: Minimal assistance  Toileting: Contact guard assistance           Balance  Sitting Balance: Independent  Standing Balance: Contact guard assistance                 Assessment: Patient demonstrated the above mentioned deficits during evaluation. Currently wearing immobilizer on the R knee. Required minimal assist with sit to stand due to inability to bend knee and lower surface. No pain per report. Discussed adaptive equipment for LE bathing and dressing. Patient states \"my wife will help me. \"  Returns to bedside chair with call light in reach. Goals  Short term goals  Time Frame for Short term goals: 3 days (2-)  Short term goal 1: Patient to be modified independent with LB bathing and dressing. Short term goal 2: Patient tolerate static standing x 5 minutes without LOB, maintaining 50% % of time. Short term goal 3: Patient to demonstrate ability to ambulate to the bathroom, completing toilet hygiene and task with CGA only. Short term goal 4: Patient to be educated on AE for LE bathing and dressing.   Long term goals  Time Frame for Long term goals : STG=LTG    Plan     Times per week: 2-3  Times per day: Daily(1-2x day)            Time In: 0930  Time Out: 1804  Timed Coded Minutes: 0  Total Treatment Time: [address], OTR/L  2/10/2021

## 2021-02-10 NOTE — ANESTHESIA POSTPROCEDURE EVALUATION
Department of Anesthesiology  Postprocedure Note    Patient: Rudy Machdao  MRN: [id number]  YOB: 1945  Date of evaluation: 2/9/2021  Time:  8:19 PM     Procedure Summary     Date: 02/09/21 Room / Location: [address] [place] [address]    Anesthesia Start: 1700 Anesthesia Stop: 1920    Procedure: RIGHT KNEE INCISION AND DRAINAGE, POLY EXCHANGE,  WITH EXTENSOR MECHANISM REPAIR WITH ALLOGRAFT (Right ) Diagnosis: (RIGHT KNEE WOUND DEHISCENCE)    Surgeons: Ayah Mendoaz DO Responsible Provider: BESS Zafar CRNA    Anesthesia Type: regional ASA Status: 4 - Emergent          Anesthesia Type: regional    Christiano Phase I: Hcristiano Score: 10    Christiano Phase II:      Last vitals: Reviewed and per EMR flowsheets.        Anesthesia Post Evaluation    Patient location during evaluation: PACU  Patient participation: complete - patient participated  Level of consciousness: awake and alert  Pain score: 0  Airway patency: patent  Nausea & Vomiting: no nausea and no vomiting  Complications: no  Cardiovascular status: blood pressure returned to baseline and hemodynamically stable  Respiratory status: acceptable and spontaneous ventilation  Comments: Spinal regressing no motor, sensory L1

## 2021-02-10 NOTE — PROGRESS NOTES
Comprehensive Nutrition Assessment    Type and Reason for Visit:  Initial    Nutrition Recommendations/Plan:   1. Continue current diet. 2. Encouraged protein foods for healing needs. Nutrition Assessment:  increased nutrient needs r/t acute injury/trauma aeb healing needs from surgery (dehicence of wound). Pt denied weight or PO changes. Pt encouraged to eat protein foods to aid healing needs. Glucose 176, expected elevation with stress. Malnutrition Assessment:  Malnutrition Status:  No malnutrition    Context:  Acute Illness     Findings of the 6 clinical characteristics of malnutrition:  Energy Intake:  No significant decrease in energy intake  Weight Loss:  No significant weight loss     Body Fat Loss:  No significant body fat loss     Muscle Mass Loss:  No significant muscle mass loss    Fluid Accumulation:  1 - Mild Extremities(+ 1 LLE, +2 RLE)   Strength:  Not Performed    Estimated Daily Nutrient Needs:  Energy (kcal):  8467-4626 ml(1-20/kg); Weight Used for Energy Requirements:  Current     Protein (g):  159-031V(9-3.9N/RK); Weight Used for Protein Requirements:  Ideal        Fluid (ml/day):  3060 ml; Method Used for Fluid Requirements:  1 ml/kcal      Nutrition Related Findings:  appears well nourished      Wounds:  Surgical Incision       Current Nutrition Therapies:    DIET GENERAL;     Anthropometric Measures:  · Height: 6' (182.9 cm)  · Current Body Weight: 336 lb 4.8 oz (152.5 kg)   · Admission Body Weight: 336 lb (152.4 kg)    · Usual Body Weight: 346 lb (156.9 kg)(12/17/20)     · Ideal Body Weight: 178 lbs; % Ideal Body Weight 188.9 %   · BMI: 45.6  · BMI Categories: Obese Class 3 (BMI 40.0 or greater)       Nutrition Diagnosis:   · Increased nutrient needs related to acute injury/trauma as evidenced by wounds      Nutrition Interventions:   Food and/or Nutrient Delivery:  Continue Current Diet, Mineral Supplement, Vitamin Supplement(recommend daily MVI w/minerals ) Nutrition Education/Counseling:  No recommendation at this time   Coordination of Nutrition Care:  Continue to monitor while inpatient    Goals:  PO > 75% of meals with good protein sources       Recent Labs     02/08/21  1554 02/09/21  0544 02/10/21  0457    136 136   K 4.9 3.8 4.1    102 104   CO2 27 24 24   BUN 23 18 16   CREATININE 1.10 1.06 0.99   GLUCOSE 131* 139* 176*   ALT 20  --   --    ALKPHOS 103  --   --    GFR       NOT REPORTED       NOT REPORTED       NOT REPORTED      Lab Results   Component Value Date    LABALBU 3.7 02/08/2021      Nutrition Monitoring and Evaluation:   Behavioral-Environmental Outcomes:  None Identified   Food/Nutrient Intake Outcomes:  Food and Nutrient Intake  Physical Signs/Symptoms Outcomes:  Biochemical Data, Weight, Skin     Discharge Planning:    Continue current diet     Electronically signed by Marcelle Waldrop RD, LD on 2/10/21 at 11:46 AM EST    Contact: [phone]

## 2021-02-11 VITALS
BODY MASS INDEX: 42.66 KG/M2 | RESPIRATION RATE: 15 BRPM | HEART RATE: 76 BPM | SYSTOLIC BLOOD PRESSURE: 118 MMHG | TEMPERATURE: 97.8 F | OXYGEN SATURATION: 98 % | WEIGHT: 315 LBS | DIASTOLIC BLOOD PRESSURE: 78 MMHG | HEIGHT: 72 IN

## 2021-02-11 LAB
ANION GAP SERPL CALCULATED.3IONS-SCNC: 11 MMOL/L (ref 9–17)
BUN BLDV-MCNC: 15 MG/DL (ref 8–23)
BUN/CREAT BLD: 14 (ref 9–20)
CALCIUM SERPL-MCNC: 9.2 MG/DL (ref 8.6–10.4)
CHLORIDE BLD-SCNC: 104 MMOL/L (ref 98–107)
CO2: 24 MMOL/L (ref 20–31)
CREAT SERPL-MCNC: 1.09 MG/DL (ref 0.7–1.2)
GFR AFRICAN AMERICAN: >60 ML/MIN
GFR NON-AFRICAN AMERICAN: >60 ML/MIN
GFR SERPL CREATININE-BSD FRML MDRD: ABNORMAL ML/MIN/{1.73_M2}
GFR SERPL CREATININE-BSD FRML MDRD: ABNORMAL ML/MIN/{1.73_M2}
GLUCOSE BLD-MCNC: 132 MG/DL (ref 70–99)
HCT VFR BLD CALC: 35.1 % (ref 41–53)
HEMOGLOBIN: 11.6 G/DL (ref 13.5–17.5)
MCH RBC QN AUTO: 28.4 PG (ref 26–34)
MCHC RBC AUTO-ENTMCNC: 33 G/DL (ref 31–37)
MCV RBC AUTO: 86.1 FL (ref 80–100)
NRBC AUTOMATED: ABNORMAL PER 100 WBC
PDW BLD-RTO: 15.1 % (ref 12.1–15.2)
PLATELET # BLD: 195 K/UL (ref 140–450)
PMV BLD AUTO: ABNORMAL FL (ref 6–12)
POTASSIUM SERPL-SCNC: 3.7 MMOL/L (ref 3.7–5.3)
RBC # BLD: 4.07 M/UL (ref 4.5–5.9)
SODIUM BLD-SCNC: 139 MMOL/L (ref 135–144)
VANCOMYCIN TROUGH DATE LAST DOSE: NORMAL
VANCOMYCIN TROUGH DOSE AMOUNT: NORMAL
VANCOMYCIN TROUGH TIME LAST DOSE: NORMAL
VANCOMYCIN TROUGH: 14.2 UG/ML (ref 10–20)
WBC # BLD: 8 K/UL (ref 3.5–11)

## 2021-02-11 PROCEDURE — 6370000000 HC RX 637 (ALT 250 FOR IP): Performed by: ORTHOPAEDIC SURGERY

## 2021-02-11 PROCEDURE — 97535 SELF CARE MNGMENT TRAINING: CPT

## 2021-02-11 PROCEDURE — 97116 GAIT TRAINING THERAPY: CPT

## 2021-02-11 PROCEDURE — 6360000002 HC RX W HCPCS: Performed by: ORTHOPAEDIC SURGERY

## 2021-02-11 PROCEDURE — 97110 THERAPEUTIC EXERCISES: CPT

## 2021-02-11 PROCEDURE — 2580000003 HC RX 258: Performed by: ORTHOPAEDIC SURGERY

## 2021-02-11 PROCEDURE — 80048 BASIC METABOLIC PNL TOTAL CA: CPT

## 2021-02-11 PROCEDURE — 36415 COLL VENOUS BLD VENIPUNCTURE: CPT

## 2021-02-11 PROCEDURE — 94761 N-INVAS EAR/PLS OXIMETRY MLT: CPT

## 2021-02-11 PROCEDURE — 85027 COMPLETE CBC AUTOMATED: CPT

## 2021-02-11 PROCEDURE — 94640 AIRWAY INHALATION TREATMENT: CPT

## 2021-02-11 PROCEDURE — 6370000000 HC RX 637 (ALT 250 FOR IP): Performed by: INTERNAL MEDICINE

## 2021-02-11 PROCEDURE — 80202 ASSAY OF VANCOMYCIN: CPT

## 2021-02-11 RX ORDER — SODIUM CHLORIDE 0.9 % (FLUSH) 0.9 %
10 SYRINGE (ML) INJECTION PRN
Status: CANCELLED | OUTPATIENT
Start: 2021-02-12

## 2021-02-11 RX ORDER — DOXYCYCLINE HYCLATE 100 MG
100 TABLET ORAL EVERY 12 HOURS SCHEDULED
Status: DISCONTINUED | OUTPATIENT
Start: 2021-02-11 | End: 2021-02-11 | Stop reason: HOSPADM

## 2021-02-11 RX ORDER — DOXYCYCLINE HYCLATE 100 MG
100 TABLET ORAL EVERY 12 HOURS SCHEDULED
Qty: 20 TABLET | Refills: 0 | Status: SHIPPED | OUTPATIENT
Start: 2021-02-11 | End: 2021-02-21

## 2021-02-11 RX ORDER — M-VIT,TX,IRON,MINS/CALC/FOLIC 27MG-0.4MG
1 TABLET ORAL DAILY
Status: DISCONTINUED | OUTPATIENT
Start: 2021-02-11 | End: 2021-02-11 | Stop reason: HOSPADM

## 2021-02-11 RX ADMIN — MONTELUKAST SODIUM 10 MG: 10 TABLET, FILM COATED ORAL at 09:29

## 2021-02-11 RX ADMIN — SODIUM CHLORIDE, PRESERVATIVE FREE 10 ML: 5 INJECTION INTRAVENOUS at 09:34

## 2021-02-11 RX ADMIN — OXYCODONE HYDROCHLORIDE AND ACETAMINOPHEN 500 MG: 500 TABLET ORAL at 09:29

## 2021-02-11 RX ADMIN — ALBUTEROL SULFATE 2.5 MG: 2.5 SOLUTION RESPIRATORY (INHALATION) at 08:58

## 2021-02-11 RX ADMIN — SODIUM CHLORIDE, PRESERVATIVE FREE 10 ML: 5 INJECTION INTRAVENOUS at 09:29

## 2021-02-11 RX ADMIN — DOCUSATE SODIUM 50MG AND SENNOSIDES 8.6MG 1 TABLET: 8.6; 5 TABLET, FILM COATED ORAL at 09:28

## 2021-02-11 RX ADMIN — HYDROCHLOROTHIAZIDE 25 MG: 25 TABLET ORAL at 09:29

## 2021-02-11 RX ADMIN — DOXYCYCLINE HYCLATE 100 MG: 100 TABLET, COATED ORAL at 09:28

## 2021-02-11 RX ADMIN — ESCITALOPRAM OXALATE 20 MG: 10 TABLET ORAL at 09:29

## 2021-02-11 RX ADMIN — MULTIPLE VITAMINS W/ MINERALS TAB 1 TABLET: TAB at 09:29

## 2021-02-11 RX ADMIN — TIOTROPIUM BROMIDE INHALATION SPRAY 2 PUFF: 3.12 SPRAY, METERED RESPIRATORY (INHALATION) at 09:01

## 2021-02-11 RX ADMIN — LOSARTAN POTASSIUM 25 MG: 50 TABLET ORAL at 09:29

## 2021-02-11 RX ADMIN — ATORVASTATIN CALCIUM 80 MG: 40 TABLET, FILM COATED ORAL at 09:29

## 2021-02-11 RX ADMIN — METOPROLOL SUCCINATE 25 MG: 25 TABLET, EXTENDED RELEASE ORAL at 09:29

## 2021-02-11 RX ADMIN — DOCUSATE SODIUM 100 MG: 100 CAPSULE, LIQUID FILLED ORAL at 09:29

## 2021-02-11 RX ADMIN — WATER 2000 MG: 1 INJECTION INTRAMUSCULAR; INTRAVENOUS; SUBCUTANEOUS at 09:28

## 2021-02-11 RX ADMIN — PANTOPRAZOLE SODIUM 40 MG: 40 TABLET, DELAYED RELEASE ORAL at 05:03

## 2021-02-11 RX ADMIN — CHOLECALCIFEROL TAB 25 MCG (1000 UNIT) 1000 UNITS: 25 TAB at 09:28

## 2021-02-11 RX ADMIN — OXYCODONE AND ACETAMINOPHEN 2 TABLET: 5; 325 TABLET ORAL at 09:49

## 2021-02-11 RX ADMIN — OXYCODONE AND ACETAMINOPHEN 2 TABLET: 5; 325 TABLET ORAL at 05:03

## 2021-02-11 RX ADMIN — SPIRONOLACTONE 25 MG: 25 TABLET, FILM COATED ORAL at 09:29

## 2021-02-11 RX ADMIN — FERROUS SULFATE TAB 325 MG (65 MG ELEMENTAL FE) 325 MG: 325 (65 FE) TAB at 09:29

## 2021-02-11 RX ADMIN — PIPERACILLIN SODIUM AND TAZOBACTAM SODIUM 3375 MG: 3; .375 INJECTION, POWDER, LYOPHILIZED, FOR SOLUTION INTRAVENOUS at 01:07

## 2021-02-11 RX ADMIN — ASPIRIN 325 MG: 325 TABLET, COATED ORAL at 09:29

## 2021-02-11 RX ADMIN — ISOSORBIDE MONONITRATE 30 MG: 30 TABLET, EXTENDED RELEASE ORAL at 09:29

## 2021-02-11 RX ADMIN — BUDESONIDE AND FORMOTEROL FUMARATE DIHYDRATE 2 PUFF: 160; 4.5 AEROSOL RESPIRATORY (INHALATION) at 09:01

## 2021-02-11 ASSESSMENT — PAIN DESCRIPTION - PROGRESSION
CLINICAL_PROGRESSION: GRADUALLY IMPROVING
CLINICAL_PROGRESSION: GRADUALLY IMPROVING

## 2021-02-11 ASSESSMENT — PAIN DESCRIPTION - DESCRIPTORS: DESCRIPTORS: ACHING;DISCOMFORT

## 2021-02-11 ASSESSMENT — PAIN SCALES - GENERAL
PAINLEVEL_OUTOF10: 6
PAINLEVEL_OUTOF10: 0
PAINLEVEL_OUTOF10: 4

## 2021-02-11 NOTE — PROGRESS NOTES
Copy of discharge instructions, discharge summary and today's progress notes faxed to Tyrone Chandler at [facility] per her request. Faxed to [phone].

## 2021-02-11 NOTE — PROGRESS NOTES
Discharge instructions given to patient with special attention given to medications, diet, activity, follow up appointments, wound care, outpatient antibiotics, and home health care. Pt verbalizes understanding of all instructions. IV to left hand discontinued with catheter intact, band aid applied. States his daughter will be here with clothes for shortly.

## 2021-02-11 NOTE — PROGRESS NOTES
PRN Medications: acetaminophen, sodium chloride flush, magnesium hydroxide, oxyCODONE-acetaminophen, oxyCODONE-acetaminophen, sodium chloride flush, albuterol sulfate HFA, morphine, sodium chloride flush, promethazine **OR** ondansetron, polyethylene glycol, acetaminophen **OR** acetaminophen    Objective:   Vitals: /78   Pulse 86   Temp 98.2 °F (36.8 °C) (Oral)   Resp 16   Ht 6' (1.829 m)   Wt (!) 336 lb 4.8 oz (152.5 kg)   SpO2 97%   BMI 45.61 kg/m²   BMI: Body mass index is 45.61 kg/m². CBC:   Recent Labs     02/09/21  0544 02/10/21  0457 02/11/21  0600   WBC 6.4 8.2 8.0   HGB 12.0* 12.3* 11.6*    209 195     BMP:    Recent Labs     02/09/21  0544 02/10/21  0457 02/11/21  0600    136 139   K 3.8 4.1 3.7    104 104   CO2 24 24 24   BUN 18 16 15   CREATININE 1.06 0.99 1.09   GLUCOSE 139* 176* 132*         Physical Exam:    General Appearance: alert and oriented to person, place and time, in no acute distress  Cardiovascular: normal rate, regular rhythm, normal S1 and S2, no murmurs  Pulmonary/Chest: clear to auscultation bilaterally- no wheezes, rales or rhonchi, normal air movement  Abdomen: Obese, soft, non-tender, non-distended, normal bowel sounds   Extremities: Right lower extremity swollen, surgical incision wound covered with a dressing  Skin: warm and dry, no rash   Neurological: alert, oriented, normal speech, no focal findings or movement disorder noted      Assessment and Plan:         1.  Right knee dehiscent wound.  S/p TKA on 1/27/21 - s/p  I&D with polyethylene exchange, extensor retinacular repair, patellar tendon repair, allograft dermal graft on 2 /9 by . Continue wound care,  pain management. Plan is to discharge home with outpatient IV Rocephin and p.o. doxycycline.    are consulted to facilitate discharge  2.  Right knee cellulitis -IV Zosyn and Vanco changed to IV Rocephin 3.  Severe CAD, s/p CABG on 3/30/2015 - asymptomatic, continue on aspirin, Toprol-XL, Imdur, statins  4.  Hypertension -blood pressure is stable, continue on current meds  5.  Hyperlipidemia -on atorvastatin  6.  BPH with LUTS  7.  Morbid obesity  8.  Obstructive sleep apnea  9.  COPD - continue on nebulizers, no signs of exacerbation  51.  EEMZRDK diastolic CHF -compensated, continue medical management  11.  History of anxiety disorder -on Lexapro      Patient continues to require inpatient admission related to need for IV antibiotics, discharge arrangements      Electronically signed by Светлана Anguiano MD on 2/11/2021 at 8:10 AM    Rounding Hospitalist

## 2021-02-11 NOTE — PROGRESS NOTES
[facility] GENERAL [facility]  Occupational Therapy  Daily Note  Date: 2021  Patient Name: Madison Webb        MRN: [id number]    : 1945  (70 y.o.)    Subjective: Pt is supine in bed upon arrival.  Pt is PROGRESSING toward goals and independence of Self Care this treatment session  Continue to assess Pending Progress    Objective  ADL  LE Dressing: Stand by assistance(doff & donned socks w/ AE (reacher & sock aide))     Supine to Sit: Stand by assistance     Balance  Sitting Balance: Independent  Standing Balance: Contact guard assistance  Standing Balance  Time: 4:28  Activity: Ring tree  Comment: w/ CGA & FWW    Assessment  Assessment: Pt supine in bed upon arrival. Pt declines ADLs this morning, but is agreeable to UE exercises in therapy room. Pt supine to sit w/ SBA/MIN A. Pt completes STS w/ MIN A. Pt transfers from bed to hallway w/ CGA & FWW. Pt recalls and demos PWB 50%. In therapy room pt educated on AE (reacher & sock aide) for doffing and donning socks. Noted pt states he believes he has a sock aide at home. Pt completes 7 UE exercises per flowsheet, tolerates good w/o rest break. Pt completes standing balance tolerance activity via ring tree for 4:28 w/ FWW & CGA. Exercises:  See Flowsheets    Goals  Short Term Goals  Time Frame for Short term goals: 3 days Short term goal 1: Patient to be modified independent with LB bathing and dressing. Short term goal 2: Patient tolerate static standing x 5 minutes without LOB, maintaining 50% % of time. Short term goal 3: Patient to demonstrate ability to ambulate to the bathroom, completing toilet hygiene and task with CGA only. Short term goal 4: Patient to be educated on AE for LE bathing and dressing.      Time In: 3828  Time Out: 1024  Timed Coded Minutes: 41  Total Treatment Time: 41    VERENICE Copeland/MG /Directly Supervised by: LUCAS Mcguire/L  Date: 2021

## 2021-02-11 NOTE — PROGRESS NOTES
S/P R patellar tendon repair POD #2:    Pain well controlled. Denies CP or SOB. No new complaints. Exam:  VSS, R thigh and leg supple with expected swelling. Cellulitis resolving. Dressing C/D/I. N/V/M intact distally. Mechanical DVT prevention in place. AM labs reviewed. CBC:   Lab Results   Component Value Date    WBC 8.0 02/11/2021    RBC 4.07 02/11/2021    RBC 4.72 05/09/2012    HGB 11.6 02/11/2021    HCT 35.1 02/11/2021    MCV 86.1 02/11/2021    MCH 28.4 02/11/2021    MCHC 33.0 02/11/2021    RDW 15.1 02/11/2021     02/11/2021     05/09/2012    MPV NOT REPORTED 02/11/2021     BMP:    Lab Results   Component Value Date     02/11/2021    K 3.7 02/11/2021     02/11/2021    CO2 24 02/11/2021    BUN 15 02/11/2021    LABALBU 3.7 02/08/2021    CREATININE 1.09 02/11/2021    CALCIUM 9.2 02/11/2021    GFRAA >60 02/11/2021    LABGLOM >60 02/11/2021    GLUCOSE 132 02/11/2021    GLUCOSE 132 05/09/2012       Impression:  1. S/P R patellar tendon repair POD #2  2. Anemia secondary to acute periop blood loss. Plan:  1. Continue DVT prophylaxis. 2.  Cryocuff per protocol. 3.  Discharge planning for post inpt needs. ? Results with PT. Refused yesterday afternoon. 4.  PT and OT services continued. 5.  Continue IS per protocol. 6.  All questions answered at the bedside. 7.  D/c Vanco/Zosyn. Start Rocephin + doxycycline.

## 2021-02-11 NOTE — PROGRESS NOTES
Pt taken to private car via wheelchair to be discharged home with family. All personal belongings sent with patient. In stable condition at time of discharge.

## 2021-02-11 NOTE — PROGRESS NOTES
PICC dressing changed per protocol using sterile technique. Pt tolerated well. Explained PICC care at home and need to keep site dry and intact. Sleeve provided to patient to wear home. Aware of orders for outpatient antibiotic and arrangements made for [facility] to transport him Monday-Friday and he will find transportation for the weekends. States daughter will be here to pick him up around 3:45.
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
## 2021-02-11 NOTE — PLAN OF CARE
Problem: Pain:  Goal: Pain level will decrease  Description: Pain level will decrease  Outcome: Ongoing     Problem:  Activity Intolerance:  Goal: Ability to tolerate increased activity will improve  Description: Ability to tolerate increased activity will improve  Outcome: Ongoing     Problem: Cardiac Output - Decreased:  Goal: Hemodynamic stability will improve  Description: Hemodynamic stability will improve  Outcome: Ongoing     Problem: Cardiac:  Goal: Hemodynamic stability will improve  Description: Hemodynamic stability will improve  Outcome: Ongoing     Problem: Sensory:  Goal: Pain level will decrease  Description: Pain level will decrease  Outcome: Ongoing

## 2021-02-11 NOTE — DISCHARGE SUMMARY
Hospitalist Discharge Summary    Patient:  Madison Webb  YOB: 1945    MRN: [id number]   Acct: [de-identified]    Primary Care Physician: BESS Ga CNP    Admit date:  2/8/2021    Discharge date:  2/11/2021       Discharge Diagnoses:     1. Right knee dehiscent wound.  S/p TKA on 1/27/21 - s/p  I&D with polyethylene exchange, extensor retinacular repair, patellar tendon repair, allograft dermal graft on 2 /9 by   2. Right knee cellulitis  3. Hypertension  4. Severe CAD, s/p CABG on 3/30/2015  5. Hyperlipidemia  6. BPH with LUTS  7. Obstructive sleep apnea  8. COPD  9. Chronic diastolic CHF  10. Morbid obesity with BMI 45.6  11. History of anxiety disorder    Discharge Medications:       [facility][id number]    Printed on:02/11/21 1219   Medication Information                      albuterol (PROVENTIL) (2.5 MG/3ML) 0.083% nebulizer solution  Take 3 mLs by nebulization 4 times daily             albuterol sulfate  (90 Base) MCG/ACT inhaler  INHALE 2 PUFFS BY MOUTH EVERY 4 HOURS AS NEEDED FOR WHEEZING             aspirin 325 MG EC tablet  Take 1 tablet by mouth 2 times daily (with meals)             atorvastatin (LIPITOR) 80 MG tablet  Take 1 tablet by mouth daily             budesonide-formoterol (SYMBICORT) 160-4.5 MCG/ACT AERO  Inhale 2 puffs into the lungs every 12 hours             cefTRIAXone (ROCEPHIN) 500 MG injection  2g IV every 24 hrs. For 14 doses.              cefTRIAXone (ROCEPHIN) infusion  Infuse 2,000 mg intravenously every 24 hours for 14 days Compound per protocol             docusate sodium (COLACE) 100 MG capsule  Take 1 capsule by mouth 2 times daily             doxycycline hyclate (VIBRA-TABS) 100 MG tablet  Take 1 tablet by mouth every 12 hours for 10 days             escitalopram (LEXAPRO) 20 MG tablet  Take 1 tablet by mouth once daily             Ferrous Sulfate (IRON) 325 (65 Fe) MG TABS  65 mg 2 times daily             fluticasone (FLONASE) 50 MCG/ACT nasal spray  1 spray by Nasal route daily             hydroCHLOROthiazide (HYDRODIURIL) 25 MG tablet  Take 1 tablet by mouth once daily             ipratropium (ATROVENT) 0.02 % nebulizer solution  Take 2.5 mLs by nebulization 4 times daily             isosorbide mononitrate (IMDUR) 30 MG extended release tablet  Take 1 tablet by mouth daily             losartan (COZAAR) 25 MG tablet  Take 1 tablet by mouth once daily             metoprolol succinate (TOPROL XL) 25 MG extended release tablet  Take 1 tablet by mouth daily             montelukast (SINGULAIR) 10 MG tablet  Take 10 mg by mouth daily             Multiple Vitamin (THERA/BETA-CAROTENE) TABS  Take 1 tablet by mouth daily             omeprazole (PRILOSEC) 40 MG delayed release capsule  TAKE 1 CAPSULE BY MOUTH ONCE DAILY 30 MINUTES BEFORE BREAKFAST. spironolactone (ALDACTONE) 25 MG tablet  Take 1 tablet by mouth once daily             tamsulosin (FLOMAX) 0.4 MG capsule  Take 1 capsule by mouth every evening             tiotropium (SPIRIVA RESPIMAT) 2.5 MCG/ACT AERS inhaler  Inhale 2 puffs into the lungs 2 times daily              vitamin C (ASCORBIC ACID) 500 MG tablet  Take 500 mg by mouth daily             Vitamin D, Cholecalciferol, 25 MCG (1000 UT) TABS  Take by mouth daily                  Diet:  DIET GENERAL;     Activity: As advised by orthopedic surgery    Follow-up: Follow-up with     Consultants: Orthopedic surgery     Physical Exam:    Vitals:  Patient Vitals for the past 24 hrs:   BP Temp Temp src Pulse Resp SpO2   02/11/21 0902     15 98 %   02/11/21 0815 118/78 97.8 °F (36.6 °C) Oral 76 18 96 %   02/11/21 0545     16 97 %   02/10/21 2036     18 97 %   02/10/21 1945 129/78 98.2 °F (36.8 °C) Oral 86 18 97 %   02/10/21 1605 122/78 98 °F (36.7 °C) Oral 96 16 98 %     Weight: Weight: (!) 336 lb 4.8 oz (152.5 kg)     24 hour intake/output:    Intake/Output right lower extremity was placed in a knee immobilizer. He was admitted and started empirically on IV Zosyn and vancomycin. Patient was evaluated by his orthopedic surgeon Luis Webster and underwent I&D with polyethylene exchange, extensor retinacular repair, patellar tendon repair, allograft dermal graft on 2 /9 . Postoperatively he was evaluated by physical therapist.  The patient prefers to be discharged home and continue PT outpatient. He is overall doing well.  recommends outpatient IV Rocephin course and oral doxycycline for 10 days. Patient has a PICC line placed. Patient will be discharged home. Resume home health care and outpatient PT. Follow-up with .          Disposition: home with home health care    Condition: Stable    Time Spent: 33 minutes      Electronically signed by Mariano Mccollum MD on 2/11/2021 at 12:19 PM  Discharging Hospitalist

## 2021-02-11 NOTE — PROGRESS NOTES
Phone: Ricky  Date: 2021  Fax: [phone]      Physical Therapy    Daily Note    Patient Name: Jenifer Fuller      : 1945  (70 y.o.)  MRN: [id number]     Pt is PROGRESSING toward goals and increased independence of mobility this treatment session        Assessment        Supine to Sit: Moderate assistance     Scooting: Independent    Sit to Stand: Modified independent  Stand to sit: Modified independent                WB Status: 50% R LE  Ambulation 1  Surface: level tile  Device: Rolling Walker  Other Apparatus: Knee Immobilizer  Assistance: Modified Independent  Quality of Gait: Step to pattern  Distance: 50ftx1        Stairs  # Steps : 2  Stairs Height: 6\"  Rails: Bilateral  Assistance: Stand by assistance    Assessment: Patient seated in W/C in therapy room having just finished OT. Educated patient on gait on stairs with maintaining PWB R LE. Patient transfered sit to stand mod independent. He ambulated up and down 2 stair steps with handrail with PWB R LE and SBA for safety. Patient reports he has good understanding of stairs, but declined to practice second time. After short rest break, patient ambulated 50ft with w.walker modified independent ( followed with w/c). Patient then returned to room via w/c. He transfered sit to stand from w/c and turned with w.walker to sit in recliner all mod independent. Plan for patient to be discharged home later today. Time In: 10:30  Time Out: 10:50  Timed Coded Minutes: 20  Total Treatment Time: 20    Exercises:  See Flowsheets    Plan  Cont Per Plan Of Care    Goals  Short Term Goals  Time Frame for Short term goals: 3 days  Short term goal 1: Pt to demonstrate ability to perform all bed mobility and transfers with mod independence to help with return to home. Short term goal 2: Pt to demonstrate ability to ascend/descend 4 steps with L handrail to ensure safety for return to home. Short term goal 3: Pt to amb with rolling walker safely with mod independence for 50ft for home amb.-Met          Long Term Goals                      [facility] License Number: PT    Date: 2/11/2021

## 2021-02-11 NOTE — PROGRESS NOTES
Patient being discharged home this p.m. He will be taking his IV antibiotics through out patient department at [facility] and [address] [place] [address] will provide transportation on week days. Family to provide transportation on weekends. Patient aware and agreeable with this plan.       [address]  2/11/2021

## 2021-02-11 NOTE — PROGRESS NOTES
Moise Blair at [place] [address] is notified of pt discharge home today with outpatient  IV ATB therapy for next 2 weeks at 12noon

## 2021-02-12 ENCOUNTER — HOSPITAL ENCOUNTER (OUTPATIENT)
Dept: NURSING | Age: 76
Setting detail: INFUSION SERIES
Discharge: HOME | DRG: 908 | End: 2021-02-12
Payer: MEDICARE

## 2021-02-12 ENCOUNTER — TELEPHONE (OUTPATIENT)
Dept: FAMILY MEDICINE CLINIC | Age: 76
End: 2021-02-12

## 2021-02-12 ENCOUNTER — CARE COORDINATION (OUTPATIENT)
Dept: CASE MANAGEMENT | Age: 76
End: 2021-02-12

## 2021-02-12 DIAGNOSIS — T81.31XA DEHISCENCE OF OPERATIVE WOUND, INITIAL ENCOUNTER: Primary | ICD-10-CM

## 2021-02-12 PROCEDURE — 2580000003 HC RX 258: Performed by: ORTHOPAEDIC SURGERY

## 2021-02-12 PROCEDURE — 6360000002 HC RX W HCPCS: Performed by: ORTHOPAEDIC SURGERY

## 2021-02-12 PROCEDURE — 1111F DSCHRG MED/CURRENT MED MERGE: CPT | Performed by: NURSE PRACTITIONER

## 2021-02-12 PROCEDURE — 96365 THER/PROPH/DIAG IV INF INIT: CPT

## 2021-02-12 RX ORDER — SODIUM CHLORIDE 0.9 % (FLUSH) 0.9 %
10 SYRINGE (ML) INJECTION PRN
Status: CANCELLED | OUTPATIENT
Start: 2021-02-13

## 2021-02-12 RX ORDER — SODIUM CHLORIDE 0.9 % (FLUSH) 0.9 %
10 SYRINGE (ML) INJECTION PRN
Status: DISCONTINUED | OUTPATIENT
Start: 2021-02-12 | End: 2021-02-13 | Stop reason: HOSPADM

## 2021-02-12 RX ADMIN — SODIUM CHLORIDE, PRESERVATIVE FREE 10 ML: 5 INJECTION INTRAVENOUS at 12:27

## 2021-02-12 RX ADMIN — SODIUM CHLORIDE, PRESERVATIVE FREE 10 ML: 5 INJECTION INTRAVENOUS at 13:01

## 2021-02-12 RX ADMIN — CEFTRIAXONE 2000 MG: 2 INJECTION, POWDER, FOR SOLUTION INTRAMUSCULAR; INTRAVENOUS at 12:27

## 2021-02-12 NOTE — CARE COORDINATION
[facility] Transitions Initial Follow Up Call    Call within 2 business days of discharge: Yes    Patient: Joyce Macias Patient : 1945   MRN: [id number]  Reason for Admission: post op wound dehiscence right knee (I & D)  Discharge Date: 21 RARS: Readmission Risk Score: 16      Last Discharge [address] [place] [address]       Complaint Diagnosis Description Type Department Provider    21 Post-op Problem Postoperative wound dehiscence, initial encounter ED to Hosp-Admission (Discharged) (ADMITTED) [facility] 2E MS Rhonda Brunner MD; Joanne Pereira . .. Spoke with: Zhaoamilcar Garcia    Call to pt who states he is doing \"good\". States leg is stiff but he is able to walk okay. States the leg has a dressing and his wife had been instructed to take it off later and leave open to air  States Gladys Marking is coming to pick him up soon (to take to IV abx). States breathing is the \"usual\". States only uses nebulizer twice daily after he had started the symbicort and spiriva      Challenges to be reviewed by the provider   Additional needs identified to be addressed with provider No  none    Discussed COVID-19 related testing which was available at this time. Test results were negative. Patient informed of results, if available? Yes         Method of communication with provider : none    Advance Care Planning:   Does patient have an Advance Directive:  not on file; education provided. Was this a readmission? Yes  Patient stated reason for admission: leg incision opened up  Patients top risk factors for readmission: medical condition    Care Transition Nurse (CTN) contacted the patient by telephone to perform post hospital discharge assessment. Verified name and  with patient as identifiers. Provided introduction to self, and explanation of the CTN role. CTN reviewed discharge instructions, medical action plan and red flags with patient who verbalized understanding.  Patient given an opportunity to ask questions and does not have any further questions or concerns at this time. Were discharge instructions available to patient? Yes. Reviewed appropriate site of care based on symptoms and resources available to patient including: Specialist, Home health and When to call 911. The patient agrees to contact the PCP office for questions related to their healthcare. Medication reconciliation was performed with patient, who verbalizes understanding of administration of home medications. Advised obtaining a 90-day supply of all daily and as-needed medications. Covid Risk Education    Patient has following risk factors of: heart failure and COPD. Education provided regarding infection prevention, and signs and symptoms of COVID-19 and when to seek medical attention with patient who verbalized understanding. Discussed exposure protocols and quarantine From CDC: Are you at higher risk for severe illness?   and given an opportunity for questions and concerns. The patient agrees to contact the COVID-19 hotline [phone] or PCP office for questions related to COVID-19. For more information on steps you can take to protect yourself, see CDC's How to Protect Yourself     Discussed follow-up appointments. If no appointment was previously scheduled, appointment scheduling offered: Yes. Is follow up appointment scheduled within 7 days of discharge? Yes  Non-[facility] follow up appointment(s): n/a     Plan for follow-up call in 10-14 days based on severity of symptoms and risk factors. Plan for next call: symptom management-leg wound healing   CTN provided contact information for future needs.           Non-face-to-face services provided:  Scheduled appointment with Specialist-confirms dennys 2/25  Obtained and reviewed discharge summary and/or continuity of care documents  Communication with home health agencies or other community services the patient is currently using-confirms [facility] Monday  Assessment and support for treatment adherence and MOBILE VAN UNIT MTHZ MOBILE  [facility]   2/23/2021 12:00 PM [facility] OP TREATMENT BAY 1 [address] OP RN [facility]   2/24/2021 11:00 AM [facility] MOBILE VAN UNIT MTHZ MOBILE  [facility]   2/24/2021 12:00 PM [place] [facility] OP TREATMENT BAY 1 [facility] OP RN [facility]   2/25/2021 11:00 AM [facility] MOBILE VAN UNIT MTHZ MOBILE  [facility]   2/25/2021 12:00 PM [facility] OSORIO Kaitlynn [place]   3/3/2021 10:00 AM Trent Rodriguez MD Neuro Endo [facility]   3/15/2021  9:00 AM MD Duong Roper [facility]   4/13/2021  9:00 AM BESS Martínez - CNP nw pc [facility]   7/12/2021 10:30 AM Edward Zelaya MD TIFF UROLOGY [facility]       Luis Curiel RN

## 2021-02-12 NOTE — TELEPHONE ENCOUNTER
BAY 1 [address] OP RN Kaitlynn [place]   2/23/2021 11:10 AM [facility] MOBILE VAN UNIT MTHZ MOBILE  [place] [facility]   2/23/2021 12:00 PM [facility] OP TREATMENT BAY 1 [facility] OP [place]   2/24/2021 11:00 AM [facility] MOBILE VAN UNIT MTHZ MOBILE  [place] [facility]   2/24/2021 12:00 PM [facility] OP TREATMENT BAY 1 [facility] OP RN [place]   2/25/2021 11:00 AM [facility] MOBILE VAN UNIT MTHZ MOBILE  [place] [facility]   2/25/2021 12:00 PM [facility] OP TREATMENT BAY [address] OP RN Kaitlynn [place]   3/3/2021 10:00 AM Markie Akins MD Neuro Endo [facility]   3/15/2021  9:00 AM MD Natalya Kenny [facility]   4/13/2021  9:00 AM BESS Bryant - CNP nw pc [facility]   7/12/2021 10:30 AM Guerrero Brownlee MD [place] UROLOGY [facility]       Vishal Poon MA

## 2021-02-13 ENCOUNTER — HOSPITAL ENCOUNTER (OUTPATIENT)
Dept: NURSING | Age: 76
Setting detail: INFUSION SERIES
Discharge: HOME | End: 2021-02-13
Payer: MEDICARE

## 2021-02-13 DIAGNOSIS — T81.31XA DEHISCENCE OF OPERATIVE WOUND, INITIAL ENCOUNTER: Primary | ICD-10-CM

## 2021-02-13 PROCEDURE — 6360000002 HC RX W HCPCS: Performed by: ORTHOPAEDIC SURGERY

## 2021-02-13 PROCEDURE — 96365 THER/PROPH/DIAG IV INF INIT: CPT

## 2021-02-13 PROCEDURE — 2580000003 HC RX 258: Performed by: ORTHOPAEDIC SURGERY

## 2021-02-13 RX ORDER — SODIUM CHLORIDE 0.9 % (FLUSH) 0.9 %
10 SYRINGE (ML) INJECTION PRN
Status: CANCELLED | OUTPATIENT
Start: 2021-02-14

## 2021-02-13 RX ORDER — SODIUM CHLORIDE 0.9 % (FLUSH) 0.9 %
10 SYRINGE (ML) INJECTION PRN
Status: DISCONTINUED | OUTPATIENT
Start: 2021-02-13 | End: 2021-02-14 | Stop reason: HOSPADM

## 2021-02-13 RX ADMIN — CEFTRIAXONE 2000 MG: 2 INJECTION, POWDER, FOR SOLUTION INTRAMUSCULAR; INTRAVENOUS at 12:23

## 2021-02-14 ENCOUNTER — HOSPITAL ENCOUNTER (OUTPATIENT)
Dept: NURSING | Age: 76
Setting detail: INFUSION SERIES
Discharge: HOME | End: 2021-02-14
Payer: MEDICARE

## 2021-02-14 DIAGNOSIS — T81.31XA DEHISCENCE OF OPERATIVE WOUND, INITIAL ENCOUNTER: Primary | ICD-10-CM

## 2021-02-14 PROCEDURE — 96365 THER/PROPH/DIAG IV INF INIT: CPT

## 2021-02-14 PROCEDURE — 2580000003 HC RX 258: Performed by: ORTHOPAEDIC SURGERY

## 2021-02-14 PROCEDURE — 6360000002 HC RX W HCPCS: Performed by: ORTHOPAEDIC SURGERY

## 2021-02-14 RX ORDER — SODIUM CHLORIDE 0.9 % (FLUSH) 0.9 %
10 SYRINGE (ML) INJECTION PRN
Status: CANCELLED | OUTPATIENT
Start: 2021-02-15

## 2021-02-14 RX ORDER — SODIUM CHLORIDE 0.9 % (FLUSH) 0.9 %
10 SYRINGE (ML) INJECTION PRN
Status: DISCONTINUED | OUTPATIENT
Start: 2021-02-14 | End: 2021-02-15 | Stop reason: HOSPADM

## 2021-02-14 RX ADMIN — CEFTRIAXONE 2000 MG: 2 INJECTION, POWDER, FOR SOLUTION INTRAMUSCULAR; INTRAVENOUS at 12:05

## 2021-02-14 RX ADMIN — SODIUM CHLORIDE, PRESERVATIVE FREE 10 ML: 5 INJECTION INTRAVENOUS at 12:05

## 2021-02-14 RX ADMIN — SODIUM CHLORIDE, PRESERVATIVE FREE 10 ML: 5 INJECTION INTRAVENOUS at 12:38

## 2021-02-15 ENCOUNTER — TELEPHONE (OUTPATIENT)
Dept: FAMILY MEDICINE CLINIC | Age: 76
End: 2021-02-15

## 2021-02-15 ENCOUNTER — HOSPITAL ENCOUNTER (OUTPATIENT)
Dept: NURSING | Age: 76
Setting detail: INFUSION SERIES
Discharge: HOME | End: 2021-02-15
Payer: MEDICARE

## 2021-02-15 DIAGNOSIS — T81.31XD DEHISCENCE OF OPERATIVE WOUND, SUBSEQUENT ENCOUNTER: Primary | ICD-10-CM

## 2021-02-15 DIAGNOSIS — T81.31XA DEHISCENCE OF OPERATIVE WOUND, INITIAL ENCOUNTER: ICD-10-CM

## 2021-02-15 PROCEDURE — 96365 THER/PROPH/DIAG IV INF INIT: CPT

## 2021-02-15 PROCEDURE — 6360000002 HC RX W HCPCS: Performed by: ORTHOPAEDIC SURGERY

## 2021-02-15 PROCEDURE — 2580000003 HC RX 258: Performed by: ORTHOPAEDIC SURGERY

## 2021-02-15 RX ORDER — SODIUM CHLORIDE 0.9 % (FLUSH) 0.9 %
10 SYRINGE (ML) INJECTION PRN
Status: DISCONTINUED | OUTPATIENT
Start: 2021-02-15 | End: 2021-02-16 | Stop reason: HOSPADM

## 2021-02-15 RX ORDER — SODIUM CHLORIDE 0.9 % (FLUSH) 0.9 %
10 SYRINGE (ML) INJECTION PRN
Status: CANCELLED | OUTPATIENT
Start: 2021-02-16

## 2021-02-15 RX ADMIN — SODIUM CHLORIDE, PRESERVATIVE FREE 10 ML: 5 INJECTION INTRAVENOUS at 12:16

## 2021-02-15 RX ADMIN — CEFTRIAXONE 2000 MG: 2 INJECTION, POWDER, FOR SOLUTION INTRAMUSCULAR; INTRAVENOUS at 12:17

## 2021-02-15 RX ADMIN — SODIUM CHLORIDE, PRESERVATIVE FREE 10 ML: 5 INJECTION INTRAVENOUS at 12:45

## 2021-02-15 NOTE — TELEPHONE ENCOUNTER
[facility] Transitions Initial Follow Up Call    Outreach made within 2 business days of discharge: Yes    Patient: Madison Webb Patient : 1945   MRN: [id number]  Reason for Admission: There are no discharge diagnoses documented for the most recent discharge. Discharge Date: 21       Spoke with: Patient     Discharge department/facility: [facility] Interactive Patient Contact:  Was patient able to fill all prescriptions: Yes- antibiotic    Was patient instructed to bring all medications to the follow-up visit: No  Is patient taking all medications as directed in the discharge summary?  Yes  Does patient understand their discharge instructions: Yes  Does patient have questions or concerns that need addressed prior to 7-14 day follow up office visit: no    Scheduled appointment with PCP within 7-14 days  Seeing Dr. Kenrick Ridley Next week       Follow Up  Future Appointments   Date Time Provider Gerry Marin   2/15/2021 11:10 AM [address] [facility]   2/15/2021 12:00 PM [facility] OP TREATMENT [address] OP OSORIO becker   2021 12:00 PM [facility] OP TREATMENT [address] OP OSORIO becker   2021 11:10 AM [facility] MOBILE VAN UNIT MTHZ MOBILE  [place] [facility]   2021 12:00 PM [facility] OP TREATMENT [address] OP OSORIO becker   2021 11:10 AM [facility] MOBILE VAN UNIT MTHZ MOBILE  [place] [facility]   2021 12:00 PM [facility] OP TREATMENT [address] OP OSORIO becker   2021 11:10 AM [facility] MOBILE VAN UNIT MTHZ MOBILE  [place] [facility]   2021 12:00 PM [facility] OP TREATMENT [address] OP OSORIO becker   2021 12:00 PM [facility] OP TREATMENT [address] OP OSORIO becker   2021 12:00 PM [facility] OP TREATMENT BAY [address] [place] [address] OP OSORIO becker   2021 11:10 AM [facility] MOBILE VAN UNIT [facility] MOBILE  [facility]   2021 12:00 PM [facility] OP TREATMENT BAY [address] [place] [address] OP OSORIO becker   2021 11:10 AM [facility] MOBILE VAN UNIT [facility] MOBILE  [facility]   2021 12:00 PM [facility] OP TREATMENT BAY 1 [facility] OP OSORIO becker   2021 11:00 AM [facility] MOBILE VAN

## 2021-02-16 ENCOUNTER — HOSPITAL ENCOUNTER (OUTPATIENT)
Dept: NURSING | Age: 76
Setting detail: INFUSION SERIES
Discharge: HOME | End: 2021-02-16
Payer: MEDICARE

## 2021-02-16 DIAGNOSIS — T81.31XA DEHISCENCE OF OPERATIVE WOUND, INITIAL ENCOUNTER: Primary | ICD-10-CM

## 2021-02-16 PROCEDURE — 96365 THER/PROPH/DIAG IV INF INIT: CPT

## 2021-02-16 PROCEDURE — 2580000003 HC RX 258: Performed by: ORTHOPAEDIC SURGERY

## 2021-02-16 PROCEDURE — 6360000002 HC RX W HCPCS: Performed by: ORTHOPAEDIC SURGERY

## 2021-02-16 RX ORDER — SODIUM CHLORIDE 0.9 % (FLUSH) 0.9 %
10 SYRINGE (ML) INJECTION PRN
Status: DISCONTINUED | OUTPATIENT
Start: 2021-02-16 | End: 2021-02-17 | Stop reason: HOSPADM

## 2021-02-16 RX ORDER — SODIUM CHLORIDE 0.9 % (FLUSH) 0.9 %
10 SYRINGE (ML) INJECTION PRN
Status: CANCELLED | OUTPATIENT
Start: 2021-02-17

## 2021-02-16 RX ADMIN — SODIUM CHLORIDE, PRESERVATIVE FREE 10 ML: 5 INJECTION INTRAVENOUS at 12:15

## 2021-02-16 RX ADMIN — CEFTRIAXONE 2000 MG: 2 INJECTION, POWDER, FOR SOLUTION INTRAMUSCULAR; INTRAVENOUS at 11:47

## 2021-02-16 RX ADMIN — SODIUM CHLORIDE, PRESERVATIVE FREE 10 ML: 5 INJECTION INTRAVENOUS at 11:47

## 2021-02-17 ENCOUNTER — TELEPHONE (OUTPATIENT)
Dept: PRIMARY CARE CLINIC | Age: 76
End: 2021-02-17

## 2021-02-17 ENCOUNTER — HOSPITAL ENCOUNTER (OUTPATIENT)
Age: 76
Discharge: HOME | End: 2021-02-17
Payer: MEDICARE

## 2021-02-17 ENCOUNTER — HOSPITAL ENCOUNTER (OUTPATIENT)
Dept: NURSING | Age: 76
Setting detail: INFUSION SERIES
Discharge: HOME | End: 2021-02-17
Payer: MEDICARE

## 2021-02-17 DIAGNOSIS — T81.31XA DEHISCENCE OF OPERATIVE WOUND, INITIAL ENCOUNTER: Primary | ICD-10-CM

## 2021-02-17 LAB
ABSOLUTE EOS #: 0.3 K/UL (ref 0–0.4)
ABSOLUTE IMMATURE GRANULOCYTE: ABNORMAL K/UL (ref 0–0.3)
ABSOLUTE LYMPH #: 0.9 K/UL (ref 1–4.8)
ABSOLUTE MONO #: 0.6 K/UL (ref 0–1)
ALBUMIN SERPL-MCNC: 3.4 G/DL (ref 3.5–5.2)
ALBUMIN/GLOBULIN RATIO: ABNORMAL (ref 1–2.5)
ALP BLD-CCNC: 105 U/L (ref 40–129)
ALT SERPL-CCNC: 24 U/L (ref 5–41)
ANION GAP SERPL CALCULATED.3IONS-SCNC: 9 MMOL/L (ref 9–17)
AST SERPL-CCNC: 28 U/L
BASOPHILS # BLD: 0 % (ref 0–2)
BASOPHILS ABSOLUTE: 0 K/UL (ref 0–0.2)
BILIRUB SERPL-MCNC: 0.35 MG/DL (ref 0.3–1.2)
BUN BLDV-MCNC: 15 MG/DL (ref 8–23)
BUN/CREAT BLD: 17 (ref 9–20)
CALCIUM SERPL-MCNC: 9.2 MG/DL (ref 8.6–10.4)
CHLORIDE BLD-SCNC: 105 MMOL/L (ref 98–107)
CO2: 25 MMOL/L (ref 20–31)
CREAT SERPL-MCNC: 0.89 MG/DL (ref 0.7–1.2)
DIFFERENTIAL TYPE: YES
EOSINOPHILS RELATIVE PERCENT: 5 % (ref 0–5)
GFR AFRICAN AMERICAN: >60 ML/MIN
GFR NON-AFRICAN AMERICAN: >60 ML/MIN
GFR SERPL CREATININE-BSD FRML MDRD: ABNORMAL ML/MIN/{1.73_M2}
GFR SERPL CREATININE-BSD FRML MDRD: ABNORMAL ML/MIN/{1.73_M2}
GLUCOSE BLD-MCNC: 106 MG/DL (ref 70–99)
HCT VFR BLD CALC: 31.8 % (ref 41–53)
HEMOGLOBIN: 10.6 G/DL (ref 13.5–17.5)
IMMATURE GRANULOCYTES: ABNORMAL %
LYMPHOCYTES # BLD: 14 % (ref 13–44)
MCH RBC QN AUTO: 28.7 PG (ref 26–34)
MCHC RBC AUTO-ENTMCNC: 33.4 G/DL (ref 31–37)
MCV RBC AUTO: 86 FL (ref 80–100)
MONOCYTES # BLD: 9 % (ref 5–9)
NRBC AUTOMATED: ABNORMAL PER 100 WBC
PDW BLD-RTO: 15.1 % (ref 12.1–15.2)
PLATELET # BLD: 218 K/UL (ref 140–450)
PLATELET ESTIMATE: ABNORMAL
PMV BLD AUTO: ABNORMAL FL (ref 6–12)
POTASSIUM SERPL-SCNC: 3.8 MMOL/L (ref 3.7–5.3)
RBC # BLD: 3.7 M/UL (ref 4.5–5.9)
RBC # BLD: ABNORMAL 10*6/UL
SEG NEUTROPHILS: 72 % (ref 39–75)
SEGMENTED NEUTROPHILS ABSOLUTE COUNT: 4.7 K/UL (ref 2.1–6.5)
SODIUM BLD-SCNC: 139 MMOL/L (ref 135–144)
TOTAL PROTEIN: 5.8 G/DL (ref 6.4–8.3)
WBC # BLD: 6.5 K/UL (ref 3.5–11)
WBC # BLD: ABNORMAL 10*3/UL

## 2021-02-17 PROCEDURE — 85025 COMPLETE CBC W/AUTO DIFF WBC: CPT

## 2021-02-17 PROCEDURE — 6360000002 HC RX W HCPCS: Performed by: ORTHOPAEDIC SURGERY

## 2021-02-17 PROCEDURE — 80053 COMPREHEN METABOLIC PANEL: CPT

## 2021-02-17 PROCEDURE — 96365 THER/PROPH/DIAG IV INF INIT: CPT

## 2021-02-17 PROCEDURE — 2580000003 HC RX 258: Performed by: ORTHOPAEDIC SURGERY

## 2021-02-17 PROCEDURE — 36415 COLL VENOUS BLD VENIPUNCTURE: CPT

## 2021-02-17 RX ORDER — SODIUM CHLORIDE 0.9 % (FLUSH) 0.9 %
10 SYRINGE (ML) INJECTION PRN
Status: DISCONTINUED | OUTPATIENT
Start: 2021-02-17 | End: 2021-02-18 | Stop reason: HOSPADM

## 2021-02-17 RX ORDER — SODIUM CHLORIDE 0.9 % (FLUSH) 0.9 %
10 SYRINGE (ML) INJECTION PRN
Status: CANCELLED | OUTPATIENT
Start: 2021-02-18

## 2021-02-17 RX ADMIN — SODIUM CHLORIDE, PRESERVATIVE FREE 10 ML: 5 INJECTION INTRAVENOUS at 12:39

## 2021-02-17 RX ADMIN — CEFTRIAXONE 2000 MG: 2 INJECTION, POWDER, FOR SOLUTION INTRAMUSCULAR; INTRAVENOUS at 12:07

## 2021-02-17 NOTE — TELEPHONE ENCOUNTER
Patient did labs and then spoke to Pavan Ibarra office, they offered an additional med to help but told him to remain on this.  His vitals were fine he states and he feels about the same but has not picked up the new script just yet, is heading to do so

## 2021-02-17 NOTE — TELEPHONE ENCOUNTER
Patient started new medication from Dr. Andi Olmedo after surgery    C/o light headedness and dizzy spells and nausea    I advised him to call their office and explain what is going on and then to call me back and let me know what they advise       Patient states he will call when they open at 8:30 and then let me know       Recent surgery and discharge

## 2021-02-17 NOTE — TELEPHONE ENCOUNTER
Increase water intake. Only new medication is doxycycline not uncommon to cause nausea take the med with food please. Usually this improves these side effects. Also good to check bp and heart rate, temp daily is he doing this.

## 2021-02-18 ENCOUNTER — HOSPITAL ENCOUNTER (OUTPATIENT)
Dept: NURSING | Age: 76
Setting detail: INFUSION SERIES
Discharge: HOME | End: 2021-02-18
Payer: MEDICARE

## 2021-02-18 DIAGNOSIS — T81.31XA DEHISCENCE OF OPERATIVE WOUND, INITIAL ENCOUNTER: Primary | ICD-10-CM

## 2021-02-18 PROCEDURE — 99211 OFF/OP EST MAY X REQ PHY/QHP: CPT

## 2021-02-18 PROCEDURE — 2580000003 HC RX 258: Performed by: ORTHOPAEDIC SURGERY

## 2021-02-18 PROCEDURE — 6360000002 HC RX W HCPCS: Performed by: ORTHOPAEDIC SURGERY

## 2021-02-18 PROCEDURE — 96365 THER/PROPH/DIAG IV INF INIT: CPT

## 2021-02-18 RX ORDER — SODIUM CHLORIDE 0.9 % (FLUSH) 0.9 %
10 SYRINGE (ML) INJECTION PRN
Status: CANCELLED | OUTPATIENT
Start: 2021-02-19

## 2021-02-18 RX ORDER — SODIUM CHLORIDE 0.9 % (FLUSH) 0.9 %
10 SYRINGE (ML) INJECTION PRN
Status: DISCONTINUED | OUTPATIENT
Start: 2021-02-18 | End: 2021-02-19 | Stop reason: HOSPADM

## 2021-02-18 RX ADMIN — SODIUM CHLORIDE, PRESERVATIVE FREE 10 ML: 5 INJECTION INTRAVENOUS at 12:27

## 2021-02-18 RX ADMIN — CEFTRIAXONE 2000 MG: 2 INJECTION, POWDER, FOR SOLUTION INTRAMUSCULAR; INTRAVENOUS at 11:50

## 2021-02-19 ENCOUNTER — HOSPITAL ENCOUNTER (OUTPATIENT)
Dept: NURSING | Age: 76
Setting detail: INFUSION SERIES
Discharge: HOME | End: 2021-02-19
Payer: MEDICARE

## 2021-02-19 DIAGNOSIS — T81.31XA DEHISCENCE OF OPERATIVE WOUND, INITIAL ENCOUNTER: Primary | ICD-10-CM

## 2021-02-19 PROCEDURE — 96365 THER/PROPH/DIAG IV INF INIT: CPT

## 2021-02-19 PROCEDURE — 2580000003 HC RX 258: Performed by: ORTHOPAEDIC SURGERY

## 2021-02-19 PROCEDURE — 6360000002 HC RX W HCPCS: Performed by: ORTHOPAEDIC SURGERY

## 2021-02-19 RX ORDER — SODIUM CHLORIDE 0.9 % (FLUSH) 0.9 %
10 SYRINGE (ML) INJECTION PRN
Status: CANCELLED | OUTPATIENT
Start: 2021-02-20

## 2021-02-19 RX ORDER — SODIUM CHLORIDE 0.9 % (FLUSH) 0.9 %
10 SYRINGE (ML) INJECTION PRN
Status: DISCONTINUED | OUTPATIENT
Start: 2021-02-19 | End: 2021-02-20 | Stop reason: HOSPADM

## 2021-02-19 RX ADMIN — SODIUM CHLORIDE, PRESERVATIVE FREE 10 ML: 5 INJECTION INTRAVENOUS at 12:52

## 2021-02-19 RX ADMIN — CEFTRIAXONE 2000 MG: 2 INJECTION, POWDER, FOR SOLUTION INTRAMUSCULAR; INTRAVENOUS at 12:21

## 2021-02-19 RX ADMIN — SODIUM CHLORIDE, PRESERVATIVE FREE 10 ML: 5 INJECTION INTRAVENOUS at 12:24

## 2021-02-20 ENCOUNTER — HOSPITAL ENCOUNTER (OUTPATIENT)
Dept: NURSING | Age: 76
Setting detail: INFUSION SERIES
Discharge: HOME | End: 2021-02-20
Payer: MEDICARE

## 2021-02-20 DIAGNOSIS — T81.31XA DEHISCENCE OF OPERATIVE WOUND, INITIAL ENCOUNTER: Primary | ICD-10-CM

## 2021-02-20 PROCEDURE — 96374 THER/PROPH/DIAG INJ IV PUSH: CPT

## 2021-02-20 PROCEDURE — 2580000003 HC RX 258: Performed by: ORTHOPAEDIC SURGERY

## 2021-02-20 PROCEDURE — 6360000002 HC RX W HCPCS: Performed by: ORTHOPAEDIC SURGERY

## 2021-02-20 RX ORDER — SODIUM CHLORIDE 0.9 % (FLUSH) 0.9 %
10 SYRINGE (ML) INJECTION PRN
Status: CANCELLED | OUTPATIENT
Start: 2021-02-21

## 2021-02-20 RX ORDER — SODIUM CHLORIDE 0.9 % (FLUSH) 0.9 %
10 SYRINGE (ML) INJECTION PRN
Status: DISCONTINUED | OUTPATIENT
Start: 2021-02-20 | End: 2021-02-21 | Stop reason: HOSPADM

## 2021-02-20 RX ADMIN — CEFTRIAXONE 2000 MG: 2 INJECTION, POWDER, FOR SOLUTION INTRAMUSCULAR; INTRAVENOUS at 11:27

## 2021-02-21 ENCOUNTER — HOSPITAL ENCOUNTER (OUTPATIENT)
Dept: NURSING | Age: 76
Setting detail: INFUSION SERIES
Discharge: HOME | End: 2021-02-21
Payer: MEDICARE

## 2021-02-21 DIAGNOSIS — T81.31XA DEHISCENCE OF OPERATIVE WOUND, INITIAL ENCOUNTER: Primary | ICD-10-CM

## 2021-02-21 PROCEDURE — 6360000002 HC RX W HCPCS: Performed by: ORTHOPAEDIC SURGERY

## 2021-02-21 PROCEDURE — 96365 THER/PROPH/DIAG IV INF INIT: CPT

## 2021-02-21 PROCEDURE — 2580000003 HC RX 258: Performed by: ORTHOPAEDIC SURGERY

## 2021-02-21 RX ORDER — SODIUM CHLORIDE 0.9 % (FLUSH) 0.9 %
10 SYRINGE (ML) INJECTION PRN
Status: DISCONTINUED | OUTPATIENT
Start: 2021-02-21 | End: 2021-02-22 | Stop reason: HOSPADM

## 2021-02-21 RX ORDER — SODIUM CHLORIDE 0.9 % (FLUSH) 0.9 %
10 SYRINGE (ML) INJECTION PRN
Status: CANCELLED | OUTPATIENT
Start: 2021-02-22

## 2021-02-21 RX ADMIN — CEFTRIAXONE 2000 MG: 2 INJECTION, POWDER, FOR SOLUTION INTRAMUSCULAR; INTRAVENOUS at 11:27

## 2021-02-22 ENCOUNTER — HOSPITAL ENCOUNTER (OUTPATIENT)
Dept: NURSING | Age: 76
Setting detail: INFUSION SERIES
Discharge: HOME | End: 2021-02-22
Payer: MEDICARE

## 2021-02-22 DIAGNOSIS — T81.31XA DEHISCENCE OF OPERATIVE WOUND, INITIAL ENCOUNTER: Primary | ICD-10-CM

## 2021-02-22 PROCEDURE — 6360000002 HC RX W HCPCS: Performed by: ORTHOPAEDIC SURGERY

## 2021-02-22 PROCEDURE — 96365 THER/PROPH/DIAG IV INF INIT: CPT

## 2021-02-22 PROCEDURE — 2580000003 HC RX 258: Performed by: ORTHOPAEDIC SURGERY

## 2021-02-22 RX ORDER — SODIUM CHLORIDE 0.9 % (FLUSH) 0.9 %
10 SYRINGE (ML) INJECTION PRN
Status: DISCONTINUED | OUTPATIENT
Start: 2021-02-22 | End: 2021-02-23 | Stop reason: HOSPADM

## 2021-02-22 RX ORDER — SODIUM CHLORIDE 0.9 % (FLUSH) 0.9 %
10 SYRINGE (ML) INJECTION PRN
Status: CANCELLED | OUTPATIENT
Start: 2021-02-23

## 2021-02-22 RX ORDER — SPIRONOLACTONE 25 MG/1
TABLET ORAL
Qty: 30 TABLET | Refills: 0 | Status: SHIPPED | OUTPATIENT
Start: 2021-02-22 | End: 2021-04-29 | Stop reason: SDUPTHER

## 2021-02-22 RX ADMIN — SODIUM CHLORIDE, PRESERVATIVE FREE 10 ML: 5 INJECTION INTRAVENOUS at 12:03

## 2021-02-22 RX ADMIN — CEFTRIAXONE 2000 MG: 2 INJECTION, POWDER, FOR SOLUTION INTRAMUSCULAR; INTRAVENOUS at 12:04

## 2021-02-23 ENCOUNTER — HOSPITAL ENCOUNTER (OUTPATIENT)
Dept: NURSING | Age: 76
Setting detail: INFUSION SERIES
Discharge: HOME | End: 2021-02-23
Payer: MEDICARE

## 2021-02-23 DIAGNOSIS — T81.31XA DEHISCENCE OF OPERATIVE WOUND, INITIAL ENCOUNTER: Primary | ICD-10-CM

## 2021-02-23 PROCEDURE — 96365 THER/PROPH/DIAG IV INF INIT: CPT

## 2021-02-23 PROCEDURE — 6360000002 HC RX W HCPCS: Performed by: ORTHOPAEDIC SURGERY

## 2021-02-23 PROCEDURE — 2580000003 HC RX 258: Performed by: ORTHOPAEDIC SURGERY

## 2021-02-23 RX ORDER — SODIUM CHLORIDE 0.9 % (FLUSH) 0.9 %
10 SYRINGE (ML) INJECTION PRN
Status: DISCONTINUED | OUTPATIENT
Start: 2021-02-23 | End: 2021-02-24 | Stop reason: HOSPADM

## 2021-02-23 RX ORDER — SODIUM CHLORIDE 0.9 % (FLUSH) 0.9 %
10 SYRINGE (ML) INJECTION PRN
Status: CANCELLED | OUTPATIENT
Start: 2021-02-24

## 2021-02-23 RX ADMIN — SODIUM CHLORIDE, PRESERVATIVE FREE 10 ML: 5 INJECTION INTRAVENOUS at 12:02

## 2021-02-23 RX ADMIN — CEFTRIAXONE 2000 MG: 2 INJECTION, POWDER, FOR SOLUTION INTRAMUSCULAR; INTRAVENOUS at 12:02

## 2021-02-24 ENCOUNTER — HOSPITAL ENCOUNTER (OUTPATIENT)
Dept: NURSING | Age: 76
Setting detail: INFUSION SERIES
Discharge: HOME | End: 2021-02-24
Payer: MEDICARE

## 2021-02-24 DIAGNOSIS — T81.31XA DEHISCENCE OF OPERATIVE WOUND, INITIAL ENCOUNTER: Primary | ICD-10-CM

## 2021-02-24 PROCEDURE — 6360000002 HC RX W HCPCS: Performed by: ORTHOPAEDIC SURGERY

## 2021-02-24 PROCEDURE — 96365 THER/PROPH/DIAG IV INF INIT: CPT

## 2021-02-24 PROCEDURE — 2580000003 HC RX 258: Performed by: ORTHOPAEDIC SURGERY

## 2021-02-24 RX ORDER — SODIUM CHLORIDE 0.9 % (FLUSH) 0.9 %
10 SYRINGE (ML) INJECTION PRN
Status: CANCELLED | OUTPATIENT
Start: 2021-02-25

## 2021-02-24 RX ORDER — SODIUM CHLORIDE 0.9 % (FLUSH) 0.9 %
10 SYRINGE (ML) INJECTION PRN
Status: DISCONTINUED | OUTPATIENT
Start: 2021-02-24 | End: 2021-02-25 | Stop reason: HOSPADM

## 2021-02-24 RX ADMIN — SODIUM CHLORIDE, PRESERVATIVE FREE 10 ML: 5 INJECTION INTRAVENOUS at 12:38

## 2021-02-24 RX ADMIN — CEFTRIAXONE 2000 MG: 2 INJECTION, POWDER, FOR SOLUTION INTRAMUSCULAR; INTRAVENOUS at 12:06

## 2021-02-24 RX ADMIN — SODIUM CHLORIDE, PRESERVATIVE FREE 10 ML: 5 INJECTION INTRAVENOUS at 12:06

## 2021-02-25 ENCOUNTER — HOSPITAL ENCOUNTER (OUTPATIENT)
Dept: NURSING | Age: 76
Setting detail: INFUSION SERIES
Discharge: HOME | End: 2021-02-25
Payer: MEDICARE

## 2021-02-25 ENCOUNTER — HOSPITAL ENCOUNTER (OUTPATIENT)
Dept: GENERAL RADIOLOGY | Age: 76
Discharge: HOME | End: 2021-02-27
Payer: MEDICARE

## 2021-02-25 ENCOUNTER — HOSPITAL ENCOUNTER (OUTPATIENT)
Age: 76
Discharge: HOME | End: 2021-02-27
Payer: MEDICARE

## 2021-02-25 DIAGNOSIS — Z96.651 TOTAL KNEE REPLACEMENT STATUS, RIGHT: ICD-10-CM

## 2021-02-25 DIAGNOSIS — T81.31XA DEHISCENCE OF OPERATIVE WOUND, INITIAL ENCOUNTER: Primary | ICD-10-CM

## 2021-02-25 PROCEDURE — 2580000003 HC RX 258: Performed by: ORTHOPAEDIC SURGERY

## 2021-02-25 PROCEDURE — 73564 X-RAY EXAM KNEE 4 OR MORE: CPT

## 2021-02-25 PROCEDURE — 96365 THER/PROPH/DIAG IV INF INIT: CPT

## 2021-02-25 PROCEDURE — 6360000002 HC RX W HCPCS: Performed by: ORTHOPAEDIC SURGERY

## 2021-02-25 RX ORDER — SODIUM CHLORIDE 0.9 % (FLUSH) 0.9 %
10 SYRINGE (ML) INJECTION PRN
Status: CANCELLED | OUTPATIENT
Start: 2021-02-25

## 2021-02-25 RX ORDER — SODIUM CHLORIDE 0.9 % (FLUSH) 0.9 %
10 SYRINGE (ML) INJECTION PRN
Status: DISCONTINUED | OUTPATIENT
Start: 2021-02-25 | End: 2021-02-26 | Stop reason: HOSPADM

## 2021-02-25 RX ADMIN — SODIUM CHLORIDE, PRESERVATIVE FREE 10 ML: 5 INJECTION INTRAVENOUS at 10:24

## 2021-02-25 RX ADMIN — SODIUM CHLORIDE, PRESERVATIVE FREE 10 ML: 5 INJECTION INTRAVENOUS at 10:55

## 2021-02-25 RX ADMIN — CEFTRIAXONE 2000 MG: 2 INJECTION, POWDER, FOR SOLUTION INTRAMUSCULAR; INTRAVENOUS at 10:24

## 2021-02-26 ENCOUNTER — CARE COORDINATION (OUTPATIENT)
Dept: CASE MANAGEMENT | Age: 76
End: 2021-02-26

## 2021-02-26 NOTE — CARE COORDINATION
Bernadette [facility] Transitions Follow Up Call- final call     2021    Patient: Omayra Carrillo  Patient : 1945   MRN: [id number]  Reason for Admission: post op wound dehiscence right knee (I & D)   Discharge Date: 21 RARS: Readmission Risk Score: 16         Spoke with: Rubens Gloria     Call to pt who states he is doing [place]  States knee is healing well. States only a little pain on knee cap but not needing any pain med  Denies redness, drainage or swelling at site  Denies needs  Writer informs this is final (CTC) phone call- v/u. Encouraged call writer/ CTC or providers if questions or concerns- v/u. Episode closed      Care Transitions Subsequent and Final Call    Subsequent and Final Calls  Do you have any ongoing symptoms?: No  Have your medications changed?: No  Do you have any questions related to your medications?: No  Do you currently have any active services?: Yes  Are you currently active with any services?: Home Health  Do you have any needs or concerns that I can assist you with?: No  Identified Barriers: Lack of Education  Care Transitions Interventions  Other Interventions:            Follow Up  Future Appointments   Date Time Provider Gerry Marin   3/15/2021  9:00 AM [facility]   2021  9:00 AM Lopez Horne APRN - CNP  pc TPP   4/15/2021  2:00 PM Rosaura Warren MD Neuro Endo [facility]   2021 10:30 AM MD JAIRO VickersF UROLOGY TPP       Cecilia Espinosa RN no

## 2021-03-05 ENCOUNTER — HOSPITAL ENCOUNTER (OUTPATIENT)
Dept: PHYSICAL THERAPY | Age: 76
Setting detail: THERAPIES SERIES
Discharge: HOME | End: 2021-03-05
Payer: MEDICARE

## 2021-03-05 PROCEDURE — 97140 MANUAL THERAPY 1/> REGIONS: CPT

## 2021-03-05 PROCEDURE — 97163 PT EVAL HIGH COMPLEX 45 MIN: CPT

## 2021-03-05 ASSESSMENT — PAIN DESCRIPTION - ORIENTATION: ORIENTATION: RIGHT

## 2021-03-05 NOTE — PLAN OF CARE
[facility] NORMA ATKINS       Phone: [phone]   Date: 3/5/2021                      Outpatient Physical Therapy  Fax: [phone]    ACCT #: [de-identified]                     Plan of Care  [facility]#: [id number]  Patient: Rm Huber  : 1945    Referring Practitioner: Dr. Cale Faye    Referral Date : 21    Diagnosis: S/P right patellar tendon repair, S/P right TKA  Onset Date: 21  Treatment Diagnosis: R knee pain    Assessment  Body structures, Functions, Activity limitations: Decreased functional mobility , Decreased ROM, Decreased strength, Increased pain  Assessment: Patient S/P R patellar tendon repair and recent TKA. Per , only PROM / manual therapy at this time. Plan to progress with therex when Dr approves. Prognosis: Good    Treatment Plan   Days: 3 times per week Weeks: 6 weeks Total # of Visits Approved: 18    Patient Education/HEP, Therapeutic Exercise, Manual Therapy: Myofacial Release/Cupping and Gait Training     Goals  Short term goals  Time Frame for Short term goals: 10  Short term goal 1: Patient to have increase PROM R knee flexion 95 degrees  Short term goal 2: Patient to be independent with HEP  Long term goals  Time Frame for Long term goals : 25  Long term goal 1: Patient to have increase strength R knee extension 4/5 to walk without device  Long term goal 2: Improve functional mobility with LEFS score > 50/80  Long term goal 3: Patient to have increase R knee flexion to 100 degrees to ambualte on stairs alternating feet CALDERONL     Irene Deras, PT   Date: 3/5/2021    ______________________________________ Date: 3/5/2021  Physician Signature  By signing above or cosigning electronically, I have reviewed this Plan of Care and certify a need for medically necessary rehabilitation services.
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
## 2021-03-05 NOTE — PRE-CERTIFICATION NOTE
Medicare Cap     [] Physical Therapy  [] Speech Therapy  [] Occupational therapy    *PT and Speech caps combine      $5560 Cap limit < kx modifier needed < $3674 requires pre-cert     Patient Name: Pierre Alvarez  YOB: 1945     Date of [address] Name $$$ charge Daily Charge YTD   Total $   03/05/21 Zelalem, man 90.90, 24.76 115.66 115.66

## 2021-03-08 ENCOUNTER — HOSPITAL ENCOUNTER (OUTPATIENT)
Dept: PHYSICAL THERAPY | Age: 76
Setting detail: THERAPIES SERIES
Discharge: HOME | End: 2021-03-08
Payer: MEDICARE

## 2021-03-08 PROCEDURE — 97110 THERAPEUTIC EXERCISES: CPT

## 2021-03-08 ASSESSMENT — PAIN SCALES - GENERAL: PAINLEVEL_OUTOF10: 1

## 2021-03-08 NOTE — PROGRESS NOTES
Phone: Eloise Platt      Fax: [phone]                            Outpatient Physical Therapy                                                                            Daily Note    Date: 3/8/2021  Patient Name: Julieta Scruggs        MRN: [id number]   ACCT#:  [de-identified]  : 1945  (70 y.o.)    Referring Practitioner: Dr. Ema Walker    Referral Date : 21    Diagnosis: S/P right patellar tendon repair, S/P right TKA  Treatment Diagnosis: R knee pain    Onset Date: 21  PT Insurance Information: MCR, Aetna  Total # of Visits Approved: 18 Per Physician Order  Total # of Visits to Date: 2  Plan of Care/Certification Expiration Date: 21    Pre-Treatment Pain:  1/10     Assessment  Assessment: Patient rates pain prior to session as 1/10. Completed PROM/manual therapy to R knee. PROM R knee flexion 81 degrees.   Chart Reviewed: Yes    Plan  Plan: Plan of care initiated    Exercises/Modalities/Manual:  See DocFlow Sheet    Education:           Goals  (Total # of Visits to Date: 2)   Short Term Goals - Time Frame for Short term goals: 10  Short term goal 1: Patient to have increase PROM R knee flexion 95 degrees  Short term goal 2: Patient to be independent with HEP             Long Term Goals - Time Frame for Long term goals : 25  Long term goal 1: Patient to have increase strength R knee extension 4/5 to walk without device  Long term goal 2: Improve functional mobility with LEFS score > 50/80  Long term goal 3: Patient to have increase R knee flexion to 100 degrees to ambualte on stairs alternating feet WFL          Post Treatment Pain: 10    Time In: 1115  Time Out : 1145        Timed Code Treatment Minutes: 30 Minutes  Total Treatment Time: 30 Minutes    Gilmar Gil PTA    Date: 3/8/2021

## 2021-03-08 NOTE — PRE-CERTIFICATION NOTE
3/    Medicare Cap     [] Physical Therapy  [] Speech Therapy  [] Occupational therapy    *PT and Speech caps combine      $7288 Cap limit < kx modifier needed < $0771 requires pre-cert     Patient Name: Sid Shah  YOB: 1945     Date of M[address] Name $$$ charge Daily Charge YTD   Total $   03/05/21 Zelalem, man 90.90, 24.76 115.66 115.66   3/8/21 Ex x2

## 2021-03-09 ENCOUNTER — APPOINTMENT (OUTPATIENT)
Dept: PHYSICAL THERAPY | Age: 76
End: 2021-03-09
Payer: MEDICARE

## 2021-03-10 ENCOUNTER — APPOINTMENT (OUTPATIENT)
Dept: PHYSICAL THERAPY | Age: 76
End: 2021-03-10
Payer: MEDICARE

## 2021-03-11 ENCOUNTER — HOSPITAL ENCOUNTER (OUTPATIENT)
Age: 76
Discharge: HOME | End: 2021-03-13
Payer: MEDICARE

## 2021-03-11 ENCOUNTER — HOSPITAL ENCOUNTER (OUTPATIENT)
Age: 76
Discharge: HOME | End: 2021-03-11
Payer: MEDICARE

## 2021-03-11 ENCOUNTER — HOSPITAL ENCOUNTER (OUTPATIENT)
Dept: GENERAL RADIOLOGY | Age: 76
Discharge: HOME | End: 2021-03-13
Payer: MEDICARE

## 2021-03-11 DIAGNOSIS — R06.02 SOB (SHORTNESS OF BREATH): ICD-10-CM

## 2021-03-11 DIAGNOSIS — I25.10 CORONARY ARTERY DISEASE INVOLVING NATIVE CORONARY ARTERY OF NATIVE HEART WITHOUT ANGINA PECTORIS: ICD-10-CM

## 2021-03-11 DIAGNOSIS — E78.5 HYPERLIPIDEMIA, UNSPECIFIED HYPERLIPIDEMIA TYPE: ICD-10-CM

## 2021-03-11 DIAGNOSIS — I10 ESSENTIAL HYPERTENSION: ICD-10-CM

## 2021-03-11 DIAGNOSIS — E55.9 VITAMIN D DEFICIENCY DISEASE: ICD-10-CM

## 2021-03-11 LAB
ABSOLUTE EOS #: 0.04 K/UL (ref 0–0.4)
ABSOLUTE IMMATURE GRANULOCYTE: ABNORMAL K/UL (ref 0–0.3)
ABSOLUTE LYMPH #: 1.25 K/UL (ref 1–4.8)
ABSOLUTE MONO #: 0.73 K/UL (ref 0–1)
ALBUMIN SERPL-MCNC: 4.1 G/DL (ref 3.5–5.2)
ALBUMIN/GLOBULIN RATIO: ABNORMAL (ref 1–2.5)
ALP BLD-CCNC: 104 U/L (ref 40–129)
ALT SERPL-CCNC: 23 U/L (ref 5–41)
ANION GAP SERPL CALCULATED.3IONS-SCNC: 11 MMOL/L (ref 9–17)
AST SERPL-CCNC: 28 U/L
BASOPHILS # BLD: 1 % (ref 0–2)
BASOPHILS ABSOLUTE: 0.04 K/UL (ref 0–0.2)
BILIRUB SERPL-MCNC: 0.65 MG/DL (ref 0.3–1.2)
BUN BLDV-MCNC: 14 MG/DL (ref 8–23)
BUN/CREAT BLD: 14 (ref 9–20)
CALCIUM SERPL-MCNC: 9.5 MG/DL (ref 8.6–10.4)
CHLORIDE BLD-SCNC: 104 MMOL/L (ref 98–107)
CHOLESTEROL/HDL RATIO: 2.3
CHOLESTEROL: 113 MG/DL
CO2: 25 MMOL/L (ref 20–31)
CREAT SERPL-MCNC: 0.99 MG/DL (ref 0.7–1.2)
DIFFERENTIAL TYPE: ABNORMAL
EKG ATRIAL RATE: 84 BPM
EKG P AXIS: -55 DEGREES
EKG P-R INTERVAL: 248 MS
EKG Q-T INTERVAL: 370 MS
EKG QRS DURATION: 100 MS
EKG QTC CALCULATION (BAZETT): 437 MS
EKG R AXIS: -53 DEGREES
EKG T AXIS: 63 DEGREES
EKG VENTRICULAR RATE: 84 BPM
EOSINOPHILS RELATIVE PERCENT: 1 % (ref 0–5)
GFR AFRICAN AMERICAN: >60 ML/MIN
GFR NON-AFRICAN AMERICAN: >60 ML/MIN
GFR SERPL CREATININE-BSD FRML MDRD: ABNORMAL ML/MIN/{1.73_M2}
GFR SERPL CREATININE-BSD FRML MDRD: ABNORMAL ML/MIN/{1.73_M2}
GLUCOSE BLD-MCNC: 108 MG/DL (ref 70–99)
HCT VFR BLD CALC: 39.1 % (ref 41–53)
HDLC SERPL-MCNC: 49 MG/DL
HEMOGLOBIN: 13.1 G/DL (ref 13.5–17.5)
IMMATURE GRANULOCYTES: ABNORMAL %
LDL CHOLESTEROL: 34 MG/DL (ref 0–130)
LYMPHOCYTES # BLD: 29 % (ref 13–44)
MAGNESIUM: 1.7 MG/DL (ref 1.6–2.6)
MCH RBC QN AUTO: 28.5 PG (ref 26–34)
MCHC RBC AUTO-ENTMCNC: 33.5 G/DL (ref 31–37)
MCV RBC AUTO: 84.8 FL (ref 80–100)
MONOCYTES # BLD: 17 % (ref 5–9)
MORPHOLOGY: ABNORMAL
NRBC AUTOMATED: ABNORMAL PER 100 WBC
PDW BLD-RTO: 15.3 % (ref 12.1–15.2)
PLATELET # BLD: 164 K/UL (ref 140–450)
PLATELET ESTIMATE: ABNORMAL
PMV BLD AUTO: ABNORMAL FL (ref 6–12)
POTASSIUM SERPL-SCNC: 4.6 MMOL/L (ref 3.7–5.3)
RBC # BLD: 4.61 M/UL (ref 4.5–5.9)
RBC # BLD: ABNORMAL 10*6/UL
SEG NEUTROPHILS: 52 % (ref 39–75)
SEGMENTED NEUTROPHILS ABSOLUTE COUNT: 2.24 K/UL (ref 2.1–6.5)
SODIUM BLD-SCNC: 140 MMOL/L (ref 135–144)
TOTAL PROTEIN: 6.7 G/DL (ref 6.4–8.3)
TRIGL SERPL-MCNC: 152 MG/DL
TSH SERPL DL<=0.05 MIU/L-ACNC: 2.34 MIU/L (ref 0.3–5)
VITAMIN D 25-HYDROXY: 37 NG/ML (ref 30–100)
VLDLC SERPL CALC-MCNC: ABNORMAL MG/DL (ref 1–30)
WBC # BLD: 4.3 K/UL (ref 3.5–11)
WBC # BLD: ABNORMAL 10*3/UL

## 2021-03-11 PROCEDURE — 93010 ELECTROCARDIOGRAM REPORT: CPT | Performed by: INTERNAL MEDICINE

## 2021-03-11 PROCEDURE — 93005 ELECTROCARDIOGRAM TRACING: CPT | Performed by: INTERNAL MEDICINE

## 2021-03-11 PROCEDURE — 80053 COMPREHEN METABOLIC PANEL: CPT

## 2021-03-11 PROCEDURE — 80061 LIPID PANEL: CPT

## 2021-03-11 PROCEDURE — 85025 COMPLETE CBC W/AUTO DIFF WBC: CPT

## 2021-03-11 PROCEDURE — 36415 COLL VENOUS BLD VENIPUNCTURE: CPT

## 2021-03-11 PROCEDURE — 82306 VITAMIN D 25 HYDROXY: CPT

## 2021-03-11 PROCEDURE — 84443 ASSAY THYROID STIM HORMONE: CPT

## 2021-03-11 PROCEDURE — 83735 ASSAY OF MAGNESIUM: CPT

## 2021-03-11 PROCEDURE — 71046 X-RAY EXAM CHEST 2 VIEWS: CPT

## 2021-03-12 ENCOUNTER — HOSPITAL ENCOUNTER (OUTPATIENT)
Dept: PHYSICAL THERAPY | Age: 76
Setting detail: THERAPIES SERIES
Discharge: HOME | End: 2021-03-12
Payer: MEDICARE

## 2021-03-12 PROCEDURE — 97110 THERAPEUTIC EXERCISES: CPT

## 2021-03-12 ASSESSMENT — PAIN SCALES - GENERAL: PAINLEVEL_OUTOF10: 2

## 2021-03-12 NOTE — PRE-CERTIFICATION NOTE
3/    Medicare Cap     [] Physical Therapy  [] Speech Therapy  [] Occupational therapy    *PT and Speech caps combine      $7026 Cap limit < kx modifier needed < $4847 requires pre-cert     Patient Name: Aura Pennington  YOB: 1945     Date of [address] Name $$$ charge Daily Charge YTD   Total $   03/05/21 Eval, man 90.90, 24.76 115.66 115.66   3/8/21 Ex x2 26.88 x 2 53.76 169.42   3/12/21 Ex x2 26.88 x 2 53.76 223.18

## 2021-03-12 NOTE — PROGRESS NOTES
Phone: Eloise Platt      Fax: [phone]                            Outpatient Physical Therapy                                                                            Daily Note    Date: 3/12/2021  Patient Name: Mariya Sharif        MRN: [id number]   ACCT#:  [de-identified]  : 1945  (70 y.o.)    Referring Practitioner: Dr. Jayesh Marx    Referral Date : 21    Diagnosis: S/P right patellar tendon repair, S/P right TKA  Treatment Diagnosis: R knee pain    Onset Date: 21  PT Insurance Information: MCR, Aetna  Total # of Visits Approved: 18 Per Physician Order  Total # of Visits to Date: 3  Plan of Care/Certification Expiration Date: 21    Pre-Treatment Pain:  2/10     Assessment  Assessment: Reports being  a little sore following previous treatment and also a little sore today. Rates pain as 2/10. Continued with gentle PROM/manual therapy to R knee per physican plan. PROM R knee flexion is 83 degrees.    Chart Reviewed: Yes    Plan  Plan: Plan of care initiated    Exercises/Modalities/Manual:  See DocFlow Sheet    Education:           Goals  (Total # of Visits to Date: 3)   Short Term Goals - Time Frame for Short term goals: 10  Short term goal 1: Patient to have increase PROM R knee flexion 95 degrees  Short term goal 2: Patient to be independent with HEP             Long Term Goals - Time Frame for Long term goals : 25  Long term goal 1: Patient to have increase strength R knee extension 4/5 to walk without device  Long term goal 2: Improve functional mobility with LEFS score > 50/80  Long term goal 3: Patient to have increase R knee flexion to 100 degrees to ambualte on stairs alternating feet WFL          Post Treatment Pain:  2/10    Time In: 0956    Time Out : 1026        Timed Code Treatment Minutes: 30 Minutes  Total Treatment Time: 30 Minutes    Xochilt Fragoso     Date: 3/12/2021

## 2021-03-15 ENCOUNTER — OFFICE VISIT (OUTPATIENT)
Dept: CARDIOLOGY CLINIC | Age: 76
End: 2021-03-15
Payer: MEDICARE

## 2021-03-15 VITALS — SYSTOLIC BLOOD PRESSURE: 120 MMHG | HEART RATE: 51 BPM | DIASTOLIC BLOOD PRESSURE: 70 MMHG | OXYGEN SATURATION: 98 %

## 2021-03-15 DIAGNOSIS — I25.10 CORONARY ARTERY DISEASE INVOLVING NATIVE CORONARY ARTERY OF NATIVE HEART WITHOUT ANGINA PECTORIS: ICD-10-CM

## 2021-03-15 DIAGNOSIS — E55.9 VITAMIN D DEFICIENCY DISEASE: ICD-10-CM

## 2021-03-15 DIAGNOSIS — I10 ESSENTIAL HYPERTENSION: Primary | ICD-10-CM

## 2021-03-15 DIAGNOSIS — E78.5 HYPERLIPIDEMIA, UNSPECIFIED HYPERLIPIDEMIA TYPE: ICD-10-CM

## 2021-03-15 DIAGNOSIS — R06.02 SOB (SHORTNESS OF BREATH): ICD-10-CM

## 2021-03-15 PROCEDURE — G8417 CALC BMI ABV UP PARAM F/U: HCPCS | Performed by: INTERNAL MEDICINE

## 2021-03-15 PROCEDURE — G8428 CUR MEDS NOT DOCUMENT: HCPCS | Performed by: INTERNAL MEDICINE

## 2021-03-15 PROCEDURE — 99213 OFFICE O/P EST LOW 20 MIN: CPT | Performed by: INTERNAL MEDICINE

## 2021-03-15 PROCEDURE — 3017F COLORECTAL CA SCREEN DOC REV: CPT | Performed by: INTERNAL MEDICINE

## 2021-03-15 PROCEDURE — 1123F ACP DISCUSS/DSCN MKR DOCD: CPT | Performed by: INTERNAL MEDICINE

## 2021-03-15 PROCEDURE — G8484 FLU IMMUNIZE NO ADMIN: HCPCS | Performed by: INTERNAL MEDICINE

## 2021-03-15 PROCEDURE — 4040F PNEUMOC VAC/ADMIN/RCVD: CPT | Performed by: INTERNAL MEDICINE

## 2021-03-15 PROCEDURE — 1036F TOBACCO NON-USER: CPT | Performed by: INTERNAL MEDICINE

## 2021-03-15 NOTE — PROGRESS NOTES
Ov Dr. Espinoza Luna for one year follow up   Pt states was just in hospital meds are all up dated   Had right knee replacement on 01/27/2021  Had walker/fall at home tore  All tendons etc in right knee had to go   Back in surgery for repair on 02/09/2021  Currently wearing a leg brace 24/7 and now in PT 2 days a week   In \"mild\" pain   No other surgeries are needed   Saw pulmonologist - inhalers have not helped yet   No chest pain   No discomfort  No sob when laying down   No palpitations      No changes    Follow up in one year  With echo
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
## 2021-03-16 ENCOUNTER — HOSPITAL ENCOUNTER (OUTPATIENT)
Dept: PHYSICAL THERAPY | Age: 76
Setting detail: THERAPIES SERIES
Discharge: HOME | End: 2021-03-16
Payer: MEDICARE

## 2021-03-16 PROCEDURE — 97110 THERAPEUTIC EXERCISES: CPT

## 2021-03-16 ASSESSMENT — PAIN SCALES - GENERAL: PAINLEVEL_OUTOF10: 1

## 2021-03-16 NOTE — PROGRESS NOTES
[address] (Dr. Enoc Mann nurse) returned phone call regarding Alex's incision. This therapist described incision to nurse and let her know patient reports he has been applying peroxide and Neosporin and taking antibiotics as directed. She said she would relay message to Dr. Enoc Mann.     Buddy Perez, PTA

## 2021-03-18 ENCOUNTER — APPOINTMENT (OUTPATIENT)
Dept: PHYSICAL THERAPY | Age: 76
End: 2021-03-18
Payer: MEDICARE
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
## 2021-03-22 ENCOUNTER — APPOINTMENT (OUTPATIENT)
Dept: PHYSICAL THERAPY | Age: 76
End: 2021-03-22
Payer: MEDICARE

## 2021-03-23 ENCOUNTER — TELEPHONE (OUTPATIENT)
Dept: PRIMARY CARE CLINIC | Age: 76
End: 2021-03-23

## 2021-03-23 ENCOUNTER — HOSPITAL ENCOUNTER (OUTPATIENT)
Dept: PREADMISSION TESTING | Age: 76
Setting detail: SPECIMEN
Discharge: HOME | End: 2021-03-23
Payer: MEDICARE

## 2021-03-23 DIAGNOSIS — R05.9 COUGH: Primary | ICD-10-CM

## 2021-03-23 DIAGNOSIS — R05.9 COUGH: ICD-10-CM

## 2021-03-23 PROCEDURE — U0005 INFEC AGEN DETEC AMPLI PROBE: HCPCS

## 2021-03-23 PROCEDURE — C9803 HOPD COVID-19 SPEC COLLECT: HCPCS

## 2021-03-23 PROCEDURE — U0003 INFECTIOUS AGENT DETECTION BY NUCLEIC ACID (DNA OR RNA); SEVERE ACUTE RESPIRATORY SYNDROME CORONAVIRUS 2 (SARS-COV-2) (CORONAVIRUS DISEASE [COVID-19]), AMPLIFIED PROBE TECHNIQUE, MAKING USE OF HIGH THROUGHPUT TECHNOLOGIES AS DESCRIBED BY CMS-2020-01-R: HCPCS

## 2021-03-23 NOTE — TELEPHONE ENCOUNTER
Patient was exposed to covid       States he has developed a cough and change in taste the past two- three days     Would like COVID tested

## 2021-03-24 ENCOUNTER — VIRTUAL VISIT (OUTPATIENT)
Dept: PRIMARY CARE CLINIC | Age: 76
End: 2021-03-24
Payer: MEDICARE

## 2021-03-24 DIAGNOSIS — U07.1 COVID-19: Primary | ICD-10-CM

## 2021-03-24 LAB
SARS-COV-2: ABNORMAL
SARS-COV-2: DETECTED
SOURCE: ABNORMAL

## 2021-03-24 PROCEDURE — G2025 DIS SITE TELE SVCS RHC/FQHC: HCPCS | Performed by: NURSE PRACTITIONER

## 2021-03-24 NOTE — PATIENT INSTRUCTIONS
You may be receiving a survey from [employer] regarding your visit today. You may get this in the mail, through your MyChart or in your email. Please complete the survey to enable us to provide the highest quality of care to you and your family. If you cannot score us as very good ( 5 Stars) on any question, please feel free to call the office to discuss how we could have made your experience exceptional.     Thank You! Claudette Filippo, APRN-CNP  [address] [facility] [place], [address]    Phone: [phone]  Fax: [phone] [facility] Office Hours:  Monday: Harshil Members office location 8-5 ([phone]) Offering additional late hours the first Monday of the month until 7 pm.   Tuesday: 8-5 Wednesday: 8-5 Thursday:  Additional hours offered 2 Thursdays a month. Please call to inquire those dates.    Fridays: 7:30-4:30

## 2021-03-24 NOTE — PROGRESS NOTES
Chencho Faust is a 70 y.o. male evaluated via telephone on 3/24/2021. Consent:  He and/or health care decision maker is aware that that he may receive a bill for this telephone service, depending on his insurance coverage, and has provided verbal consent to proceed: Yes      Documentation:  I communicated with the patient and/or health care decision maker about covid 19         Covid-19 symptom checklist  Onset: 3/18/2021  Fever   yes - once in a while but not very often. Taking temp daily, fever 101 on Friday 3/19/2021  Cough  yes, sputum productive white thick most of the day. Loss of smell/Taste   yes, stopped drinking coffee tastes bad   Severe headache   yes, comes and goes , one day with bad   Weakness   no  Diarrhea   no  Muscle pain   yes,   Shortness of breath  yes, usual amount , about the same as his normal   Abdominal pain  no  Rash  no  Sore throat   no  Vomiting  No, but nausea present   Red eye  no  Joint pain  yes, knee incision right healing without s/s infection. Bruising/bleeding  no    Any travel in the last month? yes - 1 week ago to [place]. Have you been in contact with anyone expected/suspected to have or exposed to Covid-19   yes - daughter and wife have covid. (wife Tested Monday and positive. )     Have you practiced good social distancing, avoiding person-to-person interactions except by telecommunication, and maintained the homebound/stay at home advisement with the current Covid-19 pandemic in [place]?    yes -     Details of this discussion including any medical advice provided:     I affirm this is a Patient Initiated Episode with a Patient who has not had a related appointment within my department in the past 7 days or scheduled within the next 24 hours.     Patient identification was verified at the start of the visit: Yes    Total Time: 21-30 minutes    The visit was conducted pursuant to the emergency declaration under the [facility] 5255 waiver authority and the [employer] Act. Patient identification was verified, and a caregiver was present when appropriate. The patient was located in a state where the provider was credentialed to provide care. Note: not billable if this call serves to triage the patient into an appointment for the relevant concern    Details of this discussion including any medical advice provided:     Medications encouraged during covid -19 viral infection are:     Tylenol or ibuprofen for pain/fever/body aches  Good hydration  Vitamin C 500 or 1000 mg daily (for immunity boost)  Vitamin D3 2000 international units daily (for muscles including heart)  Zinc 50 mg daily  (mineral to decrease virus uptake in bowels)      Was taking 1200 mg zinc informed to cut down to above. Also on  mg daily with food. In am.   Pt agreeable to monoclonal antibody infusion at [facility].        Maria Elena LEECNP

## 2021-03-25 ENCOUNTER — TELEPHONE (OUTPATIENT)
Dept: PRIMARY CARE CLINIC | Age: 76
End: 2021-03-25

## 2021-03-25 ENCOUNTER — APPOINTMENT (OUTPATIENT)
Dept: PHYSICAL THERAPY | Age: 76
End: 2021-03-25
Payer: MEDICARE

## 2021-03-26 ENCOUNTER — APPOINTMENT (OUTPATIENT)
Dept: PHYSICAL THERAPY | Age: 76
End: 2021-03-26
Payer: MEDICARE

## 2021-03-29 ENCOUNTER — APPOINTMENT (OUTPATIENT)
Dept: PHYSICAL THERAPY | Age: 76
End: 2021-03-29
Payer: MEDICARE

## 2021-04-21 DIAGNOSIS — I10 ESSENTIAL HYPERTENSION: ICD-10-CM

## 2021-04-21 RX ORDER — HYDROCHLOROTHIAZIDE 25 MG/1
TABLET ORAL
Qty: 90 TABLET | Refills: 2 | Status: SHIPPED | OUTPATIENT
Start: 2021-04-21 | End: 2022-04-08
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
## 2021-04-29 ENCOUNTER — OFFICE VISIT (OUTPATIENT)
Dept: PRIMARY CARE CLINIC | Age: 76
End: 2021-04-29
Payer: MEDICARE

## 2021-04-29 ENCOUNTER — HOSPITAL ENCOUNTER (OUTPATIENT)
Age: 76
Setting detail: SPECIMEN
Discharge: HOME | End: 2021-04-29
Payer: MEDICARE

## 2021-04-29 VITALS
BODY MASS INDEX: 45.1 KG/M2 | OXYGEN SATURATION: 96 % | TEMPERATURE: 97.8 F | HEIGHT: 70 IN | WEIGHT: 315 LBS | DIASTOLIC BLOOD PRESSURE: 86 MMHG | SYSTOLIC BLOOD PRESSURE: 120 MMHG | HEART RATE: 102 BPM

## 2021-04-29 DIAGNOSIS — I50.32 CHRONIC DIASTOLIC (CONGESTIVE) HEART FAILURE (HCC): ICD-10-CM

## 2021-04-29 DIAGNOSIS — M25.561 PAIN AND SWELLING OF KNEE, RIGHT: ICD-10-CM

## 2021-04-29 DIAGNOSIS — A08.4 VIRAL GASTROENTERITIS: ICD-10-CM

## 2021-04-29 DIAGNOSIS — R19.7 DIARRHEA, UNSPECIFIED TYPE: ICD-10-CM

## 2021-04-29 DIAGNOSIS — M25.461 PAIN AND SWELLING OF KNEE, RIGHT: ICD-10-CM

## 2021-04-29 DIAGNOSIS — R11.0 NAUSEA: ICD-10-CM

## 2021-04-29 DIAGNOSIS — Z00.00 ROUTINE GENERAL MEDICAL EXAMINATION AT A HEALTH CARE FACILITY: Primary | ICD-10-CM

## 2021-04-29 DIAGNOSIS — J44.9 CHRONIC OBSTRUCTIVE PULMONARY DISEASE, UNSPECIFIED COPD TYPE (HCC): ICD-10-CM

## 2021-04-29 DIAGNOSIS — B37.9 YEAST INFECTION: ICD-10-CM

## 2021-04-29 DIAGNOSIS — E66.01 MORBID OBESITY DUE TO EXCESS CALORIES (HCC): ICD-10-CM

## 2021-04-29 DIAGNOSIS — A04.72 C. DIFFICILE COLITIS: ICD-10-CM

## 2021-04-29 DIAGNOSIS — D50.9 IRON DEFICIENCY ANEMIA, UNSPECIFIED IRON DEFICIENCY ANEMIA TYPE: ICD-10-CM

## 2021-04-29 DIAGNOSIS — Z96.651 S/P TOTAL KNEE ARTHROPLASTY, RIGHT: ICD-10-CM

## 2021-04-29 LAB
-: ABNORMAL
ABSOLUTE EOS #: 0.1 K/UL (ref 0–0.44)
ABSOLUTE IMMATURE GRANULOCYTE: <0.03 K/UL (ref 0–0.3)
ABSOLUTE LYMPH #: 1.05 K/UL (ref 1.1–3.7)
ABSOLUTE MONO #: 0.87 K/UL (ref 0.1–1.2)
ALBUMIN SERPL-MCNC: 3.5 G/DL (ref 3.5–5.2)
ALBUMIN/GLOBULIN RATIO: 1.2 (ref 1–2.5)
ALP BLD-CCNC: 98 U/L (ref 40–129)
ALT SERPL-CCNC: 23 U/L (ref 5–41)
AMORPHOUS: ABNORMAL
ANION GAP SERPL CALCULATED.3IONS-SCNC: 11 MMOL/L (ref 9–17)
AST SERPL-CCNC: 30 U/L
BACTERIA: ABNORMAL
BASOPHILS # BLD: 0 % (ref 0–2)
BASOPHILS ABSOLUTE: <0.03 K/UL (ref 0–0.2)
BILIRUB SERPL-MCNC: 0.44 MG/DL (ref 0.3–1.2)
BILIRUBIN URINE: NEGATIVE
BUN BLDV-MCNC: 12 MG/DL (ref 8–23)
BUN/CREAT BLD: 14 (ref 9–20)
C DIFF AG + TOXIN: ABNORMAL
CALCIUM SERPL-MCNC: 9.4 MG/DL (ref 8.6–10.4)
CASTS UA: ABNORMAL /LPF
CHLORIDE BLD-SCNC: 99 MMOL/L (ref 98–107)
CO2: 27 MMOL/L (ref 20–31)
COLOR: YELLOW
COMMENT UA: ABNORMAL
CREAT SERPL-MCNC: 0.84 MG/DL (ref 0.7–1.2)
CRYSTALS, UA: ABNORMAL /HPF
DIFFERENTIAL TYPE: ABNORMAL
EOSINOPHILS RELATIVE PERCENT: 2 % (ref 1–4)
EPITHELIAL CELLS UA: ABNORMAL /HPF (ref 0–5)
GFR AFRICAN AMERICAN: >60 ML/MIN
GFR NON-AFRICAN AMERICAN: >60 ML/MIN
GFR SERPL CREATININE-BSD FRML MDRD: ABNORMAL ML/MIN/{1.73_M2}
GFR SERPL CREATININE-BSD FRML MDRD: ABNORMAL ML/MIN/{1.73_M2}
GLUCOSE BLD-MCNC: 132 MG/DL (ref 70–99)
GLUCOSE URINE: NEGATIVE
HCT VFR BLD CALC: 35.5 % (ref 40.7–50.3)
HEMOGLOBIN: 11.2 G/DL (ref 13–17)
IMMATURE GRANULOCYTES: 0 %
KETONES, URINE: ABNORMAL
LEUKOCYTE ESTERASE, URINE: NEGATIVE
LYMPHOCYTES # BLD: 15 % (ref 24–43)
MCH RBC QN AUTO: 26.8 PG (ref 25.2–33.5)
MCHC RBC AUTO-ENTMCNC: 31.5 G/DL (ref 28.4–34.8)
MCV RBC AUTO: 84.9 FL (ref 82.6–102.9)
MONOCYTES # BLD: 13 % (ref 3–12)
MUCUS: ABNORMAL
NITRITE, URINE: NEGATIVE
NRBC AUTOMATED: 0 PER 100 WBC
OTHER OBSERVATIONS UA: ABNORMAL
PDW BLD-RTO: 14.4 % (ref 11.8–14.4)
PH UA: 6 (ref 5–9)
PLATELET # BLD: 262 K/UL (ref 138–453)
PLATELET ESTIMATE: ABNORMAL
PMV BLD AUTO: 9.3 FL (ref 8.1–13.5)
POTASSIUM SERPL-SCNC: 3.7 MMOL/L (ref 3.7–5.3)
PROTEIN UA: ABNORMAL
RBC # BLD: 4.18 M/UL (ref 4.21–5.77)
RBC # BLD: ABNORMAL 10*6/UL
RBC UA: ABNORMAL /HPF (ref 0–2)
RENAL EPITHELIAL, UA: ABNORMAL /HPF
SEG NEUTROPHILS: 70 % (ref 36–65)
SEGMENTED NEUTROPHILS ABSOLUTE COUNT: 4.75 K/UL (ref 1.5–8.1)
SODIUM BLD-SCNC: 137 MMOL/L (ref 135–144)
SPECIFIC GRAVITY UA: 1.02 (ref 1.01–1.02)
SPECIMEN DESCRIPTION: ABNORMAL
TOTAL PROTEIN: 6.5 G/DL (ref 6.4–8.3)
TRICHOMONAS: ABNORMAL
TURBIDITY: CLEAR
URINE HGB: NEGATIVE
UROBILINOGEN, URINE: NORMAL
WBC # BLD: 6.8 K/UL (ref 3.5–11.3)
WBC # BLD: ABNORMAL 10*3/UL
WBC UA: ABNORMAL /HPF (ref 0–5)
YEAST: ABNORMAL

## 2021-04-29 PROCEDURE — 4040F PNEUMOC VAC/ADMIN/RCVD: CPT | Performed by: NURSE PRACTITIONER

## 2021-04-29 PROCEDURE — 99213 OFFICE O/P EST LOW 20 MIN: CPT | Performed by: NURSE PRACTITIONER

## 2021-04-29 PROCEDURE — G8417 CALC BMI ABV UP PARAM F/U: HCPCS | Performed by: NURSE PRACTITIONER

## 2021-04-29 PROCEDURE — 87449 NOS EACH ORGANISM AG IA: CPT

## 2021-04-29 PROCEDURE — 1123F ACP DISCUSS/DSCN MKR DOCD: CPT | Performed by: NURSE PRACTITIONER

## 2021-04-29 PROCEDURE — 80053 COMPREHEN METABOLIC PANEL: CPT

## 2021-04-29 PROCEDURE — 3017F COLORECTAL CA SCREEN DOC REV: CPT | Performed by: NURSE PRACTITIONER

## 2021-04-29 PROCEDURE — 85025 COMPLETE CBC W/AUTO DIFF WBC: CPT

## 2021-04-29 PROCEDURE — G8427 DOCREV CUR MEDS BY ELIG CLIN: HCPCS | Performed by: NURSE PRACTITIONER

## 2021-04-29 PROCEDURE — 1036F TOBACCO NON-USER: CPT | Performed by: NURSE PRACTITIONER

## 2021-04-29 PROCEDURE — 87324 CLOSTRIDIUM AG IA: CPT

## 2021-04-29 PROCEDURE — G0439 PPPS, SUBSEQ VISIT: HCPCS | Performed by: NURSE PRACTITIONER

## 2021-04-29 PROCEDURE — 81001 URINALYSIS AUTO W/SCOPE: CPT

## 2021-04-29 PROCEDURE — G8926 SPIRO NO PERF OR DOC: HCPCS | Performed by: NURSE PRACTITIONER

## 2021-04-29 PROCEDURE — 3023F SPIROM DOC REV: CPT | Performed by: NURSE PRACTITIONER

## 2021-04-29 RX ORDER — SPIRONOLACTONE 25 MG/1
25 TABLET ORAL DAILY
Qty: 30 TABLET | Refills: 0 | Status: SHIPPED | OUTPATIENT
Start: 2021-04-29 | End: 2021-07-20

## 2021-04-29 RX ORDER — ONDANSETRON 4 MG/1
4 TABLET, ORALLY DISINTEGRATING ORAL EVERY 12 HOURS PRN
Qty: 8 TABLET | Refills: 0 | Status: SHIPPED | OUTPATIENT
Start: 2021-04-29 | End: 2022-03-14

## 2021-04-29 RX ORDER — FLUCONAZOLE 100 MG/1
100 TABLET ORAL DAILY
Qty: 3 TABLET | Refills: 0 | Status: SHIPPED | OUTPATIENT
Start: 2021-04-29 | End: 2021-05-02

## 2021-04-29 RX ORDER — BUDESONIDE AND FORMOTEROL FUMARATE DIHYDRATE 160; 4.5 UG/1; UG/1
2 AEROSOL RESPIRATORY (INHALATION) EVERY 12 HOURS
Qty: 1 INHALER | Refills: 1 | Status: SHIPPED | OUTPATIENT
Start: 2021-04-29 | End: 2021-10-28 | Stop reason: ALTCHOICE

## 2021-04-29 RX ORDER — ALBUTEROL SULFATE 90 UG/1
AEROSOL, METERED RESPIRATORY (INHALATION)
Qty: 18 G | Refills: 2 | Status: SHIPPED | OUTPATIENT
Start: 2021-04-29 | End: 2022-01-17

## 2021-04-29 RX ORDER — VANCOMYCIN HYDROCHLORIDE 125 MG/1
125 CAPSULE ORAL 4 TIMES DAILY
Qty: 40 CAPSULE | Refills: 0 | Status: SHIPPED | OUTPATIENT
Start: 2021-04-29 | End: 2021-05-09

## 2021-04-29 ASSESSMENT — ENCOUNTER SYMPTOMS
DIARRHEA: 1
CONSTIPATION: 0
NAUSEA: 0
CHEST TIGHTNESS: 0
ABDOMINAL DISTENTION: 0

## 2021-04-29 NOTE — PATIENT INSTRUCTIONS
You may be receiving a survey from [employer] regarding your visit today. You may get this in the mail, through your [employer] or in your email. Please complete the survey to enable us to provide the highest quality of care to you and your family. If you cannot score us as very good ( 5 Stars) on any question, please feel free to call the office to discuss how we could have made your experience exceptional.     Thank You! AMY Hollins  [address] [place], [place]    Phone: [phone]  Fax: [phone] [facility] Office Hours:  Monday: [facility]x office location 8-5 ([phone]) Offering additional late hours the first Monday of the month until 7 pm.   Tuesday: 8-5 Wednesday: 8-5 Thursday:  Additional hours offered 2 Thursdays a month. Please call to inquire those dates. Fridays: 7:30-4:30        You may be receiving a survey from [employer] regarding your visit today. You may get this in the mail, through your [employer] or in your email. Please complete the survey to enable us to provide the highest quality of care to you and your family. If you cannot score us as very good ( 5 Stars) on any question, please feel free to call the office to discuss how we could have made your experience exceptional.     Thank You! AMY Hollins  [address] [place], [place]    Phone: [phone]  Fax: [phone] [facility] Office Hours:  Monday: [facility] office location 8-5 ([phone]) Offering additional late hours the first Monday of the month until 7 pm.   Tuesday: 8-5 Wednesday: 8-5 Thursday:  Additional hours offered 2 Thursdays a month. Please call to inquire those dates. Fridays: 7:30-4:30          Personalized Preventive Plan for Safia Sprain - 4/29/2021  Medicare offers a range of preventive health benefits.  Some of the tests and screenings are paid in full while other may be subject to a deductible, co-insurance,

## 2021-04-29 NOTE — PROGRESS NOTES
1 tablet by mouth once daily Yes BESS Parish CNP   escitalopram (LEXAPRO) 20 MG tablet Take 1 tablet by mouth once daily Yes BESS Parish CNP   montelukast (SINGULAIR) 10 MG tablet Take 10 mg by mouth daily Yes Historical Provider, MD   omeprazole (PRILOSEC) 40 MG delayed release capsule TAKE 1 CAPSULE BY MOUTH ONCE DAILY 30 MINUTES BEFORE BREAKFAST.  Yes Historical Provider, MD   fluticasone (FLONASE) 50 MCG/ACT nasal spray 1 spray by Nasal route daily  Patient taking differently: 1 spray by Nasal route as needed  Yes BESS Parish CNP   Multiple Vitamin (THERA/BETA-CAROTENE) TABS Take 1 tablet by mouth daily Yes Historical Provider, MD   vitamin C (ASCORBIC ACID) 500 MG tablet Take 500 mg by mouth daily Yes Historical Provider, MD   tamsulosin (FLOMAX) 0.4 MG capsule Take 1 capsule by mouth every evening Yes BESS Quigley CNP   metoprolol succinate (TOPROL XL) 25 MG extended release tablet Take 1 tablet by mouth daily Yes BESS Parish CNP   albuterol (PROVENTIL) (2.5 MG/3ML) 0.083% nebulizer solution Take 3 mLs by nebulization 4 times daily  Patient taking differently: Take 2.5 mg by nebulization 2 times daily  Yes BESS Parish CNP   ipratropium (ATROVENT) 0.02 % nebulizer solution Take 2.5 mLs by nebulization 4 times daily  Patient taking differently: Take 0.5 mg by nebulization 2 times daily  Yes BESS Parish CNP   atorvastatin (LIPITOR) 80 MG tablet Take 1 tablet by mouth daily Yes BESS Parish CNP   Vitamin D, Cholecalciferol, 25 MCG (1000 UT) TABS Take by mouth daily  Yes Historical Provider, MD         Past Medical History:   Diagnosis Date    Arthritis     Ascending cholang[facility] 10/2/14     at [facility] gastric remnant to open    Blood in stool 2011    CAD (coronary artery disease)     Chronic back pain     DDD    Chronic diastolic (congestive) heart failure ([facility] [address] 11/5/2018    Chronic systolic congestive heart 3th 2015    CYSTOSCOPY  05/10/2016    with laser TURP    ERCP  10/02/2014    Dr. Gail Walton with laparotomy    HERNIA REPAIR  09/2003    Hiatal    JOINT REPLACEMENT Bilateral 03/20/2013    bilateral hips    JOINT REPLACEMENT Right 01/27/2021    KNEE ARTHROSCOPY Right 09/26/2013    Dr. David Gale - [facility] SURGERY  11/02/2016    rt. L3-4, L4-5 decompression. L4-5 discectomy and fusion    SHOULDER SURGERY Left 2000    arthroscopy    SHOULDER SURGERY Left 2007    replaced humeral head    TOTAL HIP ARTHROPLASTY Right     02/03/2016    TOTAL KNEE ARTHROPLASTY Right 01/27/2021    RIGHT TOTAL KNEE ARTHOPLASTY COMPLICATED BY OBSEITY NEEDS A MARIANNE performed by Bryan Coe DO at [facility] OR         Family History   Problem Relation Age of Onset    Diabetes Mother     Heart Disease Mother     Arthritis Mother     High Blood Pressure Mother     High Cholesterol Mother     Cancer Father         lung black lung from coal mines    Heart Disease Brother         leaky heart    Liver Disease Paternal Grandfather         black lung    Obesity Sister         gastric bypass    Cancer Sister         lung, smoker       CareTeam (Including outside providers/suppliers regularly involved in providing care):   Patient Care Team:  BESS Rojas CNP as PCP - General  BESS Rojas CNP as PCP - REHABILITATION [facility] Empaneled Provider    Wt Readings from Last 3 Encounters:   04/29/21 (!) 327 lb (148.3 kg)   02/10/21 (!) 336 lb 4.8 oz (152.5 kg)   02/02/21 (!) 337 lb 9.6 oz (153.1 kg)     Vitals:    04/29/21 0803   BP: 120/86   Site: Right Upper Arm   Position: Sitting   Cuff Size: Medium Adult   Pulse: 102   Temp: 97.8 °F (36.6 °C)   TempSrc: Infrared   SpO2: 96%   Weight: (!) 327 lb (148.3 kg)   Height: 5' 10\" (1.778 m)     Body mass index is 46.92 kg/m². Based upon direct observation of the patient, evaluation of cognition reveals recent and remote memory intact.     General Appearance: complicated dehisce and repair. · Nutritional issues:  educational materials for healthy, well-balanced diet provided  · Dental exam overdue:  dentures upper and lower       Personalized Preventive Plan   Current Health Maintenance Status  Immunization History   Administered Date(s) Administered    Influenza 12/10/2013    Influenza Vaccine, unspecified formulation 10/30/2015, 10/31/2016    Influenza Virus Vaccine 09/12/2014, 11/12/2018    Influenza, Quadv, IM, PF (6 mo and older Fluzone, Flulaval, Fluarix, and 3 yrs and older Afluria) 10/06/2017, 02/21/2019, 11/19/2019    Influenza, Quadv, adjuvanted, 65 yrs +, IM, PF (Fluad) 10/13/2020    Pneumococcal Conjugate 13-valent ([id number]) 05/23/2017    Pneumococcal Polysaccharide ([id number]) 01/23/2013, 10/30/2015, 02/05/2018    Tetanus 07/01/2008        Health Maintenance   Topic Date Due    COVID-19 Vaccine (1) Never done    DTaP/Tdap/Td vaccine (1 - Tdap) Never done    Shingles Vaccine (1 of 2) Never done   [employer] Visit (AWV)  Never done    A1C test (Diabetic or Prediabetic)  05/27/2021    Lipid screen  03/11/2022    Potassium monitoring  03/11/2022    Creatinine monitoring  03/11/2022    Colon cancer screen colonoscopy  10/26/2025    Flu vaccine  Completed    Pneumococcal 65+ years Vaccine  Completed    Hepatitis C screen  Completed    Hepatitis A vaccine  Aged Out    Hepatitis B vaccine  Aged Out    Hib vaccine  Aged Out    Meningococcal (ACWY) vaccine  Aged Out     Recommendations for [employer] Due: see orders and patient instructions/AVS.  . Recommended screening schedule for the next 5-10 years is provided to the patient in written form: see Patient Easton Cronin was seen today for medicare awv.     Diagnoses and all orders for this visit:    Routine general medical examination at a health care facility    Chronic obstructive pulmonary disease, unspecified COPD type ([facility] [address]  -     albuterol sulfate HFA 108 (90 Base) MCG/ACT inhaler; INHALE 2 PUFFS BY MOUTH EVERY 4 HOURS AS NEEDED FOR WHEEZING    Chronic diastolic (congestive) heart failure (HCC)  -     spironolactone (ALDACTONE) 25 MG tablet; Take 1 tablet by mouth daily  -     CBC Auto Differential; Future  -     Comprehensive Metabolic Panel; Future    Morbid obesity due to excess calories ([address]      Other orders  -     ondansetron (ZOFRAN ODT) 4 MG disintegrating tablet; Take 1 tablet by mouth every 12 hours as needed for Nausea or Vomiting  -     tiotropium (SPIRIVA RESPIMAT) 2.5 MCG/ACT AERS inhaler; Inhale 1 puff into the lungs 2 times daily  -     budesonide-formoterol (SYMBICORT) 160-4.5 MCG/ACT AERO; Inhale 2 puffs into the lungs every 12 hours               [facility] [place] [address]  Dept: [phone]  Dept Fax: [phone]    Last encounter 3/24/2021    Medicare AWV       HPI:   Raeann Rich is a 70 y.o. male who presentstoday for his medical conditions/complaints as noted below. Raeann Rich is c/o of Medicare AWV    HPI       Pt with feeling \"blah\" patient appears pale on exam today possible anemia versus viremic due to recent illness. Sunday or Monday am started battling diarrhea. Took 1 immodium- slowed down with 1 day break. Tried pepto bismol then stool black after that. Stool pale brown light colored, not pure liquid a little formed. No abdominal pain  No cramps or gas  Nausea, no vomiting. No fever or chills. No trouble urinating. No current antibiotics, last 2-3 weeks ago. Hx rocephin 2 gm IV through PICC line x 14 days in Feb.     Dog in home  Wife also ill with diarrhea  [place]. covid-19 illness in March 2021 given monoclonal antibody infusion on 3/25/2021 plans on getting covid 19 vaccine after 90 day wait since this infusion.      Wt Readings from Last 3 Encounters:   04/29/21 (!) 327 lb (148.3 kg)   02/10/21 (!) 336 lb 4.8 oz (152.5 kg)   02/02/21 (!) 337 lb 9.6 oz (153.1 kg)     Right knee in immobilizer and has wound vac intact pt offers with erythema and warmth medial side that home health mentioned it. Photos taken of area     Reviewed prior notes None  Reviewed previous Labs, Imaging and Hospital Records    Past Medical History:   Diagnosis Date    Arthritis     Ascending cholang[facility] 10/2/14     at [facility] assisted gastric remnant to open    Blood in stool 2011    CAD (coronary artery disease)     Chronic back pain     DDD    Chronic diastolic (congestive) heart failure ([address] 11/5/2018    Chronic systolic congestive heart failure ([address] 9/25/2018    COPD with acute exacerbation ([address] 9/25/2018    Heart disease 5-9-12    History of angioplasty 7/2008    2 stents placed    Hx of CABG     x1 in 2015    Hyperlipidemia     Hypertension     onset age 41    Obesity     Stress incontinence, male 3/21/2017    Transfusion history     Unspecified sleep apnea     cpap-DOES NOT USE MACHINE      Past Surgical History:   Procedure Laterality Date    ANTERIOR CRUCIATE LIGAMENT REPAIR Right 2/9/2021    RIGHT KNEE INCISION AND DRAINAGE, POLY EXCHANGE,  WITH EXTENSOR MECHANISM REPAIR WITH ALLOGRAFT performed by Cathryn Smart DO at [place] [address]  2005    Nino Stover BARIATRIC SURGERY  2001    Lona Bob -[facility]RAIN at [facility]   [address] Left 05/09/2012    Left main normal.  Left anterior descending artery:  Plaque disease. Ramus: Plaque disease Circumflex:nondominant, plaque disease. OM1 normal. Right coronoary artery:  dominant & luminal irregularities. Left ventricular ejection fraction 60. Mitral valve normal with no insufficinecy of the mitral valve. Aortic valve normal with no stenosis of the aortic valve.   Edp was normal.    CARDIAC CATHETERIZATION Left 03/04/2015    Dr. Ramirez --Severe CAD, 80% in-stent stenosis in the left anterior descending coronary artery in a rather long segment, very eccentric,extending almost to the ostium of the left anterior descending coronary artery. 70% in-stent stenosis of a very large dominant right coronary artery. Unremarkable small circumflex. Normal left ventricular function, ejection fraction of 60%.  CARDIAC CATHETERIZATION Left 12/05/2018    Dr. Keri Pedraza @ [facility]--[facility] [place] [address] specific cardiac intervention   [address]  2008    2 unknown heart stents mri 1.5t only    CHOLECYSTECTOMY  05/30/2014    open [facility] Dr. Tulio Hughes COLONOSCOPY  10/2015    repeat in 2022    [facility]      2 vessel March 3th 2015    CYSTOSCOPY  05/10/2016    with laser TURP    ERCP  10/02/2014    Dr. Denver Lights with laparotomy    HERNIA REPAIR  09/2003    Hiatal    JOINT REPLACEMENT Bilateral 03/20/2013    bilateral hips    JOINT REPLACEMENT Right 01/27/2021    KNEE ARTHROSCOPY Right 09/26/2013    Dr. Alec Del Rosario Labrum SURGERY  11/02/2016    rt. L3-4, L4-5 decompression.  L4-5 discectomy and fusion    SHOULDER SURGERY Left 2000    arthroscopy    SHOULDER SURGERY Left 2007    replaced humeral head    TOTAL HIP ARTHROPLASTY Right     02/03/2016    TOTAL KNEE ARTHROPLASTY Right 01/27/2021    RIGHT TOTAL KNEE ARTHOPLASTY COMPLICATED BY OBSEITY NEEDS A MARIANNE performed by Meaghan Terry DO at [facility] OR       Family History   Problem Relation Age of Onset    Diabetes Mother     Heart Disease Mother     Arthritis Mother     High Blood Pressure Mother     High Cholesterol Mother     Cancer Father         lung black lung from coal mines    Heart Disease Brother         leaky heart    Liver Disease Paternal Grandfather         black lung    Obesity Sister         gastric bypass    Cancer Sister         lung, smoker       Social History     Tobacco Use    Smoking status: Never Smoker    Smokeless tobacco: Never Used   Substance Use Topics    Alcohol use: Never Frequency: Never     Comment: Rare; LESS THEN 1X PER MONTH      Current Outpatient Medications   Medication Sig Dispense Refill    albuterol sulfate  (90 Base) MCG/ACT inhaler INHALE 2 PUFFS BY MOUTH EVERY 4 HOURS AS NEEDED FOR WHEEZING 18 g 2    spironolactone (ALDACTONE) 25 MG tablet Take 1 tablet by mouth daily 30 tablet 0    ondansetron (ZOFRAN ODT) 4 MG disintegrating tablet Take 1 tablet by mouth every 12 hours as needed for Nausea or Vomiting 8 tablet 0    tiotropium (SPIRIVA RESPIMAT) 2.5 MCG/ACT AERS inhaler Inhale 1 puff into the lungs 2 times daily 1 Inhaler 1    budesonide-formoterol (SYMBICORT) 160-4.5 MCG/ACT AERO Inhale 2 puffs into the lungs every 12 hours 1 Inhaler 1    hydroCHLOROthiazide (HYDRODIURIL) 25 MG tablet Take 1 tablet by mouth once daily 90 tablet 2    isosorbide mononitrate (IMDUR) 30 MG extended release tablet Take 1 tablet by mouth once daily 90 tablet 2    cefTRIAXone (ROCEPHIN) 500 MG injection 2g IV every 24 hrs. For 14 doses. 2000 mg 0    docusate sodium (COLACE) 100 MG capsule Take 1 capsule by mouth 2 times daily 40 capsule 0    aspirin 325 MG EC tablet Take 1 tablet by mouth 2 times daily (with meals) 80 tablet 0    Ferrous Sulfate (IRON) 325 (65 Fe) MG TABS 65 mg 2 times daily      losartan (COZAAR) 25 MG tablet Take 1 tablet by mouth once daily 90 tablet 1    escitalopram (LEXAPRO) 20 MG tablet Take 1 tablet by mouth once daily 90 tablet 1    montelukast (SINGULAIR) 10 MG tablet Take 10 mg by mouth daily      omeprazole (PRILOSEC) 40 MG delayed release capsule TAKE 1 CAPSULE BY MOUTH ONCE DAILY 30 MINUTES BEFORE BREAKFAST.       fluticasone (FLONASE) 50 MCG/ACT nasal spray 1 spray by Nasal route daily (Patient taking differently: 1 spray by Nasal route as needed ) 16 g 2    Multiple Vitamin (THERA/BETA-CAROTENE) TABS Take 1 tablet by mouth daily      vitamin C (ASCORBIC ACID) 500 MG tablet Take 500 mg by mouth daily      tamsulosin (FLOMAX) 0.4 MG capsule Take 1 capsule by mouth every evening 90 capsule 3    metoprolol succinate (TOPROL XL) 25 MG extended release tablet Take 1 tablet by mouth daily 90 tablet 2    albuterol (PROVENTIL) (2.5 MG/3ML) 0.083% nebulizer solution Take 3 mLs by nebulization 4 times daily (Patient taking differently: Take 2.5 mg by nebulization 2 times daily ) 120 each 3    ipratropium (ATROVENT) 0.02 % nebulizer solution Take 2.5 mLs by nebulization 4 times daily (Patient taking differently: Take 0.5 mg by nebulization 2 times daily ) 187.5 mL 3    atorvastatin (LIPITOR) 80 MG tablet Take 1 tablet by mouth daily 90 tablet 2    Vitamin D, Cholecalciferol, 25 MCG (1000 UT) TABS Take by mouth daily        No current facility-administered medications for this visit. No Known Allergies    Health Maintenance   Topic Date Due    COVID-19 Vaccine (1) Never done    DTaP/Tdap/Td vaccine (1 - Tdap) Never done    Shingles Vaccine (1 of 2) Never done    Annual Wellness Visit (AWV)  Never done    A1C test (Diabetic or Prediabetic)  05/27/2021    Lipid screen  03/11/2022    Potassium monitoring  03/11/2022    Creatinine monitoring  03/11/2022    Colon cancer screen colonoscopy  10/26/2025    Flu vaccine  Completed    Pneumococcal 65+ years Vaccine  Completed    Hepatitis C screen  Completed    Hepatitis A vaccine  Aged Out    Hepatitis B vaccine  Aged Out    Hib vaccine  Aged Out    Meningococcal (ACWY) vaccine  Aged Out       Subjective:      Review of Systems   Constitutional: Negative for activity change, chills and fever. HENT: Negative for congestion. Respiratory: Negative for chest tightness. Cardiovascular: Positive for leg swelling. Negative for chest pain. Gastrointestinal: Positive for diarrhea. Negative for abdominal distention, constipation and nausea. Genitourinary: Negative for difficulty urinating. Neurological: Negative for dizziness and headaches.        Objective:     Physical Exam Constitutional:       Comments: Pale complexion   HENT:      Head: Normocephalic. Right Ear: Tympanic membrane normal.      Left Ear: Tympanic membrane normal.      Nose: No congestion. Mouth/Throat:      Mouth: Mucous membranes are moist.      Pharynx: No oropharyngeal exudate. Neck:      Musculoskeletal: Normal range of motion. Cardiovascular:      Rate and Rhythm: Normal rate. Heart sounds: No murmur. Pulmonary:      Effort: Pulmonary effort is normal.      Breath sounds: Normal breath sounds. Abdominal:      General: Abdomen is flat. Bowel sounds are normal.   Musculoskeletal:         General: Tenderness present. Comments: See photo right knee   Skin:     General: Skin is warm. Neurological:      Mental Status: He is alert. Right leg with erythematous area medial knee, warmth present wound vac intact. Pulses intact with edema 1+ pitting at ankle. In medication review pt not taking spiriva, symbicort, spironolactone. As we discussed pt did not have a reason for being out of meds. Renewed today. Will leave message with Dr. Hickman Shoulder with new changes erythema and warmth at medial knee. approx 3 inch area of erythema and warmth present  Medial right knee, brace intact, posterior knee no redness noted. Swelling in leg 1+ edema. Will restart spironolactone diuretic.      /86 (Site: Right Upper Arm, Position: Sitting, Cuff Size: Medium Adult)   Pulse 102   Temp 97.8 °F (36.6 °C) (Infrared)   Ht 5' 10\" (1.778 m)   Wt (!) 327 lb (148.3 kg)   SpO2 96%   BMI 46.92 kg/m²     Data:     Lab Results   Component Value Date     03/11/2021    K 4.6 03/11/2021     03/11/2021    CO2 25 03/11/2021    BUN 14 03/11/2021    CREATININE 0.99 03/11/2021    GLUCOSE 108 03/11/2021    GLUCOSE 132 05/09/2012    PROT 6.7 03/11/2021    LABALBU 4.1 03/11/2021    BILITOT 0.65 03/11/2021    ALKPHOS 104 03/11/2021    AST 28 03/11/2021    ALT 23 03/11/2021     Lab Results Component Value Date    WBC 4.3 03/11/2021    RBC 4.61 03/11/2021    RBC 4.72 05/09/2012    HGB 13.1 03/11/2021    HCT 39.1 03/11/2021    MCV 84.8 03/11/2021    MCH 28.5 03/11/2021    MCHC 33.5 03/11/2021    RDW 15.3 03/11/2021     03/11/2021     05/09/2012    MPV NOT REPORTED 03/11/2021     Lab Results   Component Value Date    TSH 2.34 03/11/2021     Lab Results   Component Value Date    CHOL 113 03/11/2021    HDL 49 03/11/2021    PSA 0.55 07/07/2020    LABA1C 6.2 05/27/2020          Assessment & Plan         1. Viral gastroenteritis  Increase hydration  Restart spironolactone  Labs , urine and stool sample today    - Urinalysis Reflex to Culture; Future  - Clostridium Difficile Toxin/Antigen; Future    6. Diarrhea, unspecified type  Good hydration  No pepto-bismol or immodium with risk of infectious /viral  Wife also il  - CBC Auto Differential; Future  - Comprehensive Metabolic Panel; Future  - Clostridium Difficile Toxin/Antigen; Future    7. Nausea  Small amount zofran until testing back    - Urinalysis Reflex to Culture; Future    CBC check for anemia no longer on iron.          18:45 update  Laboratory tests are back and upon review patient stool is C. difficile positive hemoglobin has dropped 2 g in the last month from 13.1-11.2 patient will be started on vancomycin 125 mg p.o. 4 times a day for 10 days urinalysis was negative for infection but did show yeast so will offer Diflucan 100 mg daily x3 days I spoke with Dr. Donis Jacques with who was on-call for Dr. Homa Kahn updated him on patient's current status and condition on today and he is agreeable for patient to do blood cultures x2 prior to starting the vancomycin due to the warmth and erythema noted on his right knee today patient is to call Dr. Homa Kahn office for close follow-up he has an appointment on 13 May I will also send labs to Dr. Homa Kahn I did call Dr. Krissy Iyer an update on her on patient's condition and she agrees with treatment plan Future     Standing Expiration Date:   4/29/2022    Urinalysis Reflex to Culture     Standing Status:   Future     Standing Expiration Date:   4/29/2022     Order Specific Question:   SPECIFY(EX-CATH,MIDSTREAM,CYSTO,ETC)? Answer:   cc         Jose King received counseling on the following healthy behaviors: nutrition, exercise and medication adherence  Reviewed prior labs and health maintenance. Continue current medications, diet and exercise. Discussed use, benefit, and side effects of prescribed medications. Barriers to medication compliance addressed. Patient given educational materials - see patient instructions. All patient questions answered. Patient voiced understanding. On this date 4/29/2021 I have spent 35 minutes reviewing previous notes, test results and face to face with the patient discussing the diagnosis and importance of compliance with the treatment plan as well as documenting on the day of the visit.      Electronically signed by BESS Quiñones CNP on 4/29/2021 at 2:20 PM

## 2021-04-30 ENCOUNTER — HOSPITAL ENCOUNTER (OUTPATIENT)
Age: 76
Discharge: HOME | End: 2021-04-30
Payer: MEDICARE

## 2021-04-30 DIAGNOSIS — R73.01 IFG (IMPAIRED FASTING GLUCOSE): Primary | ICD-10-CM

## 2021-04-30 DIAGNOSIS — D50.9 IRON DEFICIENCY ANEMIA, UNSPECIFIED IRON DEFICIENCY ANEMIA TYPE: ICD-10-CM

## 2021-04-30 DIAGNOSIS — R73.01 IFG (IMPAIRED FASTING GLUCOSE): ICD-10-CM

## 2021-04-30 DIAGNOSIS — M25.461 PAIN AND SWELLING OF KNEE, RIGHT: ICD-10-CM

## 2021-04-30 DIAGNOSIS — Z96.651 S/P TOTAL KNEE ARTHROPLASTY, RIGHT: ICD-10-CM

## 2021-04-30 DIAGNOSIS — R19.7 DIARRHEA, UNSPECIFIED TYPE: ICD-10-CM

## 2021-04-30 DIAGNOSIS — M25.561 PAIN AND SWELLING OF KNEE, RIGHT: ICD-10-CM

## 2021-04-30 DIAGNOSIS — R11.0 NAUSEA: ICD-10-CM

## 2021-04-30 LAB
ABSOLUTE EOS #: 0.13 K/UL (ref 0–0.44)
ABSOLUTE IMMATURE GRANULOCYTE: 0.03 K/UL (ref 0–0.3)
ABSOLUTE LYMPH #: 1.3 K/UL (ref 1.1–3.7)
ABSOLUTE MONO #: 0.8 K/UL (ref 0.1–1.2)
BASOPHILS # BLD: 1 % (ref 0–2)
BASOPHILS ABSOLUTE: 0.03 K/UL (ref 0–0.2)
DIFFERENTIAL TYPE: ABNORMAL
EOSINOPHILS RELATIVE PERCENT: 2 % (ref 1–4)
HCT VFR BLD CALC: 35.4 % (ref 40.7–50.3)
HEMOGLOBIN: 11.2 G/DL (ref 13–17)
IMMATURE GRANULOCYTES: 1 %
LYMPHOCYTES # BLD: 20 % (ref 24–43)
MCH RBC QN AUTO: 26.9 PG (ref 25.2–33.5)
MCHC RBC AUTO-ENTMCNC: 31.6 G/DL (ref 28.4–34.8)
MCV RBC AUTO: 85.1 FL (ref 82.6–102.9)
MONOCYTES # BLD: 12 % (ref 3–12)
NRBC AUTOMATED: 0 PER 100 WBC
PDW BLD-RTO: 13.9 % (ref 11.8–14.4)
PLATELET # BLD: 262 K/UL (ref 138–453)
PLATELET ESTIMATE: ABNORMAL
PMV BLD AUTO: 9.5 FL (ref 8.1–13.5)
RBC # BLD: 4.16 M/UL (ref 4.21–5.77)
RBC # BLD: ABNORMAL 10*6/UL
SEG NEUTROPHILS: 64 % (ref 36–65)
SEGMENTED NEUTROPHILS ABSOLUTE COUNT: 4.35 K/UL (ref 1.5–8.1)
WBC # BLD: 6.6 K/UL (ref 3.5–11.3)
WBC # BLD: ABNORMAL 10*3/UL

## 2021-04-30 PROCEDURE — 87040 BLOOD CULTURE FOR BACTERIA: CPT

## 2021-04-30 PROCEDURE — 36415 COLL VENOUS BLD VENIPUNCTURE: CPT

## 2021-04-30 PROCEDURE — 83036 HEMOGLOBIN GLYCOSYLATED A1C: CPT

## 2021-04-30 PROCEDURE — 85025 COMPLETE CBC W/AUTO DIFF WBC: CPT

## 2021-05-02 LAB
ESTIMATED AVERAGE GLUCOSE: 140 MG/DL
HBA1C MFR BLD: 6.5 % (ref 4–6)

## 2021-05-04 RX ORDER — BLOOD-GLUCOSE METER
1 KIT MISCELLANEOUS DAILY
Qty: 1 DEVICE | Refills: 0 | Status: SHIPPED | OUTPATIENT
Start: 2021-05-04

## 2021-05-04 RX ORDER — GLUCOSAMINE HCL/CHONDROITIN SU 500-400 MG
CAPSULE ORAL
Qty: 200 STRIP | Refills: 1 | Status: SHIPPED | OUTPATIENT
Start: 2021-05-04

## 2021-05-04 RX ORDER — LANCETS 30 GAUGE
1 EACH MISCELLANEOUS DAILY
Qty: 200 EACH | Refills: 1 | Status: SHIPPED | OUTPATIENT
Start: 2021-05-04

## 2021-05-05 LAB
CULTURE: NORMAL
CULTURE: NORMAL
Lab: NORMAL
Lab: NORMAL
SPECIMEN DESCRIPTION: NORMAL
SPECIMEN DESCRIPTION: NORMAL

## 2021-05-07 ENCOUNTER — TELEPHONE (OUTPATIENT)
Dept: PRIMARY CARE CLINIC | Age: 76
End: 2021-05-07

## 2021-05-07 DIAGNOSIS — E11.9 NEW ONSET TYPE 2 DIABETES MELLITUS (HCC): Primary | ICD-10-CM

## 2021-05-07 DIAGNOSIS — Z00.00 WELLNESS EXAMINATION: Primary | ICD-10-CM

## 2021-05-07 DIAGNOSIS — Z00.00 WELLNESS EXAMINATION: ICD-10-CM

## 2021-05-07 LAB
BASOPHILS ABSOLUTE: 0 /ΜL
BASOPHILS RELATIVE PERCENT: 0.6 %
C-REACTIVE PROTEIN: 7.1
EOSINOPHILS ABSOLUTE: 0.1 /ΜL
EOSINOPHILS RELATIVE PERCENT: 1.5 %
HCT VFR BLD CALC: 35.8 % (ref 41–53)
HEMOGLOBIN: 11.9 G/DL (ref 13.5–17.5)
LYMPHOCYTES ABSOLUTE: 1.5 /ΜL
LYMPHOCYTES RELATIVE PERCENT: 18.9 %
MCH RBC QN AUTO: 27.5 PG
MCHC RBC AUTO-ENTMCNC: 33.4 G/DL
MCV RBC AUTO: 82.4 FL
MONOCYTES ABSOLUTE: 0.9 /ΜL
MONOCYTES RELATIVE PERCENT: 11.5 %
NEUTROPHILS ABSOLUTE: 5.3 /ΜL
NEUTROPHILS RELATIVE PERCENT: 67.5 %
PLATELET # BLD: 313 K/ΜL
PMV BLD AUTO: ABNORMAL FL
RBC # BLD: 4.4 10^6/ΜL
WBC # BLD: 7.8 10^3/ML

## 2021-05-15 ENCOUNTER — HOSPITAL ENCOUNTER (INPATIENT)
Age: 76
LOS: 42 days | Discharge: HOME | DRG: 560 | End: 2021-06-26
Attending: INTERNAL MEDICINE | Admitting: INTERNAL MEDICINE
Payer: MEDICARE

## 2021-05-15 DIAGNOSIS — Z96.651 STATUS POST REVISION OF TOTAL REPLACEMENT OF RIGHT KNEE: Primary | ICD-10-CM

## 2021-05-15 DIAGNOSIS — M00.9 PYOGENIC ARTHRITIS OF RIGHT KNEE JOINT, DUE TO UNSPECIFIED ORGANISM (HCC): ICD-10-CM

## 2021-05-15 LAB
GLUCOSE BLD-MCNC: 138 MG/DL (ref 65–99)
GLUCOSE BLD-MCNC: 139 MG/DL (ref 65–99)

## 2021-05-15 PROCEDURE — 82947 ASSAY GLUCOSE BLOOD QUANT: CPT

## 2021-05-15 PROCEDURE — 6370000000 HC RX 637 (ALT 250 FOR IP): Performed by: INTERNAL MEDICINE

## 2021-05-15 PROCEDURE — 94664 DEMO&/EVAL PT USE INHALER: CPT

## 2021-05-15 PROCEDURE — 6360000002 HC RX W HCPCS: Performed by: INTERNAL MEDICINE

## 2021-05-15 PROCEDURE — 2580000003 HC RX 258: Performed by: INTERNAL MEDICINE

## 2021-05-15 PROCEDURE — 1200000002 HC SEMI PRIVATE SWING BED

## 2021-05-15 RX ORDER — BUDESONIDE AND FORMOTEROL FUMARATE DIHYDRATE 160; 4.5 UG/1; UG/1
2 AEROSOL RESPIRATORY (INHALATION) EVERY 12 HOURS
Status: DISCONTINUED | OUTPATIENT
Start: 2021-05-15 | End: 2021-06-26 | Stop reason: HOSPADM

## 2021-05-15 RX ORDER — ESCITALOPRAM OXALATE 10 MG/1
20 TABLET ORAL DAILY
Status: DISCONTINUED | OUTPATIENT
Start: 2021-05-16 | End: 2021-06-26 | Stop reason: HOSPADM

## 2021-05-15 RX ORDER — ATORVASTATIN CALCIUM 40 MG/1
80 TABLET, FILM COATED ORAL NIGHTLY
Status: DISCONTINUED | OUTPATIENT
Start: 2021-05-15 | End: 2021-06-26 | Stop reason: HOSPADM

## 2021-05-15 RX ORDER — ONDANSETRON 4 MG/1
4 TABLET, ORALLY DISINTEGRATING ORAL EVERY 12 HOURS PRN
Status: DISCONTINUED | OUTPATIENT
Start: 2021-05-15 | End: 2021-06-06

## 2021-05-15 RX ORDER — LOSARTAN POTASSIUM 50 MG/1
25 TABLET ORAL DAILY
Status: DISCONTINUED | OUTPATIENT
Start: 2021-05-16 | End: 2021-06-26 | Stop reason: HOSPADM

## 2021-05-15 RX ORDER — SPIRONOLACTONE 25 MG/1
25 TABLET ORAL DAILY
Status: DISCONTINUED | OUTPATIENT
Start: 2021-05-16 | End: 2021-06-26 | Stop reason: HOSPADM

## 2021-05-15 RX ORDER — ISOSORBIDE MONONITRATE 30 MG/1
30 TABLET, EXTENDED RELEASE ORAL DAILY
Status: DISCONTINUED | OUTPATIENT
Start: 2021-05-16 | End: 2021-06-26 | Stop reason: HOSPADM

## 2021-05-15 RX ORDER — DOCUSATE SODIUM 100 MG/1
100 CAPSULE, LIQUID FILLED ORAL 2 TIMES DAILY
Status: DISCONTINUED | OUTPATIENT
Start: 2021-05-15 | End: 2021-06-26 | Stop reason: HOSPADM

## 2021-05-15 RX ORDER — TAMSULOSIN HYDROCHLORIDE 0.4 MG/1
0.4 CAPSULE ORAL EVERY EVENING
Status: DISCONTINUED | OUTPATIENT
Start: 2021-05-15 | End: 2021-06-26 | Stop reason: HOSPADM

## 2021-05-15 RX ORDER — FLUTICASONE PROPIONATE 50 MCG
1 SPRAY, SUSPENSION (ML) NASAL DAILY
Status: DISCONTINUED | OUTPATIENT
Start: 2021-05-16 | End: 2021-06-26 | Stop reason: HOSPADM

## 2021-05-15 RX ORDER — ALBUTEROL SULFATE 2.5 MG/3ML
2.5 SOLUTION RESPIRATORY (INHALATION) EVERY 4 HOURS PRN
Status: DISCONTINUED | OUTPATIENT
Start: 2021-05-15 | End: 2021-06-26 | Stop reason: HOSPADM

## 2021-05-15 RX ORDER — HYDROCHLOROTHIAZIDE 25 MG/1
25 TABLET ORAL DAILY
Status: DISCONTINUED | OUTPATIENT
Start: 2021-05-16 | End: 2021-06-26 | Stop reason: HOSPADM

## 2021-05-15 RX ORDER — SODIUM CHLORIDE 9 MG/ML
25 INJECTION, SOLUTION INTRAVENOUS PRN
Status: DISCONTINUED | OUTPATIENT
Start: 2021-05-15 | End: 2021-06-26 | Stop reason: HOSPADM

## 2021-05-15 RX ORDER — OXYCODONE HYDROCHLORIDE AND ACETAMINOPHEN 5; 325 MG/1; MG/1
1 TABLET ORAL EVERY 6 HOURS PRN
Status: DISCONTINUED | OUTPATIENT
Start: 2021-05-15 | End: 2021-06-26 | Stop reason: HOSPADM

## 2021-05-15 RX ORDER — ALBUTEROL SULFATE 2.5 MG/3ML
2.5 SOLUTION RESPIRATORY (INHALATION) 4 TIMES DAILY
Status: DISCONTINUED | OUTPATIENT
Start: 2021-05-15 | End: 2021-05-17

## 2021-05-15 RX ORDER — SODIUM CHLORIDE 0.9 % (FLUSH) 0.9 %
5-40 SYRINGE (ML) INJECTION PRN
Status: DISCONTINUED | OUTPATIENT
Start: 2021-05-15 | End: 2021-06-26 | Stop reason: HOSPADM

## 2021-05-15 RX ORDER — METOPROLOL SUCCINATE 25 MG/1
25 TABLET, EXTENDED RELEASE ORAL DAILY
Status: DISCONTINUED | OUTPATIENT
Start: 2021-05-15 | End: 2021-06-26 | Stop reason: HOSPADM

## 2021-05-15 RX ORDER — MULTIVITAMIN WITH IRON
1 TABLET ORAL DAILY
Status: DISCONTINUED | OUTPATIENT
Start: 2021-05-16 | End: 2021-06-26 | Stop reason: HOSPADM

## 2021-05-15 RX ORDER — POTASSIUM CHLORIDE 750 MG/1
10 TABLET, FILM COATED, EXTENDED RELEASE ORAL
Status: DISCONTINUED | OUTPATIENT
Start: 2021-05-16 | End: 2021-06-26 | Stop reason: HOSPADM

## 2021-05-15 RX ORDER — VANCOMYCIN HYDROCHLORIDE 125 MG/1
125 CAPSULE ORAL EVERY 12 HOURS SCHEDULED
Status: DISCONTINUED | OUTPATIENT
Start: 2021-05-15 | End: 2021-06-26 | Stop reason: HOSPADM

## 2021-05-15 RX ORDER — PANTOPRAZOLE SODIUM 40 MG/1
40 TABLET, DELAYED RELEASE ORAL
Status: DISCONTINUED | OUTPATIENT
Start: 2021-05-16 | End: 2021-06-26 | Stop reason: HOSPADM

## 2021-05-15 RX ORDER — SODIUM CHLORIDE 0.9 % (FLUSH) 0.9 %
5-40 SYRINGE (ML) INJECTION EVERY 12 HOURS SCHEDULED
Status: DISCONTINUED | OUTPATIENT
Start: 2021-05-15 | End: 2021-06-26 | Stop reason: HOSPADM

## 2021-05-15 RX ORDER — VITAMIN B COMPLEX
1000 TABLET ORAL DAILY
Status: DISCONTINUED | OUTPATIENT
Start: 2021-05-16 | End: 2021-06-26 | Stop reason: HOSPADM

## 2021-05-15 RX ORDER — MONTELUKAST SODIUM 10 MG/1
10 TABLET ORAL NIGHTLY
Status: DISCONTINUED | OUTPATIENT
Start: 2021-05-15 | End: 2021-06-26 | Stop reason: HOSPADM

## 2021-05-15 RX ORDER — ASCORBIC ACID 500 MG
500 TABLET ORAL DAILY
Status: DISCONTINUED | OUTPATIENT
Start: 2021-05-16 | End: 2021-06-26 | Stop reason: HOSPADM

## 2021-05-15 RX ADMIN — TAMSULOSIN HYDROCHLORIDE 0.4 MG: 0.4 CAPSULE ORAL at 17:29

## 2021-05-15 RX ADMIN — MONTELUKAST SODIUM 10 MG: 10 TABLET, FILM COATED ORAL at 20:09

## 2021-05-15 RX ADMIN — WATER 2000 MG: 1 INJECTION INTRAMUSCULAR; INTRAVENOUS; SUBCUTANEOUS at 21:05

## 2021-05-15 RX ADMIN — OXYCODONE AND ACETAMINOPHEN 1 TABLET: 5; 325 TABLET ORAL at 20:08

## 2021-05-15 RX ADMIN — ALBUTEROL SULFATE 2.5 MG: 2.5 SOLUTION RESPIRATORY (INHALATION) at 21:07

## 2021-05-15 RX ADMIN — IPRATROPIUM BROMIDE 0.5 MG: 0.5 SOLUTION RESPIRATORY (INHALATION) at 21:07

## 2021-05-15 RX ADMIN — BUDESONIDE AND FORMOTEROL FUMARATE DIHYDRATE 2 PUFF: 160; 4.5 AEROSOL RESPIRATORY (INHALATION) at 21:07

## 2021-05-15 RX ADMIN — TIOTROPIUM BROMIDE INHALATION SPRAY 1 PUFF: 3.12 SPRAY, METERED RESPIRATORY (INHALATION) at 21:07

## 2021-05-15 RX ADMIN — SODIUM CHLORIDE, PRESERVATIVE FREE 10 ML: 5 INJECTION INTRAVENOUS at 21:04

## 2021-05-15 RX ADMIN — VANCOMYCIN HYDROCHLORIDE 125 MG: 125 CAPSULE ORAL at 20:09

## 2021-05-15 RX ADMIN — ATORVASTATIN CALCIUM 80 MG: 40 TABLET, FILM COATED ORAL at 20:09

## 2021-05-15 ASSESSMENT — PAIN SCALES - GENERAL
PAINLEVEL_OUTOF10: 4
PAINLEVEL_OUTOF10: 6
PAINLEVEL_OUTOF10: 1

## 2021-05-15 ASSESSMENT — PAIN DESCRIPTION - DESCRIPTORS: DESCRIPTORS: SHARP;SHOOTING

## 2021-05-15 ASSESSMENT — PAIN DESCRIPTION - LOCATION: LOCATION: KNEE

## 2021-05-15 ASSESSMENT — PAIN DESCRIPTION - PAIN TYPE: TYPE: ACUTE PAIN

## 2021-05-15 ASSESSMENT — PAIN DESCRIPTION - ORIENTATION: ORIENTATION: RIGHT

## 2021-05-15 NOTE — PROGRESS NOTES
[facility]  Swing Bed Evaluation for Certification for Clarissa meets skilled criteria due to the need for skilled nursing supervision or skilled rehabilitation services listed below:   [x] Therapy; physical and occupational; for decreased strength, balance and self         care activities. [] Tpn    [] Trach care   [x] IV therapy   [] Wound care    [] Other Skilled Need:        Jacques Hummel lives [] Alone      [x] With Spouse    [] Other:   and plans on returning there at discharge. [x] Pt declines list of alternate providers, pt prefers to receive skilled care at [facility]  [] List of alternate providers given to patient, pt prefers to receive skilled care at [facility]  [] Not applicable, pt admitted to [facility] from [address] [place] [address] prefers this facility for skilled care and services can only be practically provided in a skilled nursing facility or swing bed hospital on an inpatient basis. Jacques Hummel will require skilled care on a daily basis beginning 05-, if medically stable.   These services are for an ongoing condition for which Jacques Hummel received inpatient care for at the hospital.        Kellie Drake,        Date: 5-

## 2021-05-15 NOTE — PROGRESS NOTES
SWING BED PROGRAM PRE-ADMISSION ASSESSMENT  (TRADITIONAL MEDICARE PATIENTS)  Patient Name: Mariela Ramirez      : 1945  (70 y.o.) Gender: male   Room: [address] [place] [address] MRN: [id number]      Inpatient Admission Date: 05-11-5-15 Date & Time of Referral: 05/15/2021     Referred By: Anabel Rosenberg MD Referred from:  [] W    [x] Other:[facility]     # of Skilled Care Days Used in Last 60 days: 0 Insurance: [x]  Medicare                      [x]  Secondary - Type:     Present Condition/Diagnosis:    Weakness [R53.1]  Arthritis, septic, knee ([facility] [M00.9]      Previous Medical History:   Past Medical History:   Diagnosis Date    Arthritis     Ascending cholangitis 10/2/14     at [facility] assisted gastric remnant to open    Blood in stool     CAD (coronary artery disease)     Chronic back pain     DDD    Chronic diastolic (congestive) heart failure ([facility] 2018    Chronic systolic congestive heart failure ([facility] 2018    COPD with acute exacerbation ([facility] 2018    Heart disease 12    History of angioplasty 2008    2 stents placed    Hx of CABG     x1 in     Hyperlipidemia     Hypertension     onset age 41    Obesity     Stress incontinence, male 3/21/2017    Transfusion history     Unspecified sleep apnea     cpap-DOES NOT USE MACHINE        ADL Performance Last Seven (7) Days (Please Score)   Patients performance over all shifts during last seven (7) days     0 = Independent - No help or oversight  1 = Supervision - Oversight, encouragement or cueing provided  2 = Limited Assist -Highly involved in activity; assistance in guided maneuvering of limbs or other non-weight-bearing assistance  3 = Extensive Assist - Receives physical help in guided maneuvering of limbs or other non-weight bearing assistance  4 = Total Dependence - Full staff performance of activity   7 = Activity occurred only once or twice  8 = Activity did not occur - Activity did not occur or family and/or non- facility and injections seven (7) days and  order change two (2) or more days.   [] Parenteral/IV feeding  [] respiratory therapy for seven (7) days   SPECIAL CARE LOW:   [] Respiratory Therapy  [] Ulcers (2+sites all stages) w/treatment  [] Multiple Sclerosis  [] Cerebral Palsy  [] Parkinsons Disease  [] Oxygen Therapy  [] Extensive care services w/ADL less than 6  [] Fever w/at least one (1) of the following: [] dehydration [] pneumonia [] vomiting [] weight loss  [] Foot Infection or open lesions on the foot with treatment  [] tube-feeding - calories greater than or equal to 51% or tube feeding with total calories greater than or equal to 26% and fluid parental or enteral intake of greater than or equal to 50 ml per day   CLINICALLY COMPLEX   CURRENTLY:  [] Pneumonia  [] Hemiplegics with ADL with ADL Sum greater than or equal to 10  [x] IV medications delivered post admission in the SNF  [] Surgical wounds or open lesions w/treatment      EVALUATORS SIGNATURE:Anat Griffin DATE: 5/15/2021       [x] ACCEPTED FOR ADMISSION ON:  05/15/2021                               ELOS:  [] 1-2wks  [] 2-3wks  [x] 3-4wks   [] ADMISSION DENIED BASED ON:    [] [address] [place] [address] COORDINATOR

## 2021-05-16 LAB
ABSOLUTE EOS #: 0.2 K/UL (ref 0–0.4)
ABSOLUTE IMMATURE GRANULOCYTE: ABNORMAL K/UL (ref 0–0.3)
ABSOLUTE LYMPH #: 1 K/UL (ref 1–4.8)
ABSOLUTE MONO #: 0.6 K/UL (ref 0–1)
ANION GAP SERPL CALCULATED.3IONS-SCNC: 9 MMOL/L (ref 9–17)
BASOPHILS # BLD: 0 % (ref 0–2)
BASOPHILS ABSOLUTE: 0 K/UL (ref 0–0.2)
BUN BLDV-MCNC: 14 MG/DL (ref 8–23)
BUN/CREAT BLD: 18 (ref 9–20)
C-REACTIVE PROTEIN: 115.7 MG/L (ref 0–5)
CALCIUM SERPL-MCNC: 9 MG/DL (ref 8.6–10.4)
CHLORIDE BLD-SCNC: 100 MMOL/L (ref 98–107)
CO2: 27 MMOL/L (ref 20–31)
CREAT SERPL-MCNC: 0.76 MG/DL (ref 0.7–1.2)
DIFFERENTIAL TYPE: YES
EOSINOPHILS RELATIVE PERCENT: 3 % (ref 0–5)
FERRITIN: 380 UG/L (ref 30–400)
GFR AFRICAN AMERICAN: >60 ML/MIN
GFR NON-AFRICAN AMERICAN: >60 ML/MIN
GFR SERPL CREATININE-BSD FRML MDRD: ABNORMAL ML/MIN/{1.73_M2}
GFR SERPL CREATININE-BSD FRML MDRD: ABNORMAL ML/MIN/{1.73_M2}
GLUCOSE BLD-MCNC: 100 MG/DL (ref 65–99)
GLUCOSE BLD-MCNC: 118 MG/DL (ref 65–99)
GLUCOSE BLD-MCNC: 118 MG/DL (ref 65–99)
GLUCOSE BLD-MCNC: 137 MG/DL (ref 65–99)
GLUCOSE BLD-MCNC: 147 MG/DL (ref 70–99)
HCT VFR BLD CALC: 27.5 % (ref 41–53)
HEMOGLOBIN: 9.3 G/DL (ref 13.5–17.5)
IMMATURE GRANULOCYTES: ABNORMAL %
IRON SATURATION: 15 % (ref 20–55)
IRON: 23 UG/DL (ref 59–158)
LYMPHOCYTES # BLD: 15 % (ref 13–44)
MCH RBC QN AUTO: 26.9 PG (ref 26–34)
MCHC RBC AUTO-ENTMCNC: 33.8 G/DL (ref 31–37)
MCV RBC AUTO: 79.7 FL (ref 80–100)
MONOCYTES # BLD: 9 % (ref 5–9)
NRBC AUTOMATED: ABNORMAL PER 100 WBC
PDW BLD-RTO: 15.7 % (ref 12.1–15.2)
PLATELET # BLD: 253 K/UL (ref 140–450)
PLATELET ESTIMATE: ABNORMAL
PMV BLD AUTO: ABNORMAL FL (ref 6–12)
POTASSIUM SERPL-SCNC: 3.6 MMOL/L (ref 3.7–5.3)
RBC # BLD: 3.45 M/UL (ref 4.5–5.9)
RBC # BLD: ABNORMAL 10*6/UL
SEG NEUTROPHILS: 73 % (ref 39–75)
SEGMENTED NEUTROPHILS ABSOLUTE COUNT: 5.1 K/UL (ref 2.1–6.5)
SODIUM BLD-SCNC: 136 MMOL/L (ref 135–144)
TOTAL IRON BINDING CAPACITY: 155 UG/DL (ref 250–450)
UNSATURATED IRON BINDING CAPACITY: 132 UG/DL (ref 112–347)
WBC # BLD: 7 K/UL (ref 3.5–11)
WBC # BLD: ABNORMAL 10*3/UL

## 2021-05-16 PROCEDURE — 2580000003 HC RX 258: Performed by: INTERNAL MEDICINE

## 2021-05-16 PROCEDURE — 82947 ASSAY GLUCOSE BLOOD QUANT: CPT

## 2021-05-16 PROCEDURE — 36415 COLL VENOUS BLD VENIPUNCTURE: CPT

## 2021-05-16 PROCEDURE — 6370000000 HC RX 637 (ALT 250 FOR IP): Performed by: INTERNAL MEDICINE

## 2021-05-16 PROCEDURE — 94640 AIRWAY INHALATION TREATMENT: CPT

## 2021-05-16 PROCEDURE — 83550 IRON BINDING TEST: CPT

## 2021-05-16 PROCEDURE — 85025 COMPLETE CBC W/AUTO DIFF WBC: CPT

## 2021-05-16 PROCEDURE — 83540 ASSAY OF IRON: CPT

## 2021-05-16 PROCEDURE — 6360000002 HC RX W HCPCS: Performed by: INTERNAL MEDICINE

## 2021-05-16 PROCEDURE — 80048 BASIC METABOLIC PNL TOTAL CA: CPT

## 2021-05-16 PROCEDURE — 94761 N-INVAS EAR/PLS OXIMETRY MLT: CPT

## 2021-05-16 PROCEDURE — 1200000002 HC SEMI PRIVATE SWING BED

## 2021-05-16 PROCEDURE — 86140 C-REACTIVE PROTEIN: CPT

## 2021-05-16 PROCEDURE — 82728 ASSAY OF FERRITIN: CPT

## 2021-05-16 RX ORDER — POTASSIUM CHLORIDE 750 MG/1
20 TABLET, FILM COATED, EXTENDED RELEASE ORAL ONCE
Status: COMPLETED | OUTPATIENT
Start: 2021-05-16 | End: 2021-05-16

## 2021-05-16 RX ORDER — DEXTROSE MONOHYDRATE 50 MG/ML
100 INJECTION, SOLUTION INTRAVENOUS PRN
Status: DISCONTINUED | OUTPATIENT
Start: 2021-05-16 | End: 2021-05-18

## 2021-05-16 RX ORDER — NICOTINE POLACRILEX 4 MG
15 LOZENGE BUCCAL PRN
Status: DISCONTINUED | OUTPATIENT
Start: 2021-05-16 | End: 2021-05-18

## 2021-05-16 RX ORDER — DEXTROSE MONOHYDRATE 25 G/50ML
12.5 INJECTION, SOLUTION INTRAVENOUS PRN
Status: DISCONTINUED | OUTPATIENT
Start: 2021-05-16 | End: 2021-05-18

## 2021-05-16 RX ORDER — FERROUS SULFATE 325(65) MG
325 TABLET ORAL
Status: DISCONTINUED | OUTPATIENT
Start: 2021-05-17 | End: 2021-06-26 | Stop reason: HOSPADM

## 2021-05-16 RX ADMIN — DOCUSATE SODIUM 100 MG: 100 CAPSULE, LIQUID FILLED ORAL at 20:19

## 2021-05-16 RX ADMIN — POTASSIUM CHLORIDE 10 MEQ: 750 TABLET, FILM COATED, EXTENDED RELEASE ORAL at 08:46

## 2021-05-16 RX ADMIN — METFORMIN HYDROCHLORIDE 500 MG: 500 TABLET ORAL at 08:46

## 2021-05-16 RX ADMIN — VANCOMYCIN HYDROCHLORIDE 125 MG: 125 CAPSULE ORAL at 08:46

## 2021-05-16 RX ADMIN — MONTELUKAST SODIUM 10 MG: 10 TABLET, FILM COATED ORAL at 20:19

## 2021-05-16 RX ADMIN — OXYCODONE AND ACETAMINOPHEN 1 TABLET: 5; 325 TABLET ORAL at 22:05

## 2021-05-16 RX ADMIN — SODIUM CHLORIDE, PRESERVATIVE FREE 10 ML: 5 INJECTION INTRAVENOUS at 20:19

## 2021-05-16 RX ADMIN — VANCOMYCIN HYDROCHLORIDE 125 MG: 125 CAPSULE ORAL at 20:19

## 2021-05-16 RX ADMIN — IPRATROPIUM BROMIDE 0.5 MG: 0.5 SOLUTION RESPIRATORY (INHALATION) at 21:39

## 2021-05-16 RX ADMIN — LOSARTAN POTASSIUM 25 MG: 50 TABLET ORAL at 08:46

## 2021-05-16 RX ADMIN — TIOTROPIUM BROMIDE INHALATION SPRAY 1 PUFF: 3.12 SPRAY, METERED RESPIRATORY (INHALATION) at 09:31

## 2021-05-16 RX ADMIN — OXYCODONE HYDROCHLORIDE AND ACETAMINOPHEN 500 MG: 500 TABLET ORAL at 08:46

## 2021-05-16 RX ADMIN — SODIUM CHLORIDE, PRESERVATIVE FREE 10 ML: 5 INJECTION INTRAVENOUS at 08:47

## 2021-05-16 RX ADMIN — ENOXAPARIN SODIUM 40 MG: 40 INJECTION SUBCUTANEOUS at 08:45

## 2021-05-16 RX ADMIN — TIOTROPIUM BROMIDE INHALATION SPRAY 1 PUFF: 3.12 SPRAY, METERED RESPIRATORY (INHALATION) at 21:39

## 2021-05-16 RX ADMIN — PANTOPRAZOLE SODIUM 40 MG: 40 TABLET, DELAYED RELEASE ORAL at 06:09

## 2021-05-16 RX ADMIN — THERA TABS 1 TABLET: TAB at 08:46

## 2021-05-16 RX ADMIN — OXYCODONE AND ACETAMINOPHEN 1 TABLET: 5; 325 TABLET ORAL at 12:50

## 2021-05-16 RX ADMIN — WATER 2000 MG: 1 INJECTION INTRAMUSCULAR; INTRAVENOUS; SUBCUTANEOUS at 06:12

## 2021-05-16 RX ADMIN — ISOSORBIDE MONONITRATE 30 MG: 30 TABLET, EXTENDED RELEASE ORAL at 08:46

## 2021-05-16 RX ADMIN — ALBUTEROL SULFATE 2.5 MG: 2.5 SOLUTION RESPIRATORY (INHALATION) at 09:12

## 2021-05-16 RX ADMIN — CHOLECALCIFEROL TAB 25 MCG (1000 UNIT) 1000 UNITS: 25 TAB at 08:46

## 2021-05-16 RX ADMIN — METOPROLOL SUCCINATE 25 MG: 25 TABLET, EXTENDED RELEASE ORAL at 08:46

## 2021-05-16 RX ADMIN — IPRATROPIUM BROMIDE 0.5 MG: 0.5 SOLUTION RESPIRATORY (INHALATION) at 09:18

## 2021-05-16 RX ADMIN — BUDESONIDE AND FORMOTEROL FUMARATE DIHYDRATE 2 PUFF: 160; 4.5 AEROSOL RESPIRATORY (INHALATION) at 09:29

## 2021-05-16 RX ADMIN — HYDROCHLOROTHIAZIDE 25 MG: 25 TABLET ORAL at 08:46

## 2021-05-16 RX ADMIN — ALBUTEROL SULFATE 2.5 MG: 2.5 SOLUTION RESPIRATORY (INHALATION) at 21:39

## 2021-05-16 RX ADMIN — BUDESONIDE AND FORMOTEROL FUMARATE DIHYDRATE 2 PUFF: 160; 4.5 AEROSOL RESPIRATORY (INHALATION) at 21:39

## 2021-05-16 RX ADMIN — SPIRONOLACTONE 25 MG: 25 TABLET, FILM COATED ORAL at 08:46

## 2021-05-16 RX ADMIN — TAMSULOSIN HYDROCHLORIDE 0.4 MG: 0.4 CAPSULE ORAL at 18:09

## 2021-05-16 RX ADMIN — FLUTICASONE PROPIONATE 1 SPRAY: 50 SPRAY, METERED NASAL at 08:53

## 2021-05-16 RX ADMIN — ASPIRIN 325 MG: 325 TABLET, COATED ORAL at 08:46

## 2021-05-16 RX ADMIN — WATER 2000 MG: 1 INJECTION INTRAMUSCULAR; INTRAVENOUS; SUBCUTANEOUS at 22:30

## 2021-05-16 RX ADMIN — DOCUSATE SODIUM 100 MG: 100 CAPSULE, LIQUID FILLED ORAL at 08:46

## 2021-05-16 RX ADMIN — POTASSIUM CHLORIDE 20 MEQ: 750 TABLET, FILM COATED, EXTENDED RELEASE ORAL at 08:53

## 2021-05-16 RX ADMIN — OXYCODONE AND ACETAMINOPHEN 1 TABLET: 5; 325 TABLET ORAL at 06:09

## 2021-05-16 RX ADMIN — WATER 2000 MG: 1 INJECTION INTRAMUSCULAR; INTRAVENOUS; SUBCUTANEOUS at 15:43

## 2021-05-16 RX ADMIN — ESCITALOPRAM OXALATE 20 MG: 10 TABLET ORAL at 08:46

## 2021-05-16 RX ADMIN — ATORVASTATIN CALCIUM 80 MG: 40 TABLET, FILM COATED ORAL at 20:19

## 2021-05-16 ASSESSMENT — PAIN SCALES - GENERAL
PAINLEVEL_OUTOF10: 5
PAINLEVEL_OUTOF10: 0
PAINLEVEL_OUTOF10: 4
PAINLEVEL_OUTOF10: 0
PAINLEVEL_OUTOF10: 4
PAINLEVEL_OUTOF10: 0
PAINLEVEL_OUTOF10: 0
PAINLEVEL_OUTOF10: 2
PAINLEVEL_OUTOF10: 6
PAINLEVEL_OUTOF10: 2

## 2021-05-16 ASSESSMENT — PAIN DESCRIPTION - LOCATION
LOCATION: KNEE
LOCATION: KNEE

## 2021-05-16 ASSESSMENT — PAIN DESCRIPTION - PAIN TYPE
TYPE: ACUTE PAIN
TYPE: ACUTE PAIN

## 2021-05-16 ASSESSMENT — PAIN DESCRIPTION - ORIENTATION: ORIENTATION: RIGHT
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
## 2021-05-17 LAB
ABSOLUTE EOS #: 0.3 K/UL (ref 0–0.4)
ABSOLUTE IMMATURE GRANULOCYTE: ABNORMAL K/UL (ref 0–0.3)
ABSOLUTE LYMPH #: 1.2 K/UL (ref 1–4.8)
ABSOLUTE MONO #: 0.9 K/UL (ref 0–1)
BASOPHILS # BLD: 1 % (ref 0–2)
BASOPHILS ABSOLUTE: 0 K/UL (ref 0–0.2)
DIFFERENTIAL TYPE: YES
EOSINOPHILS RELATIVE PERCENT: 4 % (ref 0–5)
GLUCOSE BLD-MCNC: 119 MG/DL (ref 65–99)
GLUCOSE BLD-MCNC: 143 MG/DL (ref 65–99)
GLUCOSE BLD-MCNC: 91 MG/DL (ref 65–99)
HCT VFR BLD CALC: 28.3 % (ref 41–53)
HEMOGLOBIN: 9.4 G/DL (ref 13.5–17.5)
IMMATURE GRANULOCYTES: ABNORMAL %
LYMPHOCYTES # BLD: 16 % (ref 13–44)
MCH RBC QN AUTO: 26.4 PG (ref 26–34)
MCHC RBC AUTO-ENTMCNC: 33 G/DL (ref 31–37)
MCV RBC AUTO: 79.9 FL (ref 80–100)
MONOCYTES # BLD: 12 % (ref 5–9)
NRBC AUTOMATED: ABNORMAL PER 100 WBC
PDW BLD-RTO: 15.8 % (ref 12.1–15.2)
PLATELET # BLD: 279 K/UL (ref 140–450)
PLATELET ESTIMATE: ABNORMAL
PMV BLD AUTO: ABNORMAL FL (ref 6–12)
RBC # BLD: 3.54 M/UL (ref 4.5–5.9)
RBC # BLD: ABNORMAL 10*6/UL
SEG NEUTROPHILS: 67 % (ref 39–75)
SEGMENTED NEUTROPHILS ABSOLUTE COUNT: 4.9 K/UL (ref 2.1–6.5)
WBC # BLD: 7.3 K/UL (ref 3.5–11)
WBC # BLD: ABNORMAL 10*3/UL

## 2021-05-17 PROCEDURE — 97162 PT EVAL MOD COMPLEX 30 MIN: CPT

## 2021-05-17 PROCEDURE — 6360000002 HC RX W HCPCS: Performed by: INTERNAL MEDICINE

## 2021-05-17 PROCEDURE — 97166 OT EVAL MOD COMPLEX 45 MIN: CPT

## 2021-05-17 PROCEDURE — 1200000002 HC SEMI PRIVATE SWING BED

## 2021-05-17 PROCEDURE — 94640 AIRWAY INHALATION TREATMENT: CPT

## 2021-05-17 PROCEDURE — 6370000000 HC RX 637 (ALT 250 FOR IP): Performed by: INTERNAL MEDICINE

## 2021-05-17 PROCEDURE — 85025 COMPLETE CBC W/AUTO DIFF WBC: CPT

## 2021-05-17 PROCEDURE — 97110 THERAPEUTIC EXERCISES: CPT

## 2021-05-17 PROCEDURE — 2580000003 HC RX 258: Performed by: INTERNAL MEDICINE

## 2021-05-17 PROCEDURE — 82947 ASSAY GLUCOSE BLOOD QUANT: CPT

## 2021-05-17 RX ORDER — ALBUTEROL SULFATE 2.5 MG/3ML
2.5 SOLUTION RESPIRATORY (INHALATION) 2 TIMES DAILY
Status: DISCONTINUED | OUTPATIENT
Start: 2021-05-17 | End: 2021-06-26 | Stop reason: HOSPADM

## 2021-05-17 RX ADMIN — VANCOMYCIN HYDROCHLORIDE 125 MG: 125 CAPSULE ORAL at 21:42

## 2021-05-17 RX ADMIN — IPRATROPIUM BROMIDE 0.5 MG: 0.5 SOLUTION RESPIRATORY (INHALATION) at 09:40

## 2021-05-17 RX ADMIN — WATER 2000 MG: 1 INJECTION INTRAMUSCULAR; INTRAVENOUS; SUBCUTANEOUS at 21:41

## 2021-05-17 RX ADMIN — SPIRONOLACTONE 25 MG: 25 TABLET, FILM COATED ORAL at 09:05

## 2021-05-17 RX ADMIN — SODIUM CHLORIDE, PRESERVATIVE FREE 10 ML: 5 INJECTION INTRAVENOUS at 21:41

## 2021-05-17 RX ADMIN — IPRATROPIUM BROMIDE 0.5 MG: 0.5 SOLUTION RESPIRATORY (INHALATION) at 21:50

## 2021-05-17 RX ADMIN — TIOTROPIUM BROMIDE INHALATION SPRAY 1 PUFF: 3.12 SPRAY, METERED RESPIRATORY (INHALATION) at 21:51

## 2021-05-17 RX ADMIN — VANCOMYCIN HYDROCHLORIDE 125 MG: 125 CAPSULE ORAL at 09:05

## 2021-05-17 RX ADMIN — ISOSORBIDE MONONITRATE 30 MG: 30 TABLET, EXTENDED RELEASE ORAL at 09:05

## 2021-05-17 RX ADMIN — CHOLECALCIFEROL TAB 25 MCG (1000 UNIT) 1000 UNITS: 25 TAB at 09:05

## 2021-05-17 RX ADMIN — THERA TABS 1 TABLET: TAB at 09:05

## 2021-05-17 RX ADMIN — ESCITALOPRAM OXALATE 20 MG: 10 TABLET ORAL at 09:05

## 2021-05-17 RX ADMIN — METFORMIN HYDROCHLORIDE 500 MG: 500 TABLET ORAL at 09:05

## 2021-05-17 RX ADMIN — PANTOPRAZOLE SODIUM 40 MG: 40 TABLET, DELAYED RELEASE ORAL at 06:19

## 2021-05-17 RX ADMIN — FERROUS SULFATE TAB 325 MG (65 MG ELEMENTAL FE) 325 MG: 325 (65 FE) TAB at 09:05

## 2021-05-17 RX ADMIN — MONTELUKAST SODIUM 10 MG: 10 TABLET, FILM COATED ORAL at 21:42

## 2021-05-17 RX ADMIN — ONDANSETRON 4 MG: 4 TABLET, ORALLY DISINTEGRATING ORAL at 09:28

## 2021-05-17 RX ADMIN — ASPIRIN 325 MG: 325 TABLET, COATED ORAL at 09:05

## 2021-05-17 RX ADMIN — BUDESONIDE AND FORMOTEROL FUMARATE DIHYDRATE 2 PUFF: 160; 4.5 AEROSOL RESPIRATORY (INHALATION) at 21:50

## 2021-05-17 RX ADMIN — METOPROLOL SUCCINATE 25 MG: 25 TABLET, EXTENDED RELEASE ORAL at 09:05

## 2021-05-17 RX ADMIN — OXYCODONE AND ACETAMINOPHEN 1 TABLET: 5; 325 TABLET ORAL at 22:37

## 2021-05-17 RX ADMIN — DOCUSATE SODIUM 100 MG: 100 CAPSULE, LIQUID FILLED ORAL at 21:42

## 2021-05-17 RX ADMIN — FLUTICASONE PROPIONATE 1 SPRAY: 50 SPRAY, METERED NASAL at 09:06

## 2021-05-17 RX ADMIN — SODIUM CHLORIDE, PRESERVATIVE FREE 10 ML: 5 INJECTION INTRAVENOUS at 09:05

## 2021-05-17 RX ADMIN — WATER 2000 MG: 1 INJECTION INTRAMUSCULAR; INTRAVENOUS; SUBCUTANEOUS at 06:19

## 2021-05-17 RX ADMIN — HYDROCHLOROTHIAZIDE 25 MG: 25 TABLET ORAL at 09:05

## 2021-05-17 RX ADMIN — POTASSIUM CHLORIDE 10 MEQ: 750 TABLET, FILM COATED, EXTENDED RELEASE ORAL at 09:05

## 2021-05-17 RX ADMIN — OXYCODONE HYDROCHLORIDE AND ACETAMINOPHEN 500 MG: 500 TABLET ORAL at 09:05

## 2021-05-17 RX ADMIN — LOSARTAN POTASSIUM 25 MG: 50 TABLET ORAL at 09:05

## 2021-05-17 RX ADMIN — TAMSULOSIN HYDROCHLORIDE 0.4 MG: 0.4 CAPSULE ORAL at 16:13

## 2021-05-17 RX ADMIN — BUDESONIDE AND FORMOTEROL FUMARATE DIHYDRATE 2 PUFF: 160; 4.5 AEROSOL RESPIRATORY (INHALATION) at 09:41

## 2021-05-17 RX ADMIN — ALBUTEROL SULFATE 2.5 MG: 2.5 SOLUTION RESPIRATORY (INHALATION) at 21:47

## 2021-05-17 RX ADMIN — SODIUM CHLORIDE, PRESERVATIVE FREE 10 ML: 5 INJECTION INTRAVENOUS at 14:00

## 2021-05-17 RX ADMIN — ATORVASTATIN CALCIUM 80 MG: 40 TABLET, FILM COATED ORAL at 21:42

## 2021-05-17 RX ADMIN — OXYCODONE AND ACETAMINOPHEN 1 TABLET: 5; 325 TABLET ORAL at 09:05

## 2021-05-17 RX ADMIN — TIOTROPIUM BROMIDE INHALATION SPRAY 1 PUFF: 3.12 SPRAY, METERED RESPIRATORY (INHALATION) at 09:41

## 2021-05-17 RX ADMIN — ALBUTEROL SULFATE 2.5 MG: 2.5 SOLUTION RESPIRATORY (INHALATION) at 09:41

## 2021-05-17 RX ADMIN — WATER 2000 MG: 1 INJECTION INTRAMUSCULAR; INTRAVENOUS; SUBCUTANEOUS at 14:00

## 2021-05-17 RX ADMIN — OXYCODONE AND ACETAMINOPHEN 1 TABLET: 5; 325 TABLET ORAL at 16:13

## 2021-05-17 RX ADMIN — SODIUM CHLORIDE, PRESERVATIVE FREE 10 ML: 5 INJECTION INTRAVENOUS at 06:19

## 2021-05-17 ASSESSMENT — PAIN SCALES - GENERAL
PAINLEVEL_OUTOF10: 0
PAINLEVEL_OUTOF10: 2
PAINLEVEL_OUTOF10: 3
PAINLEVEL_OUTOF10: 4
PAINLEVEL_OUTOF10: 7
PAINLEVEL_OUTOF10: 5

## 2021-05-17 NOTE — PROGRESS NOTES
HOSP GENERAL [facility]  Swing Bed Interdisciplinary Care Plan Conference Report   Recertification for Continued Skilled Care. Patients name:Pete Elder  Date of Conference: 5/17/2021    The Following Information was discussed and agreed upon with the patient and/or Caregivers as listed below. Names of Team Members and Caregivers present for meeting:  : ASHLYN Walters  Nursing: OLMAN Rock  Therapy: Juan Francisco Sanchez, PT; Candy Braga L/OTR  Dietician:   Activities: Jonathon Pacheco  Pastoral Care:   Caregivers:    [] Spouse  [] Child/Children [] Significant Other   [] Other:     Nutrition:  Current Body Weight: 330 lb  Wt Readings from Last 1 Encounters:   05/15/21 (!) 330 lb (149.7 kg)     Weight Change: no significant changes   Current Diet order:DIET CARB CONTROL; Carb Control: 4 carb choices (60 gms)/meal  Intakes: %  Diet Education Provided: Encouraged protein food use. None at this time regarding diabetes, but may require diabetes education/outpatient education  Goal: PO >75% meals and supplements    Spiritual:  Yarsanism needs met: [x] Yes  [] No  [] N/A  [facility] [place] [facility] admission: [] Yes  [] No  [x] N/A  Patient added to Communion List: [] Yes  [] No  [x] N/A  Any other concerns or comments:   Goal: Participate 2-3 times per week    Occupational Therapy:   [facility] today 5-      Physical Therapy:      [facility] 5-             Speech Therapy:     Respiratory Therapy:     Nursing:  Skin/Wound/Incisions:  Skin Integrity  Skin Integrity: Other (Comment) (incision)  Location: R knee  Preventative Dressing: Yes     Pain Control:  percocet  New Medications since admission to Swing Bed:   Anticoagulants: lovenox   Goals:   Pt will remain free from falls  Pt will have control of acute pain    Activities: activity as tolerated, TV, Vistors  Goal: Participate in 3 activities per week    :  Plan for Discharge: Plan to re evaluate pt again next Monday.  Pt is scheduled currently for IV antibiotics for 6 weeks. Follow Up/Services needed:     Continued skilled needs: PT/OT, IV Meds  Skilled Services are for the ongoing condition for which the individual received inpatient care in a hospital.    Physician signature certifies patient continued need for SNF inpatient care.

## 2021-05-17 NOTE — PROGRESS NOTES
home.  Prior Function  ADL Assistance: Independent  Homemaking Assistance: Independent  Ambulation Assistance: Independent (was not using AD prior to knee replacement. Has been using w. walker for all ambulation since initial replacement)  Transfer Assistance: Independent  Additional Comments: Wife does cooking and cleaning at home. Objective                    RUE AROM (degrees)  RUE AROM : WNL  LUE AROM (degrees)  LUE AROM : WNL  Strength RLE  Strength RLE: Exception  R Hip Flexion: 3/5  R Knee Extension:  (Not tested)  R Ankle Dorsiflexion: 4/5  R Ankle Plantar flexion: 4/5  Strength LLE  Strength LLE: WFL  Strength RUE  Strength RUE: WNL  Strength LUE  Strength LUE: WNL                Sit to Stand: Contact guard assistance  Stand to sit: Contact guard assistance           Ambulation 1  Surface: level tile  Device: Rolling Walker  Assistance: Contact guard assistance (x 2 for safety due to patient size/ NWB status)  Distance: 10 ft with seated rest break and then additional 10 ft  Comments: Patent able to maintain NWB status well but does become moderately SOB with activty level/ambulation. Patient must have immoblizer on when up   Balance  Sitting - Static: Good  Sitting - Dynamic: Good  Standing - Static: Fair, +    Assessment  Activity Tolerance: Patient Tolerated treatment well   Chart Reviewed: Yes  Assessment: Patient admitted for NWB gait following Stage 1 spacer implant due to infected right TKR. Patient requires CGA/min assist for basic transfers and ambulated with wh walker 10 ft x 2 with CGA although patient becomes moderately SOB with mobility/ambulation.    He would benefit from continued PT to address strengthening and mobility to promote safety to return home with spouse  Prognosis: Good  Discharge Recommendations: Home with assist PRN     Type of devices: Call light within reach, Chair alarm in place, Gait belt, Left in chair     Plan  Times per week: daily  Times per day:  (1-2x per day; 1x Saturday/Sunday)  Current Treatment Recommendations: Strengthening, Balance Training, Transfer Training, Gait Training, Home Exercise Program, Safety Education & Training    Goals  Short term goals  Time Frame for Short term goals: 14 days - expires 5/30/21  Short term goal 1: Prevent loss of strength of bilateral UE's and left LE to allow patient to complete transfers and short distance amb  Short term goal 2: Transfer in/OOB and chair /commode indeependently  Short term goal 3: Ambulate with wh walker x 25 ft x 2 with supervision to safely negotiate home environment  Short term goal 4: Good standing balance to assist with self-care tasks  Short term goal 5: Gair+ endurance to complete above ambulation to reduce fall risk associated with fatigue            Time In: 1120  Time Out: 1150  Timed Coded Minutes: 0  Total Treatment Time: 30     NO RODRIGUEZ, PT, PT 5/17/2021

## 2021-05-17 NOTE — PROGRESS NOTES
DIAN met with pt this morning with RN case manager during quality rounds. Pt is alert and oriented and pleasant with assessment. Pt is a 70year old  male admitted to swing bed from [facility] with diagnosis of septic knee, s/p revision and need for IV antibiotics and therapy. Pt typically lives with his spouse in their home in [place]. Pt has cane, walker, shower chair, nebulizer, and elevated toilet seat to use at home. Pt has used [facility] in the past and is current with [facility]. DIAN called and notified [facility] of pt admission here and will keep them updated. Pt was driving prior to his knee replacement, but has been using family and the [facility] for transport assist.     Pt is a full code and follows with Nathaly Whitfield CNP as PCP. Pt does not have advance directives and is working on these at home with his spouse. Pt understands that SW can assist as needed. ACP note completed. Pt reports that his medications are affordable. DIAN provided swing bed packet and reviewed with pt. Pt expresses understanding and signs acknowledgement of receipt which is placed in paper chart. Pt plans to return home at discharge from swing bed. Swing bed IDT meeting held with pt, SW, RN case manager, and swing bed coordinator. Plan to re evaluate pt again next Monday. Pt is scheduled currently for IV antibiotics for 6 weeks. DIAN will follow and remain available.  Mikey CHAMBERS 5/17/2021

## 2021-05-17 NOTE — PROGRESS NOTES
Vitals: /65   Pulse 89   Temp 97.4 °F (36.3 °C) (Oral)   Resp 20   Ht 5' 10\" (1.778 m)   Wt (!) 330 lb (149.7 kg)   SpO2 96%   BMI 47.35 kg/m²   BMI: Body mass index is 47.35 kg/m². CBC:   Recent Labs     05/16/21  0515   WBC 7.0   HGB 9.3*        BMP:    Recent Labs     05/16/21  0515      K 3.6*      CO2 27   BUN 14   CREATININE 0.76   GLUCOSE 147*       Physical Exam:  General Appearance: alert and oriented to person, place and time, in no acute distress  Cardiovascular: normal rate, regular rhythm, normal S1 and S2  Pulmonary/Chest: clear to auscultation bilaterally- no wheezes, rales or rhonci  Abdomen: soft, obese, non-tender, non-distended, normal bowel sounds   Extremities: Right lower extremity with straight leg brace, dressing saturated with serosanguineous fluid  Skin: warm and dry, no rash  Neurological: alert, oriented, normal speech, no focal findings or movement disorder noted      Assessment and Plan:     1. S/P stage I revision of right TKA  -  continue on 6 weeks of IV cefepime, pain control with as needed Percocet , follow-up with orthopedic surgeon. Has been having more drainage from the incision site, will contact his orthopedic surgeon for recommendations regarding dressing changes  2. Generalized weakness -PT and OT  3. History of C. difficile colitis - on oral vancomycin prophylactically to prevent recurrence  4. Mild hypokalemia -replaced, on potassium supplements  5. Chronic anemia -on iron replacement  6. Diabetes mellitus type 2, controlled, non-insulin-dependent -on sliding scale insulin  7. Hypertension -blood pressure stable, continue losartan, Toprol-XL, HCTZ, Aldactone  8. History of CAD, s/p CABG on 3/30/2015 -asymptomatic, continue medical management with aspirin, Toprol-XL, Imdur  9. History of BPH - on Flomax  10. GERD -on Protonix  11. History of depression - mood stable on Lexapro  12.   Chronic diastolic CHF -continue on diuretics, asymptomatic        Advanced Care Plan    (x )  I confirmed that the patient's [address] is present, code status documented, or surrogate decision maker is listed in the patient's medical record. ( )  The patient's advanced care plan is not present because:  (select)   ( ) I confirmed today that the patient does not wish or was not able to name a surrogate decision maker or provide an [address]. ( ) Hospice care is currently being provided or has been provided this calender year. ( )  I did not confirm today the presence of an [address] or surrogate decision maker documented within the patient's medical record. (Does not satisfy MIPS performance). Documentation of Current Medications in the Medical Record   (x )  I have utilized all available immediate resources to obtain, update, or review the patient's current medications. If Yes, Stop Here  ( ) The patient is not eligivel for medications reconciliation; the patient is in an emergent medical situation where delaying treatment would jeopardize the patient's health. ( ) I did not confirm, update or review the patient's current list of medications today.   (does not satisfy MIPS performance)              Electronically signed by David Covington MD on 5/17/2021 at 7:35 AM    [facility]

## 2021-05-17 NOTE — PROGRESS NOTES
Quality flow rounds held on 5/17/21     Safia Young is admitted for  Right knee revision  Length of stay 2. Education:    Needed Education: wound care, weight bearing status, meds, follow up,diet      Do you have any questions regarding your plan of care while at the hospital? denies    Planned Disposition:               [x]  Home when able                [] Swing Bed                [] ECF/SNF               [] Other/TBD    Barriers to Discharge:    Can you afford your medications? yes   Do you have transportation to follow up appointments? Drove self prior to knee surgery, family provides or has used the [employer]. Do you need any new equipment at home? none   Current equipment includes   walker, shower chair, cane, nebulizer, had [facility] prior to knee surgery with a wound vac. Do you have a living will or durable power of  for healthcare? no               If yes do we have a copy on file? n/a    Do you or your family have any questions or concerns we haven't already discussed? Denies    Lives with wife and family and plans to return home after completion of his antibiotic therapy needs. Jagruti Thomas and writer present for rounding.

## 2021-05-17 NOTE — PROGRESS NOTES
[facility] BED ORIENTATION    Patient Name: Akin Francis  : 1945   Gender: male   Diagnosis:  Weakness [R53.1]  Arthritis, septic, knee [facility]) [M00.9] MRN: [id number]     [x] Goal is to provide you with very good care    [x] Insurance and Financial Responsibilities Went over co-pays and benefits. No questions at this time. [x] Changes to Expect           - Safely encourage you to learn to care for yourself     - Frequency of Doctor's Visits       - Family Training Likely  [x] Visiting Hours:           11:00am - 8:30 pm (or by special arrangement)  [x] Personal Phone Number          Located on [employer] Board   [x] Swing Bed Team Meetings         Family encouraged to attend   [x]  Clothing            Bring what you normally wear  [x]  Prevention of Falls           Put call light on, and wait until OK'd to get up on your own. [x] Therapy Expectations          Do your best to participate  []  Advanced Directives                          []  Pt has advanced directives and we have a copy on file                            []  Pt has advanced directives and we do not have a copy on file,  notified and will follow up. [x]   Pt does not have advanced directives.   [x] Educated on [employer]           Address provided for direct to hospital service                                                                                                                                                                                                                                                                                       Orientation Completed and agreed upon with Akin Wilsoner and/or Family Members

## 2021-05-17 NOTE — PROGRESS NOTES
Pt up to bathroom. This nurse notice sanguinous drainage on bed sheet. Linen changed. Pt returned to bed, immobilizer removed. Dressing and immobilizer saturated with sanguinous drainage. Soap and water used to clean leg. Dressing changed. 2x2's used to reinforce under the tegaderm. Sutures intact. Continuous bleeding from bottom of knee. Pressure applied. Will continue to monitor.

## 2021-05-17 NOTE — PROGRESS NOTES
Dressing to right knee saturated with red drainage. Dressing removed. Incision is well approximated with sutures intact. Pinhole open area at distal end of incision draining red drainage. Large collection of fluid above the knee, upon light palpation copious amounts of sanguinous fluid drains from pinhole open area. ABDs applied to pinhole, Aquacel applied to proximal portion of incision.

## 2021-05-17 NOTE — PROGRESS NOTES
Comprehensive Nutrition Assessment    Type and Reason for Visit:  Initial    Nutrition Recommendations/Plan:  Encourage protein foods    Nutrition Assessment:  Increased nutrient needs r/t acute injury or trauma, AEB post op healing needs of knee revision. ? newer dx of diabetes of good overall control per A1C. Patient stating that he never had glucose problems until just 10 day ago, however, he had an A1C of 6.5 in 2014 and multiple in prediabetes range since per lab review. Weight fluctuations with multiple hospitalizations regarding knee. C-diff, on atb for which probiotic recommended. History of bariatric surgery in 2001 per surgical history (on mvie, vit C, D and Fe). Will need to monitor for education needs/desires. Malnutrition Assessment:  Malnutrition Status:  No malnutrition    Context:  Acute Illness     Findings of the 6 clinical characteristics of malnutrition:  Energy Intake:  No significant decrease in energy intake  Weight Loss:  No significant weight loss     Body Fat Loss:  No significant body fat loss     Muscle Mass Loss:  No significant muscle mass loss    Fluid Accumulation:  No significant fluid accumulation     Strength:  Not Performed    Estimated Daily Nutrient Needs:  Energy (kcal):  7673-7537 (11-15); Weight Used for Energy Requirements:  Current     Protein (g):   (1.3-1.5);  Weight Used for Protein Requirements:  Ideal        Fluid (ml/day):  2200; Method Used for Fluid Requirements:  1 ml/kcal      Nutrition Related Findings:  mild orbital fat losses      Wounds:  Surgical Incision       Current Nutrition Therapies:    DIET CARB CONTROL; Carb Control: 4 carb choices (60 gms)/meal    Anthropometric Measures:  · Height: 5' 10\" (177.8 cm)  · Current Body Weight: 330 lb (149.7 kg)   · Admission Body Weight: 330 lb (149.7 kg)    · Usual Body Weight:  (327-348#)     · Ideal Body Weight: 166 lbs; % Ideal Body Weight 198.8 %   · BMI: 47.4  · Adjusted Body Weight:  ; No Adjustment · BMI Categories: Obese Class 3 (BMI 40.0 or greater)       Nutrition Diagnosis:   · Increased nutrient needs related to acute injury/trauma as evidenced by wounds    Lab Results   Component Value Date     05/16/2021    K 3.6 (L) 05/16/2021     05/16/2021    CO2 27 05/16/2021    BUN 14 05/16/2021    CREATININE 0.76 05/16/2021    GLUCOSE 147 (H) 05/16/2021    CALCIUM 9.0 05/16/2021    PROT 6.5 04/29/2021    LABALBU 3.5 04/29/2021    BILITOT 0.44 04/29/2021    ALKPHOS 98 04/29/2021    AST 30 04/29/2021    ALT 23 04/29/2021    LABGLOM >60 05/16/2021    GFRAA >60 05/16/2021    GLOB NOT REPORTED 10/22/2019     Lab Results   Component Value Date    LABA1C 6.5 (H) 04/30/2021     Lab Results   Component Value Date     04/30/2021     Lab Results   Component Value Date    VITD25 37.0 03/11/2021       Nutrition Interventions:   Food and/or Nutrient Delivery:  Continue Current Diet  Nutrition Education/Counseling:  No recommendation at this time   Coordination of Nutrition Care:  Continue to monitor while inpatient    Goals:  PO >75% with good protein choices       Nutrition Monitoring and Evaluation:   Behavioral-Environmental Outcomes:  None Identified   Food/Nutrient Intake Outcomes:  Food and Nutrient Intake  Physical Signs/Symptoms Outcomes:  Biochemical Data, Weight, GI Status     Discharge Planning:    No discharge needs at this time     Electronically signed by Nina Sutherland RD, LD on 5/17/21 at 8:12 AM EDT    Contact: [phone]

## 2021-05-17 NOTE — PROGRESS NOTES
Repeat call to Dr. Asia Meneses office. Staff member reports that Dr. Asia Meneses nurse will return the call either today or tomorrow. If emergent, Dr. Stann Alpers can be paged, otherwise he will not return to the office until Thursday.

## 2021-05-17 NOTE — PROGRESS NOTES
Call to Dr. Wanda Oshea office regarding increased drainage. Message left with answering service. Awaiting return call. Dressing to right knee changed. Large amount of sanguinous drainage, dressing completely saturated. Images taken.

## 2021-05-17 NOTE — PROGRESS NOTES
[facility]  Daily Note  Date: 2021  Patient Name: Mariela Ramirez        MRN: [id number]    : 1945  (70 y.o.)    Subjective: Patient is seated in bedside chair upon arrival.  Pt is PROGRESSING toward goals and independence of Self Care this treatment session  Continue to assess Pending Progress      Assessment  Assessment: Pt seated in recliner upon arrival, agreeable to OT interventions at this time. Instructed pt in BUE resistive arm exercises in order to improve UB endurance to assist with ADLs/IADLs. Pt completes exercises using 4# weight 1x20. L shoulder exercises were limited d/t limited ROM. Pt tolerates exercises well. Engages in exercises for ~20 minutes, requires x2 short rest periods. Minimal SOB is noted, encouraged to complete PLB. Pt remains in recliner at end of session, chair alarm in place. Call light and other personal items within reach, will continue to address goals as able. Activity Tolerance: Patient Tolerated treatment well    Exercises:  See Flowsheets    Goals  Short Term Goals  Time Frame for Short term goals: 10 days (2021)  Short term goal 1: Patient to transfer to bathroom with supervision only, completing toilet hygiene independently. Short term goal 2: Patient to complete UB/LB sponge bath with set up only. Short term goal 3: Patient to complete UB/LB dressing with set up only. Short term goal 4: Patient to tolerate 30 minutes BUE exercise with 1 or fewer rest breaks in order to improve endurance for ADL and IADL tasks.     Time In: 1425  Time Out: 1448  Timed Coded Minutes: 23  Total Treatment Time: 23    Quinton Sandifer, COTA/L    Date: 2021

## 2021-05-17 NOTE — PROGRESS NOTES
[facility]  Occupational Therapy  Evaluation  Date: 2021  Patient Name: Ramiro Aquino        MRN: [id number]    : 1945  (80 y.o.)  Gender: male   Referring Practitioner: Dr. Therese Leiva     Additional Pertinent Hx: Patient is referred to swingbed program s/p infection in knee replacement and subsequent removal of hardware. Patient is currently NWB on affected leg. Past Medical History:   Diagnosis Date    Arthritis     Ascending cholangitis 10/2/14     at [facility] assisted gastric remnant to open    Blood in stool     CAD (coronary artery disease)     Chronic back pain     DDD    Chronic diastolic (congestive) heart failure ([address] 2018    Chronic systolic congestive heart failure ([address] 2018    COPD with acute exacerbation [facility] [address] 2018    Heart disease 12    History of angioplasty 2008    2 stents placed    Hx of CABG     x1 in     Hyperlipidemia     Hypertension     onset age 41    Obesity     Stress incontinence, male 3/21/2017    Transfusion history     Unspecified sleep apnea     cpap-DOES NOT USE MACHINE     Past Surgical History:   Procedure Laterality Date    ANTERIOR CRUCIATE LIGAMENT REPAIR Right 2021    RIGHT KNEE INCISION AND DRAINAGE, POLY EXCHANGE,  WITH EXTENSOR MECHANISM REPAIR WITH ALLOGRAFT performed by Svitlana Gamez DO at [place] [address]      Concetta Wong BARIATRIC SURGERY      Roselyn HENRY at [facility]   [address] Left 2012    Left main normal.  Left anterior descending artery:  Plaque disease. Ramus: Plaque disease Circumflex:nondominant, plaque disease. OM1 normal. Right coronoary artery:  dominant & luminal irregularities. Left ventricular ejection fraction 60. Mitral valve normal with no insufficinecy of the mitral valve. Aortic valve normal with no stenosis of the aortic valve.   Edp was normal.    CARDIAC CATHETERIZATION Left 2015     [facility] --Severe CAD, 80% in-stent stenosis in the left anterior descending coronary artery in a rather long segment, very eccentric,extending almost to the ostium of the left anterior descending coronary artery. 70% in-stent stenosis of a very large dominant right coronary artery. Unremarkable small circumflex. Normal left ventricular function, ejection fraction of 60%.  CARDIAC CATHETERIZATION Left 12/05/2018    Dr. Connie Luz @ [facility]--Risk [address] specific cardiac intervention   [address]  2008    2 unknown heart stents mri 1.5t only    CHOLECYSTECTOMY  05/30/2014    open [facility] Dr. Lexus Robins COLONOSCOPY  10/2015    repeat in 2022    [facility]      2 vessel March 3th 2015    CYSTOSCOPY  05/10/2016    with laser TURP    ERCP  10/02/2014    Dr. Pascual Conway with laparotomy    HERNIA REPAIR  09/2003    Hiatal    JOINT REPLACEMENT Bilateral 03/20/2013    bilateral hips    JOINT REPLACEMENT Right 01/27/2021    KNEE ARTHROSCOPY Right 09/26/2013    Dr. Jay [facility] Shoulders SURGERY  11/02/2016    rt. L3-4, L4-5 decompression.  L4-5 discectomy and fusion    SHOULDER SURGERY Left 2000    arthroscopy    SHOULDER SURGERY Left 2007    replaced humeral head    TOTAL HIP ARTHROPLASTY Right     02/03/2016    TOTAL KNEE ARTHROPLASTY Right 01/27/2021    RIGHT TOTAL KNEE ARTHOPLASTY COMPLICATED BY OBSEITY NEEDS A MARIANNE performed by Kermit Ball DO at [facility] OR                Subjective  Subjective: Patient is seated in bedside chair upon arrival.  Pain Level: 2  Pain Location: Knee  Pain Orientation: Right  Orientation  Overall Orientation Status: Within Normal Limits  Vision  Vision: Within Functional Limits  Hearing  Hearing: Within functional limits  Social/Functional History  Lives With: Spouse  Type of Home: Apartment  Home Layout: One level  Bathroom Shower/Tub: Tub/Shower unit, Shower chair with back  Bathroom Equipment: Toilet raiser, Grab bars around toilet  Home Equipment: Rolling walker, Cane, Lift chair  ADL Assistance: Independent  Homemaking Assistance: Independent  Homemaking Responsibilities: No  Ambulation Assistance: Independent (was not using AD prior to knee replacement. Has been using w. walker for all ambulation since initial replacement)  Transfer Assistance: Independent  Active : Yes  Additional Comments: Wife does cooking and cleaning at home. Prior Function  ADL Assistance: Independent  Homemaking Assistance: Independent  Ambulation Assistance: Independent (was not using AD prior to knee replacement. Has been using w. walker for all ambulation since initial replacement)  Transfer Assistance: Independent  Additional Comments: Wife does cooking and cleaning at home. Objective                     Gross RUE Strength: WFL  ADL  Feeding: Independent  Grooming: Setup  UE Bathing: Setup  LE Bathing: Moderate assistance  UE Dressing: Setup  LE Dressing: Moderate assistance  Toileting: Contact guard assistance           Balance  Sitting Balance: Independent  Standing Balance: Stand by assistance                       Goals  Short term goals  Time Frame for Short term goals: 10 days (5-)  Short term goal 1: Patient to transfer to bathroom with supervision only, completing toilet hygiene independently. Short term goal 2: Patient to complete UB/LB sponge bath with set up only. Short term goal 3: Patient to complete UB/LB dressing with set up only. Short term goal 4: Patient to tolerate 30 minutes BUE exercise with 1 or fewer rest breaks in order to improve endurance for ADL and IADL tasks. Assessment  Patient demonstrated ability to hop from bedside chair to bed, approx 15 feet with CGA. Patient required rest break before going back to bedside chair. Noted fatigue and shortness of breath at end of evaluation. No LOB noted.   Recommend OT services in order to address all ADL/IADL tasks in order to maximize safety and independence prior to returning home.        Plan       Times per day: Daily (1-2x day)  Weeks: 10 days           Time In: 1120  Time Out: 1150  Timed Coded Minutes: 0  Total Treatment Time: BIANCA Rose, OTR/L  5/17/2021

## 2021-05-17 NOTE — PROGRESS NOTES
RN leaves message for Dr Franco Speaker nurse regarding availability of f/u appt before May 27. . RN asks that nurse call medsurg unit and provides contact number. Explains pt is having increased drainage from incision site. pts nurse Daphne Machuca informed.

## 2021-05-17 NOTE — PLAN OF CARE
[facility]  [facility]  Plan of Care  Date: 2021  Patient Name: Marilee Nicholson        : 1945  (3 y.o.)  Referring Practitioner: Dr. Rhoda Huber  Admission Date: 5/15/2021  Referral Date : 05/15/21     Treatment Diagnosis: Weakness; difficulty with amb  PT Orders Received and Evaluation Complete  Identified Problem Areas:  Assessment: Patient admitted for NWB gait following Stage 1 spacer implant due to infected right TKR. Patient requires CGA/min assist for basic transfers and ambulated with wh walker 10 ft x 2 with CGA although patient becomes moderately SOB with mobility/ambulation.    He would benefit from continued PT to address strengthening and mobility to promote safety to return home with spouse  Prognosis: Good    Justification for Skilled Services:  [] Reduce Falls   [x] Improve Ambulation  [x]  Complete Daily Tasks Safely   [x] Improve Balance   [x] Improve LE strength  []  Return to Prior Level of Function  [x] Improve Functional Mobility   [x]  Family/Caregiver Education  [x] Patient Education: [x]Assistive Devices [x]Home Exercise Program and Progression    Plan  Times per week: daily  Times per day:  (1-2x per day; 1x Saturday/)  [] Modalities:  [x] Therapeutic Exercise   [x] Gait Training  [x] Therapeutic Activity    [] Home Safety Evaluation         [] Massage                        [] Neuromuscular Re-education [] Back Education             [x] Patient Education [] Home Exercise Program  Discharge Recommendations: Home with assist PRN    Rehab Potential:  [x]  Good [] Fair   []  Poor    Goals  Short term goals  Time Frame for Short term goals: 14 days - expires 21  Short term goal 1: Prevent loss of strength of bilateral UE's and left LE to allow patient to complete transfers and short distance amb  Short term goal 2: Transfer in/OOB and chair /commode independently  Short term goal 3: Ambulate with wh walker x 25 ft x 2 with supervision to safely negotiate home environment  Short term goal 4: Good standing balance to assist with self-care tasks  Short term goal 5: Fair+ endurance to complete above ambulation to reduce fall risk associated with fatigue         NO RODRIGUEZ, PT, PT   Date: 5/17/2021

## 2021-05-17 NOTE — PLAN OF CARE
[place]  [facility]  Plan of Care  OT Orders Received and Evaluation Complete  Date: 2021  Patient Name: Marilee Nicholson        MRN: [id number]    : 1945  (70 y.o.)  Referring Practitioner: Dr. Rhoda Huber  Onset Date: 21  Referral Date: 05/15/2021     Treatment Diagnosis: weakness    Identified Problem Areas  Performance deficits / Impairments: Decreased functional mobility , Decreased balance, Decreased high-level IADLs, Decreased ADL status, Decreased endurance     Justification for Skilled Services:  []  Complete Daily Tasks Safely  [x] Improve Balance   [x]  Return to Prior Level of Function  [x]  Family/Caregiver Education    [x] Improve UE strength  [x] Patient Education: [x]Adaptive Equipment   [x]Home Exercise Program and Progression    Treatment Plan  Plan  Times per day: Daily (1-2x day)  [] Modalities:  [x] Therapeutic Exercise   [x] Therapeutic Activity  [] Splinting:     [] Home Safety Evaluation         [x] ADL Retraining                       [] Muscle Re-education [] Cognitive Retraining            [] Sensory Integration  [x] Patient Education [x] Home Exercise Program [] Fine Motor Coordination       GOALS  Short term goals  Time Frame for Short term goals: 10 days (2021)  Short term goal 1: Patient to transfer to bathroom with supervision only, completing toilet hygiene independently. Short term goal 2: Patient to complete UB/LB sponge bath with set up only. Short term goal 3: Patient to complete UB/LB dressing with set up only. Short term goal 4: Patient to tolerate 30 minutes BUE exercise with 1 or fewer rest breaks in order to improve endurance for ADL and IADL tasks.       Santo Arreola, OT, OTR/L                    Date: 2021

## 2021-05-18 LAB — GLUCOSE BLD-MCNC: 131 MG/DL (ref 65–99)

## 2021-05-18 PROCEDURE — 6360000002 HC RX W HCPCS: Performed by: INTERNAL MEDICINE

## 2021-05-18 PROCEDURE — 97116 GAIT TRAINING THERAPY: CPT

## 2021-05-18 PROCEDURE — 94640 AIRWAY INHALATION TREATMENT: CPT

## 2021-05-18 PROCEDURE — 97110 THERAPEUTIC EXERCISES: CPT

## 2021-05-18 PROCEDURE — 6370000000 HC RX 637 (ALT 250 FOR IP): Performed by: INTERNAL MEDICINE

## 2021-05-18 PROCEDURE — 2580000003 HC RX 258: Performed by: INTERNAL MEDICINE

## 2021-05-18 PROCEDURE — 97535 SELF CARE MNGMENT TRAINING: CPT

## 2021-05-18 PROCEDURE — 1200000002 HC SEMI PRIVATE SWING BED

## 2021-05-18 PROCEDURE — 82947 ASSAY GLUCOSE BLOOD QUANT: CPT

## 2021-05-18 RX ADMIN — TAMSULOSIN HYDROCHLORIDE 0.4 MG: 0.4 CAPSULE ORAL at 18:51

## 2021-05-18 RX ADMIN — ALBUTEROL SULFATE 2.5 MG: 2.5 SOLUTION RESPIRATORY (INHALATION) at 08:50

## 2021-05-18 RX ADMIN — THERA TABS 1 TABLET: TAB at 08:59

## 2021-05-18 RX ADMIN — VANCOMYCIN HYDROCHLORIDE 125 MG: 125 CAPSULE ORAL at 08:58

## 2021-05-18 RX ADMIN — LOSARTAN POTASSIUM 25 MG: 50 TABLET ORAL at 08:59

## 2021-05-18 RX ADMIN — SODIUM CHLORIDE, PRESERVATIVE FREE 20 ML: 5 INJECTION INTRAVENOUS at 14:09

## 2021-05-18 RX ADMIN — BUDESONIDE AND FORMOTEROL FUMARATE DIHYDRATE 2 PUFF: 160; 4.5 AEROSOL RESPIRATORY (INHALATION) at 19:57

## 2021-05-18 RX ADMIN — WATER 2000 MG: 1 INJECTION INTRAMUSCULAR; INTRAVENOUS; SUBCUTANEOUS at 14:09

## 2021-05-18 RX ADMIN — METFORMIN HYDROCHLORIDE 500 MG: 500 TABLET ORAL at 08:59

## 2021-05-18 RX ADMIN — ISOSORBIDE MONONITRATE 30 MG: 30 TABLET, EXTENDED RELEASE ORAL at 08:59

## 2021-05-18 RX ADMIN — OXYCODONE HYDROCHLORIDE AND ACETAMINOPHEN 500 MG: 500 TABLET ORAL at 08:59

## 2021-05-18 RX ADMIN — PANTOPRAZOLE SODIUM 40 MG: 40 TABLET, DELAYED RELEASE ORAL at 05:41

## 2021-05-18 RX ADMIN — IPRATROPIUM BROMIDE 0.5 MG: 0.5 SOLUTION RESPIRATORY (INHALATION) at 19:57

## 2021-05-18 RX ADMIN — SPIRONOLACTONE 25 MG: 25 TABLET, FILM COATED ORAL at 08:58

## 2021-05-18 RX ADMIN — SODIUM CHLORIDE, PRESERVATIVE FREE 10 ML: 5 INJECTION INTRAVENOUS at 22:01

## 2021-05-18 RX ADMIN — ESCITALOPRAM OXALATE 20 MG: 10 TABLET ORAL at 08:59

## 2021-05-18 RX ADMIN — SODIUM CHLORIDE, PRESERVATIVE FREE 10 ML: 5 INJECTION INTRAVENOUS at 08:58

## 2021-05-18 RX ADMIN — OXYCODONE AND ACETAMINOPHEN 1 TABLET: 5; 325 TABLET ORAL at 15:38

## 2021-05-18 RX ADMIN — OXYCODONE AND ACETAMINOPHEN 1 TABLET: 5; 325 TABLET ORAL at 21:58

## 2021-05-18 RX ADMIN — IPRATROPIUM BROMIDE 0.5 MG: 0.5 SOLUTION RESPIRATORY (INHALATION) at 08:50

## 2021-05-18 RX ADMIN — FERROUS SULFATE TAB 325 MG (65 MG ELEMENTAL FE) 325 MG: 325 (65 FE) TAB at 08:59

## 2021-05-18 RX ADMIN — WATER 2000 MG: 1 INJECTION INTRAMUSCULAR; INTRAVENOUS; SUBCUTANEOUS at 21:58

## 2021-05-18 RX ADMIN — DOCUSATE SODIUM 100 MG: 100 CAPSULE, LIQUID FILLED ORAL at 22:00

## 2021-05-18 RX ADMIN — HYDROCHLOROTHIAZIDE 25 MG: 25 TABLET ORAL at 08:59

## 2021-05-18 RX ADMIN — DOCUSATE SODIUM 100 MG: 100 CAPSULE, LIQUID FILLED ORAL at 08:59

## 2021-05-18 RX ADMIN — ATORVASTATIN CALCIUM 80 MG: 40 TABLET, FILM COATED ORAL at 22:00

## 2021-05-18 RX ADMIN — CHOLECALCIFEROL TAB 25 MCG (1000 UNIT) 1000 UNITS: 25 TAB at 08:58

## 2021-05-18 RX ADMIN — SODIUM CHLORIDE, PRESERVATIVE FREE 10 ML: 5 INJECTION INTRAVENOUS at 05:42

## 2021-05-18 RX ADMIN — ASPIRIN 325 MG: 325 TABLET, COATED ORAL at 08:59

## 2021-05-18 RX ADMIN — METOPROLOL SUCCINATE 25 MG: 25 TABLET, EXTENDED RELEASE ORAL at 08:59

## 2021-05-18 RX ADMIN — FLUTICASONE PROPIONATE 1 SPRAY: 50 SPRAY, METERED NASAL at 08:54

## 2021-05-18 RX ADMIN — VANCOMYCIN HYDROCHLORIDE 125 MG: 125 CAPSULE ORAL at 22:00

## 2021-05-18 RX ADMIN — ENOXAPARIN SODIUM 40 MG: 40 INJECTION SUBCUTANEOUS at 08:58

## 2021-05-18 RX ADMIN — POTASSIUM CHLORIDE 10 MEQ: 750 TABLET, FILM COATED, EXTENDED RELEASE ORAL at 08:58

## 2021-05-18 RX ADMIN — ALBUTEROL SULFATE 2.5 MG: 2.5 SOLUTION RESPIRATORY (INHALATION) at 19:57

## 2021-05-18 RX ADMIN — BUDESONIDE AND FORMOTEROL FUMARATE DIHYDRATE 2 PUFF: 160; 4.5 AEROSOL RESPIRATORY (INHALATION) at 08:53

## 2021-05-18 RX ADMIN — TIOTROPIUM BROMIDE INHALATION SPRAY 1 PUFF: 3.12 SPRAY, METERED RESPIRATORY (INHALATION) at 08:53

## 2021-05-18 RX ADMIN — WATER 2000 MG: 1 INJECTION INTRAMUSCULAR; INTRAVENOUS; SUBCUTANEOUS at 05:41

## 2021-05-18 RX ADMIN — MONTELUKAST SODIUM 10 MG: 10 TABLET, FILM COATED ORAL at 22:00

## 2021-05-18 RX ADMIN — OXYCODONE AND ACETAMINOPHEN 1 TABLET: 5; 325 TABLET ORAL at 08:58

## 2021-05-18 ASSESSMENT — PAIN DESCRIPTION - DESCRIPTORS: DESCRIPTORS: SHARP;SHOOTING

## 2021-05-18 ASSESSMENT — PAIN DESCRIPTION - LOCATION
LOCATION: KNEE
LOCATION: KNEE

## 2021-05-18 ASSESSMENT — PAIN SCALES - GENERAL
PAINLEVEL_OUTOF10: 5
PAINLEVEL_OUTOF10: 3
PAINLEVEL_OUTOF10: 7
PAINLEVEL_OUTOF10: 5
PAINLEVEL_OUTOF10: 2
PAINLEVEL_OUTOF10: 0
PAINLEVEL_OUTOF10: 4
PAINLEVEL_OUTOF10: 6
PAINLEVEL_OUTOF10: 0

## 2021-05-18 ASSESSMENT — PAIN DESCRIPTION - PAIN TYPE
TYPE: ACUTE PAIN
TYPE: ACUTE PAIN

## 2021-05-18 ASSESSMENT — PAIN DESCRIPTION - ORIENTATION
ORIENTATION: RIGHT
ORIENTATION: RIGHT

## 2021-05-18 NOTE — PROGRESS NOTES
Pt awake sitting up in chair, assessment completed. Right knee dressing observed and noted to have moderate amount of serosang drainage on dressing. New DSD applied. Immobilizer remains in place. Pt remains up in chair for breakfast. No other complaints at this time. Call light in reach.

## 2021-05-18 NOTE — PROGRESS NOTES
[facility]  Occupational Therapy  Daily Note  Date: 2021  Patient Name: Ramona Coffey        MRN: [id number]    : 1945  (70 y.o.)    Subjective: Patient is seated in bedside chair upon arrival.  Pt is PROGRESSING toward goals and independence of Self Care this treatment session  Continue to assess Pending Progress    Assessment  Assessment: Pt seated in recliner upon arrival, agreeable to OT interventions at this time. Instructed pt in BUE resistive arm exercises in order to improve UB strength and endurance to assist with ADLs/IADLs, transfers, and mobility. Pt completes exercises 1x20 using 4# weight. Tolerates exercises well with x2 rest periods lasting 30-60 seconds in length. Engages in exercises for 15 minutes before needing first rest. Pt is provided with a chair with arm rests so he is able to sit at the sink to complete ADLs. Pt remains in recliner at end of session preparing to work with PTA. All personal needs are met at this time. Will continue to address goals as able. Activity Tolerance: Patient Tolerated treatment well    Exercises:  See Flowsheets    Goals  Short Term Goals  Time Frame for Short term goals: 10 days (2021)  Short term goal 1: Patient to transfer to bathroom with supervision only, completing toilet hygiene independently. Short term goal 2: Patient to complete UB/LB sponge bath with set up only. -MET UB  Short term goal 3: Patient to complete UB/LB dressing with set up only. -MET UB  Short term goal 4: Patient to tolerate 30 minutes BUE exercise with 1 or fewer rest breaks in order to improve endurance for ADL and IADL tasks.     Time In: 1500  Time Out: 1525  Timed Coded Minutes: 25  Total Treatment Time: 25    JORGE Krishna Date: 2021

## 2021-05-18 NOTE — PLAN OF CARE
Problem: Pain:  Goal: Pain level will decrease  Description: Pain level will decrease  Outcome: Ongoing     Problem: Skin Integrity:  Goal: Will show no infection signs and symptoms  Description: Will show no infection signs and symptoms  Outcome: Ongoing     Problem: Skin Integrity:  Goal: Absence of new skin breakdown  Description: Absence of new skin breakdown  Outcome: Ongoing     Problem: Falls - Risk of:  Goal: Will remain free from falls  Description: Will remain free from falls  5/18/2021 0903 by Anjel Jarrett RN  Outcome: Ongoing     Problem: Gas Exchange - Impaired:  Goal: Levels of oxygenation will improve  Description: Levels of oxygenation will improve  5/18/2021 0903 by Anjel Jarrett RN  Outcome: Ongoing

## 2021-05-18 NOTE — PROGRESS NOTES
Phone: Ricky  Date: 2021  Fax: [phone]      Physical Therapy    Daily Note    Patient Name: Harpal Cosby      : 1945  (70 y.o.)  MRN: [id number]     Pt is PROGRESSING toward goals and increased independence of mobility this treatment session  Discharge Recommendations: Home with assist PRN     Assessment                   Sit to Stand: Contact guard assistance, Stand by assistance  Stand to sit: Contact guard assistance, Stand by assistance                   Ambulation 1  Surface: level tile  Device: Rolling Walker  Assistance: Contact guard assistance  Distance: 10 ft with seated rest break and then additional 10 ft  Comments: Patent able to maintain NWB status well but does become moderately SOB with activty level/ambulation. Patient must have immoblizer on when up              Assessment: Patient seated up in chair upon arrival to room. He is able to complete seated exercises to L LE. Sit to stand from chair is SBA/CGA. Ambulates with wheeled walker around bed and sits down to rest.  Able to maintain NWB on R LE well. After resting, he ambulates back to chair. Remains up in chair following with call light in reach and chair alarm in place. No LOB noted with gait.   Safety Devices  Type of devices: Call light within reach, Chair alarm in place, Gait belt, Left in chair          Time In: 926  Time Out: 952  Timed Coded Minutes: 26  Total Treatment Time: 26    Exercises:  See Flowsheets    Plan  Cont Per Plan Of Care    Goals  Short Term Goals  Time Frame for Short term goals: 14 days - expires 21  Short term goal 1: Prevent loss of strength of bilateral UE's and left LE to allow patient to complete transfers and short distance amb  Short term goal 2: Transfer in/OOB and chair /commode indeependently  Short term goal 3: Ambulate with wh walker x 25 ft x 2 with supervision to safely negotiate home environment  Short term goal 4: Good standing balance to assist with self-care tasks  Short term goal 5: Gair+ endurance to complete above ambulation to reduce fall risk associated with fatigue    Long Term Goals                      Ernesto [facility] Therapy License Number: [id number]    Date: 5/18/2021

## 2021-05-18 NOTE — PROGRESS NOTES
Medications: glucose, dextrose, glucagon (rDNA), dextrose, ondansetron, albuterol, oxyCODONE-acetaminophen, sodium chloride flush, sodium chloride    Objective:   Vitals: BP (!) 114/57   Pulse 68   Temp 98.1 °F (36.7 °C) (Oral)   Resp 14   Ht 5' 10\" (1.778 m)   Wt (!) 330 lb (149.7 kg)   SpO2 96%   BMI 47.35 kg/m²   BMI: Body mass index is 47.35 kg/m². CBC:   Recent Labs     05/16/21  0515 05/17/21  1945   WBC 7.0 7.3   HGB 9.3* 9.4*    279     BMP:    Recent Labs     05/16/21  0515      K 3.6*      CO2 27   BUN 14   CREATININE 0.76   GLUCOSE 147*     Physical Exam:    General Appearance: alert and oriented to person, place and time, in no acute distress  Cardiovascular: normal rate, regular rhythm, normal S1 and S2  Pulmonary/Chest: clear to auscultation bilaterally- no wheezes, rales or rhonci  Abdomen: soft, obese, non-tender, non-distended, normal bowel sounds   Extremities: Right lower extremity with straight leg brace  Skin: warm and dry, no rash  Neurological: alert, oriented, normal speech, no focal findings or movement disorder noted       Assessment and Plan:          1. S/P stage I revision of right TKA  -  continue on 6 weeks of IV cefepime, pain control with as needed Percocet , follow-up with orthopedic surgeon. Has been having more drainage from the incision site with 2 new bulging areas on the lateral aspect of the knee. Drainage improved today. Patient is to follow-up with his orthopedic surgeon on 5/20   2. Generalized weakness - PT and OT  3. History of C. difficile colitis - on oral vancomycin prophylactically to prevent recurrence  4. Mild hypokalemia -replaced, on potassium supplements  5. Chronic anemia -on iron replacement  6. Diabetes mellitus type 2, controlled, non-insulin-dependent -on sliding scale insulin  7. Hypertension -blood pressure stable, continue losartan, Toprol-XL, HCTZ, Aldactone  8.   History of CAD, s/p CABG on 3/30/2015 -asymptomatic, continue medical management with aspirin, Toprol-XL, Imdur  9. History of BPH - on Flomax  10. GERD -on Protonix  11. History of depression - mood stable on Lexapro  12.   Chronic diastolic CHF -continue on diuretics, asymptomatic         Electronically signed by Haley Loja MD on 5/18/2021 at 7:36 AM    Rounding Hospitalist

## 2021-05-18 NOTE — PROGRESS NOTES
[facility]  Occupational Therapy  Daily Note  Date: 2021  Patient Name: Yanet Stern        MRN: [id number]    : 1945  (70 y.o.)    Subjective: Patient is seated in bedside chair upon arrival.  Pt is PROGRESSING toward goals and independence of Self Care this treatment session  Continue to assess Pending Progress    Objective  ADL  Grooming: Setup  UE Bathing: Setup  UE Dressing: Setup    Balance  Sitting Balance: Independent    Assessment  Assessment: Pt seated in recliner upon arrival, agreeable to OT interventions at this time. Sponge bath ADL completed with patient seated in recliner, see above information. Unable to complete in BR as pt needs a chair with at least one arm rest to push off from, will attempt to locate one. Pt reports that LB bathing does not need to be completed since it was done when he went to the BR earlier this AM. Provided pt with denture cup, denture cleaning tablets, and denture adhesive for pt to complete oral care following lunch. Pt remains in recliner at end of session, chair alarm in place. Call light and other personal items within reach, will continue to address goals as able. Activity Tolerance: Patient Tolerated treatment well     Exercises:  See Flowsheets    Goals  Short Term Goals  Time Frame for Short term goals: 10 days (2021)  Short term goal 1: Patient to transfer to bathroom with supervision only, completing toilet hygiene independently. Short term goal 2: Patient to complete UB/LB sponge bath with set up only. -MET UB  Short term goal 3: Patient to complete UB/LB dressing with set up only. -MET UB  Short term goal 4: Patient to tolerate 30 minutes BUE exercise with 1 or fewer rest breaks in order to improve endurance for ADL and IADL tasks.     Time In: 1107  Time Out: 1130  Timed Coded Minutes: 23  Total Treatment Time: 23    JORGE Rowalnd  Date: 2021

## 2021-05-18 NOTE — PLAN OF CARE
Problem: Falls - Risk of:  Goal: Will remain free from falls  Description: Will remain free from falls  Outcome: Ongoing  Goal: Absence of physical injury  Description: Absence of physical injury  Outcome: Ongoing     Problem: Nutrition  Goal: Optimal nutrition therapy  Outcome: Ongoing     Problem: Discharge Planning:  Goal: Discharged to appropriate level of care  Description: Discharged to appropriate level of care  Outcome: Ongoing     Problem: Gas Exchange - Impaired:  Goal: Levels of oxygenation will improve  Description: Levels of oxygenation will improve  Outcome: Ongoing     Problem: Infection, Septic Shock:  Goal: Will show no infection signs and symptoms  Description: Will show no infection signs and symptoms  Outcome: Ongoing     Problem: Serum Glucose Level - Abnormal:  Goal: Ability to maintain appropriate glucose levels will improve  Description: Ability to maintain appropriate glucose levels will improve  Outcome: Ongoing     Problem: Tissue Perfusion, Altered:  Goal: Circulatory function within specified parameters  Description: Circulatory function within specified parameters  Outcome: Ongoing

## 2021-05-18 NOTE — PROGRESS NOTES
Right knee dressing checked, only scant amount of old drainage noted. Dressing left in place at this time. Pt requesting to have ice on knee and immobilizer left open. Remains up in chair with call light in reach.

## 2021-05-18 NOTE — PROGRESS NOTES
Phone: Ricky  Date: 2021  Fax: [phone]      Physical Therapy    Daily Note    Patient Name: Jana Pierce      : 1945  (70 y.o.)  MRN: [id number]     Pt is PROGRESSING toward goals and increased independence of mobility this treatment session  Discharge Recommendations: Home with assist PRN     Assessment                   Sit to Stand: Contact guard assistance, Stand by assistance  Stand to sit: Contact guard assistance, Stand by assistance                WB Status: NWB R LE  Ambulation 1  Surface: level tile  Device: Rolling Walker  Assistance: Contact guard assistance  Distance: 10 ft with seated rest break and then additional 10 ft  Comments: Patent able to maintain NWB status well but does become moderately SOB with activty level/ambulation. Patient must have immoblizer on when up              Assessment: Patient in chair upon arrival to room. Able to complete seated exercises with addition of 2# weight to L LE. Sit to stand from chair is CGA/SBA. Ambulates around to other side of bed and sits down to rest. When he stands back up he struggles a little stating that he got too far away from the bed. Ambulates back to chair to sit up following. Does note increase in pain following and requests to have pain meds. Call light in reach and chair alarm in place.   Safety Devices  Type of devices: Call light within reach, Chair alarm in place, Gait belt, Left in bed, Nurse notified          Time In: 3932  Time Out: 1545  Timed Coded Minutes: 20  Total Treatment Time: 20    Exercises:  See Flowsheets    Plan  Cont Per Plan Of Care    Goals  Short Term Goals  Time Frame for Short term goals: 14 days - expires 21  Short term goal 1: Prevent loss of strength of bilateral UE's and left LE to allow patient to complete transfers and short distance amb  Short term goal 2: Transfer in/OOB and chair /commode indeependently  Short term goal 3: Ambulate with wh walker x 25 ft x 2 with supervision to safely negotiate home environment  Short term goal 4: Good standing balance to assist with self-care tasks  Short term goal 5: Gair+ endurance to complete above ambulation to reduce fall risk associated with fatigue    Long Term Goals                      Adanard Patch Therapy License Number: [id number]    Date: 5/18/2021

## 2021-05-19 LAB — GLUCOSE BLD-MCNC: 109 MG/DL (ref 65–99)

## 2021-05-19 PROCEDURE — 2580000003 HC RX 258: Performed by: INTERNAL MEDICINE

## 2021-05-19 PROCEDURE — 97110 THERAPEUTIC EXERCISES: CPT

## 2021-05-19 PROCEDURE — 97116 GAIT TRAINING THERAPY: CPT

## 2021-05-19 PROCEDURE — 94640 AIRWAY INHALATION TREATMENT: CPT

## 2021-05-19 PROCEDURE — 6370000000 HC RX 637 (ALT 250 FOR IP): Performed by: INTERNAL MEDICINE

## 2021-05-19 PROCEDURE — 6360000002 HC RX W HCPCS: Performed by: INTERNAL MEDICINE

## 2021-05-19 PROCEDURE — 97530 THERAPEUTIC ACTIVITIES: CPT

## 2021-05-19 PROCEDURE — 1200000002 HC SEMI PRIVATE SWING BED

## 2021-05-19 RX ADMIN — SPIRONOLACTONE 25 MG: 25 TABLET, FILM COATED ORAL at 09:22

## 2021-05-19 RX ADMIN — ATORVASTATIN CALCIUM 80 MG: 40 TABLET, FILM COATED ORAL at 20:39

## 2021-05-19 RX ADMIN — METFORMIN HYDROCHLORIDE 500 MG: 500 TABLET ORAL at 09:22

## 2021-05-19 RX ADMIN — IPRATROPIUM BROMIDE 0.5 MG: 0.5 SOLUTION RESPIRATORY (INHALATION) at 09:05

## 2021-05-19 RX ADMIN — PANTOPRAZOLE SODIUM 40 MG: 40 TABLET, DELAYED RELEASE ORAL at 06:03

## 2021-05-19 RX ADMIN — ALBUTEROL SULFATE 2.5 MG: 2.5 SOLUTION RESPIRATORY (INHALATION) at 08:59

## 2021-05-19 RX ADMIN — ENOXAPARIN SODIUM 40 MG: 40 INJECTION SUBCUTANEOUS at 09:21

## 2021-05-19 RX ADMIN — ALBUTEROL SULFATE 2.5 MG: 2.5 SOLUTION RESPIRATORY (INHALATION) at 20:14

## 2021-05-19 RX ADMIN — VANCOMYCIN HYDROCHLORIDE 125 MG: 125 CAPSULE ORAL at 20:39

## 2021-05-19 RX ADMIN — HYDROCHLOROTHIAZIDE 25 MG: 25 TABLET ORAL at 09:22

## 2021-05-19 RX ADMIN — OXYCODONE AND ACETAMINOPHEN 1 TABLET: 5; 325 TABLET ORAL at 06:15

## 2021-05-19 RX ADMIN — DOCUSATE SODIUM 100 MG: 100 CAPSULE, LIQUID FILLED ORAL at 20:39

## 2021-05-19 RX ADMIN — VANCOMYCIN HYDROCHLORIDE 125 MG: 125 CAPSULE ORAL at 09:21

## 2021-05-19 RX ADMIN — OXYCODONE HYDROCHLORIDE AND ACETAMINOPHEN 500 MG: 500 TABLET ORAL at 09:21

## 2021-05-19 RX ADMIN — ONDANSETRON 4 MG: 4 TABLET, ORALLY DISINTEGRATING ORAL at 13:08

## 2021-05-19 RX ADMIN — WATER 2000 MG: 1 INJECTION INTRAMUSCULAR; INTRAVENOUS; SUBCUTANEOUS at 06:03

## 2021-05-19 RX ADMIN — FERROUS SULFATE TAB 325 MG (65 MG ELEMENTAL FE) 325 MG: 325 (65 FE) TAB at 09:22

## 2021-05-19 RX ADMIN — BUDESONIDE AND FORMOTEROL FUMARATE DIHYDRATE 2 PUFF: 160; 4.5 AEROSOL RESPIRATORY (INHALATION) at 09:11

## 2021-05-19 RX ADMIN — ISOSORBIDE MONONITRATE 30 MG: 30 TABLET, EXTENDED RELEASE ORAL at 09:22

## 2021-05-19 RX ADMIN — LOSARTAN POTASSIUM 25 MG: 50 TABLET ORAL at 09:21

## 2021-05-19 RX ADMIN — FLUTICASONE PROPIONATE 1 SPRAY: 50 SPRAY, METERED NASAL at 09:23

## 2021-05-19 RX ADMIN — TAMSULOSIN HYDROCHLORIDE 0.4 MG: 0.4 CAPSULE ORAL at 18:32

## 2021-05-19 RX ADMIN — BUDESONIDE AND FORMOTEROL FUMARATE DIHYDRATE 2 PUFF: 160; 4.5 AEROSOL RESPIRATORY (INHALATION) at 20:15

## 2021-05-19 RX ADMIN — WATER 2000 MG: 1 INJECTION INTRAMUSCULAR; INTRAVENOUS; SUBCUTANEOUS at 13:09

## 2021-05-19 RX ADMIN — ASPIRIN 325 MG: 325 TABLET, COATED ORAL at 09:21

## 2021-05-19 RX ADMIN — IPRATROPIUM BROMIDE 0.5 MG: 0.5 SOLUTION RESPIRATORY (INHALATION) at 20:14

## 2021-05-19 RX ADMIN — TIOTROPIUM BROMIDE INHALATION SPRAY 1 PUFF: 3.12 SPRAY, METERED RESPIRATORY (INHALATION) at 20:15

## 2021-05-19 RX ADMIN — OXYCODONE AND ACETAMINOPHEN 1 TABLET: 5; 325 TABLET ORAL at 20:39

## 2021-05-19 RX ADMIN — SODIUM CHLORIDE, PRESERVATIVE FREE 10 ML: 5 INJECTION INTRAVENOUS at 20:39

## 2021-05-19 RX ADMIN — POTASSIUM CHLORIDE 10 MEQ: 750 TABLET, FILM COATED, EXTENDED RELEASE ORAL at 09:21

## 2021-05-19 RX ADMIN — CHOLECALCIFEROL TAB 25 MCG (1000 UNIT) 1000 UNITS: 25 TAB at 09:21

## 2021-05-19 RX ADMIN — DOCUSATE SODIUM 100 MG: 100 CAPSULE, LIQUID FILLED ORAL at 09:21

## 2021-05-19 RX ADMIN — MONTELUKAST SODIUM 10 MG: 10 TABLET, FILM COATED ORAL at 20:39

## 2021-05-19 RX ADMIN — METOPROLOL SUCCINATE 25 MG: 25 TABLET, EXTENDED RELEASE ORAL at 09:21

## 2021-05-19 RX ADMIN — ESCITALOPRAM OXALATE 20 MG: 10 TABLET ORAL at 09:21

## 2021-05-19 RX ADMIN — TIOTROPIUM BROMIDE INHALATION SPRAY 1 PUFF: 3.12 SPRAY, METERED RESPIRATORY (INHALATION) at 09:13

## 2021-05-19 RX ADMIN — THERA TABS 1 TABLET: TAB at 09:22

## 2021-05-19 RX ADMIN — SODIUM CHLORIDE, PRESERVATIVE FREE 10 ML: 5 INJECTION INTRAVENOUS at 09:20

## 2021-05-19 RX ADMIN — WATER 2000 MG: 1 INJECTION INTRAMUSCULAR; INTRAVENOUS; SUBCUTANEOUS at 22:34

## 2021-05-19 ASSESSMENT — PAIN SCALES - GENERAL
PAINLEVEL_OUTOF10: 6
PAINLEVEL_OUTOF10: 0
PAINLEVEL_OUTOF10: 0
PAINLEVEL_OUTOF10: 2
PAINLEVEL_OUTOF10: 7
PAINLEVEL_OUTOF10: 0
PAINLEVEL_OUTOF10: 1
PAINLEVEL_OUTOF10: 1

## 2021-05-19 NOTE — PROGRESS NOTES
[facility]  Physical Therapy    Date: 2021  Patient Name: Chrissie Ortiz        : 1945       [x] Pt Refusal Patient c/o nausea following eating lunch. Nurse in room giving medicine. Will check back later and progress as able. [] Pt Unavailable due to:           Andreea Rees, PTA Date: 2021

## 2021-05-19 NOTE — PROGRESS NOTES
Phone: Ricky  Date: 2021  Fax: [phone]      Physical Therapy    Daily Note    Patient Name: Maru Dudley      : 1945  (70 y.o.)  MRN: [id number]     Pt is PROGRESSING toward goals and increased independence of mobility this treatment session  Discharge Recommendations: Home with assist PRN     Assessment                   Sit to Stand: Contact guard assistance, Stand by assistance  Stand to sit: Contact guard assistance, Stand by assistance                WB Status: NWB R LE  Ambulation 1  Surface: level tile  Device: Rolling Walker  Assistance: Contact guard assistance  Distance: 10 ft with seated rest break and then additional 10 ft  Comments: Patent able to maintain NWB status well but does become moderately SOB with activty level/ambulation. Patient must have immoblizer on when up              Assessment: Patient reports his nausea is a little better. He is seated up in chair and agrees to try to participate with PTA. He completes seated exercises for L LE strengthening. Sit to stand from chair is CGA. Ambulates with wheeled walker around bed and sits down to rest for short period. Sit to stand from bed with height of bed slightly elevated is CGA. Returns to chair in same manner to sit down. He complains of nausea coming back. Call light in reach and nurse notified of nausea returning. Patient has appointment in [facility].   Safety Devices  Type of devices: Call light within reach, Gait belt, Left in chair, Nurse notified          Time In:1505  Time Out: 1530  Timed Coded Minutes: 25  Total Treatment Time: 25    Exercises:  See Flowsheets    Plan  Cont Per Plan Of Care    Goals  Short Term Goals  Time Frame for Short term goals: 14 days - expires 21  Short term goal 1: Prevent loss of strength of bilateral UE's and left LE to allow patient to complete transfers and short distance amb  Short term goal 2: Transfer in/OOB and chair /commode

## 2021-05-19 NOTE — PROGRESS NOTES
[facility]  Occupational Therapy  Daily Note  Date: 2021  Patient Name: Raeann Rich        MRN: [id number]    : 1945  (70 y.o.)    Subjective: Patient is seated in bedside chair upon arrival.  Pt is PROGRESSING toward goals and independence of Self Care this treatment session  Continue to assess Pending Progress    Assessment  Assessment: Pt seated in recliner upon arrival, agreeable to OT interventions at this time. Declines need to complete ADLs at this time, wishes to complete late this afternoon/evening before his doctor's appt tomorrow morning. Instructed pt in BUE resistive arm exercises in order to improve UB strength and endurance to assist with transfers and mobility. Pt completes exercises 1x20 using 4# weight. Tolerates exercises well with x2 rest periods lasting 30-60 seconds in length. Engages in exercises for 18 minutes before needing first rest. Transitioned to arm bike exercise in order to improve UB endurance to assist with ADLs/IADLs. Exercise is completed on MOD resistance with pt completed x2 trials of 3 minutes each. Pt requires an extended rest period (~3 minutes) between each trial. SOB is noted, educated and encouraged to complete PLB. Recovers quickly with rest and PLB. Pt remains in recliner at end of session, chair alarm in place. Call light and other personal items within reach, will continue to address goals as able. Activity Tolerance: Patient Tolerated treatment well    Exercises:  See Flowsheets    Goals  Short Term Goals  Time Frame for Short term goals: 10 days (2021)  Short term goal 1: Patient to transfer to bathroom with supervision only, completing toilet hygiene independently. Short term goal 2: Patient to complete UB/LB sponge bath with set up only. -MET UB  Short term goal 3: Patient to complete UB/LB dressing with set up only.  -MET UB  Short term goal 4: Patient to tolerate 30 minutes BUE exercise with 1 or fewer rest breaks in order to improve endurance for ADL and IADL tasks.     Time In: 1127  Time Out: 1203  Timed Coded Minutes: 36  Total Treatment Time: [address] BoyerA/L  Date: 5/19/2021

## 2021-05-20 LAB — GLUCOSE BLD-MCNC: 124 MG/DL (ref 65–99)

## 2021-05-20 PROCEDURE — 82947 ASSAY GLUCOSE BLOOD QUANT: CPT

## 2021-05-20 PROCEDURE — 6370000000 HC RX 637 (ALT 250 FOR IP): Performed by: INTERNAL MEDICINE

## 2021-05-20 PROCEDURE — 6360000002 HC RX W HCPCS: Performed by: INTERNAL MEDICINE

## 2021-05-20 PROCEDURE — 1200000002 HC SEMI PRIVATE SWING BED

## 2021-05-20 PROCEDURE — 94640 AIRWAY INHALATION TREATMENT: CPT

## 2021-05-20 PROCEDURE — 2580000003 HC RX 258: Performed by: INTERNAL MEDICINE

## 2021-05-20 RX ADMIN — OXYCODONE AND ACETAMINOPHEN 1 TABLET: 5; 325 TABLET ORAL at 05:17

## 2021-05-20 RX ADMIN — ATORVASTATIN CALCIUM 80 MG: 40 TABLET, FILM COATED ORAL at 21:50

## 2021-05-20 RX ADMIN — SPIRONOLACTONE 25 MG: 25 TABLET, FILM COATED ORAL at 08:05

## 2021-05-20 RX ADMIN — FLUTICASONE PROPIONATE 1 SPRAY: 50 SPRAY, METERED NASAL at 08:06

## 2021-05-20 RX ADMIN — TIOTROPIUM BROMIDE INHALATION SPRAY 1 PUFF: 3.12 SPRAY, METERED RESPIRATORY (INHALATION) at 20:50

## 2021-05-20 RX ADMIN — OXYCODONE HYDROCHLORIDE AND ACETAMINOPHEN 500 MG: 500 TABLET ORAL at 08:05

## 2021-05-20 RX ADMIN — CHOLECALCIFEROL TAB 25 MCG (1000 UNIT) 1000 UNITS: 25 TAB at 08:05

## 2021-05-20 RX ADMIN — VANCOMYCIN HYDROCHLORIDE 125 MG: 125 CAPSULE ORAL at 08:04

## 2021-05-20 RX ADMIN — ASPIRIN 325 MG: 325 TABLET, COATED ORAL at 08:05

## 2021-05-20 RX ADMIN — SODIUM CHLORIDE, PRESERVATIVE FREE 10 ML: 5 INJECTION INTRAVENOUS at 08:04

## 2021-05-20 RX ADMIN — MONTELUKAST SODIUM 10 MG: 10 TABLET, FILM COATED ORAL at 21:50

## 2021-05-20 RX ADMIN — FERROUS SULFATE TAB 325 MG (65 MG ELEMENTAL FE) 325 MG: 325 (65 FE) TAB at 08:04

## 2021-05-20 RX ADMIN — ENOXAPARIN SODIUM 40 MG: 40 INJECTION SUBCUTANEOUS at 08:04

## 2021-05-20 RX ADMIN — TIOTROPIUM BROMIDE INHALATION SPRAY 1 PUFF: 3.12 SPRAY, METERED RESPIRATORY (INHALATION) at 09:29

## 2021-05-20 RX ADMIN — HYDROCHLOROTHIAZIDE 25 MG: 25 TABLET ORAL at 08:04

## 2021-05-20 RX ADMIN — METOPROLOL SUCCINATE 25 MG: 25 TABLET, EXTENDED RELEASE ORAL at 08:04

## 2021-05-20 RX ADMIN — PANTOPRAZOLE SODIUM 40 MG: 40 TABLET, DELAYED RELEASE ORAL at 05:17

## 2021-05-20 RX ADMIN — IPRATROPIUM BROMIDE 0.5 MG: 0.5 SOLUTION RESPIRATORY (INHALATION) at 20:50

## 2021-05-20 RX ADMIN — LOSARTAN POTASSIUM 25 MG: 50 TABLET ORAL at 08:04

## 2021-05-20 RX ADMIN — OXYCODONE AND ACETAMINOPHEN 1 TABLET: 5; 325 TABLET ORAL at 17:24

## 2021-05-20 RX ADMIN — IPRATROPIUM BROMIDE 0.5 MG: 0.5 SOLUTION RESPIRATORY (INHALATION) at 09:19

## 2021-05-20 RX ADMIN — BUDESONIDE AND FORMOTEROL FUMARATE DIHYDRATE 2 PUFF: 160; 4.5 AEROSOL RESPIRATORY (INHALATION) at 20:50

## 2021-05-20 RX ADMIN — ISOSORBIDE MONONITRATE 30 MG: 30 TABLET, EXTENDED RELEASE ORAL at 08:04

## 2021-05-20 RX ADMIN — ESCITALOPRAM OXALATE 20 MG: 10 TABLET ORAL at 08:05

## 2021-05-20 RX ADMIN — POTASSIUM CHLORIDE 10 MEQ: 750 TABLET, FILM COATED, EXTENDED RELEASE ORAL at 08:05

## 2021-05-20 RX ADMIN — METFORMIN HYDROCHLORIDE 500 MG: 500 TABLET ORAL at 08:05

## 2021-05-20 RX ADMIN — BUDESONIDE AND FORMOTEROL FUMARATE DIHYDRATE 2 PUFF: 160; 4.5 AEROSOL RESPIRATORY (INHALATION) at 09:27

## 2021-05-20 RX ADMIN — THERA TABS 1 TABLET: TAB at 08:05

## 2021-05-20 RX ADMIN — TAMSULOSIN HYDROCHLORIDE 0.4 MG: 0.4 CAPSULE ORAL at 17:25

## 2021-05-20 RX ADMIN — VANCOMYCIN HYDROCHLORIDE 125 MG: 125 CAPSULE ORAL at 21:50

## 2021-05-20 RX ADMIN — ALBUTEROL SULFATE 2.5 MG: 2.5 SOLUTION RESPIRATORY (INHALATION) at 09:13

## 2021-05-20 RX ADMIN — WATER 2000 MG: 1 INJECTION INTRAMUSCULAR; INTRAVENOUS; SUBCUTANEOUS at 05:17

## 2021-05-20 RX ADMIN — DOCUSATE SODIUM 100 MG: 100 CAPSULE, LIQUID FILLED ORAL at 08:05

## 2021-05-20 RX ADMIN — WATER 2000 MG: 1 INJECTION INTRAMUSCULAR; INTRAVENOUS; SUBCUTANEOUS at 17:25

## 2021-05-20 RX ADMIN — DOCUSATE SODIUM 100 MG: 100 CAPSULE, LIQUID FILLED ORAL at 21:50

## 2021-05-20 RX ADMIN — SODIUM CHLORIDE, PRESERVATIVE FREE 10 ML: 5 INJECTION INTRAVENOUS at 21:51

## 2021-05-20 RX ADMIN — ALBUTEROL SULFATE 2.5 MG: 2.5 SOLUTION RESPIRATORY (INHALATION) at 20:50

## 2021-05-20 RX ADMIN — ONDANSETRON 4 MG: 4 TABLET, ORALLY DISINTEGRATING ORAL at 08:14

## 2021-05-20 ASSESSMENT — PAIN SCALES - GENERAL
PAINLEVEL_OUTOF10: 0
PAINLEVEL_OUTOF10: 3
PAINLEVEL_OUTOF10: 0
PAINLEVEL_OUTOF10: 6
PAINLEVEL_OUTOF10: 2
PAINLEVEL_OUTOF10: 7
PAINLEVEL_OUTOF10: 3

## 2021-05-20 NOTE — PROGRESS NOTES
[facility]  Occupational Therapy    Date: 2021  Patient Name: Patricia Pina        : 1945       [] Pt Refusal           [x] Pt Unavailable due to:  Patient is out of the building for a doctor appointment. Will resume services when he returns.          Gayla Velasquez, OTR/l Date: 2021

## 2021-05-20 NOTE — PROGRESS NOTES
[facility]    Date: 2021  Patient Name: Dede Perdomo        : 1945                   [x] Pt Unavailable due to:  Pt has appt in [place] today, No therapy this morning.          Nabila Nunes, PTA Date: 2021

## 2021-05-20 NOTE — PROGRESS NOTES
Pt arrives back to room per wheelchair. Ambulates to chair with assist of walker. Pt relates of pain in his knee from his appointment.

## 2021-05-21 PROCEDURE — 6370000000 HC RX 637 (ALT 250 FOR IP): Performed by: INTERNAL MEDICINE

## 2021-05-21 PROCEDURE — 97110 THERAPEUTIC EXERCISES: CPT

## 2021-05-21 PROCEDURE — 94761 N-INVAS EAR/PLS OXIMETRY MLT: CPT

## 2021-05-21 PROCEDURE — 94640 AIRWAY INHALATION TREATMENT: CPT

## 2021-05-21 PROCEDURE — 6360000002 HC RX W HCPCS: Performed by: INTERNAL MEDICINE

## 2021-05-21 PROCEDURE — 1200000002 HC SEMI PRIVATE SWING BED

## 2021-05-21 PROCEDURE — 2580000003 HC RX 258: Performed by: INTERNAL MEDICINE

## 2021-05-21 PROCEDURE — 97530 THERAPEUTIC ACTIVITIES: CPT

## 2021-05-21 RX ORDER — LACTOBACILLUS RHAMNOSUS GG 10B CELL
1 CAPSULE ORAL 2 TIMES DAILY WITH MEALS
Status: DISCONTINUED | OUTPATIENT
Start: 2021-05-21 | End: 2021-06-26 | Stop reason: HOSPADM

## 2021-05-21 RX ADMIN — BUDESONIDE AND FORMOTEROL FUMARATE DIHYDRATE 2 PUFF: 160; 4.5 AEROSOL RESPIRATORY (INHALATION) at 09:13

## 2021-05-21 RX ADMIN — OXYCODONE AND ACETAMINOPHEN 1 TABLET: 5; 325 TABLET ORAL at 17:47

## 2021-05-21 RX ADMIN — THERA TABS 1 TABLET: TAB at 08:56

## 2021-05-21 RX ADMIN — METOPROLOL SUCCINATE 25 MG: 25 TABLET, EXTENDED RELEASE ORAL at 08:56

## 2021-05-21 RX ADMIN — PANTOPRAZOLE SODIUM 40 MG: 40 TABLET, DELAYED RELEASE ORAL at 06:07

## 2021-05-21 RX ADMIN — FLUTICASONE PROPIONATE 1 SPRAY: 50 SPRAY, METERED NASAL at 09:04

## 2021-05-21 RX ADMIN — DOCUSATE SODIUM 100 MG: 100 CAPSULE, LIQUID FILLED ORAL at 08:56

## 2021-05-21 RX ADMIN — WATER 2000 MG: 1 INJECTION INTRAMUSCULAR; INTRAVENOUS; SUBCUTANEOUS at 01:02

## 2021-05-21 RX ADMIN — ALBUTEROL SULFATE 2.5 MG: 2.5 SOLUTION RESPIRATORY (INHALATION) at 09:09

## 2021-05-21 RX ADMIN — CHOLECALCIFEROL TAB 25 MCG (1000 UNIT) 1000 UNITS: 25 TAB at 08:56

## 2021-05-21 RX ADMIN — POTASSIUM CHLORIDE 10 MEQ: 750 TABLET, FILM COATED, EXTENDED RELEASE ORAL at 08:56

## 2021-05-21 RX ADMIN — ATORVASTATIN CALCIUM 80 MG: 40 TABLET, FILM COATED ORAL at 20:10

## 2021-05-21 RX ADMIN — ESCITALOPRAM OXALATE 20 MG: 10 TABLET ORAL at 08:56

## 2021-05-21 RX ADMIN — TAMSULOSIN HYDROCHLORIDE 0.4 MG: 0.4 CAPSULE ORAL at 17:47

## 2021-05-21 RX ADMIN — OXYCODONE HYDROCHLORIDE AND ACETAMINOPHEN 500 MG: 500 TABLET ORAL at 08:56

## 2021-05-21 RX ADMIN — IPRATROPIUM BROMIDE 0.5 MG: 0.5 SOLUTION RESPIRATORY (INHALATION) at 20:30

## 2021-05-21 RX ADMIN — LOSARTAN POTASSIUM 25 MG: 50 TABLET ORAL at 08:56

## 2021-05-21 RX ADMIN — SODIUM CHLORIDE, PRESERVATIVE FREE 10 ML: 5 INJECTION INTRAVENOUS at 20:10

## 2021-05-21 RX ADMIN — SODIUM CHLORIDE, PRESERVATIVE FREE 20 ML: 5 INJECTION INTRAVENOUS at 17:47

## 2021-05-21 RX ADMIN — ISOSORBIDE MONONITRATE 30 MG: 30 TABLET, EXTENDED RELEASE ORAL at 08:56

## 2021-05-21 RX ADMIN — OXYCODONE AND ACETAMINOPHEN 1 TABLET: 5; 325 TABLET ORAL at 08:55

## 2021-05-21 RX ADMIN — WATER 2000 MG: 1 INJECTION INTRAMUSCULAR; INTRAVENOUS; SUBCUTANEOUS at 17:47

## 2021-05-21 RX ADMIN — Medication 1 CAPSULE: at 13:30

## 2021-05-21 RX ADMIN — SPIRONOLACTONE 25 MG: 25 TABLET, FILM COATED ORAL at 08:56

## 2021-05-21 RX ADMIN — HYDROCHLOROTHIAZIDE 25 MG: 25 TABLET ORAL at 08:56

## 2021-05-21 RX ADMIN — ENOXAPARIN SODIUM 40 MG: 40 INJECTION SUBCUTANEOUS at 08:56

## 2021-05-21 RX ADMIN — BUDESONIDE AND FORMOTEROL FUMARATE DIHYDRATE 2 PUFF: 160; 4.5 AEROSOL RESPIRATORY (INHALATION) at 20:30

## 2021-05-21 RX ADMIN — MONTELUKAST SODIUM 10 MG: 10 TABLET, FILM COATED ORAL at 20:10

## 2021-05-21 RX ADMIN — ASPIRIN 325 MG: 325 TABLET, COATED ORAL at 08:56

## 2021-05-21 RX ADMIN — METFORMIN HYDROCHLORIDE 500 MG: 500 TABLET ORAL at 08:56

## 2021-05-21 RX ADMIN — DOCUSATE SODIUM 100 MG: 100 CAPSULE, LIQUID FILLED ORAL at 20:10

## 2021-05-21 RX ADMIN — IPRATROPIUM BROMIDE 0.5 MG: 0.5 SOLUTION RESPIRATORY (INHALATION) at 09:12

## 2021-05-21 RX ADMIN — ALBUTEROL SULFATE 2.5 MG: 2.5 SOLUTION RESPIRATORY (INHALATION) at 20:29

## 2021-05-21 RX ADMIN — VANCOMYCIN HYDROCHLORIDE 125 MG: 125 CAPSULE ORAL at 20:10

## 2021-05-21 RX ADMIN — WATER 2000 MG: 1 INJECTION INTRAMUSCULAR; INTRAVENOUS; SUBCUTANEOUS at 09:00

## 2021-05-21 RX ADMIN — TIOTROPIUM BROMIDE INHALATION SPRAY 1 PUFF: 3.12 SPRAY, METERED RESPIRATORY (INHALATION) at 09:13

## 2021-05-21 RX ADMIN — FERROUS SULFATE TAB 325 MG (65 MG ELEMENTAL FE) 325 MG: 325 (65 FE) TAB at 08:56

## 2021-05-21 RX ADMIN — TIOTROPIUM BROMIDE INHALATION SPRAY 1 PUFF: 3.12 SPRAY, METERED RESPIRATORY (INHALATION) at 20:31

## 2021-05-21 RX ADMIN — VANCOMYCIN HYDROCHLORIDE 125 MG: 125 CAPSULE ORAL at 08:56

## 2021-05-21 RX ADMIN — Medication 1 CAPSULE: at 17:47

## 2021-05-21 RX ADMIN — SODIUM CHLORIDE, PRESERVATIVE FREE 10 ML: 5 INJECTION INTRAVENOUS at 08:55

## 2021-05-21 ASSESSMENT — PAIN SCALES - GENERAL
PAINLEVEL_OUTOF10: 5
PAINLEVEL_OUTOF10: 2
PAINLEVEL_OUTOF10: 0
PAINLEVEL_OUTOF10: 0
PAINLEVEL_OUTOF10: 2
PAINLEVEL_OUTOF10: 4

## 2021-05-21 ASSESSMENT — PAIN DESCRIPTION - ORIENTATION
ORIENTATION: RIGHT

## 2021-05-21 ASSESSMENT — PAIN DESCRIPTION - DESCRIPTORS
DESCRIPTORS: ACHING;SHOOTING
DESCRIPTORS: ACHING;SHOOTING
DESCRIPTORS: ACHING

## 2021-05-21 ASSESSMENT — PAIN DESCRIPTION - PAIN TYPE
TYPE: ACUTE PAIN

## 2021-05-21 ASSESSMENT — PAIN DESCRIPTION - LOCATION
LOCATION: KNEE

## 2021-05-21 NOTE — PLAN OF CARE
Problem: Pain:  Goal: Pain level will decrease  Description: Pain level will decrease  5/21/2021 0529 by Berto Wilson RN  Outcome: Ongoing  5/20/2021 1817 by Martir Rios RN  Outcome: Ongoing  Goal: Control of acute pain  Description: Control of acute pain  5/21/2021 0529 by Berto Wilson RN  Outcome: Ongoing  5/20/2021 1817 by Martir Rios RN  Outcome: Ongoing  Goal: Control of chronic pain  Description: Control of chronic pain  5/21/2021 0529 by Berto Wilson RN  Outcome: Ongoing  5/20/2021 1817 by Martir Rios RN  Outcome: Ongoing     Problem: Skin Integrity:  Goal: Will show no infection signs and symptoms  Description: Will show no infection signs and symptoms  5/21/2021 0529 by Berto Wilson RN  Outcome: Ongoing  5/20/2021 1817 by Martir Rios RN  Outcome: Ongoing  Goal: Absence of new skin breakdown  Description: Absence of new skin breakdown  5/21/2021 0529 by Berto Wilson RN  Outcome: Ongoing  5/20/2021 1817 by Martir Rios RN  Outcome: Ongoing     Problem: Falls - Risk of:  Goal: Will remain free from falls  Description: Will remain free from falls  5/21/2021 0529 by Berto Wilson RN  Outcome: Ongoing  5/20/2021 1817 by Martir Rios RN  Outcome: Ongoing  Goal: Absence of physical injury  Description: Absence of physical injury  5/21/2021 0529 by Berto Wilson RN  Outcome: Ongoing  5/20/2021 1817 by Martir Rios RN  Outcome: Ongoing     Problem: Nutrition  Goal: Optimal nutrition therapy  5/21/2021 0529 by Berto Wilson RN  Outcome: Ongoing  5/20/2021 1817 by Martir Rios RN  Outcome: Ongoing     Problem: Discharge Planning:  Goal: Discharged to appropriate level of care  Description: Discharged to appropriate level of care  5/21/2021 0529 by Berto Wilson RN  Outcome: Ongoing  5/20/2021 1817 by Martir Rios RN  Outcome: Ongoing     Problem: Gas Exchange - Impaired:  Goal: Levels of oxygenation will improve  Description: Levels of oxygenation will improve  5/21/2021 0529 by Yayo Lopez RN  Outcome: Ongoing  5/20/2021 1817 by Cristopher Tan RN  Outcome: Ongoing     Problem: Infection, Septic Shock:  Goal: Will show no infection signs and symptoms  Description: Will show no infection signs and symptoms  5/21/2021 0529 by Yayo Lopez RN  Outcome: Ongoing  5/20/2021 1817 by Cristopher Tan RN  Outcome: Ongoing     Problem: Infection - Ventilator-Associated Pneumonia:  Goal: Absence of pulmonary infection  Description: Absence of pulmonary infection  5/21/2021 0529 by Yayo Lopez RN  Outcome: Ongoing  5/20/2021 1817 by Cristopher Tan RN  Outcome: Ongoing     Problem: Serum Glucose Level - Abnormal:  Goal: Ability to maintain appropriate glucose levels will improve  Description: Ability to maintain appropriate glucose levels will improve  5/21/2021 0529 by Yayo Lopez RN  Outcome: Ongoing  5/20/2021 1817 by Cristopher Tan RN  Outcome: Ongoing     Problem: Tissue Perfusion, Altered:  Goal: Circulatory function within specified parameters  Description: Circulatory function within specified parameters  5/21/2021 0529 by Yayo Lopez RN  Outcome: Ongoing  5/20/2021 1817 by Cristopher Tan RN  Outcome: Ongoing     Problem: Venous Thromboembolism:  Goal: Will show no signs or symptoms of venous thromboembolism  Description: Will show no signs or symptoms of venous thromboembolism  5/21/2021 0529 by Yayo Lopez RN  Outcome: Ongoing  5/20/2021 1817 by Cristopher Tan RN  Outcome: Ongoing  Goal: Absence of signs or symptoms of impaired coagulation  Description: Absence of signs or symptoms of impaired coagulation  5/21/2021 0529 by Yayo Lopez RN  Outcome: Ongoing  5/20/2021 1817 by Cristopher Tan RN  Outcome: Ongoing

## 2021-05-21 NOTE — PLAN OF CARE
Problem: Pain:  Goal: Pain level will decrease  Description: Pain level will decrease  5/21/2021 1813 by Erica Campbell RN  Outcome: Ongoing     Problem: Skin Integrity:  Goal: Absence of new skin breakdown  Description: Absence of new skin breakdown  5/21/2021 1813 by Erica Campbell RN  Outcome: Ongoing     Problem: Falls - Risk of:  Goal: Will remain free from falls  Description: Will remain free from falls  5/21/2021 1813 by Erica Campbell RN  Outcome: Ongoing

## 2021-05-21 NOTE — PROGRESS NOTES
Patient to tolerate 30 minutes BUE exercise with 1 or fewer rest breaks in order to improve endurance for ADL and IADL tasks.     Time In: 1115  Time Out: 1145  Timed Coded Minutes: 30  Total Treatment Time: 30    JORGE Phillips  Date: 5/21/2021

## 2021-05-21 NOTE — PROGRESS NOTES
with fatigue    Long Term Goals                      Xochilt Paiz PTA Therapy License Number: [id number]    Date: 5/21/2021

## 2021-05-21 NOTE — PROGRESS NOTES
Phone: Ricky  Date: 2021  Fax: [phone]      Physical Therapy    Daily Note    Patient Name: Mariela Ramirez      : 1945  (70 y.o.)  MRN: [id number]     Pt shows NO CHANGE toward goals and increased independence of mobility this treatment session  Discharge Recommendations: Home with assist PRN     Assessment    Sit to Stand: Contact guard assistance, Stand by assistance  Stand to sit: Contact guard assistance, Stand by assistance    WB Status: NWB R LE  Ambulation 1  Surface: level tile  Device: Rolling Walker  Assistance: Contact guard assistance  Distance: 10 ft with seated rest break and then additional 10 ft  Comments: Patent able to maintain NWB status well but does become moderately SOB with activty level/ambulation. Patient must have immoblizer on when up     Assessment: Patient is seated in chair watching TV upon entry. Sit to stand is CGA. Patient is able to hop from chair to opposite side of bed to sit and take a rest break before ambulating back to chair. While seated in chair patient completes seated LE strengthening exercises per flowsheet. Patient notes surgeon drained more fluid off his knee yesterday and follows up with him again in another 2-4 weeks.   Safety Devices  Type of devices: Call light within reach, Gait belt, Left in chair, Nurse notified    Time In: 1003  Time Out: 1028  Timed Coded Minutes: 25  Total Treatment Time: 25    Exercises:  See Flowsheets    Plan  Cont Per Plan Of Care    Goals  Short Term Goals  Time Frame for Short term goals: 14 days - expires 21  Short term goal 1: Prevent loss of strength of bilateral UE's and left LE to allow patient to complete transfers and short distance amb  Short term goal 2: Transfer in/OOB and chair /commode indeependently  Short term goal 3: Ambulate with wh walker x 25 ft x 2 with supervision to safely negotiate home environment  Short term goal 4: Good standing balance to assist with self-care tasks  Short term goal 5: Gair+ endurance to complete above ambulation to reduce fall risk associated with fatigue    Long Term Goals    Senait Juarez [place] [facility] License Number: [id number]    Date: 5/21/2021

## 2021-05-21 NOTE — PLAN OF CARE
Problem: Pain:  Goal: Pain level will decrease  Description: Pain level will decrease  5/21/2021 0829 by Sai Sherman RN  Outcome: Ongoing    Problem: Pain:  Goal: Control of chronic pain  Description: Control of chronic pain  5/21/2021 0829 by Sai Sherman RN  Outcome: Ongoing     Problem: Skin Integrity:  Goal: Will show no infection signs and symptoms  Description: Will show no infection signs and symptoms  5/21/2021 0829 by Sai Sherman RN  Outcome: Ongoing

## 2021-05-21 NOTE — PROGRESS NOTES
[facility]  Occupational Therapy  Daily Note  Date: 2021  Patient Name: Patricia Pina        MRN: [id number]    : 1945  (70 y.o.)    Subjective: Patient is supine in bed upon arrival  Pt is PROGRESSING toward goals and independence of Self Care this treatment session  Continue to assess Pending Progress    Objective  Supine to Sit: Minimal assistance  Sit to Supine: Minimal assistance    Balance  Sitting Balance: Independent  Standing Balance: Contact guard assistance    Sit to stand: Contact guard assistance  Stand to sit: Contact guard assistance   Stand Step Transfers: Contact guard assistance (Hops on LLE)    Assessment  Assessment: Pt supine in bed upon arrival, agreeable to OT interventions at this time. Requires MIN A in order to complete all bed mobility this date. Improvements are noted with bed mobility when utilizing trapeze bar above bed. No LOB is noted when sitting EOB. Pt completes all transfers with CGA using fww, good hand placement is noted throughout. Pt hops on LLE only from EOB to recliner with CGA, excellent adherence to NWB status is noted. Once in recliner pt engages in BUE resitive arm exercises in order to improve UB endurance for transfers, ambulation and daily care tasks. Pt completes exercises 1x20 using 5# straight weight. Tolerates exercise well with no required rest periods. Hops back to bed with CGA, more fatigue and SOB is noted. Once pt is back in a supine position in bed, increased level of SOB is noted w/ pt vocalizing that he is SOB. Encouraged pt to complete PLB techniques to assist with SOB. Recovers quickly. Pt remains in bed at end of session, call light and other personal items within reach. Bed alarm in place prior to exit. Will continue to address goals as able.     Activity Tolerance: Patient Tolerated treatment well    Exercises:  See Flowsheets    Goals  Short Term Goals  Time Frame for Short term goals: 10 days (2021)  Short term goal 1: Patient to transfer to bathroom with supervision only, completing toilet hygiene independently. Short term goal 2: Patient to complete UB/LB sponge bath with set up only. -MET UB  Short term goal 3: Patient to complete UB/LB dressing with set up only. -MET UB  Short term goal 4: Patient to tolerate 30 minutes BUE exercise with 1 or fewer rest breaks in order to improve endurance for ADL and IADL tasks.     Time In: 1436  Time Out: 1455  Timed Coded Minutes: 19  Total Treatment Time: LARISA Reeves 98, KINGSLEY/L Date: 5/21/2021

## 2021-05-22 PROCEDURE — 6370000000 HC RX 637 (ALT 250 FOR IP): Performed by: INTERNAL MEDICINE

## 2021-05-22 PROCEDURE — 6360000002 HC RX W HCPCS: Performed by: INTERNAL MEDICINE

## 2021-05-22 PROCEDURE — 94640 AIRWAY INHALATION TREATMENT: CPT

## 2021-05-22 PROCEDURE — 1200000002 HC SEMI PRIVATE SWING BED

## 2021-05-22 PROCEDURE — 97110 THERAPEUTIC EXERCISES: CPT

## 2021-05-22 PROCEDURE — 2580000003 HC RX 258: Performed by: INTERNAL MEDICINE

## 2021-05-22 PROCEDURE — 94761 N-INVAS EAR/PLS OXIMETRY MLT: CPT

## 2021-05-22 RX ADMIN — SODIUM CHLORIDE, PRESERVATIVE FREE 10 ML: 5 INJECTION INTRAVENOUS at 17:38

## 2021-05-22 RX ADMIN — OXYCODONE HYDROCHLORIDE AND ACETAMINOPHEN 500 MG: 500 TABLET ORAL at 08:32

## 2021-05-22 RX ADMIN — ALBUTEROL SULFATE 2.5 MG: 2.5 SOLUTION RESPIRATORY (INHALATION) at 21:06

## 2021-05-22 RX ADMIN — MONTELUKAST SODIUM 10 MG: 10 TABLET, FILM COATED ORAL at 20:26

## 2021-05-22 RX ADMIN — PANTOPRAZOLE SODIUM 40 MG: 40 TABLET, DELAYED RELEASE ORAL at 06:28

## 2021-05-22 RX ADMIN — POTASSIUM CHLORIDE 10 MEQ: 750 TABLET, FILM COATED, EXTENDED RELEASE ORAL at 08:32

## 2021-05-22 RX ADMIN — ENOXAPARIN SODIUM 40 MG: 40 INJECTION SUBCUTANEOUS at 08:33

## 2021-05-22 RX ADMIN — FLUTICASONE PROPIONATE 1 SPRAY: 50 SPRAY, METERED NASAL at 08:36

## 2021-05-22 RX ADMIN — OXYCODONE AND ACETAMINOPHEN 1 TABLET: 5; 325 TABLET ORAL at 21:42

## 2021-05-22 RX ADMIN — DOCUSATE SODIUM 100 MG: 100 CAPSULE, LIQUID FILLED ORAL at 20:27

## 2021-05-22 RX ADMIN — HYDROCHLOROTHIAZIDE 25 MG: 25 TABLET ORAL at 08:32

## 2021-05-22 RX ADMIN — SPIRONOLACTONE 25 MG: 25 TABLET, FILM COATED ORAL at 08:32

## 2021-05-22 RX ADMIN — VANCOMYCIN HYDROCHLORIDE 125 MG: 125 CAPSULE ORAL at 08:32

## 2021-05-22 RX ADMIN — WATER 2000 MG: 1 INJECTION INTRAMUSCULAR; INTRAVENOUS; SUBCUTANEOUS at 00:25

## 2021-05-22 RX ADMIN — ASPIRIN 325 MG: 325 TABLET, COATED ORAL at 08:32

## 2021-05-22 RX ADMIN — TIOTROPIUM BROMIDE INHALATION SPRAY 1 PUFF: 3.12 SPRAY, METERED RESPIRATORY (INHALATION) at 08:42

## 2021-05-22 RX ADMIN — ISOSORBIDE MONONITRATE 30 MG: 30 TABLET, EXTENDED RELEASE ORAL at 08:32

## 2021-05-22 RX ADMIN — OXYCODONE AND ACETAMINOPHEN 1 TABLET: 5; 325 TABLET ORAL at 06:28

## 2021-05-22 RX ADMIN — SODIUM CHLORIDE, PRESERVATIVE FREE 10 ML: 5 INJECTION INTRAVENOUS at 08:33

## 2021-05-22 RX ADMIN — DOCUSATE SODIUM 100 MG: 100 CAPSULE, LIQUID FILLED ORAL at 08:32

## 2021-05-22 RX ADMIN — VANCOMYCIN HYDROCHLORIDE 125 MG: 125 CAPSULE ORAL at 20:27

## 2021-05-22 RX ADMIN — WATER 2000 MG: 1 INJECTION INTRAMUSCULAR; INTRAVENOUS; SUBCUTANEOUS at 09:16

## 2021-05-22 RX ADMIN — BUDESONIDE AND FORMOTEROL FUMARATE DIHYDRATE 2 PUFF: 160; 4.5 AEROSOL RESPIRATORY (INHALATION) at 21:07

## 2021-05-22 RX ADMIN — CHOLECALCIFEROL TAB 25 MCG (1000 UNIT) 1000 UNITS: 25 TAB at 08:32

## 2021-05-22 RX ADMIN — ALBUTEROL SULFATE 2.5 MG: 2.5 SOLUTION RESPIRATORY (INHALATION) at 08:41

## 2021-05-22 RX ADMIN — BUDESONIDE AND FORMOTEROL FUMARATE DIHYDRATE 2 PUFF: 160; 4.5 AEROSOL RESPIRATORY (INHALATION) at 08:42

## 2021-05-22 RX ADMIN — SODIUM CHLORIDE, PRESERVATIVE FREE 10 ML: 5 INJECTION INTRAVENOUS at 17:36

## 2021-05-22 RX ADMIN — ONDANSETRON 4 MG: 4 TABLET, ORALLY DISINTEGRATING ORAL at 18:42

## 2021-05-22 RX ADMIN — TAMSULOSIN HYDROCHLORIDE 0.4 MG: 0.4 CAPSULE ORAL at 17:35

## 2021-05-22 RX ADMIN — METFORMIN HYDROCHLORIDE 500 MG: 500 TABLET ORAL at 08:33

## 2021-05-22 RX ADMIN — THERA TABS 1 TABLET: TAB at 08:32

## 2021-05-22 RX ADMIN — ESCITALOPRAM OXALATE 20 MG: 10 TABLET ORAL at 08:32

## 2021-05-22 RX ADMIN — ATORVASTATIN CALCIUM 80 MG: 40 TABLET, FILM COATED ORAL at 20:26

## 2021-05-22 RX ADMIN — OXYCODONE AND ACETAMINOPHEN 1 TABLET: 5; 325 TABLET ORAL at 00:25

## 2021-05-22 RX ADMIN — IPRATROPIUM BROMIDE 0.5 MG: 0.5 SOLUTION RESPIRATORY (INHALATION) at 08:41

## 2021-05-22 RX ADMIN — METOPROLOL SUCCINATE 25 MG: 25 TABLET, EXTENDED RELEASE ORAL at 08:32

## 2021-05-22 RX ADMIN — TIOTROPIUM BROMIDE INHALATION SPRAY 1 PUFF: 3.12 SPRAY, METERED RESPIRATORY (INHALATION) at 21:08

## 2021-05-22 RX ADMIN — LOSARTAN POTASSIUM 25 MG: 50 TABLET ORAL at 08:33

## 2021-05-22 RX ADMIN — SODIUM CHLORIDE, PRESERVATIVE FREE 10 ML: 5 INJECTION INTRAVENOUS at 09:17

## 2021-05-22 RX ADMIN — IPRATROPIUM BROMIDE 0.5 MG: 0.5 SOLUTION RESPIRATORY (INHALATION) at 21:07

## 2021-05-22 RX ADMIN — Medication 1 CAPSULE: at 08:32

## 2021-05-22 RX ADMIN — SODIUM CHLORIDE, PRESERVATIVE FREE 10 ML: 5 INJECTION INTRAVENOUS at 20:26

## 2021-05-22 RX ADMIN — FERROUS SULFATE TAB 325 MG (65 MG ELEMENTAL FE) 325 MG: 325 (65 FE) TAB at 08:33

## 2021-05-22 RX ADMIN — WATER 2000 MG: 1 INJECTION INTRAMUSCULAR; INTRAVENOUS; SUBCUTANEOUS at 17:36

## 2021-05-22 RX ADMIN — Medication 1 CAPSULE: at 17:35

## 2021-05-22 ASSESSMENT — PAIN DESCRIPTION - PAIN TYPE
TYPE: ACUTE PAIN

## 2021-05-22 ASSESSMENT — PAIN DESCRIPTION - LOCATION
LOCATION: KNEE

## 2021-05-22 ASSESSMENT — PAIN SCALES - GENERAL
PAINLEVEL_OUTOF10: 2
PAINLEVEL_OUTOF10: 0
PAINLEVEL_OUTOF10: 3
PAINLEVEL_OUTOF10: 1
PAINLEVEL_OUTOF10: 3
PAINLEVEL_OUTOF10: 5
PAINLEVEL_OUTOF10: 1
PAINLEVEL_OUTOF10: 5

## 2021-05-22 ASSESSMENT — PAIN DESCRIPTION - ORIENTATION
ORIENTATION: RIGHT

## 2021-05-22 ASSESSMENT — PAIN DESCRIPTION - DESCRIPTORS: DESCRIPTORS: ACHING

## 2021-05-22 NOTE — PROGRESS NOTES
Phone: Ricky  Date: 2021  Fax: [phone]      Physical Therapy    Daily Note    Patient Name: Ami Ramon      : 1945  (70 y.o.)  MRN: [id number]       Discharge Recommendations: Home with assist PRN     Assessment  Bridging: Supervision           Scooting: Supervision    Sit to Stand: Contact guard assistance, Stand by assistance  Stand to sit: Contact guard assistance, Stand by assistance                        Assessment: Patient seated up in chair upon arrival with leg rest up. Pt required assistance to bring R LE down from recliner. Pt agrees to participate in therapy session. Pt able to complete seated exercises listed on log above with good tolerance, no c/o increased pain. Patient left in chair with tray table and call light in reach.   Safety Devices  Type of devices: Call light within reach, Gait belt, Left in chair, Nurse notified          Time In: 1027  Time Out: 1040  Timed Coded Minutes: 13  Total Treatment Time: 13    Exercises:  See Flowsheets    Plan  Cont Per Plan Of Care    Goals  Short Term Goals  Time Frame for Short term goals: 14 days - expires 21  Short term goal 1: Prevent loss of strength of bilateral UE's and left LE to allow patient to complete transfers and short distance amb  Short term goal 2: Transfer in/OOB and chair /commode indeependently  Short term goal 3: Ambulate with wh walker x 25 ft x 2 with supervision to safely negotiate home environment  Short term goal 4: Good standing balance to assist with self-care tasks  Short term goal 5: Gair+ endurance to complete above ambulation to reduce fall risk associated with fatigue    Long Term Goals                      Otilia Bell, HUBERT Therapy License Number: [id number]    Date: 2021

## 2021-05-22 NOTE — PLAN OF CARE
Problem: Pain:  Goal: Pain level will decrease  Description: Pain level will decrease  5/22/2021 8308 by Orlando Klein RN  Outcome: Met This Shift  5/21/2021 1813 by Gerg Hodges RN  Outcome: Ongoing  Goal: Control of acute pain  Description: Control of acute pain  Outcome: Met This Shift  Goal: Control of chronic pain  Description: Control of chronic pain  Outcome: Met This Shift     Problem: Skin Integrity:  Goal: Will show no infection signs and symptoms  Description: Will show no infection signs and symptoms  Outcome: Met This Shift  Goal: Absence of new skin breakdown  Description: Absence of new skin breakdown  5/22/2021 0633 by Orlando Klein RN  Outcome: Met This Shift  5/21/2021 1813 by Greg Hodges RN  Outcome: Ongoing     Problem: Falls - Risk of:  Goal: Will remain free from falls  Description: Will remain free from falls  5/22/2021 8220 by Orlando Klein RN  Outcome: Met This Shift  5/21/2021 1813 by Greg Hodges RN  Outcome: Ongoing  Goal: Absence of physical injury  Description: Absence of physical injury  Outcome: Met This Shift     Problem: Nutrition  Goal: Optimal nutrition therapy  Outcome: Met This Shift     Problem: Discharge Planning:  Goal: Discharged to appropriate level of care  Description: Discharged to appropriate level of care  5/22/2021 0633 by Orlando Klein RN  Outcome: Ongoing  5/21/2021 1813 by Greg Hodges RN  Outcome: Ongoing     Problem: Gas Exchange - Impaired:  Goal: Levels of oxygenation will improve  Description: Levels of oxygenation will improve  Outcome: Met This Shift     Problem: Infection, Septic Shock:  Goal: Will show no infection signs and symptoms  Description: Will show no infection signs and symptoms  Outcome: Met This Shift     Problem: Infection - Ventilator-Associated Pneumonia:  Goal: Absence of pulmonary infection  Description: Absence of pulmonary infection  Outcome: Met This Shift     Problem: Tissue Perfusion, Altered:  Goal: Circulatory function within specified parameters  Description: Circulatory function within specified parameters  Outcome: Met This Shift     Problem: Venous Thromboembolism:  Goal: Will show no signs or symptoms of venous thromboembolism  Description: Will show no signs or symptoms of venous thromboembolism  Outcome: Met This Shift  Goal: Absence of signs or symptoms of impaired coagulation  Description: Absence of signs or symptoms of impaired coagulation  Outcome: Met This Shift

## 2021-05-22 NOTE — PROGRESS NOTES
Hospitalist Progress Note  5/22/2021 10:54 AM  Subjective:   Admit Date: 5/15/2021  PCP: BESS Fuentes - CNP    Interval History:     Eda Alert states that he is doing well. Making progress with PT and OT. Pain in the knee is well controlled. Reports no chest pain, shortness of breath, cough, nausea vomiting. Bowels moving regularly. Appetite is good.     Diet: DIET CARB CONTROL; Carb Control: 4 carb choices (60 gms)/meal  Medications:   Scheduled Meds:   lactobacillus  1 capsule Oral BID WC    albuterol  2.5 mg Nebulization BID    ferrous sulfate  325 mg Oral Daily with breakfast    aspirin  325 mg Oral Daily    atorvastatin  80 mg Oral Nightly    hydroCHLOROthiazide  25 mg Oral Daily    isosorbide mononitrate  30 mg Oral Daily    losartan  25 mg Oral Daily    metoprolol succinate  25 mg Oral Daily    montelukast  10 mg Oral Nightly    multivitamin  1 tablet Oral Daily    spironolactone  25 mg Oral Daily    tamsulosin  0.4 mg Oral QPM    docusate sodium  100 mg Oral BID    escitalopram  20 mg Oral Daily    budesonide-formoterol  2 puff Inhalation Q12H    fluticasone  1 spray Nasal Daily    ipratropium  0.5 mg Nebulization BID    metFORMIN  500 mg Oral Daily with breakfast    pantoprazole  40 mg Oral QAM AC    tiotropium  1 puff Inhalation BID    vitamin C  500 mg Oral Daily    Vitamin D  1,000 Units Oral Daily    cefepime  2,000 mg Intravenous Q8H    enoxaparin  40 mg Subcutaneous Daily    potassium chloride  10 mEq Oral Daily with breakfast    sodium chloride flush  5-40 mL Intravenous 2 times per day    vancomycin  125 mg Oral 2 times per day     Continuous Infusions:   sodium chloride       PRN Medications: ondansetron, albuterol, oxyCODONE-acetaminophen, sodium chloride flush, sodium chloride    Objective:   Vitals: /70   Pulse 85   Temp 97.6 °F (36.4 °C) (Oral)   Resp 16   Ht 5' 10\" (1.778 m)   Wt (!) 311 lb (141.1 kg)   SpO2 98%   BMI 44.62 kg/m²   BMI: Body

## 2021-05-22 NOTE — PROGRESS NOTES
[facility]  Occupational Therapy  Daily Note  Date: 2021  Patient Name: Gloria Garner        MRN: [id number]    : 1945  (70 y.o.)    Subjective: Patient is seated in bedside chair upon arrival.  Pt is PROGRESSING toward goals and independence of Self Care this treatment session  Home Assist PRN      Assessment  Assessment: Tolerated treatment session well on this date. Completed exercises as noted. Remains seated in bedside chair with call light in reach. Activity Tolerance: Patient Tolerated treatment well                Exercises:  See Flowsheets    Goals  Short Term Goals  Time Frame for Short term goals: 10 days (2021)  Short term goal 1: Patient to transfer to bathroom with supervision only, completing toilet hygiene independently. Short term goal 2: Patient to complete UB/LB sponge bath with set up only. -MET UB  Short term goal 3: Patient to complete UB/LB dressing with set up only. -MET UB  Short term goal 4: Patient to tolerate 30 minutes BUE exercise with 1 or fewer rest breaks in order to improve endurance for ADL and IADL tasks.      Long Term Goals                      Timed Code Treatment Minutes: 25 Minutes     Time In: 7144  Time Out: 1112  Timed Coded Minutes: 25  Total Treatment Time: [address], OTR/L    Date: 2021

## 2021-05-23 LAB
ABSOLUTE EOS #: 0.4 K/UL (ref 0–0.4)
ABSOLUTE IMMATURE GRANULOCYTE: ABNORMAL K/UL (ref 0–0.3)
ABSOLUTE LYMPH #: 1.4 K/UL (ref 1–4.8)
ABSOLUTE MONO #: 0.6 K/UL (ref 0–1)
ANION GAP SERPL CALCULATED.3IONS-SCNC: 13 MMOL/L (ref 9–17)
BASOPHILS # BLD: 1 % (ref 0–2)
BASOPHILS ABSOLUTE: 0.1 K/UL (ref 0–0.2)
BUN BLDV-MCNC: 17 MG/DL (ref 8–23)
BUN/CREAT BLD: 19 (ref 9–20)
C-REACTIVE PROTEIN: 26.3 MG/L (ref 0–5)
CALCIUM SERPL-MCNC: 9.2 MG/DL (ref 8.6–10.4)
CHLORIDE BLD-SCNC: 102 MMOL/L (ref 98–107)
CO2: 24 MMOL/L (ref 20–31)
CREAT SERPL-MCNC: 0.9 MG/DL (ref 0.7–1.2)
DIFFERENTIAL TYPE: YES
EOSINOPHILS RELATIVE PERCENT: 5 % (ref 0–5)
GFR AFRICAN AMERICAN: >60 ML/MIN
GFR NON-AFRICAN AMERICAN: >60 ML/MIN
GFR SERPL CREATININE-BSD FRML MDRD: ABNORMAL ML/MIN/{1.73_M2}
GFR SERPL CREATININE-BSD FRML MDRD: ABNORMAL ML/MIN/{1.73_M2}
GLUCOSE BLD-MCNC: 129 MG/DL (ref 70–99)
HCT VFR BLD CALC: 32.3 % (ref 41–53)
HEMOGLOBIN: 10.7 G/DL (ref 13.5–17.5)
IMMATURE GRANULOCYTES: ABNORMAL %
LYMPHOCYTES # BLD: 21 % (ref 13–44)
MCH RBC QN AUTO: 26.7 PG (ref 26–34)
MCHC RBC AUTO-ENTMCNC: 33.2 G/DL (ref 31–37)
MCV RBC AUTO: 80.3 FL (ref 80–100)
MONOCYTES # BLD: 8 % (ref 5–9)
NRBC AUTOMATED: ABNORMAL PER 100 WBC
PDW BLD-RTO: 16.3 % (ref 12.1–15.2)
PLATELET # BLD: 311 K/UL (ref 140–450)
PLATELET ESTIMATE: ABNORMAL
PMV BLD AUTO: ABNORMAL FL (ref 6–12)
POTASSIUM SERPL-SCNC: 4 MMOL/L (ref 3.7–5.3)
RBC # BLD: 4.02 M/UL (ref 4.5–5.9)
RBC # BLD: ABNORMAL 10*6/UL
SEG NEUTROPHILS: 65 % (ref 39–75)
SEGMENTED NEUTROPHILS ABSOLUTE COUNT: 4.4 K/UL (ref 2.1–6.5)
SODIUM BLD-SCNC: 139 MMOL/L (ref 135–144)
WBC # BLD: 6.7 K/UL (ref 3.5–11)
WBC # BLD: ABNORMAL 10*3/UL

## 2021-05-23 PROCEDURE — 6360000002 HC RX W HCPCS: Performed by: INTERNAL MEDICINE

## 2021-05-23 PROCEDURE — 6370000000 HC RX 637 (ALT 250 FOR IP): Performed by: INTERNAL MEDICINE

## 2021-05-23 PROCEDURE — 85025 COMPLETE CBC W/AUTO DIFF WBC: CPT

## 2021-05-23 PROCEDURE — 80048 BASIC METABOLIC PNL TOTAL CA: CPT

## 2021-05-23 PROCEDURE — 2580000003 HC RX 258: Performed by: INTERNAL MEDICINE

## 2021-05-23 PROCEDURE — 1200000002 HC SEMI PRIVATE SWING BED

## 2021-05-23 PROCEDURE — 36415 COLL VENOUS BLD VENIPUNCTURE: CPT

## 2021-05-23 PROCEDURE — 97110 THERAPEUTIC EXERCISES: CPT

## 2021-05-23 PROCEDURE — 94640 AIRWAY INHALATION TREATMENT: CPT

## 2021-05-23 PROCEDURE — 86140 C-REACTIVE PROTEIN: CPT

## 2021-05-23 RX ADMIN — METOPROLOL SUCCINATE 25 MG: 25 TABLET, EXTENDED RELEASE ORAL at 08:36

## 2021-05-23 RX ADMIN — ASPIRIN 325 MG: 325 TABLET, COATED ORAL at 08:35

## 2021-05-23 RX ADMIN — SPIRONOLACTONE 25 MG: 25 TABLET, FILM COATED ORAL at 08:36

## 2021-05-23 RX ADMIN — SODIUM CHLORIDE, PRESERVATIVE FREE 20 ML: 5 INJECTION INTRAVENOUS at 17:30

## 2021-05-23 RX ADMIN — OXYCODONE HYDROCHLORIDE AND ACETAMINOPHEN 500 MG: 500 TABLET ORAL at 08:36

## 2021-05-23 RX ADMIN — VANCOMYCIN HYDROCHLORIDE 125 MG: 125 CAPSULE ORAL at 08:36

## 2021-05-23 RX ADMIN — WATER 2000 MG: 1 INJECTION INTRAMUSCULAR; INTRAVENOUS; SUBCUTANEOUS at 09:37

## 2021-05-23 RX ADMIN — Medication 1 CAPSULE: at 08:35

## 2021-05-23 RX ADMIN — ALBUTEROL SULFATE 2.5 MG: 2.5 SOLUTION RESPIRATORY (INHALATION) at 21:04

## 2021-05-23 RX ADMIN — OXYCODONE AND ACETAMINOPHEN 1 TABLET: 5; 325 TABLET ORAL at 21:29

## 2021-05-23 RX ADMIN — ALBUTEROL SULFATE 2.5 MG: 2.5 SOLUTION RESPIRATORY (INHALATION) at 09:25

## 2021-05-23 RX ADMIN — ESCITALOPRAM OXALATE 20 MG: 10 TABLET ORAL at 08:35

## 2021-05-23 RX ADMIN — TAMSULOSIN HYDROCHLORIDE 0.4 MG: 0.4 CAPSULE ORAL at 17:30

## 2021-05-23 RX ADMIN — DOCUSATE SODIUM 100 MG: 100 CAPSULE, LIQUID FILLED ORAL at 08:35

## 2021-05-23 RX ADMIN — DOCUSATE SODIUM 100 MG: 100 CAPSULE, LIQUID FILLED ORAL at 21:29

## 2021-05-23 RX ADMIN — IPRATROPIUM BROMIDE 0.5 MG: 0.5 SOLUTION RESPIRATORY (INHALATION) at 09:31

## 2021-05-23 RX ADMIN — BUDESONIDE AND FORMOTEROL FUMARATE DIHYDRATE 2 PUFF: 160; 4.5 AEROSOL RESPIRATORY (INHALATION) at 09:41

## 2021-05-23 RX ADMIN — PANTOPRAZOLE SODIUM 40 MG: 40 TABLET, DELAYED RELEASE ORAL at 07:23

## 2021-05-23 RX ADMIN — LOSARTAN POTASSIUM 25 MG: 50 TABLET ORAL at 08:36

## 2021-05-23 RX ADMIN — ENOXAPARIN SODIUM 40 MG: 40 INJECTION SUBCUTANEOUS at 08:36

## 2021-05-23 RX ADMIN — BUDESONIDE AND FORMOTEROL FUMARATE DIHYDRATE 2 PUFF: 160; 4.5 AEROSOL RESPIRATORY (INHALATION) at 21:04

## 2021-05-23 RX ADMIN — IPRATROPIUM BROMIDE 0.5 MG: 0.5 SOLUTION RESPIRATORY (INHALATION) at 21:04

## 2021-05-23 RX ADMIN — HYDROCHLOROTHIAZIDE 25 MG: 25 TABLET ORAL at 08:35

## 2021-05-23 RX ADMIN — TIOTROPIUM BROMIDE INHALATION SPRAY 1 PUFF: 3.12 SPRAY, METERED RESPIRATORY (INHALATION) at 21:05

## 2021-05-23 RX ADMIN — WATER 2000 MG: 1 INJECTION INTRAMUSCULAR; INTRAVENOUS; SUBCUTANEOUS at 01:31

## 2021-05-23 RX ADMIN — METFORMIN HYDROCHLORIDE 500 MG: 500 TABLET ORAL at 08:35

## 2021-05-23 RX ADMIN — OXYCODONE AND ACETAMINOPHEN 1 TABLET: 5; 325 TABLET ORAL at 07:23

## 2021-05-23 RX ADMIN — CHOLECALCIFEROL TAB 25 MCG (1000 UNIT) 1000 UNITS: 25 TAB at 08:36

## 2021-05-23 RX ADMIN — FLUTICASONE PROPIONATE 1 SPRAY: 50 SPRAY, METERED NASAL at 08:38

## 2021-05-23 RX ADMIN — SODIUM CHLORIDE, PRESERVATIVE FREE 20 ML: 5 INJECTION INTRAVENOUS at 09:38

## 2021-05-23 RX ADMIN — WATER 2000 MG: 1 INJECTION INTRAMUSCULAR; INTRAVENOUS; SUBCUTANEOUS at 17:29

## 2021-05-23 RX ADMIN — ISOSORBIDE MONONITRATE 30 MG: 30 TABLET, EXTENDED RELEASE ORAL at 08:36

## 2021-05-23 RX ADMIN — FERROUS SULFATE TAB 325 MG (65 MG ELEMENTAL FE) 325 MG: 325 (65 FE) TAB at 08:35

## 2021-05-23 RX ADMIN — SODIUM CHLORIDE, PRESERVATIVE FREE 10 ML: 5 INJECTION INTRAVENOUS at 21:29

## 2021-05-23 RX ADMIN — VANCOMYCIN HYDROCHLORIDE 125 MG: 125 CAPSULE ORAL at 21:29

## 2021-05-23 RX ADMIN — SODIUM CHLORIDE, PRESERVATIVE FREE 10 ML: 5 INJECTION INTRAVENOUS at 08:36

## 2021-05-23 RX ADMIN — POTASSIUM CHLORIDE 10 MEQ: 750 TABLET, FILM COATED, EXTENDED RELEASE ORAL at 08:36

## 2021-05-23 RX ADMIN — TIOTROPIUM BROMIDE INHALATION SPRAY 1 PUFF: 3.12 SPRAY, METERED RESPIRATORY (INHALATION) at 09:43

## 2021-05-23 RX ADMIN — THERA TABS 1 TABLET: TAB at 08:36

## 2021-05-23 RX ADMIN — Medication 1 CAPSULE: at 17:30

## 2021-05-23 RX ADMIN — ATORVASTATIN CALCIUM 80 MG: 40 TABLET, FILM COATED ORAL at 21:29

## 2021-05-23 RX ADMIN — MONTELUKAST SODIUM 10 MG: 10 TABLET, FILM COATED ORAL at 21:29

## 2021-05-23 ASSESSMENT — PAIN DESCRIPTION - DESCRIPTORS
DESCRIPTORS: ACHING
DESCRIPTORS: ACHING

## 2021-05-23 ASSESSMENT — PAIN DESCRIPTION - ORIENTATION
ORIENTATION: RIGHT
ORIENTATION: RIGHT

## 2021-05-23 ASSESSMENT — PAIN SCALES - GENERAL
PAINLEVEL_OUTOF10: 4
PAINLEVEL_OUTOF10: 0
PAINLEVEL_OUTOF10: 4
PAINLEVEL_OUTOF10: 0
PAINLEVEL_OUTOF10: 2
PAINLEVEL_OUTOF10: 2

## 2021-05-23 ASSESSMENT — PAIN DESCRIPTION - LOCATION
LOCATION: KNEE
LOCATION: KNEE

## 2021-05-23 ASSESSMENT — PAIN DESCRIPTION - PAIN TYPE
TYPE: ACUTE PAIN
TYPE: ACUTE PAIN

## 2021-05-23 NOTE — PROGRESS NOTES
Phone: Ricky  Date: 2021  Fax: [phone]      Physical Therapy    Daily Note    Patient Name: Dari Shelby      : 1945  (70 y.o.)  MRN: [id number]       Discharge Recommendations: Home with assist PRN     Assessment  Bridging: Supervision           Scooting: Supervision    Sit to Stand: Contact guard assistance, Stand by assistance  Stand to sit: Contact guard assistance, Stand by assistance                WB Status: NWB R LE          Assessment: Patient seated in rexliner upon entering room. Exercises completed on L LE to improve with strengthening. Pt with good tolerance. Plan to progress per pt tolerance. Tray table and call light within reach for pt.   Safety Devices  Type of devices: Call light within reach, Gait belt, Left in chair, Nurse notified          Time In: 1020  Time Out: 0077  Timed Coded Minutes: 12  Total Treatment Time: 12    Exercises:  See Flowsheets    Plan  Cont Per Plan Of Care    Goals  Short Term Goals  Time Frame for Short term goals: 14 days - expires 21  Short term goal 1: Prevent loss of strength of bilateral UE's and left LE to allow patient to complete transfers and short distance amb  Short term goal 2: Transfer in/OOB and chair /commode indeependently  Short term goal 3: Ambulate with wh walker x 25 ft x 2 with supervision to safely negotiate home environment  Short term goal 4: Good standing balance to assist with self-care tasks  Short term goal 5: Gair+ endurance to complete above ambulation to reduce fall risk associated with fatigue    Long Term Goals                      Geeta Hooker PTA Therapy License Number: [id number]    Date: 2021

## 2021-05-23 NOTE — PLAN OF CARE
thromboembolism  Outcome: Met This Shift  Goal: Absence of signs or symptoms of impaired coagulation  Description: Absence of signs or symptoms of impaired coagulation  Outcome: Met This Shift     Problem: OXYGENATION/RESPIRATORY FUNCTION  Goal: Patient will maintain patent airway  Outcome: Met This Shift  Goal: Patient will achieve/maintain normal respiratory rate/effort  Description: Respiratory rate and effort will be within normal limits for the patient  Outcome: Met This Shift     Problem: HEMODYNAMIC STATUS  Goal: Patient has stable vital signs and fluid balance  Outcome: Met This Shift     Problem: FLUID AND ELECTROLYTE IMBALANCE  Goal: Fluid and electrolyte balance are achieved/maintained  Outcome: Met This Shift     Problem: ACTIVITY INTOLERANCE/IMPAIRED MOBILITY  Goal: Mobility/activity is maintained at optimum level for patient  Outcome: Met This Shift     Problem: Gas Exchange - Impaired:  Goal: Levels of oxygenation will improve  Description: Levels of oxygenation will improve  Outcome: Met This Shift     Problem: Cardiac:  Goal: Ability to maintain vital signs within normal range will improve  Description: Ability to maintain vital signs within normal range will improve  Outcome: Met This Shift  Goal: Cardiovascular alteration will improve  Description: Cardiovascular alteration will improve  Outcome: Met This Shift     Problem: Health Behavior:  Goal: Will modify at least one risk factor affecting health status  Description: Will modify at least one risk factor affecting health status  Outcome: Met This Shift  Goal: Identification of resources available to assist in meeting health care needs will improve  Description: Identification of resources available to assist in meeting health care needs will improve  Outcome: Met This Shift     Problem: Physical Regulation:  Goal: Complications related to the disease process, condition or treatment will be avoided or minimized  Description: Complications related to the disease process, condition or treatment will be avoided or minimized  Outcome: Met This Shift

## 2021-05-23 NOTE — PROGRESS NOTES
[facility]  Occupational Therapy  Daily Note  Date: 2021  Patient Name: Tram Monae        MRN: [id number]    : 1945  (70 y.o.)    Subjective: Patient is seated in bedside chair upon arrival.  Pt is PROGRESSING toward goals and independence of Self Care this treatment session  Continue to assess Pending Progress    Assessment  Patient is seated in bedside chair upon arrival.  Patient refuses completing ADL task but is agreeable to UE exercise for continued UB strength. Tolerates well. Remains seated in bedside chair with call light in reach. Exercises:  See Flowsheets    Goals  Short Term Goals  Time Frame for Short term goals: 10 days (2021)  Short term goal 1: Patient to transfer to bathroom with supervision only, completing toilet hygiene independently. Short term goal 2: Patient to complete UB/LB sponge bath with set up only. -MET UB  Short term goal 3: Patient to complete UB/LB dressing with set up only. -MET UB  Short term goal 4: Patient to tolerate 30 minutes BUE exercise with 1 or fewer rest breaks in order to improve endurance for ADL and IADL tasks.      Long Term Goals                      Timed Code Treatment Minutes: 15 Minutes     Time In: 1050  Time Out: 4976  Timed Coded Minutes: 15  Total Treatment Time: 78[address] [facility], OTR/L    Date: 2021

## 2021-05-24 PROCEDURE — 1200000002 HC SEMI PRIVATE SWING BED

## 2021-05-24 PROCEDURE — 6360000002 HC RX W HCPCS: Performed by: INTERNAL MEDICINE

## 2021-05-24 PROCEDURE — 6370000000 HC RX 637 (ALT 250 FOR IP): Performed by: INTERNAL MEDICINE

## 2021-05-24 PROCEDURE — 97116 GAIT TRAINING THERAPY: CPT

## 2021-05-24 PROCEDURE — 94640 AIRWAY INHALATION TREATMENT: CPT

## 2021-05-24 PROCEDURE — 97110 THERAPEUTIC EXERCISES: CPT

## 2021-05-24 PROCEDURE — 2580000003 HC RX 258: Performed by: INTERNAL MEDICINE

## 2021-05-24 PROCEDURE — 94761 N-INVAS EAR/PLS OXIMETRY MLT: CPT

## 2021-05-24 RX ADMIN — ISOSORBIDE MONONITRATE 30 MG: 30 TABLET, EXTENDED RELEASE ORAL at 08:20

## 2021-05-24 RX ADMIN — VANCOMYCIN HYDROCHLORIDE 125 MG: 125 CAPSULE ORAL at 21:46

## 2021-05-24 RX ADMIN — BUDESONIDE AND FORMOTEROL FUMARATE DIHYDRATE 2 PUFF: 160; 4.5 AEROSOL RESPIRATORY (INHALATION) at 09:28

## 2021-05-24 RX ADMIN — IPRATROPIUM BROMIDE 0.5 MG: 0.5 SOLUTION RESPIRATORY (INHALATION) at 09:28

## 2021-05-24 RX ADMIN — PANTOPRAZOLE SODIUM 40 MG: 40 TABLET, DELAYED RELEASE ORAL at 06:40

## 2021-05-24 RX ADMIN — THERA TABS 1 TABLET: TAB at 08:22

## 2021-05-24 RX ADMIN — ATORVASTATIN CALCIUM 80 MG: 40 TABLET, FILM COATED ORAL at 21:46

## 2021-05-24 RX ADMIN — MONTELUKAST SODIUM 10 MG: 10 TABLET, FILM COATED ORAL at 21:46

## 2021-05-24 RX ADMIN — FERROUS SULFATE TAB 325 MG (65 MG ELEMENTAL FE) 325 MG: 325 (65 FE) TAB at 08:19

## 2021-05-24 RX ADMIN — METFORMIN HYDROCHLORIDE 500 MG: 500 TABLET ORAL at 08:19

## 2021-05-24 RX ADMIN — Medication 1 CAPSULE: at 16:14

## 2021-05-24 RX ADMIN — DOCUSATE SODIUM 100 MG: 100 CAPSULE, LIQUID FILLED ORAL at 08:19

## 2021-05-24 RX ADMIN — SODIUM CHLORIDE, PRESERVATIVE FREE 10 ML: 5 INJECTION INTRAVENOUS at 08:26

## 2021-05-24 RX ADMIN — HYDROCHLOROTHIAZIDE 25 MG: 25 TABLET ORAL at 08:20

## 2021-05-24 RX ADMIN — WATER 2000 MG: 1 INJECTION INTRAMUSCULAR; INTRAVENOUS; SUBCUTANEOUS at 18:05

## 2021-05-24 RX ADMIN — OXYCODONE HYDROCHLORIDE AND ACETAMINOPHEN 500 MG: 500 TABLET ORAL at 08:21

## 2021-05-24 RX ADMIN — SODIUM CHLORIDE, PRESERVATIVE FREE 10 ML: 5 INJECTION INTRAVENOUS at 18:13

## 2021-05-24 RX ADMIN — POTASSIUM CHLORIDE 10 MEQ: 750 TABLET, FILM COATED, EXTENDED RELEASE ORAL at 08:20

## 2021-05-24 RX ADMIN — ASPIRIN 325 MG: 325 TABLET, COATED ORAL at 08:21

## 2021-05-24 RX ADMIN — ALBUTEROL SULFATE 2.5 MG: 2.5 SOLUTION RESPIRATORY (INHALATION) at 09:28

## 2021-05-24 RX ADMIN — OXYCODONE AND ACETAMINOPHEN 1 TABLET: 5; 325 TABLET ORAL at 21:46

## 2021-05-24 RX ADMIN — ENOXAPARIN SODIUM 40 MG: 40 INJECTION SUBCUTANEOUS at 08:22

## 2021-05-24 RX ADMIN — WATER 2000 MG: 1 INJECTION INTRAMUSCULAR; INTRAVENOUS; SUBCUTANEOUS at 08:32

## 2021-05-24 RX ADMIN — BUDESONIDE AND FORMOTEROL FUMARATE DIHYDRATE 2 PUFF: 160; 4.5 AEROSOL RESPIRATORY (INHALATION) at 21:11

## 2021-05-24 RX ADMIN — OXYCODONE AND ACETAMINOPHEN 1 TABLET: 5; 325 TABLET ORAL at 15:21

## 2021-05-24 RX ADMIN — IPRATROPIUM BROMIDE 0.5 MG: 0.5 SOLUTION RESPIRATORY (INHALATION) at 21:11

## 2021-05-24 RX ADMIN — TIOTROPIUM BROMIDE INHALATION SPRAY 1 PUFF: 3.12 SPRAY, METERED RESPIRATORY (INHALATION) at 21:11

## 2021-05-24 RX ADMIN — VANCOMYCIN HYDROCHLORIDE 125 MG: 125 CAPSULE ORAL at 08:19

## 2021-05-24 RX ADMIN — LOSARTAN POTASSIUM 25 MG: 50 TABLET ORAL at 08:18

## 2021-05-24 RX ADMIN — WATER 2000 MG: 1 INJECTION INTRAMUSCULAR; INTRAVENOUS; SUBCUTANEOUS at 01:20

## 2021-05-24 RX ADMIN — OXYCODONE AND ACETAMINOPHEN 1 TABLET: 5; 325 TABLET ORAL at 08:20

## 2021-05-24 RX ADMIN — FLUTICASONE PROPIONATE 1 SPRAY: 50 SPRAY, METERED NASAL at 08:36

## 2021-05-24 RX ADMIN — METOPROLOL SUCCINATE 25 MG: 25 TABLET, EXTENDED RELEASE ORAL at 08:21

## 2021-05-24 RX ADMIN — SPIRONOLACTONE 25 MG: 25 TABLET, FILM COATED ORAL at 08:20

## 2021-05-24 RX ADMIN — CHOLECALCIFEROL TAB 25 MCG (1000 UNIT) 1000 UNITS: 25 TAB at 08:20

## 2021-05-24 RX ADMIN — TIOTROPIUM BROMIDE INHALATION SPRAY 1 PUFF: 3.12 SPRAY, METERED RESPIRATORY (INHALATION) at 09:28

## 2021-05-24 RX ADMIN — DOCUSATE SODIUM 100 MG: 100 CAPSULE, LIQUID FILLED ORAL at 21:46

## 2021-05-24 RX ADMIN — ESCITALOPRAM OXALATE 20 MG: 10 TABLET ORAL at 08:19

## 2021-05-24 RX ADMIN — Medication 1 CAPSULE: at 08:19

## 2021-05-24 RX ADMIN — TAMSULOSIN HYDROCHLORIDE 0.4 MG: 0.4 CAPSULE ORAL at 21:46

## 2021-05-24 RX ADMIN — ALBUTEROL SULFATE 2.5 MG: 2.5 SOLUTION RESPIRATORY (INHALATION) at 21:11

## 2021-05-24 ASSESSMENT — PAIN SCALES - GENERAL
PAINLEVEL_OUTOF10: 6
PAINLEVEL_OUTOF10: 3
PAINLEVEL_OUTOF10: 0
PAINLEVEL_OUTOF10: 1
PAINLEVEL_OUTOF10: 6
PAINLEVEL_OUTOF10: 3
PAINLEVEL_OUTOF10: 1
PAINLEVEL_OUTOF10: 7
PAINLEVEL_OUTOF10: 2

## 2021-05-24 ASSESSMENT — PAIN DESCRIPTION - ORIENTATION: ORIENTATION: RIGHT

## 2021-05-24 ASSESSMENT — PAIN DESCRIPTION - LOCATION: LOCATION: KNEE

## 2021-05-24 ASSESSMENT — PAIN DESCRIPTION - DESCRIPTORS: DESCRIPTORS: ACHING

## 2021-05-24 NOTE — PROGRESS NOTES
[facility]OSP [facility]  Swing Bed Interdisciplinary Care Plan Conference Report   Recertification for Continued Skilled Care. Patients name:Pete Elder  Date of Conference: 5/24/2021    The Following Information was discussed and agreed upon with the patient and/or Caregivers as listed below. Names of Team Members and Caregivers present for meeting:  : ASHLYN Franz  Nursing: OLMAN Rock  Therapy: Manuel Duke, PT; Christel Hodge L/OTR  Dietician:   Activities: Stefanie [address]en Dr:   Caregivers:    [] Spouse  [] Child/Children [] Significant Other   [] Other:     Nutrition:  Current Body Weight:   Wt Readings from Last 1 Encounters:   05/24/21 (!) 311 lb 11.2 oz (141.4 kg)     Weight Change: 4.7% weight losses in last month based on 327# value in April  Current Diet order:DIET CARB CONTROL; Carb Control: 4 carb choices (60 gms)/meal  Intakes: %  Diet Education Provided: Continued encouragement for protein foods at meals   Goal: PO >75% meals and supplements    Spiritual:  Moravian needs met: [x] Yes  [] No  [] N/A  Notified Stephany Conway or [place] of admission: [] Yes  [] No  [x] N/A  Patient added to Communion List: [] Yes  [] No  [x] N/A  Any other concerns or comments:   Goal: Participate 2-3 times per week    Occupational Therapy:  Current ADL Status: ADL  Feeding: Independent  Grooming: Setup  UE Bathing: Setup  LE Bathing: None  UE Dressing: Setup  LE Dressing: None  Toileting: None  Safety: Good  Recommendations for adaptive equipment:   [] Long handled sponge   [] Long Handled [address]  [] Extended Tub Bench  Short term goals  Time Frame for Short term goals: 10 days (5-)  Short term goal 1: Patient to transfer to bathroom with supervision only, completing toilet hygiene independently. Short term goal 2: Patient to complete UB/LB sponge bath with set up only. -MET UB  Short term goal 3: Patient to complete UB/LB dressing with set up only.  -MET UB  Short term goal 4: Patient to tolerate 30 minutes BUE exercise with 1 or fewer rest breaks in order to improve endurance for ADL and IADL tasks. Physical Therapy:  Transfers:   Transfers  Sit to Stand: Contact guard assistance, Stand by assistance  Stand to sit: Contact guard assistance, Stand by assistance  Mobility/Ambulation:   Ambulation 1  Surface: level tile  Device: Rolling Walker  Assistance: Contact guard assistance  Distance: 10 ft with seated rest break and then additional 10 ft  Comments: Patent able to maintain NWB status well but does become moderately SOB with activty level/ambulation. Patient must have immoblizer on when up   Equipment:   [x] Rolling Walker   [] Straight Cane  [] Standard Walker  Safety: Good  Short Term Goals:  Time Frame for Short term goals: 14 days - expires 5/30/21  Short term goal 1: Prevent loss of strength of bilateral UE's and left LE to allow patient to complete transfers and short distance amb  Short term goal 2: Transfer in/OOB and chair /commode indeependently  Short term goal 3: Ambulate with wh walker x 25 ft x 2 with supervision to safely negotiate home environment  Short term goal 4: Good standing balance to assist with self-care tasks  Short term goal 5: Gair+ endurance to complete above ambulation to reduce fall risk associated with fatigue    Speech Therapy:     Respiratory Therapy:     Nursing:  Skin/Wound/Incisions:  Skin Integrity  Skin Integrity: Other (Comment) (surg incision rt knee)  Location: right knee  Preventative Dressing: Yes  Assessed this shift?: Yes     Pain Control: Percocet  New Medications since admission to Swing Bed:   Anticoagulants: lovenox   Goals:   Pt will remain free from falls  Pt will have control of acute pain    Activities: Activity as tolerated, computer, TV, reads  Goal: Participate in 3 activities per week    :  Plan for Discharge: Plan to re evaluate swing bed progress again next Tuesday at swing bed IDT meeting.    Follow Up/Services needed:     Continued skilled needs: PT/OT  Skilled Services are for the ongoing condition for which the individual received inpatient care in a hospital.    Physician signature certifies patient continued need for SNF inpatient care.

## 2021-05-24 NOTE — PLAN OF CARE
Problem: Pain:  Description: Pain management should include both nonpharmacologic and pharmacologic interventions.   Goal: Pain level will decrease  Description: Pain level will decrease  Outcome: Met This Shift     Problem: Skin Integrity:  Goal: Will show no infection signs and symptoms  Description: Will show no infection signs and symptoms  Outcome: Met This Shift     Problem: Falls - Risk of:  Goal: Will remain free from falls  Description: Will remain free from falls  Outcome: Met This Shift  Goal: Absence of physical injury  Description: Absence of physical injury  Outcome: Met This Shift     Problem: Gas Exchange - Impaired:  Goal: Levels of oxygenation will improve  Description: Levels of oxygenation will improve  Outcome: Met This Shift     Problem: Gas Exchange - Impaired:  Goal: Levels of oxygenation will improve  Description: Levels of oxygenation will improve  Outcome: Met This Shift     Problem: Nutrition  Goal: Optimal nutrition therapy  5/24/2021 0836 by Dontae Thao RD, LD  Outcome: Ongoing  Note: Nutrition Problem #1: Increased nutrient needs  Intervention: Food and/or Nutrient Delivery: Continue Current Diet  Nutritional Goals: PO >75% with good protein choices  Culturelle and yogurt     Problem: ACTIVITY INTOLERANCE/IMPAIRED MOBILITY  Goal: Mobility/activity is maintained at optimum level for patient  Outcome: Ongoing     Problem: Infection - Ventilator-Associated Pneumonia:  Goal: Absence of pulmonary infection  Description: Absence of pulmonary infection  Outcome: Completed

## 2021-05-24 NOTE — PROGRESS NOTES
Value Date     05/23/2021    K 4.0 05/23/2021     05/23/2021    CO2 24 05/23/2021    BUN 17 05/23/2021    CREATININE 0.90 05/23/2021    GLUCOSE 129 (H) 05/23/2021    CALCIUM 9.2 05/23/2021    PROT 6.5 04/29/2021    LABALBU 3.5 04/29/2021    BILITOT 0.44 04/29/2021    ALKPHOS 98 04/29/2021    AST 30 04/29/2021    ALT 23 04/29/2021    LABGLOM >60 05/23/2021    GFRAA >60 05/23/2021    GLOB NOT REPORTED 10/22/2019     No results for input(s): POCGLU in the last 72 hours.     Nutrition Interventions:   Food and/or Nutrient Delivery:  Continue Current Diet  Nutrition Education/Counseling:  No recommendation at this time   Coordination of Nutrition Care:  Continue to monitor while inpatient    Goals:  PO >75% with good protein choices       Nutrition Monitoring and Evaluation:   Behavioral-Environmental Outcomes:  None Identified   Food/Nutrient Intake Outcomes:  Food and Nutrient Intake  Physical Signs/Symptoms Outcomes:  Biochemical Data, Weight, GI Status     Discharge Planning:    Continue current diet     Electronically signed by Russ Chapman RD, LD on 5/24/21 at 8:30 AM EDT    Contact: [phone]

## 2021-05-24 NOTE — PROGRESS NOTES
[facility]  Occupational Therapy  Daily Note  Date: 2021  Patient Name: Ruddy Gee        MRN: [id number]    : 1945  (70 y.o.)    Subjective: Patient is seated in bedside chair upon arrival.  Pt is PROGRESSING toward goals and independence of Self Care this treatment session  Continue to assess Pending Progress      Assessment  Assessment: Pt seated in recliner upon arrival, agreeable to OT session. Declines need to complete ADLs at this time. Tolerates treatment session well consisting of UB resistive arm exercises in order to improve UB strength and endurance to assist with daily life tasks. Completed exercises 2x20 using 5# straight weight. Does not require rest periods during exericise, no SOB noted. Pt remains in recliner at end of session. Call light within reach. Will continue to address goals as able. Activity Tolerance: Patient Tolerated treatment well    Exercises:  See Flowsheets    Goals  Short Term Goals  Time Frame for Short term goals: 10 days (2021)  Short term goal 1: Patient to transfer to bathroom with supervision only, completing toilet hygiene independently. Short term goal 2: Patient to complete UB/LB sponge bath with set up only. -MET UB  Short term goal 3: Patient to complete UB/LB dressing with set up only. -MET UB  Short term goal 4: Patient to tolerate 30 minutes BUE exercise with 1 or fewer rest breaks in order to improve endurance for ADL and IADL tasks.     Time In: 1116  Time Out: 1141  Timed Coded Minutes: 25  Total Treatment Time: 25    JORGE Huizar  Date: 2021

## 2021-05-24 NOTE — PROGRESS NOTES
Phone: Ricky  Date: 2021  Fax: [phone]      Physical Therapy    Daily Note    Patient Name: Burgess Luis      : 1945  (70 y.o.)  MRN: [id number]     Pt is PROGRESSING toward goals and increased independence of mobility this treatment session  Discharge Recommendations: Home with assist PRN     Sit to Stand: Contact guard assistance, Stand by assistance  Stand to sit: Contact guard assistance, Stand by assistance    WB Status: NWB R LE  Ambulation 1  Surface: level tile  Device: Rolling Walker  Assistance: Contact guard assistance  Distance: 10 ft with seated rest break and then additional 10 ft  Comments: Patent able to maintain NWB status well but does become moderately SOB with activty level/ambulation. Patient must have immoblizer on when up     Assessment: Pt up in chair upon arrival , just finished RT. Agreeable to participate. Performed LE strengthening as outlined, progressed sets of ex's except for hip flexion d/t pt states he occasionally is getting left hip pain. Ambulated in room  with 1 seated rest.  Amb NWB with walker and CGA. Transfers with CGA. Pt remained up in chair with LE's elevated .   Safety Devices  Type of devices: Call light within reach, Gait belt, Left in chair          Time In: 0945  Time Out: 1015  Timed Coded Minutes: 30  Total Treatment Time: 30    Exercises:  See Flowsheets    Plan  Cont Per Plan Of Care    Goals  Short Term Goals  Time Frame for Short term goals: 14 days - expires 21  Short term goal 1: Prevent loss of strength of bilateral UE's and left LE to allow patient to complete transfers and short distance amb  Short term goal 2: Transfer in/OOB and chair /commode indeependently  Short term goal 3: Ambulate with wh walker x 25 ft x 2 with supervision to safely negotiate home environment  Short term goal 4: Good standing balance to assist with self-care tasks  Short term goal 5: Gair+ endurance to complete above ambulation to reduce fall risk associated with fatigue    [facility] License Number: [id number]    Date: 5/24/2021

## 2021-05-24 NOTE — PROGRESS NOTES
SW, RN case manager, and swing bed coordinator met with pt for swing bed IDT team meeting. Pt doing well with therapies and they may discharge services toward the end of this week. Pt to remain in swing bed for IV antibiotics and sees the ID and ortho Drs again on Jun 3rd and will re evaluate after these appointments. Pt expresses understanding and is in agreement. SW will follow and remain available.  Saad Schwarz MSW LSW 5/24/2021

## 2021-05-24 NOTE — PROGRESS NOTES
Phone: Ricky  Date: 2021  Fax: [phone]      Physical Therapy    Daily Note    Patient Name: Maru Dudley      : 1945  (70 y.o.)  MRN: [id number]     Pt is PROGRESSING toward goals and increased independence of mobility this treatment session  Discharge Recommendations: Home with assist PRN     WB Status: NWB R LE  Ambulation 1  Surface: level tile  Device: Rolling Walker  Assistance: Contact guard assistance  Distance: 10 ft with seated rest break and then additional 10 ft  Comments: Patent able to maintain NWB status well but does become moderately SOB with activty level/ambulation. Patient must have immoblizer on when up              Assessment: Pt up in chair upon arrival.  Performed ex as outlined followed by ambulation in his room. CGA for transfers and gait. Remained up in chair after session.    Safety Devices  Type of devices: Call light within reach, Gait belt, Left in chair          Time In: 1345  Time Out: 1410  Timed Coded Minutes: 25  Total Treatment Time: 25    Exercises:  See [address] Per Plan Of Care    Goals  Short Term Goals  Time Frame for Short term goals: 14 days - expires 21  Short term goal 1: Prevent loss of strength of bilateral UE's and left LE to allow patient to complete transfers and short distance amb  Short term goal 2: Transfer in/OOB and chair /commode indeependently  Short term goal 3: Ambulate with wh walker x 25 ft x 2 with supervision to safely negotiate home environment  Short term goal 4: Good standing balance to assist with self-care tasks  Short term goal 5: Gair+ endurance to complete above ambulation to reduce fall risk associated with fatigue      Pavithra Arnett Therapy License Number: [id number]    Date: 2021
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
## 2021-05-25 PROCEDURE — 97110 THERAPEUTIC EXERCISES: CPT

## 2021-05-25 PROCEDURE — 1200000002 HC SEMI PRIVATE SWING BED

## 2021-05-25 PROCEDURE — 6360000002 HC RX W HCPCS: Performed by: INTERNAL MEDICINE

## 2021-05-25 PROCEDURE — 94640 AIRWAY INHALATION TREATMENT: CPT

## 2021-05-25 PROCEDURE — 94761 N-INVAS EAR/PLS OXIMETRY MLT: CPT

## 2021-05-25 PROCEDURE — 97116 GAIT TRAINING THERAPY: CPT

## 2021-05-25 PROCEDURE — 93005 ELECTROCARDIOGRAM TRACING: CPT | Performed by: INTERNAL MEDICINE

## 2021-05-25 PROCEDURE — 2580000003 HC RX 258: Performed by: INTERNAL MEDICINE

## 2021-05-25 PROCEDURE — 6370000000 HC RX 637 (ALT 250 FOR IP): Performed by: INTERNAL MEDICINE

## 2021-05-25 RX ADMIN — BUDESONIDE AND FORMOTEROL FUMARATE DIHYDRATE 2 PUFF: 160; 4.5 AEROSOL RESPIRATORY (INHALATION) at 21:13

## 2021-05-25 RX ADMIN — VANCOMYCIN HYDROCHLORIDE 125 MG: 125 CAPSULE ORAL at 08:43

## 2021-05-25 RX ADMIN — TAMSULOSIN HYDROCHLORIDE 0.4 MG: 0.4 CAPSULE ORAL at 17:35

## 2021-05-25 RX ADMIN — FERROUS SULFATE TAB 325 MG (65 MG ELEMENTAL FE) 325 MG: 325 (65 FE) TAB at 08:43

## 2021-05-25 RX ADMIN — ONDANSETRON 4 MG: 4 TABLET, ORALLY DISINTEGRATING ORAL at 14:16

## 2021-05-25 RX ADMIN — TIOTROPIUM BROMIDE INHALATION SPRAY 1 PUFF: 3.12 SPRAY, METERED RESPIRATORY (INHALATION) at 21:13

## 2021-05-25 RX ADMIN — ESCITALOPRAM OXALATE 20 MG: 10 TABLET ORAL at 08:43

## 2021-05-25 RX ADMIN — ALBUTEROL SULFATE 2.5 MG: 2.5 SOLUTION RESPIRATORY (INHALATION) at 21:13

## 2021-05-25 RX ADMIN — OXYCODONE AND ACETAMINOPHEN 1 TABLET: 5; 325 TABLET ORAL at 22:50

## 2021-05-25 RX ADMIN — Medication 1 CAPSULE: at 08:43

## 2021-05-25 RX ADMIN — TIOTROPIUM BROMIDE INHALATION SPRAY 1 PUFF: 3.12 SPRAY, METERED RESPIRATORY (INHALATION) at 08:41

## 2021-05-25 RX ADMIN — ISOSORBIDE MONONITRATE 30 MG: 30 TABLET, EXTENDED RELEASE ORAL at 08:44

## 2021-05-25 RX ADMIN — LOSARTAN POTASSIUM 25 MG: 50 TABLET ORAL at 08:43

## 2021-05-25 RX ADMIN — SODIUM CHLORIDE, PRESERVATIVE FREE 10 ML: 5 INJECTION INTRAVENOUS at 08:44

## 2021-05-25 RX ADMIN — WATER 2000 MG: 1 INJECTION INTRAMUSCULAR; INTRAVENOUS; SUBCUTANEOUS at 18:20

## 2021-05-25 RX ADMIN — DOCUSATE SODIUM 100 MG: 100 CAPSULE, LIQUID FILLED ORAL at 08:44

## 2021-05-25 RX ADMIN — METOPROLOL SUCCINATE 25 MG: 25 TABLET, EXTENDED RELEASE ORAL at 08:43

## 2021-05-25 RX ADMIN — OXYCODONE AND ACETAMINOPHEN 1 TABLET: 5; 325 TABLET ORAL at 06:27

## 2021-05-25 RX ADMIN — ENOXAPARIN SODIUM 40 MG: 40 INJECTION SUBCUTANEOUS at 08:44

## 2021-05-25 RX ADMIN — POTASSIUM CHLORIDE 10 MEQ: 750 TABLET, FILM COATED, EXTENDED RELEASE ORAL at 08:43

## 2021-05-25 RX ADMIN — OXYCODONE HYDROCHLORIDE AND ACETAMINOPHEN 500 MG: 500 TABLET ORAL at 08:43

## 2021-05-25 RX ADMIN — MONTELUKAST SODIUM 10 MG: 10 TABLET, FILM COATED ORAL at 21:45

## 2021-05-25 RX ADMIN — SODIUM CHLORIDE, PRESERVATIVE FREE 10 ML: 5 INJECTION INTRAVENOUS at 21:18

## 2021-05-25 RX ADMIN — METFORMIN HYDROCHLORIDE 500 MG: 500 TABLET ORAL at 08:43

## 2021-05-25 RX ADMIN — IPRATROPIUM BROMIDE 0.5 MG: 0.5 SOLUTION RESPIRATORY (INHALATION) at 21:13

## 2021-05-25 RX ADMIN — Medication 1 CAPSULE: at 16:20

## 2021-05-25 RX ADMIN — ALBUTEROL SULFATE 2.5 MG: 2.5 SOLUTION RESPIRATORY (INHALATION) at 08:40

## 2021-05-25 RX ADMIN — ATORVASTATIN CALCIUM 80 MG: 40 TABLET, FILM COATED ORAL at 21:45

## 2021-05-25 RX ADMIN — ASPIRIN 325 MG: 325 TABLET, COATED ORAL at 08:44

## 2021-05-25 RX ADMIN — HYDROCHLOROTHIAZIDE 25 MG: 25 TABLET ORAL at 08:43

## 2021-05-25 RX ADMIN — BUDESONIDE AND FORMOTEROL FUMARATE DIHYDRATE 2 PUFF: 160; 4.5 AEROSOL RESPIRATORY (INHALATION) at 08:41

## 2021-05-25 RX ADMIN — SPIRONOLACTONE 25 MG: 25 TABLET, FILM COATED ORAL at 08:44

## 2021-05-25 RX ADMIN — THERA TABS 1 TABLET: TAB at 08:44

## 2021-05-25 RX ADMIN — OXYCODONE AND ACETAMINOPHEN 1 TABLET: 5; 325 TABLET ORAL at 16:20

## 2021-05-25 RX ADMIN — DOCUSATE SODIUM 100 MG: 100 CAPSULE, LIQUID FILLED ORAL at 21:45

## 2021-05-25 RX ADMIN — FLUTICASONE PROPIONATE 1 SPRAY: 50 SPRAY, METERED NASAL at 08:45

## 2021-05-25 RX ADMIN — PANTOPRAZOLE SODIUM 40 MG: 40 TABLET, DELAYED RELEASE ORAL at 06:25

## 2021-05-25 RX ADMIN — SODIUM CHLORIDE, PRESERVATIVE FREE 10 ML: 5 INJECTION INTRAVENOUS at 01:38

## 2021-05-25 RX ADMIN — IPRATROPIUM BROMIDE 0.5 MG: 0.5 SOLUTION RESPIRATORY (INHALATION) at 08:41

## 2021-05-25 RX ADMIN — WATER 2000 MG: 1 INJECTION INTRAMUSCULAR; INTRAVENOUS; SUBCUTANEOUS at 09:53

## 2021-05-25 RX ADMIN — CHOLECALCIFEROL TAB 25 MCG (1000 UNIT) 1000 UNITS: 25 TAB at 08:44

## 2021-05-25 RX ADMIN — WATER 2000 MG: 1 INJECTION INTRAMUSCULAR; INTRAVENOUS; SUBCUTANEOUS at 01:38

## 2021-05-25 RX ADMIN — VANCOMYCIN HYDROCHLORIDE 125 MG: 125 CAPSULE ORAL at 21:45

## 2021-05-25 ASSESSMENT — PAIN SCALES - GENERAL
PAINLEVEL_OUTOF10: 5
PAINLEVEL_OUTOF10: 1
PAINLEVEL_OUTOF10: 4
PAINLEVEL_OUTOF10: 0
PAINLEVEL_OUTOF10: 3
PAINLEVEL_OUTOF10: 1
PAINLEVEL_OUTOF10: 4
PAINLEVEL_OUTOF10: 2
PAINLEVEL_OUTOF10: 2
PAINLEVEL_OUTOF10: 3
PAINLEVEL_OUTOF10: 1
PAINLEVEL_OUTOF10: 2
PAINLEVEL_OUTOF10: 4
PAINLEVEL_OUTOF10: 1

## 2021-05-25 ASSESSMENT — PAIN DESCRIPTION - LOCATION
LOCATION: KNEE

## 2021-05-25 ASSESSMENT — PAIN DESCRIPTION - ORIENTATION
ORIENTATION: RIGHT
ORIENTATION: RIGHT

## 2021-05-25 ASSESSMENT — PAIN DESCRIPTION - DESCRIPTORS
DESCRIPTORS: THROBBING
DESCRIPTORS: ACHING

## 2021-05-25 ASSESSMENT — PAIN DESCRIPTION - PAIN TYPE: TYPE: ACUTE PAIN

## 2021-05-25 NOTE — PROGRESS NOTES
Phone: Ricky  Date: 2021  Fax: [phone]      Physical Therapy    Daily Note    Patient Name: Marilee Nicholson      : 1945  (70 y.o.)  MRN: [id number]     Pt is PROGRESSING toward goals and increased independence of mobility this treatment session  Discharge Recommendations: Home with assist PRN     Assessment     Supine to Sit: Minimal assistance (for R LE managment)       Sit to Stand: Stand by assistance, Contact guard assistance  Stand to sit: Stand by assistance, Contact guard assistance                WB Status: NWB R LE  Ambulation 1  Surface: level tile  Device: Rolling Walker  Assistance: Contact guard assistance  Distance: 10 ft with seated rest break and then additional 10 ft  Comments: Patent able to maintain NWB status well but does become moderately SOB with activty level/ambulation. Patient must have immoblizer on when up              Assessment: Patient in bed upon arrival and states he is feeling better. Agrees to participate with PTA. Supine to sit to L side of bed requires min assist for R LE managment. Sitting balance at edge of bed is good. Sit to stand with height of bed elevated is CGA. Ambulates with wheeled walker around bed and he sits down to rest.  Slight increase SOB with gait. Returns to chair following to complete seated exercises as stated above. Nurse and RT into room to complete EKG on patient. Patient remains in chair following.   Safety Devices  Type of devices: Call light within reach, Gait belt, Left in chair, Nurse notified          Time In: 4103  Time Out: 3028  Timed Coded Minutes: 21  Total Treatment Time: 21    Exercises:  See Flowsheets    Plan  Cont Per Plan Of Care    Goals  Short Term Goals  Time Frame for Short term goals: 14 days - expires 21  Short term goal 1: Prevent loss of strength of bilateral UE's and left LE to allow patient to complete transfers and short distance amb  Short term goal 2: Transfer in/OOB and chair /commode indeependently  Short term goal 3: Ambulate with wh walker x 25 ft x 2 with supervision to safely negotiate home environment  Short term goal 4: Good standing balance to assist with self-care tasks  Short term goal 5: Gair+ endurance to complete above ambulation to reduce fall risk associated with fatigue    Long Term Goals                      [facility] Therapy License Number: [id number]    Date: 5/25/2021

## 2021-05-25 NOTE — PROGRESS NOTES
Pt up in chair doing exercises with therapy. Pt denies chest pain, reports nausea is \" a lot better\". EKG completed.

## 2021-05-25 NOTE — PROGRESS NOTES
Dr Figueroa called after EKG completed with update. Message given to Tre Samuel at his office. Pt denies nausea at this time, but does c/o of rt knee pain and asked for prn pain medication, He rates pain 5/10. Med given with saltine crackers. Will continue to monitor.  Electronically signed by Luli Alonso RN on 5/25/2021 at 4:28 PM

## 2021-05-25 NOTE — PROGRESS NOTES
Pt A&O, pain controlled on prn med t/o day. Rt knee immobilizer in place AAT. Pt tolerates wound care with expressing fluid from wound. Large amount of sanguinous/serosanguinous and purulent drainage from wound.  Electronically signed by Luli Alonso RN on 5/25/2021 at 11:47 AM

## 2021-05-25 NOTE — PROGRESS NOTES
[facility]  Occupational Therapy  Daily Note  Date: 2021  Patient Name: Tram Monae        MRN: [id number]    : 1945  (70 y.o.)    Subjective: Patient is seated in bedside chair upon arrival.  Pt is PROGRESSING toward goals and independence of Self Care this treatment session  Continue to assess Pending Progress    Assessment  Assessment: Pt seated in recliner upon arrival, agreeable to OT interventions at this time. Tolerates treatment session well consisting of UB resistive arm exercises in order to improve UB strength and endurance to assist with ADLs/IADLs. Completed exercises 2x20 using 3-4# dumbbell. Limited LUE shoulder exercise is completed due to pt's reports of L shoulder pain. Arm bike exercise is completed on MOD resistance x4 minutes with no rest periods, noted that SOB is present at end of this task. Provided pt with his lunch tray. Chair alarm in place, call light and other personal items are within reach. Will continue to address goals as able. Activity Tolerance: Patient Tolerated treatment well     Exercises:  See Flowsheets    Goals  Short Term Goals  Time Frame for Short term goals: 10 days (2021)  Short term goal 1: Patient to transfer to bathroom with supervision only, completing toilet hygiene independently. Short term goal 2: Patient to complete UB/LB sponge bath with set up only. -MET UB  Short term goal 3: Patient to complete UB/LB dressing with set up only. -MET UB  Short term goal 4: Patient to tolerate 30 minutes BUE exercise with 1 or fewer rest breaks in order to improve endurance for ADL and IADL tasks.     Time In: 1128  Time Out: 3721  Timed Coded Minutes: 39  Total Treatment Time: [address], [place]/L  Date: 2021

## 2021-05-25 NOTE — PROGRESS NOTES
Pt reports nausea after lunch, no emesis. He ambulates to  for void and BM, c/o of being sweaty. Clothes changed, he denies bath at this time. Back to bed. PRN Zofran given.

## 2021-05-25 NOTE — PROGRESS NOTES
[facility]    Date: 2021  Patient Name: Marysol Goldberg        : 1945       [x] Pt Refusal Patient in bed with nurse in room. Patient feeling nauseated following lunch and his BP is low. He was give medication to help with the nausea and asks for PTA to come back in 30 minutes. Will check back and progress as able. [] Pt Unavailable due to:           Laurel Pedro, PTA Date: 2021

## 2021-05-25 NOTE — PROGRESS NOTES
Pt reports nausea is decreasing. REcheck on BP manually 110/62. .Will continue to monitor.  Electronically signed by Chris Reed RN on 5/25/2021 at 2:46 PM

## 2021-05-25 NOTE — PROGRESS NOTES
Phone: Ricky  Date: 2021  Fax: [phone]      Physical Therapy    Daily Note    Patient Name: Dede Perdomo      : 1945  (70 y.o.)  MRN: [id number]     Pt is PROGRESSING toward goals and increased independence of mobility this treatment session  Discharge Recommendations: Home with assist PRN     Assessment             Sit to Stand: Contact guard assistance, Stand by assistance  Stand to sit: Contact guard assistance, Stand by assistance                WB Status: NWB R LE  Ambulation 1  Surface: level tile  Device: Rolling Walker  Assistance: Contact guard assistance  Distance: 10 ft with seated rest break and then additional 10 ft  Comments: Patent able to maintain NWB status well but does become moderately SOB with activty level/ambulation. Patient must have immoblizer on when up              Assessment: Patietn seated up in chair upon arrival finishing with RT. He agrees to participate with PTA. Able to complete seated exercises as outlined above. He is able to complete exercises with his L hip today and states he is feeling better but does still hurt. Sit to stand from chair is CGA/SBA. Ambulates around other side of bed with wheeled walker and CGA. Maintains NWB on R LE well. Sits for short period of time to catch his breath and then returns to chair. Up in chair following with call light in reach and chair alarm in place.   Safety Devices  Type of devices: Call light within reach, Chair alarm in place, Gait belt, Left in chair          Time In: 905  Time Out: 935  Timed Coded Minutes: 30  Total Treatment Time: 30    Exercises:  See Flowsheets    Plan  Cont Per Plan Of Care    Goals  Short Term Goals  Time Frame for Short term goals: 14 days - expires 21  Short term goal 1: Prevent loss of strength of bilateral UE's and left LE to allow patient to complete transfers and short distance amb  Short term goal 2: Transfer in/OOB and chair /commode indeependently  Short term goal 3: Ambulate with wh walker x 25 ft x 2 with supervision to safely negotiate home environment  Short term goal 4: Good standing balance to assist with self-care tasks  Short term goal 5: Gair+ endurance to complete above ambulation to reduce fall risk associated with fatigue    Long Term Goals                      Vandana Slade Therapy License Number: [id number]    Date: 5/25/2021

## 2021-05-26 LAB
EKG ATRIAL RATE: 66 BPM
EKG P-R INTERVAL: 280 MS
EKG Q-T INTERVAL: 376 MS
EKG QRS DURATION: 100 MS
EKG QTC CALCULATION (BAZETT): 394 MS
EKG R AXIS: -4 DEGREES
EKG T AXIS: 72 DEGREES
EKG VENTRICULAR RATE: 66 BPM

## 2021-05-26 PROCEDURE — 94640 AIRWAY INHALATION TREATMENT: CPT

## 2021-05-26 PROCEDURE — 97110 THERAPEUTIC EXERCISES: CPT

## 2021-05-26 PROCEDURE — 93010 ELECTROCARDIOGRAM REPORT: CPT | Performed by: INTERNAL MEDICINE

## 2021-05-26 PROCEDURE — 6370000000 HC RX 637 (ALT 250 FOR IP): Performed by: INTERNAL MEDICINE

## 2021-05-26 PROCEDURE — 6360000002 HC RX W HCPCS: Performed by: INTERNAL MEDICINE

## 2021-05-26 PROCEDURE — 97116 GAIT TRAINING THERAPY: CPT

## 2021-05-26 PROCEDURE — 94761 N-INVAS EAR/PLS OXIMETRY MLT: CPT

## 2021-05-26 PROCEDURE — 2580000003 HC RX 258: Performed by: INTERNAL MEDICINE

## 2021-05-26 PROCEDURE — 1200000002 HC SEMI PRIVATE SWING BED

## 2021-05-26 RX ADMIN — ENOXAPARIN SODIUM 40 MG: 40 INJECTION SUBCUTANEOUS at 08:43

## 2021-05-26 RX ADMIN — TAMSULOSIN HYDROCHLORIDE 0.4 MG: 0.4 CAPSULE ORAL at 17:26

## 2021-05-26 RX ADMIN — WATER 2000 MG: 1 INJECTION INTRAMUSCULAR; INTRAVENOUS; SUBCUTANEOUS at 08:45

## 2021-05-26 RX ADMIN — DOCUSATE SODIUM 100 MG: 100 CAPSULE, LIQUID FILLED ORAL at 08:44

## 2021-05-26 RX ADMIN — WATER 2000 MG: 1 INJECTION INTRAMUSCULAR; INTRAVENOUS; SUBCUTANEOUS at 17:26

## 2021-05-26 RX ADMIN — ATORVASTATIN CALCIUM 80 MG: 40 TABLET, FILM COATED ORAL at 19:58

## 2021-05-26 RX ADMIN — PANTOPRAZOLE SODIUM 40 MG: 40 TABLET, DELAYED RELEASE ORAL at 06:22

## 2021-05-26 RX ADMIN — TIOTROPIUM BROMIDE INHALATION SPRAY 1 PUFF: 3.12 SPRAY, METERED RESPIRATORY (INHALATION) at 20:02

## 2021-05-26 RX ADMIN — THERA TABS 1 TABLET: TAB at 08:44

## 2021-05-26 RX ADMIN — DOCUSATE SODIUM 100 MG: 100 CAPSULE, LIQUID FILLED ORAL at 19:58

## 2021-05-26 RX ADMIN — ALBUTEROL SULFATE 2.5 MG: 2.5 SOLUTION RESPIRATORY (INHALATION) at 20:02

## 2021-05-26 RX ADMIN — TIOTROPIUM BROMIDE INHALATION SPRAY 1 PUFF: 3.12 SPRAY, METERED RESPIRATORY (INHALATION) at 09:00

## 2021-05-26 RX ADMIN — POTASSIUM CHLORIDE 10 MEQ: 750 TABLET, FILM COATED, EXTENDED RELEASE ORAL at 08:44

## 2021-05-26 RX ADMIN — HYDROCHLOROTHIAZIDE 25 MG: 25 TABLET ORAL at 08:45

## 2021-05-26 RX ADMIN — BUDESONIDE AND FORMOTEROL FUMARATE DIHYDRATE 2 PUFF: 160; 4.5 AEROSOL RESPIRATORY (INHALATION) at 08:59

## 2021-05-26 RX ADMIN — Medication 1 CAPSULE: at 17:26

## 2021-05-26 RX ADMIN — OXYCODONE AND ACETAMINOPHEN 1 TABLET: 5; 325 TABLET ORAL at 19:58

## 2021-05-26 RX ADMIN — SODIUM CHLORIDE, PRESERVATIVE FREE 10 ML: 5 INJECTION INTRAVENOUS at 08:44

## 2021-05-26 RX ADMIN — METFORMIN HYDROCHLORIDE 500 MG: 500 TABLET ORAL at 08:44

## 2021-05-26 RX ADMIN — METOPROLOL SUCCINATE 25 MG: 25 TABLET, EXTENDED RELEASE ORAL at 08:44

## 2021-05-26 RX ADMIN — ASPIRIN 325 MG: 325 TABLET, COATED ORAL at 08:44

## 2021-05-26 RX ADMIN — CHOLECALCIFEROL TAB 25 MCG (1000 UNIT) 1000 UNITS: 25 TAB at 08:44

## 2021-05-26 RX ADMIN — LOSARTAN POTASSIUM 25 MG: 50 TABLET ORAL at 08:44

## 2021-05-26 RX ADMIN — FLUTICASONE PROPIONATE 1 SPRAY: 50 SPRAY, METERED NASAL at 08:46

## 2021-05-26 RX ADMIN — IPRATROPIUM BROMIDE 0.5 MG: 0.5 SOLUTION RESPIRATORY (INHALATION) at 08:57

## 2021-05-26 RX ADMIN — IPRATROPIUM BROMIDE 0.5 MG: 0.5 SOLUTION RESPIRATORY (INHALATION) at 20:01

## 2021-05-26 RX ADMIN — Medication 1 CAPSULE: at 08:44

## 2021-05-26 RX ADMIN — ALBUTEROL SULFATE 2.5 MG: 2.5 SOLUTION RESPIRATORY (INHALATION) at 08:57

## 2021-05-26 RX ADMIN — ISOSORBIDE MONONITRATE 30 MG: 30 TABLET, EXTENDED RELEASE ORAL at 08:44

## 2021-05-26 RX ADMIN — SODIUM CHLORIDE, PRESERVATIVE FREE 20 ML: 5 INJECTION INTRAVENOUS at 17:26

## 2021-05-26 RX ADMIN — MONTELUKAST SODIUM 10 MG: 10 TABLET, FILM COATED ORAL at 19:58

## 2021-05-26 RX ADMIN — SODIUM CHLORIDE, PRESERVATIVE FREE 10 ML: 5 INJECTION INTRAVENOUS at 19:58

## 2021-05-26 RX ADMIN — WATER 2000 MG: 1 INJECTION INTRAMUSCULAR; INTRAVENOUS; SUBCUTANEOUS at 00:52

## 2021-05-26 RX ADMIN — BUDESONIDE AND FORMOTEROL FUMARATE DIHYDRATE 2 PUFF: 160; 4.5 AEROSOL RESPIRATORY (INHALATION) at 20:02

## 2021-05-26 RX ADMIN — ESCITALOPRAM OXALATE 20 MG: 10 TABLET ORAL at 08:44

## 2021-05-26 RX ADMIN — VANCOMYCIN HYDROCHLORIDE 125 MG: 125 CAPSULE ORAL at 08:44

## 2021-05-26 RX ADMIN — OXYCODONE HYDROCHLORIDE AND ACETAMINOPHEN 500 MG: 500 TABLET ORAL at 08:44

## 2021-05-26 RX ADMIN — FERROUS SULFATE TAB 325 MG (65 MG ELEMENTAL FE) 325 MG: 325 (65 FE) TAB at 08:44

## 2021-05-26 RX ADMIN — VANCOMYCIN HYDROCHLORIDE 125 MG: 125 CAPSULE ORAL at 19:58

## 2021-05-26 RX ADMIN — SPIRONOLACTONE 25 MG: 25 TABLET, FILM COATED ORAL at 08:44

## 2021-05-26 ASSESSMENT — PAIN SCALES - GENERAL
PAINLEVEL_OUTOF10: 5
PAINLEVEL_OUTOF10: 0
PAINLEVEL_OUTOF10: 5
PAINLEVEL_OUTOF10: 0

## 2021-05-26 ASSESSMENT — PAIN DESCRIPTION - LOCATION: LOCATION: KNEE

## 2021-05-26 ASSESSMENT — PAIN DESCRIPTION - PAIN TYPE: TYPE: ACUTE PAIN

## 2021-05-26 ASSESSMENT — PAIN DESCRIPTION - ORIENTATION: ORIENTATION: RIGHT

## 2021-05-26 NOTE — PROGRESS NOTES
Phone: Ricky  Date: 2021  Fax: [phone]      Physical Therapy    Daily Note    Patient Name: Kumar Garcia      : 1945  (70 y.o.)  MRN: [id number]     Pt is PROGRESSING toward goals and increased independence of mobility this treatment session  Discharge Recommendations: Home with assist PRN     Assessment    Sit to Stand: Stand by assistance  Stand to sit: Stand by assistance                WB Status: NWB R LE  Ambulation 1  Surface: level tile  Device: Rolling Walker  Assistance: Contact guard assistance, Stand by assistance  Distance: 10 ft with seated rest break and then additional 10 ft  Comments: Patient not as SOB with gait today             Assessment: Patient seated up in chair upon arrival to room. Asked if he would like to get out of his room for change of scernerly and go to therapy room but he declines. Does agree to work with PTA in his room. Completes exercises for L LE stregthening as outlined above with good tolerance. He is able to complete 2 sets of 20 repititions with most of exercises today. Sit to stand from chair is CGA. Ambulates NWB R LE around bed with wheeled walker and CGA. No LOB noted. Sits on bed to rest and then returns to chair. Not as SOB today with gait. Up in chair followign with call light in reach and chair alarm in place.   Safety Devices  Type of devices: Call light within reach, Chair alarm in place, Gait belt, Left in chair          Time In: 957  Time Out: 1025  Timed Coded Minutes: 28  Total Treatment Time: 28    Exercises:  See Flowsheets    Plan  Cont Per Plan Of Care    Goals  Short Term Goals  Time Frame for Short term goals: 14 days - expires 21  Short term goal 1: Prevent loss of strength of bilateral UE's and left LE to allow patient to complete transfers and short distance amb  Short term goal 2: Transfer in/OOB and chair /commode indeependently  Short term goal 3: Ambulate with wh walker x 25 ft x 2 with

## 2021-05-26 NOTE — PLAN OF CARE
Problem: Pain:  Goal: Pain level will decrease  Description: Pain level will decrease  Outcome: Ongoing     Problem: Pain:  Goal: Control of acute pain  Description: Control of acute pain  Outcome: Ongoing     Problem: Skin Integrity:  Goal: Will show no infection signs and symptoms  Description: Will show no infection signs and symptoms  Outcome: Ongoing     Problem: Skin Integrity:  Goal: Absence of new skin breakdown  Description: Absence of new skin breakdown  Outcome: Ongoing     Problem: Falls - Risk of:  Goal: Will remain free from falls  Description: Will remain free from falls  Outcome: Ongoing

## 2021-05-27 PROCEDURE — 94761 N-INVAS EAR/PLS OXIMETRY MLT: CPT

## 2021-05-27 PROCEDURE — 6370000000 HC RX 637 (ALT 250 FOR IP): Performed by: INTERNAL MEDICINE

## 2021-05-27 PROCEDURE — 6360000002 HC RX W HCPCS: Performed by: INTERNAL MEDICINE

## 2021-05-27 PROCEDURE — 97116 GAIT TRAINING THERAPY: CPT

## 2021-05-27 PROCEDURE — 94640 AIRWAY INHALATION TREATMENT: CPT

## 2021-05-27 PROCEDURE — 1200000002 HC SEMI PRIVATE SWING BED

## 2021-05-27 PROCEDURE — 2580000003 HC RX 258: Performed by: INTERNAL MEDICINE

## 2021-05-27 PROCEDURE — 97110 THERAPEUTIC EXERCISES: CPT

## 2021-05-27 RX ADMIN — Medication 1 CAPSULE: at 09:30

## 2021-05-27 RX ADMIN — ASPIRIN 325 MG: 325 TABLET, COATED ORAL at 09:31

## 2021-05-27 RX ADMIN — IPRATROPIUM BROMIDE 0.5 MG: 0.5 SOLUTION RESPIRATORY (INHALATION) at 20:50

## 2021-05-27 RX ADMIN — IPRATROPIUM BROMIDE 0.5 MG: 0.5 SOLUTION RESPIRATORY (INHALATION) at 08:57

## 2021-05-27 RX ADMIN — ISOSORBIDE MONONITRATE 30 MG: 30 TABLET, EXTENDED RELEASE ORAL at 09:30

## 2021-05-27 RX ADMIN — MONTELUKAST SODIUM 10 MG: 10 TABLET, FILM COATED ORAL at 20:34

## 2021-05-27 RX ADMIN — VANCOMYCIN HYDROCHLORIDE 125 MG: 125 CAPSULE ORAL at 09:30

## 2021-05-27 RX ADMIN — ESCITALOPRAM OXALATE 20 MG: 10 TABLET ORAL at 09:31

## 2021-05-27 RX ADMIN — ALBUTEROL SULFATE 2.5 MG: 2.5 SOLUTION RESPIRATORY (INHALATION) at 08:57

## 2021-05-27 RX ADMIN — SODIUM CHLORIDE, PRESERVATIVE FREE 20 ML: 5 INJECTION INTRAVENOUS at 09:30

## 2021-05-27 RX ADMIN — THERA TABS 1 TABLET: TAB at 09:31

## 2021-05-27 RX ADMIN — TAMSULOSIN HYDROCHLORIDE 0.4 MG: 0.4 CAPSULE ORAL at 18:18

## 2021-05-27 RX ADMIN — BUDESONIDE AND FORMOTEROL FUMARATE DIHYDRATE 2 PUFF: 160; 4.5 AEROSOL RESPIRATORY (INHALATION) at 08:58

## 2021-05-27 RX ADMIN — POTASSIUM CHLORIDE 10 MEQ: 750 TABLET, FILM COATED, EXTENDED RELEASE ORAL at 09:31

## 2021-05-27 RX ADMIN — CHOLECALCIFEROL TAB 25 MCG (1000 UNIT) 1000 UNITS: 25 TAB at 09:31

## 2021-05-27 RX ADMIN — LOSARTAN POTASSIUM 25 MG: 50 TABLET ORAL at 09:31

## 2021-05-27 RX ADMIN — DOCUSATE SODIUM 100 MG: 100 CAPSULE, LIQUID FILLED ORAL at 09:31

## 2021-05-27 RX ADMIN — TIOTROPIUM BROMIDE INHALATION SPRAY 1 PUFF: 3.12 SPRAY, METERED RESPIRATORY (INHALATION) at 08:58

## 2021-05-27 RX ADMIN — HYDROCHLOROTHIAZIDE 25 MG: 25 TABLET ORAL at 09:30

## 2021-05-27 RX ADMIN — METOPROLOL SUCCINATE 25 MG: 25 TABLET, EXTENDED RELEASE ORAL at 09:31

## 2021-05-27 RX ADMIN — DOCUSATE SODIUM 100 MG: 100 CAPSULE, LIQUID FILLED ORAL at 20:34

## 2021-05-27 RX ADMIN — VANCOMYCIN HYDROCHLORIDE 125 MG: 125 CAPSULE ORAL at 20:34

## 2021-05-27 RX ADMIN — BUDESONIDE AND FORMOTEROL FUMARATE DIHYDRATE 2 PUFF: 160; 4.5 AEROSOL RESPIRATORY (INHALATION) at 20:51

## 2021-05-27 RX ADMIN — WATER 2000 MG: 1 INJECTION INTRAMUSCULAR; INTRAVENOUS; SUBCUTANEOUS at 02:11

## 2021-05-27 RX ADMIN — SODIUM CHLORIDE, PRESERVATIVE FREE 10 ML: 5 INJECTION INTRAVENOUS at 02:11

## 2021-05-27 RX ADMIN — FERROUS SULFATE TAB 325 MG (65 MG ELEMENTAL FE) 325 MG: 325 (65 FE) TAB at 09:31

## 2021-05-27 RX ADMIN — SPIRONOLACTONE 25 MG: 25 TABLET, FILM COATED ORAL at 09:31

## 2021-05-27 RX ADMIN — OXYCODONE HYDROCHLORIDE AND ACETAMINOPHEN 500 MG: 500 TABLET ORAL at 09:30

## 2021-05-27 RX ADMIN — WATER 2000 MG: 1 INJECTION INTRAMUSCULAR; INTRAVENOUS; SUBCUTANEOUS at 09:30

## 2021-05-27 RX ADMIN — METFORMIN HYDROCHLORIDE 500 MG: 500 TABLET ORAL at 09:31

## 2021-05-27 RX ADMIN — WATER 2000 MG: 1 INJECTION INTRAMUSCULAR; INTRAVENOUS; SUBCUTANEOUS at 18:18

## 2021-05-27 RX ADMIN — SODIUM CHLORIDE, PRESERVATIVE FREE 10 ML: 5 INJECTION INTRAVENOUS at 18:18

## 2021-05-27 RX ADMIN — ATORVASTATIN CALCIUM 80 MG: 40 TABLET, FILM COATED ORAL at 20:34

## 2021-05-27 RX ADMIN — Medication 1 CAPSULE: at 18:18

## 2021-05-27 RX ADMIN — ENOXAPARIN SODIUM 40 MG: 40 INJECTION SUBCUTANEOUS at 09:30

## 2021-05-27 RX ADMIN — OXYCODONE AND ACETAMINOPHEN 1 TABLET: 5; 325 TABLET ORAL at 18:56

## 2021-05-27 RX ADMIN — TIOTROPIUM BROMIDE INHALATION SPRAY 1 PUFF: 3.12 SPRAY, METERED RESPIRATORY (INHALATION) at 20:51

## 2021-05-27 RX ADMIN — FLUTICASONE PROPIONATE 1 SPRAY: 50 SPRAY, METERED NASAL at 09:29

## 2021-05-27 RX ADMIN — ALBUTEROL SULFATE 2.5 MG: 2.5 SOLUTION RESPIRATORY (INHALATION) at 20:50

## 2021-05-27 RX ADMIN — PANTOPRAZOLE SODIUM 40 MG: 40 TABLET, DELAYED RELEASE ORAL at 06:31

## 2021-05-27 RX ADMIN — SODIUM CHLORIDE, PRESERVATIVE FREE 10 ML: 5 INJECTION INTRAVENOUS at 20:34

## 2021-05-27 ASSESSMENT — PAIN SCALES - GENERAL
PAINLEVEL_OUTOF10: 0
PAINLEVEL_OUTOF10: 5

## 2021-05-27 NOTE — PROGRESS NOTES
[facility]  Occupational Therapy  Daily Note  Date: 2021  Patient Name: Mariela Ramirez        MRN: [id number]    : 1945  (70 y.o.)    Subjective: Patient is seated in bedside chair upon arrival.  Pt is PROGRESSING toward goals and independence of Self Care this treatment session  Home Assist PRN    Objective  Toileting: supervision           Sit to stand: supervision   Stand to sit: supervision            Stand Step Transfer: supervision  (Hops on LLE)    Assessment  Assessment: Tolerated treatment session well this am.  Continues to struggle with L UE resistive tasks due to previous rotator cuff injury. No SOB noted during session. Patient requests to use the bathroom, transfers to commode and manages all clothing with supervision only. Remains seated in bedside chair with call light in reach. Activity Tolerance: Patient Tolerated treatment well                Exercises:  See Flowsheets    Goals  Short Term Goals  Time Frame for Short term goals: 10 days (2021)  Short term goal 1: Patient to transfer to bathroom with supervision only, completing toilet hygiene independently. Short term goal 2: Patient to complete UB/LB sponge bath with set up only. -MET UB  Short term goal 3: Patient to complete UB/LB dressing with set up only. -MET UB  Short term goal 4: Patient to tolerate 30 minutes BUE exercise with 1 or fewer rest breaks in order to improve endurance for ADL and IADL tasks.      Long Term Goals                      Timed Code Treatment Minutes: 23 Minutes     Time In: 1000  Time Out: 1125  Timed Coded Minutes: 23  Total Treatment Time: [address], OTR/L    Date: 2021

## 2021-05-27 NOTE — PROGRESS NOTES
Phone: Ricky  Date: 2021  Fax: [phone]      Physical Therapy    Daily Note    Patient Name: Teja Núñez      : 1945  (70 y.o.)  MRN: [id number]     Pt is PROGRESSING toward goals and increased independence of mobility this treatment session  Discharge Recommendations: Home with assist PRN     Assessment  Bridging: Supervision     Supine to Sit: Minimal assistance (for R LE managment)     Scooting: Supervision    Sit to Stand: Stand by assistance  Stand to sit: Stand by assistance    WB Status: NWB R LE  Ambulation 1  Surface: level tile  Device: Rolling Walker  Assistance: Contact guard assistance, Stand by assistance  Distance: 10 ft with seated rest break and then additional 10 ft  Comments: ----    Assessment: Patient is finishing up in bathroom upon entry. Sit to stand from commode is CGA. Patient hops back to chair with FWW and CGA and completes seated LE strengthening exercises per flowsheet. After exercises patient hops to doorway and back to bed to take a short rest before hopping back to the chair. Legs elevated with call light in reach and no further needs.   Safety Devices  Type of devices: Call light within reach, Chair alarm in place, Gait belt, Left in chair    Time In: 1055  Time Out: 1120  Timed Coded Minutes: 25  Total Treatment Time: 25    Exercises:  See [address] Per Plan Of Care    Goals  Short Term Goals  Time Frame for Short term goals: 14 days - expires 21  Short term goal 1: Prevent loss of strength of bilateral UE's and left LE to allow patient to complete transfers and short distance amb  Short term goal 2: Transfer in/OOB and chair /commode indeependently  Short term goal 3: Ambulate with wh walker x 25 ft x 2 with supervision to safely negotiate home environment  Short term goal 4: Good standing balance to assist with self-care tasks  Short term goal 5: Gair+ endurance to complete above ambulation to reduce fall risk associated with fatigue    Long Term Goals    James Zabala, [place] [facility] License Number: [id number]    Date: 5/27/2021
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
## 2021-05-27 NOTE — PROGRESS NOTES
[facility]  Occupational Therapy  Daily Note  Date: 2021  Patient Name: Raeann Rich        MRN: [id number]    : 1945  (70 y.o.)    Subjective: Patient is seated in bedside chair upon arrival.  Pt is PROGRESSING toward goals and independence of Self Care this treatment session  Continue to assess Pending Progress    Assessment  Tolerated treatment session with UB exercises well. Remains seated in bedside chair with call light in reach. Exercises:  See Flowsheets    Goals  Short Term Goals  Time Frame for Short term goals: 10 days (2021)  Short term goal 1: Patient to transfer to bathroom with supervision only, completing toilet hygiene independently. Short term goal 2: Patient to complete UB/LB sponge bath with set up only. -MET UB  Short term goal 3: Patient to complete UB/LB dressing with set up only. -MET UB  Short term goal 4: Patient to tolerate 30 minutes BUE exercise with 1 or fewer rest breaks in order to improve endurance for ADL and IADL tasks.      Long Term Goals                      Timed Code Treatment Minutes: 15 Minutes     Time In: 5519  Time Out: 1600  Timed Coded Minutes: 15  Total Treatment Time: [address] [place]ALA/L    Date: 2021

## 2021-05-27 NOTE — PLAN OF CARE
Problem: Pain:  Goal: Pain level will decrease  Description: Pain level will decrease  5/27/2021 1636 by Pilo Carlos RN  Outcome: Ongoing    Problem: Skin Integrity:  Goal: Absence of new skin breakdown  Description: Absence of new skin breakdown  5/27/2021 1636 by Pilo Carlos RN  Outcome: Ongoing

## 2021-05-27 NOTE — PLAN OF CARE
Problem: Pain:  Goal: Pain level will decrease  Description: Pain level will decrease  Outcome: Ongoing     Problem: Skin Integrity:  Goal: Absence of new skin breakdown  Description: Absence of new skin breakdown  Outcome: Ongoing     Problem: Gas Exchange - Impaired:  Goal: Levels of oxygenation will improve  Description: Levels of oxygenation will improve  Outcome: Ongoing     Problem: Gas Exchange - Impaired:  Goal: Levels of oxygenation will improve  Description: Levels of oxygenation will improve  Outcome: Ongoing

## 2021-05-27 NOTE — PROGRESS NOTES
Phone: Ricky  Date: 2021  Fax: [phone]      Physical Therapy    Daily Note    Patient Name: Raeann Rich      : 1945  (70 y.o.)  MRN: [id number]     Pt is PROGRESSING toward goals and increased independence of mobility this treatment session  Discharge Recommendations: Home with assist PRN     Assessment  Bridging: Supervision     Supine to Sit: Minimal assistance (for R LE managment)     Scooting: Supervision    Sit to Stand: Stand by assistance  Stand to sit: Stand by assistance    WB Status: NWB R LE  Ambulation 1  Surface: level tile  Device: Rolling Walker  Assistance: Contact guard assistance, Stand by assistance  Distance: 10 ft with seated rest break and then additional 10 ft  Comments: ----    Assessment: Patient is seated in chair visiting with family upon entry. Patient agrees to quick session with PTA. Increased seated exercise resistance to 3# this afternoon with good tolerance from patient. Sit to stand is SBA from chair. He ambulates to door and back to bed for short rest before returning to the chair. Elevated bed for patient to complete sit to stand due to LE fatigue. Patient elevates legs in chair with call light in reach and family members present in room.   Safety Devices  Type of devices: Call light within reach, Chair alarm in place, Gait belt, Left in chair    Time In: 1400  Time Out: 1415  Timed Coded Minutes: 15  Total Treatment Time: 15    Exercises:  See Flowsheets    Plan  Cont Per Plan Of Care    Goals  Short Term Goals  Time Frame for Short term goals: 14 days - expires 21  Short term goal 1: Prevent loss of strength of bilateral UE's and left LE to allow patient to complete transfers and short distance amb  Short term goal 2: Transfer in/OOB and chair /commode indeependently  Short term goal 3: Ambulate with wh walker x 25 ft x 2 with supervision to safely negotiate home environment  Short term goal 4: Good standing balance to assist with self-care tasks  Short term goal 5: Gair+ endurance to complete above ambulation to reduce fall risk associated with fatigue    Long Term Goals    Mary Jo Garcia [place] [facility] License Number: [id number]    Date: 5/27/2021

## 2021-05-28 PROCEDURE — 6360000002 HC RX W HCPCS: Performed by: INTERNAL MEDICINE

## 2021-05-28 PROCEDURE — 1200000002 HC SEMI PRIVATE SWING BED

## 2021-05-28 PROCEDURE — 6370000000 HC RX 637 (ALT 250 FOR IP): Performed by: INTERNAL MEDICINE

## 2021-05-28 PROCEDURE — 94640 AIRWAY INHALATION TREATMENT: CPT

## 2021-05-28 PROCEDURE — 97116 GAIT TRAINING THERAPY: CPT

## 2021-05-28 PROCEDURE — 2580000003 HC RX 258: Performed by: INTERNAL MEDICINE

## 2021-05-28 PROCEDURE — 97110 THERAPEUTIC EXERCISES: CPT

## 2021-05-28 PROCEDURE — 94761 N-INVAS EAR/PLS OXIMETRY MLT: CPT

## 2021-05-28 RX ADMIN — TAMSULOSIN HYDROCHLORIDE 0.4 MG: 0.4 CAPSULE ORAL at 18:18

## 2021-05-28 RX ADMIN — TIOTROPIUM BROMIDE INHALATION SPRAY 1 PUFF: 3.12 SPRAY, METERED RESPIRATORY (INHALATION) at 08:41

## 2021-05-28 RX ADMIN — WATER 2000 MG: 1 INJECTION INTRAMUSCULAR; INTRAVENOUS; SUBCUTANEOUS at 18:18

## 2021-05-28 RX ADMIN — ATORVASTATIN CALCIUM 80 MG: 40 TABLET, FILM COATED ORAL at 20:37

## 2021-05-28 RX ADMIN — Medication 1 CAPSULE: at 18:18

## 2021-05-28 RX ADMIN — LOSARTAN POTASSIUM 25 MG: 50 TABLET ORAL at 08:58

## 2021-05-28 RX ADMIN — VANCOMYCIN HYDROCHLORIDE 125 MG: 125 CAPSULE ORAL at 08:59

## 2021-05-28 RX ADMIN — BUDESONIDE AND FORMOTEROL FUMARATE DIHYDRATE 2 PUFF: 160; 4.5 AEROSOL RESPIRATORY (INHALATION) at 08:41

## 2021-05-28 RX ADMIN — IPRATROPIUM BROMIDE 0.5 MG: 0.5 SOLUTION RESPIRATORY (INHALATION) at 08:39

## 2021-05-28 RX ADMIN — SODIUM CHLORIDE, PRESERVATIVE FREE 10 ML: 5 INJECTION INTRAVENOUS at 08:59

## 2021-05-28 RX ADMIN — ENOXAPARIN SODIUM 40 MG: 40 INJECTION SUBCUTANEOUS at 08:58

## 2021-05-28 RX ADMIN — SPIRONOLACTONE 25 MG: 25 TABLET, FILM COATED ORAL at 08:59

## 2021-05-28 RX ADMIN — VANCOMYCIN HYDROCHLORIDE 125 MG: 125 CAPSULE ORAL at 20:37

## 2021-05-28 RX ADMIN — THERA TABS 1 TABLET: TAB at 08:58

## 2021-05-28 RX ADMIN — TIOTROPIUM BROMIDE INHALATION SPRAY 1 PUFF: 3.12 SPRAY, METERED RESPIRATORY (INHALATION) at 21:02

## 2021-05-28 RX ADMIN — ALBUTEROL SULFATE 2.5 MG: 2.5 SOLUTION RESPIRATORY (INHALATION) at 21:02

## 2021-05-28 RX ADMIN — OXYCODONE AND ACETAMINOPHEN 1 TABLET: 5; 325 TABLET ORAL at 02:53

## 2021-05-28 RX ADMIN — CHOLECALCIFEROL TAB 25 MCG (1000 UNIT) 1000 UNITS: 25 TAB at 08:59

## 2021-05-28 RX ADMIN — ASPIRIN 325 MG: 325 TABLET, COATED ORAL at 08:58

## 2021-05-28 RX ADMIN — WATER 2000 MG: 1 INJECTION INTRAMUSCULAR; INTRAVENOUS; SUBCUTANEOUS at 09:06

## 2021-05-28 RX ADMIN — ALBUTEROL SULFATE 2.5 MG: 2.5 SOLUTION RESPIRATORY (INHALATION) at 08:35

## 2021-05-28 RX ADMIN — ESCITALOPRAM OXALATE 20 MG: 10 TABLET ORAL at 08:59

## 2021-05-28 RX ADMIN — SODIUM CHLORIDE, PRESERVATIVE FREE 10 ML: 5 INJECTION INTRAVENOUS at 20:37

## 2021-05-28 RX ADMIN — MONTELUKAST SODIUM 10 MG: 10 TABLET, FILM COATED ORAL at 20:37

## 2021-05-28 RX ADMIN — SODIUM CHLORIDE, PRESERVATIVE FREE 10 ML: 5 INJECTION INTRAVENOUS at 09:07

## 2021-05-28 RX ADMIN — FERROUS SULFATE TAB 325 MG (65 MG ELEMENTAL FE) 325 MG: 325 (65 FE) TAB at 08:59

## 2021-05-28 RX ADMIN — OXYCODONE AND ACETAMINOPHEN 1 TABLET: 5; 325 TABLET ORAL at 14:01

## 2021-05-28 RX ADMIN — METFORMIN HYDROCHLORIDE 500 MG: 500 TABLET ORAL at 08:58

## 2021-05-28 RX ADMIN — METOPROLOL SUCCINATE 25 MG: 25 TABLET, EXTENDED RELEASE ORAL at 08:58

## 2021-05-28 RX ADMIN — IPRATROPIUM BROMIDE 0.5 MG: 0.5 SOLUTION RESPIRATORY (INHALATION) at 21:02

## 2021-05-28 RX ADMIN — OXYCODONE AND ACETAMINOPHEN 1 TABLET: 5; 325 TABLET ORAL at 08:58

## 2021-05-28 RX ADMIN — WATER 2000 MG: 1 INJECTION INTRAMUSCULAR; INTRAVENOUS; SUBCUTANEOUS at 02:53

## 2021-05-28 RX ADMIN — FLUTICASONE PROPIONATE 1 SPRAY: 50 SPRAY, METERED NASAL at 09:06

## 2021-05-28 RX ADMIN — DOCUSATE SODIUM 100 MG: 100 CAPSULE, LIQUID FILLED ORAL at 08:59

## 2021-05-28 RX ADMIN — ISOSORBIDE MONONITRATE 30 MG: 30 TABLET, EXTENDED RELEASE ORAL at 08:58

## 2021-05-28 RX ADMIN — DOCUSATE SODIUM 100 MG: 100 CAPSULE, LIQUID FILLED ORAL at 20:37

## 2021-05-28 RX ADMIN — OXYCODONE HYDROCHLORIDE AND ACETAMINOPHEN 500 MG: 500 TABLET ORAL at 08:58

## 2021-05-28 RX ADMIN — BUDESONIDE AND FORMOTEROL FUMARATE DIHYDRATE 2 PUFF: 160; 4.5 AEROSOL RESPIRATORY (INHALATION) at 21:02

## 2021-05-28 RX ADMIN — PANTOPRAZOLE SODIUM 40 MG: 40 TABLET, DELAYED RELEASE ORAL at 06:26

## 2021-05-28 RX ADMIN — POTASSIUM CHLORIDE 10 MEQ: 750 TABLET, FILM COATED, EXTENDED RELEASE ORAL at 08:58

## 2021-05-28 RX ADMIN — HYDROCHLOROTHIAZIDE 25 MG: 25 TABLET ORAL at 09:06

## 2021-05-28 RX ADMIN — Medication 1 CAPSULE: at 08:59

## 2021-05-28 RX ADMIN — OXYCODONE AND ACETAMINOPHEN 1 TABLET: 5; 325 TABLET ORAL at 20:37

## 2021-05-28 ASSESSMENT — PAIN DESCRIPTION - ORIENTATION: ORIENTATION: RIGHT

## 2021-05-28 ASSESSMENT — PAIN SCALES - GENERAL
PAINLEVEL_OUTOF10: 5
PAINLEVEL_OUTOF10: 5
PAINLEVEL_OUTOF10: 7
PAINLEVEL_OUTOF10: 4
PAINLEVEL_OUTOF10: 5
PAINLEVEL_OUTOF10: 0

## 2021-05-28 ASSESSMENT — PAIN DESCRIPTION - PAIN TYPE: TYPE: ACUTE PAIN

## 2021-05-28 ASSESSMENT — PAIN DESCRIPTION - LOCATION: LOCATION: KNEE

## 2021-05-28 NOTE — PROGRESS NOTES
Egg crate mattress placed on reclining chair for patient for complaints of pain to coccyx area. Patient states it feels much better with the mattress.

## 2021-05-28 NOTE — PLAN OF CARE
Medicated with one Percocet for RLE pain he rates 7/10 scale. Up to BR with SBA. Remains NWB and does well. O2 sats 95% on RA.

## 2021-05-28 NOTE — PROGRESS NOTES
Phone: Ricky  Date: 2021  Fax: [phone]      Physical Therapy    Daily Note    Patient Name: Harpal Cosby      : 1945  (70 y.o.)  MRN: [id number]     Pt is PROGRESSING toward goals and increased independence of mobility this treatment session  Discharge Recommendations: Home with assist PRN     Assessment    Sit to Stand: Stand by assistance  Stand to sit: Stand by assistance                WB Status: NWB R LE  Ambulation 1  Surface: level tile  Device: Rolling Walker  Assistance: Contact guard assistance, Stand by assistance  Distance: 15 ft x2 with rest break  Comments: ----             Assessment: Patient in chair upon arrival to room. Sit to stand from chair is SBA. Ambulates with wheeled walker and CGA to doorway, turns around and sits on bed to rest. Able to maintain NWB on R LE well. After resting, sit to stand from bed with height of bed elevated is SBA. Ambulates back to chair and is able to complete seated exercises. Remains in chair following with call light in reach and chair alarm in place.   Safety Devices  Type of devices: Call light within reach, Chair alarm in place, Gait belt, Left in chair          Time In: 1438  Time Out: 1505  Timed Coded Minutes: 27  Total Treatment Time: 27    Exercises:  See Flowsheets    Plan  Cont Per Plan Of Care    Goals  Short Term Goals  Time Frame for Short term goals: 14 days - expires 21  Short term goal 1: Prevent loss of strength of bilateral UE's and left LE to allow patient to complete transfers and short distance amb  Short term goal 2: Transfer in/OOB and chair /commode indeependently  Short term goal 3: Ambulate with wh walker x 25 ft x 2 with supervision to safely negotiate home environment  Short term goal 4: Good standing balance to assist with self-care tasks  Short term goal 5: Gair+ endurance to complete above ambulation to reduce fall risk associated with fatigue    Long Term Goals [address] Therapy License Number: [id number]    Date: 5/28/2021

## 2021-05-29 PROCEDURE — 94640 AIRWAY INHALATION TREATMENT: CPT

## 2021-05-29 PROCEDURE — 1200000002 HC SEMI PRIVATE SWING BED

## 2021-05-29 PROCEDURE — 6370000000 HC RX 637 (ALT 250 FOR IP): Performed by: INTERNAL MEDICINE

## 2021-05-29 PROCEDURE — 97110 THERAPEUTIC EXERCISES: CPT

## 2021-05-29 PROCEDURE — 6360000002 HC RX W HCPCS: Performed by: INTERNAL MEDICINE

## 2021-05-29 PROCEDURE — 2580000003 HC RX 258: Performed by: INTERNAL MEDICINE

## 2021-05-29 PROCEDURE — 94761 N-INVAS EAR/PLS OXIMETRY MLT: CPT

## 2021-05-29 RX ADMIN — METFORMIN HYDROCHLORIDE 500 MG: 500 TABLET ORAL at 09:16

## 2021-05-29 RX ADMIN — FERROUS SULFATE TAB 325 MG (65 MG ELEMENTAL FE) 325 MG: 325 (65 FE) TAB at 09:15

## 2021-05-29 RX ADMIN — BUDESONIDE AND FORMOTEROL FUMARATE DIHYDRATE 2 PUFF: 160; 4.5 AEROSOL RESPIRATORY (INHALATION) at 08:47

## 2021-05-29 RX ADMIN — METOPROLOL SUCCINATE 25 MG: 25 TABLET, EXTENDED RELEASE ORAL at 09:16

## 2021-05-29 RX ADMIN — TIOTROPIUM BROMIDE INHALATION SPRAY 1 PUFF: 3.12 SPRAY, METERED RESPIRATORY (INHALATION) at 21:04

## 2021-05-29 RX ADMIN — SPIRONOLACTONE 25 MG: 25 TABLET, FILM COATED ORAL at 09:16

## 2021-05-29 RX ADMIN — WATER 2000 MG: 1 INJECTION INTRAMUSCULAR; INTRAVENOUS; SUBCUTANEOUS at 09:53

## 2021-05-29 RX ADMIN — Medication 1 CAPSULE: at 09:16

## 2021-05-29 RX ADMIN — MONTELUKAST SODIUM 10 MG: 10 TABLET, FILM COATED ORAL at 21:10

## 2021-05-29 RX ADMIN — VANCOMYCIN HYDROCHLORIDE 125 MG: 125 CAPSULE ORAL at 09:16

## 2021-05-29 RX ADMIN — THERA TABS 1 TABLET: TAB at 09:15

## 2021-05-29 RX ADMIN — POTASSIUM CHLORIDE 10 MEQ: 750 TABLET, FILM COATED, EXTENDED RELEASE ORAL at 09:15

## 2021-05-29 RX ADMIN — ISOSORBIDE MONONITRATE 30 MG: 30 TABLET, EXTENDED RELEASE ORAL at 09:16

## 2021-05-29 RX ADMIN — IPRATROPIUM BROMIDE 0.5 MG: 0.5 SOLUTION RESPIRATORY (INHALATION) at 21:04

## 2021-05-29 RX ADMIN — HYDROCHLOROTHIAZIDE 25 MG: 25 TABLET ORAL at 09:16

## 2021-05-29 RX ADMIN — ENOXAPARIN SODIUM 40 MG: 40 INJECTION SUBCUTANEOUS at 09:16

## 2021-05-29 RX ADMIN — WATER 2000 MG: 1 INJECTION INTRAMUSCULAR; INTRAVENOUS; SUBCUTANEOUS at 01:49

## 2021-05-29 RX ADMIN — SODIUM CHLORIDE, PRESERVATIVE FREE 10 ML: 5 INJECTION INTRAVENOUS at 09:17

## 2021-05-29 RX ADMIN — FLUTICASONE PROPIONATE 1 SPRAY: 50 SPRAY, METERED NASAL at 09:20

## 2021-05-29 RX ADMIN — OXYCODONE AND ACETAMINOPHEN 1 TABLET: 5; 325 TABLET ORAL at 18:37

## 2021-05-29 RX ADMIN — TIOTROPIUM BROMIDE INHALATION SPRAY 1 PUFF: 3.12 SPRAY, METERED RESPIRATORY (INHALATION) at 08:47

## 2021-05-29 RX ADMIN — ALBUTEROL SULFATE 2.5 MG: 2.5 SOLUTION RESPIRATORY (INHALATION) at 08:45

## 2021-05-29 RX ADMIN — WATER 2000 MG: 1 INJECTION INTRAMUSCULAR; INTRAVENOUS; SUBCUTANEOUS at 18:37

## 2021-05-29 RX ADMIN — ATORVASTATIN CALCIUM 80 MG: 40 TABLET, FILM COATED ORAL at 21:11

## 2021-05-29 RX ADMIN — DOCUSATE SODIUM 100 MG: 100 CAPSULE, LIQUID FILLED ORAL at 21:11

## 2021-05-29 RX ADMIN — TAMSULOSIN HYDROCHLORIDE 0.4 MG: 0.4 CAPSULE ORAL at 18:36

## 2021-05-29 RX ADMIN — ESCITALOPRAM OXALATE 20 MG: 10 TABLET ORAL at 09:15

## 2021-05-29 RX ADMIN — SODIUM CHLORIDE, PRESERVATIVE FREE 10 ML: 5 INJECTION INTRAVENOUS at 21:11

## 2021-05-29 RX ADMIN — ASPIRIN 325 MG: 325 TABLET, COATED ORAL at 09:15

## 2021-05-29 RX ADMIN — CHOLECALCIFEROL TAB 25 MCG (1000 UNIT) 1000 UNITS: 25 TAB at 09:16

## 2021-05-29 RX ADMIN — PANTOPRAZOLE SODIUM 40 MG: 40 TABLET, DELAYED RELEASE ORAL at 06:09

## 2021-05-29 RX ADMIN — LOSARTAN POTASSIUM 25 MG: 50 TABLET ORAL at 09:15

## 2021-05-29 RX ADMIN — SODIUM CHLORIDE, PRESERVATIVE FREE 10 ML: 5 INJECTION INTRAVENOUS at 01:50

## 2021-05-29 RX ADMIN — BUDESONIDE AND FORMOTEROL FUMARATE DIHYDRATE 2 PUFF: 160; 4.5 AEROSOL RESPIRATORY (INHALATION) at 21:04

## 2021-05-29 RX ADMIN — OXYCODONE AND ACETAMINOPHEN 1 TABLET: 5; 325 TABLET ORAL at 07:23

## 2021-05-29 RX ADMIN — ALBUTEROL SULFATE 2.5 MG: 2.5 SOLUTION RESPIRATORY (INHALATION) at 21:03

## 2021-05-29 RX ADMIN — VANCOMYCIN HYDROCHLORIDE 125 MG: 125 CAPSULE ORAL at 21:10

## 2021-05-29 RX ADMIN — IPRATROPIUM BROMIDE 0.5 MG: 0.5 SOLUTION RESPIRATORY (INHALATION) at 08:45

## 2021-05-29 RX ADMIN — OXYCODONE HYDROCHLORIDE AND ACETAMINOPHEN 500 MG: 500 TABLET ORAL at 09:15

## 2021-05-29 RX ADMIN — Medication 1 CAPSULE: at 18:36

## 2021-05-29 ASSESSMENT — PAIN SCALES - GENERAL
PAINLEVEL_OUTOF10: 0
PAINLEVEL_OUTOF10: 5
PAINLEVEL_OUTOF10: 2
PAINLEVEL_OUTOF10: 6

## 2021-05-29 NOTE — PLAN OF CARE
Problem: Pain:  Goal: Pain level will decrease  Description: Pain level will decrease  Outcome: Ongoing  Goal: Control of acute pain  Description: Control of acute pain  Outcome: Ongoing  Goal: Control of chronic pain  Description: Control of chronic pain  Outcome: Ongoing     Problem: Skin Integrity:  Goal: Will show no infection signs and symptoms  Description: Will show no infection signs and symptoms  Outcome: Ongoing  Goal: Absence of new skin breakdown  Description: Absence of new skin breakdown  Outcome: Ongoing     Problem: Falls - Risk of:  Goal: Will remain free from falls  Description: Will remain free from falls  Outcome: Ongoing  Goal: Absence of physical injury  Description: Absence of physical injury  Outcome: Ongoing     Problem: Discharge Planning:  Goal: Discharged to appropriate level of care  Description: Discharged to appropriate level of care  Outcome: Ongoing     Problem: OXYGENATION/RESPIRATORY FUNCTION  Goal: Patient will maintain patent airway  Outcome: Ongoing

## 2021-05-29 NOTE — PROGRESS NOTES
Phone: Ricky  Date: 2021  Fax: [phone]      Physical Therapy    Daily Note    Patient Name: David Garcia      : 1945  (70 y.o.)  MRN: [id number]     Pt is PROGRESSING toward goals and increased independence of mobility this treatment session  Discharge Recommendations: Home with assist PRN     Assessment  Bridging: Supervision     Supine to Sit: Minimal assistance (for R LE managment)     Scooting: Supervision    Sit to Stand: Stand by assistance  Stand to sit: Stand by assistance    WB Status: NWB R LE  Ambulation 1  Surface: level tile  Device: Rolling Walker  Assistance: Contact guard assistance, Stand by assistance  Distance: 15 ft x2 with rest break  Comments: ----    Assessment: Patient in chair upon entry. Sit to stand is CGA. Patient hops with FWW and CGA to doorway and back to bed to take short break before hopping back to chair and completes seated LE strengthening exercises. Patient remains in chair with legs elevated and call light in reach.   Safety Devices  Type of devices: Call light within reach, Chair alarm in place, Gait belt, Left in chair    Time In: 1050  Time Out: 1110  Timed Coded Minutes: 20  Total Treatment Time: 20    Exercises:  See [address] Per Plan Of Care    Goals  Short Term Goals  Time Frame for Short term goals: 14 days - expires 21  Short term goal 1: Prevent loss of strength of bilateral UE's and left LE to allow patient to complete transfers and short distance amb  Short term goal 2: Transfer in/OOB and chair /commode indeependently  Short term goal 3: Ambulate with wh walker x 25 ft x 2 with supervision to safely negotiate home environment  Short term goal 4: Good standing balance to assist with self-care tasks  Short term goal 5: Gair+ endurance to complete above ambulation to reduce fall risk associated with fatigue    Long Term Goals    Tori Good Therapy License Number: [id number]    Date: 2021

## 2021-05-29 NOTE — PROGRESS NOTES
Dressing changed to right knee. Large amount of orange-red fluid expressed. Immobilizer replaced following dressing completion.

## 2021-05-30 LAB
ABSOLUTE EOS #: 0.28 K/UL (ref 0–0.4)
ABSOLUTE IMMATURE GRANULOCYTE: ABNORMAL K/UL (ref 0–0.3)
ABSOLUTE LYMPH #: 1.43 K/UL (ref 1–4.8)
ABSOLUTE MONO #: 0.23 K/UL (ref 0–1)
ANION GAP SERPL CALCULATED.3IONS-SCNC: 9 MMOL/L (ref 9–17)
BASOPHILS # BLD: ABNORMAL % (ref 0–2)
BASOPHILS ABSOLUTE: ABNORMAL K/UL (ref 0–0.2)
BUN BLDV-MCNC: 17 MG/DL (ref 8–23)
BUN/CREAT BLD: 21 (ref 9–20)
C-REACTIVE PROTEIN: 10.6 MG/L (ref 0–5)
CALCIUM SERPL-MCNC: 9.3 MG/DL (ref 8.6–10.4)
CHLORIDE BLD-SCNC: 102 MMOL/L (ref 98–107)
CO2: 25 MMOL/L (ref 20–31)
CREAT SERPL-MCNC: 0.82 MG/DL (ref 0.7–1.2)
DIFFERENTIAL TYPE: ABNORMAL
EOSINOPHILS RELATIVE PERCENT: 6 % (ref 0–5)
GFR AFRICAN AMERICAN: >60 ML/MIN
GFR NON-AFRICAN AMERICAN: >60 ML/MIN
GFR SERPL CREATININE-BSD FRML MDRD: ABNORMAL ML/MIN/{1.73_M2}
GFR SERPL CREATININE-BSD FRML MDRD: ABNORMAL ML/MIN/{1.73_M2}
GLUCOSE BLD-MCNC: 117 MG/DL (ref 70–99)
HCT VFR BLD CALC: 32.9 % (ref 41–53)
HEMOGLOBIN: 10.2 G/DL (ref 13.5–17.5)
IMMATURE GRANULOCYTES: ABNORMAL %
LYMPHOCYTES # BLD: 31 % (ref 13–44)
MCH RBC QN AUTO: 25.7 PG (ref 26–34)
MCHC RBC AUTO-ENTMCNC: 31 G/DL (ref 31–37)
MCV RBC AUTO: 82.9 FL (ref 80–100)
MONOCYTES # BLD: 5 % (ref 5–9)
MORPHOLOGY: ABNORMAL
NRBC AUTOMATED: ABNORMAL PER 100 WBC
PDW BLD-RTO: 15.4 % (ref 12.1–15.2)
PLATELET # BLD: 256 K/UL (ref 140–450)
PLATELET ESTIMATE: ABNORMAL
PMV BLD AUTO: ABNORMAL FL
POTASSIUM SERPL-SCNC: 3.9 MMOL/L (ref 3.7–5.3)
RBC # BLD: 3.97 M/UL (ref 4.5–5.9)
RBC # BLD: ABNORMAL 10*6/UL
SEG NEUTROPHILS: 58 % (ref 39–75)
SEGMENTED NEUTROPHILS ABSOLUTE COUNT: 2.66 K/UL (ref 2.1–6.5)
SODIUM BLD-SCNC: 136 MMOL/L (ref 135–144)
WBC # BLD: 4.6 K/UL (ref 3.5–11)
WBC # BLD: ABNORMAL 10*3/UL

## 2021-05-30 PROCEDURE — 6370000000 HC RX 637 (ALT 250 FOR IP): Performed by: INTERNAL MEDICINE

## 2021-05-30 PROCEDURE — 86140 C-REACTIVE PROTEIN: CPT

## 2021-05-30 PROCEDURE — 80048 BASIC METABOLIC PNL TOTAL CA: CPT

## 2021-05-30 PROCEDURE — 6360000002 HC RX W HCPCS: Performed by: INTERNAL MEDICINE

## 2021-05-30 PROCEDURE — 97110 THERAPEUTIC EXERCISES: CPT

## 2021-05-30 PROCEDURE — 85025 COMPLETE CBC W/AUTO DIFF WBC: CPT

## 2021-05-30 PROCEDURE — 94761 N-INVAS EAR/PLS OXIMETRY MLT: CPT

## 2021-05-30 PROCEDURE — 2580000003 HC RX 258: Performed by: INTERNAL MEDICINE

## 2021-05-30 PROCEDURE — 1200000002 HC SEMI PRIVATE SWING BED

## 2021-05-30 PROCEDURE — 94640 AIRWAY INHALATION TREATMENT: CPT

## 2021-05-30 RX ADMIN — METFORMIN HYDROCHLORIDE 500 MG: 500 TABLET ORAL at 09:16

## 2021-05-30 RX ADMIN — WATER 2000 MG: 1 INJECTION INTRAMUSCULAR; INTRAVENOUS; SUBCUTANEOUS at 02:27

## 2021-05-30 RX ADMIN — LOSARTAN POTASSIUM 25 MG: 50 TABLET ORAL at 09:12

## 2021-05-30 RX ADMIN — CHOLECALCIFEROL TAB 25 MCG (1000 UNIT) 1000 UNITS: 25 TAB at 09:13

## 2021-05-30 RX ADMIN — DOCUSATE SODIUM 100 MG: 100 CAPSULE, LIQUID FILLED ORAL at 09:13

## 2021-05-30 RX ADMIN — ENOXAPARIN SODIUM 40 MG: 40 INJECTION SUBCUTANEOUS at 09:12

## 2021-05-30 RX ADMIN — SODIUM CHLORIDE, PRESERVATIVE FREE 10 ML: 5 INJECTION INTRAVENOUS at 09:13

## 2021-05-30 RX ADMIN — METOPROLOL SUCCINATE 25 MG: 25 TABLET, EXTENDED RELEASE ORAL at 09:13

## 2021-05-30 RX ADMIN — DOCUSATE SODIUM 100 MG: 100 CAPSULE, LIQUID FILLED ORAL at 20:10

## 2021-05-30 RX ADMIN — IPRATROPIUM BROMIDE 0.5 MG: 0.5 SOLUTION RESPIRATORY (INHALATION) at 08:44

## 2021-05-30 RX ADMIN — TIOTROPIUM BROMIDE INHALATION SPRAY 1 PUFF: 3.12 SPRAY, METERED RESPIRATORY (INHALATION) at 21:02

## 2021-05-30 RX ADMIN — OXYCODONE AND ACETAMINOPHEN 1 TABLET: 5; 325 TABLET ORAL at 00:40

## 2021-05-30 RX ADMIN — FLUTICASONE PROPIONATE 1 SPRAY: 50 SPRAY, METERED NASAL at 09:15

## 2021-05-30 RX ADMIN — HYDROCHLOROTHIAZIDE 25 MG: 25 TABLET ORAL at 09:13

## 2021-05-30 RX ADMIN — SODIUM CHLORIDE, PRESERVATIVE FREE 10 ML: 5 INJECTION INTRAVENOUS at 02:28

## 2021-05-30 RX ADMIN — FERROUS SULFATE TAB 325 MG (65 MG ELEMENTAL FE) 325 MG: 325 (65 FE) TAB at 09:16

## 2021-05-30 RX ADMIN — ESCITALOPRAM OXALATE 20 MG: 10 TABLET ORAL at 09:13

## 2021-05-30 RX ADMIN — VANCOMYCIN HYDROCHLORIDE 125 MG: 125 CAPSULE ORAL at 09:12

## 2021-05-30 RX ADMIN — BUDESONIDE AND FORMOTEROL FUMARATE DIHYDRATE 2 PUFF: 160; 4.5 AEROSOL RESPIRATORY (INHALATION) at 08:44

## 2021-05-30 RX ADMIN — ALBUTEROL SULFATE 2.5 MG: 2.5 SOLUTION RESPIRATORY (INHALATION) at 21:02

## 2021-05-30 RX ADMIN — WATER 2000 MG: 1 INJECTION INTRAMUSCULAR; INTRAVENOUS; SUBCUTANEOUS at 09:12

## 2021-05-30 RX ADMIN — PANTOPRAZOLE SODIUM 40 MG: 40 TABLET, DELAYED RELEASE ORAL at 05:07

## 2021-05-30 RX ADMIN — Medication 1 CAPSULE: at 09:22

## 2021-05-30 RX ADMIN — VANCOMYCIN HYDROCHLORIDE 125 MG: 125 CAPSULE ORAL at 20:10

## 2021-05-30 RX ADMIN — POTASSIUM CHLORIDE 10 MEQ: 750 TABLET, FILM COATED, EXTENDED RELEASE ORAL at 09:16

## 2021-05-30 RX ADMIN — BUDESONIDE AND FORMOTEROL FUMARATE DIHYDRATE 2 PUFF: 160; 4.5 AEROSOL RESPIRATORY (INHALATION) at 21:02

## 2021-05-30 RX ADMIN — WATER 2000 MG: 1 INJECTION INTRAMUSCULAR; INTRAVENOUS; SUBCUTANEOUS at 18:23

## 2021-05-30 RX ADMIN — ALBUTEROL SULFATE 2.5 MG: 2.5 SOLUTION RESPIRATORY (INHALATION) at 08:44

## 2021-05-30 RX ADMIN — MONTELUKAST SODIUM 10 MG: 10 TABLET, FILM COATED ORAL at 20:10

## 2021-05-30 RX ADMIN — TAMSULOSIN HYDROCHLORIDE 0.4 MG: 0.4 CAPSULE ORAL at 18:24

## 2021-05-30 RX ADMIN — ATORVASTATIN CALCIUM 80 MG: 40 TABLET, FILM COATED ORAL at 20:10

## 2021-05-30 RX ADMIN — TIOTROPIUM BROMIDE INHALATION SPRAY 1 PUFF: 3.12 SPRAY, METERED RESPIRATORY (INHALATION) at 08:44

## 2021-05-30 RX ADMIN — IPRATROPIUM BROMIDE 0.5 MG: 0.5 SOLUTION RESPIRATORY (INHALATION) at 21:02

## 2021-05-30 RX ADMIN — SODIUM CHLORIDE, PRESERVATIVE FREE 10 ML: 5 INJECTION INTRAVENOUS at 20:10

## 2021-05-30 RX ADMIN — ISOSORBIDE MONONITRATE 30 MG: 30 TABLET, EXTENDED RELEASE ORAL at 09:12

## 2021-05-30 RX ADMIN — OXYCODONE HYDROCHLORIDE AND ACETAMINOPHEN 500 MG: 500 TABLET ORAL at 09:13

## 2021-05-30 RX ADMIN — ASPIRIN 325 MG: 325 TABLET, COATED ORAL at 09:12

## 2021-05-30 RX ADMIN — THERA TABS 1 TABLET: TAB at 09:12

## 2021-05-30 RX ADMIN — Medication 1 CAPSULE: at 18:23

## 2021-05-30 RX ADMIN — OXYCODONE AND ACETAMINOPHEN 1 TABLET: 5; 325 TABLET ORAL at 18:23

## 2021-05-30 RX ADMIN — SPIRONOLACTONE 25 MG: 25 TABLET, FILM COATED ORAL at 09:13

## 2021-05-30 ASSESSMENT — PAIN SCALES - GENERAL
PAINLEVEL_OUTOF10: 4
PAINLEVEL_OUTOF10: 7
PAINLEVEL_OUTOF10: 3

## 2021-05-30 NOTE — PLAN OF CARE
Problem: Pain:  Goal: Pain level will decrease  Description: Pain level will decrease  Outcome: Ongoing  Goal: Control of acute pain  Description: Control of acute pain  Outcome: Ongoing  Goal: Control of chronic pain  Description: Control of chronic pain  Outcome: Ongoing     Problem: Skin Integrity:  Goal: Will show no infection signs and symptoms  Description: Will show no infection signs and symptoms  Outcome: Ongoing  Goal: Absence of new skin breakdown  Description: Absence of new skin breakdown  Outcome: Ongoing     Problem: Falls - Risk of:  Goal: Will remain free from falls  Description: Will remain free from falls  Outcome: Ongoing  Goal: Absence of physical injury  Description: Absence of physical injury  Outcome: Ongoing     Problem: Nutrition  Goal: Optimal nutrition therapy  Outcome: Ongoing     Problem: Discharge Planning:  Goal: Discharged to appropriate level of care  Description: Discharged to appropriate level of care  Outcome: Ongoing     Problem: Infection, Septic Shock:  Goal: Will show no infection signs and symptoms  Description: Will show no infection signs and symptoms  Outcome: Ongoing

## 2021-05-30 NOTE — PROGRESS NOTES
Phone: Ricky  Date: 2021  Fax: [phone]      Physical Therapy    Daily Note    Patient Name: Yanet Stern      : 1945  (70 y.o.)  MRN: [id number]     Pt is PROGRESSING toward goals and increased independence of mobility this treatment session  Discharge Recommendations: Home with assist PRN     Assessment  Bridging: Supervision     Supine to Sit: Minimal assistance (for R LE managment)     Scooting: Supervision    Sit to Stand: Stand by assistance  Stand to sit: Stand by assistance    WB Status: NWB R LE  Ambulation 1  Surface: level tile  Device: Rolling Walker  Assistance: Contact guard assistance, Stand by assistance  Distance: 15 ft x2 with rest break  Comments: ----    Assessment: Patient seated in chair watching TV upon entry. Sit to stand is SBA. Patient hops with FWW and CGA to doorway and back to bed to take short restbreak before hopping back to chair. While standing in front of chair patient completes standing R hip exercises and then sits down to complete seated strengthening exercises. Patient remains in chair with call light in reach and no further needs.   Safety Devices  Type of devices: Call light within reach, Chair alarm in place, Gait belt, Left in chair    Time In: 1010  Time Out: 1035  Timed Coded Minutes: 25  Total Treatment Time: 25    Exercises:  See Flowsheets    Plan  Cont Per Plan Of Care    Goals  Short Term Goals  Time Frame for Short term goals: 14 days - expires 21  Short term goal 1: Prevent loss of strength of bilateral UE's and left LE to allow patient to complete transfers and short distance amb  Short term goal 2: Transfer in/OOB and chair /commode indeependently  Short term goal 3: Ambulate with wh walker x 25 ft x 2 with supervision to safely negotiate home environment  Short term goal 4: Good standing balance to assist with self-care tasks  Short term goal 5: Gair+ endurance to complete above ambulation to reduce fall risk associated with fatigue    Long Term Goals    Jolly Mccann [place] [facility] License Number: [id number]    Date: 5/30/2021

## 2021-05-31 PROCEDURE — 2580000003 HC RX 258: Performed by: INTERNAL MEDICINE

## 2021-05-31 PROCEDURE — 6370000000 HC RX 637 (ALT 250 FOR IP): Performed by: INTERNAL MEDICINE

## 2021-05-31 PROCEDURE — 94761 N-INVAS EAR/PLS OXIMETRY MLT: CPT

## 2021-05-31 PROCEDURE — 1200000002 HC SEMI PRIVATE SWING BED

## 2021-05-31 PROCEDURE — 6360000002 HC RX W HCPCS: Performed by: INTERNAL MEDICINE

## 2021-05-31 PROCEDURE — 94640 AIRWAY INHALATION TREATMENT: CPT

## 2021-05-31 RX ADMIN — OXYCODONE AND ACETAMINOPHEN 1 TABLET: 5; 325 TABLET ORAL at 00:51

## 2021-05-31 RX ADMIN — ATORVASTATIN CALCIUM 80 MG: 40 TABLET, FILM COATED ORAL at 20:41

## 2021-05-31 RX ADMIN — THERA TABS 1 TABLET: TAB at 08:20

## 2021-05-31 RX ADMIN — SPIRONOLACTONE 25 MG: 25 TABLET, FILM COATED ORAL at 08:21

## 2021-05-31 RX ADMIN — OXYCODONE AND ACETAMINOPHEN 1 TABLET: 5; 325 TABLET ORAL at 18:34

## 2021-05-31 RX ADMIN — PANTOPRAZOLE SODIUM 40 MG: 40 TABLET, DELAYED RELEASE ORAL at 06:35

## 2021-05-31 RX ADMIN — ALBUTEROL SULFATE 2.5 MG: 2.5 SOLUTION RESPIRATORY (INHALATION) at 21:07

## 2021-05-31 RX ADMIN — METOPROLOL SUCCINATE 25 MG: 25 TABLET, EXTENDED RELEASE ORAL at 08:20

## 2021-05-31 RX ADMIN — VANCOMYCIN HYDROCHLORIDE 125 MG: 125 CAPSULE ORAL at 20:41

## 2021-05-31 RX ADMIN — ISOSORBIDE MONONITRATE 30 MG: 30 TABLET, EXTENDED RELEASE ORAL at 08:20

## 2021-05-31 RX ADMIN — POTASSIUM CHLORIDE 10 MEQ: 750 TABLET, FILM COATED, EXTENDED RELEASE ORAL at 08:20

## 2021-05-31 RX ADMIN — WATER 2000 MG: 1 INJECTION INTRAMUSCULAR; INTRAVENOUS; SUBCUTANEOUS at 08:20

## 2021-05-31 RX ADMIN — OXYCODONE HYDROCHLORIDE AND ACETAMINOPHEN 500 MG: 500 TABLET ORAL at 08:21

## 2021-05-31 RX ADMIN — ENOXAPARIN SODIUM 40 MG: 40 INJECTION SUBCUTANEOUS at 08:19

## 2021-05-31 RX ADMIN — WATER 2000 MG: 1 INJECTION INTRAMUSCULAR; INTRAVENOUS; SUBCUTANEOUS at 01:42

## 2021-05-31 RX ADMIN — MONTELUKAST SODIUM 10 MG: 10 TABLET, FILM COATED ORAL at 20:41

## 2021-05-31 RX ADMIN — TIOTROPIUM BROMIDE INHALATION SPRAY 1 PUFF: 3.12 SPRAY, METERED RESPIRATORY (INHALATION) at 08:09

## 2021-05-31 RX ADMIN — FERROUS SULFATE TAB 325 MG (65 MG ELEMENTAL FE) 325 MG: 325 (65 FE) TAB at 08:20

## 2021-05-31 RX ADMIN — ESCITALOPRAM OXALATE 20 MG: 10 TABLET ORAL at 08:20

## 2021-05-31 RX ADMIN — FLUTICASONE PROPIONATE 1 SPRAY: 50 SPRAY, METERED NASAL at 08:26

## 2021-05-31 RX ADMIN — Medication 1 CAPSULE: at 18:11

## 2021-05-31 RX ADMIN — TIOTROPIUM BROMIDE INHALATION SPRAY 1 PUFF: 3.12 SPRAY, METERED RESPIRATORY (INHALATION) at 21:07

## 2021-05-31 RX ADMIN — IPRATROPIUM BROMIDE 0.5 MG: 0.5 SOLUTION RESPIRATORY (INHALATION) at 21:07

## 2021-05-31 RX ADMIN — HYDROCHLOROTHIAZIDE 25 MG: 25 TABLET ORAL at 08:21

## 2021-05-31 RX ADMIN — LOSARTAN POTASSIUM 25 MG: 50 TABLET ORAL at 08:20

## 2021-05-31 RX ADMIN — SODIUM CHLORIDE, PRESERVATIVE FREE 10 ML: 5 INJECTION INTRAVENOUS at 20:41

## 2021-05-31 RX ADMIN — BUDESONIDE AND FORMOTEROL FUMARATE DIHYDRATE 2 PUFF: 160; 4.5 AEROSOL RESPIRATORY (INHALATION) at 08:09

## 2021-05-31 RX ADMIN — DOCUSATE SODIUM 100 MG: 100 CAPSULE, LIQUID FILLED ORAL at 08:21

## 2021-05-31 RX ADMIN — METFORMIN HYDROCHLORIDE 500 MG: 500 TABLET ORAL at 08:21

## 2021-05-31 RX ADMIN — DOCUSATE SODIUM 100 MG: 100 CAPSULE, LIQUID FILLED ORAL at 20:41

## 2021-05-31 RX ADMIN — SODIUM CHLORIDE, PRESERVATIVE FREE 10 ML: 5 INJECTION INTRAVENOUS at 01:45

## 2021-05-31 RX ADMIN — Medication 1 CAPSULE: at 08:20

## 2021-05-31 RX ADMIN — BUDESONIDE AND FORMOTEROL FUMARATE DIHYDRATE 2 PUFF: 160; 4.5 AEROSOL RESPIRATORY (INHALATION) at 21:07

## 2021-05-31 RX ADMIN — ASPIRIN 325 MG: 325 TABLET, COATED ORAL at 08:21

## 2021-05-31 RX ADMIN — TAMSULOSIN HYDROCHLORIDE 0.4 MG: 0.4 CAPSULE ORAL at 18:11

## 2021-05-31 RX ADMIN — IPRATROPIUM BROMIDE 0.5 MG: 0.5 SOLUTION RESPIRATORY (INHALATION) at 08:07

## 2021-05-31 RX ADMIN — CHOLECALCIFEROL TAB 25 MCG (1000 UNIT) 1000 UNITS: 25 TAB at 08:21

## 2021-05-31 RX ADMIN — ALBUTEROL SULFATE 2.5 MG: 2.5 SOLUTION RESPIRATORY (INHALATION) at 08:07

## 2021-05-31 RX ADMIN — VANCOMYCIN HYDROCHLORIDE 125 MG: 125 CAPSULE ORAL at 08:21

## 2021-05-31 RX ADMIN — WATER 2000 MG: 1 INJECTION INTRAMUSCULAR; INTRAVENOUS; SUBCUTANEOUS at 18:11

## 2021-05-31 RX ADMIN — SODIUM CHLORIDE, PRESERVATIVE FREE 10 ML: 5 INJECTION INTRAVENOUS at 08:26

## 2021-05-31 ASSESSMENT — PAIN SCALES - GENERAL
PAINLEVEL_OUTOF10: 6
PAINLEVEL_OUTOF10: 0
PAINLEVEL_OUTOF10: 3
PAINLEVEL_OUTOF10: 6

## 2021-05-31 NOTE — PROGRESS NOTES
Pt up to the bathroom with SBA and use of walker and maintaining NWB, returns to recliner without difficulty. Pt denies further needs. Call light and bedside table within reach.

## 2021-05-31 NOTE — PROGRESS NOTES
Hospitalist Progress Note  5/31/2021 9:42 AM  Subjective:   Admit Date: 5/15/2021  PCP: Tania Hdez, APRN - CNP    Interval History:     Mr. Jenaro Heath states he is doing well. He is making progress with PT and able to ambulate more. Pain is controlled with Percocet which he takes place often. Denies any abdominal pain, constipation, has no chest pain, no shortness of breath.     Diet: DIET CARB CONTROL; Carb Control: 4 carb choices (60 gms)/meal  Medications:   Scheduled Meds:   lactobacillus  1 capsule Oral BID WC    albuterol  2.5 mg Nebulization BID    ferrous sulfate  325 mg Oral Daily with breakfast    aspirin  325 mg Oral Daily    atorvastatin  80 mg Oral Nightly    hydroCHLOROthiazide  25 mg Oral Daily    isosorbide mononitrate  30 mg Oral Daily    losartan  25 mg Oral Daily    metoprolol succinate  25 mg Oral Daily    montelukast  10 mg Oral Nightly    multivitamin  1 tablet Oral Daily    spironolactone  25 mg Oral Daily    tamsulosin  0.4 mg Oral QPM    docusate sodium  100 mg Oral BID    escitalopram  20 mg Oral Daily    budesonide-formoterol  2 puff Inhalation Q12H    fluticasone  1 spray Nasal Daily    ipratropium  0.5 mg Nebulization BID    metFORMIN  500 mg Oral Daily with breakfast    pantoprazole  40 mg Oral QAM AC    tiotropium  1 puff Inhalation BID    vitamin C  500 mg Oral Daily    Vitamin D  1,000 Units Oral Daily    cefepime  2,000 mg Intravenous Q8H    enoxaparin  40 mg Subcutaneous Daily    potassium chloride  10 mEq Oral Daily with breakfast    sodium chloride flush  5-40 mL Intravenous 2 times per day    vancomycin  125 mg Oral 2 times per day     Continuous Infusions:   sodium chloride       PRN Medications: ondansetron, albuterol, oxyCODONE-acetaminophen, sodium chloride flush, sodium chloride    Objective:   Vitals: BP (!) 106/53   Pulse 72   Temp 97.5 °F (36.4 °C) (Oral)   Resp 16   Ht 5' 10\" (1.778 m)   Wt (!) 312 lb 6.4 oz (141.7 kg)   SpO2 95% BMI 44.82 kg/m²   BMI: Body mass index is 44.82 kg/m². CBC:   Recent Labs     05/30/21  0605   WBC 4.6   HGB 10.2*        BMP:    Recent Labs     05/30/21  0605      K 3.9      CO2 25   BUN 17   CREATININE 0.82   GLUCOSE 117*       Physical Exam:         General Appearance: Up in chair, alert and oriented to person, place and time, in no acute distress  Cardiovascular: normal rate, regular rhythm, normal S1 and S2  Pulmonary/Chest: clear to auscultation bilaterally, no rales, no wheezes,  Abdomen: soft, obese, non-tender, non-distended, normal bowel sounds   Extremities: Right lower extremity with straight leg brace, dressing dry  Skin: warm and dry, no rash  Neurological: alert, oriented, normal speech, no focal findings or movement disorder noted    Assessment and Plan:        1. S/P stage I revision of right TKA  -   on 6 weeks of IV cefepime. On Percocet for pain control. He is going to follow-up with his orthopedic surgeon on 6/3 .   Still has some drainage from the incision site.   2.  Generalized weakness -continue PT and OT  3.  History of C. difficile colitis - on oral vancomycin and lactobacillus prophylactically to prevent recurrence  4.  Mild hypokalemia -replaced, on potassium supplements  5.  Chronic anemia -H&H is close to baseline, continue on iron replacement  6.  Diabetes mellitus type 2, controlled, non-insulin-dependent - on Metformin  7.  Hypertension -blood pressure stable, continue losartan, Toprol-XL, HCTZ, Aldactone  8.  History of CAD, s/p CABG on 3/30/2015 -asymptomatic, continue medical management with aspirin, Toprol-XL, Imdur  9.  History of BPH - on Flomax  10.  GERD -on Protonix  11.  History of depression - mood stable on Lexapro  88.  BVNEZCX diastolic CHF - continue on diuretics, asymptomatic      Electronically signed by Agustina Ambriz MD on 5/31/2021 at 9:42 AM    Rounding Hospitalist

## 2021-06-01 PROCEDURE — 2580000003 HC RX 258: Performed by: INTERNAL MEDICINE

## 2021-06-01 PROCEDURE — 6370000000 HC RX 637 (ALT 250 FOR IP): Performed by: INTERNAL MEDICINE

## 2021-06-01 PROCEDURE — 6360000002 HC RX W HCPCS: Performed by: INTERNAL MEDICINE

## 2021-06-01 PROCEDURE — 94761 N-INVAS EAR/PLS OXIMETRY MLT: CPT

## 2021-06-01 PROCEDURE — 97116 GAIT TRAINING THERAPY: CPT

## 2021-06-01 PROCEDURE — 1200000002 HC SEMI PRIVATE SWING BED

## 2021-06-01 PROCEDURE — 94640 AIRWAY INHALATION TREATMENT: CPT

## 2021-06-01 PROCEDURE — 97110 THERAPEUTIC EXERCISES: CPT

## 2021-06-01 RX ADMIN — ALBUTEROL SULFATE 2.5 MG: 2.5 SOLUTION RESPIRATORY (INHALATION) at 21:07

## 2021-06-01 RX ADMIN — IPRATROPIUM BROMIDE 0.5 MG: 0.5 SOLUTION RESPIRATORY (INHALATION) at 09:12

## 2021-06-01 RX ADMIN — TAMSULOSIN HYDROCHLORIDE 0.4 MG: 0.4 CAPSULE ORAL at 17:29

## 2021-06-01 RX ADMIN — ISOSORBIDE MONONITRATE 30 MG: 30 TABLET, EXTENDED RELEASE ORAL at 08:56

## 2021-06-01 RX ADMIN — POTASSIUM CHLORIDE 10 MEQ: 750 TABLET, FILM COATED, EXTENDED RELEASE ORAL at 07:52

## 2021-06-01 RX ADMIN — ALBUTEROL SULFATE 2.5 MG: 2.5 SOLUTION RESPIRATORY (INHALATION) at 09:10

## 2021-06-01 RX ADMIN — THERA TABS 1 TABLET: TAB at 08:56

## 2021-06-01 RX ADMIN — ATORVASTATIN CALCIUM 80 MG: 40 TABLET, FILM COATED ORAL at 20:31

## 2021-06-01 RX ADMIN — WATER 2000 MG: 1 INJECTION INTRAMUSCULAR; INTRAVENOUS; SUBCUTANEOUS at 01:17

## 2021-06-01 RX ADMIN — ASPIRIN 325 MG: 325 TABLET, COATED ORAL at 08:56

## 2021-06-01 RX ADMIN — LOSARTAN POTASSIUM 25 MG: 50 TABLET ORAL at 08:56

## 2021-06-01 RX ADMIN — SODIUM CHLORIDE, PRESERVATIVE FREE 10 ML: 5 INJECTION INTRAVENOUS at 01:17

## 2021-06-01 RX ADMIN — ESCITALOPRAM OXALATE 20 MG: 10 TABLET ORAL at 08:56

## 2021-06-01 RX ADMIN — HYDROCHLOROTHIAZIDE 25 MG: 25 TABLET ORAL at 08:56

## 2021-06-01 RX ADMIN — SPIRONOLACTONE 25 MG: 25 TABLET, FILM COATED ORAL at 08:56

## 2021-06-01 RX ADMIN — OXYCODONE HYDROCHLORIDE AND ACETAMINOPHEN 500 MG: 500 TABLET ORAL at 08:56

## 2021-06-01 RX ADMIN — WATER 2000 MG: 1 INJECTION INTRAMUSCULAR; INTRAVENOUS; SUBCUTANEOUS at 17:29

## 2021-06-01 RX ADMIN — WATER 2000 MG: 1 INJECTION INTRAMUSCULAR; INTRAVENOUS; SUBCUTANEOUS at 08:59

## 2021-06-01 RX ADMIN — METOPROLOL SUCCINATE 25 MG: 25 TABLET, EXTENDED RELEASE ORAL at 08:56

## 2021-06-01 RX ADMIN — BUDESONIDE AND FORMOTEROL FUMARATE DIHYDRATE 2 PUFF: 160; 4.5 AEROSOL RESPIRATORY (INHALATION) at 09:11

## 2021-06-01 RX ADMIN — DOCUSATE SODIUM 100 MG: 100 CAPSULE, LIQUID FILLED ORAL at 08:56

## 2021-06-01 RX ADMIN — PANTOPRAZOLE SODIUM 40 MG: 40 TABLET, DELAYED RELEASE ORAL at 05:18

## 2021-06-01 RX ADMIN — CHOLECALCIFEROL TAB 25 MCG (1000 UNIT) 1000 UNITS: 25 TAB at 08:56

## 2021-06-01 RX ADMIN — ENOXAPARIN SODIUM 40 MG: 40 INJECTION SUBCUTANEOUS at 08:56

## 2021-06-01 RX ADMIN — FERROUS SULFATE TAB 325 MG (65 MG ELEMENTAL FE) 325 MG: 325 (65 FE) TAB at 07:52

## 2021-06-01 RX ADMIN — METFORMIN HYDROCHLORIDE 500 MG: 500 TABLET ORAL at 07:52

## 2021-06-01 RX ADMIN — Medication 1 CAPSULE: at 07:52

## 2021-06-01 RX ADMIN — FLUTICASONE PROPIONATE 1 SPRAY: 50 SPRAY, METERED NASAL at 08:58

## 2021-06-01 RX ADMIN — TIOTROPIUM BROMIDE INHALATION SPRAY 1 PUFF: 3.12 SPRAY, METERED RESPIRATORY (INHALATION) at 09:11

## 2021-06-01 RX ADMIN — SODIUM CHLORIDE, PRESERVATIVE FREE 10 ML: 5 INJECTION INTRAVENOUS at 08:58

## 2021-06-01 RX ADMIN — VANCOMYCIN HYDROCHLORIDE 125 MG: 125 CAPSULE ORAL at 20:31

## 2021-06-01 RX ADMIN — ONDANSETRON 4 MG: 4 TABLET, ORALLY DISINTEGRATING ORAL at 18:00

## 2021-06-01 RX ADMIN — Medication 1 CAPSULE: at 17:29

## 2021-06-01 RX ADMIN — BUDESONIDE AND FORMOTEROL FUMARATE DIHYDRATE 2 PUFF: 160; 4.5 AEROSOL RESPIRATORY (INHALATION) at 21:07

## 2021-06-01 RX ADMIN — SODIUM CHLORIDE, PRESERVATIVE FREE 10 ML: 5 INJECTION INTRAVENOUS at 20:32

## 2021-06-01 RX ADMIN — VANCOMYCIN HYDROCHLORIDE 125 MG: 125 CAPSULE ORAL at 08:56

## 2021-06-01 RX ADMIN — MONTELUKAST SODIUM 10 MG: 10 TABLET, FILM COATED ORAL at 20:31

## 2021-06-01 RX ADMIN — OXYCODONE AND ACETAMINOPHEN 1 TABLET: 5; 325 TABLET ORAL at 18:54

## 2021-06-01 RX ADMIN — TIOTROPIUM BROMIDE INHALATION SPRAY 1 PUFF: 3.12 SPRAY, METERED RESPIRATORY (INHALATION) at 21:07

## 2021-06-01 RX ADMIN — IPRATROPIUM BROMIDE 0.5 MG: 0.5 SOLUTION RESPIRATORY (INHALATION) at 21:07

## 2021-06-01 ASSESSMENT — PAIN DESCRIPTION - PAIN TYPE: TYPE: ACUTE PAIN

## 2021-06-01 ASSESSMENT — PAIN SCALES - GENERAL
PAINLEVEL_OUTOF10: 0
PAINLEVEL_OUTOF10: 2
PAINLEVEL_OUTOF10: 0
PAINLEVEL_OUTOF10: 5

## 2021-06-01 ASSESSMENT — PAIN DESCRIPTION - LOCATION: LOCATION: KNEE

## 2021-06-01 ASSESSMENT — PAIN DESCRIPTION - ORIENTATION: ORIENTATION: RIGHT

## 2021-06-01 NOTE — PLAN OF CARE
Problem: Pain:  Goal: Pain level will decrease  Description: Pain level will decrease  6/1/2021 0647 by Mari Murray RN  Outcome: Ongoing  6/1/2021 0647 by Mari Murray RN  Outcome: Ongoing  Goal: Control of acute pain  Description: Control of acute pain  6/1/2021 0647 by Mari Murray RN  Outcome: Ongoing  6/1/2021 0647 by Mari Murray RN  Outcome: Ongoing  Goal: Control of chronic pain  Description: Control of chronic pain  6/1/2021 0647 by Mari Murray RN  Outcome: Ongoing  6/1/2021 0647 by Mari Murray RN  Outcome: Ongoing     Problem: Skin Integrity:  Goal: Will show no infection signs and symptoms  Description: Will show no infection signs and symptoms  6/1/2021 0647 by Mari Murray RN  Outcome: Ongoing  6/1/2021 0647 by Mari Murray RN  Outcome: Ongoing  Goal: Absence of new skin breakdown  Description: Absence of new skin breakdown  6/1/2021 0647 by Mari Murray RN  Outcome: Ongoing  6/1/2021 0647 by Mari Murray RN  Outcome: Ongoing     Problem: Falls - Risk of:  Goal: Will remain free from falls  Description: Will remain free from falls  6/1/2021 0647 by Mari Murray RN  Outcome: Ongoing  6/1/2021 0647 by Mari Murray RN  Outcome: Ongoing  Goal: Absence of physical injury  Description: Absence of physical injury  6/1/2021 0647 by Mari Murray RN  Outcome: Ongoing  6/1/2021 0647 by Mari Murray RN  Outcome: Ongoing     Problem: Nutrition  Goal: Optimal nutrition therapy  6/1/2021 0647 by Mari Murray RN  Outcome: Ongoing  6/1/2021 0647 by Mari Murray RN  Outcome: Ongoing     Problem: Discharge Planning:  Goal: Discharged to appropriate level of care  Description: Discharged to appropriate level of care  6/1/2021 0647 by Mari Murray RN  Outcome: Ongoing  6/1/2021 0647 by Mari Murray RN  Outcome: Ongoing     Problem: Gas Exchange - Impaired:  Goal: Levels of oxygenation will improve  Description: Levels of oxygenation will improve  6/1/2021 0647 by Mari Murray RN  Outcome: Ongoing  6/1/2021 0647 by Freda Genao RN  Outcome: Ongoing     Problem: Infection, Septic Shock:  Goal: Will show no infection signs and symptoms  Description: Will show no infection signs and symptoms  6/1/2021 0647 by Freda Genao RN  Outcome: Ongoing  6/1/2021 0647 by Freda Genao RN  Outcome: Ongoing     Problem: Tissue Perfusion, Altered:  Goal: Circulatory function within specified parameters  Description: Circulatory function within specified parameters  6/1/2021 0647 by Freda Genao RN  Outcome: Ongoing  6/1/2021 0647 by Freda Genao RN  Outcome: Ongoing     Problem: Venous Thromboembolism:  Goal: Will show no signs or symptoms of venous thromboembolism  Description: Will show no signs or symptoms of venous thromboembolism  6/1/2021 0647 by Freda Genao RN  Outcome: Ongoing  6/1/2021 0647 by Freda Genao RN  Outcome: Ongoing  Goal: Absence of signs or symptoms of impaired coagulation  Description: Absence of signs or symptoms of impaired coagulation  6/1/2021 0647 by Freda Genao RN  Outcome: Ongoing  6/1/2021 0647 by Freda Genao RN  Outcome: Ongoing     Problem: OXYGENATION/RESPIRATORY FUNCTION  Goal: Patient will achieve/maintain normal respiratory rate/effort  Description: Respiratory rate and effort will be within normal limits for the patient  Outcome: Ongoing     Problem: FLUID AND ELECTROLYTE IMBALANCE  Goal: Fluid and electrolyte balance are achieved/maintained  Outcome: Ongoing     Problem: Gas Exchange - Impaired:  Goal: Levels of oxygenation will improve  Description: Levels of oxygenation will improve  6/1/2021 0647 by Freda Genao RN  Outcome: Ongoing  6/1/2021 0647 by Freda Genao RN  Outcome: Ongoing

## 2021-06-01 NOTE — PROGRESS NOTES
Comprehensive Nutrition Assessment    Type and Reason for Visit:  Reassess    Nutrition Recommendations/Plan:  Encourage continued good PO     Nutrition Assessment:  Continued increased nutrient needs r/t acute injury or trauma, AEB post op healing needs. PO continues to be taken well, per I/O data for both solids and fluids. Moving bowels. While weight remains 4.5% less than former values, it has stabilized. Only trace BLE edema noted. Malnutrition Assessment:  Malnutrition Status:  No malnutrition    Context:  Acute Illness     Findings of the 6 clinical characteristics of malnutrition:  Energy Intake:  No significant decrease in energy intake  Weight Loss:  No significant weight loss     Body Fat Loss:  No significant body fat loss     Muscle Mass Loss:  No significant muscle mass loss    Fluid Accumulation:  No significant fluid accumulation     Strength:  Not Performed    Estimated Daily Nutrient Needs:  Energy (kcal):  2908-5177 (11-15); Weight Used for Energy Requirements:  Current     Protein (g):   (1.3-1.5);  Weight Used for Protein Requirements:  Ideal        Fluid (ml/day):  2200; Method Used for Fluid Requirements:  1 ml/kcal      Nutrition Related Findings:  obese      Wounds:  Surgical Incision       Current Nutrition Therapies:    DIET CARB CONTROL; Carb Control: 4 carb choices (60 gms)/meal    Anthropometric Measures:  · Height: 5' 10\" (177.8 cm)  · Current Body Weight: 312 lb 6.4 oz (141.7 kg)   · Admission Body Weight: 330 lb (149.7 kg)    · Usual Body Weight: 327 lb (148.3 kg)     · Ideal Body Weight: 166 lbs; % Ideal Body Weight 198.8 %   · BMI: 44.8  · Adjusted Body Weight:  ; No Adjustment   · BMI Categories: Obese Class 3 (BMI 40.0 or greater)       Nutrition Diagnosis:   · Increased nutrient needs related to acute injury/trauma as evidenced by wounds    Lab Results   Component Value Date     05/30/2021    K 3.9 05/30/2021     05/30/2021    CO2 25 05/30/2021    BUN 17 05/30/2021    CREATININE 0.82 05/30/2021    GLUCOSE 117 (H) 05/30/2021    CALCIUM 9.3 05/30/2021    PROT 6.5 04/29/2021    LABALBU 3.5 04/29/2021    BILITOT 0.44 04/29/2021    ALKPHOS 98 04/29/2021    AST 30 04/29/2021    ALT 23 04/29/2021    LABGLOM >60 05/30/2021    GFRAA >60 05/30/2021    GLOB NOT REPORTED 10/22/2019     Nutrition Interventions:   Food and/or Nutrient Delivery:  Continue Current Diet  Nutrition Education/Counseling:  No recommendation at this time   Coordination of Nutrition Care:  Continue to monitor while inpatient    Goals:  PO >75% with good protein choices       Nutrition Monitoring and Evaluation:   Behavioral-Environmental Outcomes:  None Identified   Food/Nutrient Intake Outcomes:  Food and Nutrient Intake  Physical Signs/Symptoms Outcomes:  Biochemical Data, Weight, GI Status     Discharge Planning:    Continue current diet     Electronically signed by Genna Real RD, LD on 6/1/21 at 7:01 AM EDT    Contact: [phone]

## 2021-06-01 NOTE — PROGRESS NOTES
Patient has been up in chair for the majority of the shift, he has been ambulating up to the BR multiple times during this shift and has maintained NWB status to the right leg. Patient has been very pleasant and cooperative, call light is within reach, denies any further needs at this time.

## 2021-06-01 NOTE — PLAN OF CARE
[facility] NORMA ATKINS  Phone: [phone]   Date: 2021                  [facility] Physical Therapy Fax: [phone]    [address] #: [de-identified]                  Recertification  CSN#: [id number]  Patient: Nav Vivar  : 1945    Referring Practitioner: Dr. Naveen Adam    Referral Date : 05/15/21          Treatment Diagnosis: Weakness; difficulty with amb      Assessment: Patient was admitted to [facility] following removal of septic right TKR for continued IV antibiotics and therapy. Patient is able to complete transfers with supervision/SBA and ambulate 20-25 ft with wh walker NWB RLE with CGA/SBA. He does exhibit decreased endurance which limits his ambulatory distances. Patient is progressing well however feel he would benefit from 1 additional week of therapy to promote increased endurance and safety with mobility to return home. He is scheduled for revision of TKR once sepsis cleared.     Prognosis: Good    Treatment Plan:  Daily                 Patient Education/HEP, Therapeutic Exercise, Gait Training and Therapeutic Activity    Goals  Short term goals  Time Frame for Short term goals: 21 days - additional 7 days to  21  Short term goal 1: Prevent loss of strength of bilateral UE's and left LE to allow patient to complete transfers and short distance amb  Short term goal 2: Transfer in/OOB and chair /commode indeependently  Short term goal 3: Ambulate with wh walker x 25 ft x 2 with supervision to safely negotiate home environment  Short term goal 4: Good standing balance to assist with self-care tasks  Short term goal 5: Fair+ endurance to complete above ambulation to reduce fall risk associated with fatigue       NO RODRIGUEZ, PT    Date: 2021        ______________________________________ Date: 2021  Physician Signature

## 2021-06-01 NOTE — PROGRESS NOTES
[facility]  Swing Bed Interdisciplinary Care Plan Conference Report   Recertification for Continued Skilled Care. Patients name:Pete Elder  Date of Conference: 6/1/2021    The Following Information was discussed and agreed upon with the patient and/or Caregivers as listed below. Names of Team Members and Caregivers present for meeting:  : ASHLYN Franz  Nursing:   Therapy: Alyssia Herring, PT; , L/OTR  Dietician:   Activities: Winnie Joyner  Pastoral Care:   Caregivers:    [x] Spouse  [] Child/Children [] Significant Other   [] Other:     Nutrition:  Current Body Weight:   Wt Readings from Last 1 Encounters:   06/01/21 (!) 312 lb 6.4 oz (141.7 kg)     Weight Change: Stable after losses from former values. Current Diet order:DIET CARB CONTROL; Carb Control: 4 carb choices (60 gms)/meal  Intakes: %  Diet Education Provided: Continue to encourage adequate oral fluids and protein use   Goal: PO >75% meals and supplements    Spiritual:  Church needs met: [x] Yes  [] No  [] N/A  Notified Drake Diaz or Oralia of admission: [] Yes  [] No  [x] N/A  Patient added to Communion List: [] Yes  [] No  [x] N/A  Any other concerns or comments: none  Goal: Participate 2-3 times per week    Occupational Therapy:  Current ADL Status:ADL  Feeding: Independent  Grooming: Setup  UE Bathing: Setup  LE Bathing: None  UE Dressing: Setup  LE Dressing: None  Toileting: None  Safety: Good  Recommendations for adaptive equipment:    [] Long handled sponge   [] Long Handled Shoe Horn  [] Extended Tub Bench  Short term goals  Time Frame for Short term goals: 10 days (5-)  Short term goal 1: Patient to transfer to bathroom with supervision only, completing toilet hygiene independently. Short term goal 2: Patient to complete UB/LB sponge bath with set up only. -MET UB  Short term goal 3: Patient to complete UB/LB dressing with set up only.  -MET UB  Short term goal 4: Patient to tolerate 30 minutes BUE exercise with 1 or fewer rest breaks in order to improve endurance for ADL and IADL tasks. Physical Therapy:  Transfers:   Transfers  Sit to Stand: Stand by assistance  Stand to sit: Stand by assistance  Mobility/Ambulation:   Ambulation 1  Surface: level tile  Device: Rolling Walker  Assistance: Contact guard assistance, Stand by assistance  Distance: 15 ft x2 with rest break  Comments: ----  Equipment:   [x] Rolling Walker   [] Straight Cane  [] Standard Walker  Safety: Good  Short Term Goals:  Time Frame for Short term goals: 14 days - expires 5/30/21  Short term goal 1: Prevent loss of strength of bilateral UE's and left LE to allow patient to complete transfers and short distance amb  Short term goal 2: Transfer in/OOB and chair /commode indeependently  Short term goal 3: Ambulate with wh walker x 25 ft x 2 with supervision to safely negotiate home environment  Short term goal 4: Good standing balance to assist with self-care tasks  Short term goal 5: Gair+ endurance to complete above ambulation to reduce fall risk associated with fatigue    Speech Therapy:     Respiratory Therapy:     Nursing:  Skin/Wound/Incisions:  Skin Integrity  Skin Integrity: Other (Comment) (Surgical incision)  Location: R knee  Preventative Dressing: Yes  Date Applied: 05/30/21  Assessed this shift?:  (ELIEZER)     Pain Control: percocet  New Medications since admission to Swing Bed:   Anticoagulants: lovenox  Goals:   Pt will remain free from falls  Pt will have control of acute pain    Activities: Activity as tolerated, TV, computer, reads, vistors  Goal: Participate in 3 activities per week    :  Plan for Discharge: Plan to re evaluate swing bed progress again next Monday June 7 at swing bed IDT meeting.   Follow Up/Services needed:     Continued skilled needs: PT/OT  Skilled Services are for the ongoing condition for which the individual received inpatient care in a hospital.    Physician signature certifies patient continued need for SNF inpatient care.

## 2021-06-01 NOTE — PROGRESS NOTES
Phone: Ricky  Date: 2021  Fax: [phone]      Physical Therapy    Daily Note    Patient Name: Safia Sprain      : 1945  (70 y.o.)  MRN: [id number]     Pt is PROGRESSING toward goals and increased independence of mobility this treatment session  Discharge Recommendations: Home with assist PRN     Assessment    Sit to Stand: Stand by assistance  Stand to sit: Stand by assistance                WB Status: NWB R LE  Ambulation 1  Surface: level tile  Device: Rolling Walker  Assistance: Contact guard assistance, Stand by assistance  Distance: 15 ft x1  Comments: ----             Assessment: Patient seated in chair. Sit to stand from chair is SBA/supervision. Ambulates with wheeled walker to w/c just outside of door. He demonstrates no LOB with gait and is able to maintaing NWB on R LE well. Wheeled to therapy room and completes seated and standing exercises. When finished, he  remains in w/c and takes a ride outside with .   Safety Devices  Type of devices:  (in w/c with  to go outside)          Time In: 1330  Time Out: 1358  Timed Coded Minutes:28  Total Treatment Time: 28    Exercises:  See Flowsheets    Plan  Cont Per Plan Of Care    Goals  Short Term Goals  Time Frame for Short term goals: 21 days - additional 7 days to  21  Short term goal 1: Prevent loss of strength of bilateral UE's and left LE to allow patient to complete transfers and short distance amb  Short term goal 2: Transfer in/OOB and chair /commode indeependently  Short term goal 3: Ambulate with wh walker x 25 ft x 2 with supervision to safely negotiate home environment  Short term goal 4: Good standing balance to assist with self-care tasks  Short term goal 5: Fair+ endurance to complete above ambulation to reduce fall risk associated with fatigue    Long Term Goals                      [facility] License Number: [id number]    Date: 2021

## 2021-06-01 NOTE — PROGRESS NOTES
Phone: Ricky  Date: 2021  Fax: [phone]      Physical Therapy    Daily Note    Patient Name: Dalia Barnes      : 1945  (70 y.o.)  MRN: [id number]     Pt is PROGRESSING toward goals and increased independence of mobility this treatment session  Discharge Recommendations: Home with assist PRN     Assessment    Sit to Stand: Stand by assistance  Stand to sit: Stand by assistance                WB Status: NWB R LE  Ambulation 1  Surface: level tile  Device: Rolling Walker  Assistance: Contact guard assistance, Stand by assistance  Distance: 15 ft x2 without rest break  Comments: ----             Assessment: Patient in chair upon entry to room. Agrees to work with PTA and is able to complete seated exercises as outlined above. Does still note pain in his L hip. Sit to stand from chair is SBA/supervision. Ambulates to door and back to chair whith wheeled walker and NWB on R LE. Does not take a rest break during and does demonstrate increase SOB. Up in chair following with nurse in room and call light in reach for patient.   Safety Devices  Type of devices: Call light within reach, Gait belt, Left in chair, Nurse notified          Time In: 5294  Time Out: 0902  Timed Coded Minutes: 27  Total Treatment Time: 27    Exercises:  See Flowsheets    Plan  Cont Per Plan Of Care    Goals  Short Term Goals  Time Frame for Short term goals: 14 days - expires 21  Short term goal 1: Prevent loss of strength of bilateral UE's and left LE to allow patient to complete transfers and short distance amb  Short term goal 2: Transfer in/OOB and chair /commode indeependently  Short term goal 3: Ambulate with wh walker x 25 ft x 2 with supervision to safely negotiate home environment  Short term goal 4: Good standing balance to assist with self-care tasks  Short term goal 5: Gair+ endurance to complete above ambulation to reduce fall risk associated with fatigue    Long Term Goals [address] Therapy License Number: [id number]    Date: 6/1/2021

## 2021-06-01 NOTE — PLAN OF CARE
Problem: Pain:  Goal: Pain level will decrease  Description: Pain level will decrease  6/1/2021 0818 by Alexis Graves RN  Outcome: Ongoing  6/1/2021 0647 by Maryana Rivera RN  Outcome: Ongoing  6/1/2021 0647 by Maryana Rivera RN  Outcome: Ongoing  Goal: Control of acute pain  Description: Control of acute pain  6/1/2021 0818 by Alexis Graves RN  Outcome: Ongoing  6/1/2021 0647 by Maryana Rivera RN  Outcome: Ongoing  6/1/2021 0647 by Maryana Rivera RN  Outcome: Ongoing  Goal: Control of chronic pain  Description: Control of chronic pain  6/1/2021 0818 by Alexis Graves RN  Outcome: Ongoing  6/1/2021 0647 by Maryana Rivera RN  Outcome: Ongoing  6/1/2021 0647 by Maryana Rivera RN  Outcome: Ongoing     Problem: Skin Integrity:  Goal: Will show no infection signs and symptoms  Description: Will show no infection signs and symptoms  6/1/2021 0818 by Alexis Graves RN  Outcome: Ongoing  6/1/2021 0647 by Maryana Rivera RN  Outcome: Ongoing  6/1/2021 0647 by Maryana Rivera RN  Outcome: Ongoing  Goal: Absence of new skin breakdown  Description: Absence of new skin breakdown  6/1/2021 0818 by Alexis Graves RN  Outcome: Ongoing  6/1/2021 0647 by Maryana Rivera RN  Outcome: Ongoing  6/1/2021 0647 by Maryana Rivera RN  Outcome: Ongoing     Problem: Falls - Risk of:  Goal: Will remain free from falls  Description: Will remain free from falls  6/1/2021 0818 by Alexis Graves RN  Outcome: Ongoing  6/1/2021 0647 by Maryana Rivera RN  Outcome: Ongoing  6/1/2021 0647 by Maryana Rivera RN  Outcome: Ongoing  Goal: Absence of physical injury  Description: Absence of physical injury  6/1/2021 0818 by Alexis Graves RN  Outcome: Ongoing  6/1/2021 0647 by Maryana Rivera RN  Outcome: Ongoing  6/1/2021 0647 by Maryana Rivera RN  Outcome: Ongoing     Problem: Nutrition  Goal: Optimal nutrition therapy  6/1/2021 0818 by Alexis Graves RN  Outcome: Ongoing  6/1/2021 0704 by Dontae Thao RD, LD  Outcome: Ongoing  Note: Nutrition Problem #1: Increased nutrient needs  Intervention: Food and/or Nutrient Delivery: Continue Current Diet  Nutritional Goals: PO >75% with good protein choices    6/1/2021 0647 by Hayes Carr RN  Outcome: Ongoing  6/1/2021 0647 by Hayes Carr RN  Outcome: Ongoing

## 2021-06-02 PROCEDURE — 6360000002 HC RX W HCPCS: Performed by: INTERNAL MEDICINE

## 2021-06-02 PROCEDURE — 1200000002 HC SEMI PRIVATE SWING BED

## 2021-06-02 PROCEDURE — 2580000003 HC RX 258: Performed by: INTERNAL MEDICINE

## 2021-06-02 PROCEDURE — 6370000000 HC RX 637 (ALT 250 FOR IP): Performed by: INTERNAL MEDICINE

## 2021-06-02 PROCEDURE — 97116 GAIT TRAINING THERAPY: CPT

## 2021-06-02 PROCEDURE — 94640 AIRWAY INHALATION TREATMENT: CPT

## 2021-06-02 PROCEDURE — 94761 N-INVAS EAR/PLS OXIMETRY MLT: CPT

## 2021-06-02 PROCEDURE — 97110 THERAPEUTIC EXERCISES: CPT

## 2021-06-02 RX ADMIN — SODIUM CHLORIDE, PRESERVATIVE FREE 20 ML: 5 INJECTION INTRAVENOUS at 10:28

## 2021-06-02 RX ADMIN — VANCOMYCIN HYDROCHLORIDE 125 MG: 125 CAPSULE ORAL at 19:32

## 2021-06-02 RX ADMIN — ALBUTEROL SULFATE 2.5 MG: 2.5 SOLUTION RESPIRATORY (INHALATION) at 09:12

## 2021-06-02 RX ADMIN — DOCUSATE SODIUM 100 MG: 100 CAPSULE, LIQUID FILLED ORAL at 08:28

## 2021-06-02 RX ADMIN — POTASSIUM CHLORIDE 10 MEQ: 750 TABLET, FILM COATED, EXTENDED RELEASE ORAL at 08:28

## 2021-06-02 RX ADMIN — BUDESONIDE AND FORMOTEROL FUMARATE DIHYDRATE 2 PUFF: 160; 4.5 AEROSOL RESPIRATORY (INHALATION) at 09:17

## 2021-06-02 RX ADMIN — SPIRONOLACTONE 25 MG: 25 TABLET, FILM COATED ORAL at 08:29

## 2021-06-02 RX ADMIN — METOPROLOL SUCCINATE 25 MG: 25 TABLET, EXTENDED RELEASE ORAL at 08:29

## 2021-06-02 RX ADMIN — TIOTROPIUM BROMIDE INHALATION SPRAY 1 PUFF: 3.12 SPRAY, METERED RESPIRATORY (INHALATION) at 21:02

## 2021-06-02 RX ADMIN — MONTELUKAST SODIUM 10 MG: 10 TABLET, FILM COATED ORAL at 19:33

## 2021-06-02 RX ADMIN — HYDROCHLOROTHIAZIDE 25 MG: 25 TABLET ORAL at 08:28

## 2021-06-02 RX ADMIN — METFORMIN HYDROCHLORIDE 500 MG: 500 TABLET ORAL at 08:29

## 2021-06-02 RX ADMIN — IPRATROPIUM BROMIDE 0.5 MG: 0.5 SOLUTION RESPIRATORY (INHALATION) at 09:13

## 2021-06-02 RX ADMIN — PANTOPRAZOLE SODIUM 40 MG: 40 TABLET, DELAYED RELEASE ORAL at 05:56

## 2021-06-02 RX ADMIN — BUDESONIDE AND FORMOTEROL FUMARATE DIHYDRATE 2 PUFF: 160; 4.5 AEROSOL RESPIRATORY (INHALATION) at 21:02

## 2021-06-02 RX ADMIN — ASPIRIN 325 MG: 325 TABLET, COATED ORAL at 08:28

## 2021-06-02 RX ADMIN — SODIUM CHLORIDE, PRESERVATIVE FREE 20 ML: 5 INJECTION INTRAVENOUS at 17:36

## 2021-06-02 RX ADMIN — FLUTICASONE PROPIONATE 1 SPRAY: 50 SPRAY, METERED NASAL at 08:30

## 2021-06-02 RX ADMIN — ISOSORBIDE MONONITRATE 30 MG: 30 TABLET, EXTENDED RELEASE ORAL at 08:29

## 2021-06-02 RX ADMIN — FERROUS SULFATE TAB 325 MG (65 MG ELEMENTAL FE) 325 MG: 325 (65 FE) TAB at 08:28

## 2021-06-02 RX ADMIN — ALBUTEROL SULFATE 2.5 MG: 2.5 SOLUTION RESPIRATORY (INHALATION) at 21:02

## 2021-06-02 RX ADMIN — WATER 2000 MG: 1 INJECTION INTRAMUSCULAR; INTRAVENOUS; SUBCUTANEOUS at 17:35

## 2021-06-02 RX ADMIN — IPRATROPIUM BROMIDE 0.5 MG: 0.5 SOLUTION RESPIRATORY (INHALATION) at 21:02

## 2021-06-02 RX ADMIN — WATER 2000 MG: 1 INJECTION INTRAMUSCULAR; INTRAVENOUS; SUBCUTANEOUS at 10:28

## 2021-06-02 RX ADMIN — WATER 2000 MG: 1 INJECTION INTRAMUSCULAR; INTRAVENOUS; SUBCUTANEOUS at 00:56

## 2021-06-02 RX ADMIN — TAMSULOSIN HYDROCHLORIDE 0.4 MG: 0.4 CAPSULE ORAL at 16:54

## 2021-06-02 RX ADMIN — SODIUM CHLORIDE, PRESERVATIVE FREE 10 ML: 5 INJECTION INTRAVENOUS at 19:32

## 2021-06-02 RX ADMIN — SODIUM CHLORIDE, PRESERVATIVE FREE 10 ML: 5 INJECTION INTRAVENOUS at 08:29

## 2021-06-02 RX ADMIN — LOSARTAN POTASSIUM 25 MG: 50 TABLET ORAL at 08:28

## 2021-06-02 RX ADMIN — SODIUM CHLORIDE, PRESERVATIVE FREE 10 ML: 5 INJECTION INTRAVENOUS at 00:56

## 2021-06-02 RX ADMIN — Medication 1 CAPSULE: at 16:54

## 2021-06-02 RX ADMIN — THERA TABS 1 TABLET: TAB at 08:28

## 2021-06-02 RX ADMIN — ENOXAPARIN SODIUM 40 MG: 40 INJECTION SUBCUTANEOUS at 08:30

## 2021-06-02 RX ADMIN — TIOTROPIUM BROMIDE INHALATION SPRAY 1 PUFF: 3.12 SPRAY, METERED RESPIRATORY (INHALATION) at 09:17

## 2021-06-02 RX ADMIN — VANCOMYCIN HYDROCHLORIDE 125 MG: 125 CAPSULE ORAL at 08:30

## 2021-06-02 RX ADMIN — ATORVASTATIN CALCIUM 80 MG: 40 TABLET, FILM COATED ORAL at 19:32

## 2021-06-02 RX ADMIN — OXYCODONE AND ACETAMINOPHEN 1 TABLET: 5; 325 TABLET ORAL at 19:33

## 2021-06-02 RX ADMIN — ONDANSETRON 4 MG: 4 TABLET, ORALLY DISINTEGRATING ORAL at 12:00

## 2021-06-02 RX ADMIN — ESCITALOPRAM OXALATE 20 MG: 10 TABLET ORAL at 08:28

## 2021-06-02 RX ADMIN — OXYCODONE HYDROCHLORIDE AND ACETAMINOPHEN 500 MG: 500 TABLET ORAL at 08:29

## 2021-06-02 RX ADMIN — CHOLECALCIFEROL TAB 25 MCG (1000 UNIT) 1000 UNITS: 25 TAB at 08:29

## 2021-06-02 RX ADMIN — Medication 1 CAPSULE: at 08:28

## 2021-06-02 ASSESSMENT — PAIN SCALES - GENERAL
PAINLEVEL_OUTOF10: 5
PAINLEVEL_OUTOF10: 5
PAINLEVEL_OUTOF10: 0
PAINLEVEL_OUTOF10: 0
PAINLEVEL_OUTOF10: 1

## 2021-06-02 NOTE — PROGRESS NOTES
Phone: Ricky  Date: 2021  Fax: [phone]      Physical Therapy    Daily Note    Patient Name: Nadeen Davis      : 1945  (70 y.o.)  MRN: [id number]     Pt is PROGRESSING toward goals and increased independence of mobility this treatment session  Discharge Recommendations: Home with assist PRN     Assessment    Sit to Stand: Stand by assistance, Supervision  Stand to sit: Stand by assistance, Supervision                WB Status: NWB R LE  Ambulation 1  Surface: level tile  Device: Rolling Walker  Assistance: Stand by assistance  Quality of Gait: fair and steady  Distance: 15 ft x2  Comments: ----             Assessment: Patient in chair upon arrival to room. He is able to complete seated exercises as outlined above with good tolerance. Does note soreness in his L hip. Sit to stand from chair is SBA/supervision. Completes standing exercises with R LE. States his L leg is stronger and his balance has improved as he is now able to stand on his L LE and pull his pants up and down. Ambulates with wheeled walker to door and rests on bed for short time before returning to chair. Reports needing min assist with R LE for in/out of bed. Call light in reach following. Patient has appointment in [facility] and will pobably not be seen by therapy.   Safety Devices  Type of devices: Call light within reach, Gait belt, Left in chair          Time In: 0840  Time Out: 0910  Timed Coded Minutes: 30  Total Treatment Time: 30    Exercises:  See Flowsheets    Plan  Cont Per Plan Of Care    Goals  Short Term Goals  Time Frame for Short term goals: 21 days - additional 7 days to  21  Short term goal 1: Prevent loss of strength of bilateral UE's and left LE to allow patient to complete transfers and short distance amb-MET  Short term goal 2: Transfer in/OOB and chair /commode indeependently  Short term goal 3: Ambulate with wh walker x 25 ft x 2 with supervision to safely negotiate home environment  Short term goal 4: Good standing balance to assist with self-care tasks-MET  Short term goal 5: Fair+ endurance to complete above ambulation to reduce fall risk associated with fatigue    Long Term Goals                      [facility] License Number: [id number]    Date: 6/2/2021

## 2021-06-02 NOTE — PROGRESS NOTES
Medicated with zofran for \"my stomach is still a little upset since last night\". States that zofran has helped in the past with this.

## 2021-06-02 NOTE — PLAN OF CARE
Problem: Pain:  Goal: Pain level will decrease  Description: Pain level will decrease  Outcome: Ongoing  Goal: Control of acute pain  Description: Control of acute pain  Outcome: Ongoing  Goal: Control of chronic pain  Description: Control of chronic pain  Outcome: Ongoing     Problem: Skin Integrity:  Goal: Will show no infection signs and symptoms  Description: Will show no infection signs and symptoms  Outcome: Ongoing  Goal: Absence of new skin breakdown  Description: Absence of new skin breakdown  Outcome: Ongoing     Problem: Falls - Risk of:  Goal: Will remain free from falls  Description: Will remain free from falls  Outcome: Ongoing  Goal: Absence of physical injury  Description: Absence of physical injury  Outcome: Ongoing     Problem: Nutrition  Goal: Optimal nutrition therapy  Outcome: Ongoing     Problem: Discharge Planning:  Goal: Discharged to appropriate level of care  Description: Discharged to appropriate level of care  Outcome: Ongoing     Problem: Gas Exchange - Impaired:  Goal: Levels of oxygenation will improve  Description: Levels of oxygenation will improve  Outcome: Ongoing     Problem: Infection, Septic Shock:  Goal: Will show no infection signs and symptoms  Description: Will show no infection signs and symptoms  Outcome: Ongoing     Problem: ACTIVITY INTOLERANCE/IMPAIRED MOBILITY  Goal: Mobility/activity is maintained at optimum level for patient  Outcome: Ongoing     Problem: Gas Exchange - Impaired:  Goal: Levels of oxygenation will improve  Description: Levels of oxygenation will improve  Outcome: Ongoing     Problem: Cardiac:  Goal: Ability to maintain vital signs within normal range will improve  Description: Ability to maintain vital signs within normal range will improve  Outcome: Ongoing  Goal: Cardiovascular alteration will improve  Description: Cardiovascular alteration will improve  Outcome: Ongoing

## 2021-06-03 PROCEDURE — 6370000000 HC RX 637 (ALT 250 FOR IP): Performed by: INTERNAL MEDICINE

## 2021-06-03 PROCEDURE — 6360000002 HC RX W HCPCS: Performed by: INTERNAL MEDICINE

## 2021-06-03 PROCEDURE — 2580000003 HC RX 258: Performed by: INTERNAL MEDICINE

## 2021-06-03 PROCEDURE — 94640 AIRWAY INHALATION TREATMENT: CPT

## 2021-06-03 PROCEDURE — 1200000002 HC SEMI PRIVATE SWING BED

## 2021-06-03 RX ADMIN — MONTELUKAST SODIUM 10 MG: 10 TABLET, FILM COATED ORAL at 20:11

## 2021-06-03 RX ADMIN — THERA TABS 1 TABLET: TAB at 08:01

## 2021-06-03 RX ADMIN — SODIUM CHLORIDE, PRESERVATIVE FREE 20 ML: 5 INJECTION INTRAVENOUS at 09:20

## 2021-06-03 RX ADMIN — TIOTROPIUM BROMIDE INHALATION SPRAY 1 PUFF: 3.12 SPRAY, METERED RESPIRATORY (INHALATION) at 21:29

## 2021-06-03 RX ADMIN — VANCOMYCIN HYDROCHLORIDE 125 MG: 125 CAPSULE ORAL at 20:10

## 2021-06-03 RX ADMIN — WATER 2000 MG: 1 INJECTION INTRAMUSCULAR; INTRAVENOUS; SUBCUTANEOUS at 01:58

## 2021-06-03 RX ADMIN — OXYCODONE AND ACETAMINOPHEN 1 TABLET: 5; 325 TABLET ORAL at 20:11

## 2021-06-03 RX ADMIN — VANCOMYCIN HYDROCHLORIDE 125 MG: 125 CAPSULE ORAL at 08:01

## 2021-06-03 RX ADMIN — IPRATROPIUM BROMIDE 0.5 MG: 0.5 SOLUTION RESPIRATORY (INHALATION) at 21:18

## 2021-06-03 RX ADMIN — METFORMIN HYDROCHLORIDE 500 MG: 500 TABLET ORAL at 08:00

## 2021-06-03 RX ADMIN — TIOTROPIUM BROMIDE INHALATION SPRAY 1 PUFF: 3.12 SPRAY, METERED RESPIRATORY (INHALATION) at 09:57

## 2021-06-03 RX ADMIN — OXYCODONE HYDROCHLORIDE AND ACETAMINOPHEN 500 MG: 500 TABLET ORAL at 08:01

## 2021-06-03 RX ADMIN — BUDESONIDE AND FORMOTEROL FUMARATE DIHYDRATE 2 PUFF: 160; 4.5 AEROSOL RESPIRATORY (INHALATION) at 09:50

## 2021-06-03 RX ADMIN — CHOLECALCIFEROL TAB 25 MCG (1000 UNIT) 1000 UNITS: 25 TAB at 08:00

## 2021-06-03 RX ADMIN — ALBUTEROL SULFATE 2.5 MG: 2.5 SOLUTION RESPIRATORY (INHALATION) at 09:36

## 2021-06-03 RX ADMIN — TAMSULOSIN HYDROCHLORIDE 0.4 MG: 0.4 CAPSULE ORAL at 18:25

## 2021-06-03 RX ADMIN — ENOXAPARIN SODIUM 40 MG: 40 INJECTION SUBCUTANEOUS at 08:01

## 2021-06-03 RX ADMIN — ASPIRIN 325 MG: 325 TABLET, COATED ORAL at 08:00

## 2021-06-03 RX ADMIN — WATER 2000 MG: 1 INJECTION INTRAMUSCULAR; INTRAVENOUS; SUBCUTANEOUS at 09:20

## 2021-06-03 RX ADMIN — METOPROLOL SUCCINATE 25 MG: 25 TABLET, EXTENDED RELEASE ORAL at 08:01

## 2021-06-03 RX ADMIN — BUDESONIDE AND FORMOTEROL FUMARATE DIHYDRATE 2 PUFF: 160; 4.5 AEROSOL RESPIRATORY (INHALATION) at 21:29

## 2021-06-03 RX ADMIN — POTASSIUM CHLORIDE 10 MEQ: 750 TABLET, FILM COATED, EXTENDED RELEASE ORAL at 08:00

## 2021-06-03 RX ADMIN — SPIRONOLACTONE 25 MG: 25 TABLET, FILM COATED ORAL at 08:00

## 2021-06-03 RX ADMIN — HYDROCHLOROTHIAZIDE 25 MG: 25 TABLET ORAL at 08:00

## 2021-06-03 RX ADMIN — IPRATROPIUM BROMIDE 0.5 MG: 0.5 SOLUTION RESPIRATORY (INHALATION) at 09:42

## 2021-06-03 RX ADMIN — FERROUS SULFATE TAB 325 MG (65 MG ELEMENTAL FE) 325 MG: 325 (65 FE) TAB at 08:01

## 2021-06-03 RX ADMIN — Medication 1 CAPSULE: at 08:00

## 2021-06-03 RX ADMIN — FLUTICASONE PROPIONATE 1 SPRAY: 50 SPRAY, METERED NASAL at 08:02

## 2021-06-03 RX ADMIN — SODIUM CHLORIDE, PRESERVATIVE FREE 20 ML: 5 INJECTION INTRAVENOUS at 18:25

## 2021-06-03 RX ADMIN — ISOSORBIDE MONONITRATE 30 MG: 30 TABLET, EXTENDED RELEASE ORAL at 08:00

## 2021-06-03 RX ADMIN — ESCITALOPRAM OXALATE 20 MG: 10 TABLET ORAL at 08:01

## 2021-06-03 RX ADMIN — ALBUTEROL SULFATE 2.5 MG: 2.5 SOLUTION RESPIRATORY (INHALATION) at 21:18

## 2021-06-03 RX ADMIN — SODIUM CHLORIDE, PRESERVATIVE FREE 10 ML: 5 INJECTION INTRAVENOUS at 20:10

## 2021-06-03 RX ADMIN — Medication 1 CAPSULE: at 18:25

## 2021-06-03 RX ADMIN — PANTOPRAZOLE SODIUM 40 MG: 40 TABLET, DELAYED RELEASE ORAL at 06:32

## 2021-06-03 RX ADMIN — LOSARTAN POTASSIUM 25 MG: 50 TABLET ORAL at 08:00

## 2021-06-03 RX ADMIN — SODIUM CHLORIDE, PRESERVATIVE FREE 10 ML: 5 INJECTION INTRAVENOUS at 08:01

## 2021-06-03 RX ADMIN — WATER 2000 MG: 1 INJECTION INTRAMUSCULAR; INTRAVENOUS; SUBCUTANEOUS at 18:24

## 2021-06-03 RX ADMIN — OXYCODONE AND ACETAMINOPHEN 1 TABLET: 5; 325 TABLET ORAL at 10:49

## 2021-06-03 RX ADMIN — ATORVASTATIN CALCIUM 80 MG: 40 TABLET, FILM COATED ORAL at 20:10

## 2021-06-03 ASSESSMENT — PAIN DESCRIPTION - LOCATION: LOCATION: KNEE

## 2021-06-03 ASSESSMENT — PAIN SCALES - GENERAL
PAINLEVEL_OUTOF10: 2
PAINLEVEL_OUTOF10: 0
PAINLEVEL_OUTOF10: 5
PAINLEVEL_OUTOF10: 0
PAINLEVEL_OUTOF10: 5
PAINLEVEL_OUTOF10: 4
PAINLEVEL_OUTOF10: 0

## 2021-06-03 ASSESSMENT — PAIN DESCRIPTION - PAIN TYPE: TYPE: ACUTE PAIN

## 2021-06-03 ASSESSMENT — PAIN DESCRIPTION - ORIENTATION: ORIENTATION: RIGHT

## 2021-06-03 ASSESSMENT — PAIN DESCRIPTION - DESCRIPTORS: DESCRIPTORS: ACHING

## 2021-06-03 NOTE — PROGRESS NOTES
Medicated with percocet per pt request for pain 4/10 for car ride to [facility] for follow up appointments. Sack lunch sent with pt as well as w/c and walker. Wife signs TOMMY form and given office phone numbers, addresses, and directions.

## 2021-06-03 NOTE — PROGRESS NOTES
Delores Sanders states that DAPHNIE Pike (Infectious Disease) that he saw today was unable to view his weekly lab results.   Lab results faxed to her at this time per pt request.

## 2021-06-03 NOTE — PROGRESS NOTES
[facility]  Physical Therapy    Date: 6/3/2021  Patient Name: Nadeen Davis        : 1945       [] Pt Refusal           [x] Pt Unavailable due to:  Patient out of building for appointment. Will resume with PT services in the morning and progress as able.           Peng Alexander Date: 6/3/2021

## 2021-06-03 NOTE — PROGRESS NOTES
Returns from appointment with surgeon and infectious disease. Has follow up appointment with infectious disease in 3 weeks and surgeon in 4 weeks. May shower after 24 hours (6/4/2021, 1500). Pt ate dinner before returning. DSD on right knee with brace. No other new recommendations. States pain pill taken before leaving for appointment helped with his pain during appointment and he denies need for pain med at this time.

## 2021-06-04 PROCEDURE — 1200000002 HC SEMI PRIVATE SWING BED

## 2021-06-04 PROCEDURE — 6360000002 HC RX W HCPCS: Performed by: INTERNAL MEDICINE

## 2021-06-04 PROCEDURE — 94640 AIRWAY INHALATION TREATMENT: CPT

## 2021-06-04 PROCEDURE — 94761 N-INVAS EAR/PLS OXIMETRY MLT: CPT

## 2021-06-04 PROCEDURE — 97116 GAIT TRAINING THERAPY: CPT

## 2021-06-04 PROCEDURE — 6370000000 HC RX 637 (ALT 250 FOR IP): Performed by: INTERNAL MEDICINE

## 2021-06-04 PROCEDURE — 2580000003 HC RX 258: Performed by: INTERNAL MEDICINE

## 2021-06-04 PROCEDURE — 97110 THERAPEUTIC EXERCISES: CPT

## 2021-06-04 RX ADMIN — ESCITALOPRAM OXALATE 20 MG: 10 TABLET ORAL at 08:08

## 2021-06-04 RX ADMIN — BUDESONIDE AND FORMOTEROL FUMARATE DIHYDRATE 2 PUFF: 160; 4.5 AEROSOL RESPIRATORY (INHALATION) at 20:49

## 2021-06-04 RX ADMIN — TAMSULOSIN HYDROCHLORIDE 0.4 MG: 0.4 CAPSULE ORAL at 18:11

## 2021-06-04 RX ADMIN — VANCOMYCIN HYDROCHLORIDE 125 MG: 125 CAPSULE ORAL at 08:07

## 2021-06-04 RX ADMIN — IPRATROPIUM BROMIDE 0.5 MG: 0.5 SOLUTION RESPIRATORY (INHALATION) at 20:49

## 2021-06-04 RX ADMIN — PANTOPRAZOLE SODIUM 40 MG: 40 TABLET, DELAYED RELEASE ORAL at 06:31

## 2021-06-04 RX ADMIN — MONTELUKAST SODIUM 10 MG: 10 TABLET, FILM COATED ORAL at 21:25

## 2021-06-04 RX ADMIN — FERROUS SULFATE TAB 325 MG (65 MG ELEMENTAL FE) 325 MG: 325 (65 FE) TAB at 08:08

## 2021-06-04 RX ADMIN — Medication 1 CAPSULE: at 18:11

## 2021-06-04 RX ADMIN — FLUTICASONE PROPIONATE 1 SPRAY: 50 SPRAY, METERED NASAL at 10:20

## 2021-06-04 RX ADMIN — ENOXAPARIN SODIUM 40 MG: 40 INJECTION SUBCUTANEOUS at 08:08

## 2021-06-04 RX ADMIN — OXYCODONE AND ACETAMINOPHEN 1 TABLET: 5; 325 TABLET ORAL at 14:29

## 2021-06-04 RX ADMIN — VANCOMYCIN HYDROCHLORIDE 125 MG: 125 CAPSULE ORAL at 21:25

## 2021-06-04 RX ADMIN — POTASSIUM CHLORIDE 10 MEQ: 750 TABLET, FILM COATED, EXTENDED RELEASE ORAL at 08:08

## 2021-06-04 RX ADMIN — Medication 1 CAPSULE: at 08:08

## 2021-06-04 RX ADMIN — IPRATROPIUM BROMIDE 0.5 MG: 0.5 SOLUTION RESPIRATORY (INHALATION) at 08:48

## 2021-06-04 RX ADMIN — TIOTROPIUM BROMIDE INHALATION SPRAY 1 PUFF: 3.12 SPRAY, METERED RESPIRATORY (INHALATION) at 08:52

## 2021-06-04 RX ADMIN — ASPIRIN 325 MG: 325 TABLET, COATED ORAL at 08:07

## 2021-06-04 RX ADMIN — ALBUTEROL SULFATE 2.5 MG: 2.5 SOLUTION RESPIRATORY (INHALATION) at 20:49

## 2021-06-04 RX ADMIN — SODIUM CHLORIDE, PRESERVATIVE FREE 10 ML: 5 INJECTION INTRAVENOUS at 21:25

## 2021-06-04 RX ADMIN — OXYCODONE HYDROCHLORIDE AND ACETAMINOPHEN 500 MG: 500 TABLET ORAL at 08:07

## 2021-06-04 RX ADMIN — WATER 2000 MG: 1 INJECTION INTRAMUSCULAR; INTRAVENOUS; SUBCUTANEOUS at 01:59

## 2021-06-04 RX ADMIN — WATER 2000 MG: 1 INJECTION INTRAMUSCULAR; INTRAVENOUS; SUBCUTANEOUS at 10:19

## 2021-06-04 RX ADMIN — TIOTROPIUM BROMIDE INHALATION SPRAY 1 PUFF: 3.12 SPRAY, METERED RESPIRATORY (INHALATION) at 20:49

## 2021-06-04 RX ADMIN — CHOLECALCIFEROL TAB 25 MCG (1000 UNIT) 1000 UNITS: 25 TAB at 08:07

## 2021-06-04 RX ADMIN — METFORMIN HYDROCHLORIDE 500 MG: 500 TABLET ORAL at 08:08

## 2021-06-04 RX ADMIN — BUDESONIDE AND FORMOTEROL FUMARATE DIHYDRATE 2 PUFF: 160; 4.5 AEROSOL RESPIRATORY (INHALATION) at 08:52

## 2021-06-04 RX ADMIN — ALBUTEROL SULFATE 2.5 MG: 2.5 SOLUTION RESPIRATORY (INHALATION) at 08:48

## 2021-06-04 RX ADMIN — ISOSORBIDE MONONITRATE 30 MG: 30 TABLET, EXTENDED RELEASE ORAL at 08:08

## 2021-06-04 RX ADMIN — OXYCODONE AND ACETAMINOPHEN 1 TABLET: 5; 325 TABLET ORAL at 21:25

## 2021-06-04 RX ADMIN — HYDROCHLOROTHIAZIDE 25 MG: 25 TABLET ORAL at 08:07

## 2021-06-04 RX ADMIN — DOCUSATE SODIUM 100 MG: 100 CAPSULE, LIQUID FILLED ORAL at 21:25

## 2021-06-04 RX ADMIN — SODIUM CHLORIDE, PRESERVATIVE FREE 10 ML: 5 INJECTION INTRAVENOUS at 10:19

## 2021-06-04 RX ADMIN — WATER 2000 MG: 1 INJECTION INTRAMUSCULAR; INTRAVENOUS; SUBCUTANEOUS at 18:11

## 2021-06-04 RX ADMIN — THERA TABS 1 TABLET: TAB at 08:08

## 2021-06-04 RX ADMIN — LOSARTAN POTASSIUM 25 MG: 50 TABLET ORAL at 08:07

## 2021-06-04 RX ADMIN — ATORVASTATIN CALCIUM 80 MG: 40 TABLET, FILM COATED ORAL at 21:25

## 2021-06-04 RX ADMIN — SPIRONOLACTONE 25 MG: 25 TABLET, FILM COATED ORAL at 08:07

## 2021-06-04 RX ADMIN — METOPROLOL SUCCINATE 25 MG: 25 TABLET, EXTENDED RELEASE ORAL at 08:07

## 2021-06-04 ASSESSMENT — PAIN SCALES - GENERAL
PAINLEVEL_OUTOF10: 7
PAINLEVEL_OUTOF10: 6
PAINLEVEL_OUTOF10: 0
PAINLEVEL_OUTOF10: 0

## 2021-06-04 ASSESSMENT — PAIN DESCRIPTION - ORIENTATION: ORIENTATION: RIGHT

## 2021-06-04 ASSESSMENT — PAIN DESCRIPTION - LOCATION: LOCATION: KNEE

## 2021-06-04 ASSESSMENT — PAIN DESCRIPTION - PAIN TYPE: TYPE: SURGICAL PAIN

## 2021-06-04 NOTE — PLAN OF CARE
Problem: Pain:  Goal: Pain level will decrease  Description: Pain level will decrease  Outcome: Ongoing  Goal: Control of chronic pain  Description: Control of chronic pain  Outcome: Ongoing     Problem: Skin Integrity:  Goal: Will show no infection signs and symptoms  Description: Will show no infection signs and symptoms  Outcome: Ongoing     Problem: Discharge Planning:  Goal: Discharged to appropriate level of care  Description: Discharged to appropriate level of care  Outcome: Ongoing     Problem: Gas Exchange - Impaired:  Goal: Levels of oxygenation will improve  Description: Levels of oxygenation will improve  Outcome: Ongoing     Problem: Infection, Septic Shock:  Goal: Will show no infection signs and symptoms  Description: Will show no infection signs and symptoms  Outcome: Ongoing     Problem: OXYGENATION/RESPIRATORY FUNCTION  Goal: Patient will maintain patent airway  Outcome: Ongoing     Problem: Gas Exchange - Impaired:  Goal: Levels of oxygenation will improve  Description: Levels of oxygenation will improve  Outcome: Ongoing     Problem: Health Behavior:  Goal: Will modify at least one risk factor affecting health status  Description: Will modify at least one risk factor affecting health status  Outcome: Ongoing

## 2021-06-04 NOTE — PROGRESS NOTES
Phone: Ricky  Date: 2021  Fax: [phone]      Physical Therapy    Daily Note    Patient Name: Kumar Garcia      : 1945  (70 y.o.)  MRN: [id number]     Pt is PROGRESSING toward goals and increased independence of mobility this treatment session  Discharge Recommendations: Home with assist PRN     Assessment    Sit to Stand: Stand by assistance, Supervision  Stand to sit: Stand by assistance, Supervision                WB Status: NWB R LE  Ambulation 1  Surface: level tile  Device: Rolling Walker  Assistance: Stand by assistance  Quality of Gait: fair and steady  Distance: 15 ft x2  Comments: ----             Assessment: Patient in chair upon arrival to room and had just recently finished with RT. Able to complete seated exercises as outlined above with good tolerance. Sit to stand from chair is SBA/supervision. Ambulates to doorway with wheeled walkd and NWB on R LE. Requires CGA/SBA for gait. Returns to chair and is slightly SOB. Remains up in chair following with call light in reach.   Safety Devices  Type of devices: Call light within reach, Gait belt, Left in chair, Nurse notified          Time In: 3363  Time Out: 0935  Timed Coded Minutes: 25  Total Treatment Time: 25    Exercises:  See Flowsheets    Plan  Cont Per Plan Of Care    Goals  Short Term Goals  Time Frame for Short term goals: 21 days - additional 7 days to  21  Short term goal 1: Prevent loss of strength of bilateral UE's and left LE to allow patient to complete transfers and short distance amb-MET  Short term goal 2: Transfer in/OOB and chair /commode indeependently  Short term goal 3: Ambulate with wh walker x 25 ft x 2 with supervision to safely negotiate home environment  Short term goal 4: Good standing balance to assist with self-care tasks-MET  Short term goal 5: Fair+ endurance to complete above ambulation to reduce fall risk associated with fatigue    Long Term Goals [address] Therapy License Number: [id number]    Date: 6/4/2021

## 2021-06-04 NOTE — PROGRESS NOTES
Pt up to shower with assist.  Pt returns to bed and right knee was expressed with moderate amount of serosang fluid from small hole under knee cap. Sterile dressing reapplied and leg wrapped with ACE wrap and brace applied.

## 2021-06-05 PROCEDURE — 6360000002 HC RX W HCPCS: Performed by: INTERNAL MEDICINE

## 2021-06-05 PROCEDURE — 94640 AIRWAY INHALATION TREATMENT: CPT

## 2021-06-05 PROCEDURE — 1200000002 HC SEMI PRIVATE SWING BED

## 2021-06-05 PROCEDURE — 97110 THERAPEUTIC EXERCISES: CPT

## 2021-06-05 PROCEDURE — 97116 GAIT TRAINING THERAPY: CPT

## 2021-06-05 PROCEDURE — 6370000000 HC RX 637 (ALT 250 FOR IP): Performed by: INTERNAL MEDICINE

## 2021-06-05 PROCEDURE — 2580000003 HC RX 258: Performed by: INTERNAL MEDICINE

## 2021-06-05 RX ADMIN — HYDROCHLOROTHIAZIDE 25 MG: 25 TABLET ORAL at 08:13

## 2021-06-05 RX ADMIN — LOSARTAN POTASSIUM 25 MG: 50 TABLET ORAL at 08:13

## 2021-06-05 RX ADMIN — VANCOMYCIN HYDROCHLORIDE 125 MG: 125 CAPSULE ORAL at 08:13

## 2021-06-05 RX ADMIN — TIOTROPIUM BROMIDE INHALATION SPRAY 1 PUFF: 3.12 SPRAY, METERED RESPIRATORY (INHALATION) at 20:54

## 2021-06-05 RX ADMIN — MONTELUKAST SODIUM 10 MG: 10 TABLET, FILM COATED ORAL at 21:14

## 2021-06-05 RX ADMIN — FLUTICASONE PROPIONATE 1 SPRAY: 50 SPRAY, METERED NASAL at 09:39

## 2021-06-05 RX ADMIN — VANCOMYCIN HYDROCHLORIDE 125 MG: 125 CAPSULE ORAL at 21:14

## 2021-06-05 RX ADMIN — TIOTROPIUM BROMIDE INHALATION SPRAY 1 PUFF: 3.12 SPRAY, METERED RESPIRATORY (INHALATION) at 10:21

## 2021-06-05 RX ADMIN — BUDESONIDE AND FORMOTEROL FUMARATE DIHYDRATE 2 PUFF: 160; 4.5 AEROSOL RESPIRATORY (INHALATION) at 10:19

## 2021-06-05 RX ADMIN — Medication 1 CAPSULE: at 16:57

## 2021-06-05 RX ADMIN — POTASSIUM CHLORIDE 10 MEQ: 750 TABLET, FILM COATED, EXTENDED RELEASE ORAL at 08:12

## 2021-06-05 RX ADMIN — METOPROLOL SUCCINATE 25 MG: 25 TABLET, EXTENDED RELEASE ORAL at 08:14

## 2021-06-05 RX ADMIN — IPRATROPIUM BROMIDE 0.5 MG: 0.5 SOLUTION RESPIRATORY (INHALATION) at 20:54

## 2021-06-05 RX ADMIN — ENOXAPARIN SODIUM 40 MG: 40 INJECTION SUBCUTANEOUS at 08:11

## 2021-06-05 RX ADMIN — WATER 2000 MG: 1 INJECTION INTRAMUSCULAR; INTRAVENOUS; SUBCUTANEOUS at 02:47

## 2021-06-05 RX ADMIN — OXYCODONE AND ACETAMINOPHEN 1 TABLET: 5; 325 TABLET ORAL at 03:01

## 2021-06-05 RX ADMIN — OXYCODONE AND ACETAMINOPHEN 1 TABLET: 5; 325 TABLET ORAL at 12:12

## 2021-06-05 RX ADMIN — METFORMIN HYDROCHLORIDE 500 MG: 500 TABLET ORAL at 08:12

## 2021-06-05 RX ADMIN — DOCUSATE SODIUM 100 MG: 100 CAPSULE, LIQUID FILLED ORAL at 08:13

## 2021-06-05 RX ADMIN — ALBUTEROL SULFATE 2.5 MG: 2.5 SOLUTION RESPIRATORY (INHALATION) at 20:54

## 2021-06-05 RX ADMIN — OXYCODONE HYDROCHLORIDE AND ACETAMINOPHEN 500 MG: 500 TABLET ORAL at 08:12

## 2021-06-05 RX ADMIN — CHOLECALCIFEROL TAB 25 MCG (1000 UNIT) 1000 UNITS: 25 TAB at 08:12

## 2021-06-05 RX ADMIN — BUDESONIDE AND FORMOTEROL FUMARATE DIHYDRATE 2 PUFF: 160; 4.5 AEROSOL RESPIRATORY (INHALATION) at 20:54

## 2021-06-05 RX ADMIN — WATER 2000 MG: 1 INJECTION INTRAMUSCULAR; INTRAVENOUS; SUBCUTANEOUS at 09:52

## 2021-06-05 RX ADMIN — FERROUS SULFATE TAB 325 MG (65 MG ELEMENTAL FE) 325 MG: 325 (65 FE) TAB at 08:12

## 2021-06-05 RX ADMIN — PANTOPRAZOLE SODIUM 40 MG: 40 TABLET, DELAYED RELEASE ORAL at 08:13

## 2021-06-05 RX ADMIN — ISOSORBIDE MONONITRATE 30 MG: 30 TABLET, EXTENDED RELEASE ORAL at 08:13

## 2021-06-05 RX ADMIN — DOCUSATE SODIUM 100 MG: 100 CAPSULE, LIQUID FILLED ORAL at 21:14

## 2021-06-05 RX ADMIN — TAMSULOSIN HYDROCHLORIDE 0.4 MG: 0.4 CAPSULE ORAL at 16:57

## 2021-06-05 RX ADMIN — ALBUTEROL SULFATE 2.5 MG: 2.5 SOLUTION RESPIRATORY (INHALATION) at 10:06

## 2021-06-05 RX ADMIN — WATER 2000 MG: 1 INJECTION INTRAMUSCULAR; INTRAVENOUS; SUBCUTANEOUS at 17:38

## 2021-06-05 RX ADMIN — Medication 1 CAPSULE: at 08:13

## 2021-06-05 RX ADMIN — ATORVASTATIN CALCIUM 80 MG: 40 TABLET, FILM COATED ORAL at 21:14

## 2021-06-05 RX ADMIN — SODIUM CHLORIDE, PRESERVATIVE FREE 10 ML: 5 INJECTION INTRAVENOUS at 09:40

## 2021-06-05 RX ADMIN — SODIUM CHLORIDE, PRESERVATIVE FREE 10 ML: 5 INJECTION INTRAVENOUS at 21:14

## 2021-06-05 RX ADMIN — THERA TABS 1 TABLET: TAB at 08:14

## 2021-06-05 RX ADMIN — ESCITALOPRAM OXALATE 20 MG: 10 TABLET ORAL at 08:12

## 2021-06-05 RX ADMIN — ASPIRIN 325 MG: 325 TABLET, COATED ORAL at 08:12

## 2021-06-05 RX ADMIN — OXYCODONE AND ACETAMINOPHEN 1 TABLET: 5; 325 TABLET ORAL at 20:17

## 2021-06-05 RX ADMIN — SPIRONOLACTONE 25 MG: 25 TABLET, FILM COATED ORAL at 08:12

## 2021-06-05 RX ADMIN — IPRATROPIUM BROMIDE 0.5 MG: 0.5 SOLUTION RESPIRATORY (INHALATION) at 10:06

## 2021-06-05 ASSESSMENT — PAIN SCALES - GENERAL
PAINLEVEL_OUTOF10: 2
PAINLEVEL_OUTOF10: 5
PAINLEVEL_OUTOF10: 1
PAINLEVEL_OUTOF10: 1
PAINLEVEL_OUTOF10: 2
PAINLEVEL_OUTOF10: 7
PAINLEVEL_OUTOF10: 5

## 2021-06-05 ASSESSMENT — PAIN DESCRIPTION - LOCATION
LOCATION: KNEE
LOCATION: KNEE

## 2021-06-05 ASSESSMENT — PAIN DESCRIPTION - PAIN TYPE
TYPE: SURGICAL PAIN
TYPE: SURGICAL PAIN

## 2021-06-05 ASSESSMENT — PAIN DESCRIPTION - DESCRIPTORS
DESCRIPTORS: SHARP
DESCRIPTORS: ACHING;DULL

## 2021-06-05 ASSESSMENT — PAIN DESCRIPTION - ORIENTATION
ORIENTATION: RIGHT
ORIENTATION: RIGHT

## 2021-06-05 NOTE — PLAN OF CARE
Takes pain medication and is able to rate accordingly using 0-10 scale. No new skin breakdown noted at this time.

## 2021-06-05 NOTE — PROGRESS NOTES
Phone: Ricky  Date: 2021  Fax: [phone]      Physical Therapy    Daily Note    Patient Name: Saran Lucia      : 1945  (70 y.o.)  MRN: [id number]     Pt is PROGRESSING toward goals and increased independence of mobility this treatment session  Discharge Recommendations: Home with assist PRN     Assessment  Bridging: Supervision     Supine to Sit: Minimal assistance (for R LE managment)     Scooting: Supervision    Sit to Stand: Stand by assistance, Supervision  Stand to sit: Stand by assistance, Supervision    WB Status: NWB R LE  Ambulation 1  Surface: level tile  Device: Rolling Walker  Assistance: Stand by assistance  Quality of Gait: fair and steady  Distance: 15 ft x2  Comments: ----    Assessment: Patient is sitting up in bed watching TV finishing breakfast upon entry. Patient transfers to edge of bed with supervision. Sit to stand is SBA. Patient hops to doorway and back to bedside recliner without rest break and completes seated LE strengthening exercises. Patient also completes standing L hip strengthening exercises. Patient remains seated in recliner with legs elevated and call light in reach.   Safety Devices  Type of devices: Call light within reach, Gait belt, Left in chair, Nurse notified    Time In: 0840  Time Out: 912  Timed Coded Minutes: 32  Total Treatment Time: 32    Exercises:  See Flowsheets    Plan  Cont Per Plan Of Care    Goals  Short Term Goals  Time Frame for Short term goals: 21 days - additional 7 days to  21  Short term goal 1: Prevent loss of strength of bilateral UE's and left LE to allow patient to complete transfers and short distance amb-MET  Short term goal 2: Transfer in/OOB and chair /commode indeependently  Short term goal 3: Ambulate with wh walker x 25 ft x 2 with supervision to safely negotiate home environment  Short term goal 4: Good standing balance to assist with self-care tasks-MET  Short term goal 5: Fair+ endurance to complete above ambulation to reduce fall risk associated with fatigue    Long Term Goals    Jolly Mccann [place] [facility] License Number: [id number]    Date: 6/5/2021

## 2021-06-05 NOTE — PROGRESS NOTES
Hospitalist Progress Note  6/5/2021 9:43 AM  Subjective:   Admit Date: 5/15/2021  PCP: BESS Sherman - CNP    Interval History:       Leif Villalpando is doing well. He is making progress with PT. Has minimal pain in the right knee. States the drainage is less. Was evaluated by his orthopedic surgeon 2 days ago. States his appetite is good, bowels move regularly.   Reports no chest pain, shortness of breath, no cough    Diet: DIET CARB CONTROL; Carb Control: 4 carb choices (60 gms)/meal  Medications:   Scheduled Meds:   lactobacillus  1 capsule Oral BID WC    albuterol  2.5 mg Nebulization BID    ferrous sulfate  325 mg Oral Daily with breakfast    aspirin  325 mg Oral Daily    atorvastatin  80 mg Oral Nightly    hydroCHLOROthiazide  25 mg Oral Daily    isosorbide mononitrate  30 mg Oral Daily    losartan  25 mg Oral Daily    metoprolol succinate  25 mg Oral Daily    montelukast  10 mg Oral Nightly    multivitamin  1 tablet Oral Daily    spironolactone  25 mg Oral Daily    tamsulosin  0.4 mg Oral QPM    docusate sodium  100 mg Oral BID    escitalopram  20 mg Oral Daily    budesonide-formoterol  2 puff Inhalation Q12H    fluticasone  1 spray Nasal Daily    ipratropium  0.5 mg Nebulization BID    metFORMIN  500 mg Oral Daily with breakfast    pantoprazole  40 mg Oral QAM AC    tiotropium  1 puff Inhalation BID    vitamin C  500 mg Oral Daily    Vitamin D  1,000 Units Oral Daily    cefepime  2,000 mg Intravenous Q8H    enoxaparin  40 mg Subcutaneous Daily    potassium chloride  10 mEq Oral Daily with breakfast    sodium chloride flush  5-40 mL Intravenous 2 times per day    vancomycin  125 mg Oral 2 times per day     Continuous Infusions:   sodium chloride       PRN Medications: ondansetron, albuterol, oxyCODONE-acetaminophen, sodium chloride flush, sodium chloride    Objective:   Vitals: BP (!) 123/59   Pulse 71   Temp 97.7 °F (36.5 °C) (Oral)   Resp 18   Ht 5' 10\" (1.778 m)   Wt (!) 312 lb 6.4 oz (141.7 kg)   SpO2 96%   BMI 44.82 kg/m²   BMI: Body mass index is 44.82 kg/m². Physical Exam:    General Appearance: Up in chair, alert and oriented to person, place and time, in no acute distress  Cardiovascular: normal rate, regular rhythm, normal S1 and S2  Pulmonary/Chest: clear to auscultation bilaterally, no rales, no wheezes,  Abdomen: soft, obese, non-tender, non-distended, normal bowel sounds   Extremities: Right lower extremity with straight leg brace, dressing dry  Skin: warm and dry, no rash  Neurological: alert, oriented, normal speech, no focal findings or movement disorder noted      Assessment and Plan:     1. S/P stage I revision of right TKA  -   continue 6 weeks course of IV cefepime.   On Percocet for pain control.       He followed up with his orthopedic surgeon on 6/3 .    2.  Generalized weakness - continue PT and OT  3.  History of C. difficile colitis - on oral vancomycin and lactobacillus prophylactically to prevent recurrence  4.  Mild hypokalemia -replaced, on potassium supplements  5.  Chronic anemia - H&H is close to baseline, continue on iron replacement  6.  Diabetes mellitus type 2, controlled, non-insulin-dependent - on Metformin  7.  Hypertension - blood pressure stable, continue losartan, Toprol-XL, HCTZ, Aldactone  8.  History of CAD, s/p CABG on 3/30/2015 -asymptomatic, continue medical management with aspirin, Toprol-XL, Imdur  9.  History of BPH - on Flomax  10.  GERD -on Protonix  11.  History of depression - mood stable on Lexapro  36.  QSWJCOH diastolic CHF - continue on diuretics, asymptomatic         Electronically signed by Lily Parmar MD on 6/5/2021 at 9:43 AM    [facility] Hospitalist

## 2021-06-05 NOTE — PROGRESS NOTES
Pt waiting on family to bring clean clothing. AM hygiene done. Pt requests prn pain med \"about an hour\" before wound care. He rates his pain 1-2/10 at time of giving dose, but wants it prior to wound care. . Electronically signed by Lina Tucker RN on 6/5/2021 at 12:55 PM

## 2021-06-06 LAB
ABSOLUTE EOS #: 0.5 K/UL (ref 0–0.4)
ABSOLUTE IMMATURE GRANULOCYTE: ABNORMAL K/UL (ref 0–0.3)
ABSOLUTE LYMPH #: 0.9 K/UL (ref 1–4.8)
ABSOLUTE MONO #: 0.5 K/UL (ref 0–1)
ANION GAP SERPL CALCULATED.3IONS-SCNC: 10 MMOL/L (ref 9–17)
BASOPHILS # BLD: 1 % (ref 0–2)
BASOPHILS ABSOLUTE: 0 K/UL (ref 0–0.2)
BUN BLDV-MCNC: 15 MG/DL (ref 8–23)
BUN/CREAT BLD: 18 (ref 9–20)
C-REACTIVE PROTEIN: 5.3 MG/L (ref 0–5)
CALCIUM SERPL-MCNC: 9 MG/DL (ref 8.6–10.4)
CHLORIDE BLD-SCNC: 104 MMOL/L (ref 98–107)
CO2: 25 MMOL/L (ref 20–31)
CREAT SERPL-MCNC: 0.82 MG/DL (ref 0.7–1.2)
DIFFERENTIAL TYPE: YES
EOSINOPHILS RELATIVE PERCENT: 10 % (ref 0–5)
GFR AFRICAN AMERICAN: >60 ML/MIN
GFR NON-AFRICAN AMERICAN: >60 ML/MIN
GFR SERPL CREATININE-BSD FRML MDRD: ABNORMAL ML/MIN/{1.73_M2}
GFR SERPL CREATININE-BSD FRML MDRD: ABNORMAL ML/MIN/{1.73_M2}
GLUCOSE BLD-MCNC: 121 MG/DL (ref 70–99)
HCT VFR BLD CALC: 32.5 % (ref 41–53)
HEMOGLOBIN: 10.9 G/DL (ref 13.5–17.5)
IMMATURE GRANULOCYTES: ABNORMAL %
LYMPHOCYTES # BLD: 18 % (ref 13–44)
MCH RBC QN AUTO: 26.7 PG (ref 26–34)
MCHC RBC AUTO-ENTMCNC: 33.3 G/DL (ref 31–37)
MCV RBC AUTO: 80 FL (ref 80–100)
MONOCYTES # BLD: 10 % (ref 5–9)
NRBC AUTOMATED: ABNORMAL PER 100 WBC
PDW BLD-RTO: 17.7 % (ref 12.1–15.2)
PLATELET # BLD: 191 K/UL (ref 140–450)
PLATELET ESTIMATE: ABNORMAL
PMV BLD AUTO: ABNORMAL FL (ref 6–12)
POTASSIUM SERPL-SCNC: 3.9 MMOL/L (ref 3.7–5.3)
RBC # BLD: 4.06 M/UL (ref 4.5–5.9)
RBC # BLD: ABNORMAL 10*6/UL
SEG NEUTROPHILS: 61 % (ref 39–75)
SEGMENTED NEUTROPHILS ABSOLUTE COUNT: 3 K/UL (ref 2.1–6.5)
SODIUM BLD-SCNC: 139 MMOL/L (ref 135–144)
WBC # BLD: 4.9 K/UL (ref 3.5–11)
WBC # BLD: ABNORMAL 10*3/UL

## 2021-06-06 PROCEDURE — 80048 BASIC METABOLIC PNL TOTAL CA: CPT

## 2021-06-06 PROCEDURE — 2580000003 HC RX 258: Performed by: INTERNAL MEDICINE

## 2021-06-06 PROCEDURE — 6360000002 HC RX W HCPCS: Performed by: INTERNAL MEDICINE

## 2021-06-06 PROCEDURE — 85025 COMPLETE CBC W/AUTO DIFF WBC: CPT

## 2021-06-06 PROCEDURE — 6370000000 HC RX 637 (ALT 250 FOR IP): Performed by: INTERNAL MEDICINE

## 2021-06-06 PROCEDURE — 97116 GAIT TRAINING THERAPY: CPT

## 2021-06-06 PROCEDURE — 97110 THERAPEUTIC EXERCISES: CPT

## 2021-06-06 PROCEDURE — 6370000000 HC RX 637 (ALT 250 FOR IP)

## 2021-06-06 PROCEDURE — 94640 AIRWAY INHALATION TREATMENT: CPT

## 2021-06-06 PROCEDURE — 1200000002 HC SEMI PRIVATE SWING BED

## 2021-06-06 PROCEDURE — 86140 C-REACTIVE PROTEIN: CPT

## 2021-06-06 RX ORDER — ONDANSETRON 4 MG/1
4 TABLET, ORALLY DISINTEGRATING ORAL EVERY 6 HOURS PRN
Status: DISCONTINUED | OUTPATIENT
Start: 2021-06-06 | End: 2021-06-26 | Stop reason: HOSPADM

## 2021-06-06 RX ORDER — ONDANSETRON 4 MG/1
TABLET, ORALLY DISINTEGRATING ORAL
Status: COMPLETED
Start: 2021-06-06 | End: 2021-06-06

## 2021-06-06 RX ADMIN — TIOTROPIUM BROMIDE INHALATION SPRAY 1 PUFF: 3.12 SPRAY, METERED RESPIRATORY (INHALATION) at 20:53

## 2021-06-06 RX ADMIN — METOPROLOL SUCCINATE 25 MG: 25 TABLET, EXTENDED RELEASE ORAL at 07:57

## 2021-06-06 RX ADMIN — FERROUS SULFATE TAB 325 MG (65 MG ELEMENTAL FE) 325 MG: 325 (65 FE) TAB at 07:58

## 2021-06-06 RX ADMIN — ONDANSETRON 4 MG: 4 TABLET, ORALLY DISINTEGRATING ORAL at 18:53

## 2021-06-06 RX ADMIN — Medication 1 CAPSULE: at 15:58

## 2021-06-06 RX ADMIN — DOCUSATE SODIUM 100 MG: 100 CAPSULE, LIQUID FILLED ORAL at 20:47

## 2021-06-06 RX ADMIN — ALBUTEROL SULFATE 2.5 MG: 2.5 SOLUTION RESPIRATORY (INHALATION) at 20:53

## 2021-06-06 RX ADMIN — ALBUTEROL SULFATE 2.5 MG: 2.5 SOLUTION RESPIRATORY (INHALATION) at 09:48

## 2021-06-06 RX ADMIN — SODIUM CHLORIDE, PRESERVATIVE FREE 10 ML: 5 INJECTION INTRAVENOUS at 20:46

## 2021-06-06 RX ADMIN — POTASSIUM CHLORIDE 10 MEQ: 750 TABLET, FILM COATED, EXTENDED RELEASE ORAL at 07:57

## 2021-06-06 RX ADMIN — OXYCODONE AND ACETAMINOPHEN 1 TABLET: 5; 325 TABLET ORAL at 11:53

## 2021-06-06 RX ADMIN — MONTELUKAST SODIUM 10 MG: 10 TABLET, FILM COATED ORAL at 20:47

## 2021-06-06 RX ADMIN — ONDANSETRON 4 MG: 4 TABLET, ORALLY DISINTEGRATING ORAL at 09:03

## 2021-06-06 RX ADMIN — WATER 2000 MG: 1 INJECTION INTRAMUSCULAR; INTRAVENOUS; SUBCUTANEOUS at 17:29

## 2021-06-06 RX ADMIN — SODIUM CHLORIDE, PRESERVATIVE FREE 10 ML: 5 INJECTION INTRAVENOUS at 07:58

## 2021-06-06 RX ADMIN — TIOTROPIUM BROMIDE INHALATION SPRAY 1 PUFF: 3.12 SPRAY, METERED RESPIRATORY (INHALATION) at 10:04

## 2021-06-06 RX ADMIN — BUDESONIDE AND FORMOTEROL FUMARATE DIHYDRATE 2 PUFF: 160; 4.5 AEROSOL RESPIRATORY (INHALATION) at 10:02

## 2021-06-06 RX ADMIN — TAMSULOSIN HYDROCHLORIDE 0.4 MG: 0.4 CAPSULE ORAL at 17:29

## 2021-06-06 RX ADMIN — ATORVASTATIN CALCIUM 80 MG: 40 TABLET, FILM COATED ORAL at 20:47

## 2021-06-06 RX ADMIN — BUDESONIDE AND FORMOTEROL FUMARATE DIHYDRATE 2 PUFF: 160; 4.5 AEROSOL RESPIRATORY (INHALATION) at 20:53

## 2021-06-06 RX ADMIN — ISOSORBIDE MONONITRATE 30 MG: 30 TABLET, EXTENDED RELEASE ORAL at 07:56

## 2021-06-06 RX ADMIN — ENOXAPARIN SODIUM 40 MG: 40 INJECTION SUBCUTANEOUS at 07:56

## 2021-06-06 RX ADMIN — IPRATROPIUM BROMIDE 0.5 MG: 0.5 SOLUTION RESPIRATORY (INHALATION) at 09:48

## 2021-06-06 RX ADMIN — THERA TABS 1 TABLET: TAB at 07:57

## 2021-06-06 RX ADMIN — LOSARTAN POTASSIUM 25 MG: 50 TABLET ORAL at 07:57

## 2021-06-06 RX ADMIN — METFORMIN HYDROCHLORIDE 500 MG: 500 TABLET ORAL at 07:57

## 2021-06-06 RX ADMIN — DOCUSATE SODIUM 100 MG: 100 CAPSULE, LIQUID FILLED ORAL at 07:57

## 2021-06-06 RX ADMIN — CHOLECALCIFEROL TAB 25 MCG (1000 UNIT) 1000 UNITS: 25 TAB at 07:59

## 2021-06-06 RX ADMIN — SODIUM CHLORIDE, PRESERVATIVE FREE 10 ML: 5 INJECTION INTRAVENOUS at 01:49

## 2021-06-06 RX ADMIN — VANCOMYCIN HYDROCHLORIDE 125 MG: 125 CAPSULE ORAL at 07:57

## 2021-06-06 RX ADMIN — SPIRONOLACTONE 25 MG: 25 TABLET, FILM COATED ORAL at 07:56

## 2021-06-06 RX ADMIN — ESCITALOPRAM OXALATE 20 MG: 10 TABLET ORAL at 07:56

## 2021-06-06 RX ADMIN — VANCOMYCIN HYDROCHLORIDE 125 MG: 125 CAPSULE ORAL at 20:47

## 2021-06-06 RX ADMIN — OXYCODONE AND ACETAMINOPHEN 1 TABLET: 5; 325 TABLET ORAL at 18:54

## 2021-06-06 RX ADMIN — Medication 1 CAPSULE: at 07:57

## 2021-06-06 RX ADMIN — ASPIRIN 325 MG: 325 TABLET, COATED ORAL at 07:57

## 2021-06-06 RX ADMIN — OXYCODONE HYDROCHLORIDE AND ACETAMINOPHEN 500 MG: 500 TABLET ORAL at 07:57

## 2021-06-06 RX ADMIN — WATER 2000 MG: 1 INJECTION INTRAMUSCULAR; INTRAVENOUS; SUBCUTANEOUS at 09:30

## 2021-06-06 RX ADMIN — PANTOPRAZOLE SODIUM 40 MG: 40 TABLET, DELAYED RELEASE ORAL at 07:59

## 2021-06-06 RX ADMIN — FLUTICASONE PROPIONATE 1 SPRAY: 50 SPRAY, METERED NASAL at 07:57

## 2021-06-06 RX ADMIN — IPRATROPIUM BROMIDE 0.5 MG: 0.5 SOLUTION RESPIRATORY (INHALATION) at 20:53

## 2021-06-06 RX ADMIN — HYDROCHLOROTHIAZIDE 25 MG: 25 TABLET ORAL at 07:57

## 2021-06-06 RX ADMIN — WATER 2000 MG: 1 INJECTION INTRAMUSCULAR; INTRAVENOUS; SUBCUTANEOUS at 01:49

## 2021-06-06 ASSESSMENT — PAIN SCALES - GENERAL
PAINLEVEL_OUTOF10: 2
PAINLEVEL_OUTOF10: 0
PAINLEVEL_OUTOF10: 4
PAINLEVEL_OUTOF10: 0
PAINLEVEL_OUTOF10: 5

## 2021-06-06 NOTE — PROGRESS NOTES
Phone: Ricky  Date: 2021  Fax: [phone]      Physical Therapy    Daily Note    Patient Name: Patricia Pina      : 1945  (70 y.o.)  MRN: [id number]     Pt is PROGRESSING toward goals and increased independence of mobility this treatment session  Discharge Recommendations: Home with assist PRN     Assessment  Bridging: Supervision     Supine to Sit: Minimal assistance (for R LE managment)     Scooting: Supervision    Sit to Stand: Stand by assistance, Supervision  Stand to sit: Stand by assistance, Supervision    WB Status: NWB R LE  Ambulation 1  Surface: level tile  Device: Rolling Walker  Assistance: Stand by assistance  Quality of Gait: fair and steady  Distance: 15 ft x2  Comments: ----    Assessment: Patient is sitting up in bed watching TV upon entry. Patient transfers to edge of bed with SBA. Sit to stand from edge of bed is SBA as well. He hops to doorway with FWW and CGA and then back to chair without rest break and completes standing R hip strengthening exercises before sitting down. Patient notes some L hip discomfort and because of this held L hip flex and ABD exercises this morning. Patient remains reclined in chair with legs elevated and call light in reach.   Safety Devices  Type of devices: Call light within reach, Gait belt, Left in chair, Nurse notified    Time In: 0840  Time Out: 0910  Timed Coded Minutes: 30  Total Treatment Time: 30    Exercises:  See Flowsheets    Plan  Cont Per Plan Of Care    Goals  Short Term Goals  Time Frame for Short term goals: 21 days - additional 7 days to  21  Short term goal 1: Prevent loss of strength of bilateral UE's and left LE to allow patient to complete transfers and short distance amb-MET  Short term goal 2: Transfer in/OOB and chair /commode indeependently  Short term goal 3: Ambulate with wh walker x 25 ft x 2 with supervision to safely negotiate home environment  Short term goal 4: Good standing balance to assist with self-care tasks-MET  Short term goal 5: Fair+ endurance to complete above ambulation to reduce fall risk associated with fatigue    Long Term Goals    Silvio Stack, [place] Therapy License Number: [id number]    Date: 6/6/2021

## 2021-06-07 PROCEDURE — 6370000000 HC RX 637 (ALT 250 FOR IP): Performed by: INTERNAL MEDICINE

## 2021-06-07 PROCEDURE — 97116 GAIT TRAINING THERAPY: CPT

## 2021-06-07 PROCEDURE — 6360000002 HC RX W HCPCS: Performed by: INTERNAL MEDICINE

## 2021-06-07 PROCEDURE — 97110 THERAPEUTIC EXERCISES: CPT

## 2021-06-07 PROCEDURE — 1200000002 HC SEMI PRIVATE SWING BED

## 2021-06-07 PROCEDURE — 2580000003 HC RX 258: Performed by: INTERNAL MEDICINE

## 2021-06-07 PROCEDURE — 94640 AIRWAY INHALATION TREATMENT: CPT

## 2021-06-07 RX ADMIN — SODIUM CHLORIDE, PRESERVATIVE FREE 10 ML: 5 INJECTION INTRAVENOUS at 09:35

## 2021-06-07 RX ADMIN — THERA TABS 1 TABLET: TAB at 09:34

## 2021-06-07 RX ADMIN — IPRATROPIUM BROMIDE 0.5 MG: 0.5 SOLUTION RESPIRATORY (INHALATION) at 20:50

## 2021-06-07 RX ADMIN — METOPROLOL SUCCINATE 25 MG: 25 TABLET, EXTENDED RELEASE ORAL at 09:35

## 2021-06-07 RX ADMIN — TAMSULOSIN HYDROCHLORIDE 0.4 MG: 0.4 CAPSULE ORAL at 17:12

## 2021-06-07 RX ADMIN — DOCUSATE SODIUM 100 MG: 100 CAPSULE, LIQUID FILLED ORAL at 20:48

## 2021-06-07 RX ADMIN — OXYCODONE HYDROCHLORIDE AND ACETAMINOPHEN 500 MG: 500 TABLET ORAL at 09:34

## 2021-06-07 RX ADMIN — WATER 2000 MG: 1 INJECTION INTRAMUSCULAR; INTRAVENOUS; SUBCUTANEOUS at 17:13

## 2021-06-07 RX ADMIN — ALBUTEROL SULFATE 2.5 MG: 2.5 SOLUTION RESPIRATORY (INHALATION) at 09:44

## 2021-06-07 RX ADMIN — SPIRONOLACTONE 25 MG: 25 TABLET, FILM COATED ORAL at 09:34

## 2021-06-07 RX ADMIN — BUDESONIDE AND FORMOTEROL FUMARATE DIHYDRATE 2 PUFF: 160; 4.5 AEROSOL RESPIRATORY (INHALATION) at 09:45

## 2021-06-07 RX ADMIN — SODIUM CHLORIDE, PRESERVATIVE FREE 10 ML: 5 INJECTION INTRAVENOUS at 20:48

## 2021-06-07 RX ADMIN — ALBUTEROL SULFATE 2.5 MG: 2.5 SOLUTION RESPIRATORY (INHALATION) at 20:49

## 2021-06-07 RX ADMIN — ENOXAPARIN SODIUM 40 MG: 40 INJECTION SUBCUTANEOUS at 09:34

## 2021-06-07 RX ADMIN — TIOTROPIUM BROMIDE INHALATION SPRAY 1 PUFF: 3.12 SPRAY, METERED RESPIRATORY (INHALATION) at 09:45

## 2021-06-07 RX ADMIN — ESCITALOPRAM OXALATE 20 MG: 10 TABLET ORAL at 09:34

## 2021-06-07 RX ADMIN — DOCUSATE SODIUM 100 MG: 100 CAPSULE, LIQUID FILLED ORAL at 09:35

## 2021-06-07 RX ADMIN — TIOTROPIUM BROMIDE INHALATION SPRAY 1 PUFF: 3.12 SPRAY, METERED RESPIRATORY (INHALATION) at 20:50

## 2021-06-07 RX ADMIN — POTASSIUM CHLORIDE 10 MEQ: 750 TABLET, FILM COATED, EXTENDED RELEASE ORAL at 07:44

## 2021-06-07 RX ADMIN — ISOSORBIDE MONONITRATE 30 MG: 30 TABLET, EXTENDED RELEASE ORAL at 09:34

## 2021-06-07 RX ADMIN — MONTELUKAST SODIUM 10 MG: 10 TABLET, FILM COATED ORAL at 20:47

## 2021-06-07 RX ADMIN — VANCOMYCIN HYDROCHLORIDE 125 MG: 125 CAPSULE ORAL at 09:34

## 2021-06-07 RX ADMIN — ASPIRIN 325 MG: 325 TABLET, COATED ORAL at 09:35

## 2021-06-07 RX ADMIN — METFORMIN HYDROCHLORIDE 500 MG: 500 TABLET ORAL at 07:44

## 2021-06-07 RX ADMIN — Medication 1 CAPSULE: at 07:44

## 2021-06-07 RX ADMIN — IPRATROPIUM BROMIDE 0.5 MG: 0.5 SOLUTION RESPIRATORY (INHALATION) at 09:44

## 2021-06-07 RX ADMIN — LOSARTAN POTASSIUM 25 MG: 50 TABLET ORAL at 09:34

## 2021-06-07 RX ADMIN — WATER 2000 MG: 1 INJECTION INTRAMUSCULAR; INTRAVENOUS; SUBCUTANEOUS at 09:35

## 2021-06-07 RX ADMIN — OXYCODONE AND ACETAMINOPHEN 1 TABLET: 5; 325 TABLET ORAL at 16:06

## 2021-06-07 RX ADMIN — HYDROCHLOROTHIAZIDE 25 MG: 25 TABLET ORAL at 09:34

## 2021-06-07 RX ADMIN — Medication 1 CAPSULE: at 17:12

## 2021-06-07 RX ADMIN — BUDESONIDE AND FORMOTEROL FUMARATE DIHYDRATE 2 PUFF: 160; 4.5 AEROSOL RESPIRATORY (INHALATION) at 20:50

## 2021-06-07 RX ADMIN — OXYCODONE AND ACETAMINOPHEN 1 TABLET: 5; 325 TABLET ORAL at 13:03

## 2021-06-07 RX ADMIN — OXYCODONE AND ACETAMINOPHEN 1 TABLET: 5; 325 TABLET ORAL at 22:14

## 2021-06-07 RX ADMIN — SODIUM CHLORIDE, PRESERVATIVE FREE 10 ML: 5 INJECTION INTRAVENOUS at 01:00

## 2021-06-07 RX ADMIN — PANTOPRAZOLE SODIUM 40 MG: 40 TABLET, DELAYED RELEASE ORAL at 05:47

## 2021-06-07 RX ADMIN — CHOLECALCIFEROL TAB 25 MCG (1000 UNIT) 1000 UNITS: 25 TAB at 09:34

## 2021-06-07 RX ADMIN — ATORVASTATIN CALCIUM 80 MG: 40 TABLET, FILM COATED ORAL at 20:47

## 2021-06-07 RX ADMIN — FLUTICASONE PROPIONATE 1 SPRAY: 50 SPRAY, METERED NASAL at 09:38

## 2021-06-07 RX ADMIN — VANCOMYCIN HYDROCHLORIDE 125 MG: 125 CAPSULE ORAL at 20:48

## 2021-06-07 RX ADMIN — WATER 2000 MG: 1 INJECTION INTRAMUSCULAR; INTRAVENOUS; SUBCUTANEOUS at 01:00

## 2021-06-07 RX ADMIN — FERROUS SULFATE TAB 325 MG (65 MG ELEMENTAL FE) 325 MG: 325 (65 FE) TAB at 07:44

## 2021-06-07 ASSESSMENT — PAIN SCALES - GENERAL
PAINLEVEL_OUTOF10: 7
PAINLEVEL_OUTOF10: 5
PAINLEVEL_OUTOF10: 0
PAINLEVEL_OUTOF10: 2
PAINLEVEL_OUTOF10: 5
PAINLEVEL_OUTOF10: 2
PAINLEVEL_OUTOF10: 0
PAINLEVEL_OUTOF10: 2
PAINLEVEL_OUTOF10: 0

## 2021-06-07 ASSESSMENT — PAIN DESCRIPTION - FREQUENCY
FREQUENCY: INTERMITTENT
FREQUENCY: INTERMITTENT

## 2021-06-07 ASSESSMENT — PAIN DESCRIPTION - LOCATION
LOCATION: KNEE

## 2021-06-07 ASSESSMENT — PAIN DESCRIPTION - ORIENTATION
ORIENTATION: RIGHT

## 2021-06-07 ASSESSMENT — PAIN - FUNCTIONAL ASSESSMENT: PAIN_FUNCTIONAL_ASSESSMENT: ACTIVITIES ARE NOT PREVENTED

## 2021-06-07 ASSESSMENT — PAIN DESCRIPTION - PAIN TYPE
TYPE: SURGICAL PAIN

## 2021-06-07 ASSESSMENT — PAIN DESCRIPTION - DESCRIPTORS
DESCRIPTORS: ACHING
DESCRIPTORS: ACHING;THROBBING

## 2021-06-07 ASSESSMENT — PAIN DESCRIPTION - ONSET: ONSET: GRADUAL

## 2021-06-07 ASSESSMENT — PAIN DESCRIPTION - PROGRESSION: CLINICAL_PROGRESSION: NOT CHANGED

## 2021-06-07 NOTE — PROGRESS NOTES
Hospitalist Progress Note  6/7/2021 6:49 AM  Subjective:   Admit Date: 5/15/2021  PCP: BESS Ledezma - CNP    Interval History: Vince Alvarez has no complaints this am.  He denies chest pain or SOB. Appetite is good, occasional nausea but not every day. Bowels moving and he denies any trouble urinating. Pain is controlled. He continues to tolerate the antibiotics well. \"I'm tired of being in the hospital\". Diet: DIET CARB CONTROL; Carb Control: 4 carb choices (60 gms)/meal  Medications:   Scheduled Meds:   lactobacillus  1 capsule Oral BID WC    albuterol  2.5 mg Nebulization BID    ferrous sulfate  325 mg Oral Daily with breakfast    aspirin  325 mg Oral Daily    atorvastatin  80 mg Oral Nightly    hydroCHLOROthiazide  25 mg Oral Daily    isosorbide mononitrate  30 mg Oral Daily    losartan  25 mg Oral Daily    metoprolol succinate  25 mg Oral Daily    montelukast  10 mg Oral Nightly    multivitamin  1 tablet Oral Daily    spironolactone  25 mg Oral Daily    tamsulosin  0.4 mg Oral QPM    docusate sodium  100 mg Oral BID    escitalopram  20 mg Oral Daily    budesonide-formoterol  2 puff Inhalation Q12H    fluticasone  1 spray Nasal Daily    ipratropium  0.5 mg Nebulization BID    metFORMIN  500 mg Oral Daily with breakfast    pantoprazole  40 mg Oral QAM AC    tiotropium  1 puff Inhalation BID    vitamin C  500 mg Oral Daily    Vitamin D  1,000 Units Oral Daily    cefepime  2,000 mg Intravenous Q8H    enoxaparin  40 mg Subcutaneous Daily    potassium chloride  10 mEq Oral Daily with breakfast    sodium chloride flush  5-40 mL Intravenous 2 times per day    vancomycin  125 mg Oral 2 times per day     Continuous Infusions:   sodium chloride         Patient's current medications documented, reviewed, and updated.       CBC:   Recent Labs     06/06/21  0610   WBC 4.9   HGB 10.9*        BMP:    Recent Labs     06/06/21  0610      K 3.9      CO2 25   BUN 15 CREATININE 0.82   GLUCOSE 121*     Hepatic: No results for input(s): AST, ALT, ALB, BILITOT, ALKPHOS in the last 72 hours. Troponin: No results for input(s): TROPONINI in the last 72 hours. BNP: No results for input(s): BNP in the last 72 hours. Lipids: No results for input(s): CHOL, HDL in the last 72 hours. Invalid input(s): LDLCALCU  INR: No results for input(s): INR in the last 72 hours. Objective:   Vitals: BP (!) 101/54   Pulse 78   Temp 97.1 °F (36.2 °C) (Temporal)   Resp 16   Ht 5' 10\" (1.778 m)   Wt (!) 305 lb (138.3 kg)   SpO2 98%   BMI 43.76 kg/m²   General appearance: alert and cooperative with exam  HEENT: Head: Normocephalic, no lesions, without obvious abnormality. Eye: Normal external eye, conjunctiva, lids cornea, SACHIN. Nose: Normal external nose, mucus membranes and septum. Neck: no adenopathy, no carotid bruit and supple, symmetrical, trachea midline  Lungs: clear to auscultation bilaterally  Heart: regular rate and rhythm and S1, S2 normal  Abdomen: soft, non-tender; bowel sounds normal; no masses,  no organomegaly and obese  Extremities: No calf tenderness. \  Neurologic: Mental status: Alert, oriented, thought content appropriate    Assessment and Plan:   1. 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 On PRN Percocet. Seen Ortho on 6/3.  2. H/O C.diff Colitis - on Oral Vancomycin, while on Cefepime, to prevent recurrence. 3. Generalized weakness - PT / OT daily. 4. Anemia - last Hgb 10.9 on 6/6. .  5. DM II - controlled on Metformin.    6. CAD - S/P CABG 3/30/15.  No chest pain or SOB. On Toprol XL, Imdur, and aspirin.    7. HTN - controlled on HCTZ, Losartan, Aldactone, and Toprol XL. 8. COPD - controlled on current inhalers. 9. H/O BPH - stable on Flomax. 10.  GERD - controlled on PPI. 11.  Hyperlipidemia - controlled on Lipitor. 12.  H/O Depression - stable on Lexapro. Plan:  1. is present, code status documented, or surrogate decision maker is listed in the patient's medical record. ( )  The patient's advanced care plan is not present because:  (select)   ( ) I confirmed today that the patient does not wish or was not able to name a surrogate decision maker or provide an [address] ( ) Hospice care is currently being provided or has been provided this calender year. ( )  I did not confirm today the presence of an [address] or surrogate decision maker documented within the patient's medical record. (Does not satisfy MIPS performance).             Kaitlin Maher MD, MD  [facility] Hospitalist

## 2021-06-07 NOTE — PLAN OF CARE
Problem: Pain:  Goal: Pain level will decrease  Description: Pain level will decrease  6/7/2021 1531 by Alexis Graves RN  Outcome: Ongoing  6/7/2021 0755 by Alexis Graves RN  Outcome: Ongoing  6/7/2021 0301 by Rishabh Raygoza RN  Outcome: Met This Shift  Goal: Control of acute pain  Description: Control of acute pain  6/7/2021 1531 by Alexis Graves RN  Outcome: Ongoing  6/7/2021 0301 by Rishabh Raygoza RN  Outcome: Met This Shift  Goal: Control of chronic pain  Description: Control of chronic pain  6/7/2021 1531 by Alexis Graves RN  Outcome: Ongoing  6/7/2021 0301 by Rishabh Raygoza RN  Outcome: Met This Shift

## 2021-06-07 NOTE — PROGRESS NOTES
Dr. Neri Monsalve updated on patient's request to obtain records from [facility] and be forwarded to [facility]. Called Dr. Wanda Oshea office twice and left message for nurse to call Med Surg floor and speak with this nurse.

## 2021-06-07 NOTE — PROGRESS NOTES
complete UB/LB dressing with set up only. -MET UB  Short term goal 4: Patient to tolerate 30 minutes BUE exercise with 1 or fewer rest breaks in order to improve endurance for ADL and IADL tasks. Physical Therapy: Pt to discharge from PT on 2021.    Transfers:   Transfers  Sit to Stand: Supervision, Modified independent  Stand to sit: Supervision, Modified independent  Mobility/Ambulation:   Ambulation 1  Surface: level tile  Device: Rolling Walker  Assistance: Stand by assistance  Quality of Gait: fair and steady  Distance: 15 ft x2  Comments: ----  Equipment:   [x] Rolling Walker   [] Straight Cane  [] Standard Walker  Safety: Good  Short Term Goals:  Time Frame for Short term goals: 21 days - additional 7 days to  21  Short term goal 1: Prevent loss of strength of bilateral UE's and left LE to allow patient to complete transfers and short distance amb-MET  Short term goal 2: Transfer in/OOB and chair /commode indeependently-NOT MET  Short term goal 3: Ambulate with wh walker x 25 ft x 2 with supervision to safely negotiate home environment-NOT MET  Short term goal 4: Good standing balance to assist with self-care tasks-MET  Short term goal 5: Fair+ endurance to complete above ambulation to reduce fall risk associated with fatigue  Speech Therapy: None    Respiratory Therapy: None`    Nursing:  Skin/Wound/Incisions:  Skin Color/Condition  Skin Color: Pale  Skin Condition/Temp: Warm  Skin Integrity  Skin Integrity:  (Surgical incision)  Location: Right knee  Preventative Dressing: Yes  Date Applied: 21  Assessed this shift?: No (ELIEZER )     Pain Control: percocet    New Medications since admission to Swing Bed: NA  Anticoagulants: lovenox   Goals:   Pt will remain free from falls  Pt will have control of acute pain    Activities: Activity as tolerated TV, Computer, reading, visitors, nerf guns  Goal: Participate in 3 activities per week    :  Plan for Discharge:Plan to re evaluate swing bed progress again next Monday June 14 as pt continues to receive IV antibiotics three times per day. Follow Up/Services needed: To be determined. Continued skilled needs: IV antibiotics  Skilled Services are for the ongoing condition for which the individual received inpatient care in a hospital.    Physician signature certifies patient continued need for SNF inpatient care.

## 2021-06-07 NOTE — PROGRESS NOTES
Phone: Ricky  Date: 2021  Fax: [phone]      Physical Therapy    Daily Note    Patient Name: Raeann Rich      : 1945  (70 y.o.)  MRN: [id number]     Pt is PROGRESSING toward goals and increased independence of mobility this treatment session  Discharge Recommendations: Home with assist PRN     Assessment    Sit to Stand: Supervision, Modified independent  Stand to sit: Supervision, Modified independent                WB Status: NWB R LE  Ambulation 1  Surface: level tile  Device: Rolling Walker  Assistance: Stand by assistance  Quality of Gait: fair and steady  Distance: 15 ft x2  Comments: ----             Assessment: Patient in chair upon arrival to room. He  completes seated exercieses as outlined above but L hip ABD/ADD are held due to pain in his L hip. Sit to stand from chair is supervision/mod I.    Ambulates with wheeled walker to doorway and back to chair with SBA and maintains NWB on R LE. Fatigue noted with gait along with SOB. Remains in chair following with call light in reach.   Safety Devices  Type of devices: Call light within reach, Gait belt, Left in chair          Time In: 09  Time Out: 925  Timed Coded Minutes: 25  Total Treatment Time: 25    Exercises:  See Flowsheets    Plan  Cont Per Plan Of Care    Goals  Short Term Goals  Time Frame for Short term goals: 21 days - additional 7 days to  21  Short term goal 1: Prevent loss of strength of bilateral UE's and left LE to allow patient to complete transfers and short distance amb-MET  Short term goal 2: Transfer in/OOB and chair /commode indeependently-NOT MET  Short term goal 3: Ambulate with wh walker x 25 ft x 2 with supervision to safely negotiate home environment-NOT MET  Short term goal 4: Good standing balance to assist with self-care tasks-MET  Short term goal 5: Fair+ endurance to complete above ambulation to reduce fall risk associated with fatigue-NOT MET    Long Term Goals [address] Therapy License Number: [id number]    Date: 6/7/2021

## 2021-06-07 NOTE — PROGRESS NOTES
Patient is up in chair, on the phone speaking with wife. Patient is pleasant and cooperative. Call light is within reach, denies any needs at this time.

## 2021-06-07 NOTE — PROGRESS NOTES
Dr. Don Perdue office calls. Request made for operative note, implant info,and image reports. Images pushed through by [facility] via [employer] to [facility] per pt request.  Awaiting receipt of records to forward to [facility].

## 2021-06-07 NOTE — PROGRESS NOTES
Comprehensive Nutrition Assessment    Type and Reason for Visit:  Reassess    Nutrition Recommendations/Plan:  Encourage oral intakes    Nutrition Assessment:  Continued increased nutrient needs r/t acute injury or trauma, AEB post op healing needs. Significant weight losses now, at 6.7% declines from usual weight of 327#, despite good oral intakes being reported. Patient doesn't believe current weigth to be accurate from bed, and plans on weighing on stand up scale when therapy sees him. I reinforced protien foods in diet again. Malnutrition Assessment:  Malnutrition Status:  No malnutrition    Context:  Acute Illness     Findings of the 6 clinical characteristics of malnutrition:  Energy Intake:  No significant decrease in energy intake  Weight Loss:  No significant weight loss     Body Fat Loss:  No significant body fat loss     Muscle Mass Loss:  No significant muscle mass loss    Fluid Accumulation:  No significant fluid accumulation     Strength:  Not Performed    Estimated Daily Nutrient Needs:  Energy (kcal):  0075-2183 (11-15); Weight Used for Energy Requirements:  Current     Protein (g):   (1.3-1.5);  Weight Used for Protein Requirements:  Ideal        Fluid (ml/day):  2200; Method Used for Fluid Requirements:  1 ml/kcal      Nutrition Related Findings:  obese      Wounds:  Surgical Incision       Current Nutrition Therapies:    DIET CARB CONTROL; Carb Control: 4 carb choices (60 gms)/meal    Anthropometric Measures:  · Height: 5' 10\" (177.8 cm)  · Current Body Weight: 305 lb (138.3 kg) (questionable accuracy per patient)   · Admission Body Weight: 330 lb (149.7 kg)    · Usual Body Weight: 327 lb (148.3 kg)     · Ideal Body Weight: 166 lbs; % Ideal Body Weight 198.8 %   · BMI: 43.8  · Adjusted Body Weight:  ; No Adjustment   · BMI Categories: Obese Class 3 (BMI 40.0 or greater)       Nutrition Diagnosis:   · Increased nutrient needs related to acute injury/trauma as evidenced by wounds    Lab Results   Component Value Date     06/06/2021    K 3.9 06/06/2021     06/06/2021    CO2 25 06/06/2021    BUN 15 06/06/2021    CREATININE 0.82 06/06/2021    GLUCOSE 121 (H) 06/06/2021    CALCIUM 9.0 06/06/2021    PROT 6.5 04/29/2021    LABALBU 3.5 04/29/2021    BILITOT 0.44 04/29/2021    ALKPHOS 98 04/29/2021    AST 30 04/29/2021    ALT 23 04/29/2021    LABGLOM >60 06/06/2021    GFRAA >60 06/06/2021    GLOB NOT REPORTED 10/22/2019     No results for input(s): POCGLU in the last 72 hours.     Nutrition Interventions:   Food and/or Nutrient Delivery:  Continue Current Diet  Nutrition Education/Counseling:  No recommendation at this time   Coordination of Nutrition Care:  Continue to monitor while inpatient    Goals:  PO >75% with good protein choices       Nutrition Monitoring and Evaluation:   Behavioral-Environmental Outcomes:  None Identified   Food/Nutrient Intake Outcomes:  Food and Nutrient Intake  Physical Signs/Symptoms Outcomes:  Biochemical Data, Weight, GI Status     Discharge Planning:    Continue current diet     Electronically signed by Helene Mendoza RD, LD on 6/7/21 at 8:08 AM EDT    Contact: [phone]

## 2021-06-07 NOTE — PLAN OF CARE
Problem: Pain:  Goal: Pain level will decrease  Description: Pain level will decrease  6/7/2021 0755 by Chepe Alejandro RN  Outcome: Ongoing  6/7/2021 0301 by Kurtis Duckworth RN  Outcome: Met This Shift  6/6/2021 1819 by Yuliya Del Valle RN  Outcome: Ongoing  6/6/2021 1816 by Yuliya Del Valle RN  Outcome: Ongoing  Goal: Control of acute pain  Description: Control of acute pain  6/7/2021 0301 by Kurtis Duckworth RN  Outcome: Met This Shift  6/6/2021 1819 by Yuliya Del Valle RN  Outcome: Ongoing  6/6/2021 1816 by Yuliya Del Valle RN  Outcome: Ongoing  Goal: Control of chronic pain  Description: Control of chronic pain  6/7/2021 0301 by Kurtis Duckworth RN  Outcome: Met This Shift  6/6/2021 1819 by Yuliya Del Valle RN  Outcome: Ongoing  6/6/2021 1816 by Yuliya Del Valle RN  Outcome: Ongoing     Problem: Pain:  Goal: Control of acute pain  Description: Control of acute pain  6/7/2021 0301 by Kurtis Duckworth RN  Outcome: Met This Shift  6/6/2021 1819 by Yuliya Del Valle RN  Outcome: Ongoing  6/6/2021 1816 by Yuliya Del Valle RN  Outcome: Ongoing     Problem: Skin Integrity:  Goal: Will show no infection signs and symptoms  Description: Will show no infection signs and symptoms  6/6/2021 1819 by Yuliya Del Valle RN  Outcome: Ongoing  6/6/2021 1816 by Yuliya Del Valle RN  Outcome: Ongoing  Goal: Absence of new skin breakdown  Description: Absence of new skin breakdown  6/7/2021 0301 by Kurtis Duckworth RN  Outcome: Met This Shift  6/6/2021 1819 by Yuliya Del Valle RN  Outcome: Ongoing  6/6/2021 1816 by Yuliya Del Valle RN  Outcome: Ongoing  Goal: Risk for impaired skin integrity will decrease  Description: Risk for impaired skin integrity will decrease  6/7/2021 0301 by Kurtis Duckworth RN  Outcome: Ongoing     Problem: Falls - Risk of:  Goal: Will remain free from falls  Description: Will remain free from falls  6/7/2021 0301 by Kurtis Duckworth RN  Outcome: Met This Shift  6/6/2021 1819 by Yuliya Del Valle, RN  Outcome: Ongoing  6/6/2021 1816 by Joi Reyez RN  Outcome: Ongoing  Goal: Absence of physical injury  Description: Absence of physical injury  6/7/2021 0301 by Eric Mishra RN  Outcome: Met This Shift  6/6/2021 1819 by Joi Reyez RN  Outcome: Ongoing  6/6/2021 1816 by Joi Reyez RN  Outcome: Ongoing     Problem: Nutrition  Goal: Optimal nutrition therapy  6/7/2021 0301 by Eric Mishra RN  Outcome: Met This Shift  6/6/2021 1819 by Joi Reyez RN  Outcome: Ongoing  6/6/2021 1816 by Joi Reyez RN  Outcome: Ongoing

## 2021-06-07 NOTE — PROGRESS NOTES
Attempt to drain incision wound was unsuccessful. Patient is c/o pain of right knee after wound care, states pain is 7/10. Unable to administer percocet at this time, since it is to close to previous administration. Called Dr. Fernando Griffith. Dr. Fernando Griffith to return my call.

## 2021-06-08 PROCEDURE — 1200000002 HC SEMI PRIVATE SWING BED

## 2021-06-08 PROCEDURE — 6360000002 HC RX W HCPCS: Performed by: INTERNAL MEDICINE

## 2021-06-08 PROCEDURE — 6370000000 HC RX 637 (ALT 250 FOR IP): Performed by: INTERNAL MEDICINE

## 2021-06-08 PROCEDURE — 2580000003 HC RX 258: Performed by: INTERNAL MEDICINE

## 2021-06-08 PROCEDURE — 94640 AIRWAY INHALATION TREATMENT: CPT

## 2021-06-08 RX ADMIN — TAMSULOSIN HYDROCHLORIDE 0.4 MG: 0.4 CAPSULE ORAL at 17:39

## 2021-06-08 RX ADMIN — SODIUM CHLORIDE, PRESERVATIVE FREE 10 ML: 5 INJECTION INTRAVENOUS at 21:01

## 2021-06-08 RX ADMIN — SPIRONOLACTONE 25 MG: 25 TABLET, FILM COATED ORAL at 07:48

## 2021-06-08 RX ADMIN — FLUTICASONE PROPIONATE 1 SPRAY: 50 SPRAY, METERED NASAL at 07:52

## 2021-06-08 RX ADMIN — SODIUM CHLORIDE, PRESERVATIVE FREE 10 ML: 5 INJECTION INTRAVENOUS at 07:48

## 2021-06-08 RX ADMIN — MONTELUKAST SODIUM 10 MG: 10 TABLET, FILM COATED ORAL at 21:01

## 2021-06-08 RX ADMIN — Medication 1 CAPSULE: at 17:38

## 2021-06-08 RX ADMIN — SODIUM CHLORIDE, PRESERVATIVE FREE 10 ML: 5 INJECTION INTRAVENOUS at 01:15

## 2021-06-08 RX ADMIN — ESCITALOPRAM OXALATE 20 MG: 10 TABLET ORAL at 07:46

## 2021-06-08 RX ADMIN — THERA TABS 1 TABLET: TAB at 07:48

## 2021-06-08 RX ADMIN — FERROUS SULFATE TAB 325 MG (65 MG ELEMENTAL FE) 325 MG: 325 (65 FE) TAB at 07:46

## 2021-06-08 RX ADMIN — OXYCODONE AND ACETAMINOPHEN 1 TABLET: 5; 325 TABLET ORAL at 14:25

## 2021-06-08 RX ADMIN — DOCUSATE SODIUM 100 MG: 100 CAPSULE, LIQUID FILLED ORAL at 07:47

## 2021-06-08 RX ADMIN — DOCUSATE SODIUM 100 MG: 100 CAPSULE, LIQUID FILLED ORAL at 21:02

## 2021-06-08 RX ADMIN — METOPROLOL SUCCINATE 25 MG: 25 TABLET, EXTENDED RELEASE ORAL at 07:47

## 2021-06-08 RX ADMIN — ASPIRIN 325 MG: 325 TABLET, COATED ORAL at 07:47

## 2021-06-08 RX ADMIN — TIOTROPIUM BROMIDE INHALATION SPRAY 1 PUFF: 3.12 SPRAY, METERED RESPIRATORY (INHALATION) at 20:26

## 2021-06-08 RX ADMIN — BUDESONIDE AND FORMOTEROL FUMARATE DIHYDRATE 2 PUFF: 160; 4.5 AEROSOL RESPIRATORY (INHALATION) at 09:04

## 2021-06-08 RX ADMIN — CHOLECALCIFEROL TAB 25 MCG (1000 UNIT) 1000 UNITS: 25 TAB at 07:47

## 2021-06-08 RX ADMIN — PANTOPRAZOLE SODIUM 40 MG: 40 TABLET, DELAYED RELEASE ORAL at 06:00

## 2021-06-08 RX ADMIN — LOSARTAN POTASSIUM 25 MG: 50 TABLET ORAL at 07:47

## 2021-06-08 RX ADMIN — METFORMIN HYDROCHLORIDE 500 MG: 500 TABLET ORAL at 07:47

## 2021-06-08 RX ADMIN — OXYCODONE AND ACETAMINOPHEN 1 TABLET: 5; 325 TABLET ORAL at 21:01

## 2021-06-08 RX ADMIN — IPRATROPIUM BROMIDE 0.5 MG: 0.5 SOLUTION RESPIRATORY (INHALATION) at 20:26

## 2021-06-08 RX ADMIN — ATORVASTATIN CALCIUM 80 MG: 40 TABLET, FILM COATED ORAL at 21:01

## 2021-06-08 RX ADMIN — VANCOMYCIN HYDROCHLORIDE 125 MG: 125 CAPSULE ORAL at 21:01

## 2021-06-08 RX ADMIN — IPRATROPIUM BROMIDE 0.5 MG: 0.5 SOLUTION RESPIRATORY (INHALATION) at 09:04

## 2021-06-08 RX ADMIN — WATER 2000 MG: 1 INJECTION INTRAMUSCULAR; INTRAVENOUS; SUBCUTANEOUS at 09:29

## 2021-06-08 RX ADMIN — WATER 2000 MG: 1 INJECTION INTRAMUSCULAR; INTRAVENOUS; SUBCUTANEOUS at 17:38

## 2021-06-08 RX ADMIN — WATER 2000 MG: 1 INJECTION INTRAMUSCULAR; INTRAVENOUS; SUBCUTANEOUS at 01:15

## 2021-06-08 RX ADMIN — BUDESONIDE AND FORMOTEROL FUMARATE DIHYDRATE 2 PUFF: 160; 4.5 AEROSOL RESPIRATORY (INHALATION) at 20:26

## 2021-06-08 RX ADMIN — OXYCODONE HYDROCHLORIDE AND ACETAMINOPHEN 500 MG: 500 TABLET ORAL at 07:47

## 2021-06-08 RX ADMIN — HYDROCHLOROTHIAZIDE 25 MG: 25 TABLET ORAL at 07:47

## 2021-06-08 RX ADMIN — ENOXAPARIN SODIUM 40 MG: 40 INJECTION SUBCUTANEOUS at 07:46

## 2021-06-08 RX ADMIN — TIOTROPIUM BROMIDE INHALATION SPRAY 1 PUFF: 3.12 SPRAY, METERED RESPIRATORY (INHALATION) at 09:04

## 2021-06-08 RX ADMIN — ISOSORBIDE MONONITRATE 30 MG: 30 TABLET, EXTENDED RELEASE ORAL at 07:47

## 2021-06-08 RX ADMIN — POTASSIUM CHLORIDE 10 MEQ: 750 TABLET, FILM COATED, EXTENDED RELEASE ORAL at 07:47

## 2021-06-08 RX ADMIN — Medication 1 CAPSULE: at 07:47

## 2021-06-08 RX ADMIN — VANCOMYCIN HYDROCHLORIDE 125 MG: 125 CAPSULE ORAL at 07:47

## 2021-06-08 RX ADMIN — ALBUTEROL SULFATE 2.5 MG: 2.5 SOLUTION RESPIRATORY (INHALATION) at 20:26

## 2021-06-08 RX ADMIN — ALBUTEROL SULFATE 2.5 MG: 2.5 SOLUTION RESPIRATORY (INHALATION) at 09:03

## 2021-06-08 ASSESSMENT — PAIN SCALES - GENERAL
PAINLEVEL_OUTOF10: 0
PAINLEVEL_OUTOF10: 7
PAINLEVEL_OUTOF10: 3
PAINLEVEL_OUTOF10: 2
PAINLEVEL_OUTOF10: 5
PAINLEVEL_OUTOF10: 2
PAINLEVEL_OUTOF10: 1
PAINLEVEL_OUTOF10: 7

## 2021-06-08 ASSESSMENT — PAIN DESCRIPTION - ORIENTATION
ORIENTATION: RIGHT

## 2021-06-08 ASSESSMENT — PAIN DESCRIPTION - PAIN TYPE
TYPE: SURGICAL PAIN

## 2021-06-08 ASSESSMENT — PAIN DESCRIPTION - DESCRIPTORS
DESCRIPTORS: ACHING

## 2021-06-08 ASSESSMENT — PAIN DESCRIPTION - LOCATION
LOCATION: KNEE

## 2021-06-08 ASSESSMENT — PAIN DESCRIPTION - FREQUENCY
FREQUENCY: INTERMITTENT
FREQUENCY: INTERMITTENT

## 2021-06-08 ASSESSMENT — PAIN DESCRIPTION - ONSET
ONSET: ON-GOING
ONSET: ON-GOING

## 2021-06-08 ASSESSMENT — PAIN DESCRIPTION - PROGRESSION: CLINICAL_PROGRESSION: GRADUALLY IMPROVING

## 2021-06-08 NOTE — PLAN OF CARE
Problem: Pain:  Goal: Pain level will decrease  Description: Pain level will decrease  6/8/2021 0836 by Mendez Harrell RN  Outcome: Met This Shift  6/7/2021 2308 by Daija Kennedy RN  Outcome: Ongoing  Goal: Control of acute pain  Description: Control of acute pain  6/8/2021 0836 by Mendez Harrell RN  Outcome: Met This Shift  6/7/2021 2308 by Daija Kennedy RN  Outcome: Met This Shift  Goal: Control of chronic pain  Description: Control of chronic pain  Outcome: Met This Shift     Problem: Skin Integrity:  Goal: Will show no infection signs and symptoms  Description: Will show no infection signs and symptoms  6/8/2021 0836 by Mendez Harrell RN  Outcome: Met This Shift  6/7/2021 2308 by Daija Kennedy RN  Outcome: Ongoing  Goal: Absence of new skin breakdown  Description: Absence of new skin breakdown  Outcome: Met This Shift  Goal: Risk for impaired skin integrity will decrease  Description: Risk for impaired skin integrity will decrease  Outcome: Met This Shift     Problem: Falls - Risk of:  Goal: Will remain free from falls  Description: Will remain free from falls  6/8/2021 0836 by Mendez Harrell RN  Outcome: Met This Shift  6/7/2021 2308 by Daija Kennedy RN  Outcome: Met This Shift  Goal: Absence of physical injury  Description: Absence of physical injury  Outcome: Met This Shift     Problem: Nutrition  Goal: Optimal nutrition therapy  Outcome: Met This Shift     Problem: Gas Exchange - Impaired:  Goal: Levels of oxygenation will improve  Description: Levels of oxygenation will improve  Outcome: Met This Shift     Problem: Infection, Septic Shock:  Goal: Will show no infection signs and symptoms  Description: Will show no infection signs and symptoms  6/8/2021 0836 by Mendez Harrell RN  Outcome: Met This Shift  6/7/2021 2308 by Daija Kennedy RN  Outcome: Ongoing     Problem: Serum Glucose Level - Abnormal:  Goal: Ability to maintain appropriate glucose levels will improve  Description: Ability to maintain appropriate glucose levels will improve  Outcome: Met This Shift     Problem: Tissue Perfusion, Altered:  Goal: Circulatory function within specified parameters  Description: Circulatory function within specified parameters  Outcome: Met This Shift     Problem: Venous Thromboembolism:  Goal: Will show no signs or symptoms of venous thromboembolism  Description: Will show no signs or symptoms of venous thromboembolism  Outcome: Met This Shift  Goal: Absence of signs or symptoms of impaired coagulation  Description: Absence of signs or symptoms of impaired coagulation  Outcome: Met This Shift     Problem: OXYGENATION/RESPIRATORY FUNCTION  Goal: Patient will maintain patent airway  Outcome: Met This Shift  Goal: Patient will achieve/maintain normal respiratory rate/effort  Description: Respiratory rate and effort will be within normal limits for the patient  Outcome: Met This Shift     Problem: HEMODYNAMIC STATUS  Goal: Patient has stable vital signs and fluid balance  Outcome: Met This Shift     Problem: ACTIVITY INTOLERANCE/IMPAIRED MOBILITY  Goal: Mobility/activity is maintained at optimum level for patient  6/7/2021 2308 by Yevgeniy Adair RN  Outcome: Met This Shift     Problem: Gas Exchange - Impaired:  Goal: Levels of oxygenation will improve  Description: Levels of oxygenation will improve  Outcome: Met This Shift     Problem: Sensory:  Goal: Pain level will decrease  Description: Pain level will decrease  6/8/2021 0836 by Finn Elizbaeth RN  Outcome: Met This Shift  6/7/2021 2308 by Yevgeniy Adair RN  Outcome: Ongoing     Problem: Skin Integrity:  Goal: Skin integrity will improve  Description: Skin integrity will improve  Outcome: Ongoing  Goal: Signs of wound healing will improve  Description: Signs of wound healing will improve  6/8/2021 0836 by Finn Elizabeth RN  Outcome: Ongoing  6/7/2021 2308 by Yevgeniy Adair RN  Outcome: Ongoing     Problem: Discharge Planning:  Goal: Discharged to appropriate level of care  Description: Discharged to appropriate level of care  Outcome: Ongoing

## 2021-06-08 NOTE — PLAN OF CARE
Problem: Pain:  Goal: Control of acute pain  Description: Control of acute pain  6/7/2021 2308 by Dennys Dc RN  Outcome: Met This Shift  6/7/2021 1531 by Mimi Harris RN  Outcome: Ongoing     Problem: Skin Integrity:  Goal: Absence of new skin breakdown  Description: Absence of new skin breakdown  6/7/2021 1531 by Mimi Harris RN  Outcome: Met This Shift  Goal: Risk for impaired skin integrity will decrease  Description: Risk for impaired skin integrity will decrease  6/7/2021 1531 by Mimi Harris RN  Outcome: Met This Shift  Goal: Skin integrity will improve  Description: Skin integrity will improve  6/7/2021 1531 by Mimi Harris RN  Outcome: Met This Shift     Problem: Falls - Risk of:  Goal: Will remain free from falls  Description: Will remain free from falls  6/7/2021 2308 by Dennys Dc RN  Outcome: Met This Shift  6/7/2021 1531 by Mimi Harris RN  Outcome: Met This Shift  Goal: Absence of physical injury  Description: Absence of physical injury  6/7/2021 1531 by Mimi Harris RN  Outcome: Met This Shift     Problem: Gas Exchange - Impaired:  Goal: Levels of oxygenation will improve  Description: Levels of oxygenation will improve  6/7/2021 1531 by Mimi Harris RN  Outcome: Met This Shift     Problem: ACTIVITY INTOLERANCE/IMPAIRED MOBILITY  Goal: Mobility/activity is maintained at optimum level for patient  Outcome: Met This Shift     Problem: Gas Exchange - Impaired:  Goal: Levels of oxygenation will improve  Description: Levels of oxygenation will improve  6/7/2021 1531 by Mimi Harris RN  Outcome: Met This Shift     Problem: Pain:  Goal: Pain level will decrease  Description: Pain level will decrease  6/7/2021 2308 by Dennys Dc RN  Outcome: Ongoing  6/7/2021 1531 by Mimi Harris RN  Outcome: Ongoing  Goal: Control of chronic pain  Description: Control of chronic pain  6/7/2021 1531 by Mimi Harris RN  Outcome: Ongoing     Problem: Skin Integrity:  Goal: Will show no infection signs and symptoms  Description:

## 2021-06-09 PROCEDURE — 6370000000 HC RX 637 (ALT 250 FOR IP): Performed by: INTERNAL MEDICINE

## 2021-06-09 PROCEDURE — 2580000003 HC RX 258: Performed by: INTERNAL MEDICINE

## 2021-06-09 PROCEDURE — 1200000002 HC SEMI PRIVATE SWING BED

## 2021-06-09 PROCEDURE — 94640 AIRWAY INHALATION TREATMENT: CPT

## 2021-06-09 PROCEDURE — 6360000002 HC RX W HCPCS: Performed by: INTERNAL MEDICINE

## 2021-06-09 PROCEDURE — 94761 N-INVAS EAR/PLS OXIMETRY MLT: CPT

## 2021-06-09 RX ADMIN — TIOTROPIUM BROMIDE INHALATION SPRAY 1 PUFF: 3.12 SPRAY, METERED RESPIRATORY (INHALATION) at 09:46

## 2021-06-09 RX ADMIN — TAMSULOSIN HYDROCHLORIDE 0.4 MG: 0.4 CAPSULE ORAL at 16:15

## 2021-06-09 RX ADMIN — VANCOMYCIN HYDROCHLORIDE 125 MG: 125 CAPSULE ORAL at 21:22

## 2021-06-09 RX ADMIN — THERA TABS 1 TABLET: TAB at 10:10

## 2021-06-09 RX ADMIN — SPIRONOLACTONE 25 MG: 25 TABLET, FILM COATED ORAL at 10:11

## 2021-06-09 RX ADMIN — Medication 1 CAPSULE: at 16:15

## 2021-06-09 RX ADMIN — METOPROLOL SUCCINATE 25 MG: 25 TABLET, EXTENDED RELEASE ORAL at 10:10

## 2021-06-09 RX ADMIN — ISOSORBIDE MONONITRATE 30 MG: 30 TABLET, EXTENDED RELEASE ORAL at 10:10

## 2021-06-09 RX ADMIN — ASPIRIN 325 MG: 325 TABLET, COATED ORAL at 10:10

## 2021-06-09 RX ADMIN — BUDESONIDE AND FORMOTEROL FUMARATE DIHYDRATE 2 PUFF: 160; 4.5 AEROSOL RESPIRATORY (INHALATION) at 09:46

## 2021-06-09 RX ADMIN — IPRATROPIUM BROMIDE 0.5 MG: 0.5 SOLUTION RESPIRATORY (INHALATION) at 21:02

## 2021-06-09 RX ADMIN — MONTELUKAST SODIUM 10 MG: 10 TABLET, FILM COATED ORAL at 21:22

## 2021-06-09 RX ADMIN — OXYCODONE AND ACETAMINOPHEN 1 TABLET: 5; 325 TABLET ORAL at 21:22

## 2021-06-09 RX ADMIN — OXYCODONE HYDROCHLORIDE AND ACETAMINOPHEN 500 MG: 500 TABLET ORAL at 10:11

## 2021-06-09 RX ADMIN — ESCITALOPRAM OXALATE 20 MG: 10 TABLET ORAL at 10:11

## 2021-06-09 RX ADMIN — ALBUTEROL SULFATE 2.5 MG: 2.5 SOLUTION RESPIRATORY (INHALATION) at 09:43

## 2021-06-09 RX ADMIN — FERROUS SULFATE TAB 325 MG (65 MG ELEMENTAL FE) 325 MG: 325 (65 FE) TAB at 07:46

## 2021-06-09 RX ADMIN — SODIUM CHLORIDE, PRESERVATIVE FREE 10 ML: 5 INJECTION INTRAVENOUS at 02:00

## 2021-06-09 RX ADMIN — SODIUM CHLORIDE, PRESERVATIVE FREE 10 ML: 5 INJECTION INTRAVENOUS at 10:12

## 2021-06-09 RX ADMIN — POTASSIUM CHLORIDE 10 MEQ: 750 TABLET, FILM COATED, EXTENDED RELEASE ORAL at 07:46

## 2021-06-09 RX ADMIN — OXYCODONE AND ACETAMINOPHEN 1 TABLET: 5; 325 TABLET ORAL at 13:53

## 2021-06-09 RX ADMIN — Medication 1 CAPSULE: at 07:46

## 2021-06-09 RX ADMIN — HYDROCHLOROTHIAZIDE 25 MG: 25 TABLET ORAL at 10:10

## 2021-06-09 RX ADMIN — ATORVASTATIN CALCIUM 80 MG: 40 TABLET, FILM COATED ORAL at 21:22

## 2021-06-09 RX ADMIN — LOSARTAN POTASSIUM 25 MG: 50 TABLET ORAL at 10:11

## 2021-06-09 RX ADMIN — SODIUM CHLORIDE, PRESERVATIVE FREE 10 ML: 5 INJECTION INTRAVENOUS at 21:23

## 2021-06-09 RX ADMIN — IPRATROPIUM BROMIDE 0.5 MG: 0.5 SOLUTION RESPIRATORY (INHALATION) at 09:43

## 2021-06-09 RX ADMIN — WATER 2000 MG: 1 INJECTION INTRAMUSCULAR; INTRAVENOUS; SUBCUTANEOUS at 02:00

## 2021-06-09 RX ADMIN — SODIUM CHLORIDE, PRESERVATIVE FREE 10 ML: 5 INJECTION INTRAVENOUS at 16:15

## 2021-06-09 RX ADMIN — CHOLECALCIFEROL TAB 25 MCG (1000 UNIT) 1000 UNITS: 25 TAB at 10:11

## 2021-06-09 RX ADMIN — PANTOPRAZOLE SODIUM 40 MG: 40 TABLET, DELAYED RELEASE ORAL at 06:50

## 2021-06-09 RX ADMIN — ENOXAPARIN SODIUM 40 MG: 40 INJECTION SUBCUTANEOUS at 10:11

## 2021-06-09 RX ADMIN — METFORMIN HYDROCHLORIDE 500 MG: 500 TABLET ORAL at 07:46

## 2021-06-09 RX ADMIN — DOCUSATE SODIUM 100 MG: 100 CAPSULE, LIQUID FILLED ORAL at 21:22

## 2021-06-09 RX ADMIN — BUDESONIDE AND FORMOTEROL FUMARATE DIHYDRATE 2 PUFF: 160; 4.5 AEROSOL RESPIRATORY (INHALATION) at 21:02

## 2021-06-09 RX ADMIN — WATER 2000 MG: 1 INJECTION INTRAMUSCULAR; INTRAVENOUS; SUBCUTANEOUS at 16:15

## 2021-06-09 RX ADMIN — ALBUTEROL SULFATE 2.5 MG: 2.5 SOLUTION RESPIRATORY (INHALATION) at 21:02

## 2021-06-09 RX ADMIN — VANCOMYCIN HYDROCHLORIDE 125 MG: 125 CAPSULE ORAL at 10:11

## 2021-06-09 RX ADMIN — TIOTROPIUM BROMIDE INHALATION SPRAY 1 PUFF: 3.12 SPRAY, METERED RESPIRATORY (INHALATION) at 21:02

## 2021-06-09 RX ADMIN — WATER 2000 MG: 1 INJECTION INTRAMUSCULAR; INTRAVENOUS; SUBCUTANEOUS at 10:11

## 2021-06-09 ASSESSMENT — PAIN DESCRIPTION - LOCATION: LOCATION: KNEE

## 2021-06-09 ASSESSMENT — PAIN SCALES - GENERAL
PAINLEVEL_OUTOF10: 5
PAINLEVEL_OUTOF10: 5
PAINLEVEL_OUTOF10: 1
PAINLEVEL_OUTOF10: 2
PAINLEVEL_OUTOF10: 3
PAINLEVEL_OUTOF10: 5

## 2021-06-09 ASSESSMENT — PAIN DESCRIPTION - PAIN TYPE: TYPE: SURGICAL PAIN

## 2021-06-09 ASSESSMENT — PAIN DESCRIPTION - DESCRIPTORS: DESCRIPTORS: ACHING

## 2021-06-09 ASSESSMENT — PAIN DESCRIPTION - ORIENTATION: ORIENTATION: RIGHT

## 2021-06-09 NOTE — PLAN OF CARE
Problem: Pain:  Goal: Pain level will decrease  Description: Pain level will decrease  Outcome: Ongoing  Goal: Control of acute pain  Description: Control of acute pain  Outcome: Ongoing  Goal: Control of chronic pain  Description: Control of chronic pain  Outcome: Ongoing     Problem: Skin Integrity:  Goal: Will show no infection signs and symptoms  Description: Will show no infection signs and symptoms  Outcome: Ongoing  Goal: Absence of new skin breakdown  Description: Absence of new skin breakdown  Outcome: Ongoing  Goal: Risk for impaired skin integrity will decrease  Description: Risk for impaired skin integrity will decrease  Outcome: Ongoing  Goal: Skin integrity will improve  Description: Skin integrity will improve  Outcome: Ongoing  Goal: Signs of wound healing will improve  Description: Signs of wound healing will improve  Outcome: Ongoing     Problem: Falls - Risk of:  Goal: Will remain free from falls  Description: Will remain free from falls  Outcome: Ongoing  Goal: Absence of physical injury  Description: Absence of physical injury  Outcome: Ongoing     Problem: Nutrition  Goal: Optimal nutrition therapy  Outcome: Ongoing     Problem: Discharge Planning:  Goal: Discharged to appropriate level of care  Description: Discharged to appropriate level of care  Outcome: Ongoing     Problem: Gas Exchange - Impaired:  Goal: Levels of oxygenation will improve  Description: Levels of oxygenation will improve  Outcome: Ongoing     Problem: Infection, Septic Shock:  Goal: Will show no infection signs and symptoms  Description: Will show no infection signs and symptoms  Outcome: Ongoing     Problem: Serum Glucose Level - Abnormal:  Goal: Ability to maintain appropriate glucose levels will improve  Description: Ability to maintain appropriate glucose levels will improve  Outcome: Ongoing     Problem: Tissue Perfusion, Altered:  Goal: Circulatory function within specified parameters  Description: Circulatory function within specified parameters  Outcome: Ongoing     Problem: Venous Thromboembolism:  Goal: Will show no signs or symptoms of venous thromboembolism  Description: Will show no signs or symptoms of venous thromboembolism  Outcome: Ongoing  Goal: Absence of signs or symptoms of impaired coagulation  Description: Absence of signs or symptoms of impaired coagulation  Outcome: Ongoing     Problem: OXYGENATION/RESPIRATORY FUNCTION  Goal: Patient will maintain patent airway  Outcome: Ongoing  Goal: Patient will achieve/maintain normal respiratory rate/effort  Description: Respiratory rate and effort will be within normal limits for the patient  Outcome: Ongoing     Problem: HEMODYNAMIC STATUS  Goal: Patient has stable vital signs and fluid balance  Outcome: Ongoing     Problem: FLUID AND ELECTROLYTE IMBALANCE  Goal: Fluid and electrolyte balance are achieved/maintained  Outcome: Ongoing     Problem: ACTIVITY INTOLERANCE/IMPAIRED MOBILITY  Goal: Mobility/activity is maintained at optimum level for patient  Outcome: Ongoing     Problem: Gas Exchange - Impaired:  Goal: Levels of oxygenation will improve  Description: Levels of oxygenation will improve  Outcome: Ongoing     Problem: Cardiac:  Goal: Ability to maintain vital signs within normal range will improve  Description: Ability to maintain vital signs within normal range will improve  Outcome: Ongoing  Goal: Cardiovascular alteration will improve  Description: Cardiovascular alteration will improve  Outcome: Ongoing  Goal: Ability to maintain an adequate cardiac output will improve  Description: Ability to maintain an adequate cardiac output will improve  Outcome: Ongoing  Goal: Ability to maintain adequate ventilation will improve  Description: Ability to maintain adequate ventilation will improve  Outcome: Ongoing  Goal: Ability to achieve and maintain adequate cardiopulmonary perfusion will improve  Description: Ability to achieve and maintain adequate cardiopulmonary perfusion will improve  Outcome: Ongoing  Goal: Complications related to the disease process, condition or treatment will be avoided or minimized  Outcome: Ongoing  Goal: Hemodynamic stability will improve  Outcome: Ongoing  Goal: Cerebral tissue perfusion will improve  Outcome: Ongoing     Problem: Health Behavior:  Goal: Will modify at least one risk factor affecting health status  Description: Will modify at least one risk factor affecting health status  Outcome: Ongoing  Goal: Identification of resources available to assist in meeting health care needs will improve  Description: Identification of resources available to assist in meeting health care needs will improve  Outcome: Ongoing  Goal: Identification of resources available to assist in meeting health care needs will improve  Description: Identification of resources available to assist in meeting health care needs will improve  Outcome: Ongoing     Problem: Physical Regulation:  Goal: Complications related to the disease process, condition or treatment will be avoided or minimized  Outcome: Ongoing  Goal: Signs and symptoms of infection will decrease  Description: Signs and symptoms of infection will decrease  Outcome: Ongoing  Goal: Will show no signs and symptoms of excessive bleeding  Description: Will show no signs and symptoms of excessive bleeding  Outcome: Ongoing     Problem:  Activity:  Goal: Mobility will improve  Description: Mobility will improve  Outcome: Ongoing  Goal: Fatigue will decrease  Description: Fatigue will decrease  Outcome: Ongoing  Goal: Ability to tolerate increased activity will improve  Description: Ability to tolerate increased activity will improve  Outcome: Ongoing  Goal: Ability to maintain optimal joint mobility will improve  Outcome: Ongoing     Problem: Nutritional:  Goal: Ability to identify appropriate dietary choices will improve  Description: Ability to identify appropriate dietary choices will improve  Outcome: Ongoing  Goal: Dietary intake will improve  Description: Dietary intake will improve  Outcome: Ongoing  Goal: Maintenance of adequate nutrition will improve  Description: Maintenance of adequate nutrition will improve  Outcome: Ongoing  Goal: Ability to identify appropriate dietary choices will improve  Description: Ability to identify appropriate dietary choices will improve  Outcome: Ongoing  Goal: Ability to chew and swallow food without choking will improve  Description: Ability to chew and swallow food without choking will improve  Outcome: Ongoing  Goal: Ability to achieve adequate nutritional intake will improve  Description: Ability to achieve adequate nutritional intake will improve  Outcome: Ongoing  Goal: Ability to maintain an optimal weight for height and age will improve  Outcome: Ongoing     Problem: Safety:  Goal: Ability to remain free from injury will improve  Description: Ability to remain free from injury will improve  Outcome: Ongoing  Goal: Ability to correctly utilize injury-preventing devices as appropriate will improve  Description: Ability to correctly utilize injury-preventing devices as appropriate will improve  Outcome: Ongoing     Problem: Health Maintenance - Impaired:  Goal: Maintenance of adequate nutrition will improve  Description: Maintenance of adequate nutrition will improve  Outcome: Ongoing     Problem: Sensory:  Goal: Pain level will decrease  Description: Pain level will decrease  Outcome: Ongoing     Problem:  Activity Intolerance:  Goal: Ability to tolerate increased activity will improve  Description: Ability to tolerate increased activity will improve  Outcome: Ongoing

## 2021-06-09 NOTE — PROGRESS NOTES
Dressing to right knee changed. Scant amount of serous fluid on old dressing. Area expressed, with no results. Patient encouraged to contact  for denture fitting as patient's current dentures are broken in half.

## 2021-06-10 PROCEDURE — 6370000000 HC RX 637 (ALT 250 FOR IP): Performed by: INTERNAL MEDICINE

## 2021-06-10 PROCEDURE — 94640 AIRWAY INHALATION TREATMENT: CPT

## 2021-06-10 PROCEDURE — 6360000002 HC RX W HCPCS: Performed by: INTERNAL MEDICINE

## 2021-06-10 PROCEDURE — 2580000003 HC RX 258: Performed by: INTERNAL MEDICINE

## 2021-06-10 PROCEDURE — 1200000002 HC SEMI PRIVATE SWING BED

## 2021-06-10 RX ADMIN — SODIUM CHLORIDE, PRESERVATIVE FREE 10 ML: 5 INJECTION INTRAVENOUS at 09:21

## 2021-06-10 RX ADMIN — WATER 2000 MG: 1 INJECTION INTRAMUSCULAR; INTRAVENOUS; SUBCUTANEOUS at 03:05

## 2021-06-10 RX ADMIN — VANCOMYCIN HYDROCHLORIDE 125 MG: 125 CAPSULE ORAL at 21:50

## 2021-06-10 RX ADMIN — OXYCODONE AND ACETAMINOPHEN 1 TABLET: 5; 325 TABLET ORAL at 21:50

## 2021-06-10 RX ADMIN — OXYCODONE HYDROCHLORIDE AND ACETAMINOPHEN 500 MG: 500 TABLET ORAL at 09:20

## 2021-06-10 RX ADMIN — MONTELUKAST SODIUM 10 MG: 10 TABLET, FILM COATED ORAL at 21:50

## 2021-06-10 RX ADMIN — TIOTROPIUM BROMIDE INHALATION SPRAY 1 PUFF: 3.12 SPRAY, METERED RESPIRATORY (INHALATION) at 21:30

## 2021-06-10 RX ADMIN — IPRATROPIUM BROMIDE 0.5 MG: 0.5 SOLUTION RESPIRATORY (INHALATION) at 09:43

## 2021-06-10 RX ADMIN — IPRATROPIUM BROMIDE 0.5 MG: 0.5 SOLUTION RESPIRATORY (INHALATION) at 21:30

## 2021-06-10 RX ADMIN — WATER 2000 MG: 1 INJECTION INTRAMUSCULAR; INTRAVENOUS; SUBCUTANEOUS at 16:16

## 2021-06-10 RX ADMIN — DOCUSATE SODIUM 100 MG: 100 CAPSULE, LIQUID FILLED ORAL at 09:21

## 2021-06-10 RX ADMIN — OXYCODONE AND ACETAMINOPHEN 1 TABLET: 5; 325 TABLET ORAL at 16:17

## 2021-06-10 RX ADMIN — HYDROCHLOROTHIAZIDE 25 MG: 25 TABLET ORAL at 09:20

## 2021-06-10 RX ADMIN — SODIUM CHLORIDE, PRESERVATIVE FREE 10 ML: 5 INJECTION INTRAVENOUS at 21:50

## 2021-06-10 RX ADMIN — ENOXAPARIN SODIUM 40 MG: 40 INJECTION SUBCUTANEOUS at 09:20

## 2021-06-10 RX ADMIN — BUDESONIDE AND FORMOTEROL FUMARATE DIHYDRATE 2 PUFF: 160; 4.5 AEROSOL RESPIRATORY (INHALATION) at 09:55

## 2021-06-10 RX ADMIN — FERROUS SULFATE TAB 325 MG (65 MG ELEMENTAL FE) 325 MG: 325 (65 FE) TAB at 09:20

## 2021-06-10 RX ADMIN — THERA TABS 1 TABLET: TAB at 09:21

## 2021-06-10 RX ADMIN — ESCITALOPRAM OXALATE 20 MG: 10 TABLET ORAL at 09:20

## 2021-06-10 RX ADMIN — BUDESONIDE AND FORMOTEROL FUMARATE DIHYDRATE 2 PUFF: 160; 4.5 AEROSOL RESPIRATORY (INHALATION) at 21:30

## 2021-06-10 RX ADMIN — ALBUTEROL SULFATE 2.5 MG: 2.5 SOLUTION RESPIRATORY (INHALATION) at 21:30

## 2021-06-10 RX ADMIN — CHOLECALCIFEROL TAB 25 MCG (1000 UNIT) 1000 UNITS: 25 TAB at 09:20

## 2021-06-10 RX ADMIN — Medication 1 CAPSULE: at 16:16

## 2021-06-10 RX ADMIN — ISOSORBIDE MONONITRATE 30 MG: 30 TABLET, EXTENDED RELEASE ORAL at 09:20

## 2021-06-10 RX ADMIN — PANTOPRAZOLE SODIUM 40 MG: 40 TABLET, DELAYED RELEASE ORAL at 06:28

## 2021-06-10 RX ADMIN — Medication 1 CAPSULE: at 09:20

## 2021-06-10 RX ADMIN — LOSARTAN POTASSIUM 25 MG: 50 TABLET ORAL at 09:21

## 2021-06-10 RX ADMIN — ALBUTEROL SULFATE 2.5 MG: 2.5 SOLUTION RESPIRATORY (INHALATION) at 09:43

## 2021-06-10 RX ADMIN — ATORVASTATIN CALCIUM 80 MG: 40 TABLET, FILM COATED ORAL at 21:50

## 2021-06-10 RX ADMIN — POTASSIUM CHLORIDE 10 MEQ: 750 TABLET, FILM COATED, EXTENDED RELEASE ORAL at 09:20

## 2021-06-10 RX ADMIN — FLUTICASONE PROPIONATE 1 SPRAY: 50 SPRAY, METERED NASAL at 09:40

## 2021-06-10 RX ADMIN — METFORMIN HYDROCHLORIDE 500 MG: 500 TABLET ORAL at 09:20

## 2021-06-10 RX ADMIN — METOPROLOL SUCCINATE 25 MG: 25 TABLET, EXTENDED RELEASE ORAL at 09:20

## 2021-06-10 RX ADMIN — DOCUSATE SODIUM 100 MG: 100 CAPSULE, LIQUID FILLED ORAL at 21:50

## 2021-06-10 RX ADMIN — SPIRONOLACTONE 25 MG: 25 TABLET, FILM COATED ORAL at 09:21

## 2021-06-10 RX ADMIN — VANCOMYCIN HYDROCHLORIDE 125 MG: 125 CAPSULE ORAL at 09:20

## 2021-06-10 RX ADMIN — TIOTROPIUM BROMIDE INHALATION SPRAY 1 PUFF: 3.12 SPRAY, METERED RESPIRATORY (INHALATION) at 09:57

## 2021-06-10 RX ADMIN — ASPIRIN 325 MG: 325 TABLET, COATED ORAL at 09:20

## 2021-06-10 RX ADMIN — TAMSULOSIN HYDROCHLORIDE 0.4 MG: 0.4 CAPSULE ORAL at 16:16

## 2021-06-10 RX ADMIN — WATER 2000 MG: 1 INJECTION INTRAMUSCULAR; INTRAVENOUS; SUBCUTANEOUS at 09:30

## 2021-06-10 ASSESSMENT — PAIN DESCRIPTION - ORIENTATION: ORIENTATION: RIGHT

## 2021-06-10 ASSESSMENT — PAIN DESCRIPTION - LOCATION: LOCATION: KNEE

## 2021-06-10 ASSESSMENT — PAIN SCALES - GENERAL
PAINLEVEL_OUTOF10: 5
PAINLEVEL_OUTOF10: 6
PAINLEVEL_OUTOF10: 2
PAINLEVEL_OUTOF10: 5
PAINLEVEL_OUTOF10: 1

## 2021-06-10 ASSESSMENT — PAIN DESCRIPTION - PAIN TYPE: TYPE: SURGICAL PAIN

## 2021-06-10 ASSESSMENT — PAIN DESCRIPTION - DESCRIPTORS: DESCRIPTORS: ACHING

## 2021-06-11 PROCEDURE — 6360000002 HC RX W HCPCS: Performed by: INTERNAL MEDICINE

## 2021-06-11 PROCEDURE — 94640 AIRWAY INHALATION TREATMENT: CPT

## 2021-06-11 PROCEDURE — 6370000000 HC RX 637 (ALT 250 FOR IP): Performed by: INTERNAL MEDICINE

## 2021-06-11 PROCEDURE — 1200000002 HC SEMI PRIVATE SWING BED

## 2021-06-11 PROCEDURE — 2580000003 HC RX 258: Performed by: INTERNAL MEDICINE

## 2021-06-11 RX ADMIN — IPRATROPIUM BROMIDE 0.5 MG: 0.5 SOLUTION RESPIRATORY (INHALATION) at 09:09

## 2021-06-11 RX ADMIN — IPRATROPIUM BROMIDE 0.5 MG: 0.5 SOLUTION RESPIRATORY (INHALATION) at 21:11

## 2021-06-11 RX ADMIN — PANTOPRAZOLE SODIUM 40 MG: 40 TABLET, DELAYED RELEASE ORAL at 06:12

## 2021-06-11 RX ADMIN — METOPROLOL SUCCINATE 25 MG: 25 TABLET, EXTENDED RELEASE ORAL at 08:36

## 2021-06-11 RX ADMIN — TIOTROPIUM BROMIDE INHALATION SPRAY 1 PUFF: 3.12 SPRAY, METERED RESPIRATORY (INHALATION) at 21:11

## 2021-06-11 RX ADMIN — POTASSIUM CHLORIDE 10 MEQ: 750 TABLET, FILM COATED, EXTENDED RELEASE ORAL at 08:36

## 2021-06-11 RX ADMIN — MONTELUKAST SODIUM 10 MG: 10 TABLET, FILM COATED ORAL at 20:53

## 2021-06-11 RX ADMIN — OXYCODONE AND ACETAMINOPHEN 1 TABLET: 5; 325 TABLET ORAL at 21:47

## 2021-06-11 RX ADMIN — ATORVASTATIN CALCIUM 80 MG: 40 TABLET, FILM COATED ORAL at 20:53

## 2021-06-11 RX ADMIN — METFORMIN HYDROCHLORIDE 500 MG: 500 TABLET ORAL at 08:37

## 2021-06-11 RX ADMIN — TIOTROPIUM BROMIDE INHALATION SPRAY 1 PUFF: 3.12 SPRAY, METERED RESPIRATORY (INHALATION) at 09:13

## 2021-06-11 RX ADMIN — SODIUM CHLORIDE, PRESERVATIVE FREE 10 ML: 5 INJECTION INTRAVENOUS at 20:53

## 2021-06-11 RX ADMIN — VANCOMYCIN HYDROCHLORIDE 125 MG: 125 CAPSULE ORAL at 08:36

## 2021-06-11 RX ADMIN — BUDESONIDE AND FORMOTEROL FUMARATE DIHYDRATE 2 PUFF: 160; 4.5 AEROSOL RESPIRATORY (INHALATION) at 09:13

## 2021-06-11 RX ADMIN — DOCUSATE SODIUM 100 MG: 100 CAPSULE, LIQUID FILLED ORAL at 08:36

## 2021-06-11 RX ADMIN — VANCOMYCIN HYDROCHLORIDE 125 MG: 125 CAPSULE ORAL at 20:53

## 2021-06-11 RX ADMIN — FERROUS SULFATE TAB 325 MG (65 MG ELEMENTAL FE) 325 MG: 325 (65 FE) TAB at 08:37

## 2021-06-11 RX ADMIN — ALBUTEROL SULFATE 2.5 MG: 2.5 SOLUTION RESPIRATORY (INHALATION) at 21:11

## 2021-06-11 RX ADMIN — SPIRONOLACTONE 25 MG: 25 TABLET, FILM COATED ORAL at 08:36

## 2021-06-11 RX ADMIN — BUDESONIDE AND FORMOTEROL FUMARATE DIHYDRATE 2 PUFF: 160; 4.5 AEROSOL RESPIRATORY (INHALATION) at 21:11

## 2021-06-11 RX ADMIN — DOCUSATE SODIUM 100 MG: 100 CAPSULE, LIQUID FILLED ORAL at 20:53

## 2021-06-11 RX ADMIN — FLUTICASONE PROPIONATE 1 SPRAY: 50 SPRAY, METERED NASAL at 08:38

## 2021-06-11 RX ADMIN — LOSARTAN POTASSIUM 25 MG: 50 TABLET ORAL at 08:36

## 2021-06-11 RX ADMIN — SODIUM CHLORIDE, PRESERVATIVE FREE 10 ML: 5 INJECTION INTRAVENOUS at 08:37

## 2021-06-11 RX ADMIN — ESCITALOPRAM OXALATE 20 MG: 10 TABLET ORAL at 08:36

## 2021-06-11 RX ADMIN — SODIUM CHLORIDE, PRESERVATIVE FREE 20 ML: 5 INJECTION INTRAVENOUS at 09:47

## 2021-06-11 RX ADMIN — OXYCODONE AND ACETAMINOPHEN 1 TABLET: 5; 325 TABLET ORAL at 12:07

## 2021-06-11 RX ADMIN — HYDROCHLOROTHIAZIDE 25 MG: 25 TABLET ORAL at 08:36

## 2021-06-11 RX ADMIN — WATER 2000 MG: 1 INJECTION INTRAMUSCULAR; INTRAVENOUS; SUBCUTANEOUS at 02:03

## 2021-06-11 RX ADMIN — WATER 2000 MG: 1 INJECTION INTRAMUSCULAR; INTRAVENOUS; SUBCUTANEOUS at 09:47

## 2021-06-11 RX ADMIN — SODIUM CHLORIDE, PRESERVATIVE FREE 20 ML: 5 INJECTION INTRAVENOUS at 17:37

## 2021-06-11 RX ADMIN — ISOSORBIDE MONONITRATE 30 MG: 30 TABLET, EXTENDED RELEASE ORAL at 08:36

## 2021-06-11 RX ADMIN — ALBUTEROL SULFATE 2.5 MG: 2.5 SOLUTION RESPIRATORY (INHALATION) at 09:09

## 2021-06-11 RX ADMIN — Medication 1 CAPSULE: at 17:36

## 2021-06-11 RX ADMIN — ENOXAPARIN SODIUM 40 MG: 40 INJECTION SUBCUTANEOUS at 08:37

## 2021-06-11 RX ADMIN — CHOLECALCIFEROL TAB 25 MCG (1000 UNIT) 1000 UNITS: 25 TAB at 08:36

## 2021-06-11 RX ADMIN — ASPIRIN 325 MG: 325 TABLET, COATED ORAL at 08:36

## 2021-06-11 RX ADMIN — TAMSULOSIN HYDROCHLORIDE 0.4 MG: 0.4 CAPSULE ORAL at 17:36

## 2021-06-11 RX ADMIN — THERA TABS 1 TABLET: TAB at 08:36

## 2021-06-11 RX ADMIN — OXYCODONE HYDROCHLORIDE AND ACETAMINOPHEN 500 MG: 500 TABLET ORAL at 08:36

## 2021-06-11 RX ADMIN — WATER 2000 MG: 1 INJECTION INTRAMUSCULAR; INTRAVENOUS; SUBCUTANEOUS at 17:36

## 2021-06-11 RX ADMIN — Medication 1 CAPSULE: at 08:36

## 2021-06-11 ASSESSMENT — PAIN DESCRIPTION - DESCRIPTORS
DESCRIPTORS: ACHING
DESCRIPTORS: ACHING

## 2021-06-11 ASSESSMENT — PAIN DESCRIPTION - ORIENTATION
ORIENTATION: RIGHT
ORIENTATION: RIGHT

## 2021-06-11 ASSESSMENT — PAIN SCALES - GENERAL
PAINLEVEL_OUTOF10: 0
PAINLEVEL_OUTOF10: 0
PAINLEVEL_OUTOF10: 4
PAINLEVEL_OUTOF10: 2
PAINLEVEL_OUTOF10: 6

## 2021-06-11 ASSESSMENT — PAIN DESCRIPTION - LOCATION
LOCATION: KNEE
LOCATION: KNEE

## 2021-06-11 ASSESSMENT — PAIN DESCRIPTION - PAIN TYPE: TYPE: SURGICAL PAIN

## 2021-06-11 NOTE — PLAN OF CARE
Problem: Pain:  Goal: Pain level will decrease  Description: Pain level will decrease  Outcome: Ongoing  Goal: Control of acute pain  Description: Control of acute pain  Outcome: Ongoing  Goal: Control of chronic pain  Description: Control of chronic pain  Outcome: Ongoing     Problem: Skin Integrity:  Goal: Will show no infection signs and symptoms  Description: Will show no infection signs and symptoms  Outcome: Ongoing  Goal: Absence of new skin breakdown  Description: Absence of new skin breakdown  Outcome: Ongoing  Goal: Risk for impaired skin integrity will decrease  Description: Risk for impaired skin integrity will decrease  Outcome: Ongoing  Goal: Skin integrity will improve  Description: Skin integrity will improve  Outcome: Ongoing  Goal: Signs of wound healing will improve  Description: Signs of wound healing will improve  Outcome: Ongoing     Problem: Falls - Risk of:  Goal: Will remain free from falls  Description: Will remain free from falls  Outcome: Ongoing  Goal: Absence of physical injury  Description: Absence of physical injury  Outcome: Ongoing     Problem: Nutrition  Goal: Optimal nutrition therapy  Outcome: Ongoing     Problem: Discharge Planning:  Goal: Discharged to appropriate level of care  Description: Discharged to appropriate level of care  Outcome: Ongoing     Problem: Gas Exchange - Impaired:  Goal: Levels of oxygenation will improve  Description: Levels of oxygenation will improve  Outcome: Ongoing     Problem: Infection, Septic Shock:  Goal: Will show no infection signs and symptoms  Description: Will show no infection signs and symptoms  Outcome: Ongoing     Problem: Serum Glucose Level - Abnormal:  Goal: Ability to maintain appropriate glucose levels will improve  Description: Ability to maintain appropriate glucose levels will improve  Outcome: Ongoing     Problem: Tissue Perfusion, Altered:  Goal: Circulatory function within specified parameters  Description: Circulatory cardiopulmonary perfusion will improve  Outcome: Ongoing  Goal: Complications related to the disease process, condition or treatment will be avoided or minimized  Outcome: Ongoing  Goal: Hemodynamic stability will improve  Outcome: Ongoing  Goal: Cerebral tissue perfusion will improve  Outcome: Ongoing     Problem: Health Behavior:  Goal: Will modify at least one risk factor affecting health status  Description: Will modify at least one risk factor affecting health status  Outcome: Ongoing  Goal: Identification of resources available to assist in meeting health care needs will improve  Description: Identification of resources available to assist in meeting health care needs will improve  Outcome: Ongoing  Goal: Identification of resources available to assist in meeting health care needs will improve  Description: Identification of resources available to assist in meeting health care needs will improve  Outcome: Ongoing     Problem: Physical Regulation:  Goal: Complications related to the disease process, condition or treatment will be avoided or minimized  Outcome: Ongoing  Goal: Signs and symptoms of infection will decrease  Description: Signs and symptoms of infection will decrease  Outcome: Ongoing  Goal: Will show no signs and symptoms of excessive bleeding  Description: Will show no signs and symptoms of excessive bleeding  Outcome: Ongoing     Problem:  Activity:  Goal: Mobility will improve  Description: Mobility will improve  Outcome: Ongoing  Goal: Fatigue will decrease  Description: Fatigue will decrease  Outcome: Ongoing  Goal: Ability to tolerate increased activity will improve  Description: Ability to tolerate increased activity will improve  Outcome: Ongoing  Goal: Ability to maintain optimal joint mobility will improve  Outcome: Ongoing     Problem: Nutritional:  Goal: Ability to identify appropriate dietary choices will improve  Description: Ability to identify appropriate dietary choices will improve  Outcome: Ongoing  Goal: Dietary intake will improve  Description: Dietary intake will improve  Outcome: Ongoing  Goal: Maintenance of adequate nutrition will improve  Description: Maintenance of adequate nutrition will improve  Outcome: Ongoing  Goal: Ability to identify appropriate dietary choices will improve  Description: Ability to identify appropriate dietary choices will improve  Outcome: Ongoing  Goal: Ability to chew and swallow food without choking will improve  Description: Ability to chew and swallow food without choking will improve  Outcome: Ongoing  Goal: Ability to achieve adequate nutritional intake will improve  Description: Ability to achieve adequate nutritional intake will improve  Outcome: Ongoing  Goal: Ability to maintain an optimal weight for height and age will improve  Outcome: Ongoing     Problem: Safety:  Goal: Ability to remain free from injury will improve  Description: Ability to remain free from injury will improve  Outcome: Ongoing  Goal: Ability to correctly utilize injury-preventing devices as appropriate will improve  Description: Ability to correctly utilize injury-preventing devices as appropriate will improve  Outcome: Ongoing     Problem: Health Maintenance - Impaired:  Goal: Maintenance of adequate nutrition will improve  Description: Maintenance of adequate nutrition will improve  Outcome: Ongoing     Problem: Sensory:  Goal: Pain level will decrease  Description: Pain level will decrease  Outcome: Ongoing     Problem:  Activity Intolerance:  Goal: Ability to tolerate increased activity will improve  Description: Ability to tolerate increased activity will improve  Outcome: Ongoing

## 2021-06-11 NOTE — PROGRESS NOTES
the last 72 hours. Troponin: No results for input(s): TROPONINI in the last 72 hours. BNP: No results for input(s): BNP in the last 72 hours. Lipids: No results for input(s): CHOL, HDL in the last 72 hours. Invalid input(s): LDLCALCU  INR: No results for input(s): INR in the last 72 hours. Objective:   Vitals: BP (!) 113/58   Pulse 64   Temp 97.4 °F (36.3 °C) (Oral)   Resp 18   Ht 5' 10\" (1.778 m)   Wt (!) 305 lb (138.3 kg)   SpO2 93%   BMI 43.76 kg/m²   General appearance: alert and cooperative with exam  HEENT: Head: Normocephalic, no lesions, without obvious abnormality. Eye: Normal external eye, conjunctiva, lids cornea, SACHIN. Nose: Normal external nose, mucus membranes and septum. Neck: no adenopathy, no carotid bruit and supple, symmetrical, trachea midline  Lungs: clear to auscultation bilaterally  Heart: regular rate and rhythm, S1, S2 normal and II/VI systolic murmur. Abdomen: soft, non-tender; bowel sounds normal; no masses,  no organomegaly and obese. Extremities: Right leg in brace, no calf tenderness. Neurologic: Mental status: Alert, oriented, thought content appropriate    Assessment and Plan:   1. 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  On PRN Percocet.  Seen Ortho on 6/3. Brianda plans on getting a second opinion through the [facility]. 2. H/O C.diff Colitis - on Oral Vancomycin, while on Cefepime, to prevent recurrence. No issues with diarrhea. 3. Generalized weakness - doing well. 4. Anemia - last Hgb 10.9 on 6/6. .  5. DM II - controlled on Metformin.    6. CAD - S/P CABG 3/30/15.  No chest pain or SOB. On Toprol XL, Imdur, and aspirin.    7. HTN - controlled on HCTZ, Losartan, Aldactone, and Toprol XL. 8. COPD - controlled on current inhalers. 9. H/O BPH - stable on Flomax. 10.  GERD - controlled on PPI. 11.  Hyperlipidemia - controlled on Lipitor.   12.  H/O Depression - stable on

## 2021-06-12 PROCEDURE — 1200000002 HC SEMI PRIVATE SWING BED

## 2021-06-12 PROCEDURE — 6370000000 HC RX 637 (ALT 250 FOR IP): Performed by: INTERNAL MEDICINE

## 2021-06-12 PROCEDURE — 94640 AIRWAY INHALATION TREATMENT: CPT

## 2021-06-12 PROCEDURE — 94761 N-INVAS EAR/PLS OXIMETRY MLT: CPT

## 2021-06-12 PROCEDURE — 6360000002 HC RX W HCPCS: Performed by: INTERNAL MEDICINE

## 2021-06-12 PROCEDURE — 2580000003 HC RX 258: Performed by: INTERNAL MEDICINE

## 2021-06-12 RX ADMIN — THERA TABS 1 TABLET: TAB at 08:24

## 2021-06-12 RX ADMIN — WATER 2000 MG: 1 INJECTION INTRAMUSCULAR; INTRAVENOUS; SUBCUTANEOUS at 01:30

## 2021-06-12 RX ADMIN — HYDROCHLOROTHIAZIDE 25 MG: 25 TABLET ORAL at 08:24

## 2021-06-12 RX ADMIN — LOSARTAN POTASSIUM 25 MG: 50 TABLET ORAL at 08:24

## 2021-06-12 RX ADMIN — DOCUSATE SODIUM 100 MG: 100 CAPSULE, LIQUID FILLED ORAL at 08:25

## 2021-06-12 RX ADMIN — OXYCODONE HYDROCHLORIDE AND ACETAMINOPHEN 500 MG: 500 TABLET ORAL at 08:25

## 2021-06-12 RX ADMIN — Medication 1 CAPSULE: at 08:25

## 2021-06-12 RX ADMIN — ISOSORBIDE MONONITRATE 30 MG: 30 TABLET, EXTENDED RELEASE ORAL at 08:25

## 2021-06-12 RX ADMIN — WATER 2000 MG: 1 INJECTION INTRAMUSCULAR; INTRAVENOUS; SUBCUTANEOUS at 18:00

## 2021-06-12 RX ADMIN — Medication 1 CAPSULE: at 16:51

## 2021-06-12 RX ADMIN — TIOTROPIUM BROMIDE INHALATION SPRAY 1 PUFF: 3.12 SPRAY, METERED RESPIRATORY (INHALATION) at 21:10

## 2021-06-12 RX ADMIN — SODIUM CHLORIDE, PRESERVATIVE FREE 10 ML: 5 INJECTION INTRAVENOUS at 08:25

## 2021-06-12 RX ADMIN — ESCITALOPRAM OXALATE 20 MG: 10 TABLET ORAL at 08:25

## 2021-06-12 RX ADMIN — FERROUS SULFATE TAB 325 MG (65 MG ELEMENTAL FE) 325 MG: 325 (65 FE) TAB at 08:25

## 2021-06-12 RX ADMIN — ATORVASTATIN CALCIUM 80 MG: 40 TABLET, FILM COATED ORAL at 20:54

## 2021-06-12 RX ADMIN — POTASSIUM CHLORIDE 10 MEQ: 750 TABLET, FILM COATED, EXTENDED RELEASE ORAL at 08:25

## 2021-06-12 RX ADMIN — SPIRONOLACTONE 25 MG: 25 TABLET, FILM COATED ORAL at 08:24

## 2021-06-12 RX ADMIN — METFORMIN HYDROCHLORIDE 500 MG: 500 TABLET ORAL at 08:25

## 2021-06-12 RX ADMIN — BUDESONIDE AND FORMOTEROL FUMARATE DIHYDRATE 2 PUFF: 160; 4.5 AEROSOL RESPIRATORY (INHALATION) at 09:06

## 2021-06-12 RX ADMIN — METOPROLOL SUCCINATE 25 MG: 25 TABLET, EXTENDED RELEASE ORAL at 08:25

## 2021-06-12 RX ADMIN — ALBUTEROL SULFATE 2.5 MG: 2.5 SOLUTION RESPIRATORY (INHALATION) at 09:05

## 2021-06-12 RX ADMIN — PANTOPRAZOLE SODIUM 40 MG: 40 TABLET, DELAYED RELEASE ORAL at 06:44

## 2021-06-12 RX ADMIN — ALBUTEROL SULFATE 2.5 MG: 2.5 SOLUTION RESPIRATORY (INHALATION) at 21:09

## 2021-06-12 RX ADMIN — SODIUM CHLORIDE, PRESERVATIVE FREE 20 ML: 5 INJECTION INTRAVENOUS at 10:35

## 2021-06-12 RX ADMIN — CHOLECALCIFEROL TAB 25 MCG (1000 UNIT) 1000 UNITS: 25 TAB at 08:24

## 2021-06-12 RX ADMIN — TIOTROPIUM BROMIDE INHALATION SPRAY 1 PUFF: 3.12 SPRAY, METERED RESPIRATORY (INHALATION) at 09:06

## 2021-06-12 RX ADMIN — SODIUM CHLORIDE, PRESERVATIVE FREE 20 ML: 5 INJECTION INTRAVENOUS at 18:06

## 2021-06-12 RX ADMIN — OXYCODONE AND ACETAMINOPHEN 1 TABLET: 5; 325 TABLET ORAL at 20:59

## 2021-06-12 RX ADMIN — ASPIRIN 325 MG: 325 TABLET, COATED ORAL at 08:25

## 2021-06-12 RX ADMIN — ENOXAPARIN SODIUM 40 MG: 40 INJECTION SUBCUTANEOUS at 08:30

## 2021-06-12 RX ADMIN — DOCUSATE SODIUM 100 MG: 100 CAPSULE, LIQUID FILLED ORAL at 20:54

## 2021-06-12 RX ADMIN — IPRATROPIUM BROMIDE 0.5 MG: 0.5 SOLUTION RESPIRATORY (INHALATION) at 21:10

## 2021-06-12 RX ADMIN — SODIUM CHLORIDE, PRESERVATIVE FREE 10 ML: 5 INJECTION INTRAVENOUS at 20:54

## 2021-06-12 RX ADMIN — WATER 2000 MG: 1 INJECTION INTRAMUSCULAR; INTRAVENOUS; SUBCUTANEOUS at 10:30

## 2021-06-12 RX ADMIN — FLUTICASONE PROPIONATE 1 SPRAY: 50 SPRAY, METERED NASAL at 08:26

## 2021-06-12 RX ADMIN — BUDESONIDE AND FORMOTEROL FUMARATE DIHYDRATE 2 PUFF: 160; 4.5 AEROSOL RESPIRATORY (INHALATION) at 21:10

## 2021-06-12 RX ADMIN — IPRATROPIUM BROMIDE 0.5 MG: 0.5 SOLUTION RESPIRATORY (INHALATION) at 09:05

## 2021-06-12 RX ADMIN — TAMSULOSIN HYDROCHLORIDE 0.4 MG: 0.4 CAPSULE ORAL at 16:51

## 2021-06-12 RX ADMIN — VANCOMYCIN HYDROCHLORIDE 125 MG: 125 CAPSULE ORAL at 08:30

## 2021-06-12 RX ADMIN — MONTELUKAST SODIUM 10 MG: 10 TABLET, FILM COATED ORAL at 20:54

## 2021-06-12 RX ADMIN — VANCOMYCIN HYDROCHLORIDE 125 MG: 125 CAPSULE ORAL at 20:54

## 2021-06-12 ASSESSMENT — PAIN SCALES - GENERAL
PAINLEVEL_OUTOF10: 0
PAINLEVEL_OUTOF10: 0
PAINLEVEL_OUTOF10: 6

## 2021-06-12 NOTE — PROGRESS NOTES
Dressing change completed after shower to right knee. Unable to express any drainage. Small amount serosang drnge on old dressing. New dressing applied as ordered.

## 2021-06-12 NOTE — PROGRESS NOTES
Returns from outside. Assisted to chair. Wife cut patient's toenails when outside and accidentally cut skin around small toe on left foot. Minimal bleeding. bandaid applied to keep clean.

## 2021-06-12 NOTE — PLAN OF CARE
Pt maintains NWB status while up. No new skin breakdown noted at this time. Pt pleasant and cooperative.

## 2021-06-12 NOTE — PROGRESS NOTES
Up to w/c. Out to sit with wife outside of hospital.  Right leg remains elevated with immobilizer on. Pt grateful to be able to go outside.

## 2021-06-12 NOTE — DISCHARGE SUMMARY
Phone: Eloise Platt             Fax: [phone]                            [facility]                                                                    Discharge Summary    Patient: Ruddy Gee  :   ACCT #: [de-identified]   Referring Practitioner: Dr. Neri Monsalve           Date Treatment Initiated: 21  Date of Last Treatment: 21       Treatment Received:  Patient Education/HEP, Therapeutic Exercise and Gait Training      Assessment: Patient was admitted to [facility] for IV antibiotics and skilled therapy following a septic right TKR with quad tendon rupture. He has participated in skilled PT for strengthening ex and functional mobility. He is very cooperative and pleasant. Sit to stand from chair is supervision/mod I.    Ambulates with wheeled walker to doorway and back to chair ( 20-25 ft) with SBA and maintains NWB on R LE. Patient is limited primarily due to respiratory status and energy challenges to maintain NWB status. He has been educated on an independent exercise program for the right LE. Patient will remain in hospital to complete his IV antibiotic course and will then undergo revision of right TKR or fusion depending on ortho consults. Based on his current level of mobility, plan to discharge further PT and allow patient to continue with mobility in room with nursing assist.  He is compliant with his exercise program.      Reason for Discharge:  Goals Met    Comments:   Thank you for this referral      Og Hernandez, PT  Date: 2021

## 2021-06-12 NOTE — PROGRESS NOTES
This am, right mid lung field with coarse rhonchi. Enc to use IS today which pt has used hourly. Rhonchi remain but much improved from this am.  States he has been coughing up yellow tinged phlegm. Encouraged to continue to use IS.

## 2021-06-13 LAB
ABSOLUTE EOS #: 0.5 K/UL (ref 0–0.4)
ABSOLUTE IMMATURE GRANULOCYTE: ABNORMAL K/UL (ref 0–0.3)
ABSOLUTE LYMPH #: 0.9 K/UL (ref 1–4.8)
ABSOLUTE MONO #: 0.4 K/UL (ref 0–1)
ANION GAP SERPL CALCULATED.3IONS-SCNC: 10 MMOL/L (ref 9–17)
BASOPHILS # BLD: 0 % (ref 0–2)
BASOPHILS ABSOLUTE: 0 K/UL (ref 0–0.2)
BUN BLDV-MCNC: 17 MG/DL (ref 8–23)
BUN/CREAT BLD: 22 (ref 9–20)
C-REACTIVE PROTEIN: <3 MG/L (ref 0–5)
CALCIUM SERPL-MCNC: 9 MG/DL (ref 8.6–10.4)
CHLORIDE BLD-SCNC: 105 MMOL/L (ref 98–107)
CO2: 24 MMOL/L (ref 20–31)
CREAT SERPL-MCNC: 0.76 MG/DL (ref 0.7–1.2)
DIFFERENTIAL TYPE: YES
EOSINOPHILS RELATIVE PERCENT: 11 % (ref 0–5)
GFR AFRICAN AMERICAN: >60 ML/MIN
GFR NON-AFRICAN AMERICAN: >60 ML/MIN
GFR SERPL CREATININE-BSD FRML MDRD: ABNORMAL ML/MIN/{1.73_M2}
GFR SERPL CREATININE-BSD FRML MDRD: ABNORMAL ML/MIN/{1.73_M2}
GLUCOSE BLD-MCNC: 114 MG/DL (ref 70–99)
HCT VFR BLD CALC: 31.8 % (ref 41–53)
HEMOGLOBIN: 10.5 G/DL (ref 13.5–17.5)
IMMATURE GRANULOCYTES: ABNORMAL %
LYMPHOCYTES # BLD: 22 % (ref 13–44)
MCH RBC QN AUTO: 26.5 PG (ref 26–34)
MCHC RBC AUTO-ENTMCNC: 32.9 G/DL (ref 31–37)
MCV RBC AUTO: 80.3 FL (ref 80–100)
MONOCYTES # BLD: 10 % (ref 5–9)
NRBC AUTOMATED: ABNORMAL PER 100 WBC
PDW BLD-RTO: 18.4 % (ref 12.1–15.2)
PLATELET # BLD: 155 K/UL (ref 140–450)
PLATELET ESTIMATE: ABNORMAL
PMV BLD AUTO: ABNORMAL FL (ref 6–12)
POTASSIUM SERPL-SCNC: 3.7 MMOL/L (ref 3.7–5.3)
RBC # BLD: 3.96 M/UL (ref 4.5–5.9)
RBC # BLD: ABNORMAL 10*6/UL
SEG NEUTROPHILS: 57 % (ref 39–75)
SEGMENTED NEUTROPHILS ABSOLUTE COUNT: 2.5 K/UL (ref 2.1–6.5)
SODIUM BLD-SCNC: 139 MMOL/L (ref 135–144)
WBC # BLD: 4.3 K/UL (ref 3.5–11)
WBC # BLD: ABNORMAL 10*3/UL

## 2021-06-13 PROCEDURE — 1200000002 HC SEMI PRIVATE SWING BED

## 2021-06-13 PROCEDURE — 94761 N-INVAS EAR/PLS OXIMETRY MLT: CPT

## 2021-06-13 PROCEDURE — 6370000000 HC RX 637 (ALT 250 FOR IP): Performed by: INTERNAL MEDICINE

## 2021-06-13 PROCEDURE — 85025 COMPLETE CBC W/AUTO DIFF WBC: CPT

## 2021-06-13 PROCEDURE — 6360000002 HC RX W HCPCS: Performed by: INTERNAL MEDICINE

## 2021-06-13 PROCEDURE — 36415 COLL VENOUS BLD VENIPUNCTURE: CPT

## 2021-06-13 PROCEDURE — 94640 AIRWAY INHALATION TREATMENT: CPT

## 2021-06-13 PROCEDURE — 80048 BASIC METABOLIC PNL TOTAL CA: CPT

## 2021-06-13 PROCEDURE — 86140 C-REACTIVE PROTEIN: CPT

## 2021-06-13 PROCEDURE — 2580000003 HC RX 258: Performed by: INTERNAL MEDICINE

## 2021-06-13 RX ADMIN — WATER 2000 MG: 1 INJECTION INTRAMUSCULAR; INTRAVENOUS; SUBCUTANEOUS at 09:34

## 2021-06-13 RX ADMIN — ATORVASTATIN CALCIUM 80 MG: 40 TABLET, FILM COATED ORAL at 21:15

## 2021-06-13 RX ADMIN — THERA TABS 1 TABLET: TAB at 08:23

## 2021-06-13 RX ADMIN — BUDESONIDE AND FORMOTEROL FUMARATE DIHYDRATE 2 PUFF: 160; 4.5 AEROSOL RESPIRATORY (INHALATION) at 21:21

## 2021-06-13 RX ADMIN — WATER 2000 MG: 1 INJECTION INTRAMUSCULAR; INTRAVENOUS; SUBCUTANEOUS at 18:00

## 2021-06-13 RX ADMIN — ISOSORBIDE MONONITRATE 30 MG: 30 TABLET, EXTENDED RELEASE ORAL at 08:23

## 2021-06-13 RX ADMIN — FERROUS SULFATE TAB 325 MG (65 MG ELEMENTAL FE) 325 MG: 325 (65 FE) TAB at 08:23

## 2021-06-13 RX ADMIN — BUDESONIDE AND FORMOTEROL FUMARATE DIHYDRATE 2 PUFF: 160; 4.5 AEROSOL RESPIRATORY (INHALATION) at 09:10

## 2021-06-13 RX ADMIN — ESCITALOPRAM OXALATE 20 MG: 10 TABLET ORAL at 08:24

## 2021-06-13 RX ADMIN — VANCOMYCIN HYDROCHLORIDE 125 MG: 125 CAPSULE ORAL at 21:15

## 2021-06-13 RX ADMIN — LOSARTAN POTASSIUM 25 MG: 50 TABLET ORAL at 08:23

## 2021-06-13 RX ADMIN — POTASSIUM CHLORIDE 10 MEQ: 750 TABLET, FILM COATED, EXTENDED RELEASE ORAL at 08:23

## 2021-06-13 RX ADMIN — SPIRONOLACTONE 25 MG: 25 TABLET, FILM COATED ORAL at 08:23

## 2021-06-13 RX ADMIN — Medication 1 CAPSULE: at 18:00

## 2021-06-13 RX ADMIN — ASPIRIN 325 MG: 325 TABLET, COATED ORAL at 08:23

## 2021-06-13 RX ADMIN — TAMSULOSIN HYDROCHLORIDE 0.4 MG: 0.4 CAPSULE ORAL at 18:08

## 2021-06-13 RX ADMIN — METOPROLOL SUCCINATE 25 MG: 25 TABLET, EXTENDED RELEASE ORAL at 08:23

## 2021-06-13 RX ADMIN — ALBUTEROL SULFATE 2.5 MG: 2.5 SOLUTION RESPIRATORY (INHALATION) at 09:07

## 2021-06-13 RX ADMIN — IPRATROPIUM BROMIDE 0.5 MG: 0.5 SOLUTION RESPIRATORY (INHALATION) at 09:06

## 2021-06-13 RX ADMIN — FLUTICASONE PROPIONATE 1 SPRAY: 50 SPRAY, METERED NASAL at 08:26

## 2021-06-13 RX ADMIN — HYDROCHLOROTHIAZIDE 25 MG: 25 TABLET ORAL at 08:23

## 2021-06-13 RX ADMIN — OXYCODONE AND ACETAMINOPHEN 1 TABLET: 5; 325 TABLET ORAL at 21:15

## 2021-06-13 RX ADMIN — WATER 2000 MG: 1 INJECTION INTRAMUSCULAR; INTRAVENOUS; SUBCUTANEOUS at 01:36

## 2021-06-13 RX ADMIN — ALBUTEROL SULFATE 2.5 MG: 2.5 SOLUTION RESPIRATORY (INHALATION) at 21:20

## 2021-06-13 RX ADMIN — SODIUM CHLORIDE, PRESERVATIVE FREE 20 ML: 5 INJECTION INTRAVENOUS at 18:07

## 2021-06-13 RX ADMIN — ONDANSETRON 4 MG: 4 TABLET, ORALLY DISINTEGRATING ORAL at 09:43

## 2021-06-13 RX ADMIN — ENOXAPARIN SODIUM 40 MG: 40 INJECTION SUBCUTANEOUS at 08:30

## 2021-06-13 RX ADMIN — IPRATROPIUM BROMIDE 0.5 MG: 0.5 SOLUTION RESPIRATORY (INHALATION) at 21:20

## 2021-06-13 RX ADMIN — Medication 1 CAPSULE: at 08:23

## 2021-06-13 RX ADMIN — SODIUM CHLORIDE, PRESERVATIVE FREE 10 ML: 5 INJECTION INTRAVENOUS at 21:16

## 2021-06-13 RX ADMIN — CHOLECALCIFEROL TAB 25 MCG (1000 UNIT) 1000 UNITS: 25 TAB at 08:23

## 2021-06-13 RX ADMIN — VANCOMYCIN HYDROCHLORIDE 125 MG: 125 CAPSULE ORAL at 08:30

## 2021-06-13 RX ADMIN — TIOTROPIUM BROMIDE INHALATION SPRAY 1 PUFF: 3.12 SPRAY, METERED RESPIRATORY (INHALATION) at 21:21

## 2021-06-13 RX ADMIN — OXYCODONE HYDROCHLORIDE AND ACETAMINOPHEN 500 MG: 500 TABLET ORAL at 08:23

## 2021-06-13 RX ADMIN — METFORMIN HYDROCHLORIDE 500 MG: 500 TABLET ORAL at 08:23

## 2021-06-13 RX ADMIN — TIOTROPIUM BROMIDE INHALATION SPRAY 1 PUFF: 3.12 SPRAY, METERED RESPIRATORY (INHALATION) at 09:10

## 2021-06-13 RX ADMIN — PANTOPRAZOLE SODIUM 40 MG: 40 TABLET, DELAYED RELEASE ORAL at 05:40

## 2021-06-13 RX ADMIN — SODIUM CHLORIDE, PRESERVATIVE FREE 20 ML: 5 INJECTION INTRAVENOUS at 09:34

## 2021-06-13 RX ADMIN — MONTELUKAST SODIUM 10 MG: 10 TABLET, FILM COATED ORAL at 21:15

## 2021-06-13 ASSESSMENT — PAIN DESCRIPTION - PROGRESSION
CLINICAL_PROGRESSION: GRADUALLY IMPROVING

## 2021-06-13 ASSESSMENT — PAIN SCALES - GENERAL
PAINLEVEL_OUTOF10: 4
PAINLEVEL_OUTOF10: 0
PAINLEVEL_OUTOF10: 0

## 2021-06-13 NOTE — PROGRESS NOTES
Up and down to BR with walker. Takes pain medication without excessive use. Pleasant and cooperative.

## 2021-06-13 NOTE — PROGRESS NOTES
Returns from outside with wife; ambulates to bathroom then returns to chair. Right leg elevated. Dressing to right knee changed as ordered. No new drainage able to be expressed but surrounding tissue is not red or warm. Keeps immobilizer on at all times and does not bend knee, nor put weight on it when up.

## 2021-06-13 NOTE — PROGRESS NOTES
Medicated with zofran for c/o \"upset stomach\" after breakfast. \"I think it had a lot of spices in it\".

## 2021-06-14 PROCEDURE — 6370000000 HC RX 637 (ALT 250 FOR IP): Performed by: INTERNAL MEDICINE

## 2021-06-14 PROCEDURE — 2580000003 HC RX 258: Performed by: INTERNAL MEDICINE

## 2021-06-14 PROCEDURE — 6360000002 HC RX W HCPCS: Performed by: INTERNAL MEDICINE

## 2021-06-14 PROCEDURE — 1200000002 HC SEMI PRIVATE SWING BED

## 2021-06-14 PROCEDURE — 94640 AIRWAY INHALATION TREATMENT: CPT

## 2021-06-14 RX ADMIN — SODIUM CHLORIDE, PRESERVATIVE FREE 10 ML: 5 INJECTION INTRAVENOUS at 20:04

## 2021-06-14 RX ADMIN — ESCITALOPRAM OXALATE 20 MG: 10 TABLET ORAL at 08:35

## 2021-06-14 RX ADMIN — Medication 1 CAPSULE: at 08:35

## 2021-06-14 RX ADMIN — VANCOMYCIN HYDROCHLORIDE 125 MG: 125 CAPSULE ORAL at 08:35

## 2021-06-14 RX ADMIN — OXYCODONE HYDROCHLORIDE AND ACETAMINOPHEN 500 MG: 500 TABLET ORAL at 08:35

## 2021-06-14 RX ADMIN — CHOLECALCIFEROL TAB 25 MCG (1000 UNIT) 1000 UNITS: 25 TAB at 08:34

## 2021-06-14 RX ADMIN — TAMSULOSIN HYDROCHLORIDE 0.4 MG: 0.4 CAPSULE ORAL at 17:54

## 2021-06-14 RX ADMIN — BUDESONIDE AND FORMOTEROL FUMARATE DIHYDRATE 2 PUFF: 160; 4.5 AEROSOL RESPIRATORY (INHALATION) at 11:05

## 2021-06-14 RX ADMIN — WATER 2000 MG: 1 INJECTION INTRAMUSCULAR; INTRAVENOUS; SUBCUTANEOUS at 17:54

## 2021-06-14 RX ADMIN — LOSARTAN POTASSIUM 25 MG: 50 TABLET ORAL at 08:34

## 2021-06-14 RX ADMIN — FLUTICASONE PROPIONATE 1 SPRAY: 50 SPRAY, METERED NASAL at 08:34

## 2021-06-14 RX ADMIN — Medication 1 CAPSULE: at 17:54

## 2021-06-14 RX ADMIN — OXYCODONE AND ACETAMINOPHEN 1 TABLET: 5; 325 TABLET ORAL at 21:51

## 2021-06-14 RX ADMIN — WATER 2000 MG: 1 INJECTION INTRAMUSCULAR; INTRAVENOUS; SUBCUTANEOUS at 01:35

## 2021-06-14 RX ADMIN — ALBUTEROL SULFATE 2.5 MG: 2.5 SOLUTION RESPIRATORY (INHALATION) at 10:25

## 2021-06-14 RX ADMIN — ISOSORBIDE MONONITRATE 30 MG: 30 TABLET, EXTENDED RELEASE ORAL at 08:34

## 2021-06-14 RX ADMIN — ASPIRIN 325 MG: 325 TABLET, COATED ORAL at 08:35

## 2021-06-14 RX ADMIN — FERROUS SULFATE TAB 325 MG (65 MG ELEMENTAL FE) 325 MG: 325 (65 FE) TAB at 08:35

## 2021-06-14 RX ADMIN — BUDESONIDE AND FORMOTEROL FUMARATE DIHYDRATE 2 PUFF: 160; 4.5 AEROSOL RESPIRATORY (INHALATION) at 21:29

## 2021-06-14 RX ADMIN — THERA TABS 1 TABLET: TAB at 08:35

## 2021-06-14 RX ADMIN — ALBUTEROL SULFATE 2.5 MG: 2.5 SOLUTION RESPIRATORY (INHALATION) at 21:29

## 2021-06-14 RX ADMIN — HYDROCHLOROTHIAZIDE 25 MG: 25 TABLET ORAL at 08:35

## 2021-06-14 RX ADMIN — SODIUM CHLORIDE, PRESERVATIVE FREE 10 ML: 5 INJECTION INTRAVENOUS at 09:07

## 2021-06-14 RX ADMIN — WATER 2000 MG: 1 INJECTION INTRAMUSCULAR; INTRAVENOUS; SUBCUTANEOUS at 09:07

## 2021-06-14 RX ADMIN — MONTELUKAST SODIUM 10 MG: 10 TABLET, FILM COATED ORAL at 20:05

## 2021-06-14 RX ADMIN — PANTOPRAZOLE SODIUM 40 MG: 40 TABLET, DELAYED RELEASE ORAL at 06:56

## 2021-06-14 RX ADMIN — METFORMIN HYDROCHLORIDE 500 MG: 500 TABLET ORAL at 08:35

## 2021-06-14 RX ADMIN — TIOTROPIUM BROMIDE INHALATION SPRAY 1 PUFF: 3.12 SPRAY, METERED RESPIRATORY (INHALATION) at 21:29

## 2021-06-14 RX ADMIN — IPRATROPIUM BROMIDE 0.5 MG: 0.5 SOLUTION RESPIRATORY (INHALATION) at 21:29

## 2021-06-14 RX ADMIN — ATORVASTATIN CALCIUM 80 MG: 40 TABLET, FILM COATED ORAL at 20:04

## 2021-06-14 RX ADMIN — METOPROLOL SUCCINATE 25 MG: 25 TABLET, EXTENDED RELEASE ORAL at 08:35

## 2021-06-14 RX ADMIN — ENOXAPARIN SODIUM 30 MG: 30 INJECTION SUBCUTANEOUS at 09:07

## 2021-06-14 RX ADMIN — TIOTROPIUM BROMIDE INHALATION SPRAY 1 PUFF: 3.12 SPRAY, METERED RESPIRATORY (INHALATION) at 10:29

## 2021-06-14 RX ADMIN — ENOXAPARIN SODIUM 30 MG: 30 INJECTION SUBCUTANEOUS at 20:04

## 2021-06-14 RX ADMIN — SODIUM CHLORIDE, PRESERVATIVE FREE 10 ML: 5 INJECTION INTRAVENOUS at 01:35

## 2021-06-14 RX ADMIN — SPIRONOLACTONE 25 MG: 25 TABLET, FILM COATED ORAL at 08:35

## 2021-06-14 RX ADMIN — OXYCODONE AND ACETAMINOPHEN 1 TABLET: 5; 325 TABLET ORAL at 13:33

## 2021-06-14 RX ADMIN — IPRATROPIUM BROMIDE 0.5 MG: 0.5 SOLUTION RESPIRATORY (INHALATION) at 10:25

## 2021-06-14 RX ADMIN — VANCOMYCIN HYDROCHLORIDE 125 MG: 125 CAPSULE ORAL at 20:05

## 2021-06-14 RX ADMIN — POTASSIUM CHLORIDE 10 MEQ: 750 TABLET, FILM COATED, EXTENDED RELEASE ORAL at 08:35

## 2021-06-14 ASSESSMENT — PAIN DESCRIPTION - ORIENTATION
ORIENTATION: RIGHT

## 2021-06-14 ASSESSMENT — PAIN SCALES - GENERAL
PAINLEVEL_OUTOF10: 4
PAINLEVEL_OUTOF10: 1
PAINLEVEL_OUTOF10: 1
PAINLEVEL_OUTOF10: 2
PAINLEVEL_OUTOF10: 4
PAINLEVEL_OUTOF10: 2

## 2021-06-14 ASSESSMENT — PAIN DESCRIPTION - DESCRIPTORS
DESCRIPTORS: ACHING
DESCRIPTORS: DISCOMFORT
DESCRIPTORS: SHARP

## 2021-06-14 ASSESSMENT — PAIN DESCRIPTION - PROGRESSION
CLINICAL_PROGRESSION: GRADUALLY IMPROVING

## 2021-06-14 ASSESSMENT — PAIN DESCRIPTION - LOCATION
LOCATION: KNEE

## 2021-06-14 ASSESSMENT — PAIN DESCRIPTION - FREQUENCY: FREQUENCY: CONTINUOUS

## 2021-06-14 ASSESSMENT — PAIN DESCRIPTION - PAIN TYPE: TYPE: SURGICAL PAIN

## 2021-06-14 NOTE — PROGRESS NOTES
[facility]  Swing Bed Interdisciplinary Care Plan Conference Report   Recertification for Continued Skilled Care. Patients name:Pete Elder  Date of Conference: 6/14/2021    The Following Information was discussed and agreed upon with the patient and/or Caregivers as listed below.     Names of Team Members and Caregivers present for meeting:  : ASHLYN Franz  Nursing: OLMAN Rock  Therapy: , PT; , L/OTR  Dietician:   Activities: [facility]  Pastoral Care:   Caregivers:    [] Spouse  [] Child/Children [] Significant Other   [] Other:     Nutrition:  Current Body Weight:   Wt Readings from Last 1 Encounters:   06/07/21 (!) 305 lb (138.3 kg)     Weight Change: : 5.5% losses to 309# reported by patient   Current Diet order:DIET CARB CONTROL; Carb Control: 4 carb choices (60 gms)/meal  Intakes: %  Diet Education Provided: continued encouragement of protein foods and adequate carbohydrates  Goal: PO >75% meals and supplements    Spiritual:  Orthodoxy needs met: [x] Yes  [] No  [] N/A  Notified Cathryn Gaffney or Oralia of admission: [] Yes  [] No  [x] N/A  Patient added to Communion List: [] Yes  [] No  [x] N/A  Any other concerns or comments:   Goal: Participate 2-3 times per week    Occupational Therapy:  Current ADL Status:   Physical Therapy:    Speech Therapy:    Respiratory Therapy:     Nursing:  Skin/Wound/Incisions:  Skin Color/Condition  Skin Color: Pale  Skin Condition/Temp: Dry, Warm  Skin Integrity  Skin Integrity: Other (Comment) (healing incision)  Location: right knee  Preventative Dressing: Yes  Date Applied: 06/14/21  Assessed this shift?:  (ELIEZER)     Pain Control:   New Medications since admission to Swing Bed:   Anticoagulants:  lovenox  Goals:   Pt will remain free from falls  Pt will have control of acute pain    Activities: activity as tolerated  Goal: Participate in 3 activities per week    :  Plan for Discharge: Plan to re evaluate swing bed progress again

## 2021-06-14 NOTE — PROGRESS NOTES
10.5*        BMP:    Recent Labs     06/13/21  0530      K 3.7      CO2 24   BUN 17   CREATININE 0.76   GLUCOSE 114*     Physical Exam:    General Appearance: Up in chair, alert and oriented to person, place and time, in no acute distress  Cardiovascular: normal rate, regular rhythm, normal S1 and S2  Pulmonary/Chest: clear to auscultation bilaterally, no rales, no wheezes,  Abdomen: soft, obese, non-tender, non-distended, normal bowel sounds   Extremities: Right lower extremity with straight leg brace, dressing dry  Skin: warm and dry, no rash  Neurological: alert, oriented, normal speech, no focal findings or movement disorder noted    Assessment and Plan:           1. S/P stage I revision of right TKA  - on 6 weeks of IV cefepime.   Doing well with PT and OT, pain control.   2.  Generalized weakness -continue PT and OT  3.  History of C. difficile colitis - on oral vancomycin and lactobacillus prophylactically to prevent recurrence  4.  Mild hypokalemia -  on potassium supplements  5.  Chronic anemia -  continue on iron replacement  6.  Diabetes mellitus type 2, controlled, non-insulin-dependent - on Metformin  7.  Hypertension -blood pressure stable, continue losartan, Toprol-XL, HCTZ, Aldactone  8.  History of CAD, s/p CABG on 3/30/2015 -asymptomatic, continue medical management with aspirin, Toprol-XL, Imdur  9.  History of BPH - on Flomax  10.  GERD -on Protonix  11.  History of depression - mood stable on Lexapro  48.  AQCSAJO diastolic CHF - continue on diuretics, asymptomatic        Electronically signed by Trevor Grier MD on 6/14/2021 at 8:10 AM    Rounding Hospitalist

## 2021-06-14 NOTE — PLAN OF CARE
Problem: Pain:  Description: Pain management should include both nonpharmacologic and pharmacologic interventions.   Goal: Pain level will decrease  Description: Pain level will decrease  Outcome: Met This Shift     Problem: Skin Integrity:  Goal: Absence of new skin breakdown  Description: Absence of new skin breakdown  Outcome: Met This Shift     Problem: Falls - Risk of:  Goal: Will remain free from falls  Description: Will remain free from falls  Outcome: Met This Shift     Problem: Gas Exchange - Impaired:  Goal: Levels of oxygenation will improve  Description: Levels of oxygenation will improve  Outcome: Met This Shift     Problem: Venous Thromboembolism:  Goal: Will show no signs or symptoms of venous thromboembolism  Description: Will show no signs or symptoms of venous thromboembolism  Outcome: Met This Shift     Problem: Gas Exchange - Impaired:  Goal: Levels of oxygenation will improve  Description: Levels of oxygenation will improve  Outcome: Met This Shift     Problem: Sensory:  Goal: Pain level will decrease  Description: Pain level will decrease  Outcome: Met This Shift     Problem: Nutrition  Goal: Optimal nutrition therapy  6/14/2021 0815 by Aline Sorto RD, LD  Outcome: Ongoing  Note: Nutrition Problem #1: Increased nutrient needs  Intervention: Food and/or Nutrient Delivery: Continue Current Diet  Nutritional Goals: PO >75% with good protein choices       Problem: HEMODYNAMIC STATUS  Goal: Patient has stable vital signs and fluid balance  6/14/2021 0056 by Meghan Murillo RN  Outcome: Ongoing     Problem: ACTIVITY INTOLERANCE/IMPAIRED MOBILITY  Goal: Mobility/activity is maintained at optimum level for patient  Outcome: Ongoing

## 2021-06-14 NOTE — PROGRESS NOTES
Comprehensive Nutrition Assessment    Type and Reason for Visit:  Reassess    Nutrition Recommendations/Plan:  Encourage oral intakes    Nutrition Assessment:  Continued increased nutrient needs r/t acute injury or trauma, AEB post op healing needs. Unknown weight changes from last week, as no now weights recorded, even though patient stating 309# (?saturday). This would continue to be significant, at 5.5% declines from usual weight of 327#. Continued good oral intakes being reported. I reinforced protein foods in diet again. He denies questions or concerns. Malnutrition Assessment:  Malnutrition Status:  No malnutrition    Context:  Acute Illness     Findings of the 6 clinical characteristics of malnutrition:  Energy Intake:  No significant decrease in energy intake  Weight Loss:  No significant weight loss     Body Fat Loss:  No significant body fat loss     Muscle Mass Loss:  No significant muscle mass loss    Fluid Accumulation:  No significant fluid accumulation     Strength:  Not Performed    Estimated Daily Nutrient Needs:  Energy (kcal):  1166-0252 (11-15); Weight Used for Energy Requirements:  Current     Protein (g):   (1.3-1.5);  Weight Used for Protein Requirements:  Ideal        Fluid (ml/day):  2200; Method Used for Fluid Requirements:  1 ml/kcal      Nutrition Related Findings:  obese      Wounds:  Surgical Incision       Current Nutrition Therapies:    DIET CARB CONTROL; Carb Control: 4 carb choices (60 gms)/meal    Anthropometric Measures:  · Height: 5' 10\" (177.8 cm)  · Current Body Weight: 305 lb (138.3 kg) (Patient stating 309# on Saturday though)   · Admission Body Weight: 330 lb (149.7 kg)    · Usual Body Weight: 327 lb (148.3 kg)     · Ideal Body Weight: 166 lbs; % Ideal Body Weight 198.8 %   · BMI: 43.8  · Adjusted Body Weight:  ; No Adjustment   · BMI Categories: Obese Class 3 (BMI 40.0 or greater)       Nutrition Diagnosis:   · Increased nutrient needs related to acute injury/trauma as evidenced by wounds    Lab Results   Component Value Date     06/13/2021    K 3.7 06/13/2021     06/13/2021    CO2 24 06/13/2021    BUN 17 06/13/2021    CREATININE 0.76 06/13/2021    GLUCOSE 114 (H) 06/13/2021    CALCIUM 9.0 06/13/2021    PROT 6.5 04/29/2021    LABALBU 3.5 04/29/2021    BILITOT 0.44 04/29/2021    ALKPHOS 98 04/29/2021    AST 30 04/29/2021    ALT 23 04/29/2021    LABGLOM >60 06/13/2021    GFRAA >60 06/13/2021    GLOB NOT REPORTED 10/22/2019     Nutrition Interventions:   Food and/or Nutrient Delivery:  Continue Current Diet  Nutrition Education/Counseling:  No recommendation at this time   Coordination of Nutrition Care:  Continue to monitor while inpatient    Goals:  PO >75% with good protein choices       Nutrition Monitoring and Evaluation:   Behavioral-Environmental Outcomes:  None Identified   Food/Nutrient Intake Outcomes:  Food and Nutrient Intake  Physical Signs/Symptoms Outcomes:  Biochemical Data, Weight, Fluid Status or Edema     Discharge Planning:    Continue current diet     Electronically signed by Romeo Bess RD, LD on 6/14/21 at 8:14 AM EDT    Contact: [phone]

## 2021-06-15 PROCEDURE — 6370000000 HC RX 637 (ALT 250 FOR IP): Performed by: INTERNAL MEDICINE

## 2021-06-15 PROCEDURE — 6360000002 HC RX W HCPCS: Performed by: INTERNAL MEDICINE

## 2021-06-15 PROCEDURE — 94640 AIRWAY INHALATION TREATMENT: CPT

## 2021-06-15 PROCEDURE — 1200000002 HC SEMI PRIVATE SWING BED

## 2021-06-15 PROCEDURE — 94761 N-INVAS EAR/PLS OXIMETRY MLT: CPT

## 2021-06-15 PROCEDURE — 2580000003 HC RX 258: Performed by: INTERNAL MEDICINE

## 2021-06-15 RX ADMIN — ALBUTEROL SULFATE 2.5 MG: 2.5 SOLUTION RESPIRATORY (INHALATION) at 21:02

## 2021-06-15 RX ADMIN — OXYCODONE AND ACETAMINOPHEN 1 TABLET: 5; 325 TABLET ORAL at 19:45

## 2021-06-15 RX ADMIN — MONTELUKAST SODIUM 10 MG: 10 TABLET, FILM COATED ORAL at 19:45

## 2021-06-15 RX ADMIN — BUDESONIDE AND FORMOTEROL FUMARATE DIHYDRATE 2 PUFF: 160; 4.5 AEROSOL RESPIRATORY (INHALATION) at 09:41

## 2021-06-15 RX ADMIN — ENOXAPARIN SODIUM 30 MG: 30 INJECTION SUBCUTANEOUS at 19:44

## 2021-06-15 RX ADMIN — HYDROCHLOROTHIAZIDE 25 MG: 25 TABLET ORAL at 09:32

## 2021-06-15 RX ADMIN — Medication 1 CAPSULE: at 09:35

## 2021-06-15 RX ADMIN — ASPIRIN 325 MG: 325 TABLET, COATED ORAL at 09:32

## 2021-06-15 RX ADMIN — IPRATROPIUM BROMIDE 0.5 MG: 0.5 SOLUTION RESPIRATORY (INHALATION) at 09:40

## 2021-06-15 RX ADMIN — METFORMIN HYDROCHLORIDE 500 MG: 500 TABLET ORAL at 12:08

## 2021-06-15 RX ADMIN — OXYCODONE HYDROCHLORIDE AND ACETAMINOPHEN 500 MG: 500 TABLET ORAL at 09:32

## 2021-06-15 RX ADMIN — WATER 2000 MG: 1 INJECTION INTRAMUSCULAR; INTRAVENOUS; SUBCUTANEOUS at 17:29

## 2021-06-15 RX ADMIN — LOSARTAN POTASSIUM 25 MG: 50 TABLET ORAL at 09:32

## 2021-06-15 RX ADMIN — ESCITALOPRAM OXALATE 20 MG: 10 TABLET ORAL at 09:32

## 2021-06-15 RX ADMIN — THERA TABS 1 TABLET: TAB at 09:32

## 2021-06-15 RX ADMIN — FERROUS SULFATE TAB 325 MG (65 MG ELEMENTAL FE) 325 MG: 325 (65 FE) TAB at 09:35

## 2021-06-15 RX ADMIN — IPRATROPIUM BROMIDE 0.5 MG: 0.5 SOLUTION RESPIRATORY (INHALATION) at 21:02

## 2021-06-15 RX ADMIN — VANCOMYCIN HYDROCHLORIDE 125 MG: 125 CAPSULE ORAL at 09:31

## 2021-06-15 RX ADMIN — ENOXAPARIN SODIUM 30 MG: 30 INJECTION SUBCUTANEOUS at 09:32

## 2021-06-15 RX ADMIN — CHOLECALCIFEROL TAB 25 MCG (1000 UNIT) 1000 UNITS: 25 TAB at 09:32

## 2021-06-15 RX ADMIN — FLUTICASONE PROPIONATE 1 SPRAY: 50 SPRAY, METERED NASAL at 09:35

## 2021-06-15 RX ADMIN — SODIUM CHLORIDE, PRESERVATIVE FREE 10 ML: 5 INJECTION INTRAVENOUS at 19:44

## 2021-06-15 RX ADMIN — TAMSULOSIN HYDROCHLORIDE 0.4 MG: 0.4 CAPSULE ORAL at 17:29

## 2021-06-15 RX ADMIN — TIOTROPIUM BROMIDE INHALATION SPRAY 1 PUFF: 3.12 SPRAY, METERED RESPIRATORY (INHALATION) at 21:02

## 2021-06-15 RX ADMIN — ALBUTEROL SULFATE 2.5 MG: 2.5 SOLUTION RESPIRATORY (INHALATION) at 09:40

## 2021-06-15 RX ADMIN — SPIRONOLACTONE 25 MG: 25 TABLET, FILM COATED ORAL at 09:31

## 2021-06-15 RX ADMIN — ISOSORBIDE MONONITRATE 30 MG: 30 TABLET, EXTENDED RELEASE ORAL at 09:32

## 2021-06-15 RX ADMIN — PANTOPRAZOLE SODIUM 40 MG: 40 TABLET, DELAYED RELEASE ORAL at 06:19

## 2021-06-15 RX ADMIN — WATER 2000 MG: 1 INJECTION INTRAMUSCULAR; INTRAVENOUS; SUBCUTANEOUS at 02:23

## 2021-06-15 RX ADMIN — POTASSIUM CHLORIDE 10 MEQ: 750 TABLET, FILM COATED, EXTENDED RELEASE ORAL at 12:08

## 2021-06-15 RX ADMIN — VANCOMYCIN HYDROCHLORIDE 125 MG: 125 CAPSULE ORAL at 19:45

## 2021-06-15 RX ADMIN — Medication 1 CAPSULE: at 17:29

## 2021-06-15 RX ADMIN — ATORVASTATIN CALCIUM 80 MG: 40 TABLET, FILM COATED ORAL at 19:45

## 2021-06-15 RX ADMIN — TIOTROPIUM BROMIDE INHALATION SPRAY 1 PUFF: 3.12 SPRAY, METERED RESPIRATORY (INHALATION) at 09:41

## 2021-06-15 RX ADMIN — DOCUSATE SODIUM 100 MG: 100 CAPSULE, LIQUID FILLED ORAL at 09:31

## 2021-06-15 RX ADMIN — METOPROLOL SUCCINATE 25 MG: 25 TABLET, EXTENDED RELEASE ORAL at 09:32

## 2021-06-15 RX ADMIN — WATER 2000 MG: 1 INJECTION INTRAMUSCULAR; INTRAVENOUS; SUBCUTANEOUS at 10:15

## 2021-06-15 RX ADMIN — BUDESONIDE AND FORMOTEROL FUMARATE DIHYDRATE 2 PUFF: 160; 4.5 AEROSOL RESPIRATORY (INHALATION) at 21:02

## 2021-06-15 ASSESSMENT — PAIN DESCRIPTION - PROGRESSION
CLINICAL_PROGRESSION: GRADUALLY IMPROVING

## 2021-06-15 ASSESSMENT — PAIN SCALES - GENERAL
PAINLEVEL_OUTOF10: 1
PAINLEVEL_OUTOF10: 2
PAINLEVEL_OUTOF10: 4
PAINLEVEL_OUTOF10: 1

## 2021-06-15 NOTE — PROGRESS NOTES
Pt's dressing on right knee changed for moderate amount of serosang drainage. Wound cleansed with alcohol. Small amount of serosang drainage noted from pinhole in knee. Xeroform 1\"x8\" applied to wound. Scab noted on right knee that is unchanged. Knee wrapped with ACE and brace reapplied. No redness noted.

## 2021-06-16 PROCEDURE — 6370000000 HC RX 637 (ALT 250 FOR IP): Performed by: INTERNAL MEDICINE

## 2021-06-16 PROCEDURE — 94640 AIRWAY INHALATION TREATMENT: CPT

## 2021-06-16 PROCEDURE — 6360000002 HC RX W HCPCS: Performed by: INTERNAL MEDICINE

## 2021-06-16 PROCEDURE — 1200000002 HC SEMI PRIVATE SWING BED

## 2021-06-16 PROCEDURE — 2580000003 HC RX 258: Performed by: INTERNAL MEDICINE

## 2021-06-16 RX ADMIN — IPRATROPIUM BROMIDE 0.5 MG: 0.5 SOLUTION RESPIRATORY (INHALATION) at 21:25

## 2021-06-16 RX ADMIN — PANTOPRAZOLE SODIUM 40 MG: 40 TABLET, DELAYED RELEASE ORAL at 06:42

## 2021-06-16 RX ADMIN — BUDESONIDE AND FORMOTEROL FUMARATE DIHYDRATE 2 PUFF: 160; 4.5 AEROSOL RESPIRATORY (INHALATION) at 21:25

## 2021-06-16 RX ADMIN — SODIUM CHLORIDE, PRESERVATIVE FREE 10 ML: 5 INJECTION INTRAVENOUS at 20:46

## 2021-06-16 RX ADMIN — IPRATROPIUM BROMIDE 0.5 MG: 0.5 SOLUTION RESPIRATORY (INHALATION) at 08:57

## 2021-06-16 RX ADMIN — OXYCODONE AND ACETAMINOPHEN 1 TABLET: 5; 325 TABLET ORAL at 22:21

## 2021-06-16 RX ADMIN — BUDESONIDE AND FORMOTEROL FUMARATE DIHYDRATE 2 PUFF: 160; 4.5 AEROSOL RESPIRATORY (INHALATION) at 09:02

## 2021-06-16 RX ADMIN — ASPIRIN 325 MG: 325 TABLET, COATED ORAL at 07:57

## 2021-06-16 RX ADMIN — METFORMIN HYDROCHLORIDE 500 MG: 500 TABLET ORAL at 07:57

## 2021-06-16 RX ADMIN — ALBUTEROL SULFATE 2.5 MG: 2.5 SOLUTION RESPIRATORY (INHALATION) at 08:56

## 2021-06-16 RX ADMIN — WATER 2000 MG: 1 INJECTION INTRAMUSCULAR; INTRAVENOUS; SUBCUTANEOUS at 18:26

## 2021-06-16 RX ADMIN — Medication 1 CAPSULE: at 15:43

## 2021-06-16 RX ADMIN — TAMSULOSIN HYDROCHLORIDE 0.4 MG: 0.4 CAPSULE ORAL at 18:26

## 2021-06-16 RX ADMIN — Medication 1 CAPSULE: at 07:56

## 2021-06-16 RX ADMIN — VANCOMYCIN HYDROCHLORIDE 125 MG: 125 CAPSULE ORAL at 20:46

## 2021-06-16 RX ADMIN — TIOTROPIUM BROMIDE INHALATION SPRAY 1 PUFF: 3.12 SPRAY, METERED RESPIRATORY (INHALATION) at 21:26

## 2021-06-16 RX ADMIN — OXYCODONE AND ACETAMINOPHEN 1 TABLET: 5; 325 TABLET ORAL at 03:11

## 2021-06-16 RX ADMIN — SPIRONOLACTONE 25 MG: 25 TABLET, FILM COATED ORAL at 07:56

## 2021-06-16 RX ADMIN — ESCITALOPRAM OXALATE 20 MG: 10 TABLET ORAL at 07:56

## 2021-06-16 RX ADMIN — CHOLECALCIFEROL TAB 25 MCG (1000 UNIT) 1000 UNITS: 25 TAB at 07:55

## 2021-06-16 RX ADMIN — FLUTICASONE PROPIONATE 1 SPRAY: 50 SPRAY, METERED NASAL at 07:58

## 2021-06-16 RX ADMIN — VANCOMYCIN HYDROCHLORIDE 125 MG: 125 CAPSULE ORAL at 07:56

## 2021-06-16 RX ADMIN — LOSARTAN POTASSIUM 25 MG: 50 TABLET ORAL at 07:55

## 2021-06-16 RX ADMIN — FERROUS SULFATE TAB 325 MG (65 MG ELEMENTAL FE) 325 MG: 325 (65 FE) TAB at 07:56

## 2021-06-16 RX ADMIN — DOCUSATE SODIUM 100 MG: 100 CAPSULE, LIQUID FILLED ORAL at 07:56

## 2021-06-16 RX ADMIN — POTASSIUM CHLORIDE 10 MEQ: 750 TABLET, FILM COATED, EXTENDED RELEASE ORAL at 07:56

## 2021-06-16 RX ADMIN — ISOSORBIDE MONONITRATE 30 MG: 30 TABLET, EXTENDED RELEASE ORAL at 07:55

## 2021-06-16 RX ADMIN — THERA TABS 1 TABLET: TAB at 07:56

## 2021-06-16 RX ADMIN — ALBUTEROL SULFATE 2.5 MG: 2.5 SOLUTION RESPIRATORY (INHALATION) at 21:25

## 2021-06-16 RX ADMIN — DOCUSATE SODIUM 100 MG: 100 CAPSULE, LIQUID FILLED ORAL at 20:46

## 2021-06-16 RX ADMIN — ENOXAPARIN SODIUM 30 MG: 30 INJECTION SUBCUTANEOUS at 20:46

## 2021-06-16 RX ADMIN — WATER 2000 MG: 1 INJECTION INTRAMUSCULAR; INTRAVENOUS; SUBCUTANEOUS at 11:11

## 2021-06-16 RX ADMIN — WATER 2000 MG: 1 INJECTION INTRAMUSCULAR; INTRAVENOUS; SUBCUTANEOUS at 01:57

## 2021-06-16 RX ADMIN — HYDROCHLOROTHIAZIDE 25 MG: 25 TABLET ORAL at 07:57

## 2021-06-16 RX ADMIN — METOPROLOL SUCCINATE 25 MG: 25 TABLET, EXTENDED RELEASE ORAL at 07:56

## 2021-06-16 RX ADMIN — OXYCODONE AND ACETAMINOPHEN 1 TABLET: 5; 325 TABLET ORAL at 15:43

## 2021-06-16 RX ADMIN — ATORVASTATIN CALCIUM 80 MG: 40 TABLET, FILM COATED ORAL at 20:46

## 2021-06-16 RX ADMIN — TIOTROPIUM BROMIDE INHALATION SPRAY 1 PUFF: 3.12 SPRAY, METERED RESPIRATORY (INHALATION) at 09:02

## 2021-06-16 RX ADMIN — ENOXAPARIN SODIUM 30 MG: 30 INJECTION SUBCUTANEOUS at 07:57

## 2021-06-16 RX ADMIN — SODIUM CHLORIDE, PRESERVATIVE FREE 10 ML: 5 INJECTION INTRAVENOUS at 07:57

## 2021-06-16 RX ADMIN — OXYCODONE HYDROCHLORIDE AND ACETAMINOPHEN 500 MG: 500 TABLET ORAL at 07:56

## 2021-06-16 RX ADMIN — MONTELUKAST SODIUM 10 MG: 10 TABLET, FILM COATED ORAL at 20:47

## 2021-06-16 ASSESSMENT — PAIN SCALES - GENERAL
PAINLEVEL_OUTOF10: 6
PAINLEVEL_OUTOF10: 6
PAINLEVEL_OUTOF10: 3
PAINLEVEL_OUTOF10: 0
PAINLEVEL_OUTOF10: 3
PAINLEVEL_OUTOF10: 1
PAINLEVEL_OUTOF10: 6
PAINLEVEL_OUTOF10: 1

## 2021-06-16 NOTE — PROGRESS NOTES
Pt relates that elevating leg on pillow has helped with his pain. Dressing changed for moderate amount of serosang drainage. Lower part of scab comes off with application on alcohol. New dressing applied.

## 2021-06-16 NOTE — PLAN OF CARE
Problem: Pain:  Goal: Pain level will decrease  Description: Pain level will decrease  Outcome: Ongoing  Goal: Control of chronic pain  Description: Control of chronic pain  Outcome: Ongoing     Problem: Skin Integrity:  Goal: Will show no infection signs and symptoms  Description: Will show no infection signs and symptoms  Outcome: Ongoing  Goal: Risk for impaired skin integrity will decrease  Description: Risk for impaired skin integrity will decrease  Outcome: Ongoing  Goal: Signs of wound healing will improve  Description: Signs of wound healing will improve  Outcome: Ongoing     Problem: Falls - Risk of:  Goal: Will remain free from falls  Description: Will remain free from falls  Outcome: Ongoing     Problem: Gas Exchange - Impaired:  Goal: Levels of oxygenation will improve  Description: Levels of oxygenation will improve  Outcome: Ongoing     Problem: Infection, Septic Shock:  Goal: Will show no infection signs and symptoms  Description: Will show no infection signs and symptoms  Outcome: Ongoing     Problem: Venous Thromboembolism:  Goal: Will show no signs or symptoms of venous thromboembolism  Description: Will show no signs or symptoms of venous thromboembolism  Outcome: Ongoing     Problem: OXYGENATION/RESPIRATORY FUNCTION  Goal: Patient will maintain patent airway  Outcome: Ongoing     Problem: Gas Exchange - Impaired:  Goal: Levels of oxygenation will improve  Description: Levels of oxygenation will improve  Outcome: Ongoing     Problem: Sensory:  Goal: Pain level will decrease  Description: Pain level will decrease  Outcome: Ongoing

## 2021-06-17 PROCEDURE — 6360000002 HC RX W HCPCS: Performed by: INTERNAL MEDICINE

## 2021-06-17 PROCEDURE — 1200000002 HC SEMI PRIVATE SWING BED

## 2021-06-17 PROCEDURE — 2580000003 HC RX 258: Performed by: INTERNAL MEDICINE

## 2021-06-17 PROCEDURE — 6370000000 HC RX 637 (ALT 250 FOR IP): Performed by: INTERNAL MEDICINE

## 2021-06-17 PROCEDURE — 94640 AIRWAY INHALATION TREATMENT: CPT

## 2021-06-17 RX ADMIN — VANCOMYCIN HYDROCHLORIDE 125 MG: 125 CAPSULE ORAL at 20:17

## 2021-06-17 RX ADMIN — SODIUM CHLORIDE, PRESERVATIVE FREE 20 ML: 5 INJECTION INTRAVENOUS at 17:14

## 2021-06-17 RX ADMIN — ISOSORBIDE MONONITRATE 30 MG: 30 TABLET, EXTENDED RELEASE ORAL at 08:26

## 2021-06-17 RX ADMIN — TIOTROPIUM BROMIDE INHALATION SPRAY 1 PUFF: 3.12 SPRAY, METERED RESPIRATORY (INHALATION) at 21:12

## 2021-06-17 RX ADMIN — CHOLECALCIFEROL TAB 25 MCG (1000 UNIT) 1000 UNITS: 25 TAB at 08:27

## 2021-06-17 RX ADMIN — ASPIRIN 325 MG: 325 TABLET, COATED ORAL at 08:27

## 2021-06-17 RX ADMIN — SODIUM CHLORIDE, PRESERVATIVE FREE 20 ML: 5 INJECTION INTRAVENOUS at 09:19

## 2021-06-17 RX ADMIN — BUDESONIDE AND FORMOTEROL FUMARATE DIHYDRATE 2 PUFF: 160; 4.5 AEROSOL RESPIRATORY (INHALATION) at 09:27

## 2021-06-17 RX ADMIN — MONTELUKAST SODIUM 10 MG: 10 TABLET, FILM COATED ORAL at 20:17

## 2021-06-17 RX ADMIN — ATORVASTATIN CALCIUM 80 MG: 40 TABLET, FILM COATED ORAL at 20:17

## 2021-06-17 RX ADMIN — ENOXAPARIN SODIUM 30 MG: 30 INJECTION SUBCUTANEOUS at 20:17

## 2021-06-17 RX ADMIN — SODIUM CHLORIDE, PRESERVATIVE FREE 10 ML: 5 INJECTION INTRAVENOUS at 20:16

## 2021-06-17 RX ADMIN — LOSARTAN POTASSIUM 25 MG: 50 TABLET ORAL at 08:27

## 2021-06-17 RX ADMIN — Medication 1 CAPSULE: at 17:14

## 2021-06-17 RX ADMIN — SPIRONOLACTONE 25 MG: 25 TABLET, FILM COATED ORAL at 08:26

## 2021-06-17 RX ADMIN — FERROUS SULFATE TAB 325 MG (65 MG ELEMENTAL FE) 325 MG: 325 (65 FE) TAB at 08:27

## 2021-06-17 RX ADMIN — ENOXAPARIN SODIUM 30 MG: 30 INJECTION SUBCUTANEOUS at 08:30

## 2021-06-17 RX ADMIN — IPRATROPIUM BROMIDE 0.5 MG: 0.5 SOLUTION RESPIRATORY (INHALATION) at 21:12

## 2021-06-17 RX ADMIN — WATER 2000 MG: 1 INJECTION INTRAMUSCULAR; INTRAVENOUS; SUBCUTANEOUS at 17:14

## 2021-06-17 RX ADMIN — FLUTICASONE PROPIONATE 1 SPRAY: 50 SPRAY, METERED NASAL at 08:29

## 2021-06-17 RX ADMIN — ALBUTEROL SULFATE 2.5 MG: 2.5 SOLUTION RESPIRATORY (INHALATION) at 21:12

## 2021-06-17 RX ADMIN — IPRATROPIUM BROMIDE 0.5 MG: 0.5 SOLUTION RESPIRATORY (INHALATION) at 09:17

## 2021-06-17 RX ADMIN — DOCUSATE SODIUM 100 MG: 100 CAPSULE, LIQUID FILLED ORAL at 20:17

## 2021-06-17 RX ADMIN — TAMSULOSIN HYDROCHLORIDE 0.4 MG: 0.4 CAPSULE ORAL at 17:14

## 2021-06-17 RX ADMIN — TIOTROPIUM BROMIDE INHALATION SPRAY 1 PUFF: 3.12 SPRAY, METERED RESPIRATORY (INHALATION) at 09:29

## 2021-06-17 RX ADMIN — METFORMIN HYDROCHLORIDE 500 MG: 500 TABLET ORAL at 08:27

## 2021-06-17 RX ADMIN — SODIUM CHLORIDE, PRESERVATIVE FREE 10 ML: 5 INJECTION INTRAVENOUS at 08:27

## 2021-06-17 RX ADMIN — OXYCODONE AND ACETAMINOPHEN 1 TABLET: 5; 325 TABLET ORAL at 14:07

## 2021-06-17 RX ADMIN — Medication 1 CAPSULE: at 08:27

## 2021-06-17 RX ADMIN — METOPROLOL SUCCINATE 25 MG: 25 TABLET, EXTENDED RELEASE ORAL at 08:27

## 2021-06-17 RX ADMIN — BUDESONIDE AND FORMOTEROL FUMARATE DIHYDRATE 2 PUFF: 160; 4.5 AEROSOL RESPIRATORY (INHALATION) at 21:12

## 2021-06-17 RX ADMIN — POTASSIUM CHLORIDE 10 MEQ: 750 TABLET, FILM COATED, EXTENDED RELEASE ORAL at 08:26

## 2021-06-17 RX ADMIN — ALBUTEROL SULFATE 2.5 MG: 2.5 SOLUTION RESPIRATORY (INHALATION) at 09:17

## 2021-06-17 RX ADMIN — VANCOMYCIN HYDROCHLORIDE 125 MG: 125 CAPSULE ORAL at 08:30

## 2021-06-17 RX ADMIN — DOCUSATE SODIUM 100 MG: 100 CAPSULE, LIQUID FILLED ORAL at 08:27

## 2021-06-17 RX ADMIN — OXYCODONE AND ACETAMINOPHEN 1 TABLET: 5; 325 TABLET ORAL at 22:09

## 2021-06-17 RX ADMIN — PANTOPRAZOLE SODIUM 40 MG: 40 TABLET, DELAYED RELEASE ORAL at 05:48

## 2021-06-17 RX ADMIN — ESCITALOPRAM OXALATE 20 MG: 10 TABLET ORAL at 08:27

## 2021-06-17 RX ADMIN — WATER 2000 MG: 1 INJECTION INTRAMUSCULAR; INTRAVENOUS; SUBCUTANEOUS at 02:24

## 2021-06-17 RX ADMIN — THERA TABS 1 TABLET: TAB at 08:26

## 2021-06-17 RX ADMIN — HYDROCHLOROTHIAZIDE 25 MG: 25 TABLET ORAL at 08:27

## 2021-06-17 RX ADMIN — OXYCODONE HYDROCHLORIDE AND ACETAMINOPHEN 500 MG: 500 TABLET ORAL at 08:26

## 2021-06-17 RX ADMIN — WATER 2000 MG: 1 INJECTION INTRAMUSCULAR; INTRAVENOUS; SUBCUTANEOUS at 09:19

## 2021-06-17 ASSESSMENT — PAIN DESCRIPTION - PAIN TYPE
TYPE: SURGICAL PAIN

## 2021-06-17 ASSESSMENT — PAIN DESCRIPTION - DESCRIPTORS
DESCRIPTORS: ACHING

## 2021-06-17 ASSESSMENT — PAIN SCALES - GENERAL
PAINLEVEL_OUTOF10: 2
PAINLEVEL_OUTOF10: 6
PAINLEVEL_OUTOF10: 2
PAINLEVEL_OUTOF10: 5
PAINLEVEL_OUTOF10: 2
PAINLEVEL_OUTOF10: 2

## 2021-06-17 ASSESSMENT — PAIN DESCRIPTION - FREQUENCY
FREQUENCY: CONTINUOUS

## 2021-06-17 ASSESSMENT — PAIN DESCRIPTION - ORIENTATION
ORIENTATION: RIGHT
ORIENTATION: RIGHT;INNER

## 2021-06-17 ASSESSMENT — PAIN DESCRIPTION - LOCATION
LOCATION: KNEE

## 2021-06-17 ASSESSMENT — PAIN DESCRIPTION - ONSET: ONSET: ON-GOING

## 2021-06-18 PROCEDURE — 94640 AIRWAY INHALATION TREATMENT: CPT

## 2021-06-18 PROCEDURE — 6370000000 HC RX 637 (ALT 250 FOR IP): Performed by: INTERNAL MEDICINE

## 2021-06-18 PROCEDURE — 1200000002 HC SEMI PRIVATE SWING BED

## 2021-06-18 PROCEDURE — 6360000002 HC RX W HCPCS: Performed by: INTERNAL MEDICINE

## 2021-06-18 PROCEDURE — 2580000003 HC RX 258: Performed by: INTERNAL MEDICINE

## 2021-06-18 RX ADMIN — SODIUM CHLORIDE, PRESERVATIVE FREE 10 ML: 5 INJECTION INTRAVENOUS at 18:19

## 2021-06-18 RX ADMIN — ALBUTEROL SULFATE 2.5 MG: 2.5 SOLUTION RESPIRATORY (INHALATION) at 09:33

## 2021-06-18 RX ADMIN — ISOSORBIDE MONONITRATE 30 MG: 30 TABLET, EXTENDED RELEASE ORAL at 08:07

## 2021-06-18 RX ADMIN — ENOXAPARIN SODIUM 30 MG: 30 INJECTION SUBCUTANEOUS at 21:25

## 2021-06-18 RX ADMIN — VANCOMYCIN HYDROCHLORIDE 125 MG: 125 CAPSULE ORAL at 08:07

## 2021-06-18 RX ADMIN — ALBUTEROL SULFATE 2.5 MG: 2.5 SOLUTION RESPIRATORY (INHALATION) at 22:53

## 2021-06-18 RX ADMIN — TIOTROPIUM BROMIDE INHALATION SPRAY 1 PUFF: 3.12 SPRAY, METERED RESPIRATORY (INHALATION) at 22:53

## 2021-06-18 RX ADMIN — SPIRONOLACTONE 25 MG: 25 TABLET, FILM COATED ORAL at 08:08

## 2021-06-18 RX ADMIN — MONTELUKAST SODIUM 10 MG: 10 TABLET, FILM COATED ORAL at 21:26

## 2021-06-18 RX ADMIN — PANTOPRAZOLE SODIUM 40 MG: 40 TABLET, DELAYED RELEASE ORAL at 06:20

## 2021-06-18 RX ADMIN — OXYCODONE HYDROCHLORIDE AND ACETAMINOPHEN 500 MG: 500 TABLET ORAL at 08:07

## 2021-06-18 RX ADMIN — BUDESONIDE AND FORMOTEROL FUMARATE DIHYDRATE 2 PUFF: 160; 4.5 AEROSOL RESPIRATORY (INHALATION) at 22:53

## 2021-06-18 RX ADMIN — FLUTICASONE PROPIONATE 1 SPRAY: 50 SPRAY, METERED NASAL at 08:09

## 2021-06-18 RX ADMIN — BUDESONIDE AND FORMOTEROL FUMARATE DIHYDRATE 2 PUFF: 160; 4.5 AEROSOL RESPIRATORY (INHALATION) at 09:41

## 2021-06-18 RX ADMIN — ESCITALOPRAM OXALATE 20 MG: 10 TABLET ORAL at 08:07

## 2021-06-18 RX ADMIN — ASPIRIN 325 MG: 325 TABLET, COATED ORAL at 08:07

## 2021-06-18 RX ADMIN — DOCUSATE SODIUM 100 MG: 100 CAPSULE, LIQUID FILLED ORAL at 08:07

## 2021-06-18 RX ADMIN — OXYCODONE AND ACETAMINOPHEN 1 TABLET: 5; 325 TABLET ORAL at 21:25

## 2021-06-18 RX ADMIN — POTASSIUM CHLORIDE 10 MEQ: 750 TABLET, FILM COATED, EXTENDED RELEASE ORAL at 08:08

## 2021-06-18 RX ADMIN — THERA TABS 1 TABLET: TAB at 08:08

## 2021-06-18 RX ADMIN — METOPROLOL SUCCINATE 25 MG: 25 TABLET, EXTENDED RELEASE ORAL at 08:07

## 2021-06-18 RX ADMIN — CHOLECALCIFEROL TAB 25 MCG (1000 UNIT) 1000 UNITS: 25 TAB at 08:08

## 2021-06-18 RX ADMIN — TIOTROPIUM BROMIDE INHALATION SPRAY 1 PUFF: 3.12 SPRAY, METERED RESPIRATORY (INHALATION) at 09:43

## 2021-06-18 RX ADMIN — HYDROCHLOROTHIAZIDE 25 MG: 25 TABLET ORAL at 08:08

## 2021-06-18 RX ADMIN — SODIUM CHLORIDE, PRESERVATIVE FREE 10 ML: 5 INJECTION INTRAVENOUS at 08:08

## 2021-06-18 RX ADMIN — DOCUSATE SODIUM 100 MG: 100 CAPSULE, LIQUID FILLED ORAL at 21:26

## 2021-06-18 RX ADMIN — WATER 2000 MG: 1 INJECTION INTRAMUSCULAR; INTRAVENOUS; SUBCUTANEOUS at 18:18

## 2021-06-18 RX ADMIN — IPRATROPIUM BROMIDE 0.5 MG: 0.5 SOLUTION RESPIRATORY (INHALATION) at 09:33

## 2021-06-18 RX ADMIN — SODIUM CHLORIDE, PRESERVATIVE FREE 10 ML: 5 INJECTION INTRAVENOUS at 21:26

## 2021-06-18 RX ADMIN — IPRATROPIUM BROMIDE 0.5 MG: 0.5 SOLUTION RESPIRATORY (INHALATION) at 22:53

## 2021-06-18 RX ADMIN — Medication 1 CAPSULE: at 18:17

## 2021-06-18 RX ADMIN — TAMSULOSIN HYDROCHLORIDE 0.4 MG: 0.4 CAPSULE ORAL at 18:17

## 2021-06-18 RX ADMIN — METFORMIN HYDROCHLORIDE 500 MG: 500 TABLET ORAL at 08:07

## 2021-06-18 RX ADMIN — ONDANSETRON 4 MG: 4 TABLET, ORALLY DISINTEGRATING ORAL at 18:21

## 2021-06-18 RX ADMIN — Medication 1 CAPSULE: at 08:07

## 2021-06-18 RX ADMIN — LOSARTAN POTASSIUM 25 MG: 50 TABLET ORAL at 08:07

## 2021-06-18 RX ADMIN — FERROUS SULFATE TAB 325 MG (65 MG ELEMENTAL FE) 325 MG: 325 (65 FE) TAB at 08:08

## 2021-06-18 RX ADMIN — WATER 2000 MG: 1 INJECTION INTRAMUSCULAR; INTRAVENOUS; SUBCUTANEOUS at 02:21

## 2021-06-18 RX ADMIN — ENOXAPARIN SODIUM 30 MG: 30 INJECTION SUBCUTANEOUS at 08:08

## 2021-06-18 RX ADMIN — WATER 2000 MG: 1 INJECTION INTRAMUSCULAR; INTRAVENOUS; SUBCUTANEOUS at 09:51

## 2021-06-18 RX ADMIN — ATORVASTATIN CALCIUM 80 MG: 40 TABLET, FILM COATED ORAL at 21:26

## 2021-06-18 ASSESSMENT — PAIN DESCRIPTION - PAIN TYPE
TYPE: SURGICAL PAIN
TYPE: SURGICAL PAIN

## 2021-06-18 ASSESSMENT — PAIN DESCRIPTION - FREQUENCY
FREQUENCY: INTERMITTENT
FREQUENCY: CONTINUOUS

## 2021-06-18 ASSESSMENT — PAIN SCALES - GENERAL
PAINLEVEL_OUTOF10: 1
PAINLEVEL_OUTOF10: 6
PAINLEVEL_OUTOF10: 0
PAINLEVEL_OUTOF10: 6

## 2021-06-18 ASSESSMENT — PAIN DESCRIPTION - ORIENTATION
ORIENTATION: RIGHT
ORIENTATION: RIGHT

## 2021-06-18 ASSESSMENT — PAIN DESCRIPTION - DESCRIPTORS
DESCRIPTORS: ACHING
DESCRIPTORS: SHARP

## 2021-06-18 ASSESSMENT — PAIN DESCRIPTION - LOCATION
LOCATION: KNEE
LOCATION: KNEE

## 2021-06-18 ASSESSMENT — PAIN DESCRIPTION - ONSET: ONSET: ON-GOING

## 2021-06-18 NOTE — PROGRESS NOTES
Dressing of right knee changed as ordered. Scant amount of drainage noted on old dressing. Right leg elevated and ice applied throughout the day, pt denies pain. Keeps immobilizer on at all times and bears no weight on right leg.

## 2021-06-19 PROCEDURE — 6370000000 HC RX 637 (ALT 250 FOR IP): Performed by: INTERNAL MEDICINE

## 2021-06-19 PROCEDURE — 6360000002 HC RX W HCPCS: Performed by: INTERNAL MEDICINE

## 2021-06-19 PROCEDURE — 2580000003 HC RX 258: Performed by: INTERNAL MEDICINE

## 2021-06-19 PROCEDURE — 94640 AIRWAY INHALATION TREATMENT: CPT

## 2021-06-19 PROCEDURE — 1200000002 HC SEMI PRIVATE SWING BED

## 2021-06-19 RX ADMIN — ATORVASTATIN CALCIUM 80 MG: 40 TABLET, FILM COATED ORAL at 21:51

## 2021-06-19 RX ADMIN — DOCUSATE SODIUM 100 MG: 100 CAPSULE, LIQUID FILLED ORAL at 21:50

## 2021-06-19 RX ADMIN — SODIUM CHLORIDE, PRESERVATIVE FREE 10 ML: 5 INJECTION INTRAVENOUS at 09:14

## 2021-06-19 RX ADMIN — WATER 2000 MG: 1 INJECTION INTRAMUSCULAR; INTRAVENOUS; SUBCUTANEOUS at 01:22

## 2021-06-19 RX ADMIN — ENOXAPARIN SODIUM 30 MG: 30 INJECTION SUBCUTANEOUS at 09:07

## 2021-06-19 RX ADMIN — THERA TABS 1 TABLET: TAB at 09:06

## 2021-06-19 RX ADMIN — BUDESONIDE AND FORMOTEROL FUMARATE DIHYDRATE 2 PUFF: 160; 4.5 AEROSOL RESPIRATORY (INHALATION) at 20:04

## 2021-06-19 RX ADMIN — ESCITALOPRAM OXALATE 20 MG: 10 TABLET ORAL at 09:06

## 2021-06-19 RX ADMIN — SPIRONOLACTONE 25 MG: 25 TABLET, FILM COATED ORAL at 09:06

## 2021-06-19 RX ADMIN — ISOSORBIDE MONONITRATE 30 MG: 30 TABLET, EXTENDED RELEASE ORAL at 09:06

## 2021-06-19 RX ADMIN — IPRATROPIUM BROMIDE 0.5 MG: 0.5 SOLUTION RESPIRATORY (INHALATION) at 20:04

## 2021-06-19 RX ADMIN — HYDROCHLOROTHIAZIDE 25 MG: 25 TABLET ORAL at 09:06

## 2021-06-19 RX ADMIN — OXYCODONE HYDROCHLORIDE AND ACETAMINOPHEN 500 MG: 500 TABLET ORAL at 09:07

## 2021-06-19 RX ADMIN — METFORMIN HYDROCHLORIDE 500 MG: 500 TABLET ORAL at 09:06

## 2021-06-19 RX ADMIN — SODIUM CHLORIDE, PRESERVATIVE FREE 10 ML: 5 INJECTION INTRAVENOUS at 17:30

## 2021-06-19 RX ADMIN — MONTELUKAST SODIUM 10 MG: 10 TABLET, FILM COATED ORAL at 21:51

## 2021-06-19 RX ADMIN — ASPIRIN 325 MG: 325 TABLET, COATED ORAL at 09:06

## 2021-06-19 RX ADMIN — Medication 1 CAPSULE: at 09:07

## 2021-06-19 RX ADMIN — ALBUTEROL SULFATE 2.5 MG: 2.5 SOLUTION RESPIRATORY (INHALATION) at 08:22

## 2021-06-19 RX ADMIN — WATER 2000 MG: 1 INJECTION INTRAMUSCULAR; INTRAVENOUS; SUBCUTANEOUS at 09:49

## 2021-06-19 RX ADMIN — METOPROLOL SUCCINATE 25 MG: 25 TABLET, EXTENDED RELEASE ORAL at 09:06

## 2021-06-19 RX ADMIN — VANCOMYCIN HYDROCHLORIDE 125 MG: 125 CAPSULE ORAL at 01:22

## 2021-06-19 RX ADMIN — FLUTICASONE PROPIONATE 1 SPRAY: 50 SPRAY, METERED NASAL at 09:13

## 2021-06-19 RX ADMIN — TAMSULOSIN HYDROCHLORIDE 0.4 MG: 0.4 CAPSULE ORAL at 17:30

## 2021-06-19 RX ADMIN — WATER 2000 MG: 1 INJECTION INTRAMUSCULAR; INTRAVENOUS; SUBCUTANEOUS at 17:30

## 2021-06-19 RX ADMIN — VANCOMYCIN HYDROCHLORIDE 125 MG: 125 CAPSULE ORAL at 21:51

## 2021-06-19 RX ADMIN — ENOXAPARIN SODIUM 30 MG: 30 INJECTION SUBCUTANEOUS at 21:50

## 2021-06-19 RX ADMIN — OXYCODONE AND ACETAMINOPHEN 1 TABLET: 5; 325 TABLET ORAL at 21:51

## 2021-06-19 RX ADMIN — DOCUSATE SODIUM 100 MG: 100 CAPSULE, LIQUID FILLED ORAL at 09:06

## 2021-06-19 RX ADMIN — IPRATROPIUM BROMIDE 0.5 MG: 0.5 SOLUTION RESPIRATORY (INHALATION) at 08:22

## 2021-06-19 RX ADMIN — POTASSIUM CHLORIDE 10 MEQ: 750 TABLET, FILM COATED, EXTENDED RELEASE ORAL at 09:07

## 2021-06-19 RX ADMIN — PANTOPRAZOLE SODIUM 40 MG: 40 TABLET, DELAYED RELEASE ORAL at 07:28

## 2021-06-19 RX ADMIN — SODIUM CHLORIDE, PRESERVATIVE FREE 10 ML: 5 INJECTION INTRAVENOUS at 09:49

## 2021-06-19 RX ADMIN — SODIUM CHLORIDE, PRESERVATIVE FREE 10 ML: 5 INJECTION INTRAVENOUS at 21:50

## 2021-06-19 RX ADMIN — LOSARTAN POTASSIUM 25 MG: 50 TABLET ORAL at 09:07

## 2021-06-19 RX ADMIN — Medication 1 CAPSULE: at 17:30

## 2021-06-19 RX ADMIN — VANCOMYCIN HYDROCHLORIDE 125 MG: 125 CAPSULE ORAL at 09:06

## 2021-06-19 RX ADMIN — TIOTROPIUM BROMIDE INHALATION SPRAY 1 PUFF: 3.12 SPRAY, METERED RESPIRATORY (INHALATION) at 08:26

## 2021-06-19 RX ADMIN — FERROUS SULFATE TAB 325 MG (65 MG ELEMENTAL FE) 325 MG: 325 (65 FE) TAB at 09:07

## 2021-06-19 RX ADMIN — BUDESONIDE AND FORMOTEROL FUMARATE DIHYDRATE 2 PUFF: 160; 4.5 AEROSOL RESPIRATORY (INHALATION) at 08:26

## 2021-06-19 RX ADMIN — OXYCODONE AND ACETAMINOPHEN 1 TABLET: 5; 325 TABLET ORAL at 09:13

## 2021-06-19 RX ADMIN — TIOTROPIUM BROMIDE INHALATION SPRAY 1 PUFF: 3.12 SPRAY, METERED RESPIRATORY (INHALATION) at 20:04

## 2021-06-19 RX ADMIN — ALBUTEROL SULFATE 2.5 MG: 2.5 SOLUTION RESPIRATORY (INHALATION) at 20:04

## 2021-06-19 RX ADMIN — CHOLECALCIFEROL TAB 25 MCG (1000 UNIT) 1000 UNITS: 25 TAB at 09:07

## 2021-06-19 ASSESSMENT — PAIN DESCRIPTION - PROGRESSION

## 2021-06-19 ASSESSMENT — PAIN SCALES - GENERAL
PAINLEVEL_OUTOF10: 5
PAINLEVEL_OUTOF10: 1
PAINLEVEL_OUTOF10: 2
PAINLEVEL_OUTOF10: 1

## 2021-06-19 ASSESSMENT — PAIN DESCRIPTION - DESCRIPTORS
DESCRIPTORS: STABBING

## 2021-06-19 ASSESSMENT — PAIN DESCRIPTION - ONSET: ONSET: ON-GOING

## 2021-06-19 ASSESSMENT — PAIN DESCRIPTION - LOCATION
LOCATION: KNEE

## 2021-06-19 ASSESSMENT — PAIN DESCRIPTION - ORIENTATION
ORIENTATION: RIGHT;INNER
ORIENTATION: RIGHT;INNER
ORIENTATION: RIGHT

## 2021-06-19 ASSESSMENT — PAIN DESCRIPTION - FREQUENCY
FREQUENCY: INTERMITTENT

## 2021-06-19 ASSESSMENT — PAIN DESCRIPTION - PAIN TYPE
TYPE: SURGICAL PAIN
TYPE: SURGICAL PAIN;CHRONIC PAIN

## 2021-06-19 NOTE — PLAN OF CARE
Problem: Pain:  Description: Pain management should include both nonpharmacologic and pharmacologic interventions. Goal: Pain level will decrease  Description: Pain level will decrease  Outcome: Met This Shift     Problem: Skin Integrity:  Goal: Will show no infection signs and symptoms  Description: Will show no infection signs and symptoms  Outcome: Met This Shift     Problem: Falls - Risk of:  Goal: Will remain free from falls  Description: Will remain free from falls  Outcome: Met This Shift     Problem: Gas Exchange - Impaired:  Goal: Levels of oxygenation will improve  Description: Levels of oxygenation will improve  Outcome: Met This Shift     Problem: Venous Thromboembolism:  Goal: Will show no signs or symptoms of venous thromboembolism  Description: Will show no signs or symptoms of venous thromboembolism  Outcome: Met This Shift     Problem: Gas Exchange - Impaired:  Goal: Levels of oxygenation will improve  Description: Levels of oxygenation will improve  Outcome: Met This Shift     Problem: Cardiac:  Goal: Ability to maintain vital signs within normal range will improve  Description: Ability to maintain vital signs within normal range will improve  Outcome: Met This Shift     Problem: Safety:  Goal: Ability to remain free from injury will improve  Description: Ability to remain free from injury will improve  Outcome: Met This Shift     Problem: Sensory:  Goal: Pain level will decrease  Description: Pain level will decrease  Outcome: Met This Shift     Problem: ACTIVITY INTOLERANCE/IMPAIRED MOBILITY  Goal: Mobility/activity is maintained at optimum level for patient  Outcome: Ongoing     Problem: Activity:  Goal: Ability to tolerate increased activity will improve  Description: Ability to tolerate increased activity will improve  Outcome: Ongoing     Problem:  Activity Intolerance:  Goal: Ability to tolerate increased activity will improve  Description: Ability to tolerate increased activity will improve  Outcome: Ongoing     Problem: Infection, Septic Shock:  Goal: Will show no infection signs and symptoms  Description: Will show no infection signs and symptoms  Outcome: Completed

## 2021-06-19 NOTE — PROGRESS NOTES
Hospitalist Progress Note  6/19/2021 11:37 AM  Subjective:   Admit Date: 5/15/2021  PCP: BESS Bae - CNP    Interval History:    Ebony Howard states he is doing well. Has little bit more pain in his knee today, he believes he slightly twisted his knee during therapy. No increased drainage from the wound site. Denies having any chest pain, shortness of breath cough, nausea, diarrhea. States oral intake is good. Bowels moving regularly.       Diet: DIET CARB CONTROL; Carb Control: 4 carb choices (60 gms)/meal  Medications:   Scheduled Meds:   enoxaparin  30 mg Subcutaneous BID    lactobacillus  1 capsule Oral BID WC    albuterol  2.5 mg Nebulization BID    ferrous sulfate  325 mg Oral Daily with breakfast    aspirin  325 mg Oral Daily    atorvastatin  80 mg Oral Nightly    hydroCHLOROthiazide  25 mg Oral Daily    isosorbide mononitrate  30 mg Oral Daily    losartan  25 mg Oral Daily    metoprolol succinate  25 mg Oral Daily    montelukast  10 mg Oral Nightly    multivitamin  1 tablet Oral Daily    spironolactone  25 mg Oral Daily    tamsulosin  0.4 mg Oral QPM    docusate sodium  100 mg Oral BID    escitalopram  20 mg Oral Daily    budesonide-formoterol  2 puff Inhalation Q12H    fluticasone  1 spray Nasal Daily    ipratropium  0.5 mg Nebulization BID    metFORMIN  500 mg Oral Daily with breakfast    pantoprazole  40 mg Oral QAM AC    tiotropium  1 puff Inhalation BID    vitamin C  500 mg Oral Daily    Vitamin D  1,000 Units Oral Daily    cefepime  2,000 mg Intravenous Q8H    potassium chloride  10 mEq Oral Daily with breakfast    sodium chloride flush  5-40 mL Intravenous 2 times per day    vancomycin  125 mg Oral 2 times per day     Continuous Infusions:   sodium chloride       PRN Medications: ondansetron, albuterol, oxyCODONE-acetaminophen, sodium chloride flush, sodium chloride    Objective:   Vitals: /77   Pulse 64   Temp 98.1 °F (36.7 °C) (Oral)   Resp 16   Ht 5' 10\" (1.778 m)   Wt (!) 305 lb (138.3 kg)   SpO2 99%   BMI 43.76 kg/m²   BMI: Body mass index is 43.76 kg/m². Physical Exam:      General Appearance: Up in chair, oriented x3, cooperative  Cardiovascular: normal rate, regular rhythm, normal S1 and S2  Pulmonary/Chest: clear to auscultation bilaterally, no rales, no wheezes,  Abdomen: soft, obese, non-tender, non-distended, normal bowel sounds   Extremities: Right lower extremity with straight leg brace  Skin: warm and dry, no rash  Neurological: alert, oriented, normal speech, no focal findings or movement disorder noted       Assessment and Plan:     1. S/P stage I revision of right TKA  - on 6 weeks of IV cefepime. He is making good progress with PT and OT.   He is supposed to have telemedicine evaluation by orthopedic surgeon at [facility] [employer] [facility] on 6/28   2.  Generalized weakness -continue PT and OT  3.  History of C. difficile colitis - on oral vancomycin and lactobacillus prophylactically to prevent recurrence  4.  Mild hypokalemia -  on potassium supplements  5.  Chronic anemia -  continue on iron replacement  6.  Diabetes mellitus type 2, controlled, non-insulin-dependent - on Metformin  7.  Hypertension -blood pressure stable, continue losartan, Toprol-XL, HCTZ, Aldactone  8.  History of CAD, s/p CABG on 3/30/2015 -asymptomatic, continue medical management with aspirin, Toprol-XL, Imdur  9.  History of BPH - on Flomax  10.  GERD -on Protonix  11.  History of depression - mood stable on Lexapro  06.  SARAN diastolic CHF - continue on diuretics, asymptomatic    Electronically signed by Dio Gavin MD on 6/19/2021 at 11:37 AM    Rounding Hospitalist

## 2021-06-19 NOTE — PLAN OF CARE
Problem: Pain:  Description: Pain management should include both nonpharmacologic and pharmacologic interventions.   Goal: Pain level will decrease  Description: Pain level will decrease  Outcome: Met This Shift     Problem: Skin Integrity:  Goal: Will show no infection signs and symptoms  Description: Will show no infection signs and symptoms  6/19/2021 1626 by Heena Goodson RN  Outcome: Met This Shift  6/19/2021 1624 by Heena Goodson RN  Outcome: Met This Shift  Goal: Absence of new skin breakdown  Description: Absence of new skin breakdown  Outcome: Met This Shift  Goal: Signs of wound healing will improve  Description: Signs of wound healing will improve  Outcome: Met This Shift     Problem: Falls - Risk of:  Goal: Will remain free from falls  Description: Will remain free from falls  Outcome: Met This Shift     Problem: Gas Exchange - Impaired:  Goal: Levels of oxygenation will improve  Description: Levels of oxygenation will improve  6/19/2021 1626 by Heena Goodson RN  Outcome: Met This Shift  6/19/2021 1624 by Heena Goodson RN  Outcome: Met This Shift     Problem: Venous Thromboembolism:  Goal: Will show no signs or symptoms of venous thromboembolism  Description: Will show no signs or symptoms of venous thromboembolism  Outcome: Met This Shift     Problem: Gas Exchange - Impaired:  Goal: Levels of oxygenation will improve  Description: Levels of oxygenation will improve  6/19/2021 1626 by Heena Goodson RN  Outcome: Met This Shift  6/19/2021 1624 by Heena Goodson RN  Outcome: Met This Shift     Problem: Cardiac:  Goal: Ability to maintain vital signs within normal range will improve  Description: Ability to maintain vital signs within normal range will improve  Outcome: Met This Shift     Problem: Safety:  Goal: Ability to remain free from injury will improve  Description: Ability to remain free from injury will improve  6/19/2021 1626 by Heena Goodson RN  Outcome: Met This Shift  6/19/2021 1624 by Whitney Valdez Hillary Klein RN  Outcome: Met This Shift     Problem: Sensory:  Goal: Pain level will decrease  Description: Pain level will decrease  Outcome: Met This Shift     Problem: ACTIVITY INTOLERANCE/IMPAIRED MOBILITY  Goal: Mobility/activity is maintained at optimum level for patient  6/19/2021 1626 by Hali Rasmussen RN  Outcome: Ongoing  6/19/2021 1624 by Hali Rasmussen RN  Outcome: Ongoing     Problem: Activity:  Goal: Mobility will improve  Description: Mobility will improve  Outcome: Ongoing  Goal: Ability to tolerate increased activity will improve  Description: Ability to tolerate increased activity will improve  Outcome: Ongoing     Problem:  Activity Intolerance:  Goal: Ability to tolerate increased activity will improve  Description: Ability to tolerate increased activity will improve  Outcome: Ongoing     Problem: Infection, Septic Shock:  Goal: Will show no infection signs and symptoms  Description: Will show no infection signs and symptoms  Outcome: Completed     Problem: Nutritional:  Goal: Ability to chew and swallow food without choking will improve  Outcome: Completed

## 2021-06-20 LAB
ABSOLUTE EOS #: 0.5 K/UL (ref 0–0.4)
ABSOLUTE IMMATURE GRANULOCYTE: ABNORMAL K/UL (ref 0–0.3)
ABSOLUTE LYMPH #: 1 K/UL (ref 1–4.8)
ABSOLUTE MONO #: 0.5 K/UL (ref 0–1)
ANION GAP SERPL CALCULATED.3IONS-SCNC: 9 MMOL/L (ref 9–17)
BASOPHILS # BLD: 2 % (ref 0–2)
BASOPHILS ABSOLUTE: 0.1 K/UL (ref 0–0.2)
BUN BLDV-MCNC: 17 MG/DL (ref 8–23)
BUN/CREAT BLD: 21 (ref 9–20)
C-REACTIVE PROTEIN: 3.1 MG/L (ref 0–5)
CALCIUM SERPL-MCNC: 9.3 MG/DL (ref 8.6–10.4)
CHLORIDE BLD-SCNC: 104 MMOL/L (ref 98–107)
CO2: 23 MMOL/L (ref 20–31)
CREAT SERPL-MCNC: 0.81 MG/DL (ref 0.7–1.2)
DIFFERENTIAL TYPE: YES
EOSINOPHILS RELATIVE PERCENT: 11 % (ref 0–5)
GFR AFRICAN AMERICAN: >60 ML/MIN
GFR NON-AFRICAN AMERICAN: >60 ML/MIN
GFR SERPL CREATININE-BSD FRML MDRD: ABNORMAL ML/MIN/{1.73_M2}
GFR SERPL CREATININE-BSD FRML MDRD: ABNORMAL ML/MIN/{1.73_M2}
GLUCOSE BLD-MCNC: 115 MG/DL (ref 70–99)
HCT VFR BLD CALC: 31.8 % (ref 41–53)
HEMOGLOBIN: 10.7 G/DL (ref 13.5–17.5)
IMMATURE GRANULOCYTES: ABNORMAL %
LYMPHOCYTES # BLD: 22 % (ref 13–44)
MCH RBC QN AUTO: 27 PG (ref 26–34)
MCHC RBC AUTO-ENTMCNC: 33.8 G/DL (ref 31–37)
MCV RBC AUTO: 80.1 FL (ref 80–100)
MONOCYTES # BLD: 10 % (ref 5–9)
NRBC AUTOMATED: ABNORMAL PER 100 WBC
PDW BLD-RTO: 18.5 % (ref 12.1–15.2)
PLATELET # BLD: 137 K/UL (ref 140–450)
PLATELET ESTIMATE: ABNORMAL
PMV BLD AUTO: ABNORMAL FL (ref 6–12)
POTASSIUM SERPL-SCNC: 3.9 MMOL/L (ref 3.7–5.3)
RBC # BLD: 3.97 M/UL (ref 4.5–5.9)
RBC # BLD: ABNORMAL 10*6/UL
SEG NEUTROPHILS: 55 % (ref 39–75)
SEGMENTED NEUTROPHILS ABSOLUTE COUNT: 2.6 K/UL (ref 2.1–6.5)
SODIUM BLD-SCNC: 136 MMOL/L (ref 135–144)
WBC # BLD: 4.7 K/UL (ref 3.5–11)
WBC # BLD: ABNORMAL 10*3/UL

## 2021-06-20 PROCEDURE — 6360000002 HC RX W HCPCS: Performed by: INTERNAL MEDICINE

## 2021-06-20 PROCEDURE — 94640 AIRWAY INHALATION TREATMENT: CPT

## 2021-06-20 PROCEDURE — 85025 COMPLETE CBC W/AUTO DIFF WBC: CPT

## 2021-06-20 PROCEDURE — 1200000002 HC SEMI PRIVATE SWING BED

## 2021-06-20 PROCEDURE — 6370000000 HC RX 637 (ALT 250 FOR IP): Performed by: INTERNAL MEDICINE

## 2021-06-20 PROCEDURE — 80048 BASIC METABOLIC PNL TOTAL CA: CPT

## 2021-06-20 PROCEDURE — 86140 C-REACTIVE PROTEIN: CPT

## 2021-06-20 PROCEDURE — 2580000003 HC RX 258: Performed by: INTERNAL MEDICINE

## 2021-06-20 RX ADMIN — SPIRONOLACTONE 25 MG: 25 TABLET, FILM COATED ORAL at 08:28

## 2021-06-20 RX ADMIN — VANCOMYCIN HYDROCHLORIDE 125 MG: 125 CAPSULE ORAL at 08:28

## 2021-06-20 RX ADMIN — POTASSIUM CHLORIDE 10 MEQ: 750 TABLET, FILM COATED, EXTENDED RELEASE ORAL at 08:28

## 2021-06-20 RX ADMIN — MONTELUKAST SODIUM 10 MG: 10 TABLET, FILM COATED ORAL at 21:26

## 2021-06-20 RX ADMIN — Medication 1 CAPSULE: at 17:49

## 2021-06-20 RX ADMIN — WATER 2000 MG: 1 INJECTION INTRAMUSCULAR; INTRAVENOUS; SUBCUTANEOUS at 01:38

## 2021-06-20 RX ADMIN — TIOTROPIUM BROMIDE INHALATION SPRAY 1 PUFF: 3.12 SPRAY, METERED RESPIRATORY (INHALATION) at 20:43

## 2021-06-20 RX ADMIN — OXYCODONE AND ACETAMINOPHEN 1 TABLET: 5; 325 TABLET ORAL at 21:26

## 2021-06-20 RX ADMIN — METOPROLOL SUCCINATE 25 MG: 25 TABLET, EXTENDED RELEASE ORAL at 08:28

## 2021-06-20 RX ADMIN — ESCITALOPRAM OXALATE 20 MG: 10 TABLET ORAL at 08:28

## 2021-06-20 RX ADMIN — ASPIRIN 325 MG: 325 TABLET, COATED ORAL at 08:28

## 2021-06-20 RX ADMIN — ATORVASTATIN CALCIUM 80 MG: 40 TABLET, FILM COATED ORAL at 21:26

## 2021-06-20 RX ADMIN — FERROUS SULFATE TAB 325 MG (65 MG ELEMENTAL FE) 325 MG: 325 (65 FE) TAB at 08:28

## 2021-06-20 RX ADMIN — HYDROCHLOROTHIAZIDE 25 MG: 25 TABLET ORAL at 08:28

## 2021-06-20 RX ADMIN — WATER 2000 MG: 1 INJECTION INTRAMUSCULAR; INTRAVENOUS; SUBCUTANEOUS at 09:01

## 2021-06-20 RX ADMIN — BUDESONIDE AND FORMOTEROL FUMARATE DIHYDRATE 2 PUFF: 160; 4.5 AEROSOL RESPIRATORY (INHALATION) at 20:43

## 2021-06-20 RX ADMIN — ENOXAPARIN SODIUM 30 MG: 30 INJECTION SUBCUTANEOUS at 08:31

## 2021-06-20 RX ADMIN — VANCOMYCIN HYDROCHLORIDE 125 MG: 125 CAPSULE ORAL at 21:26

## 2021-06-20 RX ADMIN — TIOTROPIUM BROMIDE INHALATION SPRAY 1 PUFF: 3.12 SPRAY, METERED RESPIRATORY (INHALATION) at 08:04

## 2021-06-20 RX ADMIN — IPRATROPIUM BROMIDE 0.5 MG: 0.5 SOLUTION RESPIRATORY (INHALATION) at 20:43

## 2021-06-20 RX ADMIN — OXYCODONE AND ACETAMINOPHEN 1 TABLET: 5; 325 TABLET ORAL at 15:37

## 2021-06-20 RX ADMIN — BUDESONIDE AND FORMOTEROL FUMARATE DIHYDRATE 2 PUFF: 160; 4.5 AEROSOL RESPIRATORY (INHALATION) at 08:04

## 2021-06-20 RX ADMIN — WATER 2000 MG: 1 INJECTION INTRAMUSCULAR; INTRAVENOUS; SUBCUTANEOUS at 17:49

## 2021-06-20 RX ADMIN — ISOSORBIDE MONONITRATE 30 MG: 30 TABLET, EXTENDED RELEASE ORAL at 08:28

## 2021-06-20 RX ADMIN — SODIUM CHLORIDE, PRESERVATIVE FREE 10 ML: 5 INJECTION INTRAVENOUS at 09:05

## 2021-06-20 RX ADMIN — OXYCODONE AND ACETAMINOPHEN 1 TABLET: 5; 325 TABLET ORAL at 09:16

## 2021-06-20 RX ADMIN — TAMSULOSIN HYDROCHLORIDE 0.4 MG: 0.4 CAPSULE ORAL at 17:49

## 2021-06-20 RX ADMIN — METFORMIN HYDROCHLORIDE 500 MG: 500 TABLET ORAL at 08:28

## 2021-06-20 RX ADMIN — ALBUTEROL SULFATE 2.5 MG: 2.5 SOLUTION RESPIRATORY (INHALATION) at 20:43

## 2021-06-20 RX ADMIN — ALBUTEROL SULFATE 2.5 MG: 2.5 SOLUTION RESPIRATORY (INHALATION) at 08:00

## 2021-06-20 RX ADMIN — OXYCODONE HYDROCHLORIDE AND ACETAMINOPHEN 500 MG: 500 TABLET ORAL at 08:28

## 2021-06-20 RX ADMIN — LOSARTAN POTASSIUM 25 MG: 50 TABLET ORAL at 08:29

## 2021-06-20 RX ADMIN — FLUTICASONE PROPIONATE 1 SPRAY: 50 SPRAY, METERED NASAL at 11:26

## 2021-06-20 RX ADMIN — CHOLECALCIFEROL TAB 25 MCG (1000 UNIT) 1000 UNITS: 25 TAB at 08:28

## 2021-06-20 RX ADMIN — SODIUM CHLORIDE, PRESERVATIVE FREE 10 ML: 5 INJECTION INTRAVENOUS at 17:49

## 2021-06-20 RX ADMIN — Medication 1 CAPSULE: at 08:28

## 2021-06-20 RX ADMIN — DOCUSATE SODIUM 100 MG: 100 CAPSULE, LIQUID FILLED ORAL at 08:27

## 2021-06-20 RX ADMIN — DOCUSATE SODIUM 100 MG: 100 CAPSULE, LIQUID FILLED ORAL at 21:26

## 2021-06-20 RX ADMIN — IPRATROPIUM BROMIDE 0.5 MG: 0.5 SOLUTION RESPIRATORY (INHALATION) at 08:00

## 2021-06-20 RX ADMIN — SODIUM CHLORIDE, PRESERVATIVE FREE 10 ML: 5 INJECTION INTRAVENOUS at 22:31

## 2021-06-20 RX ADMIN — PANTOPRAZOLE SODIUM 40 MG: 40 TABLET, DELAYED RELEASE ORAL at 05:07

## 2021-06-20 RX ADMIN — THERA TABS 1 TABLET: TAB at 08:28

## 2021-06-20 RX ADMIN — WATER 2000 MG: 1 INJECTION INTRAMUSCULAR; INTRAVENOUS; SUBCUTANEOUS at 21:25

## 2021-06-20 RX ADMIN — ENOXAPARIN SODIUM 30 MG: 30 INJECTION SUBCUTANEOUS at 21:25

## 2021-06-20 ASSESSMENT — PAIN DESCRIPTION - PROGRESSION

## 2021-06-20 ASSESSMENT — PAIN DESCRIPTION - ORIENTATION
ORIENTATION: RIGHT

## 2021-06-20 ASSESSMENT — PAIN DESCRIPTION - PAIN TYPE
TYPE: SURGICAL PAIN
TYPE: CHRONIC PAIN;SURGICAL PAIN
TYPE: SURGICAL PAIN
TYPE: SURGICAL PAIN
TYPE: CHRONIC PAIN;SURGICAL PAIN
TYPE: CHRONIC PAIN;SURGICAL PAIN

## 2021-06-20 ASSESSMENT — PAIN DESCRIPTION - DESCRIPTORS
DESCRIPTORS: ACHING
DESCRIPTORS: ACHING;SHOOTING
DESCRIPTORS: STABBING
DESCRIPTORS: ACHING;SHOOTING
DESCRIPTORS: ACHING;SHOOTING
DESCRIPTORS: ACHING
DESCRIPTORS: ACHING;SHOOTING

## 2021-06-20 ASSESSMENT — PAIN SCALES - GENERAL
PAINLEVEL_OUTOF10: 2
PAINLEVEL_OUTOF10: 2
PAINLEVEL_OUTOF10: 0
PAINLEVEL_OUTOF10: 2
PAINLEVEL_OUTOF10: 0
PAINLEVEL_OUTOF10: 2
PAINLEVEL_OUTOF10: 2
PAINLEVEL_OUTOF10: 4
PAINLEVEL_OUTOF10: 5
PAINLEVEL_OUTOF10: 2
PAINLEVEL_OUTOF10: 2
PAINLEVEL_OUTOF10: 0

## 2021-06-20 ASSESSMENT — PAIN DESCRIPTION - LOCATION
LOCATION: KNEE

## 2021-06-20 ASSESSMENT — PAIN DESCRIPTION - FREQUENCY
FREQUENCY: INTERMITTENT

## 2021-06-20 NOTE — PLAN OF CARE
Problem: Pain:  Description: Pain management should include both nonpharmacologic and pharmacologic interventions. Goal: Control of chronic pain  Description: Control of chronic pain  Outcome: Met This Shift     Problem: Skin Integrity:  Goal: Will show no infection signs and symptoms  Description: Will show no infection signs and symptoms  Outcome: Met This Shift  Goal: Absence of new skin breakdown  Description: Absence of new skin breakdown  Outcome: Met This Shift     Problem: Falls - Risk of:  Goal: Will remain free from falls  Description: Will remain free from falls  Outcome: Met This Shift     Problem: Gas Exchange - Impaired:  Goal: Levels of oxygenation will improve  Description: Levels of oxygenation will improve  Outcome: Met This Shift     Problem: Gas Exchange - Impaired:  Goal: Levels of oxygenation will improve  Description: Levels of oxygenation will improve  Outcome: Met This Shift     Problem: Pain:  Description: Pain management should include both nonpharmacologic and pharmacologic interventions.   Goal: Pain level will decrease  Description: Pain level will decrease  Outcome: Ongoing     Problem: Sensory:  Goal: Pain level will decrease  Description: Pain level will decrease  Outcome: Ongoing

## 2021-06-20 NOTE — PROGRESS NOTES
Pt reports pain 2/10 at rest, bu with movement it increases to 7 or 8 out of 10. Nurse offers pain med, pt agrees. Electronically signed by Rosette Fournier RN on 6/20/2021 at 9:02 AM

## 2021-06-21 PROCEDURE — 6370000000 HC RX 637 (ALT 250 FOR IP): Performed by: INTERNAL MEDICINE

## 2021-06-21 PROCEDURE — 6360000002 HC RX W HCPCS: Performed by: INTERNAL MEDICINE

## 2021-06-21 PROCEDURE — 94640 AIRWAY INHALATION TREATMENT: CPT

## 2021-06-21 PROCEDURE — 1200000002 HC SEMI PRIVATE SWING BED

## 2021-06-21 PROCEDURE — 2580000003 HC RX 258: Performed by: INTERNAL MEDICINE

## 2021-06-21 RX ADMIN — SODIUM CHLORIDE, PRESERVATIVE FREE 20 ML: 5 INJECTION INTRAVENOUS at 18:09

## 2021-06-21 RX ADMIN — ALBUTEROL SULFATE 2.5 MG: 2.5 SOLUTION RESPIRATORY (INHALATION) at 09:18

## 2021-06-21 RX ADMIN — METOPROLOL SUCCINATE 25 MG: 25 TABLET, EXTENDED RELEASE ORAL at 08:55

## 2021-06-21 RX ADMIN — LOSARTAN POTASSIUM 25 MG: 50 TABLET ORAL at 08:55

## 2021-06-21 RX ADMIN — IPRATROPIUM BROMIDE 0.5 MG: 0.5 SOLUTION RESPIRATORY (INHALATION) at 09:18

## 2021-06-21 RX ADMIN — Medication 1 CAPSULE: at 18:08

## 2021-06-21 RX ADMIN — TAMSULOSIN HYDROCHLORIDE 0.4 MG: 0.4 CAPSULE ORAL at 18:09

## 2021-06-21 RX ADMIN — OXYCODONE AND ACETAMINOPHEN 1 TABLET: 5; 325 TABLET ORAL at 21:44

## 2021-06-21 RX ADMIN — SODIUM CHLORIDE, PRESERVATIVE FREE 10 ML: 5 INJECTION INTRAVENOUS at 08:54

## 2021-06-21 RX ADMIN — ASPIRIN 325 MG: 325 TABLET, COATED ORAL at 08:55

## 2021-06-21 RX ADMIN — WATER 2000 MG: 1 INJECTION INTRAMUSCULAR; INTRAVENOUS; SUBCUTANEOUS at 18:08

## 2021-06-21 RX ADMIN — DOCUSATE SODIUM 100 MG: 100 CAPSULE, LIQUID FILLED ORAL at 21:44

## 2021-06-21 RX ADMIN — HYDROCHLOROTHIAZIDE 25 MG: 25 TABLET ORAL at 08:55

## 2021-06-21 RX ADMIN — VANCOMYCIN HYDROCHLORIDE 125 MG: 125 CAPSULE ORAL at 21:44

## 2021-06-21 RX ADMIN — FLUTICASONE PROPIONATE 1 SPRAY: 50 SPRAY, METERED NASAL at 08:58

## 2021-06-21 RX ADMIN — ATORVASTATIN CALCIUM 80 MG: 40 TABLET, FILM COATED ORAL at 21:44

## 2021-06-21 RX ADMIN — PANTOPRAZOLE SODIUM 40 MG: 40 TABLET, DELAYED RELEASE ORAL at 05:37

## 2021-06-21 RX ADMIN — MONTELUKAST SODIUM 10 MG: 10 TABLET, FILM COATED ORAL at 21:44

## 2021-06-21 RX ADMIN — METFORMIN HYDROCHLORIDE 500 MG: 500 TABLET ORAL at 08:54

## 2021-06-21 RX ADMIN — TIOTROPIUM BROMIDE INHALATION SPRAY 1 PUFF: 3.12 SPRAY, METERED RESPIRATORY (INHALATION) at 09:28

## 2021-06-21 RX ADMIN — WATER 2000 MG: 1 INJECTION INTRAMUSCULAR; INTRAVENOUS; SUBCUTANEOUS at 02:18

## 2021-06-21 RX ADMIN — POTASSIUM CHLORIDE 10 MEQ: 750 TABLET, FILM COATED, EXTENDED RELEASE ORAL at 08:55

## 2021-06-21 RX ADMIN — CHOLECALCIFEROL TAB 25 MCG (1000 UNIT) 1000 UNITS: 25 TAB at 08:55

## 2021-06-21 RX ADMIN — ENOXAPARIN SODIUM 30 MG: 30 INJECTION SUBCUTANEOUS at 08:54

## 2021-06-21 RX ADMIN — Medication 1 CAPSULE: at 08:54

## 2021-06-21 RX ADMIN — ESCITALOPRAM OXALATE 20 MG: 10 TABLET ORAL at 08:54

## 2021-06-21 RX ADMIN — VANCOMYCIN HYDROCHLORIDE 125 MG: 125 CAPSULE ORAL at 08:54

## 2021-06-21 RX ADMIN — SODIUM CHLORIDE, PRESERVATIVE FREE 10 ML: 5 INJECTION INTRAVENOUS at 21:43

## 2021-06-21 RX ADMIN — DOCUSATE SODIUM 100 MG: 100 CAPSULE, LIQUID FILLED ORAL at 08:55

## 2021-06-21 RX ADMIN — THERA TABS 1 TABLET: TAB at 08:55

## 2021-06-21 RX ADMIN — ALBUTEROL SULFATE 2.5 MG: 2.5 SOLUTION RESPIRATORY (INHALATION) at 21:22

## 2021-06-21 RX ADMIN — WATER 2000 MG: 1 INJECTION INTRAMUSCULAR; INTRAVENOUS; SUBCUTANEOUS at 10:11

## 2021-06-21 RX ADMIN — BUDESONIDE AND FORMOTEROL FUMARATE DIHYDRATE 2 PUFF: 160; 4.5 AEROSOL RESPIRATORY (INHALATION) at 21:22

## 2021-06-21 RX ADMIN — TIOTROPIUM BROMIDE INHALATION SPRAY 1 PUFF: 3.12 SPRAY, METERED RESPIRATORY (INHALATION) at 21:22

## 2021-06-21 RX ADMIN — ISOSORBIDE MONONITRATE 30 MG: 30 TABLET, EXTENDED RELEASE ORAL at 08:54

## 2021-06-21 RX ADMIN — ENOXAPARIN SODIUM 30 MG: 30 INJECTION SUBCUTANEOUS at 21:43

## 2021-06-21 RX ADMIN — OXYCODONE HYDROCHLORIDE AND ACETAMINOPHEN 500 MG: 500 TABLET ORAL at 08:54

## 2021-06-21 RX ADMIN — FERROUS SULFATE TAB 325 MG (65 MG ELEMENTAL FE) 325 MG: 325 (65 FE) TAB at 08:55

## 2021-06-21 RX ADMIN — BUDESONIDE AND FORMOTEROL FUMARATE DIHYDRATE 2 PUFF: 160; 4.5 AEROSOL RESPIRATORY (INHALATION) at 09:26

## 2021-06-21 RX ADMIN — IPRATROPIUM BROMIDE 0.5 MG: 0.5 SOLUTION RESPIRATORY (INHALATION) at 21:23

## 2021-06-21 RX ADMIN — SODIUM CHLORIDE, PRESERVATIVE FREE 10 ML: 5 INJECTION INTRAVENOUS at 10:12

## 2021-06-21 RX ADMIN — SPIRONOLACTONE 25 MG: 25 TABLET, FILM COATED ORAL at 08:55

## 2021-06-21 ASSESSMENT — PAIN DESCRIPTION - DESCRIPTORS
DESCRIPTORS: ACHING

## 2021-06-21 ASSESSMENT — PAIN DESCRIPTION - ORIENTATION
ORIENTATION: RIGHT

## 2021-06-21 ASSESSMENT — PAIN DESCRIPTION - PROGRESSION
CLINICAL_PROGRESSION: GRADUALLY IMPROVING

## 2021-06-21 ASSESSMENT — PAIN SCALES - GENERAL
PAINLEVEL_OUTOF10: 1
PAINLEVEL_OUTOF10: 5
PAINLEVEL_OUTOF10: 0
PAINLEVEL_OUTOF10: 2
PAINLEVEL_OUTOF10: 2

## 2021-06-21 ASSESSMENT — PAIN DESCRIPTION - LOCATION
LOCATION: KNEE

## 2021-06-21 ASSESSMENT — PAIN DESCRIPTION - PAIN TYPE
TYPE: SURGICAL PAIN;ACUTE PAIN
TYPE: SURGICAL PAIN
TYPE: SURGICAL PAIN

## 2021-06-21 NOTE — PROGRESS NOTES
[facility]  Swing Bed Interdisciplinary Care Plan Conference Report   Recertification for Continued Skilled Care. Patients name:Pete Elder  Date of Conference: 6/21/2021    The Following Information was discussed and agreed upon with the patient and/or Caregivers as listed below.     Names of Team Members and Caregivers present for meeting:  : ASHLYN Franz  Nursing: OLMAN Rock  Therapy: , PT; , L/OTR  Dietician:   Activities: Concepcion Carvajal  Pastoral Care:   Caregivers:    [] Spouse  [] Child/Children [] Significant Other   [] Other:     Nutrition:  Current Body Weight:   Wt Readings from Last 1 Encounters:   06/18/21 (!) 305 lb (138.3 kg)     Weight Change: 6.7% lower than usual values of 327#, but stable appearing as well  Current Diet order:DIET CARB CONTROL; Carb Control: 4 carb choices (60 gms)/meal  Intakes: % meals   Diet Education Provided: none  Goal: PO >75% meals and supplements    Spiritual:  Congregation needs met: [x] Yes  [] No  [] N/A  Notified Margie Iniguez or Oralia of admission: [] Yes  [] No  [x] N/A  Patient added to Communion List: [] Yes  [] No  [x] N/A  Any other concerns or comments:   Goal: Participate 2-3 times per week    Occupational Therapy:    Physical Therapy:  Transfers:     Speech Therapy:     Respiratory Therapy:     Nursing:  Skin/Wound/Incisions:  Skin Color/Condition  Skin Color: Pale  Skin Condition/Temp: Dry, Warm  Skin Integrity  Skin Integrity: Other (Comment) (surigical incision)  Location: right knee  Preventative Dressing: Yes  Date Applied: 06/14/21  Assessed this shift?: No (r/t dressing)     Pain Control: Percocet  New Medications since admission to Swing Bed:   Anticoagulants: lovenox   Goals:   Pt will remain free from falls  Pt will have control of acute pain    Activities: activity as tolerated, TV, visitors, outside  Goal: Participate in 3 activities per week    :  Plan for Discharge: Plan for pt to discharge to home on Saturday 6/26/2021. Follow Up/Services needed:     Continued skilled needs: IV antibiotics   Skilled Services are for the ongoing condition for which the individual received inpatient care in a hospital.    Physician signature certifies patient continued need for SNF inpatient care.

## 2021-06-21 NOTE — PLAN OF CARE
Problem: Pain:  Goal: Pain level will decrease  Description: Pain level will decrease  6/21/2021 1057 by Inga Hernandez RN  Outcome: Ongoing     Problem: Skin Integrity:  Goal: Will show no infection signs and symptoms  Description: Will show no infection signs and symptoms  6/21/2021 1057 by Inga Hernandez RN  Outcome: Ongoing    Problem: Skin Integrity:  Goal: Absence of new skin breakdown  Description: Absence of new skin breakdown  6/21/2021 1057 by Inga Hernandez RN  Outcome: Ongoing     Problem: Falls - Risk of:  Goal: Will remain free from falls  Description: Will remain free from falls  6/21/2021 1057 by Inga Hernandez RN  Outcome: Ongoing     Problem: Discharge Planning:  Goal: Discharged to appropriate level of care  Description: Discharged to appropriate level of care  6/21/2021 1057 by Inga Hernandez RN  Outcome: Ongoing    Problem: Gas Exchange - Impaired:  Goal: Levels of oxygenation will improve  Description: Levels of oxygenation will improve  6/21/2021 1057 by Inga Hernandez RN  Outcome: Ongoing    Problem: Gas Exchange - Impaired:  Goal: Levels of oxygenation will improve  Description: Levels of oxygenation will improve  6/21/2021 1057 by Inga Hernandez RN  Outcome: Ongoing

## 2021-06-21 NOTE — PLAN OF CARE
Problem: Pain:  Goal: Pain level will decrease  Description: Pain level will decrease  6/21/2021 0448 by Emely Jeff RN  Outcome: Ongoing  6/20/2021 1934 by Venkatesh Gill RN  Outcome: Ongoing  Goal: Control of acute pain  Description: Control of acute pain  Outcome: Ongoing  Goal: Control of chronic pain  Description: Control of chronic pain  6/21/2021 0448 by Emely Jeff RN  Outcome: Ongoing  6/20/2021 1934 by Venkatesh Gill RN  Outcome: Met This Shift     Problem: Skin Integrity:  Goal: Will show no infection signs and symptoms  Description: Will show no infection signs and symptoms  6/21/2021 0448 by Emely Jeff RN  Outcome: Ongoing  6/20/2021 1934 by Venkatesh Gill RN  Outcome: Met This Shift  Goal: Absence of new skin breakdown  Description: Absence of new skin breakdown  6/21/2021 0448 by Emely Jeff RN  Outcome: Ongoing  6/20/2021 1934 by Venkatesh Gill RN  Outcome: Met This Shift  Goal: Risk for impaired skin integrity will decrease  Description: Risk for impaired skin integrity will decrease  Outcome: Ongoing  Goal: Skin integrity will improve  Description: Skin integrity will improve  Outcome: Ongoing  Goal: Signs of wound healing will improve  Description: Signs of wound healing will improve  Outcome: Ongoing     Problem: Falls - Risk of:  Goal: Will remain free from falls  Description: Will remain free from falls  6/21/2021 0448 by Emely Jeff RN  Outcome: Ongoing  6/20/2021 1934 by Venkatesh Gill RN  Outcome: Met This Shift  Goal: Absence of physical injury  Description: Absence of physical injury  Outcome: Ongoing     Problem: Nutrition  Goal: Optimal nutrition therapy  Outcome: Ongoing     Problem: Discharge Planning:  Goal: Discharged to appropriate level of care  Description: Discharged to appropriate level of care  Outcome: Ongoing     Problem: Gas Exchange - Impaired:  Goal: Levels of oxygenation will improve  Description: Levels of oxygenation will improve  6/21/2021 0448 by Emely Jeff Cardiovascular alteration will improve  Outcome: Ongoing  Goal: Ability to maintain an adequate cardiac output will improve  Description: Ability to maintain an adequate cardiac output will improve  Outcome: Ongoing  Goal: Ability to maintain adequate ventilation will improve  Description: Ability to maintain adequate ventilation will improve  Outcome: Ongoing  Goal: Ability to achieve and maintain adequate cardiopulmonary perfusion will improve  Description: Ability to achieve and maintain adequate cardiopulmonary perfusion will improve  Outcome: Ongoing  Goal: Complications related to the disease process, condition or treatment will be avoided or minimized  Outcome: Ongoing  Goal: Hemodynamic stability will improve  Outcome: Ongoing  Goal: Cerebral tissue perfusion will improve  Outcome: Ongoing     Problem: Health Behavior:  Goal: Will modify at least one risk factor affecting health status  Description: Will modify at least one risk factor affecting health status  Outcome: Ongoing  Goal: Identification of resources available to assist in meeting health care needs will improve  Description: Identification of resources available to assist in meeting health care needs will improve  Outcome: Ongoing  Goal: Identification of resources available to assist in meeting health care needs will improve  Description: Identification of resources available to assist in meeting health care needs will improve  Outcome: Ongoing     Problem: Physical Regulation:  Goal: Complications related to the disease process, condition or treatment will be avoided or minimized  Outcome: Ongoing  Goal: Signs and symptoms of infection will decrease  Description: Signs and symptoms of infection will decrease  Outcome: Ongoing  Goal: Will show no signs and symptoms of excessive bleeding  Description: Will show no signs and symptoms of excessive bleeding  Outcome: Ongoing     Problem:  Activity:  Goal: Mobility will improve  Description: Mobility will improve  Outcome: Ongoing  Goal: Fatigue will decrease  Description: Fatigue will decrease  Outcome: Ongoing  Goal: Ability to tolerate increased activity will improve  Description: Ability to tolerate increased activity will improve  Outcome: Ongoing  Goal: Ability to maintain optimal joint mobility will improve  Outcome: Ongoing     Problem: Nutritional:  Goal: Ability to identify appropriate dietary choices will improve  Description: Ability to identify appropriate dietary choices will improve  Outcome: Ongoing  Goal: Dietary intake will improve  Description: Dietary intake will improve  Outcome: Ongoing  Goal: Maintenance of adequate nutrition will improve  Description: Maintenance of adequate nutrition will improve  Outcome: Ongoing  Goal: Ability to identify appropriate dietary choices will improve  Description: Ability to identify appropriate dietary choices will improve  Outcome: Ongoing  Goal: Ability to achieve adequate nutritional intake will improve  Description: Ability to achieve adequate nutritional intake will improve  Outcome: Ongoing  Goal: Ability to maintain an optimal weight for height and age will improve  Outcome: Ongoing     Problem: Safety:  Goal: Ability to remain free from injury will improve  Description: Ability to remain free from injury will improve  Outcome: Ongoing  Goal: Ability to correctly utilize injury-preventing devices as appropriate will improve  Description: Ability to correctly utilize injury-preventing devices as appropriate will improve  Outcome: Ongoing     Problem: Health Maintenance - Impaired:  Goal: Maintenance of adequate nutrition will improve  Description: Maintenance of adequate nutrition will improve  Outcome: Ongoing     Problem: Sensory:  Goal: Pain level will decrease  Description: Pain level will decrease  6/21/2021 0448 by Emely Jeff RN  Outcome: Ongoing  6/20/2021 1934 by Venkatesh Gill RN  Outcome: Ongoing     Problem:  Activity Intolerance:  Goal: Ability to tolerate increased activity will improve  Description: Ability to tolerate increased activity will improve  Outcome: Ongoing
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
## 2021-06-21 NOTE — PROGRESS NOTES
06/20/2021    BUN 17 06/20/2021    CREATININE 0.81 06/20/2021    GLUCOSE 115 (H) 06/20/2021    CALCIUM 9.3 06/20/2021    PROT 6.5 04/29/2021    LABALBU 3.5 04/29/2021    BILITOT 0.44 04/29/2021    ALKPHOS 98 04/29/2021    AST 30 04/29/2021    ALT 23 04/29/2021    LABGLOM >60 06/20/2021    GFRAA >60 06/20/2021    GLOB NOT REPORTED 10/22/2019     Nutrition Interventions:   Food and/or Nutrient Delivery:  Continue Current Diet  Nutrition Education/Counseling:  No recommendation at this time   Coordination of Nutrition Care:  Continue to monitor while inpatient    Goals:  PO >75% with good protein choices       Nutrition Monitoring and Evaluation:   Behavioral-Environmental Outcomes:  None Identified   Food/Nutrient Intake Outcomes:  Food and Nutrient Intake  Physical Signs/Symptoms Outcomes:  Biochemical Data, Weight, Fluid Status or Edema     Discharge Planning:    Continue current diet     Electronically signed by Andres Roldan RD, LD on 6/21/21 at 9:37 AM EDT    Contact: [phone]

## 2021-06-21 NOTE — PROGRESS NOTES
DIAN met with pt for swing bed IDT team meeting with RN case manager and swing bed coordinator. Pt is scheduled for his last IV treatments on Friday 6/25/2021 and may discharge to home on Saturday 6/26/2021. Pt very much in agreement and is excited to get back home. SW provided Medicare notice of non coverage letter to pt with discharge dates as listed above. Pt in agreement and signs letter of non coverage. Copy made and provided to pt and filed in paper chart. SW will work on obtaining wheelchair for pt for home use. Pt states that he would like to order this through [facility] [place]. No further services needed after discharge. Pt meets with [facility] again next week virtually to see about having a knee replacement put in. DIAN will follow and remain available. Gavino Lee [facility] 6/21/2021     All needed info faxed to [facility] for wheelchair to start to be processed for pt.  Gavino BUTTERFIELD W 6/21/2021

## 2021-06-22 PROCEDURE — 6370000000 HC RX 637 (ALT 250 FOR IP): Performed by: INTERNAL MEDICINE

## 2021-06-22 PROCEDURE — 94761 N-INVAS EAR/PLS OXIMETRY MLT: CPT

## 2021-06-22 PROCEDURE — 6360000002 HC RX W HCPCS: Performed by: INTERNAL MEDICINE

## 2021-06-22 PROCEDURE — 2580000003 HC RX 258: Performed by: INTERNAL MEDICINE

## 2021-06-22 PROCEDURE — 94640 AIRWAY INHALATION TREATMENT: CPT

## 2021-06-22 PROCEDURE — 1200000002 HC SEMI PRIVATE SWING BED

## 2021-06-22 RX ADMIN — OXYCODONE AND ACETAMINOPHEN 1 TABLET: 5; 325 TABLET ORAL at 22:19

## 2021-06-22 RX ADMIN — ATORVASTATIN CALCIUM 80 MG: 40 TABLET, FILM COATED ORAL at 22:18

## 2021-06-22 RX ADMIN — OXYCODONE HYDROCHLORIDE AND ACETAMINOPHEN 500 MG: 500 TABLET ORAL at 09:00

## 2021-06-22 RX ADMIN — TIOTROPIUM BROMIDE INHALATION SPRAY 1 PUFF: 3.12 SPRAY, METERED RESPIRATORY (INHALATION) at 09:14

## 2021-06-22 RX ADMIN — ALBUTEROL SULFATE 2.5 MG: 2.5 SOLUTION RESPIRATORY (INHALATION) at 09:13

## 2021-06-22 RX ADMIN — WATER 2000 MG: 1 INJECTION INTRAMUSCULAR; INTRAVENOUS; SUBCUTANEOUS at 09:02

## 2021-06-22 RX ADMIN — IPRATROPIUM BROMIDE 0.5 MG: 0.5 SOLUTION RESPIRATORY (INHALATION) at 21:18

## 2021-06-22 RX ADMIN — ISOSORBIDE MONONITRATE 30 MG: 30 TABLET, EXTENDED RELEASE ORAL at 09:00

## 2021-06-22 RX ADMIN — THERA TABS 1 TABLET: TAB at 09:01

## 2021-06-22 RX ADMIN — DOCUSATE SODIUM 100 MG: 100 CAPSULE, LIQUID FILLED ORAL at 22:18

## 2021-06-22 RX ADMIN — SODIUM CHLORIDE, PRESERVATIVE FREE 20 ML: 5 INJECTION INTRAVENOUS at 17:49

## 2021-06-22 RX ADMIN — WATER 2000 MG: 1 INJECTION INTRAMUSCULAR; INTRAVENOUS; SUBCUTANEOUS at 17:49

## 2021-06-22 RX ADMIN — VANCOMYCIN HYDROCHLORIDE 125 MG: 125 CAPSULE ORAL at 09:00

## 2021-06-22 RX ADMIN — PANTOPRAZOLE SODIUM 40 MG: 40 TABLET, DELAYED RELEASE ORAL at 09:06

## 2021-06-22 RX ADMIN — LOSARTAN POTASSIUM 25 MG: 50 TABLET ORAL at 09:00

## 2021-06-22 RX ADMIN — Medication 1 CAPSULE: at 17:49

## 2021-06-22 RX ADMIN — IPRATROPIUM BROMIDE 0.5 MG: 0.5 SOLUTION RESPIRATORY (INHALATION) at 09:13

## 2021-06-22 RX ADMIN — SODIUM CHLORIDE, PRESERVATIVE FREE 10 ML: 5 INJECTION INTRAVENOUS at 22:19

## 2021-06-22 RX ADMIN — Medication 1 CAPSULE: at 09:01

## 2021-06-22 RX ADMIN — HYDROCHLOROTHIAZIDE 25 MG: 25 TABLET ORAL at 09:00

## 2021-06-22 RX ADMIN — VANCOMYCIN HYDROCHLORIDE 125 MG: 125 CAPSULE ORAL at 22:18

## 2021-06-22 RX ADMIN — ENOXAPARIN SODIUM 30 MG: 30 INJECTION SUBCUTANEOUS at 22:19

## 2021-06-22 RX ADMIN — ALBUTEROL SULFATE 2.5 MG: 2.5 SOLUTION RESPIRATORY (INHALATION) at 21:18

## 2021-06-22 RX ADMIN — BUDESONIDE AND FORMOTEROL FUMARATE DIHYDRATE 2 PUFF: 160; 4.5 AEROSOL RESPIRATORY (INHALATION) at 21:18

## 2021-06-22 RX ADMIN — TAMSULOSIN HYDROCHLORIDE 0.4 MG: 0.4 CAPSULE ORAL at 17:49

## 2021-06-22 RX ADMIN — FLUTICASONE PROPIONATE 1 SPRAY: 50 SPRAY, METERED NASAL at 09:05

## 2021-06-22 RX ADMIN — SODIUM CHLORIDE, PRESERVATIVE FREE 20 ML: 5 INJECTION INTRAVENOUS at 09:02

## 2021-06-22 RX ADMIN — CHOLECALCIFEROL TAB 25 MCG (1000 UNIT) 1000 UNITS: 25 TAB at 09:00

## 2021-06-22 RX ADMIN — TIOTROPIUM BROMIDE INHALATION SPRAY 1 PUFF: 3.12 SPRAY, METERED RESPIRATORY (INHALATION) at 21:18

## 2021-06-22 RX ADMIN — ASPIRIN 325 MG: 325 TABLET, COATED ORAL at 09:00

## 2021-06-22 RX ADMIN — ENOXAPARIN SODIUM 30 MG: 30 INJECTION SUBCUTANEOUS at 09:00

## 2021-06-22 RX ADMIN — METOPROLOL SUCCINATE 25 MG: 25 TABLET, EXTENDED RELEASE ORAL at 09:00

## 2021-06-22 RX ADMIN — ESCITALOPRAM OXALATE 20 MG: 10 TABLET ORAL at 09:00

## 2021-06-22 RX ADMIN — DOCUSATE SODIUM 100 MG: 100 CAPSULE, LIQUID FILLED ORAL at 09:00

## 2021-06-22 RX ADMIN — BUDESONIDE AND FORMOTEROL FUMARATE DIHYDRATE 2 PUFF: 160; 4.5 AEROSOL RESPIRATORY (INHALATION) at 09:14

## 2021-06-22 RX ADMIN — WATER 2000 MG: 1 INJECTION INTRAMUSCULAR; INTRAVENOUS; SUBCUTANEOUS at 01:54

## 2021-06-22 RX ADMIN — MONTELUKAST SODIUM 10 MG: 10 TABLET, FILM COATED ORAL at 22:24

## 2021-06-22 RX ADMIN — FERROUS SULFATE TAB 325 MG (65 MG ELEMENTAL FE) 325 MG: 325 (65 FE) TAB at 09:01

## 2021-06-22 RX ADMIN — METFORMIN HYDROCHLORIDE 500 MG: 500 TABLET ORAL at 09:00

## 2021-06-22 RX ADMIN — SPIRONOLACTONE 25 MG: 25 TABLET, FILM COATED ORAL at 09:00

## 2021-06-22 RX ADMIN — POTASSIUM CHLORIDE 10 MEQ: 750 TABLET, FILM COATED, EXTENDED RELEASE ORAL at 09:00

## 2021-06-22 ASSESSMENT — PAIN DESCRIPTION - PROGRESSION
CLINICAL_PROGRESSION: GRADUALLY IMPROVING

## 2021-06-22 ASSESSMENT — PAIN SCALES - GENERAL
PAINLEVEL_OUTOF10: 2
PAINLEVEL_OUTOF10: 7
PAINLEVEL_OUTOF10: 3

## 2021-06-22 NOTE — PLAN OF CARE
Problem: Pain:  Goal: Pain level will decrease  Description: Pain level will decrease  Outcome: Ongoing     Problem: Pain:  Goal: Control of acute pain  Description: Control of acute pain  Outcome: Ongoing     Problem: Pain:  Goal: Control of chronic pain  Description: Control of chronic pain  Outcome: Ongoing     Problem: Skin Integrity:  Goal: Absence of new skin breakdown  Description: Absence of new skin breakdown  Outcome: Ongoing     Problem: Skin Integrity:  Goal: Skin integrity will improve  Description: Skin integrity will improve  Outcome: Ongoing     Problem: Falls - Risk of:  Goal: Will remain free from falls  Description: Will remain free from falls  Outcome: Ongoing     Problem: Falls - Risk of:  Goal: Absence of physical injury  Description: Absence of physical injury  Outcome: Ongoing     Problem: Sensory:  Goal: Pain level will decrease  Description: Pain level will decrease  Outcome: Ongoing

## 2021-06-23 PROCEDURE — 6370000000 HC RX 637 (ALT 250 FOR IP): Performed by: INTERNAL MEDICINE

## 2021-06-23 PROCEDURE — 1200000002 HC SEMI PRIVATE SWING BED

## 2021-06-23 PROCEDURE — 6360000002 HC RX W HCPCS: Performed by: INTERNAL MEDICINE

## 2021-06-23 PROCEDURE — 94640 AIRWAY INHALATION TREATMENT: CPT

## 2021-06-23 PROCEDURE — 2580000003 HC RX 258: Performed by: INTERNAL MEDICINE

## 2021-06-23 RX ADMIN — HYDROCHLOROTHIAZIDE 25 MG: 25 TABLET ORAL at 10:35

## 2021-06-23 RX ADMIN — WATER 2000 MG: 1 INJECTION INTRAMUSCULAR; INTRAVENOUS; SUBCUTANEOUS at 18:05

## 2021-06-23 RX ADMIN — WATER 2000 MG: 1 INJECTION INTRAMUSCULAR; INTRAVENOUS; SUBCUTANEOUS at 10:32

## 2021-06-23 RX ADMIN — ASPIRIN 325 MG: 325 TABLET, COATED ORAL at 10:33

## 2021-06-23 RX ADMIN — DOCUSATE SODIUM 100 MG: 100 CAPSULE, LIQUID FILLED ORAL at 10:32

## 2021-06-23 RX ADMIN — ALBUTEROL SULFATE 2.5 MG: 2.5 SOLUTION RESPIRATORY (INHALATION) at 21:15

## 2021-06-23 RX ADMIN — ALBUTEROL SULFATE 2.5 MG: 2.5 SOLUTION RESPIRATORY (INHALATION) at 08:53

## 2021-06-23 RX ADMIN — PANTOPRAZOLE SODIUM 40 MG: 40 TABLET, DELAYED RELEASE ORAL at 05:16

## 2021-06-23 RX ADMIN — SODIUM CHLORIDE, PRESERVATIVE FREE 10 ML: 5 INJECTION INTRAVENOUS at 10:54

## 2021-06-23 RX ADMIN — METFORMIN HYDROCHLORIDE 500 MG: 500 TABLET ORAL at 10:34

## 2021-06-23 RX ADMIN — OXYCODONE HYDROCHLORIDE AND ACETAMINOPHEN 500 MG: 500 TABLET ORAL at 10:34

## 2021-06-23 RX ADMIN — IPRATROPIUM BROMIDE 0.5 MG: 0.5 SOLUTION RESPIRATORY (INHALATION) at 21:15

## 2021-06-23 RX ADMIN — IPRATROPIUM BROMIDE 0.5 MG: 0.5 SOLUTION RESPIRATORY (INHALATION) at 08:54

## 2021-06-23 RX ADMIN — POTASSIUM CHLORIDE 10 MEQ: 750 TABLET, FILM COATED, EXTENDED RELEASE ORAL at 10:35

## 2021-06-23 RX ADMIN — VANCOMYCIN HYDROCHLORIDE 125 MG: 125 CAPSULE ORAL at 10:33

## 2021-06-23 RX ADMIN — BUDESONIDE AND FORMOTEROL FUMARATE DIHYDRATE 2 PUFF: 160; 4.5 AEROSOL RESPIRATORY (INHALATION) at 08:56

## 2021-06-23 RX ADMIN — LOSARTAN POTASSIUM 25 MG: 50 TABLET ORAL at 10:33

## 2021-06-23 RX ADMIN — ESCITALOPRAM OXALATE 20 MG: 10 TABLET ORAL at 10:33

## 2021-06-23 RX ADMIN — METOPROLOL SUCCINATE 25 MG: 25 TABLET, EXTENDED RELEASE ORAL at 10:33

## 2021-06-23 RX ADMIN — TAMSULOSIN HYDROCHLORIDE 0.4 MG: 0.4 CAPSULE ORAL at 18:05

## 2021-06-23 RX ADMIN — TIOTROPIUM BROMIDE INHALATION SPRAY 1 PUFF: 3.12 SPRAY, METERED RESPIRATORY (INHALATION) at 21:15

## 2021-06-23 RX ADMIN — Medication 1 CAPSULE: at 10:32

## 2021-06-23 RX ADMIN — FERROUS SULFATE TAB 325 MG (65 MG ELEMENTAL FE) 325 MG: 325 (65 FE) TAB at 10:34

## 2021-06-23 RX ADMIN — BUDESONIDE AND FORMOTEROL FUMARATE DIHYDRATE 2 PUFF: 160; 4.5 AEROSOL RESPIRATORY (INHALATION) at 21:15

## 2021-06-23 RX ADMIN — Medication 1 CAPSULE: at 18:05

## 2021-06-23 RX ADMIN — OXYCODONE AND ACETAMINOPHEN 1 TABLET: 5; 325 TABLET ORAL at 16:34

## 2021-06-23 RX ADMIN — TIOTROPIUM BROMIDE INHALATION SPRAY 1 PUFF: 3.12 SPRAY, METERED RESPIRATORY (INHALATION) at 08:56

## 2021-06-23 RX ADMIN — ENOXAPARIN SODIUM 30 MG: 30 INJECTION SUBCUTANEOUS at 20:15

## 2021-06-23 RX ADMIN — THERA TABS 1 TABLET: TAB at 10:34

## 2021-06-23 RX ADMIN — CHOLECALCIFEROL TAB 25 MCG (1000 UNIT) 1000 UNITS: 25 TAB at 10:34

## 2021-06-23 RX ADMIN — VANCOMYCIN HYDROCHLORIDE 125 MG: 125 CAPSULE ORAL at 20:15

## 2021-06-23 RX ADMIN — WATER 2000 MG: 1 INJECTION INTRAMUSCULAR; INTRAVENOUS; SUBCUTANEOUS at 01:47

## 2021-06-23 RX ADMIN — SODIUM CHLORIDE, PRESERVATIVE FREE 10 ML: 5 INJECTION INTRAVENOUS at 20:16

## 2021-06-23 RX ADMIN — DOCUSATE SODIUM 100 MG: 100 CAPSULE, LIQUID FILLED ORAL at 20:15

## 2021-06-23 RX ADMIN — ISOSORBIDE MONONITRATE 30 MG: 30 TABLET, EXTENDED RELEASE ORAL at 10:33

## 2021-06-23 RX ADMIN — ATORVASTATIN CALCIUM 80 MG: 40 TABLET, FILM COATED ORAL at 20:15

## 2021-06-23 RX ADMIN — SPIRONOLACTONE 25 MG: 25 TABLET, FILM COATED ORAL at 10:32

## 2021-06-23 RX ADMIN — FLUTICASONE PROPIONATE 1 SPRAY: 50 SPRAY, METERED NASAL at 10:42

## 2021-06-23 RX ADMIN — ENOXAPARIN SODIUM 30 MG: 30 INJECTION SUBCUTANEOUS at 10:35

## 2021-06-23 RX ADMIN — MONTELUKAST SODIUM 10 MG: 10 TABLET, FILM COATED ORAL at 20:15

## 2021-06-23 RX ADMIN — OXYCODONE AND ACETAMINOPHEN 1 TABLET: 5; 325 TABLET ORAL at 20:16

## 2021-06-23 ASSESSMENT — PAIN DESCRIPTION - LOCATION
LOCATION: KNEE

## 2021-06-23 ASSESSMENT — PAIN SCALES - GENERAL
PAINLEVEL_OUTOF10: 1
PAINLEVEL_OUTOF10: 0
PAINLEVEL_OUTOF10: 0
PAINLEVEL_OUTOF10: 8
PAINLEVEL_OUTOF10: 5
PAINLEVEL_OUTOF10: 1
PAINLEVEL_OUTOF10: 1
PAINLEVEL_OUTOF10: 0
PAINLEVEL_OUTOF10: 3

## 2021-06-23 ASSESSMENT — PAIN DESCRIPTION - DESCRIPTORS
DESCRIPTORS: ACHING

## 2021-06-23 ASSESSMENT — PAIN DESCRIPTION - PROGRESSION
CLINICAL_PROGRESSION: GRADUALLY IMPROVING

## 2021-06-23 ASSESSMENT — PAIN DESCRIPTION - PAIN TYPE
TYPE: SURGICAL PAIN

## 2021-06-23 ASSESSMENT — PAIN DESCRIPTION - ORIENTATION
ORIENTATION: RIGHT

## 2021-06-23 NOTE — PLAN OF CARE
No new skin breakdown noted. O2 sats 97% on room air. Pt encourage to continue with IS. Pt VU. Pt rates pain a 7/10 scale. Medicated as ordered. See MAR.

## 2021-06-24 PROCEDURE — 94640 AIRWAY INHALATION TREATMENT: CPT

## 2021-06-24 PROCEDURE — 6370000000 HC RX 637 (ALT 250 FOR IP): Performed by: INTERNAL MEDICINE

## 2021-06-24 PROCEDURE — 6360000002 HC RX W HCPCS: Performed by: INTERNAL MEDICINE

## 2021-06-24 PROCEDURE — 2580000003 HC RX 258: Performed by: INTERNAL MEDICINE

## 2021-06-24 PROCEDURE — 1200000002 HC SEMI PRIVATE SWING BED

## 2021-06-24 RX ADMIN — TIOTROPIUM BROMIDE INHALATION SPRAY 1 PUFF: 3.12 SPRAY, METERED RESPIRATORY (INHALATION) at 21:01

## 2021-06-24 RX ADMIN — SODIUM CHLORIDE, PRESERVATIVE FREE 10 ML: 5 INJECTION INTRAVENOUS at 20:06

## 2021-06-24 RX ADMIN — VANCOMYCIN HYDROCHLORIDE 125 MG: 125 CAPSULE ORAL at 07:43

## 2021-06-24 RX ADMIN — SPIRONOLACTONE 25 MG: 25 TABLET, FILM COATED ORAL at 07:43

## 2021-06-24 RX ADMIN — BUDESONIDE AND FORMOTEROL FUMARATE DIHYDRATE 2 PUFF: 160; 4.5 AEROSOL RESPIRATORY (INHALATION) at 08:48

## 2021-06-24 RX ADMIN — SODIUM CHLORIDE, PRESERVATIVE FREE 10 ML: 5 INJECTION INTRAVENOUS at 09:25

## 2021-06-24 RX ADMIN — SODIUM CHLORIDE, PRESERVATIVE FREE 10 ML: 5 INJECTION INTRAVENOUS at 02:19

## 2021-06-24 RX ADMIN — ATORVASTATIN CALCIUM 80 MG: 40 TABLET, FILM COATED ORAL at 20:06

## 2021-06-24 RX ADMIN — CHOLECALCIFEROL TAB 25 MCG (1000 UNIT) 1000 UNITS: 25 TAB at 07:43

## 2021-06-24 RX ADMIN — METFORMIN HYDROCHLORIDE 500 MG: 500 TABLET ORAL at 07:42

## 2021-06-24 RX ADMIN — ALBUTEROL SULFATE 2.5 MG: 2.5 SOLUTION RESPIRATORY (INHALATION) at 21:01

## 2021-06-24 RX ADMIN — ESCITALOPRAM OXALATE 20 MG: 10 TABLET ORAL at 07:42

## 2021-06-24 RX ADMIN — LOSARTAN POTASSIUM 25 MG: 50 TABLET ORAL at 07:42

## 2021-06-24 RX ADMIN — TAMSULOSIN HYDROCHLORIDE 0.4 MG: 0.4 CAPSULE ORAL at 17:43

## 2021-06-24 RX ADMIN — HYDROCHLOROTHIAZIDE 25 MG: 25 TABLET ORAL at 07:42

## 2021-06-24 RX ADMIN — OXYCODONE AND ACETAMINOPHEN 1 TABLET: 5; 325 TABLET ORAL at 20:17

## 2021-06-24 RX ADMIN — PANTOPRAZOLE SODIUM 40 MG: 40 TABLET, DELAYED RELEASE ORAL at 06:36

## 2021-06-24 RX ADMIN — WATER 2000 MG: 1 INJECTION INTRAMUSCULAR; INTRAVENOUS; SUBCUTANEOUS at 09:25

## 2021-06-24 RX ADMIN — WATER 2000 MG: 1 INJECTION INTRAMUSCULAR; INTRAVENOUS; SUBCUTANEOUS at 02:18

## 2021-06-24 RX ADMIN — BUDESONIDE AND FORMOTEROL FUMARATE DIHYDRATE 2 PUFF: 160; 4.5 AEROSOL RESPIRATORY (INHALATION) at 21:01

## 2021-06-24 RX ADMIN — ISOSORBIDE MONONITRATE 30 MG: 30 TABLET, EXTENDED RELEASE ORAL at 07:42

## 2021-06-24 RX ADMIN — FERROUS SULFATE TAB 325 MG (65 MG ELEMENTAL FE) 325 MG: 325 (65 FE) TAB at 07:41

## 2021-06-24 RX ADMIN — Medication 1 CAPSULE: at 17:43

## 2021-06-24 RX ADMIN — WATER 2000 MG: 1 INJECTION INTRAMUSCULAR; INTRAVENOUS; SUBCUTANEOUS at 17:43

## 2021-06-24 RX ADMIN — ENOXAPARIN SODIUM 30 MG: 30 INJECTION SUBCUTANEOUS at 07:41

## 2021-06-24 RX ADMIN — POTASSIUM CHLORIDE 10 MEQ: 750 TABLET, FILM COATED, EXTENDED RELEASE ORAL at 07:43

## 2021-06-24 RX ADMIN — IPRATROPIUM BROMIDE 0.5 MG: 0.5 SOLUTION RESPIRATORY (INHALATION) at 21:01

## 2021-06-24 RX ADMIN — Medication 1 CAPSULE: at 07:43

## 2021-06-24 RX ADMIN — ALBUTEROL SULFATE 2.5 MG: 2.5 SOLUTION RESPIRATORY (INHALATION) at 08:44

## 2021-06-24 RX ADMIN — THERA TABS 1 TABLET: TAB at 07:43

## 2021-06-24 RX ADMIN — ENOXAPARIN SODIUM 30 MG: 30 INJECTION SUBCUTANEOUS at 20:06

## 2021-06-24 RX ADMIN — DOCUSATE SODIUM 100 MG: 100 CAPSULE, LIQUID FILLED ORAL at 07:42

## 2021-06-24 RX ADMIN — FLUTICASONE PROPIONATE 1 SPRAY: 50 SPRAY, METERED NASAL at 07:44

## 2021-06-24 RX ADMIN — IPRATROPIUM BROMIDE 0.5 MG: 0.5 SOLUTION RESPIRATORY (INHALATION) at 08:44

## 2021-06-24 RX ADMIN — MONTELUKAST SODIUM 10 MG: 10 TABLET, FILM COATED ORAL at 20:06

## 2021-06-24 RX ADMIN — METOPROLOL SUCCINATE 25 MG: 25 TABLET, EXTENDED RELEASE ORAL at 07:42

## 2021-06-24 RX ADMIN — OXYCODONE HYDROCHLORIDE AND ACETAMINOPHEN 500 MG: 500 TABLET ORAL at 07:42

## 2021-06-24 RX ADMIN — DOCUSATE SODIUM 100 MG: 100 CAPSULE, LIQUID FILLED ORAL at 20:07

## 2021-06-24 RX ADMIN — TIOTROPIUM BROMIDE INHALATION SPRAY 1 PUFF: 3.12 SPRAY, METERED RESPIRATORY (INHALATION) at 08:48

## 2021-06-24 RX ADMIN — VANCOMYCIN HYDROCHLORIDE 125 MG: 125 CAPSULE ORAL at 20:06

## 2021-06-24 RX ADMIN — ASPIRIN 325 MG: 325 TABLET, COATED ORAL at 07:43

## 2021-06-24 ASSESSMENT — PAIN SCALES - GENERAL
PAINLEVEL_OUTOF10: 2
PAINLEVEL_OUTOF10: 5
PAINLEVEL_OUTOF10: 2
PAINLEVEL_OUTOF10: 5
PAINLEVEL_OUTOF10: 1
PAINLEVEL_OUTOF10: 0

## 2021-06-24 ASSESSMENT — PAIN DESCRIPTION - DESCRIPTORS
DESCRIPTORS: ACHING
DESCRIPTORS: ACHING

## 2021-06-24 ASSESSMENT — PAIN DESCRIPTION - ORIENTATION
ORIENTATION: RIGHT
ORIENTATION: RIGHT

## 2021-06-24 ASSESSMENT — PAIN DESCRIPTION - PAIN TYPE
TYPE: SURGICAL PAIN
TYPE: SURGICAL PAIN

## 2021-06-24 ASSESSMENT — PAIN DESCRIPTION - LOCATION
LOCATION: KNEE
LOCATION: KNEE

## 2021-06-24 NOTE — PROGRESS NOTES
Virtual appointment completed with Hipolito Schilder FNP-C over the phone--updated on wound site, dressings, vitals, and assessment. Evan Davenport states that after last antibiotic on Friday June 25th, 2021 that the PICC line can be discontinued and patient to can be discharged on Saturday June 26th, 2021 and needs to continue on oral Vancocin until July 1st, 2021.

## 2021-06-24 NOTE — PROGRESS NOTES
Physical Therapy    Educated patient on steps while being NWB on R LE. He is able to complete up/down steps 6\" in height 2x with mod assist x2 for safety. He does well maintaining NWB.       Shameka Garduno PTA  Time In: 1320  Time Out: 1335  Date: 6/23/2021

## 2021-06-24 NOTE — PLAN OF CARE
Problem: Falls - Risk of:  Goal: Will remain free from falls  Description: Will remain free from falls  Outcome: Met This Shift     Problem: Pain:  Goal: Pain level will decrease  Description: Pain level will decrease  Outcome: Ongoing  Goal: Control of acute pain  Description: Control of acute pain  Outcome: Ongoing  Goal: Control of chronic pain  Description: Control of chronic pain  Outcome: Ongoing     Problem: Skin Integrity:  Goal: Will show no infection signs and symptoms  Description: Will show no infection signs and symptoms  Outcome: Ongoing  Goal: Signs of wound healing will improve  Description: Signs of wound healing will improve  Outcome: Ongoing     Problem:  Activity:  Goal: Mobility will improve  Description: Mobility will improve  Outcome: Ongoing  Goal: Fatigue will decrease  Description: Fatigue will decrease  Outcome: Ongoing     Problem: Coping:  Goal: Ability to adjust to condition or change in health will improve  Description: Ability to adjust to condition or change in health will improve  Outcome: Ongoing     Problem: Sensory:  Goal: Pain level will decrease  Description: Pain level will decrease  Outcome: Ongoing

## 2021-06-25 PROCEDURE — 6370000000 HC RX 637 (ALT 250 FOR IP): Performed by: INTERNAL MEDICINE

## 2021-06-25 PROCEDURE — 6360000002 HC RX W HCPCS: Performed by: INTERNAL MEDICINE

## 2021-06-25 PROCEDURE — 1200000002 HC SEMI PRIVATE SWING BED

## 2021-06-25 PROCEDURE — 94640 AIRWAY INHALATION TREATMENT: CPT

## 2021-06-25 PROCEDURE — 2580000003 HC RX 258: Performed by: INTERNAL MEDICINE

## 2021-06-25 RX ORDER — VANCOMYCIN HYDROCHLORIDE 125 MG/1
125 CAPSULE ORAL EVERY 12 HOURS SCHEDULED
Qty: 20 CAPSULE | Refills: 0 | Status: SHIPPED | OUTPATIENT
Start: 2021-06-25 | End: 2021-07-05

## 2021-06-25 RX ADMIN — Medication 1 CAPSULE: at 09:32

## 2021-06-25 RX ADMIN — POTASSIUM CHLORIDE 10 MEQ: 750 TABLET, FILM COATED, EXTENDED RELEASE ORAL at 09:33

## 2021-06-25 RX ADMIN — ALBUTEROL SULFATE 2.5 MG: 2.5 SOLUTION RESPIRATORY (INHALATION) at 20:56

## 2021-06-25 RX ADMIN — OXYCODONE AND ACETAMINOPHEN 1 TABLET: 5; 325 TABLET ORAL at 06:45

## 2021-06-25 RX ADMIN — TAMSULOSIN HYDROCHLORIDE 0.4 MG: 0.4 CAPSULE ORAL at 17:30

## 2021-06-25 RX ADMIN — VANCOMYCIN HYDROCHLORIDE 125 MG: 125 CAPSULE ORAL at 20:33

## 2021-06-25 RX ADMIN — IPRATROPIUM BROMIDE 0.5 MG: 0.5 SOLUTION RESPIRATORY (INHALATION) at 10:11

## 2021-06-25 RX ADMIN — OXYCODONE AND ACETAMINOPHEN 1 TABLET: 5; 325 TABLET ORAL at 20:33

## 2021-06-25 RX ADMIN — METFORMIN HYDROCHLORIDE 500 MG: 500 TABLET ORAL at 09:32

## 2021-06-25 RX ADMIN — Medication 1 CAPSULE: at 17:30

## 2021-06-25 RX ADMIN — SODIUM CHLORIDE, PRESERVATIVE FREE 10 ML: 5 INJECTION INTRAVENOUS at 09:48

## 2021-06-25 RX ADMIN — ASPIRIN 325 MG: 325 TABLET, COATED ORAL at 09:33

## 2021-06-25 RX ADMIN — METOPROLOL SUCCINATE 25 MG: 25 TABLET, EXTENDED RELEASE ORAL at 09:33

## 2021-06-25 RX ADMIN — OXYCODONE HYDROCHLORIDE AND ACETAMINOPHEN 500 MG: 500 TABLET ORAL at 09:34

## 2021-06-25 RX ADMIN — THERA TABS 1 TABLET: TAB at 09:33

## 2021-06-25 RX ADMIN — VANCOMYCIN HYDROCHLORIDE 125 MG: 125 CAPSULE ORAL at 09:33

## 2021-06-25 RX ADMIN — FLUTICASONE PROPIONATE 1 SPRAY: 50 SPRAY, METERED NASAL at 09:35

## 2021-06-25 RX ADMIN — ESCITALOPRAM OXALATE 20 MG: 10 TABLET ORAL at 09:33

## 2021-06-25 RX ADMIN — PANTOPRAZOLE SODIUM 40 MG: 40 TABLET, DELAYED RELEASE ORAL at 06:34

## 2021-06-25 RX ADMIN — WATER 2000 MG: 1 INJECTION INTRAMUSCULAR; INTRAVENOUS; SUBCUTANEOUS at 02:00

## 2021-06-25 RX ADMIN — DOCUSATE SODIUM 100 MG: 100 CAPSULE, LIQUID FILLED ORAL at 20:33

## 2021-06-25 RX ADMIN — ENOXAPARIN SODIUM 30 MG: 30 INJECTION SUBCUTANEOUS at 09:34

## 2021-06-25 RX ADMIN — ENOXAPARIN SODIUM 30 MG: 30 INJECTION SUBCUTANEOUS at 20:33

## 2021-06-25 RX ADMIN — DOCUSATE SODIUM 100 MG: 100 CAPSULE, LIQUID FILLED ORAL at 09:38

## 2021-06-25 RX ADMIN — TIOTROPIUM BROMIDE INHALATION SPRAY 1 PUFF: 3.12 SPRAY, METERED RESPIRATORY (INHALATION) at 20:57

## 2021-06-25 RX ADMIN — LOSARTAN POTASSIUM 25 MG: 50 TABLET ORAL at 09:33

## 2021-06-25 RX ADMIN — BUDESONIDE AND FORMOTEROL FUMARATE DIHYDRATE 2 PUFF: 160; 4.5 AEROSOL RESPIRATORY (INHALATION) at 10:19

## 2021-06-25 RX ADMIN — BUDESONIDE AND FORMOTEROL FUMARATE DIHYDRATE 2 PUFF: 160; 4.5 AEROSOL RESPIRATORY (INHALATION) at 20:56

## 2021-06-25 RX ADMIN — ATORVASTATIN CALCIUM 80 MG: 40 TABLET, FILM COATED ORAL at 20:33

## 2021-06-25 RX ADMIN — ALBUTEROL SULFATE 2.5 MG: 2.5 SOLUTION RESPIRATORY (INHALATION) at 10:11

## 2021-06-25 RX ADMIN — OXYCODONE AND ACETAMINOPHEN 1 TABLET: 5; 325 TABLET ORAL at 12:48

## 2021-06-25 RX ADMIN — IPRATROPIUM BROMIDE 0.5 MG: 0.5 SOLUTION RESPIRATORY (INHALATION) at 20:57

## 2021-06-25 RX ADMIN — SPIRONOLACTONE 25 MG: 25 TABLET, FILM COATED ORAL at 09:33

## 2021-06-25 RX ADMIN — ISOSORBIDE MONONITRATE 30 MG: 30 TABLET, EXTENDED RELEASE ORAL at 09:32

## 2021-06-25 RX ADMIN — TIOTROPIUM BROMIDE INHALATION SPRAY 1 PUFF: 3.12 SPRAY, METERED RESPIRATORY (INHALATION) at 10:19

## 2021-06-25 RX ADMIN — HYDROCHLOROTHIAZIDE 25 MG: 25 TABLET ORAL at 09:48

## 2021-06-25 RX ADMIN — FERROUS SULFATE TAB 325 MG (65 MG ELEMENTAL FE) 325 MG: 325 (65 FE) TAB at 09:33

## 2021-06-25 RX ADMIN — WATER 2000 MG: 1 INJECTION INTRAMUSCULAR; INTRAVENOUS; SUBCUTANEOUS at 09:32

## 2021-06-25 RX ADMIN — WATER 2000 MG: 1 INJECTION INTRAMUSCULAR; INTRAVENOUS; SUBCUTANEOUS at 17:30

## 2021-06-25 RX ADMIN — MONTELUKAST SODIUM 10 MG: 10 TABLET, FILM COATED ORAL at 20:33

## 2021-06-25 RX ADMIN — CHOLECALCIFEROL TAB 25 MCG (1000 UNIT) 1000 UNITS: 25 TAB at 09:34

## 2021-06-25 ASSESSMENT — PAIN DESCRIPTION - LOCATION
LOCATION: KNEE

## 2021-06-25 ASSESSMENT — PULMONARY FUNCTION TESTS
PEFR_L/MIN: 16

## 2021-06-25 ASSESSMENT — PAIN DESCRIPTION - DESCRIPTORS
DESCRIPTORS: ACHING
DESCRIPTORS: ACHING;THROBBING
DESCRIPTORS: ACHING;DISCOMFORT
DESCRIPTORS: ACHING

## 2021-06-25 ASSESSMENT — PAIN DESCRIPTION - ORIENTATION
ORIENTATION: RIGHT

## 2021-06-25 ASSESSMENT — PAIN SCALES - GENERAL
PAINLEVEL_OUTOF10: 8
PAINLEVEL_OUTOF10: 5
PAINLEVEL_OUTOF10: 7
PAINLEVEL_OUTOF10: 8
PAINLEVEL_OUTOF10: 8
PAINLEVEL_OUTOF10: 5
PAINLEVEL_OUTOF10: 4
PAINLEVEL_OUTOF10: 6
PAINLEVEL_OUTOF10: 4

## 2021-06-25 ASSESSMENT — PAIN DESCRIPTION - PAIN TYPE
TYPE: SURGICAL PAIN

## 2021-06-25 ASSESSMENT — PAIN DESCRIPTION - PROGRESSION: CLINICAL_PROGRESSION: GRADUALLY IMPROVING

## 2021-06-25 ASSESSMENT — PAIN DESCRIPTION - FREQUENCY
FREQUENCY: INTERMITTENT
FREQUENCY: INTERMITTENT

## 2021-06-25 ASSESSMENT — PAIN - FUNCTIONAL ASSESSMENT: PAIN_FUNCTIONAL_ASSESSMENT: ACTIVITIES ARE NOT PREVENTED

## 2021-06-25 NOTE — PROGRESS NOTES
Dr. Naveen Adam called to clarify PICC line removal and antibiotic doses. Dr. Naveen Adam states to give the dose tonight and in the am and patient will be discharged tomorrow.

## 2021-06-25 NOTE — PROGRESS NOTES
Physical Therapy    Reviewed steps with patient again this afternoon. He is able to complete 2 steps 6\" in height with B handrails and mod x 2 with third person just in case. He completes these 2x. Able to maintain NWB on R LE. Plans to return home tomorrow. Wheeled back to room and he sits up in chair following with call light in reach.     Jihan Ca PTA  Time In: 7360  Time Out: 1326  Date: 6/25/2021

## 2021-06-25 NOTE — PROGRESS NOTES
Patient anticipating discharge tomorrow. Oral antibiotics obtained through [facility] program.  Given to RN for Patient to receive at discharge as pharmacy will be closed over the weekend. No further needs or concerns voiced by Patient at this time.     [address]  6/25/2021

## 2021-06-25 NOTE — PLAN OF CARE
Problem: Skin Integrity:  Goal: Absence of new skin breakdown  Description: Absence of new skin breakdown  Outcome: Met This Shift     Problem: Falls - Risk of:  Goal: Will remain free from falls  Description: Will remain free from falls  Outcome: Met This Shift  Goal: Absence of physical injury  Description: Absence of physical injury  Outcome: Met This Shift     Problem: Nutrition  Goal: Optimal nutrition therapy  Outcome: Met This Shift     Problem: Gas Exchange - Impaired:  Goal: Levels of oxygenation will improve  Description: Levels of oxygenation will improve  Outcome: Met This Shift     Problem: Venous Thromboembolism:  Goal: Will show no signs or symptoms of venous thromboembolism  Description: Will show no signs or symptoms of venous thromboembolism  Outcome: Met This Shift     Problem: Gas Exchange - Impaired:  Goal: Levels of oxygenation will improve  Description: Levels of oxygenation will improve  Outcome: Met This Shift     Problem: Activity:  Goal: Mobility will improve  Description: Mobility will improve  Outcome: Met This Shift     Problem: Safety:  Goal: Ability to remain free from injury will improve  Description: Ability to remain free from injury will improve  Outcome: Met This Shift     Problem: Pain:  Description: Pain management should include both nonpharmacologic and pharmacologic interventions.   Goal: Pain level will decrease  Description: Pain level will decrease  Outcome: Ongoing     Problem: Sensory:  Goal: Pain level will decrease  Description: Pain level will decrease  Outcome: Ongoing

## 2021-06-25 NOTE — DISCHARGE SUMMARY
Hospitalist Discharge Summary    Nadeen Davis  :  1945  MRN:  [id number]    Admit date:  5/15/2021  Discharge date:  21    Admitting Physician:  Gabriel Storey MD    Discharge Diagnoses:     1. 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 2. H/O C.diff Colitis - on Oral Vancomycin, while on Cefepime, to prevent recurrence. 3. Anemia - Hgb 10.7 at time of discharge. 4. DM II   5. CAD - S/P CABG 3/30/15. 6. HTN - controlled on HCTZ, Losartan, Aldactone, and Toprol XL. 7. COPD    Admission Condition:  fair      Discharged Condition:  good    Hospital Course: The patient is a 70 y.o. male who presents to our swing bed program after undergoing right knee revision. Torri Hager, who has a history of CAD (s/p stents), DM II, HTN, Hyperlipidemia, and COPD, underwent TKA on 21. He suffered a fall while using his walker and had a ligamentous tear infection of the right knee and returned to the OR on  for superficial I&D with polyexchange. After this he developed a sinus tract and failure to heal his surgical wound. He was on 2 weeks of IV Vancomycin followed by 2 weeks of an oral antibiotic at which time he developed C.diff colitis. This was treated with oral Vancomycin. He was treated on 3/25/21, with monoclonal antibody infusion for COVID. On 21, he underwent right total knee prosthetic joint stage 1 revision. He is to continue on Cefepime for a total of 6 weeks with oral Vancomycin for C.diff prophylaxis during this time. Torri Hager was admitted to swing bed with PT / OT ordered daily. He was continued on IV Cefepime for a total of 6 weeks. Oral Vancomycin was given two times a day to prevent C.diff recurrence. He denied any diarrhea during his stay. Percocet was ordered for pain. Glucose was initially monitored but discontinued when it was shown that his diabetes was stable. Labs were monitored weekly.   During his mouth every 12 hours for 10 days             vitamin C (ASCORBIC ACID) 500 MG tablet  Take 500 mg by mouth daily             Vitamin D, Cholecalciferol, 25 MCG (1000 UT) TABS  Take by mouth daily                  Consults:  PT / OT, . Significant Diagnostic Studies:  Labs    Treatments:   IV Cefepime, Oral Vancomycin, Percocet PRN for pain, PT / OT. Disposition:   Home. Discharge Instructions:  Resume previous home medication. Add Potassium 10 meq daily. Take Oral Vancomycin 125 mg two times a day x 10 days. Percocet 5/325: 1 by mouth every 6 hours as needed for moderate to severe pain # 28, no refills. Dressing changes as directed by Ortho. Activity:  As tolerated and directed by Ortho. Diet:  Diabetic. Follow up with Ortho as directed by them at discharge. Follow up with BESS Blanco CNP in 1 week. Discharge time =  22 minutes.      Signed:  Kris Zaldivar MD  6/26/2021, 7:39 AM

## 2021-06-26 VITALS
TEMPERATURE: 98.1 F | HEIGHT: 70 IN | RESPIRATION RATE: 18 BRPM | BODY MASS INDEX: 43.67 KG/M2 | HEART RATE: 61 BPM | SYSTOLIC BLOOD PRESSURE: 122 MMHG | WEIGHT: 305 LBS | DIASTOLIC BLOOD PRESSURE: 69 MMHG | OXYGEN SATURATION: 97 %

## 2021-06-26 PROCEDURE — 6360000002 HC RX W HCPCS: Performed by: INTERNAL MEDICINE

## 2021-06-26 PROCEDURE — 6370000000 HC RX 637 (ALT 250 FOR IP): Performed by: INTERNAL MEDICINE

## 2021-06-26 PROCEDURE — 2580000003 HC RX 258: Performed by: INTERNAL MEDICINE

## 2021-06-26 RX ORDER — OXYCODONE HYDROCHLORIDE AND ACETAMINOPHEN 5; 325 MG/1; MG/1
1 TABLET ORAL EVERY 6 HOURS PRN
Qty: 28 TABLET | Refills: 0 | Status: SHIPPED | OUTPATIENT
Start: 2021-06-26 | End: 2021-07-26

## 2021-06-26 RX ORDER — POTASSIUM CHLORIDE 750 MG/1
10 TABLET, FILM COATED, EXTENDED RELEASE ORAL
Qty: 30 TABLET | Refills: 3 | Status: SHIPPED | OUTPATIENT
Start: 2021-06-26 | End: 2021-11-22

## 2021-06-26 RX ADMIN — METFORMIN HYDROCHLORIDE 500 MG: 500 TABLET ORAL at 07:57

## 2021-06-26 RX ADMIN — OXYCODONE AND ACETAMINOPHEN 1 TABLET: 5; 325 TABLET ORAL at 07:55

## 2021-06-26 RX ADMIN — SPIRONOLACTONE 25 MG: 25 TABLET, FILM COATED ORAL at 07:56

## 2021-06-26 RX ADMIN — FERROUS SULFATE TAB 325 MG (65 MG ELEMENTAL FE) 325 MG: 325 (65 FE) TAB at 07:57

## 2021-06-26 RX ADMIN — ISOSORBIDE MONONITRATE 30 MG: 30 TABLET, EXTENDED RELEASE ORAL at 07:56

## 2021-06-26 RX ADMIN — ESCITALOPRAM OXALATE 20 MG: 10 TABLET ORAL at 07:55

## 2021-06-26 RX ADMIN — WATER 2000 MG: 1 INJECTION INTRAMUSCULAR; INTRAVENOUS; SUBCUTANEOUS at 01:10

## 2021-06-26 RX ADMIN — OXYCODONE HYDROCHLORIDE AND ACETAMINOPHEN 500 MG: 500 TABLET ORAL at 07:56

## 2021-06-26 RX ADMIN — OXYCODONE AND ACETAMINOPHEN 1 TABLET: 5; 325 TABLET ORAL at 08:07

## 2021-06-26 RX ADMIN — WATER 2000 MG: 1 INJECTION INTRAMUSCULAR; INTRAVENOUS; SUBCUTANEOUS at 07:55

## 2021-06-26 RX ADMIN — LOSARTAN POTASSIUM 25 MG: 50 TABLET ORAL at 07:56

## 2021-06-26 RX ADMIN — METOPROLOL SUCCINATE 25 MG: 25 TABLET, EXTENDED RELEASE ORAL at 07:56

## 2021-06-26 RX ADMIN — PANTOPRAZOLE SODIUM 40 MG: 40 TABLET, DELAYED RELEASE ORAL at 06:28

## 2021-06-26 RX ADMIN — ASPIRIN 325 MG: 325 TABLET, COATED ORAL at 07:56

## 2021-06-26 RX ADMIN — ENOXAPARIN SODIUM 30 MG: 30 INJECTION SUBCUTANEOUS at 07:55

## 2021-06-26 RX ADMIN — SODIUM CHLORIDE, PRESERVATIVE FREE 10 ML: 5 INJECTION INTRAVENOUS at 07:57

## 2021-06-26 RX ADMIN — FLUTICASONE PROPIONATE 1 SPRAY: 50 SPRAY, METERED NASAL at 07:58

## 2021-06-26 RX ADMIN — Medication 1 CAPSULE: at 07:57

## 2021-06-26 RX ADMIN — VANCOMYCIN HYDROCHLORIDE 125 MG: 125 CAPSULE ORAL at 07:57

## 2021-06-26 RX ADMIN — CHOLECALCIFEROL TAB 25 MCG (1000 UNIT) 1000 UNITS: 25 TAB at 07:57

## 2021-06-26 RX ADMIN — POTASSIUM CHLORIDE 10 MEQ: 750 TABLET, FILM COATED, EXTENDED RELEASE ORAL at 07:57

## 2021-06-26 RX ADMIN — HYDROCHLOROTHIAZIDE 25 MG: 25 TABLET ORAL at 07:56

## 2021-06-26 RX ADMIN — SODIUM CHLORIDE, PRESERVATIVE FREE 10 ML: 5 INJECTION INTRAVENOUS at 01:10

## 2021-06-26 RX ADMIN — DOCUSATE SODIUM 100 MG: 100 CAPSULE, LIQUID FILLED ORAL at 07:56

## 2021-06-26 RX ADMIN — THERA TABS 1 TABLET: TAB at 07:57

## 2021-06-26 ASSESSMENT — PAIN SCALES - GENERAL
PAINLEVEL_OUTOF10: 0
PAINLEVEL_OUTOF10: 5
PAINLEVEL_OUTOF10: 5
PAINLEVEL_OUTOF10: 0

## 2021-06-26 NOTE — PROGRESS NOTES
Discharge instructions provided to patient and wife; pt verbalizes understanding at this time. Pt discharged to private vehicle with all documented belongings.

## 2021-06-26 NOTE — PLAN OF CARE
Problem: Pain:  Goal: Pain level will decrease  Description: Pain level will decrease  Outcome: Met This Shift  Goal: Control of acute pain  Description: Control of acute pain  Outcome: Met This Shift  Goal: Control of chronic pain  Description: Control of chronic pain  Outcome: Met This Shift     Problem: Skin Integrity:  Goal: Will show no infection signs and symptoms  Description: Will show no infection signs and symptoms  Outcome: Met This Shift  Goal: Absence of new skin breakdown  Description: Absence of new skin breakdown  Outcome: Met This Shift  Goal: Risk for impaired skin integrity will decrease  Description: Risk for impaired skin integrity will decrease  Outcome: Met This Shift  Goal: Skin integrity will improve  Description: Skin integrity will improve  Outcome: Met This Shift  Goal: Signs of wound healing will improve  Description: Signs of wound healing will improve  Outcome: Met This Shift     Problem: Falls - Risk of:  Goal: Will remain free from falls  Description: Will remain free from falls  Outcome: Met This Shift  Goal: Absence of physical injury  Description: Absence of physical injury  Outcome: Met This Shift
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
## 2021-06-26 NOTE — PROGRESS NOTES
PICC line removed from Right upper arm per protocol. Occlusive dressing and 2x2 applied. Pressure held to site. No bleeding noted to site. Tolerated procedure with out complaint. Denies further needs.      [address]any Li Nurse Coordinator  6/26/2021 9:02 AM

## 2021-06-27 DIAGNOSIS — F32.A DEPRESSION, UNSPECIFIED DEPRESSION TYPE: ICD-10-CM

## 2021-06-28 RX ORDER — ESCITALOPRAM OXALATE 20 MG/1
TABLET ORAL
Qty: 90 TABLET | Refills: 0 | Status: SHIPPED | OUTPATIENT
Start: 2021-06-28 | End: 2021-10-08

## 2021-06-28 RX ORDER — METOPROLOL SUCCINATE 25 MG/1
TABLET, EXTENDED RELEASE ORAL
Qty: 90 TABLET | Refills: 0 | Status: SHIPPED | OUTPATIENT
Start: 2021-06-28 | End: 2021-10-04

## 2021-06-29 ENCOUNTER — TELEPHONE (OUTPATIENT)
Dept: PRIMARY CARE CLINIC | Age: 76
End: 2021-06-29

## 2021-06-29 NOTE — TELEPHONE ENCOUNTER
Bernadette [facility] Transitions Initial Follow Up Call    Outreach made within 2 business days of discharge: Yes    Patient: Earl Heredia Patient : 1945   MRN: [id number]  Reason for Admission: There are no discharge diagnoses documented for the most recent discharge. Discharge Date: 21       Spoke with: Kiara Rojo    Discharge department/facility: [place] [address] Interactive Patient Contact:  Was patient able to fill all prescriptions: Yes  Was patient instructed to bring all medications to the follow-up visit: Yes  Is patient taking all medications as directed in the discharge summary?  Yes  Does patient understand their discharge instructions: Yes  Does patient have questions or concerns that need addressed prior to 7-14 day follow up office visit: no    Scheduled appointment with PCP within 7-14 days    Follow Up  Future Appointments   Date Time Provider Gerry Marin   2021 11:30 AM BESS Lee - CNP nw pc [facility]   2021 10:30 AM Elias Nava MD TIF UROLOGY [facility]   3/14/2022  8:30 AM MD Paco Zhao [facility]       Elena Sargent MA

## 2021-07-20 DIAGNOSIS — I50.32 CHRONIC DIASTOLIC (CONGESTIVE) HEART FAILURE (HCC): ICD-10-CM

## 2021-07-20 RX ORDER — SPIRONOLACTONE 25 MG/1
TABLET ORAL
Qty: 30 TABLET | Refills: 1 | Status: SHIPPED | OUTPATIENT
Start: 2021-07-20 | End: 2021-10-04
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
## 2021-07-21 ENCOUNTER — HOSPITAL ENCOUNTER (OUTPATIENT)
Dept: NON INVASIVE DIAGNOSTICS | Age: 76
Discharge: HOME | End: 2021-07-21
Payer: MEDICARE

## 2021-07-21 DIAGNOSIS — R06.02 SOB (SHORTNESS OF BREATH): ICD-10-CM

## 2021-07-21 LAB
LV EF: 50 %
LVEF MODALITY: NORMAL

## 2021-07-21 PROCEDURE — C8929 TTE W OR WO FOL WCON,DOPPLER: HCPCS

## 2021-07-27 ENCOUNTER — OFFICE VISIT (OUTPATIENT)
Dept: PRIMARY CARE CLINIC | Age: 76
End: 2021-07-27
Payer: MEDICARE

## 2021-07-27 ENCOUNTER — HOSPITAL ENCOUNTER (OUTPATIENT)
Age: 76
Setting detail: SPECIMEN
Discharge: HOME | End: 2021-07-27
Payer: MEDICARE

## 2021-07-27 VITALS
BODY MASS INDEX: 44.91 KG/M2 | WEIGHT: 313 LBS | OXYGEN SATURATION: 98 % | DIASTOLIC BLOOD PRESSURE: 80 MMHG | HEART RATE: 67 BPM | SYSTOLIC BLOOD PRESSURE: 100 MMHG

## 2021-07-27 DIAGNOSIS — Z51.81 ENCOUNTER FOR MONITORING DIURETIC THERAPY: ICD-10-CM

## 2021-07-27 DIAGNOSIS — Z79.899 LONG-TERM CURRENT USE OF PROTON PUMP INHIBITOR THERAPY: ICD-10-CM

## 2021-07-27 DIAGNOSIS — E11.9 CONTROLLED TYPE 2 DIABETES MELLITUS WITHOUT COMPLICATION, WITHOUT LONG-TERM CURRENT USE OF INSULIN (HCC): ICD-10-CM

## 2021-07-27 DIAGNOSIS — M86.9 OSTEOMYELITIS OF RIGHT KNEE REGION (HCC): ICD-10-CM

## 2021-07-27 DIAGNOSIS — Z96.651 S/P TOTAL KNEE ARTHROPLASTY, RIGHT: Primary | ICD-10-CM

## 2021-07-27 DIAGNOSIS — Z12.5 PROSTATE CANCER SCREENING: ICD-10-CM

## 2021-07-27 DIAGNOSIS — Z79.899 ENCOUNTER FOR MONITORING DIURETIC THERAPY: ICD-10-CM

## 2021-07-27 DIAGNOSIS — D68.9 COAGULATION DEFECT (HCC): ICD-10-CM

## 2021-07-27 LAB
ANION GAP SERPL CALCULATED.3IONS-SCNC: 12 MMOL/L (ref 9–17)
BUN BLDV-MCNC: 14 MG/DL (ref 8–23)
BUN/CREAT BLD: 15 (ref 9–20)
CALCIUM SERPL-MCNC: 9.4 MG/DL (ref 8.6–10.4)
CHLORIDE BLD-SCNC: 103 MMOL/L (ref 98–107)
CO2: 22 MMOL/L (ref 20–31)
CREAT SERPL-MCNC: 0.91 MG/DL (ref 0.7–1.2)
GFR AFRICAN AMERICAN: >60 ML/MIN
GFR NON-AFRICAN AMERICAN: >60 ML/MIN
GFR SERPL CREATININE-BSD FRML MDRD: ABNORMAL ML/MIN/{1.73_M2}
GFR SERPL CREATININE-BSD FRML MDRD: ABNORMAL ML/MIN/{1.73_M2}
GLUCOSE BLD-MCNC: 122 MG/DL (ref 70–99)
MAGNESIUM: 1.8 MG/DL (ref 1.6–2.6)
POTASSIUM SERPL-SCNC: 4.2 MMOL/L (ref 3.7–5.3)
SODIUM BLD-SCNC: 137 MMOL/L (ref 135–144)

## 2021-07-27 PROCEDURE — 2022F DILAT RTA XM EVC RTNOPTHY: CPT | Performed by: NURSE PRACTITIONER

## 2021-07-27 PROCEDURE — 3044F HG A1C LEVEL LT 7.0%: CPT | Performed by: NURSE PRACTITIONER

## 2021-07-27 PROCEDURE — 4040F PNEUMOC VAC/ADMIN/RCVD: CPT | Performed by: NURSE PRACTITIONER

## 2021-07-27 PROCEDURE — 82570 ASSAY OF URINE CREATININE: CPT

## 2021-07-27 PROCEDURE — 1036F TOBACCO NON-USER: CPT | Performed by: NURSE PRACTITIONER

## 2021-07-27 PROCEDURE — 99214 OFFICE O/P EST MOD 30 MIN: CPT | Performed by: NURSE PRACTITIONER

## 2021-07-27 PROCEDURE — G0103 PSA SCREENING: HCPCS

## 2021-07-27 PROCEDURE — 82043 UR ALBUMIN QUANTITATIVE: CPT

## 2021-07-27 PROCEDURE — 83735 ASSAY OF MAGNESIUM: CPT

## 2021-07-27 PROCEDURE — 80048 BASIC METABOLIC PNL TOTAL CA: CPT

## 2021-07-27 PROCEDURE — G8417 CALC BMI ABV UP PARAM F/U: HCPCS | Performed by: NURSE PRACTITIONER

## 2021-07-27 PROCEDURE — 3017F COLORECTAL CA SCREEN DOC REV: CPT | Performed by: NURSE PRACTITIONER

## 2021-07-27 PROCEDURE — G8427 DOCREV CUR MEDS BY ELIG CLIN: HCPCS | Performed by: NURSE PRACTITIONER

## 2021-07-27 PROCEDURE — 1111F DSCHRG MED/CURRENT MED MERGE: CPT | Performed by: NURSE PRACTITIONER

## 2021-07-27 PROCEDURE — 1123F ACP DISCUSS/DSCN MKR DOCD: CPT | Performed by: NURSE PRACTITIONER

## 2021-07-27 RX ORDER — BUDESONIDE AND FORMOTEROL FUMARATE DIHYDRATE 160; 4.5 UG/1; UG/1
AEROSOL RESPIRATORY (INHALATION)
COMMUNITY
End: 2021-07-27 | Stop reason: SDUPTHER

## 2021-07-27 RX ORDER — LISINOPRIL 10 MG/1
TABLET ORAL
COMMUNITY
End: 2021-07-27 | Stop reason: ALTCHOICE

## 2021-07-27 NOTE — PATIENT INSTRUCTIONS
You may be receiving a survey from [employer] regarding your visit today. You may get this in the mail, through your MyChart or in your email. Please complete the survey to enable us to provide the highest quality of care to you and your family. If you cannot score us as very good ( 5 Stars) on any question, please feel free to call the office to discuss how we could have made your experience exceptional.     Thank You! BESS Tamayo-CNP  [address] [place]EN Muller  [facility], [address]    Phone: [phone]  Fax: [phone] [facility] Office Hours:  Monday: Balir Tejeda office location [phone] ([phone]) Offering additional late hours the first Monday of the month until 7 pm.   Tuesday: 8-5 Wednesday: 8-5 Thursday:  Additional hours offered 2 Thursdays a month. Please call to inquire those dates.    Fridays: 7:30-4:30
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
## 2021-07-28 LAB
CREATININE URINE: 120.4 MG/DL (ref 39–259)
MICROALBUMIN/CREAT 24H UR: <12 MG/L
MICROALBUMIN/CREAT UR-RTO: NORMAL MCG/MG CREAT
PROSTATE SPECIFIC ANTIGEN: 0.61 UG/L
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
## 2021-08-31 ENCOUNTER — TELEPHONE (OUTPATIENT)
Dept: UROLOGY | Age: 76
End: 2021-08-31

## 2021-08-31 NOTE — TELEPHONE ENCOUNTER
Left message for patient to return call rescheduling cancelled appt. PSA recently completed ordered by Jaun He.

## 2021-09-15 DIAGNOSIS — I10 ESSENTIAL HYPERTENSION: ICD-10-CM

## 2021-09-15 RX ORDER — LOSARTAN POTASSIUM 25 MG/1
TABLET ORAL
Qty: 90 TABLET | Refills: 1 | Status: SHIPPED | OUTPATIENT
Start: 2021-09-15 | End: 2022-04-08

## 2021-09-15 NOTE — TELEPHONE ENCOUNTER
Health Maintenance   Topic Date Due    Diabetic foot exam  Never done    Diabetic retinal exam  Never done    COVID-19 Vaccine (1) Never done    DTaP/Tdap/Td vaccine (1 - Tdap) Never done    Shingles Vaccine (1 of 2) Never done    Flu vaccine (1) 09/01/2021    Lipid screen  03/11/2022    A1C test (Diabetic or Prediabetic)  04/30/2022    Annual Wellness Visit (AWV)  04/30/2022    Diabetic microalbuminuria test  07/27/2022    Potassium monitoring  07/27/2022    Creatinine monitoring  07/27/2022    Colon cancer screen colonoscopy  10/26/2025    Pneumococcal 65+ years Vaccine  Completed    Hepatitis C screen  Completed    Hepatitis A vaccine  Aged Out    Hib vaccine  Aged Out    Meningococcal (ACWY) vaccine  Aged Out             (applicable per patient's age: Cancer Screenings, Depression Screening, Fall Risk Screening, Immunizations)    Hemoglobin A1C (%)   Date Value   04/30/2021 6.5 (H)   05/27/2020 6.2   05/23/2018 6.0 (H)     Microalb/Crt.  Ratio (mcg/mg creat)   Date Value   07/27/2021 CANNOT BE CALCULATED     LDL Cholesterol (mg/dL)   Date Value   03/11/2021 34     AST (U/L)   Date Value   04/29/2021 30     ALT (U/L)   Date Value   04/29/2021 23     BUN (mg/dL)   Date Value   07/27/2021 14      (goal A1C is < 7)   (goal LDL is <100) need 30-50% reduction from baseline     BP Readings from Last 3 Encounters:   07/27/21 100/80   06/26/21 122/69   04/29/21 120/86    (goal /80)      All Future Testing planned in [facility]:  Lab Frequency Next Occurrence   CBC Auto Differential Once 03/15/2022   Comprehensive Metabolic Panel Once 13/25/6279   Vitamin D 25 Hydroxy Once 03/15/2022   Lipid Panel Once 03/15/2022   TSH with Reflex Once 03/15/2022   Magnesium Once 03/15/2022   EKG 12 Lead Once 03/15/2022   XR CHEST (2 VW) Once 03/15/2022       Next Visit Date:  Future Appointments   Date Time Provider Gerry Marin   3/14/2022  8:30 AM MD Caroline Bell [facility]            Patient Active

## 2021-09-27 ENCOUNTER — TELEPHONE (OUTPATIENT)
Dept: DIABETES SERVICES | Age: 76
End: 2021-09-27

## 2021-10-19 ENCOUNTER — OFFICE VISIT (OUTPATIENT)
Dept: PRIMARY CARE CLINIC | Age: 76
End: 2021-10-19
Payer: MEDICARE

## 2021-10-19 ENCOUNTER — HOSPITAL ENCOUNTER (OUTPATIENT)
Age: 76
Setting detail: SPECIMEN
Discharge: HOME | End: 2021-10-19
Payer: MEDICARE

## 2021-10-19 VITALS — SYSTOLIC BLOOD PRESSURE: 102 MMHG | DIASTOLIC BLOOD PRESSURE: 78 MMHG | OXYGEN SATURATION: 98 % | HEART RATE: 72 BPM

## 2021-10-19 DIAGNOSIS — E11.9 CONTROLLED TYPE 2 DIABETES MELLITUS WITHOUT COMPLICATION, WITHOUT LONG-TERM CURRENT USE OF INSULIN (HCC): ICD-10-CM

## 2021-10-19 DIAGNOSIS — N30.00 ACUTE CYSTITIS WITHOUT HEMATURIA: Primary | ICD-10-CM

## 2021-10-19 DIAGNOSIS — N30.00 ACUTE CYSTITIS WITHOUT HEMATURIA: ICD-10-CM

## 2021-10-19 LAB — HBA1C MFR BLD: 6 %

## 2021-10-19 PROCEDURE — 1036F TOBACCO NON-USER: CPT | Performed by: NURSE PRACTITIONER

## 2021-10-19 PROCEDURE — 87186 SC STD MICRODIL/AGAR DIL: CPT

## 2021-10-19 PROCEDURE — 99212 OFFICE O/P EST SF 10 MIN: CPT | Performed by: NURSE PRACTITIONER

## 2021-10-19 PROCEDURE — 3044F HG A1C LEVEL LT 7.0%: CPT | Performed by: NURSE PRACTITIONER

## 2021-10-19 PROCEDURE — 4040F PNEUMOC VAC/ADMIN/RCVD: CPT | Performed by: NURSE PRACTITIONER

## 2021-10-19 PROCEDURE — G8484 FLU IMMUNIZE NO ADMIN: HCPCS | Performed by: NURSE PRACTITIONER

## 2021-10-19 PROCEDURE — 83036 HEMOGLOBIN GLYCOSYLATED A1C: CPT | Performed by: NURSE PRACTITIONER

## 2021-10-19 PROCEDURE — 87086 URINE CULTURE/COLONY COUNT: CPT

## 2021-10-19 PROCEDURE — 81001 URINALYSIS AUTO W/SCOPE: CPT

## 2021-10-19 PROCEDURE — G8427 DOCREV CUR MEDS BY ELIG CLIN: HCPCS | Performed by: NURSE PRACTITIONER

## 2021-10-19 PROCEDURE — 3017F COLORECTAL CA SCREEN DOC REV: CPT | Performed by: NURSE PRACTITIONER

## 2021-10-19 PROCEDURE — 2022F DILAT RTA XM EVC RTNOPTHY: CPT | Performed by: NURSE PRACTITIONER

## 2021-10-19 PROCEDURE — 1123F ACP DISCUSS/DSCN MKR DOCD: CPT | Performed by: NURSE PRACTITIONER

## 2021-10-19 PROCEDURE — G8417 CALC BMI ABV UP PARAM F/U: HCPCS | Performed by: NURSE PRACTITIONER

## 2021-10-19 PROCEDURE — 87088 URINE BACTERIA CULTURE: CPT

## 2021-10-19 RX ORDER — CIPROFLOXACIN 500 MG/1
500 TABLET, FILM COATED ORAL 2 TIMES DAILY
Qty: 14 TABLET | Refills: 0 | Status: SHIPPED | OUTPATIENT
Start: 2021-10-19 | End: 2021-10-26

## 2021-10-19 NOTE — PATIENT INSTRUCTIONS
Patient Education        Urinary Tract Infections (UTI) in Men: Care Instructions  Overview     A urinary tract infection, or UTI, is a term for an infection anywhere between the kidneys and the urethra. (The urethra is the tube that carries urine from the bladder to outside the body.) Most UTIs are bladder infections. They often cause pain or burning when you urinate. UTIs are caused by bacteria. This means they can be cured with antibiotics. Be sure to complete your treatment so that the infection does not get worse. Follow-up care is a key part of your treatment and safety. Be sure to make and go to all appointments, and call your doctor if you are having problems. It's also a good idea to know your test results and keep a list of the medicines you take. How can you care for yourself at home? · Take your antibiotics as prescribed. Do not stop taking them just because you feel better. You need to take the full course of antibiotics. · Take your medicines exactly as prescribed. Your doctor may have prescribed a medicine, such as phenazopyridine (Pyridium), to help relieve pain when you urinate. This turns your urine orange. You may stop taking it when your symptoms get better. But be sure to take all of your antibiotics, which treat the infection. · Drink extra water for the next day or two. This will help make the urine less concentrated and help wash out the bacteria causing the infection. (If you have kidney, heart, or liver disease and have to limit your fluids, talk with your doctor before you increase your fluid intake.)  · Avoid drinks that are carbonated or have caffeine. They can irritate the bladder. · Urinate often. Try to empty your bladder each time. · To relieve pain, take a hot bath or lay a heating pad (set on low) over your lower belly or genital area. Never go to sleep with a heating pad in place. To help prevent UTIs  · Drink plenty of fluids.  If you have kidney, heart, or liver disease and have to limit fluids, talk with your doctor before you increase the amount of fluids you drink. · Urinate when you have the urge. Do not hold your urine for a long time. Urinate before you go to sleep. · Keep your penis clean. Catheter care  If you have a drainage tube (catheter) in place, the following steps will help you care for it. · Always wash your hands before and after touching your catheter. · Check the area around the urethra for inflammation or signs of infection. Signs of infection include irritated, swollen, red, or tender skin, or pus around the catheter. · Clean the area around the catheter with soap and water two times a day. Dry with a clean towel afterward. · Do not apply powder or lotion to the skin around the catheter. To empty the urine collection bag   · Wash your hands with soap and water. · Without touching the drain spout, remove the spout from its sleeve at the bottom of the collection bag. Open the valve on the spout. · Let the urine flow out of the bag and into the toilet or a container. Do not let the tubing or drain spout touch anything. · After you empty the bag, clean the end of the drain spout with tissue and water. Close the valve and put the drain spout back into its sleeve at the bottom of the collection bag. · Wash your hands with soap and water. When should you call for help? Call your doctor now or seek immediate medical care if:    · Symptoms such as a fever, chills, nausea, or vomiting get worse or happen for the first time.     · You have new pain in your back just below your rib cage. This is called flank pain.     · There is new blood or pus in your urine.     · You are not able to take or keep down your antibiotics. Watch closely for changes in your health, and be sure to contact your doctor if:    · You are not getting better after taking an antibiotic for 2 days.     · Your symptoms go away but then come back. Where can you learn more?   Go to [email or web address] and sign in to your [employer] account. Enter V618 in the [facility] box to learn more about \"Urinary Tract Infections (UTI) in Men: Care Instructions. \"     If you do not have an account, please click on the \"Sign Up Now\" link. Current as of: February 10, 2021               Content Version: 13.0  © 6699-4825 [employer]. Care instructions adapted under license by [facility] ([facility]). If you have questions about a medical condition or this instruction, always ask your healthcare professional. [address] any warranty or liability for your use of this information.

## 2021-10-19 NOTE — PROGRESS NOTES
HPI Notes    Name: Steven Brush  : 1945         Chief Complaint:     Chief Complaint   Patient presents with    Urinary Frequency     pt has been having alot more urinary frequency, started last week with some burning, did drink alot of water. no longer burning, but still frequency, odor, urge to go. History of Present Illness:        HPI  Pt with c/o last week with burning most of last week, urine concentrated and strong. Increased water. bp low today. No fever no chills , does not feel like he is emptying bladder. Will bring urine back to office just voided. Urinary pressure, urinary urgency. Sometimes can't get urine out. Denies vomiting, denies back pain. Hx repeat surgeries with hx right knee dehiscence/infection. Wife present today  Pt not getting out much. Sugar levels have been good.        Past Medical History:     Past Medical History:   Diagnosis Date    Arthritis     Ascending cholangitis 10/2/14     at [facility] assisted gastric remnant to open    Blood in stool     CAD (coronary artery disease)     Chronic back pain     DDD    Chronic diastolic (congestive) heart failure ([address] 2018    Chronic systolic congestive heart failure ([address] 2018    COPD with acute exacerbation ([address] 2018    Heart disease 5-9-12    History of angioplasty 2008    2 stents placed    Hx of CABG     x1 in     Hyperlipidemia     Hypertension     onset age 41    Obesity     Stress incontinence, male 3/21/2017    Transfusion history     Unspecified sleep apnea     cpap-DOES NOT USE MACHINE      Reviewed all health maintenance requirements and ordered appropriate tests  Health Maintenance Due   Topic Date Due    Diabetic foot exam  Never done    Diabetic retinal exam  Never done    COVID-19 Vaccine (1) Never done    DTaP/Tdap/Td vaccine (1 - Tdap) Never done    Shingles Vaccine (1 of 2) Never done    Flu vaccine (1) 2021       Past Surgical History:     Past Surgical History:   Procedure Laterality Date    ANTERIOR CRUCIATE LIGAMENT REPAIR Right 2/9/2021    RIGHT KNEE INCISION AND DRAINAGE, POLY EXCHANGE,  WITH EXTENSOR MECHANISM REPAIR WITH ALLOGRAFT performed by Viri Gurrola DO at [place] [address]  2005    Betsy Bee [address]  2001    roun-en -Y at [facility]   [address] Left 05/09/2012    Left main normal.  Left anterior descending artery:  Plaque disease. Ramus: Plaque disease Circumflex:nondominant, plaque disease. OM1 normal. Right coronoary artery:  dominant & luminal irregularities. Left ventricular ejection fraction 60. Mitral valve normal with no insufficinecy of the mitral valve. Aortic valve normal with no stenosis of the aortic valve. Edp was normal.    CARDIAC CATHETERIZATION Left 03/04/2015    Dr. Ramirez --Severe CAD, 80% in-stent stenosis in the left anterior descending coronary artery in a rather long segment, very eccentric,extending almost to the ostium of the left anterior descending coronary artery. 70% in-stent stenosis of a very large dominant right coronary artery. Unremarkable small circumflex. Normal left ventricular function, ejection fraction of 60%.     CARDIAC CATHETERIZATION Left 12/05/2018    Dr. Dani Tierney @ [address] [place] [address] [facility] [place] [address] specific cardiac intervention   [address]  2008    2 unknown heart stents mri 1.5t only    CHOLECYSTECTOMY  05/30/2014    open [facility] COLONOSCOPY  10/2015    repeat in 2022   [address]      2 vessel March 3th 2015    CYSTOSCOPY  05/10/2016    with laser TURP    ERCP  10/02/2014    Dr. Maryam Schilling with laparotomy    HERNIA REPAIR  09/2003    Hiatal    JOINT REPLACEMENT Bilateral 03/20/2013    bilateral hips    JOINT REPLACEMENT Right 01/27/2021    KNEE ARTHROSCOPY Right 09/26/2013    Dr. Doreen Alvarado 11/02/2016    rt. L3-4, L4-5 decompression. L4-5 discectomy and fusion    SHOULDER SURGERY Left 2000    arthroscopy    SHOULDER SURGERY Left 2007    replaced humeral head    TOTAL HIP ARTHROPLASTY Right     02/03/2016    TOTAL KNEE ARTHROPLASTY Right 01/27/2021    RIGHT TOTAL KNEE ARTHOPLASTY COMPLICATED BY OBSEITY NEEDS A MARIANNE performed by Martir Frazier DO at [facility] OR        Medications:       Prior to Admission medications    Medication Sig Start Date End Date Taking?  Authorizing Provider   ciprofloxacin (CIPRO) 500 MG tablet Take 1 tablet by mouth 2 times daily for 7 days For UTI 10/19/21 10/26/21 Yes BESS Melendez CNP   escitalopram (LEXAPRO) 20 MG tablet Take 1 tablet by mouth daily 10/8/21  Yes BESS Melendez CNP   metoprolol succinate (TOPROL XL) 25 MG extended release tablet Take 1 tablet by mouth daily 10/4/21  Yes BESS Melendez CNP   spironolactone (ALDACTONE) 25 MG tablet Take 1 tablet by mouth once daily 10/4/21  Yes BESS Melendez CNP   losartan (COZAAR) 25 MG tablet Take 1 tablet by mouth once daily 9/15/21  Yes BESS Melendez CNP   metFORMIN (GLUCOPHAGE) 500 MG tablet TAKE 1 TABLET BY MOUTH ONCE DAILY WITH BREAKFAST FOR  TYPE  2  DIABETES 8/18/21  Yes BESS Melendez CNP   atorvastatin (LIPITOR) 80 MG tablet Take 1 tablet by mouth once daily 8/9/21  Yes BESS Melendez CNP   potassium chloride (KLOR-CON) 10 MEQ extended release tablet Take 1 tablet by mouth daily (with breakfast) 6/26/21  Yes José Figueroa MD   Blood Glucose Monitoring Suppl (FREESTYLE LITE) RADHA 1 Device by Does not apply route daily E11.9 5/4/21  Yes BESS Melendez CNP   blood glucose monitor strips Test daily and as needed   E 11.9 5/4/21  Yes BESS Melendez CNP   Lancets MISC 1 each by Does not apply route daily 5/4/21  Yes BESS Melendez CNP   albuterol sulfate  (90 Base) MCG/ACT inhaler INHALE 2 PUFFS BY MOUTH EVERY 4 HOURS AS NEEDED FOR WHEEZING 4/29/21  Yes BESS Gonzalez CNP   ondansetron (ZOFRAN ODT) 4 MG disintegrating tablet Take 1 tablet by mouth every 12 hours as needed for Nausea or Vomiting 4/29/21  Yes BESS Gonzalez CNP   tiotropium (SPIRIVA RESPIMAT) 2.5 MCG/ACT AERS inhaler Inhale 1 puff into the lungs 2 times daily 4/29/21 4/29/22 Yes BESS Gonzalez CNP   budesonide-formoterol [facility]) 160-4.5 MCG/ACT AERO Inhale 2 puffs into the lungs every 12 hours 4/29/21  Yes BESS Gonzalez CNP   hydroCHLOROthiazide (HYDRODIURIL) 25 MG tablet Take 1 tablet by mouth once daily 4/21/21  Yes BESS Gonzalez CNP   isosorbide mononitrate (IMDUR) 30 MG extended release tablet Take 1 tablet by mouth once daily 2/19/21  Yes BESS Gonzalez CNP   docusate sodium (COLACE) 100 MG capsule Take 1 capsule by mouth 2 times daily 1/28/21  Yes Ngozi Moment Pocos, DO   aspirin 325 MG EC tablet Take 1 tablet by mouth 2 times daily (with meals) 1/28/21  Yes Ngozi Moment Pocos, DO   Ferrous Sulfate (IRON) 325 (65 Fe) MG TABS 65 mg 2 times daily 12/17/20  Yes Historical Provider, MD   montelukast (SINGULAIR) 10 MG tablet Take 10 mg by mouth daily 10/30/20 10/30/21 Yes Historical Provider, MD   omeprazole (PRILOSEC) 40 MG delayed release capsule TAKE 1 CAPSULE BY MOUTH ONCE DAILY 30 MINUTES BEFORE BREAKFAST.  10/12/20  Yes Historical Provider, MD   fluticasone (FLONASE) 50 MCG/ACT nasal spray 1 spray by Nasal route daily  Patient taking differently: 1 spray by Nasal route as needed  10/21/20  Yes BESS Gonzalez CNP   Multiple Vitamin (THERA/BETA-CAROTENE) TABS Take 1 tablet by mouth daily   Yes Historical Provider, MD   vitamin C (ASCORBIC ACID) 500 MG tablet Take 500 mg by mouth daily   Yes Historical Provider, MD   tamsulosin (FLOMAX) 0.4 MG capsule Take 1 capsule by mouth every evening 7/10/20  Yes BESS Estrada CNP   albuterol (PROVENTIL) (2.5 MG/3ML) 0.083% nebulizer solution Take 3 mLs by nebulization 4 times daily  Patient taking differently: Take 2.5 mg by nebulization 2 times daily  5/27/20  Yes BESS Perez CNP   ipratropium (ATROVENT) 0.02 % nebulizer solution Take 2.5 mLs by nebulization 4 times daily  Patient taking differently: Take 0.5 mg by nebulization 2 times daily  5/27/20  Yes BESS Perez CNP   Vitamin D, Cholecalciferol, 25 MCG (1000 UT) TABS Take by mouth daily    Yes Historical Provider, MD        Allergies:       Patient has no known allergies. Social History:     Tobacco:    reports that he has never smoked. He has never used smokeless tobacco.  Alcohol:      reports no history of alcohol use. Drug Use:  reports no history of drug use. Family History:     Family History   Problem Relation Age of Onset    Diabetes Mother     Heart Disease Mother     Arthritis Mother     High Blood Pressure Mother     High Cholesterol Mother     Cancer Father         lung black lung from coal mines    Heart Disease Brother         leaky heart    Liver Disease Paternal Grandfather         black lung    Obesity Sister         gastric bypass    Cancer Sister         lung, smoker       Review of Systems:         Review of Systems   Constitutional: Negative for activity change, chills and fever. HENT: Negative for congestion. Eyes: Negative. Respiratory: Negative for cough, shortness of breath and wheezing. Cardiovascular: Negative for chest pain and leg swelling. Gastrointestinal: Negative for abdominal distention. Endocrine: Negative for cold intolerance and heat intolerance. Genitourinary: Negative for difficulty urinating. Musculoskeletal: Negative for arthralgias. Skin: Negative for rash. Neurological: Negative for dizziness and headaches. Psychiatric/Behavioral: Negative for sleep disturbance. The patient is not nervous/anxious.             Physical Exam:     Vitals:  /78 (Site: Left Upper Arm, Position: Sitting, Cuff Size: Medium Adult)   Pulse 72 SpO2 98%       Physical Exam  Vitals and nursing note reviewed. Constitutional:       General: He is not in acute distress. Appearance: Normal appearance. He is well-developed. HENT:      Head: Normocephalic and atraumatic. Right Ear: Tympanic membrane normal.      Left Ear: Tympanic membrane and external ear normal.      Nose: Nose normal. No rhinorrhea. Mouth/Throat:      Mouth: Mucous membranes are moist.      Pharynx: No oropharyngeal exudate or posterior oropharyngeal erythema. Eyes:      Pupils: Pupils are equal, round, and reactive to light. Cardiovascular:      Rate and Rhythm: Normal rate and regular rhythm. Pulses: Normal pulses. Heart sounds: Normal heart sounds. No murmur heard. Pulmonary:      Effort: Pulmonary effort is normal. No respiratory distress. Breath sounds: Normal breath sounds. Abdominal:      Palpations: Abdomen is soft. There is no mass. Tenderness: There is no abdominal tenderness. There is no right CVA tenderness or left CVA tenderness. Musculoskeletal:         General: Normal range of motion. Cervical back: Normal range of motion and neck supple. Right lower leg: No edema. Left lower leg: No edema. Lymphadenopathy:      Cervical: No cervical adenopathy. Skin:     General: Skin is warm. Findings: No rash. Neurological:      Mental Status: He is alert and oriented to person, place, and time. Psychiatric:         Behavior: Behavior normal.         Thought Content:  Thought content normal.         Judgment: Judgment normal.               Data:     Lab Results   Component Value Date     07/27/2021    K 4.2 07/27/2021     07/27/2021    CO2 22 07/27/2021    BUN 14 07/27/2021    CREATININE 0.91 07/27/2021    GLUCOSE 122 07/27/2021    GLUCOSE 132 05/09/2012    PROT 6.5 04/29/2021    LABALBU 3.5 04/29/2021    BILITOT 0.44 04/29/2021    ALKPHOS 98 04/29/2021    AST 30 04/29/2021    ALT 23 04/29/2021     Lab Results   Component Value Date    WBC 4.7 06/20/2021    RBC 3.97 06/20/2021    RBC 4.72 05/09/2012    HGB 10.7 06/20/2021    HCT 31.8 06/20/2021    MCV 80.1 06/20/2021    MCH 27.0 06/20/2021    MCHC 33.8 06/20/2021    RDW 18.5 06/20/2021     06/20/2021     05/09/2012    MPV NOT REPORTED 06/20/2021     Lab Results   Component Value Date    TSH 2.34 03/11/2021     Lab Results   Component Value Date    CHOL 113 03/11/2021    HDL 49 03/11/2021    PSA 0.61 07/27/2021    LABA1C 6.0 10/19/2021          Assessment & Plan        Diagnosis Orders   1. Acute cystitis without hematuria  ciprofloxacin (CIPRO) 500 MG tablet    Culture, Urine    Urinalysis With Microscopic   2. Controlled type 2 diabetes mellitus without complication, without long-term current use of insulin ([facility])  POCT glycosylated hemoglobin (Hb A1C)       Good hydration  Start antibiotic will call with urine culture result              Completed Refills   Requested Prescriptions     Signed Prescriptions Disp Refills    ciprofloxacin (CIPRO) 500 MG tablet 14 tablet 0     Sig: Take 1 tablet by mouth 2 times daily for 7 days For UTI     Return in about 4 weeks (around 11/16/2021). Orders Placed This Encounter   Medications    ciprofloxacin (CIPRO) 500 MG tablet     Sig: Take 1 tablet by mouth 2 times daily for 7 days For UTI     Dispense:  14 tablet     Refill:  0     Orders Placed This Encounter   Procedures    Culture, Urine     Standing Status:   Future     Number of Occurrences:   1     Standing Expiration Date:   10/19/2022     Order Specific Question:   Specify (ex-cath, midstream, cysto, etc)? Answer:   clean catch    Urinalysis With Microscopic     Standing Status:   Future     Number of Occurrences:   1     Standing Expiration Date:   10/19/2022     Order Specific Question:   SPECIFY(EX-CATH,MIDSTREAM,CYSTO,ETC)?      Answer:   clean catch    POCT glycosylated hemoglobin (Hb A1C)         Patient Instructions     Patient Education Urinary Tract Infections (UTI) in Men: Care Instructions  Overview     A urinary tract infection, or UTI, is a term for an infection anywhere between the kidneys and the urethra. (The urethra is the tube that carries urine from the bladder to outside the body.) Most UTIs are bladder infections. They often cause pain or burning when you urinate. UTIs are caused by bacteria. This means they can be cured with antibiotics. Be sure to complete your treatment so that the infection does not get worse. Follow-up care is a key part of your treatment and safety. Be sure to make and go to all appointments, and call your doctor if you are having problems. It's also a good idea to know your test results and keep a list of the medicines you take. How can you care for yourself at home? · Take your antibiotics as prescribed. Do not stop taking them just because you feel better. You need to take the full course of antibiotics. · Take your medicines exactly as prescribed. Your doctor may have prescribed a medicine, such as phenazopyridine (Pyridium), to help relieve pain when you urinate. This turns your urine orange. You may stop taking it when your symptoms get better. But be sure to take all of your antibiotics, which treat the infection. · Drink extra water for the next day or two. This will help make the urine less concentrated and help wash out the bacteria causing the infection. (If you have kidney, heart, or liver disease and have to limit your fluids, talk with your doctor before you increase your fluid intake.)  · Avoid drinks that are carbonated or have caffeine. They can irritate the bladder. · Urinate often. Try to empty your bladder each time. · To relieve pain, take a hot bath or lay a heating pad (set on low) over your lower belly or genital area. Never go to sleep with a heating pad in place. To help prevent UTIs  · Drink plenty of fluids.  If you have kidney, heart, or liver disease and have to limit fluids, talk with your doctor before you increase the amount of fluids you drink. · Urinate when you have the urge. Do not hold your urine for a long time. Urinate before you go to sleep. · Keep your penis clean. Catheter care  If you have a drainage tube (catheter) in place, the following steps will help you care for it. · Always wash your hands before and after touching your catheter. · Check the area around the urethra for inflammation or signs of infection. Signs of infection include irritated, swollen, red, or tender skin, or pus around the catheter. · Clean the area around the catheter with soap and water two times a day. Dry with a clean towel afterward. · Do not apply powder or lotion to the skin around the catheter. To empty the urine collection bag   · Wash your hands with soap and water. · Without touching the drain spout, remove the spout from its sleeve at the bottom of the collection bag. Open the valve on the spout. · Let the urine flow out of the bag and into the toilet or a container. Do not let the tubing or drain spout touch anything. · After you empty the bag, clean the end of the drain spout with tissue and water. Close the valve and put the drain spout back into its sleeve at the bottom of the collection bag. · Wash your hands with soap and water. When should you call for help? Call your doctor now or seek immediate medical care if:    · Symptoms such as a fever, chills, nausea, or vomiting get worse or happen for the first time.     · You have new pain in your back just below your rib cage. This is called flank pain.     · There is new blood or pus in your urine.     · You are not able to take or keep down your antibiotics. Watch closely for changes in your health, and be sure to contact your doctor if:    · You are not getting better after taking an antibiotic for 2 days.     · Your symptoms go away but then come back. Where can you learn more?   Go to

## 2021-10-20 LAB
-: ABNORMAL
AMORPHOUS: ABNORMAL
BACTERIA: ABNORMAL
BILIRUBIN URINE: NEGATIVE
CASTS UA: ABNORMAL /LPF
COLOR: YELLOW
COMMENT UA: ABNORMAL
CRYSTALS, UA: ABNORMAL /HPF
CRYSTALS, UA: ABNORMAL /HPF
EPITHELIAL CELLS UA: ABNORMAL /HPF (ref 0–5)
GLUCOSE URINE: NEGATIVE
KETONES, URINE: NEGATIVE
LEUKOCYTE ESTERASE, URINE: ABNORMAL
MUCUS: ABNORMAL
NITRITE, URINE: POSITIVE
OTHER OBSERVATIONS UA: ABNORMAL
PH UA: 8 (ref 5–9)
PROTEIN UA: NEGATIVE
RBC UA: ABNORMAL /HPF (ref 0–2)
RENAL EPITHELIAL, UA: ABNORMAL /HPF
SPECIFIC GRAVITY UA: 1.01 (ref 1.01–1.02)
TRICHOMONAS: ABNORMAL
TURBIDITY: CLEAR
URINE HGB: NEGATIVE
UROBILINOGEN, URINE: NORMAL
WBC UA: ABNORMAL /HPF (ref 0–5)
YEAST: ABNORMAL

## 2021-10-21 ASSESSMENT — ENCOUNTER SYMPTOMS
SHORTNESS OF BREATH: 0
COUGH: 0
ABDOMINAL DISTENTION: 0
EYES NEGATIVE: 1
WHEEZING: 0

## 2021-10-22 LAB
CULTURE: ABNORMAL
CULTURE: ABNORMAL
Lab: ABNORMAL
SPECIMEN DESCRIPTION: ABNORMAL

## 2021-10-28 ENCOUNTER — HOSPITAL ENCOUNTER (OUTPATIENT)
Age: 76
Setting detail: SPECIMEN
Discharge: HOME | End: 2021-10-28
Payer: MEDICARE

## 2021-10-28 ENCOUNTER — OFFICE VISIT (OUTPATIENT)
Dept: PRIMARY CARE CLINIC | Age: 76
End: 2021-10-28
Payer: MEDICARE

## 2021-10-28 VITALS — OXYGEN SATURATION: 98 % | HEART RATE: 67 BPM | SYSTOLIC BLOOD PRESSURE: 110 MMHG | DIASTOLIC BLOOD PRESSURE: 84 MMHG

## 2021-10-28 DIAGNOSIS — E11.9 CONTROLLED TYPE 2 DIABETES MELLITUS WITHOUT COMPLICATION, WITHOUT LONG-TERM CURRENT USE OF INSULIN (HCC): ICD-10-CM

## 2021-10-28 DIAGNOSIS — M79.89 LEG SWELLING: ICD-10-CM

## 2021-10-28 DIAGNOSIS — I50.32 CHRONIC DIASTOLIC (CONGESTIVE) HEART FAILURE (HCC): ICD-10-CM

## 2021-10-28 DIAGNOSIS — N30.00 ACUTE CYSTITIS WITHOUT HEMATURIA: ICD-10-CM

## 2021-10-28 DIAGNOSIS — N30.00 ACUTE CYSTITIS WITHOUT HEMATURIA: Primary | ICD-10-CM

## 2021-10-28 DIAGNOSIS — Z87.440 HX: UTI (URINARY TRACT INFECTION): ICD-10-CM

## 2021-10-28 LAB
-: ABNORMAL
AMORPHOUS: ABNORMAL
ANION GAP SERPL CALCULATED.3IONS-SCNC: 14 MMOL/L (ref 9–17)
BACTERIA: ABNORMAL
BILIRUBIN URINE: NEGATIVE
BNP INTERPRETATION: ABNORMAL
BUN BLDV-MCNC: 17 MG/DL (ref 8–23)
BUN/CREAT BLD: 16 (ref 9–20)
CALCIUM SERPL-MCNC: 8.9 MG/DL (ref 8.6–10.4)
CASTS UA: ABNORMAL /LPF
CHLORIDE BLD-SCNC: 103 MMOL/L (ref 98–107)
CO2: 20 MMOL/L (ref 20–31)
COLOR: YELLOW
COMMENT UA: ABNORMAL
CREAT SERPL-MCNC: 1.04 MG/DL (ref 0.7–1.2)
CRYSTALS, UA: ABNORMAL /HPF
EPITHELIAL CELLS UA: ABNORMAL /HPF (ref 0–5)
GFR AFRICAN AMERICAN: >60 ML/MIN
GFR NON-AFRICAN AMERICAN: >60 ML/MIN
GFR SERPL CREATININE-BSD FRML MDRD: NORMAL ML/MIN/{1.73_M2}
GFR SERPL CREATININE-BSD FRML MDRD: NORMAL ML/MIN/{1.73_M2}
GLUCOSE BLD-MCNC: 91 MG/DL (ref 70–99)
GLUCOSE URINE: NEGATIVE
KETONES, URINE: NEGATIVE
LEUKOCYTE ESTERASE, URINE: ABNORMAL
MUCUS: ABNORMAL
NITRITE, URINE: NEGATIVE
OTHER OBSERVATIONS UA: ABNORMAL
PH UA: 6 (ref 5–9)
POTASSIUM SERPL-SCNC: 4.1 MMOL/L (ref 3.7–5.3)
PRO-BNP: 485 PG/ML
PROTEIN UA: NEGATIVE
RBC UA: ABNORMAL /HPF (ref 0–2)
RENAL EPITHELIAL, UA: ABNORMAL /HPF
SODIUM BLD-SCNC: 137 MMOL/L (ref 135–144)
SPECIFIC GRAVITY UA: >1.03 (ref 1.01–1.02)
TRICHOMONAS: ABNORMAL
TURBIDITY: CLEAR
URINE HGB: NEGATIVE
UROBILINOGEN, URINE: NORMAL
WBC UA: ABNORMAL /HPF (ref 0–5)
YEAST: ABNORMAL

## 2021-10-28 PROCEDURE — 80048 BASIC METABOLIC PNL TOTAL CA: CPT

## 2021-10-28 PROCEDURE — 3017F COLORECTAL CA SCREEN DOC REV: CPT | Performed by: NURSE PRACTITIONER

## 2021-10-28 PROCEDURE — 99213 OFFICE O/P EST LOW 20 MIN: CPT | Performed by: NURSE PRACTITIONER

## 2021-10-28 PROCEDURE — 1123F ACP DISCUSS/DSCN MKR DOCD: CPT | Performed by: NURSE PRACTITIONER

## 2021-10-28 PROCEDURE — 83880 ASSAY OF NATRIURETIC PEPTIDE: CPT

## 2021-10-28 PROCEDURE — 4040F PNEUMOC VAC/ADMIN/RCVD: CPT | Performed by: NURSE PRACTITIONER

## 2021-10-28 PROCEDURE — 2022F DILAT RTA XM EVC RTNOPTHY: CPT | Performed by: NURSE PRACTITIONER

## 2021-10-28 PROCEDURE — 81001 URINALYSIS AUTO W/SCOPE: CPT

## 2021-10-28 PROCEDURE — G8417 CALC BMI ABV UP PARAM F/U: HCPCS | Performed by: NURSE PRACTITIONER

## 2021-10-28 PROCEDURE — 87086 URINE CULTURE/COLONY COUNT: CPT

## 2021-10-28 PROCEDURE — 3044F HG A1C LEVEL LT 7.0%: CPT | Performed by: NURSE PRACTITIONER

## 2021-10-28 PROCEDURE — G8484 FLU IMMUNIZE NO ADMIN: HCPCS | Performed by: NURSE PRACTITIONER

## 2021-10-28 PROCEDURE — 1036F TOBACCO NON-USER: CPT | Performed by: NURSE PRACTITIONER

## 2021-10-28 PROCEDURE — G8427 DOCREV CUR MEDS BY ELIG CLIN: HCPCS | Performed by: NURSE PRACTITIONER

## 2021-10-28 RX ORDER — FLUCONAZOLE 150 MG/1
150 TABLET ORAL ONCE
Qty: 2 TABLET | Refills: 0 | Status: SHIPPED | OUTPATIENT
Start: 2021-10-28 | End: 2021-10-28

## 2021-10-28 RX ORDER — FLUTICASONE FUROATE, UMECLIDINIUM BROMIDE AND VILANTEROL TRIFENATATE 200; 62.5; 25 UG/1; UG/1; UG/1
POWDER RESPIRATORY (INHALATION)
COMMUNITY
Start: 2021-10-27 | End: 2021-10-28 | Stop reason: ALTCHOICE

## 2021-10-28 RX ORDER — FLUTICASONE FUROATE, UMECLIDINIUM BROMIDE AND VILANTEROL TRIFENATATE 200; 62.5; 25 UG/1; UG/1; UG/1
1 POWDER RESPIRATORY (INHALATION) DAILY
COMMUNITY
Start: 2021-10-28 | End: 2021-11-28

## 2021-10-28 ASSESSMENT — ENCOUNTER SYMPTOMS
EYES NEGATIVE: 1
DIARRHEA: 0
TROUBLE SWALLOWING: 0
WHEEZING: 0
SINUS PRESSURE: 0
SHORTNESS OF BREATH: 0
COUGH: 0
CONSTIPATION: 0
SORE THROAT: 0
ABDOMINAL DISTENTION: 1

## 2021-10-28 NOTE — PATIENT INSTRUCTIONS
You may be receiving a survey from [employer] regarding your visit today. You may get this in the mail, through your MyChart or in your email. Please complete the survey to enable us to provide the highest quality of care to you and your family. If you cannot score us as very good ( 5 Stars) on any question, please feel free to call the office to discuss how we could have made your experience exceptional.     Thank You! BESS Rios-CNP  [address] [facility]  [place], [place]    Phone: [phone]  Fax: [facility] Office Hours:  Monday: [facility] office location [phone] ([phone]) Offering additional late hours the first Monday of the month until 7 pm.   Tuesday: 8-5 Wednesday: 8-5 Thursday:  Additional hours offered 2 Thursdays a month. Please call to inquire those dates.    Fridays: 7:30-4:30

## 2021-10-28 NOTE — PROGRESS NOTES
HPI Notes    Name: Jake Granado  : 1945         Chief Complaint:     Chief Complaint   Patient presents with    Follow-up     Re check from recent infection        History of Present Illness:        HPI    Follow up recent UTI-PROTEUS MIRABILIS >[id number] CFU/ML  Treated with cipro 500mg po bid x 7 days  Here today no further urinary symptoms. Delay in picking up antibiotic when dx for 2 days had to have delivered unable to go get it. No fever. No flank pain      Component Collected Lab   Specimen Description 10/19/2021 12:57 PM [facility]   . CLEAN CATCH URINE    Special Requests 10/19/2021 12:57 PM [facility]   NOT REPORTED    Culture Abnormal  10/19/2021 12:57 PM [address] >[id number] CFU/ML    Culture 10/19/2021 12:57  [address]   PRESUMPTIVE ID: GROUP D ENTEROCOCCUS 10 to 50,000 CFU/ML Susceptibility testing not performed on low colony count organisms. Testing Performed By    Jenny Pinto Name Director Address Valid Date Range   309-GQ- 3650 [facility] Jaclyn Boucher M.D. [address] [place] [address] 90857 20 1133-Present   208-Yesenia AyalaKodi Espitia MD [address] [facility] [address] 17 0801-Present   Susceptibility    Proteus mirabilis (1)    Antibiotic Interpretation TAMANNA Status    amikacin  NOT REPORTED Final    ampicillin Sensitive <=2 Final    ampicillin-sulbactam  NOT REPORTED Final    aztreonam Sensitive <=1 Final    ceFAZolin Sensitive <=4 Final    ceFAZolin Sensitive Cefazolin sensitivity results can be used to predict the effectiveness of oral cephalosporins (eg.  Cephalexin) in uncomplicated Urinary Tract Infections due to E. coli, K. pneumoniae, and P. mirabilis Final    cefepime  NOT REPORTED Final    cefTRIAXone Sensitive <=1 Final    ciprofloxacin Sensitive <=0.25 Final    ertapenem  NOT REPORTED Final gentamicin Sensitive <=1 Final    meropenem  NOT REPORTED Final    nitrofurantoin Resistant 128 Final    tigecycline  NOT REPORTED Final    tobramycin Sensitive <=1 Final    trimethoprim-sulfamethoxazole Sensitive <=20 Final    piperacillin-tazobactam Sensitive <=4 Final    Lab and Collection      Surgical date 11/2/2021    S/p original R TKA in jan 5245, complicated by a fall after getting tangled up in his walker, fell and tore open his incision, had I&D and poly swap and subsequent antibiotic spacer placed. Finished IV ABX in late June, was on oral Vanco for C. Diff and has since finished it. Unsure of what bacteria grew  HX of COPD, CABG, T2DM    Surgery date: 8/31/2021 for revision R TKA possible re-space vs hinge and cones at [facility]    Monday scheduled for covid -19 test in [place]  Chronic pain of right knee (Primary Dx); Status post revision of total replacement of right knee    Eleni Solares MD    . [facility] [address]   [phone] (Fax)   Off all meds but 3 since Tuesday. Currently taking     Pt with fluid in legs and \"does not feel well today\" non specific but offers for the last 3 days has not taken meds he needs to hold prior to surgery. Currently taking:   imdur  Metformin   Losartan  Metoprolol   trelegy 1 puff daily (new med from pulmonology was on anoro prior)     I feel pt should still be on meds below: concerning with chf diastolic hx and pt with edema pitting to mid calf today on exam.   Wt 331# at pulmonologist, (wt last in July 2021 was 313  Offers was not eating much when in hospital)   Pt going today for chest xray at [address]. Everton Dunaway office for clarification. -2:19 pm spoke with his  will have PA call me for clarification. I drew BNP and BMP in office today.      lexapro  Spironolactone/hctz  flomax      Past Medical History:     Past Medical History:   Diagnosis Date    Arthritis     Ascending cholangitis 10/2/14     at [facility] assisted gastric remnant to open    Blood in stool 2011    CAD (coronary artery disease)     Chronic back pain     DDD    Chronic diastolic (congestive) heart failure ([facility] 11/5/2018    Chronic systolic congestive heart failure ([facility] 9/25/2018    COPD with acute exacerbation ([facility] 9/25/2018    Heart disease 5-9-12    History of angioplasty 7/2008    2 stents placed    Hx of CABG     x1 in 2015    Hyperlipidemia     Hypertension     onset age 41    Obesity     Stress incontinence, male 3/21/2017    Transfusion history     Unspecified sleep apnea     cpap-DOES NOT USE MACHINE      Reviewed all health maintenance requirements and ordered appropriate tests  Health Maintenance Due   Topic Date Due    Diabetic foot exam  Never done    Diabetic retinal exam  Never done    COVID-19 Vaccine (1) Never done    DTaP/Tdap/Td vaccine (1 - Tdap) Never done    Shingles Vaccine (1 of 2) Never done    Flu vaccine (1) 09/01/2021       Past Surgical History:     Past Surgical History:   Procedure Laterality Date    ANTERIOR CRUCIATE LIGAMENT REPAIR Right 2/9/2021    RIGHT KNEE INCISION AND DRAINAGE, POLY EXCHANGE,  WITH EXTENSOR MECHANISM REPAIR WITH ALLOGRAFT performed by Kaycee Rodríguez DO at [facility]  2005    Saravanan Stevens BARIATRIC SURGERY  2001    Maria Isabel HENRY at [facility]   [address] Left 05/09/2012    Left main normal.  Left anterior descending artery:  Plaque disease. Ramus: Plaque disease Circumflex:nondominant, plaque disease. OM1 normal. Right coronoary artery:  dominant & luminal irregularities. Left ventricular ejection fraction 60. Mitral valve normal with no insufficinecy of the mitral valve. Aortic valve normal with no stenosis of the aortic valve.   Edp was normal.    CARDIAC CATHETERIZATION Left 03/04/2015    Dr. Ramirez --Severe CAD, 80% in-stent stenosis in the left anterior descending coronary artery in a rather long segment, very eccentric,extending almost to the ostium of the left anterior descending coronary artery. 70% in-stent stenosis of a very large dominant right coronary artery. Unremarkable small circumflex. Normal left ventricular function, ejection fraction of 60%.  CARDIAC CATHETERIZATION Left 12/05/2018    Dr. Salvatore Malcolm @ [facility]--Risk [facility] [place] [address] specific cardiac intervention   [address]  2008    2 unknown heart stents mri 1.5t only    CHOLECYSTECTOMY  05/30/2014    open [facility] Dr. Kristin Rodriguez COLONOSCOPY  10/2015    repeat in 2022    [facility]      2 vessel March 3th 2015    CYSTOSCOPY  05/10/2016    with laser TURP    ERCP  10/02/2014    Dr. Frankey Fryer with laparotomy    HERNIA REPAIR  09/2003    Hiatal    JOINT REPLACEMENT Bilateral 03/20/2013    bilateral hips    JOINT REPLACEMENT Right 01/27/2021    KNEE ARTHROSCOPY Right 09/26/2013    Dr. Balaji Lanza - Nory Deter SURGERY  11/02/2016    rt. L3-4, L4-5 decompression. L4-5 discectomy and fusion    SHOULDER SURGERY Left 2000    arthroscopy    SHOULDER SURGERY Left 2007    replaced humeral head    TOTAL HIP ARTHROPLASTY Right     02/03/2016    TOTAL KNEE ARTHROPLASTY Right 01/27/2021    RIGHT TOTAL KNEE ARTHOPLASTY COMPLICATED BY OBSEITY NEEDS A MARIANNE performed by Tim De La Fuente DO at [facility] OR        Medications:       Prior to Admission medications    Medication Sig Start Date End Date Taking?  Authorizing Provider   Fluticasone-Umeclidin-Vilant (TRELEGY ELLIPTA) 200-62.5-25 MCG/INH AEPB Inhale 1 puff into the lungs daily 10/28/21 11/28/21 Yes Historical Provider, MD   escitalopram (LEXAPRO) 20 MG tablet Take 1 tablet by mouth daily 10/8/21  Yes BESS Childs CNP   metoprolol succinate (TOPROL XL) 25 MG extended release tablet Take 1 tablet by mouth daily 10/4/21  Yes BESS Childs CNP   spironolactone (ALDACTONE) 25 MG tablet Take 1 tablet by mouth once daily 10/4/21  Yes Lorrin Ion, APRN - CNP   losartan (COZAAR) 25 MG tablet Take 1 tablet by mouth once daily 9/15/21  Yes Lorrin Ion, APRN - CNP   metFORMIN (GLUCOPHAGE) 500 MG tablet TAKE 1 TABLET BY MOUTH ONCE DAILY WITH BREAKFAST FOR  TYPE  2  DIABETES 8/18/21  Yes Lorrin Ion, APRN - CNP   atorvastatin (LIPITOR) 80 MG tablet Take 1 tablet by mouth once daily 8/9/21  Yes Lorrin Ion, APRN - CNP   potassium chloride (KLOR-CON) 10 MEQ extended release tablet Take 1 tablet by mouth daily (with breakfast) 6/26/21  Yes José Figueroa MD   Blood Glucose Monitoring Suppl (FREESTYLE LITE) RADHA 1 Device by Does not apply route daily E11.9 5/4/21  Yes Lorrin Ion, APRN - CNP   blood glucose monitor strips Test daily and as needed   E 11.9 5/4/21  Yes Lorrin Ion, APRN - CNP   Lancets MISC 1 each by Does not apply route daily 5/4/21  Yes Lorrin Ion, APRN - CNP   ondansetron (ZOFRAN ODT) 4 MG disintegrating tablet Take 1 tablet by mouth every 12 hours as needed for Nausea or Vomiting 4/29/21  Yes Lorrin Ion, APRN - CNP   hydroCHLOROthiazide (HYDRODIURIL) 25 MG tablet Take 1 tablet by mouth once daily 4/21/21  Yes Lorrin Ion, APRN - CNP   isosorbide mononitrate (IMDUR) 30 MG extended release tablet Take 1 tablet by mouth once daily 2/19/21  Yes Lorrin Ion, APRN - CNP   docusate sodium (COLACE) 100 MG capsule Take 1 capsule by mouth 2 times daily 1/28/21  Yes Hernan Salazar DO   aspirin 325 MG EC tablet Take 1 tablet by mouth 2 times daily (with meals) 1/28/21  Yes Hernan Salazar DO   Ferrous Sulfate (IRON) 325 (65 Fe) MG TABS 65 mg 2 times daily 12/17/20  Yes Historical Provider, MD   montelukast (SINGULAIR) 10 MG tablet Take 10 mg by mouth daily 10/30/20 10/30/21 Yes Historical Provider, MD   omeprazole (PRILOSEC) 40 MG delayed release capsule TAKE 1 CAPSULE BY MOUTH ONCE DAILY 30 MINUTES BEFORE BREAKFAST.  10/12/20  Yes Historical Provider, MD   fluticasone (FLONASE) 50 MCG/ACT nasal spray 1 spray by Nasal route daily  Patient taking differently: 1 spray by Nasal route as needed  10/21/20  Yes BESS Walker CNP   Multiple Vitamin (THERA/BETA-CAROTENE) TABS Take 1 tablet by mouth daily   Yes Historical Provider, MD   vitamin C (ASCORBIC ACID) 500 MG tablet Take 500 mg by mouth daily   Yes Historical Provider, MD   tamsulosin (FLOMAX) 0.4 MG capsule Take 1 capsule by mouth every evening 7/10/20  Yes BESS Reis CNP   Vitamin D, Cholecalciferol, 25 MCG (1000 UT) TABS Take by mouth daily    Yes Historical Provider, MD   albuterol sulfate  (90 Base) MCG/ACT inhaler INHALE 2 PUFFS BY MOUTH EVERY 4 HOURS AS NEEDED FOR WHEEZING  Patient not taking: Reported on 10/28/2021 4/29/21   BESS Walker CNP   albuterol (PROVENTIL) (2.5 MG/3ML) 0.083% nebulizer solution Take 3 mLs by nebulization 4 times daily  Patient not taking: Reported on 10/28/2021 5/27/20   BESS Walker CNP        Allergies:       Patient has no known allergies. Social History:     Tobacco:    reports that he has never smoked. He has never used smokeless tobacco.  Alcohol:      reports no history of alcohol use. Drug Use:  reports no history of drug use. Family History:     Family History   Problem Relation Age of Onset    Diabetes Mother     Heart Disease Mother     Arthritis Mother     High Blood Pressure Mother     High Cholesterol Mother     Cancer Father         lung black lung from coal mines    Heart Disease Brother         leaky heart    Liver Disease Paternal Grandfather         black lung    Obesity Sister         gastric bypass    Cancer Sister         lung, smoker       Review of Systems:         Review of Systems   Constitutional: Negative for activity change, appetite change, chills and fever. HENT: Negative for congestion, sinus pressure, sore throat and trouble swallowing. Eyes: Negative.     Respiratory: Negative for cough, shortness of breath and wheezing. Cardiovascular: Positive for leg swelling (pavel pitting). Negative for chest pain. Gastrointestinal: Positive for abdominal distention. Negative for constipation and diarrhea. Endocrine: Negative for cold intolerance and heat intolerance. Genitourinary: Negative for dysuria, frequency and hematuria. Musculoskeletal: Negative for arthralgias. Skin: Negative for rash. Neurological: Negative for dizziness and headaches. Psychiatric/Behavioral: Negative for sleep disturbance. The patient is not nervous/anxious. Physical Exam:     Vitals:  /84 (Site: Left Upper Arm)   Pulse 67   SpO2 98%       Physical Exam  Vitals and nursing note reviewed. Constitutional:       General: He is not in acute distress. Appearance: He is well-developed. Comments: Large framed male with BMI 44.91 in wheelchair with his wife today. HENT:      Head: Normocephalic and atraumatic. Right Ear: Tympanic membrane normal.      Left Ear: Tympanic membrane and external ear normal.      Nose: No congestion. Mouth/Throat:      Mouth: Mucous membranes are moist.      Pharynx: No oropharyngeal exudate. Eyes:      Pupils: Pupils are equal, round, and reactive to light. Neck:      Vascular: No carotid bruit. Cardiovascular:      Rate and Rhythm: Normal rate and regular rhythm. Pulses: Normal pulses. Heart sounds: Normal heart sounds. No murmur heard. Pulmonary:      Effort: Pulmonary effort is normal. No respiratory distress. Breath sounds: Normal breath sounds. Abdominal:      Palpations: Abdomen is soft. There is no mass. Tenderness: There is no abdominal tenderness. Musculoskeletal:         General: Tenderness present. Normal range of motion. Cervical back: Normal range of motion and neck supple. Right lower leg: Edema (edema both lower legs ankles and to mid calf area 1+ pitting. ) present. Left lower leg: Edema present.       Comments: Braces on bilateral knees. Lymphadenopathy:      Cervical: No cervical adenopathy. Skin:     General: Skin is warm. Findings: No rash. Neurological:      Mental Status: He is alert and oriented to person, place, and time. Psychiatric:         Behavior: Behavior normal.         Thought Content: Thought content normal.         Judgment: Judgment normal.               Data:     Lab Results   Component Value Date     07/27/2021    K 4.2 07/27/2021     07/27/2021    CO2 22 07/27/2021    BUN 14 07/27/2021    CREATININE 0.91 07/27/2021    GLUCOSE 122 07/27/2021    GLUCOSE 132 05/09/2012    PROT 6.5 04/29/2021    LABALBU 3.5 04/29/2021    BILITOT 0.44 04/29/2021    ALKPHOS 98 04/29/2021    AST 30 04/29/2021    ALT 23 04/29/2021     Lab Results   Component Value Date    WBC 4.7 06/20/2021    RBC 3.97 06/20/2021    RBC 4.72 05/09/2012    HGB 10.7 06/20/2021    HCT 31.8 06/20/2021    MCV 80.1 06/20/2021    MCH 27.0 06/20/2021    MCHC 33.8 06/20/2021    RDW 18.5 06/20/2021     06/20/2021     05/09/2012    MPV NOT REPORTED 06/20/2021     Lab Results   Component Value Date    TSH 2.34 03/11/2021     Lab Results   Component Value Date    CHOL 113 03/11/2021    HDL 49 03/11/2021    PSA 0.61 07/27/2021    LABA1C 6.0 10/19/2021          Assessment & Plan        Diagnosis Orders   1. Acute cystitis without hematuria  Urinalysis    Culture, Urine    Basic Metabolic Panel   2. Controlled type 2 diabetes mellitus without complication, without long-term current use of insulin ([facility])  Brain Natriuretic Peptide    Basic Metabolic Panel   3. Hx: UTI (urinary tract infection)     4. Leg swelling  Brain Natriuretic Peptide   5. Chronic diastolic (congestive) heart failure (HCC)  Geoff Graves office today need med clarification ; pt needs to be on his diuretics.                  Completed Refills   Requested Prescriptions      No prescriptions requested or ordered in this encounter     No follow-ups on file. No orders of the defined types were placed in this encounter. Orders Placed This Encounter   Procedures    Culture, Urine     Standing Status:   Future     Standing Expiration Date:   10/28/2022     Order Specific Question:   Specify (ex-cath, midstream, cysto, etc)? Answer:   midstream    Urinalysis     Standing Status:   Future     Standing Expiration Date:   10/28/2022    Brain Natriuretic Peptide     Standing Status:   Future     Standing Expiration Date:   10/28/2022    Basic Metabolic Panel     Standing Status:   Future     Standing Expiration Date:   10/28/2022         Patient Instructions   You may be receiving a survey from [employer] regarding your visit today. You may get this in the mail, through your MyChart or in your email. Please complete the survey to enable us to provide the highest quality of care to you and your family. If you cannot score us as very good ( 5 Stars) on any question, please feel free to call the office to discuss how we could have made your experience exceptional.     Thank You! MAY Vences  [address] [place] [address] [place], [place]    Phone: [phone]  Fax: [address] [facility] Office Hours:  Monday: [facility] office location 85 ([phone]) Offering additional late hours the first Monday of the month until 7 pm.   Tuesday: 8-5 Wednesday: 8-5 Thursday:  Additional hours offered 2 Thursdays a month. Please call to inquire those dates. Fridays: 7:30-4:30              Electronically signed by BESS Vences CNP on 10/28/2021 at 2:25 PM           Completed Refills   Requested Prescriptions      No prescriptions requested or ordered in this encounter         Sourav Colmenares received counseling on the following healthy behaviors: nutrition, exercise and medication adherence  Reviewed prior labs and health maintenance. Continue current medications, diet and exercise.   Discussed use,

## 2021-10-30 LAB
CULTURE: ABNORMAL
Lab: ABNORMAL
SPECIMEN DESCRIPTION: ABNORMAL

## 2021-11-09 ENCOUNTER — TELEPHONE (OUTPATIENT)
Dept: PRIMARY CARE CLINIC | Age: 76
End: 2021-11-09

## 2021-11-09 NOTE — TELEPHONE ENCOUNTER
Bernadette [facility] Transitions Initial Follow Up Call    Outreach made within 2 business days of discharge: Yes    Patient: Reba Barker Patient : 1945   MRN: [id number]  Reason for Admission: There are no discharge diagnoses documented for the most recent discharge. Discharge Date: 21       Spoke with: Elsa Carlton     Discharge department/facility: [facility] Interactive Patient Contact:  Was patient able to fill all prescriptions: Yes  Was patient instructed to bring all medications to the follow-up visit: Yes  Is patient taking all medications as directed in the discharge summary?  Yes  Does patient understand their discharge instructions: Yes  Does patient have questions or concerns that need addressed prior to 7-14 day follow up office visit: no    Pt has follow up with Ortho on 21    Scheduled appointment with PCP within 7-14 days    Follow Up  Future Appointments   Date Time Provider Gerry Marin   2021 10:20 AM BESS Cuello CNP nw pc [facility]   3/14/2022  8:30 AM MD Gaby Solomon [facility]       Rayray Crump MA

## 2022-01-17 ENCOUNTER — OFFICE VISIT (OUTPATIENT)
Dept: UROLOGY | Age: 77
End: 2022-01-17
Payer: MEDICARE

## 2022-01-17 ENCOUNTER — HOSPITAL ENCOUNTER (OUTPATIENT)
Age: 77
Setting detail: SPECIMEN
Discharge: HOME | End: 2022-01-17
Payer: MEDICARE

## 2022-01-17 VITALS
HEIGHT: 71 IN | BODY MASS INDEX: 43.82 KG/M2 | WEIGHT: 313 LBS | SYSTOLIC BLOOD PRESSURE: 120 MMHG | DIASTOLIC BLOOD PRESSURE: 84 MMHG

## 2022-01-17 DIAGNOSIS — E66.01 OBESITY, CLASS III, BMI 40-49.9 (MORBID OBESITY) (HCC): ICD-10-CM

## 2022-01-17 DIAGNOSIS — R39.15 URGENCY OF URINATION: ICD-10-CM

## 2022-01-17 DIAGNOSIS — N40.1 BPH WITH OBSTRUCTION/LOWER URINARY TRACT SYMPTOMS: Primary | ICD-10-CM

## 2022-01-17 DIAGNOSIS — N32.81 OAB (OVERACTIVE BLADDER): ICD-10-CM

## 2022-01-17 DIAGNOSIS — N13.8 BPH WITH OBSTRUCTION/LOWER URINARY TRACT SYMPTOMS: ICD-10-CM

## 2022-01-17 DIAGNOSIS — N39.46 MIXED INCONTINENCE: ICD-10-CM

## 2022-01-17 DIAGNOSIS — N13.8 BPH WITH OBSTRUCTION/LOWER URINARY TRACT SYMPTOMS: Primary | ICD-10-CM

## 2022-01-17 DIAGNOSIS — N40.1 BPH WITH OBSTRUCTION/LOWER URINARY TRACT SYMPTOMS: ICD-10-CM

## 2022-01-17 PROBLEM — E66.813 OBESITY, CLASS III, BMI 40-49.9 (MORBID OBESITY): Status: ACTIVE | Noted: 2018-05-20

## 2022-01-17 LAB
-: ABNORMAL
AMORPHOUS: ABNORMAL
BACTERIA: ABNORMAL
BILIRUBIN URINE: NEGATIVE
CASTS UA: ABNORMAL /LPF
COLOR: YELLOW
COMMENT UA: ABNORMAL
CRYSTALS, UA: ABNORMAL /HPF
EPITHELIAL CELLS UA: ABNORMAL /HPF (ref 0–5)
GLUCOSE URINE: NEGATIVE
KETONES, URINE: ABNORMAL
LEUKOCYTE ESTERASE, URINE: NEGATIVE
MUCUS: ABNORMAL
NITRITE, URINE: NEGATIVE
OTHER OBSERVATIONS UA: ABNORMAL
PH UA: 5.5 (ref 5–9)
PROTEIN UA: NEGATIVE
RBC UA: ABNORMAL /HPF (ref 0–2)
RENAL EPITHELIAL, UA: ABNORMAL /HPF
SPECIFIC GRAVITY UA: 1.02 (ref 1.01–1.02)
TRICHOMONAS: ABNORMAL
TURBIDITY: CLEAR
URINE HGB: NEGATIVE
UROBILINOGEN, URINE: NORMAL
WBC UA: ABNORMAL /HPF (ref 0–5)
YEAST: ABNORMAL

## 2022-01-17 PROCEDURE — G8427 DOCREV CUR MEDS BY ELIG CLIN: HCPCS | Performed by: NURSE PRACTITIONER

## 2022-01-17 PROCEDURE — PBSHW PR MEASUREMENT,POST-VOID RESIDUAL VOLUME BY US,NON-IMAGING: Performed by: NURSE PRACTITIONER

## 2022-01-17 PROCEDURE — 81001 URINALYSIS AUTO W/SCOPE: CPT

## 2022-01-17 PROCEDURE — 1036F TOBACCO NON-USER: CPT | Performed by: NURSE PRACTITIONER

## 2022-01-17 PROCEDURE — 87088 URINE BACTERIA CULTURE: CPT

## 2022-01-17 PROCEDURE — 99214 OFFICE O/P EST MOD 30 MIN: CPT | Performed by: NURSE PRACTITIONER

## 2022-01-17 PROCEDURE — 87186 SC STD MICRODIL/AGAR DIL: CPT

## 2022-01-17 PROCEDURE — G8484 FLU IMMUNIZE NO ADMIN: HCPCS | Performed by: NURSE PRACTITIONER

## 2022-01-17 PROCEDURE — 1123F ACP DISCUSS/DSCN MKR DOCD: CPT | Performed by: NURSE PRACTITIONER

## 2022-01-17 PROCEDURE — G8417 CALC BMI ABV UP PARAM F/U: HCPCS | Performed by: NURSE PRACTITIONER

## 2022-01-17 PROCEDURE — 3017F COLORECTAL CA SCREEN DOC REV: CPT | Performed by: NURSE PRACTITIONER

## 2022-01-17 PROCEDURE — 87086 URINE CULTURE/COLONY COUNT: CPT

## 2022-01-17 PROCEDURE — 4040F PNEUMOC VAC/ADMIN/RCVD: CPT | Performed by: NURSE PRACTITIONER

## 2022-01-17 PROCEDURE — 51798 US URINE CAPACITY MEASURE: CPT | Performed by: NURSE PRACTITIONER

## 2022-01-17 RX ORDER — FLUTICASONE FUROATE, UMECLIDINIUM BROMIDE AND VILANTEROL TRIFENATATE 200; 62.5; 25 UG/1; UG/1; UG/1
POWDER RESPIRATORY (INHALATION)
COMMUNITY
Start: 2021-12-24 | End: 2022-07-15 | Stop reason: ALTCHOICE

## 2022-01-17 RX ORDER — TAMSULOSIN HYDROCHLORIDE 0.4 MG/1
0.4 CAPSULE ORAL EVERY EVENING
Qty: 90 CAPSULE | Refills: 3 | Status: SHIPPED | OUTPATIENT
Start: 2022-01-17

## 2022-01-17 ASSESSMENT — ENCOUNTER SYMPTOMS
NAUSEA: 0
BACK PAIN: 0
CONSTIPATION: 0
EYE REDNESS: 0
ABDOMINAL PAIN: 0
COLOR CHANGE: 0
VOMITING: 0
SHORTNESS OF BREATH: 0
COUGH: 0
WHEEZING: 0

## 2022-01-17 NOTE — PROGRESS NOTES
HPI:          Patient is a 78 y.o. male in no acute distress. He is alert and oriented to person, place, and time. History  5/2016 PVP greenlight     3/2017 cystoscopy showed normal anatomy. PFR recommended. 6/2017 Completed seven PFR treatments. Daytime frequency improved from q1hr to q1-2 hrs, nocturia improved from 0-3 to 0-1, severe urgency improved to mild, LIBBY improved from 5-10x per day to 3-4x per day. Today  Here today to follow-up for frequency, incontinence. We have not seen patient since 7/2020. He is complaining of frequency, nocturia, urgency and urge incontinence. He is saturating 1 briefs per day. He denies any dysuria or gross hematuria. He is taking Flomax daily. PVR is low, 46 mL. He is having daily bowel movements. He does consume 2 cups of coffee and 1 bottle of soda per day.     Past Medical History:   Diagnosis Date    Arthritis     Ascending cholangitis 10/2/14     at [facility] assisted gastric remnant to open    Blood in stool 2011    CAD (coronary artery disease)     Chronic back pain     DDD    Chronic diastolic (congestive) heart failure ([address] 11/5/2018    Chronic systolic congestive heart failure ([address] 9/25/2018    COPD with acute exacerbation ([address] 9/25/2018    Heart disease 5-9-12    History of angioplasty 7/2008    2 stents placed    Hx of CABG     x1 in 2015    Hyperlipidemia     Hypertension     onset age 41    Obesity     Stress incontinence, male 3/21/2017    Transfusion history     Unspecified sleep apnea     cpap-DOES NOT USE MACHINE     Past Surgical History:   Procedure Laterality Date    ANTERIOR CRUCIATE LIGAMENT REPAIR Right 2/9/2021    RIGHT KNEE INCISION AND DRAINAGE, POLY EXCHANGE,  WITH EXTENSOR MECHANISM REPAIR WITH ALLOGRAFT performed by Letha Spring DO at [facility] Dr. Hammad Walters BARIATRIC SURGERY  2001    Oralia HENRY at [address] [place] [address] Left 05/09/2012    Left main normal.  Left anterior descending artery:  Plaque disease. Ramus: Plaque disease Circumflex:nondominant, plaque disease. OM1 normal. Right coronoary artery:  dominant & luminal irregularities. Left ventricular ejection fraction 60. Mitral valve normal with no insufficinecy of the mitral valve. Aortic valve normal with no stenosis of the aortic valve. Edp was normal.    CARDIAC CATHETERIZATION Left 03/04/2015    Dr. Ramirez --Severe CAD, 80% in-stent stenosis in the left anterior descending coronary artery in a rather long segment, very eccentric,extending almost to the ostium of the left anterior descending coronary artery. 70% in-stent stenosis of a very large dominant right coronary artery. Unremarkable small circumflex. Normal left ventricular function, ejection fraction of 60%.  CARDIAC CATHETERIZATION Left 12/05/2018    Dr. Amol Ferrer @ [facility]--Risk [address] [place] [address] specific cardiac intervention   [address]  2008    2 unknown heart stents mri 1.5t only    CHOLECYSTECTOMY  05/30/2014    open [facility] Dr. Finn Fernandez COLONOSCOPY  10/2015    repeat in 2022    [facility]      2 vessel March 3th 2015    CYSTOSCOPY  05/10/2016    with laser TURP    ERCP  10/02/2014    Dr. Dori Mayfield with laparotomy    HERNIA REPAIR  09/2003    [facility]    JOINT REPLACEMENT Bilateral 03/20/2013    bilateral hips    JOINT REPLACEMENT Right 01/27/2021    KNEE ARTHROSCOPY Right 09/26/2013    Dr. Nuvia Arriaza SURGERY  11/02/2016    rt. L3-4, L4-5 decompression.  L4-5 discectomy and fusion    SHOULDER SURGERY Left 2000    arthroscopy    SHOULDER SURGERY Left 2007    replaced humeral head    TOTAL HIP ARTHROPLASTY Right     02/03/2016    TOTAL KNEE ARTHROPLASTY Right 01/27/2021    RIGHT TOTAL KNEE ARTHOPLASTY COMPLICATED BY OBSEITY NEEDS A MARIANNE performed by Aide Powers DO at [facility] OR     Outpatient Encounter Medications as of 1/17/2022   Medication Sig Dispense Refill    TRELEGY ELLIPTA 200-62.5-25 MCG/INH AEPB INHALE 1 PUFF ONCE DAILY      tamsulosin (FLOMAX) 0.4 MG capsule Take 1 capsule by mouth every evening 90 capsule 3    spironolactone (ALDACTONE) 25 MG tablet Take 1 tablet by mouth once daily 90 tablet 2    metFORMIN (GLUCOPHAGE) 500 MG tablet TAKE 1 TABLET BY MOUTH ONCE DAILY WITH BREAKFAST FOR  TYPE  2  DIABETES 90 tablet 1    potassium chloride (KLOR-CON) 10 MEQ extended release tablet Take 1 tablet by mouth once daily with breakfast 30 tablet 2    metoprolol succinate (TOPROL XL) 25 MG extended release tablet Take 1 tablet by mouth daily 90 tablet 1    losartan (COZAAR) 25 MG tablet Take 1 tablet by mouth once daily 90 tablet 1    atorvastatin (LIPITOR) 80 MG tablet Take 1 tablet by mouth once daily 90 tablet 1    Blood Glucose Monitoring Suppl (FREESTYLE LITE) RADHA 1 Device by Does not apply route daily E11.9 1 Device 0    blood glucose monitor strips Test daily and as needed   E 11.9 200 strip 1    Lancets MISC 1 each by Does not apply route daily 200 each 1    ondansetron (ZOFRAN ODT) 4 MG disintegrating tablet Take 1 tablet by mouth every 12 hours as needed for Nausea or Vomiting 8 tablet 0    hydroCHLOROthiazide (HYDRODIURIL) 25 MG tablet Take 1 tablet by mouth once daily 90 tablet 2    isosorbide mononitrate (IMDUR) 30 MG extended release tablet Take 1 tablet by mouth once daily 90 tablet 2    docusate sodium (COLACE) 100 MG capsule Take 1 capsule by mouth 2 times daily 40 capsule 0    aspirin 325 MG EC tablet Take 1 tablet by mouth 2 times daily (with meals) 80 tablet 0    Ferrous Sulfate (IRON) 325 (65 Fe) MG TABS 65 mg 2 times daily      montelukast (SINGULAIR) 10 MG tablet Take 10 mg by mouth daily      omeprazole (PRILOSEC) 40 MG delayed release capsule TAKE 1 CAPSULE BY MOUTH ONCE DAILY 30 MINUTES BEFORE BREAKFAST.       fluticasone (FLONASE) 50 MCG/ACT nasal spray 1 spray by Nasal route daily (Patient taking differently: 1 spray by Nasal route as needed ) 16 g 2    Multiple Vitamin (THERA/BETA-CAROTENE) TABS Take 1 tablet by mouth daily      vitamin C (ASCORBIC ACID) 500 MG tablet Take 500 mg by mouth daily      Vitamin D, Cholecalciferol, 25 MCG (1000 UT) TABS Take by mouth daily       [DISCONTINUED] tamsulosin (FLOMAX) 0.4 MG capsule Take 1 capsule by mouth every evening 90 capsule 0    [DISCONTINUED] escitalopram (LEXAPRO) 20 MG tablet Take 1 tablet by mouth daily (Patient not taking: Reported on 1/17/2022) 90 tablet 1    [DISCONTINUED] albuterol sulfate  (90 Base) MCG/ACT inhaler INHALE 2 PUFFS BY MOUTH EVERY 4 HOURS AS NEEDED FOR WHEEZING (Patient not taking: Reported on 10/28/2021) 18 g 2    [DISCONTINUED] albuterol (PROVENTIL) (2.5 MG/3ML) 0.083% nebulizer solution Take 3 mLs by nebulization 4 times daily (Patient not taking: Reported on 10/28/2021) 120 each 3     No facility-administered encounter medications on file as of 1/17/2022.       Current Outpatient Medications on File Prior to Visit   Medication Sig Dispense Refill    TRELEGY ELLIPTA 200-62.5-25 MCG/INH AEPB INHALE 1 PUFF ONCE DAILY      spironolactone (ALDACTONE) 25 MG tablet Take 1 tablet by mouth once daily 90 tablet 2    metFORMIN (GLUCOPHAGE) 500 MG tablet TAKE 1 TABLET BY MOUTH ONCE DAILY WITH BREAKFAST FOR  TYPE  2  DIABETES 90 tablet 1    potassium chloride (KLOR-CON) 10 MEQ extended release tablet Take 1 tablet by mouth once daily with breakfast 30 tablet 2    metoprolol succinate (TOPROL XL) 25 MG extended release tablet Take 1 tablet by mouth daily 90 tablet 1    losartan (COZAAR) 25 MG tablet Take 1 tablet by mouth once daily 90 tablet 1    atorvastatin (LIPITOR) 80 MG tablet Take 1 tablet by mouth once daily 90 tablet 1    Blood Glucose Monitoring Suppl (FREESTYLE LITE) RADHA 1 Device by Does not apply route daily E11.9 1 Device 0    blood glucose monitor strips Test daily and as needed   E 11.9 200 strip 1    Lancets MISC 1 each by Does not apply route daily 200 each 1    ondansetron (ZOFRAN ODT) 4 MG disintegrating tablet Take 1 tablet by mouth every 12 hours as needed for Nausea or Vomiting 8 tablet 0    hydroCHLOROthiazide (HYDRODIURIL) 25 MG tablet Take 1 tablet by mouth once daily 90 tablet 2    isosorbide mononitrate (IMDUR) 30 MG extended release tablet Take 1 tablet by mouth once daily 90 tablet 2    docusate sodium (COLACE) 100 MG capsule Take 1 capsule by mouth 2 times daily 40 capsule 0    aspirin 325 MG EC tablet Take 1 tablet by mouth 2 times daily (with meals) 80 tablet 0    Ferrous Sulfate (IRON) 325 (65 Fe) MG TABS 65 mg 2 times daily      montelukast (SINGULAIR) 10 MG tablet Take 10 mg by mouth daily      omeprazole (PRILOSEC) 40 MG delayed release capsule TAKE 1 CAPSULE BY MOUTH ONCE DAILY 30 MINUTES BEFORE BREAKFAST.  fluticasone (FLONASE) 50 MCG/ACT nasal spray 1 spray by Nasal route daily (Patient taking differently: 1 spray by Nasal route as needed ) 16 g 2    Multiple Vitamin (THERA/BETA-CAROTENE) TABS Take 1 tablet by mouth daily      vitamin C (ASCORBIC ACID) 500 MG tablet Take 500 mg by mouth daily      Vitamin D, Cholecalciferol, 25 MCG (1000 UT) TABS Take by mouth daily        No current facility-administered medications on file prior to visit. Patient has no known allergies.   Family History   Problem Relation Age of Onset    Diabetes Mother     Heart Disease Mother     Arthritis Mother     High Blood Pressure Mother     High Cholesterol Mother     Cancer Father         lung black lung from coal mines    Heart Disease Brother         leaky heart    Liver Disease Paternal Grandfather         black lung    Obesity Sister         gastric bypass    Cancer Sister         lung, smoker     Social History     Tobacco Use   Smoking Status Never Smoker   Smokeless Tobacco Never Used       Social History     Substance and Sexual Activity   Alcohol Use Never Comment: Rare; LESS THEN 1X PER MONTH       Review of Systems   Constitutional: Negative for appetite change, chills and fever. Eyes: Negative for redness and visual disturbance. Respiratory: Negative for cough, shortness of breath and wheezing. Cardiovascular: Negative for chest pain and leg swelling. Gastrointestinal: Negative for abdominal pain, constipation, nausea and vomiting. Genitourinary: Positive for enuresis, frequency and urgency. Negative for decreased urine volume, difficulty urinating, dysuria, flank pain, hematuria, penile discharge, penile pain, scrotal swelling and testicular pain. Musculoskeletal: Negative for back pain, joint swelling and myalgias. Skin: Negative for color change, rash and wound. Neurological: Negative for dizziness, tremors and numbness. Hematological: Negative for adenopathy. Does not bruise/bleed easily. /84 (Site: Right Upper Arm, Position: Sitting, Cuff Size: Large Adult)   Ht 5' 11\" (1.803 m)   Wt (!) 313 lb (142 kg)   BMI 43.65 kg/m²       PHYSICAL EXAM:  Constitutional: Patient in no acute distress; Neuro: alert and oriented to person place and time. Psych: Mood and affect normal.  Skin: Normal  Lungs: Respiratory effort normal  Cardiovascular:  Normal peripheral pulses  Abdomen: Soft, non-tender, non-distended with no CVA, flank pain  Bladder non-tender and not distended. Lymphatics: no palpable lymphadenopathy  Penis normal  Urethral meatus normal  Scrotal exam normal        Lab Results   Component Value Date    BUN 17 10/28/2021     Lab Results   Component Value Date    CREATININE 1.04 10/28/2021     Lab Results   Component Value Date    PSA 0.61 07/27/2021    PSA 0.55 07/07/2020    PSA 1.00 06/10/2019       ASSESSMENT:   Diagnosis Orders   1. BPH with obstruction/lower urinary tract symptoms  MS MEASUREMENT,POST-VOID RESIDUAL VOLUME BY US,NON-IMAGING    Urinalysis with Microscopic    Culture, Urine   2.  Mixed incontinence  MS MEASUREMENT,POST-VOID RESIDUAL VOLUME BY US,NON-IMAGING    Urinalysis with Microscopic    Culture, Urine   3. Urgency of urination  VT MEASUREMENT,POST-VOID RESIDUAL VOLUME BY US,NON-IMAGING    Urinalysis with Microscopic    Culture, Urine   4. Obesity, Class III, BMI 40-49.9 (morbid obesity) ([facility] [address]     5. OAB (overactive bladder)  Urinalysis with Microscopic    Culture, Urine         PLAN:  He did recently undergo several orthopedic procedures and did have catheters, so we will check a UA C&S. If the urine culture is negative for infection we will start oxybutynin daily. He will continue Flomax daily    Discussed bladder irritants thoroughly. Patient instructed to avoid/minimize intake of food/ drinks such as: coffee, tea, caffeine, alcohol, carbonated beverages, soda pop, spicy/acidic foods. Was sent home with a extensive list, including non-irritating alternatives.      We will see him in 6-8 weeks or sooner if needed for new or worsening symptoms

## 2022-01-17 NOTE — PATIENT INSTRUCTIONS
SURVEY:    You may be receiving a survey from [employer] regarding your visit today. Please complete the survey to enable us to provide the highest quality of care to you and your family. If you cannot score us a very good on any question, please call the office to discuss how we could have made your experience a very good one. Thank you.

## 2022-01-18 LAB
CULTURE: ABNORMAL
Lab: ABNORMAL
SPECIMEN DESCRIPTION: ABNORMAL

## 2022-01-27 ENCOUNTER — TELEPHONE (OUTPATIENT)
Dept: DIABETES SERVICES | Age: 77
End: 2022-01-27

## 2022-01-27 NOTE — PROGRESS NOTES
Spoke with Rima Maddox briefly regarding his referral for diabetes education. States he is \"doing good and doesn't need anything. \" Declines education. A1C is 6%. Denies questions at this time. Encouraged to call if he does have questions or concerns.       [facility]  Diabetes clinic educator  1/27/2022  3:19 PM

## 2022-02-07 ENCOUNTER — HOSPITAL ENCOUNTER (OUTPATIENT)
Age: 77
Discharge: HOME | End: 2022-02-07
Payer: MEDICARE

## 2022-02-07 DIAGNOSIS — B96.4 URINARY TRACT INFECTION DUE TO PROTEUS: ICD-10-CM

## 2022-02-07 DIAGNOSIS — N39.0 URINARY TRACT INFECTION DUE TO PROTEUS: ICD-10-CM

## 2022-02-07 PROCEDURE — 87086 URINE CULTURE/COLONY COUNT: CPT

## 2022-02-08 ENCOUNTER — TELEPHONE (OUTPATIENT)
Dept: UROLOGY | Age: 77
End: 2022-02-08

## 2022-02-08 LAB
CULTURE: NORMAL
Lab: NORMAL
SPECIMEN DESCRIPTION: NORMAL

## 2022-02-08 RX ORDER — OXYBUTYNIN CHLORIDE 10 MG/1
10 TABLET, EXTENDED RELEASE ORAL DAILY
Qty: 30 TABLET | Refills: 3 | Status: SHIPPED | OUTPATIENT
Start: 2022-02-08 | End: 2022-02-08

## 2022-02-28 ENCOUNTER — OFFICE VISIT (OUTPATIENT)
Dept: UROLOGY | Age: 77
End: 2022-02-28
Payer: MEDICARE

## 2022-02-28 VITALS
WEIGHT: 313 LBS | BODY MASS INDEX: 43.82 KG/M2 | DIASTOLIC BLOOD PRESSURE: 77 MMHG | HEIGHT: 71 IN | HEART RATE: 79 BPM | SYSTOLIC BLOOD PRESSURE: 126 MMHG

## 2022-02-28 DIAGNOSIS — N32.81 OAB (OVERACTIVE BLADDER): ICD-10-CM

## 2022-02-28 DIAGNOSIS — R35.0 FREQUENCY OF URINATION: ICD-10-CM

## 2022-02-28 DIAGNOSIS — N13.8 BPH WITH OBSTRUCTION/LOWER URINARY TRACT SYMPTOMS: ICD-10-CM

## 2022-02-28 DIAGNOSIS — N40.1 BPH WITH OBSTRUCTION/LOWER URINARY TRACT SYMPTOMS: ICD-10-CM

## 2022-02-28 DIAGNOSIS — N39.46 MIXED INCONTINENCE: ICD-10-CM

## 2022-02-28 DIAGNOSIS — R39.15 URGENCY OF URINATION: Primary | ICD-10-CM

## 2022-02-28 PROCEDURE — 99214 OFFICE O/P EST MOD 30 MIN: CPT | Performed by: NURSE PRACTITIONER

## 2022-02-28 PROCEDURE — 4040F PNEUMOC VAC/ADMIN/RCVD: CPT | Performed by: NURSE PRACTITIONER

## 2022-02-28 PROCEDURE — 99211 OFF/OP EST MAY X REQ PHY/QHP: CPT | Performed by: NURSE PRACTITIONER

## 2022-02-28 PROCEDURE — G8427 DOCREV CUR MEDS BY ELIG CLIN: HCPCS | Performed by: NURSE PRACTITIONER

## 2022-02-28 PROCEDURE — G8484 FLU IMMUNIZE NO ADMIN: HCPCS | Performed by: NURSE PRACTITIONER

## 2022-02-28 PROCEDURE — 1123F ACP DISCUSS/DSCN MKR DOCD: CPT | Performed by: NURSE PRACTITIONER

## 2022-02-28 PROCEDURE — G8417 CALC BMI ABV UP PARAM F/U: HCPCS | Performed by: NURSE PRACTITIONER

## 2022-02-28 PROCEDURE — 3017F COLORECTAL CA SCREEN DOC REV: CPT | Performed by: NURSE PRACTITIONER

## 2022-02-28 PROCEDURE — 1036F TOBACCO NON-USER: CPT | Performed by: NURSE PRACTITIONER

## 2022-02-28 RX ORDER — OXYBUTYNIN CHLORIDE 10 MG/1
10 TABLET, EXTENDED RELEASE ORAL DAILY
Qty: 30 TABLET | Refills: 3 | Status: SHIPPED | OUTPATIENT
Start: 2022-02-28 | End: 2022-04-18

## 2022-02-28 RX ORDER — ALBUTEROL SULFATE 90 UG/1
AEROSOL, METERED RESPIRATORY (INHALATION)
COMMUNITY
Start: 2022-01-31

## 2022-02-28 RX ORDER — BUDESONIDE AND FORMOTEROL FUMARATE DIHYDRATE 160; 4.5 UG/1; UG/1
AEROSOL RESPIRATORY (INHALATION)
COMMUNITY
Start: 2022-02-27

## 2022-02-28 ASSESSMENT — ENCOUNTER SYMPTOMS
VOMITING: 0
SHORTNESS OF BREATH: 0
WHEEZING: 0
EYE REDNESS: 0
ABDOMINAL PAIN: 0
BACK PAIN: 0
COUGH: 0
CONSTIPATION: 0
NAUSEA: 0
COLOR CHANGE: 0

## 2022-02-28 NOTE — PROGRESS NOTES
HPI:          Patient is a 78 y.o. male in no acute distress. He is alert and oriented to person, place, and time. History  5/2016 PVP greenlight     3/2017 cystoscopy showed normal anatomy. PFR recommended. 6/2017 Completed seven PFR treatments. Daytime frequency improved from q1hr to q1-2 hrs, nocturia improved from 0-3 to 0-1, severe urgency improved to mild, LIBBY improved from 5-10x per day to 3-4x per day. Today  Here today to follow-up for frequency, incontinence. At his last visit we did check a urine culture that was negative for infection. He is complaining of frequency, nocturia, urgency and urge incontinence. He is saturating 1 briefs per day. He denies any dysuria or gross hematuria. He is taking Flomax daily. He is having daily bowel movements. He does consume 2 cups of coffee. He did stop drinking soda.      Past Medical History:   Diagnosis Date    Arthritis     Ascending cholangitis 10/2/14     at [facility] assisted gastric remnant to open    Blood in stool 2011    CAD (coronary artery disease)     Chronic back pain     DDD    Chronic diastolic (congestive) heart failure ([address] 11/5/2018    Chronic systolic congestive heart failure ([address] 9/25/2018    COPD with acute exacerbation ([address] 9/25/2018    Heart disease 5-9-12    History of angioplasty 7/2008    2 stents placed    Hx of CABG     x1 in 2015    Hyperlipidemia     Hypertension     onset age 41    Obesity     Stress incontinence, male 3/21/2017    Transfusion history     Unspecified sleep apnea     cpap-DOES NOT USE MACHINE     Past Surgical History:   Procedure Laterality Date    ANTERIOR CRUCIATE LIGAMENT REPAIR Right 2/9/2021    RIGHT KNEE INCISION AND DRAINAGE, POLY EXCHANGE,  WITH EXTENSOR MECHANISM REPAIR WITH ALLOGRAFT performed by Letha Spring DO at [facility] Dr. Hammad Walters BARIATRIC SURGERY  2001    Oralia HENRY at [facility]   [address] [place] [address] CATHETERIZATION Left 05/09/2012    Left main normal.  Left anterior descending artery:  Plaque disease. Ramus: Plaque disease Circumflex:nondominant, plaque disease. OM1 normal. Right coronoary artery:  dominant & luminal irregularities. Left ventricular ejection fraction 60. Mitral valve normal with no insufficinecy of the mitral valve. Aortic valve normal with no stenosis of the aortic valve. Edp was normal.    CARDIAC CATHETERIZATION Left 03/04/2015    Dr. Ramirez --Severe CAD, 80% in-stent stenosis in the left anterior descending coronary artery in a rather long segment, very eccentric,extending almost to the ostium of the left anterior descending coronary artery. 70% in-stent stenosis of a very large dominant right coronary artery. Unremarkable small circumflex. Normal left ventricular function, ejection fraction of 60%.  CARDIAC CATHETERIZATION Left 12/05/2018    Dr. Ivy Mooney @ [facility]--Risk [place] [address] specific cardiac intervention   [address]  2008    2 unknown heart stents mri 1.5t only    CHOLECYSTECTOMY  05/30/2014    open [facility] Dr. Lizeth Tamayo COLONOSCOPY  10/2015    repeat in 2022    [address] [place] [address]      2 vessel March 3th 2015    CYSTOSCOPY  05/10/2016    with laser TURP    ERCP  10/02/2014    Dr. Sayda Jackson with laparotomy    HERNIA REPAIR  09/2003    Hi[facility]    JOINT REPLACEMENT Bilateral 03/20/2013    bilateral hips    JOINT REPLACEMENT Right 01/27/2021    KNEE ARTHROSCOPY Right 09/26/2013    Dr. Jhonny Bosch - [facility] SURGERY  11/02/2016    rt. L3-4, L4-5 decompression.  L4-5 discectomy and fusion    SHOULDER SURGERY Left 2000    arthroscopy    SHOULDER SURGERY Left 2007    replaced humeral head    TOTAL HIP ARTHROPLASTY Right     02/03/2016    TOTAL KNEE ARTHROPLASTY Right 01/27/2021    RIGHT TOTAL KNEE ARTHOPLASTY COMPLICATED BY OBSEITY NEEDS A MARIANNE performed by Vanesa Mccall DO at [address] Encounter Medications as of 2/28/2022   Medication Sig Dispense Refill    SYMBICORT 160-4.5 MCG/ACT AERO       albuterol sulfate  (90 Base) MCG/ACT inhaler INHALE 2 PUFFS BY MOUTH EVERY 4 HOURS AS NEEDED FOR WHEEZING      oxybutynin (DITROPAN XL) 10 MG extended release tablet Take 1 tablet by mouth daily 30 tablet 3    TRELEGY ELLIPTA 200-62.5-25 MCG/INH AEPB INHALE 1 PUFF ONCE DAILY      tamsulosin (FLOMAX) 0.4 MG capsule Take 1 capsule by mouth every evening 90 capsule 3    spironolactone (ALDACTONE) 25 MG tablet Take 1 tablet by mouth once daily 90 tablet 2    metFORMIN (GLUCOPHAGE) 500 MG tablet TAKE 1 TABLET BY MOUTH ONCE DAILY WITH BREAKFAST FOR  TYPE  2  DIABETES 90 tablet 1    potassium chloride (KLOR-CON) 10 MEQ extended release tablet Take 1 tablet by mouth once daily with breakfast 30 tablet 2    metoprolol succinate (TOPROL XL) 25 MG extended release tablet Take 1 tablet by mouth daily 90 tablet 1    losartan (COZAAR) 25 MG tablet Take 1 tablet by mouth once daily 90 tablet 1    atorvastatin (LIPITOR) 80 MG tablet Take 1 tablet by mouth once daily 90 tablet 1    Blood Glucose Monitoring Suppl (FREESTYLE LITE) RADHA 1 Device by Does not apply route daily E11.9 1 Device 0    blood glucose monitor strips Test daily and as needed   E 11.9 200 strip 1    Lancets MISC 1 each by Does not apply route daily 200 each 1    ondansetron (ZOFRAN ODT) 4 MG disintegrating tablet Take 1 tablet by mouth every 12 hours as needed for Nausea or Vomiting 8 tablet 0    hydroCHLOROthiazide (HYDRODIURIL) 25 MG tablet Take 1 tablet by mouth once daily 90 tablet 2    isosorbide mononitrate (IMDUR) 30 MG extended release tablet Take 1 tablet by mouth once daily 90 tablet 2    docusate sodium (COLACE) 100 MG capsule Take 1 capsule by mouth 2 times daily 40 capsule 0    aspirin 325 MG EC tablet Take 1 tablet by mouth 2 times daily (with meals) (Patient taking differently: Take 325 mg by mouth daily ) 80 tablet 0    Ferrous Sulfate (IRON) 325 (65 Fe) MG TABS 65 mg daily       montelukast (SINGULAIR) 10 MG tablet Take 10 mg by mouth daily      omeprazole (PRILOSEC) 40 MG delayed release capsule TAKE 1 CAPSULE BY MOUTH ONCE DAILY 30 MINUTES BEFORE BREAKFAST.  fluticasone (FLONASE) 50 MCG/ACT nasal spray 1 spray by Nasal route daily (Patient taking differently: 1 spray by Nasal route as needed ) 16 g 2    Multiple Vitamin (THERA/BETA-CAROTENE) TABS Take 1 tablet by mouth daily      vitamin C (ASCORBIC ACID) 500 MG tablet Take 500 mg by mouth daily      Vitamin D, Cholecalciferol, 25 MCG (1000 UT) TABS Take by mouth daily        No facility-administered encounter medications on file as of 2/28/2022.       Current Outpatient Medications on File Prior to Visit   Medication Sig Dispense Refill    SYMBICORT 160-4.5 MCG/ACT AERO       albuterol sulfate  (90 Base) MCG/ACT inhaler INHALE 2 PUFFS BY MOUTH EVERY 4 HOURS AS NEEDED FOR WHEEZING      TRELEGY ELLIPTA 200-62.5-25 MCG/INH AEPB INHALE 1 PUFF ONCE DAILY      tamsulosin (FLOMAX) 0.4 MG capsule Take 1 capsule by mouth every evening 90 capsule 3    spironolactone (ALDACTONE) 25 MG tablet Take 1 tablet by mouth once daily 90 tablet 2    metFORMIN (GLUCOPHAGE) 500 MG tablet TAKE 1 TABLET BY MOUTH ONCE DAILY WITH BREAKFAST FOR  TYPE  2  DIABETES 90 tablet 1    potassium chloride (KLOR-CON) 10 MEQ extended release tablet Take 1 tablet by mouth once daily with breakfast 30 tablet 2    metoprolol succinate (TOPROL XL) 25 MG extended release tablet Take 1 tablet by mouth daily 90 tablet 1    losartan (COZAAR) 25 MG tablet Take 1 tablet by mouth once daily 90 tablet 1    atorvastatin (LIPITOR) 80 MG tablet Take 1 tablet by mouth once daily 90 tablet 1    Blood Glucose Monitoring Suppl (FREESTYLE LITE) RADHA 1 Device by Does not apply route daily E11.9 1 Device 0    blood glucose monitor strips Test daily and as needed   E 11.9 200 strip 1    Lancets MISC 1 each by Does not apply route daily 200 each 1    ondansetron (ZOFRAN ODT) 4 MG disintegrating tablet Take 1 tablet by mouth every 12 hours as needed for Nausea or Vomiting 8 tablet 0    hydroCHLOROthiazide (HYDRODIURIL) 25 MG tablet Take 1 tablet by mouth once daily 90 tablet 2    isosorbide mononitrate (IMDUR) 30 MG extended release tablet Take 1 tablet by mouth once daily 90 tablet 2    docusate sodium (COLACE) 100 MG capsule Take 1 capsule by mouth 2 times daily 40 capsule 0    aspirin 325 MG EC tablet Take 1 tablet by mouth 2 times daily (with meals) (Patient taking differently: Take 325 mg by mouth daily ) 80 tablet 0    Ferrous Sulfate (IRON) 325 (65 Fe) MG TABS 65 mg daily       montelukast (SINGULAIR) 10 MG tablet Take 10 mg by mouth daily      omeprazole (PRILOSEC) 40 MG delayed release capsule TAKE 1 CAPSULE BY MOUTH ONCE DAILY 30 MINUTES BEFORE BREAKFAST.  fluticasone (FLONASE) 50 MCG/ACT nasal spray 1 spray by Nasal route daily (Patient taking differently: 1 spray by Nasal route as needed ) 16 g 2    Multiple Vitamin (THERA/BETA-CAROTENE) TABS Take 1 tablet by mouth daily      vitamin C (ASCORBIC ACID) 500 MG tablet Take 500 mg by mouth daily      Vitamin D, Cholecalciferol, 25 MCG (1000 UT) TABS Take by mouth daily        No current facility-administered medications on file prior to visit. Patient has no known allergies.   Family History   Problem Relation Age of Onset    Diabetes Mother     Heart Disease Mother     Arthritis Mother     High Blood Pressure Mother     High Cholesterol Mother     Cancer Father         lung black lung from coal mines    Heart Disease Brother         leaky heart    Liver Disease Paternal Grandfather         black lung    Obesity Sister         gastric bypass    Cancer Sister         lung, smoker     Social History     Tobacco Use   Smoking Status Never Smoker   Smokeless Tobacco Never Used       Social History     Substance and Sexual Activity   Alcohol Use Never    Comment: Rare; LESS THEN 1X PER MONTH       Review of Systems   Constitutional: Negative for appetite change, chills and fever. Eyes: Negative for redness and visual disturbance. Respiratory: Negative for cough, shortness of breath and wheezing. Cardiovascular: Negative for chest pain and leg swelling. Gastrointestinal: Negative for abdominal pain, constipation, nausea and vomiting. Genitourinary: Positive for enuresis, frequency and urgency. Negative for decreased urine volume, difficulty urinating, dysuria, flank pain, hematuria, penile discharge, penile pain, scrotal swelling and testicular pain. Musculoskeletal: Negative for back pain, joint swelling and myalgias. Skin: Negative for color change, rash and wound. Neurological: Negative for dizziness, tremors and numbness. Hematological: Negative for adenopathy. Does not bruise/bleed easily. /77 (Site: Right Upper Arm, Position: Sitting, Cuff Size: Large Adult)   Pulse 79   Ht 5' 11\" (1.803 m)   Wt (!) 313 lb (142 kg)   BMI 43.65 kg/m²       PHYSICAL EXAM:  Constitutional: Patient in no acute distress; Neuro: alert and oriented to person place and time. Psych: Mood and affect normal.  Skin: Normal  Lungs: Respiratory effort normal  Cardiovascular:  Normal peripheral pulses  Abdomen: Soft, non-tender, non-distended with no CVA, flank pain  Bladder non-tender and not distended. Lab Results   Component Value Date    BUN 17 10/28/2021     Lab Results   Component Value Date    CREATININE 1.04 10/28/2021     Lab Results   Component Value Date    PSA 0.61 07/27/2021    PSA 0.55 07/07/2020    PSA 1.00 06/10/2019       ASSESSMENT:   Diagnosis Orders   1. Urgency of urination     2. Mixed incontinence     3. OAB (overactive bladder)     4. Frequency of urination     5.  BPH with obstruction/lower urinary tract symptoms           PLAN:  He will continue Flomax daily    We will start oxybutynin 10 mg extended release    We will see him back in 6-8 weeks to reevaluate his urinary symptoms or sooner if needed

## 2022-02-28 NOTE — PATIENT INSTRUCTIONS
SURVEY:    You may be receiving a survey from [email or web address] regarding your visit today. Please complete the survey to enable us to provide the highest quality of care to you and your family. If you cannot score us a very good on any question, please call the office to discuss how we could of made your experience a very good one. Thank you.

## 2022-03-08 DIAGNOSIS — R06.02 SOB (SHORTNESS OF BREATH): Primary | ICD-10-CM

## 2022-03-11 ENCOUNTER — HOSPITAL ENCOUNTER (OUTPATIENT)
Dept: NON INVASIVE DIAGNOSTICS | Age: 77
Discharge: HOME | End: 2022-03-11
Payer: MEDICARE

## 2022-03-11 ENCOUNTER — HOSPITAL ENCOUNTER (OUTPATIENT)
Age: 77
Discharge: HOME | End: 2022-03-11
Payer: MEDICARE

## 2022-03-11 ENCOUNTER — HOSPITAL ENCOUNTER (OUTPATIENT)
Age: 77
Discharge: HOME | End: 2022-03-13
Payer: MEDICARE

## 2022-03-11 ENCOUNTER — HOSPITAL ENCOUNTER (OUTPATIENT)
Dept: GENERAL RADIOLOGY | Age: 77
Discharge: HOME | End: 2022-03-13
Payer: MEDICARE

## 2022-03-11 DIAGNOSIS — R06.02 SOB (SHORTNESS OF BREATH): ICD-10-CM

## 2022-03-11 DIAGNOSIS — E55.9 VITAMIN D DEFICIENCY DISEASE: ICD-10-CM

## 2022-03-11 DIAGNOSIS — E78.5 HYPERLIPIDEMIA, UNSPECIFIED HYPERLIPIDEMIA TYPE: ICD-10-CM

## 2022-03-11 DIAGNOSIS — I25.10 CORONARY ARTERY DISEASE INVOLVING NATIVE CORONARY ARTERY OF NATIVE HEART WITHOUT ANGINA PECTORIS: ICD-10-CM

## 2022-03-11 DIAGNOSIS — I10 ESSENTIAL HYPERTENSION: ICD-10-CM

## 2022-03-11 LAB
ABSOLUTE EOS #: 0.1 K/UL (ref 0–0.4)
ABSOLUTE LYMPH #: 1.7 K/UL (ref 1–4.8)
ABSOLUTE MONO #: 0.6 K/UL (ref 0–1)
ALBUMIN SERPL-MCNC: 4.1 G/DL (ref 3.5–5.2)
ALP BLD-CCNC: 129 U/L (ref 40–129)
ALT SERPL-CCNC: 15 U/L (ref 5–41)
ANION GAP SERPL CALCULATED.3IONS-SCNC: 14 MMOL/L (ref 9–17)
AST SERPL-CCNC: 25 U/L
BASOPHILS # BLD: 0 % (ref 0–2)
BASOPHILS ABSOLUTE: 0 K/UL (ref 0–0.2)
BILIRUB SERPL-MCNC: 0.57 MG/DL (ref 0.3–1.2)
BUN BLDV-MCNC: 21 MG/DL (ref 8–23)
BUN/CREAT BLD: 20 (ref 9–20)
CALCIUM SERPL-MCNC: 9.5 MG/DL (ref 8.6–10.4)
CHLORIDE BLD-SCNC: 99 MMOL/L (ref 98–107)
CHOLESTEROL/HDL RATIO: 1.6
CHOLESTEROL: 107 MG/DL
CO2: 24 MMOL/L (ref 20–31)
CREAT SERPL-MCNC: 1.03 MG/DL (ref 0.7–1.2)
EOSINOPHILS RELATIVE PERCENT: 2 % (ref 0–5)
GFR AFRICAN AMERICAN: >60 ML/MIN
GFR NON-AFRICAN AMERICAN: >60 ML/MIN
GFR SERPL CREATININE-BSD FRML MDRD: ABNORMAL ML/MIN/{1.73_M2}
GLUCOSE BLD-MCNC: 101 MG/DL (ref 70–99)
HCT VFR BLD CALC: 33.7 % (ref 41–53)
HDLC SERPL-MCNC: 65 MG/DL
HEMOGLOBIN: 10.7 G/DL (ref 13.5–17.5)
LDL CHOLESTEROL: 22 MG/DL (ref 0–130)
LV EF: 53 %
LVEF MODALITY: NORMAL
LYMPHOCYTES # BLD: 27 % (ref 13–44)
MAGNESIUM: 1.7 MG/DL (ref 1.6–2.6)
MCH RBC QN AUTO: 22.1 PG (ref 26–34)
MCHC RBC AUTO-ENTMCNC: 31.7 G/DL (ref 31–37)
MCV RBC AUTO: 69.8 FL (ref 80–100)
MONOCYTES # BLD: 10 % (ref 5–9)
PATIENT FASTING?: YES
PDW BLD-RTO: 17.1 % (ref 12.1–15.2)
PLATELET # BLD: 231 K/UL (ref 140–450)
POTASSIUM SERPL-SCNC: 3.8 MMOL/L (ref 3.7–5.3)
RBC # BLD: 4.83 M/UL (ref 4.5–5.9)
SEG NEUTROPHILS: 61 % (ref 39–75)
SEGMENTED NEUTROPHILS ABSOLUTE COUNT: 3.8 K/UL (ref 2.1–6.5)
SODIUM BLD-SCNC: 137 MMOL/L (ref 135–144)
TOTAL PROTEIN: 6.6 G/DL (ref 6.4–8.3)
TRIGL SERPL-MCNC: 102 MG/DL
TSH SERPL DL<=0.05 MIU/L-ACNC: 2.95 MIU/L (ref 0.3–5)
VITAMIN D 25-HYDROXY: 53 NG/ML
WBC # BLD: 6.3 K/UL (ref 3.5–11)

## 2022-03-11 PROCEDURE — 71046 X-RAY EXAM CHEST 2 VIEWS: CPT

## 2022-03-11 PROCEDURE — 85025 COMPLETE CBC W/AUTO DIFF WBC: CPT

## 2022-03-11 PROCEDURE — 82306 VITAMIN D 25 HYDROXY: CPT

## 2022-03-11 PROCEDURE — 36415 COLL VENOUS BLD VENIPUNCTURE: CPT

## 2022-03-11 PROCEDURE — 93306 TTE W/DOPPLER COMPLETE: CPT

## 2022-03-11 PROCEDURE — 80053 COMPREHEN METABOLIC PANEL: CPT

## 2022-03-11 PROCEDURE — 83735 ASSAY OF MAGNESIUM: CPT

## 2022-03-11 PROCEDURE — 84443 ASSAY THYROID STIM HORMONE: CPT

## 2022-03-11 PROCEDURE — 80061 LIPID PANEL: CPT

## 2022-03-11 PROCEDURE — 93005 ELECTROCARDIOGRAM TRACING: CPT

## 2022-03-13 LAB
EKG ATRIAL RATE: 92 BPM
EKG Q-T INTERVAL: 370 MS
EKG QRS DURATION: 100 MS
EKG QTC CALCULATION (BAZETT): 445 MS
EKG R AXIS: -27 DEGREES
EKG T AXIS: 46 DEGREES
EKG VENTRICULAR RATE: 87 BPM

## 2022-03-13 PROCEDURE — 93010 ELECTROCARDIOGRAM REPORT: CPT | Performed by: INTERNAL MEDICINE

## 2022-03-14 ENCOUNTER — OFFICE VISIT (OUTPATIENT)
Dept: CARDIOLOGY CLINIC | Age: 77
End: 2022-03-14
Payer: MEDICARE

## 2022-03-14 VITALS
BODY MASS INDEX: 37.52 KG/M2 | DIASTOLIC BLOOD PRESSURE: 70 MMHG | HEART RATE: 93 BPM | OXYGEN SATURATION: 93 % | SYSTOLIC BLOOD PRESSURE: 110 MMHG | WEIGHT: 269 LBS

## 2022-03-14 DIAGNOSIS — R06.02 SOB (SHORTNESS OF BREATH): Primary | ICD-10-CM

## 2022-03-14 DIAGNOSIS — E55.9 VITAMIN D DEFICIENCY DISEASE: ICD-10-CM

## 2022-03-14 DIAGNOSIS — I25.10 CORONARY ARTERY DISEASE INVOLVING NATIVE CORONARY ARTERY OF NATIVE HEART WITHOUT ANGINA PECTORIS: ICD-10-CM

## 2022-03-14 DIAGNOSIS — E78.5 HYPERLIPIDEMIA, UNSPECIFIED HYPERLIPIDEMIA TYPE: ICD-10-CM

## 2022-03-14 DIAGNOSIS — I10 ESSENTIAL HYPERTENSION: ICD-10-CM

## 2022-03-14 PROCEDURE — 3017F COLORECTAL CA SCREEN DOC REV: CPT | Performed by: INTERNAL MEDICINE

## 2022-03-14 PROCEDURE — 1123F ACP DISCUSS/DSCN MKR DOCD: CPT | Performed by: INTERNAL MEDICINE

## 2022-03-14 PROCEDURE — 99214 OFFICE O/P EST MOD 30 MIN: CPT | Performed by: INTERNAL MEDICINE

## 2022-03-14 PROCEDURE — 1036F TOBACCO NON-USER: CPT | Performed by: INTERNAL MEDICINE

## 2022-03-14 PROCEDURE — G8484 FLU IMMUNIZE NO ADMIN: HCPCS | Performed by: INTERNAL MEDICINE

## 2022-03-14 PROCEDURE — 4040F PNEUMOC VAC/ADMIN/RCVD: CPT | Performed by: INTERNAL MEDICINE

## 2022-03-14 PROCEDURE — G8417 CALC BMI ABV UP PARAM F/U: HCPCS | Performed by: INTERNAL MEDICINE

## 2022-03-14 PROCEDURE — G8428 CUR MEDS NOT DOCUMENT: HCPCS | Performed by: INTERNAL MEDICINE

## 2022-03-14 RX ORDER — ESCITALOPRAM OXALATE 20 MG/1
20 TABLET ORAL DAILY
COMMUNITY
End: 2022-10-18

## 2022-03-14 NOTE — PROGRESS NOTES
Ov Dr. Hardeep Clifford for one year follow up   Pt forgot bag of medications sitting on table  At home   Has had 5 graft surgeries this year on right knee  Has had trouble with infections   Dr. Bina Jane to put 50% wt now on knee   Last three weeks  Prior had been in wheelchair   No chest pain or heaviness  Breathing is same \"bad\"  On inhalers seeing pulmonology in [address]   Has appt Tuesday   C/o getting cramps on both sides of the neck   X one month hx of broken neck-2 yrs ago   Last 2-3 min.  Will rub it and goes away   occ lightheadedness             Call back with medications you are taking     DECREASE Losartan (cozaar) 25 mg to 1/2 tablet  Daily     Monitor BP/HR daily - different times of the day   Report reading back to us in 2 weeks     Follow up in one year

## 2022-03-14 NOTE — PATIENT INSTRUCTIONS
Call back with medications you are taking     DECREASE Losartan (cozaar) 25 mg to 1/2 tablet  Daily     Monitor BP/HR daily - different times of the day   Report reading back to us in 2 weeks     Follow up in one year       You may be receiving a survey from [employer] regarding your visit today. Please complete the survey to enable us to provide the highest quality of care to you and your family. If you cannot score us a very good on any question, please call the office to discuss how we could have made your experience a very good one.       WHITLEY Diaz [place], LPN Dr.Gregory Bennett Laud
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
## 2022-03-31 ENCOUNTER — TELEPHONE (OUTPATIENT)
Dept: CARDIOLOGY CLINIC | Age: 77
End: 2022-03-31

## 2022-03-31 NOTE — TELEPHONE ENCOUNTER
Patient's wife stopped by office with BP Log for Dr Keri Pedraza to review. Scanned in.   Please advise

## 2022-04-07 DIAGNOSIS — I10 ESSENTIAL HYPERTENSION: ICD-10-CM

## 2022-04-08 RX ORDER — ISOSORBIDE MONONITRATE 30 MG/1
30 TABLET, EXTENDED RELEASE ORAL DAILY
Qty: 90 TABLET | Refills: 1 | Status: SHIPPED | OUTPATIENT
Start: 2022-04-08 | End: 2022-10-26

## 2022-04-08 RX ORDER — METOPROLOL SUCCINATE 25 MG/1
25 TABLET, EXTENDED RELEASE ORAL DAILY
Qty: 90 TABLET | Refills: 1 | Status: SHIPPED | OUTPATIENT
Start: 2022-04-08 | End: 2022-10-26

## 2022-04-08 RX ORDER — HYDROCHLOROTHIAZIDE 25 MG/1
TABLET ORAL
Qty: 90 TABLET | Refills: 1 | Status: SHIPPED | OUTPATIENT
Start: 2022-04-08 | End: 2022-10-11

## 2022-04-08 RX ORDER — LOSARTAN POTASSIUM 25 MG/1
12.5 TABLET ORAL DAILY
Qty: 45 TABLET | Refills: 1 | Status: SHIPPED | OUTPATIENT
Start: 2022-04-08 | End: 2022-10-11

## 2022-04-18 ENCOUNTER — OFFICE VISIT (OUTPATIENT)
Dept: UROLOGY | Age: 77
End: 2022-04-18
Payer: MEDICARE

## 2022-04-18 VITALS
SYSTOLIC BLOOD PRESSURE: 143 MMHG | HEART RATE: 93 BPM | BODY MASS INDEX: 44.1 KG/M2 | HEIGHT: 71 IN | WEIGHT: 315 LBS | DIASTOLIC BLOOD PRESSURE: 79 MMHG

## 2022-04-18 DIAGNOSIS — N39.46 MIXED INCONTINENCE: ICD-10-CM

## 2022-04-18 DIAGNOSIS — R35.0 FREQUENCY OF URINATION: ICD-10-CM

## 2022-04-18 DIAGNOSIS — R39.15 URGENCY OF URINATION: Primary | ICD-10-CM

## 2022-04-18 DIAGNOSIS — N32.81 OAB (OVERACTIVE BLADDER): ICD-10-CM

## 2022-04-18 PROCEDURE — 99214 OFFICE O/P EST MOD 30 MIN: CPT | Performed by: UROLOGY

## 2022-04-18 PROCEDURE — G8417 CALC BMI ABV UP PARAM F/U: HCPCS | Performed by: UROLOGY

## 2022-04-18 PROCEDURE — 99211 OFF/OP EST MAY X REQ PHY/QHP: CPT

## 2022-04-18 PROCEDURE — 4040F PNEUMOC VAC/ADMIN/RCVD: CPT | Performed by: UROLOGY

## 2022-04-18 PROCEDURE — G8427 DOCREV CUR MEDS BY ELIG CLIN: HCPCS | Performed by: UROLOGY

## 2022-04-18 PROCEDURE — 3017F COLORECTAL CA SCREEN DOC REV: CPT | Performed by: UROLOGY

## 2022-04-18 PROCEDURE — 1123F ACP DISCUSS/DSCN MKR DOCD: CPT | Performed by: UROLOGY

## 2022-04-18 PROCEDURE — 1036F TOBACCO NON-USER: CPT | Performed by: UROLOGY

## 2022-04-18 RX ORDER — OXYBUTYNIN CHLORIDE 15 MG/1
15 TABLET, EXTENDED RELEASE ORAL DAILY
Qty: 30 TABLET | Refills: 3 | Status: SHIPPED | OUTPATIENT
Start: 2022-04-18 | End: 2022-06-27

## 2022-04-18 NOTE — PROGRESS NOTES
2001    Zayda HENRY at [facility]    CARDIAC CATHETERIZATION Left 05/09/2012    Left main normal.  Left anterior descending artery:  Plaque disease. Ramus: Plaque disease Circumflex:nondominant, plaque disease. OM1 normal. Right coronoary artery:  dominant & luminal irregularities. Left ventricular ejection fraction 60. Mitral valve normal with no insufficinecy of the mitral valve. Aortic valve normal with no stenosis of the aortic valve. Edp was normal.    CARDIAC CATHETERIZATION Left 03/04/2015    Dr. Ramirez --Severe CAD, 80% in-stent stenosis in the left anterior descending coronary artery in a rather long segment, very eccentric,extending almost to the ostium of the left anterior descending coronary artery. 70% in-stent stenosis of a very large dominant right coronary artery. Unremarkable small circumflex. Normal left ventricular function, ejection fraction of 60%.  CARDIAC CATHETERIZATION Left 12/05/2018    Dr. Giovana Mendoza @ [facility]--Risk [place] [address] specific cardiac intervention   [place] [address]  2008    2 unknown heart stents mri 1.5t only    CHOLECYSTECTOMY  05/30/2014    open [facility] Dr. Nidhi Bill COLONOSCOPY  10/2015    repeat in 2022    [address] [place] [address]      2 vessel March 3th 2015    CYSTOSCOPY  05/10/2016    with laser TURP    ERCP  10/02/2014    Dr. Alyssa Garland with laparotomy    HERNIA REPAIR  09/2003    [facility]    JOINT REPLACEMENT Bilateral 03/20/2013    bilateral hips    JOINT REPLACEMENT Right 01/27/2021    KNEE ARTHROSCOPY Right 09/26/2013    Dr. Roxy Villanueva Hair SURGERY  11/02/2016    rt. L3-4, L4-5 decompression.  L4-5 discectomy and fusion    SHOULDER SURGERY Left 2000    arthroscopy    SHOULDER SURGERY Left 2007    replaced humeral head    TOTAL HIP ARTHROPLASTY Right     02/03/2016    TOTAL KNEE ARTHROPLASTY Right 01/27/2021    RIGHT TOTAL KNEE ARTHOPLASTY COMPLICATED BY OBSEITY NEEDS A [employer] performed by Lamar Sanders DO at [facility] OR     Outpatient Encounter Medications as of 4/18/2022   Medication Sig Dispense Refill    hydroCHLOROthiazide (HYDRODIURIL) 25 MG tablet Take 1 tablet by mouth once daily 90 tablet 1    losartan (COZAAR) 25 MG tablet Take 0.5 tablets by mouth daily As of 03/14/2022 start taking 1/2 tablet daily 45 tablet 1    metoprolol succinate (TOPROL XL) 25 MG extended release tablet Take 1 tablet by mouth daily 90 tablet 1    isosorbide mononitrate (IMDUR) 30 MG extended release tablet Take 1 tablet by mouth daily 90 tablet 1    escitalopram (LEXAPRO) 20 MG tablet Take 20 mg by mouth daily      Probiotic Product (PROBIOTIC DAILY PO) Take by mouth      zinc 50 MG CAPS Take by mouth      aspirin 325 MG EC tablet Take 325 mg by mouth daily      SYMBICORT 160-4.5 MCG/ACT AERO       albuterol sulfate  (90 Base) MCG/ACT inhaler INHALE 2 PUFFS BY MOUTH EVERY 4 HOURS AS NEEDED FOR WHEEZING      oxybutynin (DITROPAN XL) 10 MG extended release tablet Take 1 tablet by mouth daily 30 tablet 3    TRELEGY ELLIPTA 200-62.5-25 MCG/INH AEPB INHALE 1 PUFF ONCE DAILY      tamsulosin (FLOMAX) 0.4 MG capsule Take 1 capsule by mouth every evening 90 capsule 3    spironolactone (ALDACTONE) 25 MG tablet Take 1 tablet by mouth once daily 90 tablet 2    metFORMIN (GLUCOPHAGE) 500 MG tablet TAKE 1 TABLET BY MOUTH ONCE DAILY WITH BREAKFAST FOR  TYPE  2  DIABETES 90 tablet 1    potassium chloride (KLOR-CON) 10 MEQ extended release tablet Take 1 tablet by mouth once daily with breakfast 30 tablet 2    atorvastatin (LIPITOR) 80 MG tablet Take 1 tablet by mouth once daily 90 tablet 1    Blood Glucose Monitoring Suppl (FREESTYLE LITE) RADHA 1 Device by Does not apply route daily E11.9 1 Device 0    blood glucose monitor strips Test daily and as needed   E 11.9 200 strip 1    Lancets MISC 1 each by Does not apply route daily 200 each 1    docusate sodium (COLACE) 100 MG capsule Take 1 capsule by mouth 2 times daily 40 capsule 0    Ferrous Sulfate (IRON) 325 (65 Fe) MG TABS 65 mg daily       montelukast (SINGULAIR) 10 MG tablet Take 10 mg by mouth daily      fluticasone (FLONASE) 50 MCG/ACT nasal spray 1 spray by Nasal route daily (Patient taking differently: 1 spray by Nasal route as needed ) 16 g 2    Multiple Vitamin (THERA/BETA-CAROTENE) TABS Take 1 tablet by mouth daily      vitamin C (ASCORBIC ACID) 500 MG tablet Take 500 mg by mouth daily      Vitamin D, Cholecalciferol, 25 MCG (1000 UT) TABS Take by mouth daily        No facility-administered encounter medications on file as of 4/18/2022.       Current Outpatient Medications on File Prior to Visit   Medication Sig Dispense Refill    hydroCHLOROthiazide (HYDRODIURIL) 25 MG tablet Take 1 tablet by mouth once daily 90 tablet 1    losartan (COZAAR) 25 MG tablet Take 0.5 tablets by mouth daily As of 03/14/2022 start taking 1/2 tablet daily 45 tablet 1    metoprolol succinate (TOPROL XL) 25 MG extended release tablet Take 1 tablet by mouth daily 90 tablet 1    isosorbide mononitrate (IMDUR) 30 MG extended release tablet Take 1 tablet by mouth daily 90 tablet 1    escitalopram (LEXAPRO) 20 MG tablet Take 20 mg by mouth daily      Probiotic Product (PROBIOTIC DAILY PO) Take by mouth      zinc 50 MG CAPS Take by mouth      aspirin 325 MG EC tablet Take 325 mg by mouth daily      SYMBICORT 160-4.5 MCG/ACT AERO       albuterol sulfate  (90 Base) MCG/ACT inhaler INHALE 2 PUFFS BY MOUTH EVERY 4 HOURS AS NEEDED FOR WHEEZING      oxybutynin (DITROPAN XL) 10 MG extended release tablet Take 1 tablet by mouth daily 30 tablet 3    TRELEGY ELLIPTA 200-62.5-25 MCG/INH AEPB INHALE 1 PUFF ONCE DAILY      tamsulosin (FLOMAX) 0.4 MG capsule Take 1 capsule by mouth every evening 90 capsule 3    spironolactone (ALDACTONE) 25 MG tablet Take 1 tablet by mouth once daily 90 tablet 2    metFORMIN (GLUCOPHAGE) 500 MG tablet TAKE 1 TABLET BY visit.      PHYSICAL EXAM:  Constitutional: Patient in no acute distress; Neuro: alert and oriented to person place and time. Psych: Mood and affect normal.  Skin: Normal  Lungs: Respiratory effort normal  Cardiovascular:  Normal peripheral pulses  Abdomen: Soft, non-tender, non-distended with no CVA, flank pain  Bladder non-tender and not distended. Lymphatics: no palpable lymphadenopathy  Penis normal  Urethral meatus normal  Scrotal exam normal  Testicles normal bilaterally  Epididymis normal bilaterally  No evidence of inguinal hernia      Lab Results   Component Value Date    BUN 21 03/11/2022     Lab Results   Component Value Date    CREATININE 1.03 03/11/2022     Lab Results   Component Value Date    PSA 0.61 07/27/2021    PSA 0.55 07/07/2020    PSA 1.00 06/10/2019       ASSESSMENT:  This is a 78 y.o. male with the following diagnoses:   Diagnosis Orders   1. Urgency of urination  oxybutynin (DITROPAN XL) 15 MG extended release tablet   2. Mixed incontinence  oxybutynin (DITROPAN XL) 15 MG extended release tablet   3. OAB (overactive bladder)  oxybutynin (DITROPAN XL) 15 MG extended release tablet   4. Frequency of urination  oxybutynin (DITROPAN XL) 15 MG extended release tablet       PLAN:  Patient will stop his 10 mg Ditropan XL we will move him to 15 mg XL. Patient will follow up with us in 8 to 10 weeks. We did have a long discussion on bladder irritants today. He will work on reducing these as well.

## 2022-04-26 ENCOUNTER — HOSPITAL ENCOUNTER (OUTPATIENT)
Dept: PHYSICAL THERAPY | Age: 77
Setting detail: THERAPIES SERIES
Discharge: HOME | End: 2022-04-26
Payer: MEDICARE

## 2022-04-26 PROCEDURE — 97162 PT EVAL MOD COMPLEX 30 MIN: CPT

## 2022-04-26 ASSESSMENT — PAIN SCALES - GENERAL: PAINLEVEL_OUTOF10: 1

## 2022-04-26 NOTE — PROGRESS NOTES
Phone: [phone]         Fax: [phone]                      Outpatient Physical Therapy                                                                      Evaluation    Date: 2022  Patient: Stephanie Arroyo  : 1945  ACCT #: [de-identified]    Referring Practitioner:Referring Provider (secondary): Dr. Marilin Fuller    Referral Date: 22      Diagnosis: Right TKR revision    Treatment Diagnosis: Right TKR revision;  diffuculty with amb; weakness  Onset Date: 22  PT Insurance Information: [facility] [employer] [facility]    Per Physician Order  Total # of Visits to Date: 1  No Show: 0  Canceled Appointment: 0     Subjective  Subjective: Kelly Silveira  Additional Pertinent Hx: Patient with 2 year ongoing medical issues regarding right knee. Underwent TKR 2021 which then became septic and resulted in removal with spacer/antibiotics. He underwent revision 2021 with subsequent muscle transplant from calf and additional skin graft on 22. He is currently ambulating with wh walker 75% WB. He is to continue 75% x 4 weeks total or until 22. Other restriction include no forced flexion. He currently rates minimal pain but does note moderate edema of right lower leg. He has completed no formal therapy since initial surgery.    PMHx includes dizziness, SOB, HTN, CABG, B THR, Left shoulder surgery, back surgery,  DM  Pain Assessment  Pain Level: 1     IADL History  Active : Yes (currently just short distances)  Occupation: Retired  Leisure & Hobbies: Enjoys going to [employer] baseball and sporting events;  only  in home and transports spouse to appt    Objective  Vision  Vision: Within Functional Limits  Hearing  Hearing: Within functional limits  Observation/Palpation  Posture: Fair  Palpation: Moderate tightness of right fern and surrounding right knee  Observation: Healing skin graft right anterior knee;   healing graft site posterior calf which has one small opened area distal aspect  Edema: Moderate edema throughout distal right leg     AROM RLE (degrees)  RLE AROM: Exceptions  R Knee Flexion 0-145: 85 deg seated flexion  R Knee Extension 0: 20 deg  Strength RUE  Comment: Able to complete SLR with 20 deg extension lag;  hip strength 4/5;  calf strength 3-/5             PROM RLE (degrees)  RLE PROM: Exceptions  R Knee Flexion 0-145: 85-90 deg seated  R Knee Extension 0: 10 deg                               Assessment  Assessment: Patient underwent right TKR revision in November 2021 with full thickness skin graft completed in 2/1/2022. He is currently 75% with wh walker until 5/9/22. Strength right knee extension 3/5 with limited active and passive mobility.   Plan to progress with strengthening and ROM exercise to progress patients functional mobility  Therapy Prognosis: Good    Clinical Presentation:  Evolving  The Following Comorbities will impact the patients progression and Plan of Care:   Cardiac Disease/Pacemaker, Diabetes, Obesity and Previous Orthopedic Injury/Surgery          Medium Complexity    Education: PT POC        Learning  Does the patient/guardian have any barriers to learning?: No barriers  Will there be a co-learner?: No  What is the preferred language of the patient/guardian?: English    Goals  Short Term Goals  Time Frame for Short term goals: 8 visits  Short term goal 1: Educate on home program of strengthening and weight bearing exercises  STG 1 Current Status[de-identified] No formal exercise education  Short term goal 2: Ambulate with cane on level surfaces in home (after 5/9/22 when progressed to FWB)  STG 2 Current Status[de-identified] Currently ambulating with wh walker 75% WB    Long Term Goals  Long term goal 1: PROM 0 deg terminal extension to normalize gait pattern  LTG 1 Current Status[de-identified] 10 deg PROM;  20 deg AROM  Long term goal 2: PROM 110 deg flexion to allow patient to complete sit to stand without UE assist and stairs  LTG 2 Current Status[de-identified] WANDEROM limited to 90 deg which affects transfers and ambulation  Long term goal 3: Ambulate in home and community with cane  LTG 3 Current Status[de-identified] Currently ambulating with wh walker  Long term goal 4: Strength right knee extension 4-4+/5 to complete sit to stand and reciprical stairs  LTG 4 Current Status[de-identified] Current strength right knee extension 3/5 with extension lag    Patient's Goal:    Get walking and driving again    Timed Code Treatment Minutes: 0 Minutes  Total Treatment Time: 50     Time In: 3367  Time Out: 7803    NO RODRIGUEZ, PT Date: 4/26/2022

## 2022-04-26 NOTE — PLAN OF CARE
[facility] ATKINS       Phone: [phone]   Date: 2022                      Outpatient Physical Therapy  Fax: [phone]    ACCT #: [de-identified]                     Plan of Care  [facility]#: [id number]  Patient: Maryanne Brunner  : 1945    Referring Provider (secondary): Dr. Tiffanie Lugo    Referral Date: 22      Diagnosis: Right TKR revision  Onset Date: 22  Treatment Diagnosis: Right TKR revision;  diffuculty with amb; weakness    Assessment: Patient underwent right TKR revision in 2021 with full thickness skin graft completed in 2022. He is currently 75% with wh walker until 22. Strength right knee extension 3/5 with limited active and passive mobility. Plan to progress with strengthening and ROM exercise to progress patients functional mobility  Therapy Prognosis: Good    Treatment Plan :   Days: 2 (2x initially and will increase to 3x as appropriate/needed) times per week Weeks: 8 weeks      Patient Education/HEP, Therapeutic Exercise, Manual Therapy and Gait Training     Goals  Time Frame for Short term goals: 8 visits  Short term goal 1: Educate on home program of strengthening and weight bearing exercises  Short term goal 2: Ambulate with cane on level surfaces in home (after 22 when progressed to FWB)    Long term goal 1: PROM 0 deg terminal extension to normalize gait pattern  Long term goal 2: PROM 110 deg flexion to allow patient to complete sit to stand without UE assist and stairs  Long term goal 3: Ambulate in home and community with cane  Long term goal 4: Strength right knee extension 4-4+/5 to complete sit to stand and reciprical stairs     NO RODRIGUEZ PT   Date: 2022    ______________________________________ Date: 2022  Physician Signature  By signing above or cosigning electronically, I have reviewed this Plan of Care and certify a need for medically necessary rehabilitation services.

## 2022-04-28 ENCOUNTER — HOSPITAL ENCOUNTER (OUTPATIENT)
Dept: PHYSICAL THERAPY | Age: 77
Setting detail: THERAPIES SERIES
Discharge: HOME | End: 2022-04-28
Payer: MEDICARE

## 2022-04-28 PROCEDURE — 97110 THERAPEUTIC EXERCISES: CPT

## 2022-04-28 NOTE — PROGRESS NOTES
Phone: Eloise Platt      Fax: [phone]                            Outpatient Physical Therapy                                                                            Daily Note    Date: 2022  Patient Name: Nathen Kaur        MRN: [id number]   ACCT#:  [de-identified]  : 1945  (78 y.o.)    Referring Provider (secondary): Dr. Kayva Lanza         Diagnosis: Right TKR revision  Treatment Diagnosis: Right TKR revision;  diffuculty with amb; weakness    Onset Date: 22  PT Insurance Information: [facility]    Per Physician Order  Total # of Visits to Date: 2  No Show: 0  Canceled Appointment: 0  Plan of Care/Certification Expiration Date: 22    Pre-Treatment Pain:  0/10     Assessment  Assessment: Good tolerance to initial session. Ambulating with wh walker with proper heel toe pattern. AAROM 90 deg seated flexion. Continue with gentle ROM and strengthening along with manual/soft tissue and scar massage of calf ( skingraft region).    Will continue with 75% Wb until 22    Plan  Continue with current plan of care    Exercises/Modalities/Manual:  See DocFlow Sheet    Education:           Goals  (Total # of Visits to Date: 2)   Short Term Goals - Time Frame for Short term goals: 8 visits  Short Term Goals  Time Frame for Short term goals: 8 visits  Short term goal 1: Educate on home program of strengthening and weight bearing exercises  STG 1 Current Status[de-identified] No formal exercise education  Short term goal 2: Ambulate with cane on level surfaces in home (after 22 when progressed to FWB)  STG 2 Current Status[de-identified] Currently ambulating with wh walker 75% WB    Long Term Goals -    Long Term Goals  Long term goal 1: PROM 0 deg terminal extension to normalize gait pattern  LTG 1 Current Status[de-identified] 10 deg PROM;  20 deg AROM  Long term goal 2: PROM 110 deg flexion to allow patient to complete sit to stand without UE assist and stairs  LTG 2 Current Status[de-identified] AAROM

## 2022-05-03 ENCOUNTER — HOSPITAL ENCOUNTER (OUTPATIENT)
Dept: PHYSICAL THERAPY | Age: 77
Setting detail: THERAPIES SERIES
Discharge: HOME | End: 2022-05-03
Payer: MEDICARE

## 2022-05-03 PROCEDURE — 97110 THERAPEUTIC EXERCISES: CPT

## 2022-05-03 PROCEDURE — 97140 MANUAL THERAPY 1/> REGIONS: CPT

## 2022-05-03 ASSESSMENT — PAIN SCALES - GENERAL: PAINLEVEL_OUTOF10: 1

## 2022-05-03 NOTE — PROGRESS NOTES
Phone: [phone]      Fax: [phone]                            Outpatient Physical Therapy                                                                            Daily Note    Date: 5/3/2022  Patient Name: Shelton Conte        MRN: [id number]   ACCT#:  [de-identified]  : 1945  (78 y.o.)    Referring Provider (secondary): Dr. Layne Dunbar         Diagnosis: Right TKR revision  Treatment Diagnosis: Right TKR revision;  diffuculty with amb; weakness    Onset Date: 22  PT Insurance Information: [facility]    Per Physician Order  Total # of Visits to Date: 3  No Show: 0  Canceled Appointment: 0  Plan of Care/Certification Expiration Date: 22    Pre-Treatment Pain:  1/10     Assessment  Assessment: Pt c/o pain 1/10 in shin area. Performed ex as outlined for strength and ROM, manual therapy for soft tissue release/swelling reduction of calf. PROM to 94 deg flex in sitting. Good cary.     Plan  Continue with current plan of care    Exercises/Modalities/Manual:  See DocFlow Sheet         Goals  (Total # of Visits to Date: 3)   Short Term Goals - Time Frame for Short term goals: 8 visits  Short Term Goals  Time Frame for Short term goals: 8 visits  Short term goal 1: Educate on home program of strengthening and weight bearing exercises  STG 1 Current Status[de-identified] No formal exercise education  Short term goal 2: Ambulate with cane on level surfaces in home (after 22 when progressed to FWB)  STG 2 Current Status[de-identified] Currently ambulating with wh walker 75% WB    Long Term Goals -    Long Term Goals  Long term goal 1: PROM 0 deg terminal extension to normalize gait pattern  LTG 1 Current Status[de-identified] 10 deg PROM;  20 deg AROM  Long term goal 2: PROM 110 deg flexion to allow patient to complete sit to stand without UE assist and stairs  LTG 2 Current Status[de-identified] AAROM limited to 90 deg which affects transfers and ambulation  Long term goal 3: Ambulate in home and community with cane  LTG 3 Current Status[de-identified] Currently ambulating with wh walker  Long term goal 4: Strength right knee extension 4-4+/5 to complete sit to stand and reciprical stairs  LTG 4 Current Status[de-identified] Current strength right knee extension 3/5 with extension lag    Post Treatment Pain:  1/10    Time In: 0945    Time Out : 1024        Timed Code Treatment Minutes: 39 Minutes  Total Treatment Time: 39 Minutes    Nia Johnson   PTA  Date: 5/3/2022

## 2022-05-05 RX ORDER — POTASSIUM CHLORIDE 750 MG/1
10 TABLET, FILM COATED, EXTENDED RELEASE ORAL DAILY
Qty: 90 TABLET | Refills: 1 | Status: SHIPPED | OUTPATIENT
Start: 2022-05-05

## 2022-05-06 ENCOUNTER — HOSPITAL ENCOUNTER (OUTPATIENT)
Dept: PHYSICAL THERAPY | Age: 77
Setting detail: THERAPIES SERIES
Discharge: HOME | End: 2022-05-06
Payer: MEDICARE

## 2022-05-06 PROCEDURE — 97110 THERAPEUTIC EXERCISES: CPT

## 2022-05-06 ASSESSMENT — PAIN SCALES - GENERAL: PAINLEVEL_OUTOF10: 1

## 2022-05-06 NOTE — PROGRESS NOTES
Phone: Eloise [address]att      Fax: [phone]                            Outpatient Physical Therapy                                                                            Daily Note    Date: 2022  Patient Name: Kaylen Beckford        MRN: [id number]   ACCT#:  [de-identified]  : 1945  (78 y.o.)    Referring Provider (secondary): Dr. Seb Lanier         Diagnosis: Right TKR revision  Treatment Diagnosis: Right TKR revision;  diffuculty with amb; weakness    Onset Date: 22  PT Insurance Information: [facility] [employer] [facility]    Per Physician Order  Total # of Visits to Date: 4  No Show: 0  Canceled Appointment: 0  Plan of Care/Certification Expiration Date: 22    Pre-Treatment Pain:  0/10     Assessment  Assessment: Patient reports no pain at time of sessions however did report increased soreness after last visit when performing lunging motion. AAROM seated flexion to 97 deg.    Plan to progress to FWB next visit on 22    Plan  Continue with current plan of care    Exercises/Modalities/Manual:  See DocFlow Sheet    Education: discussed use of home stationary bike - elevate seat with gentle knee flexion motions          Goals  (Total # of Visits to Date: 4)   Short Term Goals - Time Frame for Short term goals: 8 visits  Short Term Goals  Time Frame for Short term goals: 8 visits  Short term goal 1: Educate on home program of strengthening and weight bearing exercises  STG 1 Current Status[de-identified] No formal exercise education  Short term goal 2: Ambulate with cane on level surfaces in home (after 22 when progressed to FWB)  STG 2 Current Status[de-identified] Currently ambulating with wh walker 75% WB    Long Term Goals -    Long Term Goals  Long term goal 1: PROM 0 deg terminal extension to normalize gait pattern  LTG 1 Current Status[de-identified] 10 deg PROM;  20 deg AROM  Long term goal 2: PROM 110 deg flexion to allow patient to complete sit to stand without UE assist and stairs  LTG 2 Current Status[de-identified] AAROM limited to 90 deg which affects transfers and ambulation  Long term goal 3: Ambulate in home and community with cane  LTG 3 Current Status[de-identified] Currently ambulating with wh walker  Long term goal 4: Strength right knee extension 4-4+/5 to complete sit to stand and reciprical stairs  LTG 4 Current Status[de-identified] Current strength right knee extension 3/5 with extension lag    Post Treatment Pain:  0/10    Time In: 0845    Time Out : 0660        Timed Code Treatment Minutes: 40 Minutes  Total Treatment Time: [address], PT     Date: 5/6/2022

## 2022-05-09 ENCOUNTER — HOSPITAL ENCOUNTER (OUTPATIENT)
Dept: PHYSICAL THERAPY | Age: 77
Setting detail: THERAPIES SERIES
Discharge: HOME | End: 2022-05-09
Payer: MEDICARE

## 2022-05-09 PROCEDURE — 97110 THERAPEUTIC EXERCISES: CPT

## 2022-05-09 NOTE — PROGRESS NOTES
Phone: Eloise Platt      Fax: [phone]                            Outpatient Physical Therapy                                                                            Daily Note    Date: 2022  Patient Name: Stephanie Arroyo        MRN: [id number]   ACCT#:  [de-identified]  : 1945  (78 y.o.)    Referring Provider (secondary): Dr. Marilin Fuller         Diagnosis: Right TKR revision  Treatment Diagnosis: Right TKR revision;  diffuculty with amb; weakness    Onset Date: 22  PT Insurance Information: [facility]    Per Physician Order  Total # of Visits to Date: 5  No Show: 0  Canceled Appointment: 0  Plan of Care/Certification Expiration Date: 22    Pre-Treatment Pain:  0/10     Assessment  Assessment: Progressed to FWB in clinic per MD orders;  initiated ambulation in // bars with LUE assist with patient noting  instability of right knee with gait. Plan to progress with supervised FWB activities;   patient to continue to use wh walker at home/community until stronger.   PROM 100 deg    Plan  Continue with current plan of care    Exercises/Modalities/Manual:  See DocFlow Sheet    Education:           Goals  (Total # of Visits to Date: 5)   Short Term Goals - Time Frame for Short term goals: 8 visits  Short Term Goals  Time Frame for Short term goals: 8 visits  Short term goal 1: Educate on home program of strengthening and weight bearing exercises  STG 1 Current Status[de-identified] No formal exercise education  Short term goal 2: Ambulate with cane on level surfaces in home (after 22 when progressed to FWB)  STG 2 Current Status[de-identified] Currently ambulating with wh walker 75% WB    Long Term Goals -    Long Term Goals  Long term goal 1: PROM 0 deg terminal extension to normalize gait pattern  LTG 1 Current Status[de-identified] 10 deg PROM;  20 deg AROM  Long term goal 2: PROM 110 deg flexion to allow patient to complete sit to stand without UE assist and stairs  LTG 2 Current Status[de-identified] WANDEROM limited to 90 deg which affects transfers and ambulation  Long term goal 3: Ambulate in home and community with cane  LTG 3 Current Status[de-identified] Currently ambulating with wh walker  Long term goal 4: Strength right knee extension 4-4+/5 to complete sit to stand and reciprical stairs  LTG 4 Current Status[de-identified] Current strength right knee extension 3/5 with extension lag    Post Treatment Pain:  0/10    Time In: 0845    Time Out : 0930        Timed Code Treatment Minutes: 45 Minutes  Total Treatment Time: Alva [address]inez 38, PT     Date: 5/9/2022

## 2022-05-13 ENCOUNTER — HOSPITAL ENCOUNTER (OUTPATIENT)
Dept: PHYSICAL THERAPY | Age: 77
Setting detail: THERAPIES SERIES
Discharge: HOME | End: 2022-05-13
Payer: MEDICARE

## 2022-05-13 PROCEDURE — 97140 MANUAL THERAPY 1/> REGIONS: CPT

## 2022-05-13 PROCEDURE — 97110 THERAPEUTIC EXERCISES: CPT

## 2022-05-13 ASSESSMENT — PAIN SCALES - GENERAL: PAINLEVEL_OUTOF10: 1

## 2022-05-13 NOTE — PROGRESS NOTES
Phone: Eloise Platt      Fax: [phone]                            Outpatient Physical Therapy                                                                            Daily Note    Date: 2022  Patient Name: Latonya Charles        MRN: [id number]   ACCT#:  [de-identified]  : 1945  (78 y.o.)    Referring Provider (secondary): Dr. Devon Nam         Diagnosis: Right TKR revision  Treatment Diagnosis: Right TKR revision;  diffuculty with amb; weakness    Onset Date: 22  PT Insurance Information: [facility] [employer]    Per Physician Order  Total # of Visits to Date: 6  No Show: 0  Canceled Appointment: 0  Plan of Care/Certification Expiration Date: 22    Pre-Treatment Pain:  1/10     Assessment  Assessment: Pt ambulates to therapy with wheeled walker. .  Performed gait in // bars with 1UE support with education on step through gait bilaterally. Pt reports he ordered a compression stocking , discussed to caution with stocking d/t skin graft location. Advised pt to try ace wrap for edema. Will cont.      Plan  Continue with current plan of care    Exercises/Modalities/Manual:  See DocFlow Sheet      Goals  (Total # of Visits to Date: 6)   Short Term Goals - Time Frame for Short term goals: 8 visits  Short Term Goals  Time Frame for Short term goals: 8 visits  Short term goal 1: Educate on home program of strengthening and weight bearing exercises-met  STG 1 Current Status[de-identified] No formal exercise education  Short term goal 2: Ambulate with cane on level surfaces in home (after 22 when progressed to FWB)  STG 2 Current Status[de-identified] Currently ambulating with wh walker 75% WB    Long Term Goals -    Long Term Goals  Long term goal 1: PROM 0 deg terminal extension to normalize gait pattern  LTG 1 Current Status[de-identified] 10 deg PROM;  20 deg AROM  Long term goal 2: PROM 110 deg flexion to allow patient to complete sit to stand without UE assist and stairs  LTG 2 Current Status[de-identified] AAROM limited to 90 deg which affects transfers and ambulation  Long term goal 3: Ambulate in home and community with cane  LTG 3 Current Status[de-identified] Currently ambulating with wh walker  Long term goal 4: Strength right knee extension 4-4+/5 to complete sit to stand and reciprical stairs  LTG 4 Current Status[de-identified] Current strength right knee extension 3/5 with extension lag    Post Treatment Pain:  1/10    Time In: 0813    Time Out : 0858        Timed Code Treatment Minutes: 45 Minutes  Total Treatment Time: 45 Minutes    Kelly Johnson PTA    Date: 5/13/2022

## 2022-05-16 ENCOUNTER — HOSPITAL ENCOUNTER (OUTPATIENT)
Dept: PHYSICAL THERAPY | Age: 77
Setting detail: THERAPIES SERIES
Discharge: HOME | End: 2022-05-16
Payer: MEDICARE

## 2022-05-16 PROCEDURE — 97110 THERAPEUTIC EXERCISES: CPT

## 2022-05-16 NOTE — PROGRESS NOTES
Phone: Eloise Platt      Fax: [phone]                            Outpatient Physical Therapy                                                                            Daily Note    Date: 2022  Patient Name: Fabiano Elizabeth        MRN: [id number]   ACCT#:  [de-identified]  : 1945  (78 y.o.)    Referring Provider (secondary): Dr. Brian Mace         Diagnosis: Right TKR revision  Treatment Diagnosis: Right TKR revision;  diffuculty with amb; weakness    Onset Date: 22  PT Insurance Information: [facility] [employer]    Per Physician Order  Total # of Visits to Date: 7  No Show: 0  Canceled Appointment: 0  Plan of Care/Certification Expiration Date: 22    Pre-Treatment Pain:  0/10     Assessment  Assessment: Patient is applying ACE wrap for edema control. Continues to use wh walker due to instability of right knee which is extremely weak;  unable to complete SAQ with assistance. Initiated SAQ with VMO squeeze with assisted lift and hold. PROM 102 deg.        Plan  Continue with current plan of care    Exercises/Modalities/Manual:  See DocFlow Sheet    Education: SAQ with VMO          Goals  (Total # of Visits to Date: 7)   Short Term Goals - Time Frame for Short term goals: 8 visits  Short Term Goals  Time Frame for Short term goals: 8 visits  Short term goal 1: Educate on home program of strengthening and weight bearing exercises-met  STG 1 Current Status[de-identified] No formal exercise education  Short term goal 2: Ambulate with cane on level surfaces in home (after 22 when progressed to FWB)  STG 2 Current Status[de-identified] Currently ambulating with wh walker 75% WB    Long Term Goals -    Long Term Goals  Long term goal 1: PROM 0 deg terminal extension to normalize gait pattern  LTG 1 Current Status[de-identified] 10 deg PROM;  20 deg AROM  Long term goal 2: PROM 110 deg flexion to allow patient to complete sit to stand without UE assist and stairs  LTG 2 Current Status[de-identified] AAROM limited to 90

## 2022-05-20 ENCOUNTER — HOSPITAL ENCOUNTER (OUTPATIENT)
Dept: PHYSICAL THERAPY | Age: 77
Setting detail: THERAPIES SERIES
Discharge: HOME | End: 2022-05-20
Payer: MEDICARE

## 2022-05-20 PROCEDURE — G0283 ELEC STIM OTHER THAN WOUND: HCPCS

## 2022-05-20 PROCEDURE — 97110 THERAPEUTIC EXERCISES: CPT

## 2022-05-20 ASSESSMENT — PAIN SCALES - GENERAL: PAINLEVEL_OUTOF10: 1

## 2022-05-20 NOTE — PROGRESS NOTES
Phone: Eloise Platt      Fax: [phone]                            Outpatient Physical Therapy                                                                            Daily Note    Date: 2022  Patient Name: Amara Givens        MRN: [id number]   ACCT#:  [de-identified]  : 1945  (78 y.o.)    Referring Provider (secondary): Dr. Jessica Vanegas         Diagnosis: Right TKR revision  Treatment Diagnosis: Right TKR revision;  diffuculty with amb; weakness    Onset Date: 22  PT Insurance Information: [facility] [employer]    Per Physician Order  Total # of Visits to Date: 8  No Show: 0  Canceled Appointment: 0  Plan of Care/Certification Expiration Date: 22    Pre-Treatment Pain:  1/10     Assessment  Assessment: Patient noting minimal brian pain with daily ambulation activities using wh walker. Unable to progress to cane ambulation due to right quad weakness and instability. PROM knee flexion to 103 deg.   Initiated [address] for muscle re-ed;  no active quad contraction noted but patient attempted to complete SAQ when stimulation applied    Plan  Continue with current plan of care    Exercises/Modalities/Manual:  See DocFlow Sheet    Education: Benefits of Estim for muscle re-ed          Goals  (Total # of Visits to Date: 8)   Short Term Goals - Time Frame for Short term goals: 8 visits  Short Term Goals  Time Frame for Short term goals: 8 visits  Short term goal 1: Educate on home program of strengthening and weight bearing exercises-met  STG 1 Current Status[de-identified] No formal exercise education  Short term goal 2: Ambulate with cane on level surfaces in home (after 22 when progressed to FWB) - NOT MET  STG 2 Current Status[de-identified] Currently ambulating with wh walker 75% WB    Long Term Goals -    Long Term Goals  Long term goal 1: PROM 0 deg terminal extension to normalize gait pattern  LTG 1 Current Status[de-identified] 10 deg PROM;  20 deg AROM  Long term goal 2: PROM 110 deg flexion to allow patient to complete sit to stand without UE assist and stairs  LTG 2 Current Status[de-identified] AAROM limited to 90 deg which affects transfers and ambulation  Long term goal 3: Ambulate in home and community with cane  LTG 3 Current Status[de-identified] Currently ambulating with wh walker  Long term goal 4: Strength right knee extension 4-4+/5 to complete sit to stand and reciprical stairs  LTG 4 Current Status[de-identified] Current strength right knee extension 3/5 with extension lag    Post Treatment Pain:  1/10    Time In: 0800    Time Out : 0850        Timed Code Treatment Minutes: 45 Minutes  Total Treatment Time: 72 0242 [facility], PT     Date: 5/20/2022

## 2022-05-23 ENCOUNTER — HOSPITAL ENCOUNTER (OUTPATIENT)
Dept: PHYSICAL THERAPY | Age: 77
Setting detail: THERAPIES SERIES
Discharge: HOME | End: 2022-05-23
Payer: MEDICARE

## 2022-05-24 RX ORDER — ATORVASTATIN CALCIUM 80 MG/1
TABLET, FILM COATED ORAL
Qty: 90 TABLET | Refills: 2 | Status: SHIPPED | OUTPATIENT
Start: 2022-05-24

## 2022-05-27 ENCOUNTER — HOSPITAL ENCOUNTER (OUTPATIENT)
Dept: PHYSICAL THERAPY | Age: 77
Setting detail: THERAPIES SERIES
Discharge: HOME | End: 2022-05-27
Payer: MEDICARE

## 2022-05-27 PROCEDURE — 97110 THERAPEUTIC EXERCISES: CPT

## 2022-05-27 PROCEDURE — G0283 ELEC STIM OTHER THAN WOUND: HCPCS

## 2022-05-27 NOTE — PROGRESS NOTES
Phone: Eloise Platt      Fax: [phone]                            Outpatient Physical Therapy                                                                            Daily Note    Date: 2022  Patient Name: Isai Kim        MRN: [id number]   ACCT#:  [de-identified]  : 1945  (78 y.o.)    Referring Provider (secondary): Dr. Julian Area         Diagnosis: Right TKR revision  Treatment Diagnosis: Right TKR revision;  diffuculty with amb; weakness    Onset Date: 22  PT Insurance Information: [facility] [employer]    Per Physician Order  Total # of Visits to Date: 9  No Show: 0  Canceled Appointment: 1  Plan of Care/Certification Expiration Date: 22    Pre-Treatment Pain:  0/10     Assessment  Assessment: Patient reports no pain right knee however continues to report instability with weight bearing unless supported with walker. Tends to hyperextend with right step ups. Continued with Estim /Russion for muscle re-ed;  no noticable contraction but had patient complete quad set during stimulation period.     Plan  Continue with current plan of care    Exercises/Modalities/Manual:  See DocFlow Sheet    Education:           Goals  (Total # of Visits to Date: 5)   Short Term Goals  Time Frame for Short term goals: 8 visits  Short term goal 1: Educate on home program of strengthening and weight bearing exercises-met  STG 1 Current Status[de-identified] No formal exercise education  STG Goal 1 Status[de-identified] Met  Short term goal 2: Ambulate with cane on level surfaces in home (after 22 when progressed to FWB)  STG 2 Current Status[de-identified] Currently ambulating with wh walker 75% WB  STG Goal 2 Status[de-identified] Not Met    Long Term Goals  Long term goal 1: PROM 0 deg terminal extension to normalize gait pattern  LTG 1 Current Status[de-identified] 10 deg PROM;  20 deg AROM  Long term goal 2: PROM 110 deg flexion to allow patient to complete sit to stand without UE assist and stairs  LTG 2 Current Status[de-identified] AAROM limited to 90 deg which affects transfers and ambulation  Long term goal 3: Ambulate in home and community with cane  LTG 3 Current Status[de-identified] Currently ambulating with wh walker  Long term goal 4: Strength right knee extension 4-4+/5 to complete sit to stand and reciprical stairs  LTG 4 Current Status[de-identified] Current strength right knee extension 3/5 with extension lag    Post Treatment Pain:  0/10    Time In: 0800    Time Out : 0850        Timed Code Treatment Minutes: 45 Minutes  Total Treatment Time: 48 3394 [facility] William, PT     Date: 5/27/2022

## 2022-05-27 NOTE — OP NOTE
[facility]          Phone: [phone]      Outpatient Physical Therapy  Fax: [phone]                                 10th Visit Report    Date: 5/27/2022  ACCT #: [de-identified]      Referring Provider (secondary): Dr. Bhavna Dejesus         Diagnosis: Right TKR revision      Treatment Diagnosis: Right TKR revision;  diffuculty with amb; weakness    Onset Date: 04/11/22  PT Insurance Information: [facility] [employer]    Per Physician Order  Total # of Visits to Date: 9  No Show: 0  Canceled Appointment: 1    Current Treatment:  Patient Education/HEP, Therapeutic Exercise, Manual Therapy: Mobilization/Manipulation, Gait Training and Electrical Stimulation    Skilled Intervention:  Assessment: Patient reports no pain right knee however continues to report instability with weight bearing unless supported with walker. Tends to hyperextend with right step ups. Continued with Estim /Russion for muscle re-ed;  no noticable contraction but had patient complete quad set during stimulation period.   Therapy Prognosis: Good  Reduce Falls   , Improve Ambulation and Complete Daily Tasks Safely    Expectations for Improvement/Time Frame:  4 weeks    Plan  Continue with current plan of care    Goals  Short Term Goals  Time Frame for Short term goals: 8 visits  Short term goal 1: Educate on home program of strengthening and weight bearing exercises-met  Short term goal 2: Ambulate with cane on level surfaces in home (after 5/9/22 when progressed to FWB)    Long Term Goals  Time Frame for Long term goals : 18  Long term goal 1: PROM 0 deg terminal extension to normalize gait pattern  Long term goal 2: PROM 110 deg flexion to allow patient to complete sit to stand without UE assist and stairs  Long term goal 3: Ambulate in home and community with cane  Long term goal 4: Strength right knee extension 4-4+/5 to complete sit to stand and reciprical stairs    NO RODRIGUEZ, PT      Date: 5/27/2022

## 2022-06-01 ENCOUNTER — HOSPITAL ENCOUNTER (OUTPATIENT)
Dept: PHYSICAL THERAPY | Age: 77
Setting detail: THERAPIES SERIES
Discharge: HOME | End: 2022-06-01
Payer: MEDICARE

## 2022-06-01 PROCEDURE — 97110 THERAPEUTIC EXERCISES: CPT

## 2022-06-01 PROCEDURE — 97032 APPL MODALITY 1+ESTIM EA 15: CPT

## 2022-06-01 ASSESSMENT — PAIN SCALES - GENERAL: PAINLEVEL_OUTOF10: 1

## 2022-06-01 NOTE — PROGRESS NOTES
Phone: Eloise Platt      Fax: [phone]                            Outpatient Physical Therapy                                                                            Daily Note    Date: 2022  Patient Name: Dorina Hamman        MRN: [id number]   ACCT#:  [de-identified]  : 1945  (78 y.o.)    Referring Provider (secondary): Dr. Bhavna Dejesus         Diagnosis: Right TKR revision  Treatment Diagnosis: Right TKR revision;  diffuculty with amb; weakness    Onset Date: 22  PT Insurance Information: [facility] [employer]    Per Physician Order  Total # of Visits to Date: 10  No Show: 0  Canceled Appointment: 1  Plan of Care/Certification Expiration Date: 22    Pre-Treatment Pain:  1/10     Assessment  Assessment: Pt reports pain 1/10. He notes he is still unable to lift leg while keeping it straight in supine. Pt notes after russian stim he does get muscle soreness. Pt able to make contraction when performing SAQ. Will cont.      Plan  Continue with current plan of care    Exercises/Modalities/Manual:  See DocFlow Sheet    Goals  (Total # of Visits to Date: 8)   Short Term Goals  Time Frame for Short term goals: 8 visits  Short term goal 1: Educate on home program of strengthening and weight bearing exercises-met  STG 1 Current Status[de-identified] No formal exercise education  STG Goal 1 Status[de-identified] Met  Short term goal 2: Ambulate with cane on level surfaces in home (after 22 when progressed to FWB)  STG 2 Current Status[de-identified] Currently ambulating with wh walker 75% WB  STG Goal 2 Status[de-identified] Not Met    Long Term Goals  Time Frame for Long term goals : 25  Long term goal 1: PROM 0 deg terminal extension to normalize gait pattern  LTG 1 Current Status[de-identified] 10 deg PROM;  20 deg AROM  Long term goal 2: PROM 110 deg flexion to allow patient to complete sit to stand without UE assist and stairs  LTG 2 Current Status[de-identified] AAROM limited to 90 deg which affects transfers and ambulation  Long term goal 3: Ambulate in home and community with cane  LTG 3 Current Status[de-identified] Currently ambulating with wh walker  Long term goal 4: Strength right knee extension 4-4+/5 to complete sit to stand and reciprical stairs  LTG 4 Current Status[de-identified] Current strength right knee extension 3/5 with extension lag    Post Treatment Pain:  1/10    Time In: 0730    Time Out : 0815        Timed Code Treatment Minutes: 45 Minutes  Total Treatment Time: 45 Minutes    Shlomo Johnson   PTA  Date: 6/1/2022

## 2022-06-06 ENCOUNTER — HOSPITAL ENCOUNTER (OUTPATIENT)
Dept: PHYSICAL THERAPY | Age: 77
Setting detail: THERAPIES SERIES
Discharge: HOME | End: 2022-06-06
Payer: MEDICARE

## 2022-06-06 PROCEDURE — 97110 THERAPEUTIC EXERCISES: CPT

## 2022-06-06 PROCEDURE — 97032 APPL MODALITY 1+ESTIM EA 15: CPT

## 2022-06-06 ASSESSMENT — PAIN SCALES - GENERAL: PAINLEVEL_OUTOF10: 1

## 2022-06-06 NOTE — PROGRESS NOTES
Phone: Eloise Platt      Fax: [phone]                            Outpatient Physical Therapy                                                                            Daily Note    Date: 2022  Patient Name: Isai Kim        MRN: [id number]   ACCT#:  [de-identified]  : 1945  (78 y.o.)    Referring Provider (secondary): Dr. Eliel Andino         Diagnosis: Right TKR revision  Treatment Diagnosis: Right TKR revision;  diffuculty with amb; weakness    Onset Date: 22  PT Insurance Information: [facility]  Total # of Visits Approved: 18 Per Physician Order  Total # of Visits to Date: 11  No Show: 0  Canceled Appointment: 1  Plan of Care/Certification Expiration Date: 22    Pre-Treatment Pain:  1/10     Assessment  Assessment: Patient states R knee pain is a 1/10 this morning. Continued with strengthening ex as outlined. Patient continue to have significant quad weakness. Conluded session with Nel estim. Patient is still unable to complete SAQ while estim is on. Following session patient notes pain remains a 1/10, but notes his quad is usually sore the day after therapy.      Plan  Continue with current plan of care    Exercises/Modalities/Manual:  See DocFlow Sheet    Education:     Goals  (Total # of Visits to Date: 6)   Short Term Goals  Time Frame for Short term goals: 8 visits  Short term goal 1: Educate on home program of strengthening and weight bearing exercises-met  STG 1 Current Status[de-identified] No formal exercise education  STG Goal 1 Status[de-identified] Met  Short term goal 2: Ambulate with cane on level surfaces in home (after 22 when progressed to FWB)  STG 2 Current Status[de-identified] Currently ambulating with wh walker 75% WB  STG Goal 2 Status[de-identified] Not Met    Long Term Goals  Time Frame for Long term goals : 25  Long term goal 1: PROM 0 deg terminal extension to normalize gait pattern  LTG 1 Current Status[de-identified] 10 deg PROM;  20 deg AROM  Long term goal 2: PROM 110 deg flexion to allow patient to complete sit to stand without UE assist and stairs  LTG 2 Current Status[de-identified] AAROM limited to 90 deg which affects transfers and ambulation  Long term goal 3: Ambulate in home and community with cane  LTG 3 Current Status[de-identified] Currently ambulating with wh walker  Long term goal 4: Strength right knee extension 4-4+/5 to complete sit to stand and reciprical stairs  LTG 4 Current Status[de-identified] Current strength right knee extension 3/5 with extension lag    Post Treatment Pain:  1/10    Time In: 8052    Time Out : 0901   Timed Code Treatment Minutes: 32 Minutes  Total Treatment Time: 40 Minutes    Tori Jeong     Date: 6/6/2022

## 2022-06-09 ENCOUNTER — HOSPITAL ENCOUNTER (OUTPATIENT)
Dept: PHYSICAL THERAPY | Age: 77
Setting detail: THERAPIES SERIES
Discharge: HOME | End: 2022-06-09
Payer: MEDICARE

## 2022-06-09 PROCEDURE — 97110 THERAPEUTIC EXERCISES: CPT

## 2022-06-09 ASSESSMENT — PAIN SCALES - GENERAL: PAINLEVEL_OUTOF10: 1

## 2022-06-09 NOTE — PROGRESS NOTES
Phone: Eloise Platt      Fax: [phone]                            Outpatient Physical Therapy                                                                            Daily Note    Date: 2022  Patient Name: Cristino Roque        MRN: [id number]   ACCT#:  [de-identified]  : 1945  (78 y.o.)    Referring Provider (secondary): Dr. Max Helton         Diagnosis: Right TKR revision  Treatment Diagnosis: Right TKR revision;  diffuculty with amb; weakness    Onset Date: 22  PT Insurance Information: [facility]  Total # of Visits Approved: 18 Per Physician Order  Total # of Visits to Date: 12  No Show: 0  Canceled Appointment: 1  Plan of Care/Certification Expiration Date: 22    Pre-Treatment Pain:  1/10     Assessment  Assessment: Patient subjectivley rates pain 1/10 with daily ambulation. Emphasis on weight bearing activities for strengthening and co-contraction stability of quad/HS. Held [place] Estim to focus on closed chain ex vs open chain strengthening. Discussed proper step length with left foot to promote full stability with stance on right. Provided orange tband for home walking(band above knees) to provide feedback and resistance of right quad. AAROM/PROM seated knee flexion to 110 deg.     Plan  Continue with current plan of care    Exercises/Modalities/Manual:  See DocFlow Sheet    Education: USe of tband around thighs during gait to facilitate proper step and quads strengthening          Goals  (Total # of Visits to Date: 15)   Short Term Goals  Time Frame for Short term goals: 8 visits  Short term goal 1: Educate on home program of strengthening and weight bearing exercises-met  STG 1 Current Status[de-identified] No formal exercise education  STG Goal 1 Status[de-identified] Met  Short term goal 2: Ambulate with cane on level surfaces in home (after 22 when progressed to FWB)  STG 2 Current Status[de-identified] Currently ambulating with wh walker 75% WB  STG Goal 2 Status[de-identified] Not Met    Long Term Goals  Time Frame for Long term goals : 25  Long term goal 1: PROM 0 deg terminal extension to normalize gait pattern  LTG 1 Current Status[de-identified] 10 deg PROM;  20 deg AROM  Long term goal 2: PROM 110 deg flexion to allow patient to complete sit to stand without UE assist and stairs  LTG 2 Current Status[de-identified] AAROM limited to 90 deg which affects transfers and ambulation  Long term goal 3: Ambulate in home and community with cane  LTG 3 Current Status[de-identified] Currently ambulating with wh walker  Long term goal 4: Strength right knee extension 4-4+/5 to complete sit to stand and reciprical stairs  LTG 4 Current Status[de-identified] Current strength right knee extension 3/5 with extension lag    Post Treatment Pain:  1/10    Time In: 0815    Time Out : 0905        Timed Code Treatment Minutes: 47 Minutes  Total Treatment Time: 50 Minutes    VINCENZO YOST, PT     Date: 6/9/2022

## 2022-06-14 ENCOUNTER — OFFICE VISIT (OUTPATIENT)
Dept: PRIMARY CARE CLINIC | Age: 77
End: 2022-06-14
Payer: MEDICARE

## 2022-06-14 VITALS
HEIGHT: 71 IN | WEIGHT: 315 LBS | SYSTOLIC BLOOD PRESSURE: 110 MMHG | BODY MASS INDEX: 44.1 KG/M2 | HEART RATE: 69 BPM | DIASTOLIC BLOOD PRESSURE: 78 MMHG | OXYGEN SATURATION: 97 %

## 2022-06-14 DIAGNOSIS — R43.2 DYSGEUSIA: Primary | ICD-10-CM

## 2022-06-14 DIAGNOSIS — L01.00 IMPETIGO: ICD-10-CM

## 2022-06-14 PROCEDURE — G8417 CALC BMI ABV UP PARAM F/U: HCPCS | Performed by: NURSE PRACTITIONER

## 2022-06-14 PROCEDURE — G8427 DOCREV CUR MEDS BY ELIG CLIN: HCPCS | Performed by: NURSE PRACTITIONER

## 2022-06-14 PROCEDURE — 3017F COLORECTAL CA SCREEN DOC REV: CPT | Performed by: NURSE PRACTITIONER

## 2022-06-14 PROCEDURE — 1123F ACP DISCUSS/DSCN MKR DOCD: CPT | Performed by: NURSE PRACTITIONER

## 2022-06-14 PROCEDURE — 99213 OFFICE O/P EST LOW 20 MIN: CPT | Performed by: NURSE PRACTITIONER

## 2022-06-14 PROCEDURE — 1036F TOBACCO NON-USER: CPT | Performed by: NURSE PRACTITIONER

## 2022-06-14 RX ORDER — DICLOXACILLIN SODIUM 250 MG/1
250 CAPSULE ORAL 4 TIMES DAILY
Qty: 40 CAPSULE | Refills: 0 | Status: SHIPPED | OUTPATIENT
Start: 2022-06-14 | End: 2022-06-24

## 2022-06-14 SDOH — ECONOMIC STABILITY: FOOD INSECURITY: WITHIN THE PAST 12 MONTHS, THE FOOD YOU BOUGHT JUST DIDN'T LAST AND YOU DIDN'T HAVE MONEY TO GET MORE.: NEVER TRUE

## 2022-06-14 SDOH — ECONOMIC STABILITY: FOOD INSECURITY: WITHIN THE PAST 12 MONTHS, YOU WORRIED THAT YOUR FOOD WOULD RUN OUT BEFORE YOU GOT MONEY TO BUY MORE.: NEVER TRUE

## 2022-06-14 ASSESSMENT — PATIENT HEALTH QUESTIONNAIRE - PHQ9
2. FEELING DOWN, DEPRESSED OR HOPELESS: 0
4. FEELING TIRED OR HAVING LITTLE ENERGY: 3
SUM OF ALL RESPONSES TO PHQ QUESTIONS 1-9: 5
5. POOR APPETITE OR OVEREATING: 1
9. THOUGHTS THAT YOU WOULD BE BETTER OFF DEAD, OR OF HURTING YOURSELF: 0
6. FEELING BAD ABOUT YOURSELF - OR THAT YOU ARE A FAILURE OR HAVE LET YOURSELF OR YOUR FAMILY DOWN: 0
8. MOVING OR SPEAKING SO SLOWLY THAT OTHER PEOPLE COULD HAVE NOTICED. OR THE OPPOSITE, BEING SO FIGETY OR RESTLESS THAT YOU HAVE BEEN MOVING AROUND A LOT MORE THAN USUAL: 0
SUM OF ALL RESPONSES TO PHQ QUESTIONS 1-9: 5
10. IF YOU CHECKED OFF ANY PROBLEMS, HOW DIFFICULT HAVE THESE PROBLEMS MADE IT FOR YOU TO DO YOUR WORK, TAKE CARE OF THINGS AT HOME, OR GET ALONG WITH OTHER PEOPLE: 0
SUM OF ALL RESPONSES TO PHQ9 QUESTIONS 1 & 2: 0
7. TROUBLE CONCENTRATING ON THINGS, SUCH AS READING THE NEWSPAPER OR WATCHING TELEVISION: 0
SUM OF ALL RESPONSES TO PHQ QUESTIONS 1-9: 5
SUM OF ALL RESPONSES TO PHQ QUESTIONS 1-9: 5
3. TROUBLE FALLING OR STAYING ASLEEP: 1
1. LITTLE INTEREST OR PLEASURE IN DOING THINGS: 0

## 2022-06-14 ASSESSMENT — SOCIAL DETERMINANTS OF HEALTH (SDOH): HOW HARD IS IT FOR YOU TO PAY FOR THE VERY BASICS LIKE FOOD, HOUSING, MEDICAL CARE, AND HEATING?: NOT HARD AT ALL

## 2022-06-14 NOTE — PATIENT INSTRUCTIONS
You may be receiving a survey from [employer] regarding your visit today. You may get this in the mail, through your MyChart or in your email. Please complete the survey to enable us to provide the highest quality of care to you and your family. If you cannot score us as very good ( 5 Stars) on any question, please feel free to call the office to discuss how we could have made your experience exceptional.     Thank You! BESS Jean Baptiste-DAPHNIE  [address] Myra Baeza LPN        Phone: [phone]  Fax: 309 [facility] Office Hours:  Monday: [facility] office location [phone] ([phone]) Offering additional late hours the first Monday of the month until 7 pm.   Tuesday: 8-5 Wednesday: 8-5 Thursday:  Additional hours offered 2 Thursdays a month. Please call to inquire those dates.    Fridays: 7:30-4:30

## 2022-06-14 NOTE — PROGRESS NOTES
HPI Notes    Name: Porter Smith  : 1945         Chief Complaint:     Chief Complaint   Patient presents with    Taste Change     Pt states in the last 2-3 weeks, his taste buds will start out real salty and then turn bitter regardless of what he's eating       History of Present Illness:        HPI    78year old male with c/o taste alteration with all foods starts out salty and then bitter, or just bitter all the time. Pt feels this sensation is strong. Started about 2-3 weeks ago. Med change : ditropan XL 15 mg daily for urinary incontinence. Felt to be side effect of this new start med in 2022. Pt also with erythema started on back of right leg patch approx 4 inches in size blanchable with skin integrity broke appears crusty yellow. Pt denies bite.      Past Medical History:     Past Medical History:   Diagnosis Date    Arthritis     Ascending cholangitis 10/2/14     at [facility] assisted gastric remnant to open    Blood in stool     CAD (coronary artery disease)     Chronic back pain     DDD    Chronic diastolic (congestive) heart failure ([address] 2018    Chronic systolic congestive heart failure ([address] 2018    COPD with acute exacerbation ([address] 2018    Heart disease 5-9-12    History of angioplasty 2008    2 stents placed    Hx of CABG     x1 in     Hyperlipidemia     Hypertension     onset age 41    Obesity     Stress incontinence, male 3/21/2017    Transfusion history     Unspecified sleep apnea     cpap-DOES NOT USE MACHINE      Reviewed all health maintenance requirements and ordered appropriate tests  Health Maintenance Due   Topic Date Due    COVID-19 Vaccine (1) Never done    Diabetic foot exam  Never done    Diabetic retinal exam  Never done    DTaP/Tdap/Td vaccine (1 - Tdap) Never done    Shingles vaccine (1 of 2) Never done   [employer] Visit (AWV)  2022       Past Surgical History:     Past Surgical History: Procedure Laterality Date    ANTERIOR CRUCIATE LIGAMENT REPAIR Right 2/9/2021    RIGHT KNEE INCISION AND DRAINAGE, POLY EXCHANGE,  WITH EXTENSOR MECHANISM REPAIR WITH ALLOGRAFT performed by Rylie Parker DO at [place] [address]  2005    Karthik Wu BARIATRIC SURGERY  2001    Murray HENRY at [facility]   Jenn Chavez Left 05/09/2012    Left main normal.  Left anterior descending artery:  Plaque disease. Ramus: Plaque disease Circumflex:nondominant, plaque disease. OM1 normal. Right coronoary artery:  dominant & luminal irregularities. Left ventricular ejection fraction 60. Mitral valve normal with no insufficinecy of the mitral valve. Aortic valve normal with no stenosis of the aortic valve. Edp was normal.    CARDIAC CATHETERIZATION Left 03/04/2015    Dr. Ramirez --Severe CAD, 80% in-stent stenosis in the left anterior descending coronary artery in a rather long segment, very eccentric,extending almost to the ostium of the left anterior descending coronary artery. 70% in-stent stenosis of a very large dominant right coronary artery. Unremarkable small circumflex. Normal left ventricular function, ejection fraction of 60%.  CARDIAC CATHETERIZATION Left 12/05/2018    Dr. Hipolito Hi @ [facility]--Risk [place] [address] specific cardiac intervention   [address]  2008    2 unknown heart stents mri 1.5t only    CHOLECYSTECTOMY  05/30/2014    [facility] Dr. Cueto Brought COLONOSCOPY  10/2015    repeat in 2022    [address] [place] [address]      2 vessel March 3th 2015    CYSTOSCOPY  05/10/2016    with laser TURP    ERCP  10/02/2014    Dr. Mtz Schools with laparotomy    HERNIA REPAIR  09/2003    [facility]    JOINT REPLACEMENT Bilateral 03/20/2013    bilateral hips    JOINT REPLACEMENT Right 01/27/2021    KNEE ARTHROSCOPY Right 09/26/2013    Dr. Curtis Spears - [facility]  11/02/2016    rt. L3-4, L4-5 decompression.  L4-5 discectomy and fusion    SHOULDER SURGERY Left 2000    arthroscopy    SHOULDER SURGERY Left 2007    replaced humeral head    TOTAL HIP ARTHROPLASTY Right     02/03/2016    TOTAL KNEE ARTHROPLASTY Right 01/27/2021    RIGHT TOTAL KNEE ARTHOPLASTY COMPLICATED BY OBSEITY NEEDS A MARIANNE performed by Jeremi Reynolds DO at [facility] OR        Medications:       Prior to Admission medications    Medication Sig Start Date End Date Taking? Authorizing Provider   mupirocin (BACTROBAN) 2 % ointment Apply thin coat twice a day to right posterior calf wound.  6/14/22 6/21/22 Yes BESS Cannon CNP   dicloxacillin (DYNAPEN) 250 MG capsule Take 1 capsule by mouth 4 times daily for 10 days For right posterior leg impetigo 6/14/22 6/24/22 Yes BESS Cannon CNP   atorvastatin (LIPITOR) 80 MG tablet Take 1 tablet by mouth once daily 5/24/22  Yes BESS Cannon CNP   potassium chloride (KLOR-CON) 10 MEQ extended release tablet Take 1 tablet by mouth daily 5/5/22  Yes BESS Cannon CNP   oxybutynin (DITROPAN XL) 15 MG extended release tablet Take 1 tablet by mouth daily 4/18/22  Yes Andreea Allen MD   hydroCHLOROthiazide (HYDRODIURIL) 25 MG tablet Take 1 tablet by mouth once daily 4/8/22  Yes BESS Cannon CNP   losartan (COZAAR) 25 MG tablet Take 0.5 tablets by mouth daily As of 03/14/2022 start taking 1/2 tablet daily 4/8/22  Yes BESS Cannon CNP   metoprolol succinate (TOPROL XL) 25 MG extended release tablet Take 1 tablet by mouth daily 4/8/22  Yes BESS Cannon CNP   isosorbide mononitrate (IMDUR) 30 MG extended release tablet Take 1 tablet by mouth daily 4/8/22  Yes BESS Cannon CNP   escitalopram (LEXAPRO) 20 MG tablet Take 20 mg by mouth daily   Yes Historical Provider, MD   Probiotic Product (PROBIOTIC DAILY PO) Take by mouth   Yes Historical Provider, MD   zinc 50 MG CAPS Take by mouth   Yes Historical Provider, MD   aspirin 325 MG EC tablet Take 325 mg by mouth daily   Yes Historical Provider, MD   SYMBICORT 160-4.5 MCG/ACT AERO  2/27/22  Yes Historical Provider, MD   albuterol sulfate  (90 Base) MCG/ACT inhaler INHALE 2 PUFFS BY MOUTH EVERY 4 HOURS AS NEEDED FOR WHEEZING 1/31/22  Yes Historical Provider, MD   tamsulosin (FLOMAX) 0.4 MG capsule Take 1 capsule by mouth every evening 1/17/22  Yes BESS Dunham CNP   spironolactone (ALDACTONE) 25 MG tablet Take 1 tablet by mouth once daily 1/4/22  Yes BESS Flores CNP   metFORMIN (GLUCOPHAGE) 500 MG tablet TAKE 1 TABLET BY MOUTH ONCE DAILY WITH BREAKFAST FOR  TYPE  2  DIABETES 12/17/21  Yes BESS Flores CNP   docusate sodium (COLACE) 100 MG capsule Take 1 capsule by mouth 2 times daily 1/28/21  Yes Karis Salazar,    Ferrous Sulfate (IRON) 325 (65 Fe) MG TABS 65 mg daily  12/17/20  Yes Historical Provider, MD   fluticasone (FLONASE) 50 MCG/ACT nasal spray 1 spray by Nasal route daily  Patient taking differently: 1 spray by Nasal route as needed  10/21/20  Yes BESS Flores CNP   Multiple Vitamin (THERA/BETA-CAROTENE) TABS Take 1 tablet by mouth daily   Yes Historical Provider, MD   vitamin C (ASCORBIC ACID) 500 MG tablet Take 500 mg by mouth daily   Yes Historical Provider, MD   Vitamin D, Cholecalciferol, 25 MCG (1000 UT) TABS Take by mouth daily    Yes Historical Provider, MD [address] 103-22.2-45 MCG/INH AEPB INHALE 1 PUFF ONCE DAILY  Patient not taking: Reported on 6/14/2022 12/24/21   Historical Provider, MD   Blood Glucose Monitoring Suppl (FREESTYLE LITE) RADHA 1 Device by Does not apply route daily E11.9 5/4/21   BESS Flores CNP   blood glucose monitor strips Test daily and as needed   E 11.9 5/4/21   BESS Flores CNP   Lancets MISC 1 each by Does not apply route daily 5/4/21   BESS Flores CNP        Allergies:       Patient has no known allergies. Social History:     Tobacco:    reports that he has never smoked.  He has never used smokeless tobacco.  Alcohol:      reports no history of alcohol use. Drug Use:  reports no history of drug use. Family History:     Family History   Problem Relation Age of Onset    Diabetes Mother     Heart Disease Mother     Arthritis Mother     High Blood Pressure Mother     High Cholesterol Mother     Cancer Father         lung black lung from coal mines    Heart Disease Brother         leaky heart    Liver Disease Paternal Grandfather         black lung    Obesity Sister         gastric bypass    Cancer Sister         lung, smoker       Review of Systems:         Review of Systems   Constitutional: Negative for activity change, chills and fever. HENT: Negative for congestion and rhinorrhea. Eyes: Negative. Respiratory: Negative for cough, chest tightness and wheezing. Cardiovascular: Negative for chest pain and leg swelling. Gastrointestinal: Negative for abdominal distention. Endocrine: Negative for cold intolerance and heat intolerance. Genitourinary: Negative for difficulty urinating. Musculoskeletal: Negative for arthralgias and gait problem. Skin: Negative for rash. Neurological: Negative for dizziness. Psychiatric/Behavioral: Negative for sleep disturbance. The patient is not nervous/anxious. Physical Exam:     Vitals:  /78   Pulse 69   Ht 5' 11\" (1.803 m)   Wt (!) 337 lb (152.9 kg)   SpO2 97%   BMI 47.00 kg/m²       Physical Exam  Vitals and nursing note reviewed. Constitutional:       General: He is not in acute distress. Appearance: Normal appearance. He is well-developed. HENT:      Head: Normocephalic and atraumatic. Right Ear: Tympanic membrane normal.      Left Ear: Tympanic membrane and external ear normal.      Nose: No congestion. Mouth/Throat:      Pharynx: No oropharyngeal exudate. Eyes:      Pupils: Pupils are equal, round, and reactive to light. Neck:      Vascular: No carotid bruit. Cardiovascular:      Rate and Rhythm: Normal rate and regular rhythm. Pulses: Normal pulses. Heart sounds: Normal heart sounds. No murmur heard. Pulmonary:      Effort: Pulmonary effort is normal. No respiratory distress. Breath sounds: Normal breath sounds. Abdominal:      Palpations: Abdomen is soft. There is no mass. Tenderness: There is no abdominal tenderness. Musculoskeletal:         General: Normal range of motion. Cervical back: Normal range of motion and neck supple. Right lower leg: No edema. Left lower leg: No edema. Lymphadenopathy:      Cervical: No cervical adenopathy. Skin:     General: Skin is warm. Findings: No rash. Neurological:      Mental Status: He is alert and oriented to person, place, and time. Psychiatric:         Behavior: Behavior normal.         Thought Content: Thought content normal.         Judgment: Judgment normal.           Erythema posterior right leg with 2 open areas crusted yellow. Non tender. Blanchable . Impetigo suspected.          Data:     Lab Results   Component Value Date     03/11/2022    K 3.8 03/11/2022    CL 99 03/11/2022    CO2 24 03/11/2022    BUN 21 03/11/2022    CREATININE 1.03 03/11/2022    GLUCOSE 101 03/11/2022    GLUCOSE 132 05/09/2012    PROT 6.6 03/11/2022    LABALBU 4.1 03/11/2022    BILITOT 0.57 03/11/2022    ALKPHOS 129 03/11/2022    AST 25 03/11/2022    ALT 15 03/11/2022     Lab Results   Component Value Date    WBC 6.3 03/11/2022    RBC 4.83 03/11/2022    RBC 4.72 05/09/2012    HGB 10.7 03/11/2022    HCT 33.7 03/11/2022    MCV 69.8 03/11/2022    MCH 22.1 03/11/2022    MCHC 31.7 03/11/2022    RDW 17.1 03/11/2022     03/11/2022     05/09/2012    MPV NOT REPORTED 06/20/2021     Lab Results   Component Value Date    TSH 2.95 03/11/2022     Lab Results   Component Value Date    CHOL 107 03/11/2022    HDL 65 03/11/2022    PSA 0.61 07/27/2021    LABA1C 6.0 10/19/2021 Assessment & Plan        Diagnosis Orders   1. Dysgeusia     2. Impetigo  mupirocin (BACTROBAN) 2 % ointment    dicloxacillin (DYNAPEN) 250 MG capsule       Will consider decreasing ditropan XL due to taste changes. Completed Refills   Requested Prescriptions     Signed Prescriptions Disp Refills    mupirocin (BACTROBAN) 2 % ointment 1 each 1     Sig: Apply thin coat twice a day to right posterior calf wound.  dicloxacillin (DYNAPEN) 250 MG capsule 40 capsule 0     Sig: Take 1 capsule by mouth 4 times daily for 10 days For right posterior leg impetigo     Return for diabetes visit overdue within the month!!, awv in 3 month . Orders Placed This Encounter   Medications    mupirocin (BACTROBAN) 2 % ointment     Sig: Apply thin coat twice a day to right posterior calf wound. Dispense:  1 each     Refill:  1    dicloxacillin (DYNAPEN) 250 MG capsule     Sig: Take 1 capsule by mouth 4 times daily for 10 days For right posterior leg impetigo     Dispense:  40 capsule     Refill:  0     No orders of the defined types were placed in this encounter. Patient Instructions   You may be receiving a survey from [employer] regarding your visit today. You may get this in the mail, through your MyChart or in your email. Please complete the survey to enable us to provide the highest quality of care to you and your family. If you cannot score us as very good ( 5 Stars) on any question, please feel free to call the office to discuss how we could have made your experience exceptional.     Thank You! BESS Robertson-DAPHNIE  [address] Tabitha Monge LPN        Phone: [phone]  Fax: [facility]38 [facility] Office Hours:  Monday: [facility] office location 8-5 ([phone]) Offering additional late hours the first Monday of the month until 7 pm.   Tuesday: 8-5 Wednesday: 8-5 Thursday:  Additional hours offered 2 Thursdays a month.  Please call to inquire those dates.   Fridays: 7:30-4:30        Electronically signed by BESS Quintana CNP on 6/16/2022 at 8:34 AM           Completed Refills   Requested Prescriptions     Signed Prescriptions Disp Refills    mupirocin (BACTROBAN) 2 % ointment 1 each 1     Sig: Apply thin coat twice a day to right posterior calf wound.     dicloxacillin (DYNAPEN) 250 MG capsule 40 capsule 0     Sig: Take 1 capsule by mouth 4 times daily for 10 days For right posterior leg impetigo

## 2022-06-16 ASSESSMENT — ENCOUNTER SYMPTOMS
ABDOMINAL DISTENTION: 0
WHEEZING: 0
EYES NEGATIVE: 1
COUGH: 0
CHEST TIGHTNESS: 0
RHINORRHEA: 0

## 2022-06-17 ENCOUNTER — HOSPITAL ENCOUNTER (OUTPATIENT)
Dept: PHYSICAL THERAPY | Age: 77
Setting detail: THERAPIES SERIES
Discharge: HOME | End: 2022-06-17
Payer: MEDICARE

## 2022-06-17 PROCEDURE — 97110 THERAPEUTIC EXERCISES: CPT

## 2022-06-17 NOTE — PROGRESS NOTES
Phone: Eloise Platt      Fax: [phone]                            Outpatient Physical Therapy                                                                            Daily Note    Date: 2022  Patient Name: Porter Smith        MRN: [id number]   ACCT#:  [de-identified]  : 1945  (78 y.o.)    Referring Provider (secondary): Dr. Gerardo Esquivel         Diagnosis: Right TKR revision  Treatment Diagnosis: Right TKR revision;  diffuculty with amb; weakness    Onset Date: 22  PT Insurance Information: [facility]  Total # of Visits Approved: 18 Per Physician Order  Total # of Visits to Date: 13  No Show: 0  Canceled Appointment: 1  Plan of Care/Certification Expiration Date: 22    Pre-Treatment Pain:  0/10     Assessment  Assessment: Patient subjectivley rates pain 1/10 with daily ambulation. Emphasis on weight bearing activities for strengthening and co-contraction stability of quad/HS. Held [place] Estim to focus on closed chain ex vs open chain strengthening. Discussed proper step length with left foot to promote full stability with stance on right. Patient attempting to decrease amount of UE pressure on walker during gait. Able to complete increased reps with RLE on shuttle.       Plan  Continue with current plan of care    Exercises/Modalities/Manual:  See DocFlow Sheet    Education:           Goals  (Total # of Visits to Date: 15)   Short Term Goals  Time Frame for Short term goals: 8 visits  Short term goal 1: Educate on home program of strengthening and weight bearing exercises-met  STG 1 Current Status[de-identified] No formal exercise education  STG Goal 1 Status[de-identified] Met  Short term goal 2: Ambulate with cane on level surfaces in home (after 22 when progressed to FWB)  STG 2 Current Status[de-identified] Currently ambulating with wh walker 75% WB  STG Goal 2 Status[de-identified] Not Met    Long Term Goals  Time Frame for Long term goals : 25  Long term goal 1: PROM 0 deg terminal extension to normalize gait pattern  LTG 1 Current Status[de-identified] 10 deg PROM;  20 deg AROM  Long term goal 2: PROM 110 deg flexion to allow patient to complete sit to stand without UE assist and stairs  LTG 2 Current Status[de-identified] AAROM limited to 90 deg which affects transfers and ambulation  Long term goal 3: Ambulate in home and community with cane  LTG 3 Current Status[de-identified] Currently ambulating with wh walker  Long term goal 4: Strength right knee extension 4-4+/5 to complete sit to stand and reciprical stairs  LTG 4 Current Status[de-identified] Current strength right knee extension 3/5 with extension lag    Post Treatment Pain:  0-1/10    Time In: 0800    Time Out : 0848        Timed Code Treatment Minutes: 45 Minutes  Total Treatment Time: 1100 [facility], PT     Date: 6/17/2022

## 2022-06-21 ENCOUNTER — HOSPITAL ENCOUNTER (OUTPATIENT)
Dept: PHYSICAL THERAPY | Age: 77
Setting detail: THERAPIES SERIES
Discharge: HOME | End: 2022-06-21
Payer: MEDICARE

## 2022-06-21 PROCEDURE — 97140 MANUAL THERAPY 1/> REGIONS: CPT

## 2022-06-21 PROCEDURE — 97110 THERAPEUTIC EXERCISES: CPT

## 2022-06-21 ASSESSMENT — PAIN SCALES - GENERAL: PAINLEVEL_OUTOF10: 1

## 2022-06-21 NOTE — PROGRESS NOTES
Phone: Eloise Platt      Fax: [phone]                            Outpatient Physical Therapy                                                                            Daily Note    Date: 2022  Patient Name: Hernesto Duron        MRN: [id number]   ACCT#:  [de-identified]  : 1945  (78 y.o.)    Referring Provider (secondary): Dr. Van Bergman         Diagnosis: Right TKR revision       Onset Date: 22  PT Insurance Information: M[employer]  Total # of Visits Approved: 18 Per Physician Order  Total # of Visits to Date: 14  No Show: 0  Canceled Appointment: 1  Plan of Care/Certification Expiration Date: 22    Pre-Treatment Pain:  1/10     Assessment  Assessment: Pt reports gradual slow progression. Performed ex as outlined focusing on close chain ex, progressed with TKE with tband . PROM to 113 deg,  AROM to 102 deg.      Plan  Continue with current plan of care    Exercises/Modalities/Manual:  See DocFlow Sheet    Goals  (Total # of Visits to Date: 15)   Short Term Goals  Time Frame for Short term goals: 8 visits  Short term goal 1: Educate on home program of strengthening and weight bearing exercises-met  STG 1 Current Status[de-identified] No formal exercise education  STG Goal 1 Status[de-identified] Met  Short term goal 2: Ambulate with cane on level surfaces in home (after 22 when progressed to FWB)  STG 2 Current Status[de-identified] Currently ambulating with wh walker 75% WB  STG Goal 2 Status[de-identified] Not Met    Long Term Goals  Time Frame for Long term goals : 25  Long term goal 1: PROM 0 deg terminal extension to normalize gait pattern  LTG 1 Current Status[de-identified] 10 deg PROM;  20 deg AROM  Long term goal 2: PROM 110 deg flexion to allow patient to complete sit to stand without UE assist and stairs  LTG 2 Current Status[de-identified] AAROM limited to 90 deg which affects transfers and ambulation  Long term goal 3: Ambulate in home and community with cane  LTG 3 Current Status[de-identified] Currently ambulating with wh

## 2022-06-24 ENCOUNTER — HOSPITAL ENCOUNTER (OUTPATIENT)
Dept: PHYSICAL THERAPY | Age: 77
Setting detail: THERAPIES SERIES
Discharge: HOME | End: 2022-06-24
Payer: MEDICARE

## 2022-06-24 PROCEDURE — 97110 THERAPEUTIC EXERCISES: CPT

## 2022-06-24 NOTE — PROGRESS NOTES
Phone: Eloise Platt      Fax: [phone]                            Outpatient Physical Therapy                                                                            Daily Note    Date: 2022  Patient Name: Favian Love        MRN: [id number]   ACCT#:  [de-identified]  : 1945  (78 y.o.)    Referring Provider (secondary): Dr. Tressa Alexander         Diagnosis: Right TKR revision       Onset Date: 22  PT Insurance Information: [facility]  Total # of Visits Approved: 18 Per Physician Order  Total # of Visits to Date: 14  No Show: 0  Canceled Appointment: 1  Plan of Care/Certification Expiration Date: 22    Pre-Treatment Pain:  0/10     Assessment  Assessment: Patient notes gradual improvement in strength; attempting to increase ambulatory distances with emphasis on proper step length. Continues to exhibit weakness of right quad during stance phase and full advancement of left LE and also limited with quad control with right step downs.     Unable to safely ambulate without use of wh walker    Plan  Continue with current plan of care    Exercises/Modalities/Manual:  See DocFlow Sheet    Education:           Goals  (Total # of Visits to Date: 15)   Short Term Goals  Time Frame for Short term goals: 8 visits  Short term goal 1: Educate on home program of strengthening and weight bearing exercises-met  STG 1 Current Status[de-identified] No formal exercise education  STG Goal 1 Status[de-identified] Met  Short term goal 2: Ambulate with cane on level surfaces in home (after 22 when progressed to FWB)  STG 2 Current Status[de-identified] Currently ambulating with wh walker 75% WB  STG Goal 2 Status[de-identified] Not Met    Long Term Goals  Time Frame for Long term goals : 25  Long term goal 1: PROM 0 deg terminal extension to normalize gait pattern  LTG 1 Current Status[de-identified] 10 deg PROM;  20 deg AROM  Long term goal 2: PROM 110 deg flexion to allow patient to complete sit to stand without UE assist and stairs  LTG 2 Current Status[de-identified] AAROM limited to 90 deg which affects transfers and ambulation  Long term goal 3: Ambulate in home and community with cane  LTG 3 Current Status[de-identified] Currently ambulating with wh walker  Long term goal 4: Strength right knee extension 4-4+/5 to complete sit to stand and reciprical stairs  LTG 4 Current Status[de-identified] Current strength right knee extension 3/5 with extension lag    Post Treatment Pain:  0/10    Time In: 0815  (arrived 15 min thinking appt was at 830)    Time Out : 0845        Timed Code Treatment Minutes: 30 Minutes  Total Treatment Time: 30 Minutes    CECILIA POON, PT     Date: 6/24/2022

## 2022-06-27 ENCOUNTER — OFFICE VISIT (OUTPATIENT)
Dept: UROLOGY | Age: 77
End: 2022-06-27
Payer: MEDICARE

## 2022-06-27 VITALS
RESPIRATION RATE: 18 BRPM | BODY MASS INDEX: 44.1 KG/M2 | SYSTOLIC BLOOD PRESSURE: 162 MMHG | TEMPERATURE: 98.7 F | WEIGHT: 315 LBS | HEART RATE: 78 BPM | DIASTOLIC BLOOD PRESSURE: 92 MMHG | HEIGHT: 71 IN

## 2022-06-27 DIAGNOSIS — R39.15 URGENCY OF URINATION: ICD-10-CM

## 2022-06-27 DIAGNOSIS — N13.8 BPH WITH OBSTRUCTION/LOWER URINARY TRACT SYMPTOMS: ICD-10-CM

## 2022-06-27 DIAGNOSIS — N40.1 BPH WITH OBSTRUCTION/LOWER URINARY TRACT SYMPTOMS: ICD-10-CM

## 2022-06-27 DIAGNOSIS — N32.81 OAB (OVERACTIVE BLADDER): Primary | ICD-10-CM

## 2022-06-27 PROCEDURE — PBSHW PR MEASUREMENT,POST-VOID RESIDUAL VOLUME BY US,NON-IMAGING: Performed by: NURSE PRACTITIONER

## 2022-06-27 PROCEDURE — G8427 DOCREV CUR MEDS BY ELIG CLIN: HCPCS | Performed by: NURSE PRACTITIONER

## 2022-06-27 PROCEDURE — G8417 CALC BMI ABV UP PARAM F/U: HCPCS | Performed by: NURSE PRACTITIONER

## 2022-06-27 PROCEDURE — 3017F COLORECTAL CA SCREEN DOC REV: CPT | Performed by: NURSE PRACTITIONER

## 2022-06-27 PROCEDURE — 51798 US URINE CAPACITY MEASURE: CPT | Performed by: NURSE PRACTITIONER

## 2022-06-27 PROCEDURE — 1123F ACP DISCUSS/DSCN MKR DOCD: CPT | Performed by: NURSE PRACTITIONER

## 2022-06-27 PROCEDURE — 1036F TOBACCO NON-USER: CPT | Performed by: NURSE PRACTITIONER

## 2022-06-27 PROCEDURE — 99214 OFFICE O/P EST MOD 30 MIN: CPT | Performed by: NURSE PRACTITIONER

## 2022-06-27 RX ORDER — SOLIFENACIN SUCCINATE 10 MG/1
10 TABLET, FILM COATED ORAL DAILY
Qty: 30 TABLET | Refills: 3 | Status: SHIPPED | OUTPATIENT
Start: 2022-06-27 | End: 2023-06-27

## 2022-06-27 ASSESSMENT — ENCOUNTER SYMPTOMS
COLOR CHANGE: 0
CONSTIPATION: 0
ABDOMINAL PAIN: 0
BACK PAIN: 0
SHORTNESS OF BREATH: 0
NAUSEA: 0
WHEEZING: 0
COUGH: 0
VOMITING: 0
EYE REDNESS: 0

## 2022-06-27 NOTE — PROGRESS NOTES
HPI:          Patient is a 78 y.o. male in no acute distress. He is alert and oriented to person, place, and time. History  5/2016 PVP greenlight     3/2017 cystoscopy showed normal anatomy. PFR recommended. 6/2017 Completed seven PFR treatments. Daytime frequency improved from q1hr to q1-2 hrs, nocturia improved from 0-3 to 0-1, severe urgency improved to mild, LIBBY improved from 5-10x per day to 3-4x per day. 2/2022 frequency, nocturia, urgency and urge incontinence. Saturating 1 briefs per day. Taking Flomax daily. He is having daily bowel movements. He does consume 2 cups of coffee. He did stop drinking soda. Started oxybutynin 10mg XL     4/2022 increased oxybutynin to 15mg    Today  Here today to follow-up for OAB and BPH. He has nocturia x1, urinates during the day every 2 hours. He continues to complain of urgency and UUI. He is wearing one brief per day. He is having a daily BM. PVR is low.      Past Medical History:   Diagnosis Date    Arthritis     Ascending cholangitis 10/2/14     at [facility] assisted gastric remnant to open    Blood in stool 2011    CAD (coronary artery disease)     Chronic back pain     DDD    Chronic diastolic (congestive) heart failure ([address] 11/5/2018    Chronic systolic congestive heart failure ([address] 9/25/2018    COPD with acute exacerbation ([facility] 9/25/2018    Heart disease 5-9-12    History of angioplasty 7/2008    2 stents placed    Hx of CABG     x1 in 2015    Hyperlipidemia     Hypertension     onset age 41    Obesity     Stress incontinence, male 3/21/2017    Transfusion history     Unspecified sleep apnea     cpap-DOES NOT USE MACHINE     Past Surgical History:   Procedure Laterality Date    ANTERIOR CRUCIATE LIGAMENT REPAIR Right 2/9/2021    RIGHT KNEE INCISION AND DRAINAGE, POLY EXCHANGE,  WITH EXTENSOR MECHANISM REPAIR WITH ALLOGRAFT performed by Marcela Vazquez DO at [facility] Dr. Domo Marie BARIATRIC SURGERY  2001    Verle Neo -Y at [facility]   [address] Left 05/09/2012    Left main normal.  Left anterior descending artery:  Plaque disease. Ramus: Plaque disease Circumflex:nondominant, plaque disease. OM1 normal. Right coronoary artery:  dominant & luminal irregularities. Left ventricular ejection fraction 60. Mitral valve normal with no insufficinecy of the mitral valve. Aortic valve normal with no stenosis of the aortic valve. Edp was normal.    CARDIAC CATHETERIZATION Left 03/04/2015    Dr. Ramirez --Severe CAD, 80% in-stent stenosis in the left anterior descending coronary artery in a rather long segment, very eccentric,extending almost to the ostium of the left anterior descending coronary artery. 70% in-stent stenosis of a very large dominant right coronary artery. Unremarkable small circumflex. Normal left ventricular function, ejection fraction of 60%.  CARDIAC CATHETERIZATION Left 12/05/2018    Dr. Mariela Soler @ [facility]--Risk [address] specific cardiac intervention   [address]  2008    2 unknown heart stents mri 1.5t only    CHOLECYSTECTOMY  05/30/2014    open [facility] Dr. Micheline Mack COLONOSCOPY  10/2015    repeat in 2022    [facility]      2 vessel March 3th 2015    CYSTOSCOPY  05/10/2016    with laser TURP    ERCP  10/02/2014    Dr. Cory Brush with laparotomy    HERNIA REPAIR  09/2003    Hiatal    JOINT REPLACEMENT Bilateral 03/20/2013    bilateral hips    JOINT REPLACEMENT Right 01/27/2021    KNEE ARTHROSCOPY Right 09/26/2013    Dr. Sharlene Sullivan - [facility] SURGERY  11/02/2016    rt. L3-4, L4-5 decompression.  L4-5 discectomy and fusion    SHOULDER SURGERY Left 2000    arthroscopy    SHOULDER SURGERY Left 2007    replaced humeral head    TOTAL HIP ARTHROPLASTY Right     02/03/2016    TOTAL KNEE ARTHROPLASTY Right 01/27/2021    RIGHT TOTAL KNEE ARTHOPLASTY COMPLICATED BY OBSEITY NEEDS A MARIANNE performed by Josee Washington DO at [address] OR     Outpatient Encounter Medications as of 6/27/2022   Medication Sig Dispense Refill    atorvastatin (LIPITOR) 80 MG tablet Take 1 tablet by mouth once daily 90 tablet 2    potassium chloride (KLOR-CON) 10 MEQ extended release tablet Take 1 tablet by mouth daily 90 tablet 1    oxybutynin (DITROPAN XL) 15 MG extended release tablet Take 1 tablet by mouth daily 30 tablet 3    hydroCHLOROthiazide (HYDRODIURIL) 25 MG tablet Take 1 tablet by mouth once daily 90 tablet 1    losartan (COZAAR) 25 MG tablet Take 0.5 tablets by mouth daily As of 03/14/2022 start taking 1/2 tablet daily 45 tablet 1    metoprolol succinate (TOPROL XL) 25 MG extended release tablet Take 1 tablet by mouth daily 90 tablet 1    isosorbide mononitrate (IMDUR) 30 MG extended release tablet Take 1 tablet by mouth daily 90 tablet 1    escitalopram (LEXAPRO) 20 MG tablet Take 20 mg by mouth daily      Probiotic Product (PROBIOTIC DAILY PO) Take by mouth      zinc 50 MG CAPS Take by mouth      aspirin 325 MG EC tablet Take 325 mg by mouth daily      SYMBICORT 160-4.5 MCG/ACT AERO       albuterol sulfate  (90 Base) MCG/ACT inhaler INHALE 2 PUFFS BY MOUTH EVERY 4 HOURS AS NEEDED FOR WHEEZING      TRELEGY ELLIPTA 200-62.5-25 MCG/INH AEPB INHALE 1 PUFF ONCE DAILY      tamsulosin (FLOMAX) 0.4 MG capsule Take 1 capsule by mouth every evening 90 capsule 3    spironolactone (ALDACTONE) 25 MG tablet Take 1 tablet by mouth once daily 90 tablet 2    metFORMIN (GLUCOPHAGE) 500 MG tablet TAKE 1 TABLET BY MOUTH ONCE DAILY WITH BREAKFAST FOR  TYPE  2  DIABETES 90 tablet 1    Blood Glucose Monitoring Suppl (FREESTYLE LITE) RADHA 1 Device by Does not apply route daily E11.9 1 Device 0    blood glucose monitor strips Test daily and as needed   E 11.9 200 strip 1    Lancets MISC 1 each by Does not apply route daily 200 each 1    docusate sodium (COLACE) 100 MG capsule Take 1 capsule by mouth 2 times daily 40 capsule 0    Ferrous Sulfate (IRON) 325 (65 Fe) MG TABS 65 mg daily       fluticasone (FLONASE) 50 MCG/ACT nasal spray 1 spray by Nasal route daily (Patient taking differently: 1 spray by Nasal route as needed ) 16 g 2    Multiple Vitamin (THERA/BETA-CAROTENE) TABS Take 1 tablet by mouth daily      vitamin C (ASCORBIC ACID) 500 MG tablet Take 500 mg by mouth daily      Vitamin D, Cholecalciferol, 25 MCG (1000 UT) TABS Take by mouth daily        No facility-administered encounter medications on file as of 6/27/2022.       Current Outpatient Medications on File Prior to Visit   Medication Sig Dispense Refill    atorvastatin (LIPITOR) 80 MG tablet Take 1 tablet by mouth once daily 90 tablet 2    potassium chloride (KLOR-CON) 10 MEQ extended release tablet Take 1 tablet by mouth daily 90 tablet 1    oxybutynin (DITROPAN XL) 15 MG extended release tablet Take 1 tablet by mouth daily 30 tablet 3    hydroCHLOROthiazide (HYDRODIURIL) 25 MG tablet Take 1 tablet by mouth once daily 90 tablet 1    losartan (COZAAR) 25 MG tablet Take 0.5 tablets by mouth daily As of 03/14/2022 start taking 1/2 tablet daily 45 tablet 1    metoprolol succinate (TOPROL XL) 25 MG extended release tablet Take 1 tablet by mouth daily 90 tablet 1    isosorbide mononitrate (IMDUR) 30 MG extended release tablet Take 1 tablet by mouth daily 90 tablet 1    escitalopram (LEXAPRO) 20 MG tablet Take 20 mg by mouth daily      Probiotic Product (PROBIOTIC DAILY PO) Take by mouth      zinc 50 MG CAPS Take by mouth      aspirin 325 MG EC tablet Take 325 mg by mouth daily      SYMBICORT 160-4.5 MCG/ACT AERO       albuterol sulfate  (90 Base) MCG/ACT inhaler INHALE 2 PUFFS BY MOUTH EVERY 4 HOURS AS NEEDED FOR WHEEZING      TRELEGY ELLIPTA 200-62.5-25 MCG/INH AEPB INHALE 1 PUFF ONCE DAILY      tamsulosin (FLOMAX) 0.4 MG capsule Take 1 capsule by mouth every evening 90 capsule 3    spironolactone (ALDACTONE) 25 MG tablet Take 1 tablet by mouth once daily 90 tablet 2    metFORMIN (GLUCOPHAGE) 500 MG tablet TAKE 1 TABLET BY MOUTH ONCE DAILY WITH BREAKFAST FOR  TYPE  2  DIABETES 90 tablet 1    Blood Glucose Monitoring Suppl (FREESTYLE LITE) RADHA 1 Device by Does not apply route daily E11.9 1 Device 0    blood glucose monitor strips Test daily and as needed   E 11.9 200 strip 1    Lancets MISC 1 each by Does not apply route daily 200 each 1    docusate sodium (COLACE) 100 MG capsule Take 1 capsule by mouth 2 times daily 40 capsule 0    Ferrous Sulfate (IRON) 325 (65 Fe) MG TABS 65 mg daily       fluticasone (FLONASE) 50 MCG/ACT nasal spray 1 spray by Nasal route daily (Patient taking differently: 1 spray by Nasal route as needed ) 16 g 2    Multiple Vitamin (THERA/BETA-CAROTENE) TABS Take 1 tablet by mouth daily      vitamin C (ASCORBIC ACID) 500 MG tablet Take 500 mg by mouth daily      Vitamin D, Cholecalciferol, 25 MCG (1000 UT) TABS Take by mouth daily        No current facility-administered medications on file prior to visit. Patient has no known allergies. Family History   Problem Relation Age of Onset    Diabetes Mother     Heart Disease Mother     Arthritis Mother     High Blood Pressure Mother     High Cholesterol Mother     Cancer Father         lung black lung from coal mines    Heart Disease Brother         leaky heart    Liver Disease Paternal Grandfather         black lung    Obesity Sister         gastric bypass    Cancer Sister         lung, smoker     Social History     Tobacco Use   Smoking Status Never Smoker   Smokeless Tobacco Never Used       Social History     Substance and Sexual Activity   Alcohol Use Never    Comment: Rare; LESS THEN 1X PER MONTH       Review of Systems   Constitutional: Negative for appetite change, chills and fever. Eyes: Negative for redness and visual disturbance. Respiratory: Negative for cough, shortness of breath and wheezing.     Cardiovascular: Negative for chest pain and leg swelling. Gastrointestinal: Negative for abdominal pain, constipation, nausea and vomiting. Genitourinary: Positive for urgency. Negative for decreased urine volume, difficulty urinating, dysuria, enuresis, flank pain, frequency, hematuria, penile discharge, penile pain, scrotal swelling and testicular pain. Musculoskeletal: Negative for back pain, joint swelling and myalgias. Skin: Negative for color change, rash and wound. Neurological: Negative for dizziness, tremors and numbness. Hematological: Negative for adenopathy. Does not bruise/bleed easily. BP (!) 162/92 (Site: Right Upper Arm, Position: Sitting, Cuff Size: Medium Adult)   Pulse 78   Temp 98.7 °F (37.1 °C)   Resp 18   Ht 5' 11\" (1.803 m)   Wt (!) 383 lb (173.7 kg)   BMI 53.42 kg/m²       PHYSICAL EXAM:  Constitutional: Patient in no acute distress; Neuro: alert and oriented to person place and time. Psych: Mood and affect normal.  Skin: Normal  Lungs: Respiratory effort normal  Cardiovascular:  Normal peripheral pulses  Abdomen: Soft, non-tender, non-distended with no CVA, flank pain  Bladder non-tender and not distended. Lab Results   Component Value Date    BUN 21 03/11/2022     Lab Results   Component Value Date    CREATININE 1.03 03/11/2022     Lab Results   Component Value Date    PSA 0.61 07/27/2021    PSA 0.55 07/07/2020    PSA 1.00 06/10/2019       ASSESSMENT:   Diagnosis Orders   1. OAB (overactive bladder)  ID MEASUREMENT,POST-VOID RESIDUAL VOLUME BY US,NON-IMAGING   2. BPH with obstruction/lower urinary tract symptoms  ID MEASUREMENT,POST-VOID RESIDUAL VOLUME BY US,NON-IMAGING   3.  Urgency of urination  ID MEASUREMENT,POST-VOID RESIDUAL VOLUME BY US,NON-IMAGING         PLAN:  Stop oxybutynin    Start vesicare 10mg daily    F/U in 6-8 weeks or sooner if needed

## 2022-06-28 ENCOUNTER — HOSPITAL ENCOUNTER (OUTPATIENT)
Dept: PHYSICAL THERAPY | Age: 77
Setting detail: THERAPIES SERIES
Discharge: HOME | End: 2022-06-28
Payer: MEDICARE

## 2022-06-28 PROCEDURE — 97110 THERAPEUTIC EXERCISES: CPT

## 2022-06-28 NOTE — PROGRESS NOTES
Phone: Eloise Platt      Fax: [phone]                            Outpatient Physical Therapy                                                                            Daily Note    Date: 2022  Patient Name: Christopher Hancock        MRN: [id number]   ACCT#:  [de-identified]  : 1945  (78 y.o.)    Referring Provider (secondary): Dr. Desiree Do         Diagnosis: Right TKR revision       Onset Date: 22  PT Insurance Information: [facility]  Total # of Visits Approved: 18 Per Physician Order  Total # of Visits to Date: 16  No Show: 0  Canceled Appointment: 1  Plan of Care/Certification Expiration Date: 22    Pre-Treatment Pain:  0/10     Assessment  Assessment: Performed ex as outlined to promote increased strenth in right knee. Pt no safe to amb with cane d/t weakness. PROM to 120 deg flexion. Will cont until Dr noyola.     Plan  Continue with current plan of care    Exercises/Modalities/Manual:  See DocFlow Sheet      Goals  (Total # of Visits to Date: 12)   Short Term Goals  Time Frame for Short term goals: 8 visits  Short term goal 1: Educate on home program of strengthening and weight bearing exercises-met  STG 1 Current Status[de-identified] No formal exercise education  STG Goal 1 Status[de-identified] Met  Short term goal 2: Ambulate with cane on level surfaces in home (after 22 when progressed to FWB)  STG 2 Current Status[de-identified] Currently ambulating with wh walker 75% WB  STG Goal 2 Status[de-identified] Not Met    Long Term Goals  Time Frame for Long term goals : 25  Long term goal 1: PROM 0 deg terminal extension to normalize gait pattern  LTG 1 Current Status[de-identified] 10 deg PROM;  20 deg AROM  Long term goal 2: PROM 110 deg flexion to allow patient to complete sit to stand without UE assist and stairs  LTG 2 Current Status[de-identified] AAROM limited to 90 deg which affects transfers and ambulation  Long term goal 3: Ambulate in home and community with cane  LTG 3 Current Status[de-identified] Currently ambulating with wh

## 2022-06-30 ENCOUNTER — HOSPITAL ENCOUNTER (OUTPATIENT)
Dept: PHYSICAL THERAPY | Age: 77
Setting detail: THERAPIES SERIES
Discharge: HOME | End: 2022-06-30
Payer: MEDICARE

## 2022-06-30 PROCEDURE — 97110 THERAPEUTIC EXERCISES: CPT

## 2022-06-30 NOTE — PROGRESS NOTES
Phone: Eloise Platt      Fax: [phone]                            Outpatient Physical Therapy                                                                            Daily Note    Date: 2022  Patient Name: Rian Dodson        MRN: [id number]   ACCT#:  [de-identified]  : 1945  (78 y.o.)    Referring Provider (secondary): Dr. Maria R Penn         Diagnosis: Right TKR revision       Onset Date: 22  PT Insurance Information: [facility]  Total # of Visits Approved: 18 Per Physician Order  Total # of Visits to Date: 17  No Show: 0  Canceled Appointment: 1  Plan of Care/Certification Expiration Date: 22    Pre-Treatment Pain:  0/10     Assessment  Assessment: Pt notes no sig improvement with strength. Performed ex as outlined  Pt inquiring about  possibly getting a elastic sleeve, will discuss with PT. Performed ex as outlined. Pts active knee ext limited by 35 deg d/t weakness. See's   22.     Plan  Continue with current plan of care    Exercises/Modalities/Manual:  See DocFlow Sheet              Goals  (Total # of Visits to Date: 16)     Short Term Goals  Time Frame for Short term goals: 8 visits  Short term goal 1: Educate on home program of strengthening and weight bearing exercises-met  STG 1 Current Status[de-identified] No formal exercise education  STG Goal 1 Status[de-identified] Met  Short term goal 2: Ambulate with cane on level surfaces in home (after 22 when progressed to FWB)  STG 2 Current Status[de-identified] Currently ambulating with wh walker 75% WB  STG Goal 2 Status[de-identified] Not Met    Long Term Goals  Time Frame for Long term goals : 25  Long term goal 1: PROM 0 deg terminal extension to normalize gait pattern  Long term goal 2: PROM 110 deg flexion to allow patient to complete sit to stand without UE assist and stairs  LTG 2 Current Status[de-identified] met  Long term goal 3: Ambulate in home and community with cane  LTG 3 Current Status[de-identified] Currently ambulating with wh walker  Long term goal 4: Strength right knee extension 4-4+/5 to complete sit to stand and reciprical stairs  LTG 4 Current Status[de-identified] Current strength right knee extension 3/5 with extension lag    Post Treatment Pain:  0/10    Time In: 0730    Time Out : 0810        Timed Code Treatment Minutes: 35 Minutes  Total Treatment Time: 35 Minutes    Cuco Johnson  PTA   Date: 6/30/2022

## 2022-07-05 ENCOUNTER — HOSPITAL ENCOUNTER (OUTPATIENT)
Dept: PHYSICAL THERAPY | Age: 77
Setting detail: THERAPIES SERIES
Discharge: HOME | End: 2022-07-05
Payer: MEDICARE

## 2022-07-05 PROCEDURE — 97110 THERAPEUTIC EXERCISES: CPT

## 2022-07-05 NOTE — PROGRESS NOTES
Phone: Eloise Platt      Fax: [phone]                            Outpatient Physical Therapy                                                                            Daily Note    Date: 2022  Patient Name: Mayito Marie        MRN: [id number]   ACCT#:  [de-identified]  : 1945  (78 y.o.)    Referring Provider (secondary): Dr. Ady Graves         Diagnosis: Right TKR revision       Onset Date: 22  PT Insurance Information: [facility]  Total # of Visits Approved: 18 Per Physician Order  Total # of Visits to Date: 18  No Show: 0  Canceled Appointment: 1    Pre-Treatment Pain:  0/10     Assessment  Assessment: Pt reports no pain. Notes continued frustration with slow progress. Focused on strengthening, Hyperextension on step up persists . Need new POC next visit and see's  Monday.     Plan  Continue with current plan of care    Exercises/Modalities/Manual:  See DocFlow Sheet    Goals  (Total # of Visits to Date: 25)   Short Term Goals  Time Frame for Short term goals: 8 visits  Short term goal 1: Educate on home program of strengthening and weight bearing exercises-met  STG 1 Current Status[de-identified] No formal exercise education  STG Goal 1 Status[de-identified] Met  Short term goal 2: Ambulate with cane on level surfaces in home (after 22 when progressed to FWB)  STG 2 Current Status[de-identified] Currently ambulating with wh walker 75% WB  STG Goal 2 Status[de-identified] Not Met    Long Term Goals  Time Frame for Long term goals : 25  Long term goal 1: PROM 0 deg terminal extension to normalize gait pattern-not met  Long term goal 2: PROM 110 deg flexion to allow patient to complete sit to stand without UE assist and stairs  LTG 2 Current Status[de-identified] met  Long term goal 3: Ambulate in home and community with cane-not met  Long term goal 4: Strength right knee extension 4-4+/5 to complete sit to stand and reciprical stairs-not met    Post Treatment Pain:  0/10    Time In: 730    Time Out : 805        Timed Code Treatment Minutes: 35 Minutes  Total Treatment Time: 35 Minutes    Júnior Johnson    PTA Date: 7/5/2022

## 2022-07-08 ENCOUNTER — HOSPITAL ENCOUNTER (OUTPATIENT)
Dept: PHYSICAL THERAPY | Age: 77
Setting detail: THERAPIES SERIES
Discharge: HOME | End: 2022-07-08
Payer: MEDICARE

## 2022-07-08 PROCEDURE — 97110 THERAPEUTIC EXERCISES: CPT

## 2022-07-08 NOTE — OP NOTE
[facility] NORMA ATKINS          Phone: [phone]      Outpatient Physical Therapy  Fax: [phone]                                 10th Visit Report    Date: 7/8/2022  ACCT #: [de-identified]      Referring Provider (secondary): Dr. Nola Cadet         Diagnosis: Right TKR revision           Onset Date: 04/11/22  PT Insurance Information: [facility]  Total # of Visits Approved: 26 Per Physician Order  Total # of Visits to Date: 18  No Show: 0  Canceled Appointment: 1    Current Treatment:  Patient Education/HEP, Therapeutic Exercise, Gait Training and Electrical Stimulation    Skilled Intervention:  Assessment: Patient has completed 18 treatment sessions consisting of strengthening, ROM and gait activities following an extension revision of his right TKR. PROM 0-120 deg while actively he is limited to 35 deg terminal extension due to signficant quad weakness. He continues to use a wh walker for ambulation due to quad weakness and instability of right knee;  he is not safe to ambulate with cane as he is a high falls risk due to his weakness/instability of right knee. Will plan to continue with strengthening of the right quad to improve stability with weight bearing/gait activities with goal of progressing to use of cane safely.   He will require additional time/therapy due to the extensive and complicated course following his initial TKR  Therapy Prognosis: Good  Reduce Falls   , Improve Ambulation and Return to Prior Level of Function    Expectations for Improvement/Time Frame:  4-6 weeks    Plan  Continue with current plan of care    Goals  Short Term Goals  Short term goal 1: STG= LTG    Long Term Goals  Time Frame for Long term goals : 32  Long term goal 1: Strength right knee extension 4-4+/5 to complete sit to stand and reciprical stairs  Long term goal 2: Ambulate with cane in home with right knee stability and walker in community    NO RODRIGUEZ, PT      Date: 7/8/2022

## 2022-07-08 NOTE — PROGRESS NOTES
Phone: Eloise Platt      Fax: [phone]                            Outpatient Physical Therapy                                                                            Daily Note    Date: 2022  Patient Name: Candy Marie        MRN: [id number]   ACCT#:  [de-identified]  : 1945  (78 y.o.)    Referring Provider : Dr. Makayla Savage         Diagnosis: Right TKR revision       Onset Date: 22  PT Insurance Information: [facility]  Total # of Visits Approved: 26 Per Physician Order  Total # of Visits to Date: 25  No Show: 0  Canceled Appointment: 1    Pre-Treatment Pain:  0/10     Assessment  Assessment: Patient has completed 18 treatment sessions consisting of strengthening, ROM and gait activities following an extension revision of his right TKR. PROM 0-120 deg while actively he is limited to 35 deg terminal extension due to signficant quad weakness. He continues to use a wh walker for ambulation due to quad weakness and instability of right knee;  he is not safe to ambulate with cane as he is a high falls risk due to his weakness/instability of right knee. Will plan to continue with strengthening of the right quad to improve stability with weight bearing/gait activities with goal of progressing to use of cane safely.   He will require additional time/therapy due to the extensive and complicated course following his initial TKR    Plan  Continue with current plan of care    Exercises/Modalities/Manual:  See DocFlow Sheet    Education:           Goals  (Total # of Visits to Date: 25)   Short Term Goals  Short term goal 1: STG= LTG    Long Term Goals  Time Frame for Long term goals : 32  Long term goal 1: Strength right knee extension 4-4+/5 to complete sit to stand and reciprical stairs  Long term goal 2: Ambulate with cane in home with right knee stability and walker in community    Post Treatment Pain:  0/10    Time In: 0800    Time Out : 0840        Timed Code Treatment Minutes: 40 Minutes  Total Treatment Time: 36 Minutes    NO RODRIGUEZ, PT     Date: 7/8/2022

## 2022-07-08 NOTE — PLAN OF CARE
[facility] NORMA ATKINS  Phone: [phone]   Date: 2022                  Outpatient Physical Therapy Fax: [phone]    ACCT #: [de-identified]                  Recertification  [facility]#: [id number]  Patient: Mayito Marie  : 1945    Referring Provider : Dr. Ady Graves            Diagnosis: Right TKR revision  Onset Date: 22       Assessment: Patient has completed 18 treatment sessions consisting of strengthening, ROM and gait activities following an extension revision of his right TKR. PROM 0-120 deg while actively he is limited to 35 deg terminal extension due to signficant quad weakness. He continues to use a wh walker for ambulation due to quad weakness and instability of right knee;  he is not safe to ambulate with cane as he is a high falls risk due to instability. Will plan to continue with strengthening of the right quad to improve stability with weight bearing/gait activities with goal of progressing to use of cane safely. He will require additional time/therapy due to the extensive and complicated course following his initial TKR and subsequent infections and skin grafts.   Therapy Prognosis: Good    Treatment Plan :   Days: 2  times per week     Weeks: 12 weeks Total # of Visits Approved: 26  Patient Education/HEP, Therapeutic Exercise, Manual Therapy and Electrical Stimulation    Goals  Short term goal 1: STG= LTG    Time Frame for Long term goals : 32  Long term goal 1: Strength right knee extension 4-4+/5 to complete sit to stand and reciprical stairs  Long term goal 2: Ambulate with cane in home with right knee stability and walker in community    NO RODRIGUEZ PT    Date: 2022        ______________________________________ Date: 2022  Physician Signature

## 2022-07-13 ENCOUNTER — HOSPITAL ENCOUNTER (OUTPATIENT)
Dept: PHYSICAL THERAPY | Age: 77
Setting detail: THERAPIES SERIES
End: 2022-07-13
Payer: MEDICARE

## 2022-07-14 ENCOUNTER — APPOINTMENT (OUTPATIENT)
Dept: PHYSICAL THERAPY | Age: 77
End: 2022-07-14
Payer: MEDICARE

## 2022-07-15 ENCOUNTER — OFFICE VISIT (OUTPATIENT)
Dept: PRIMARY CARE CLINIC | Age: 77
End: 2022-07-15
Payer: MEDICARE

## 2022-07-15 VITALS
HEART RATE: 66 BPM | DIASTOLIC BLOOD PRESSURE: 72 MMHG | TEMPERATURE: 98.3 F | BODY MASS INDEX: 48.82 KG/M2 | WEIGHT: 315 LBS | OXYGEN SATURATION: 97 % | SYSTOLIC BLOOD PRESSURE: 142 MMHG

## 2022-07-15 DIAGNOSIS — Z96.651 S/P TOTAL KNEE ARTHROPLASTY, RIGHT: ICD-10-CM

## 2022-07-15 DIAGNOSIS — D68.9 COAGULATION DEFECT (HCC): ICD-10-CM

## 2022-07-15 DIAGNOSIS — N39.41 BENIGN PROSTATIC HYPERPLASIA (BPH) WITH URINARY URGE INCONTINENCE: ICD-10-CM

## 2022-07-15 DIAGNOSIS — K21.9 GASTROESOPHAGEAL REFLUX DISEASE WITHOUT ESOPHAGITIS: ICD-10-CM

## 2022-07-15 DIAGNOSIS — E66.01 OBESITY, CLASS III, BMI 40-49.9 (MORBID OBESITY) (HCC): ICD-10-CM

## 2022-07-15 DIAGNOSIS — Z98.84 HX OF GASTRIC BYPASS: ICD-10-CM

## 2022-07-15 DIAGNOSIS — I50.32 CHRONIC DIASTOLIC (CONGESTIVE) HEART FAILURE (HCC): ICD-10-CM

## 2022-07-15 DIAGNOSIS — E11.9 CONTROLLED TYPE 2 DIABETES MELLITUS WITHOUT COMPLICATION, WITHOUT LONG-TERM CURRENT USE OF INSULIN (HCC): Primary | ICD-10-CM

## 2022-07-15 DIAGNOSIS — I10 ESSENTIAL HYPERTENSION: ICD-10-CM

## 2022-07-15 DIAGNOSIS — N40.1 BENIGN PROSTATIC HYPERPLASIA (BPH) WITH URINARY URGE INCONTINENCE: ICD-10-CM

## 2022-07-15 DIAGNOSIS — J44.9 CHRONIC OBSTRUCTIVE PULMONARY DISEASE, UNSPECIFIED COPD TYPE (HCC): ICD-10-CM

## 2022-07-15 DIAGNOSIS — D50.8 OTHER IRON DEFICIENCY ANEMIA: ICD-10-CM

## 2022-07-15 DIAGNOSIS — F41.9 ANXIETY: ICD-10-CM

## 2022-07-15 LAB — HBA1C MFR BLD: 5.6 %

## 2022-07-15 PROCEDURE — 1123F ACP DISCUSS/DSCN MKR DOCD: CPT | Performed by: NURSE PRACTITIONER

## 2022-07-15 PROCEDURE — G8417 CALC BMI ABV UP PARAM F/U: HCPCS | Performed by: NURSE PRACTITIONER

## 2022-07-15 PROCEDURE — 2022F DILAT RTA XM EVC RTNOPTHY: CPT | Performed by: NURSE PRACTITIONER

## 2022-07-15 PROCEDURE — 83036 HEMOGLOBIN GLYCOSYLATED A1C: CPT | Performed by: NURSE PRACTITIONER

## 2022-07-15 PROCEDURE — 3023F SPIROM DOC REV: CPT | Performed by: NURSE PRACTITIONER

## 2022-07-15 PROCEDURE — 99214 OFFICE O/P EST MOD 30 MIN: CPT | Performed by: NURSE PRACTITIONER

## 2022-07-15 PROCEDURE — 1036F TOBACCO NON-USER: CPT | Performed by: NURSE PRACTITIONER

## 2022-07-15 PROCEDURE — 3044F HG A1C LEVEL LT 7.0%: CPT | Performed by: NURSE PRACTITIONER

## 2022-07-15 PROCEDURE — 3017F COLORECTAL CA SCREEN DOC REV: CPT | Performed by: NURSE PRACTITIONER

## 2022-07-15 PROCEDURE — G8427 DOCREV CUR MEDS BY ELIG CLIN: HCPCS | Performed by: NURSE PRACTITIONER

## 2022-07-15 RX ORDER — SUCRALFATE 1 G/1
1 TABLET ORAL
Qty: 60 TABLET | Refills: 1 | Status: SHIPPED | OUTPATIENT
Start: 2022-07-15 | End: 2022-09-21

## 2022-07-15 RX ORDER — FAMOTIDINE 40 MG/1
40 TABLET, FILM COATED ORAL EVERY EVENING
Qty: 30 TABLET | Refills: 0 | Status: SHIPPED | OUTPATIENT
Start: 2022-07-15

## 2022-07-15 ASSESSMENT — ENCOUNTER SYMPTOMS
CHEST TIGHTNESS: 0
ABDOMINAL DISTENTION: 0
RHINORRHEA: 0
WHEEZING: 0
COUGH: 1
SHORTNESS OF BREATH: 0
EYES NEGATIVE: 1

## 2022-07-15 NOTE — PROGRESS NOTES
[facility] PRIMARY CARE [address] [address]  Dept: [phone]  Dept Fax: [phone]    Last encounter 6/14/2022    1 Month Follow-Up (DM check. Last A1c 6.0  10/19/21), Leg Injury (Right leg redness), and Taste Change (Dysgeusia. Medication (Ditropan) was decreased.)       HPI:   Alexa Ferrera is a 78 y.o. male who presentstoday for his medical conditions/complaints as noted below. Alexa Ferrera is c/o of 1 Month Follow-Up (DM check. Last A1c 6.0  10/19/21), Leg Injury (Right leg redness), and Taste Change (Dysgeusia. Medication (Ditropan) was decreased.)      Leg Injury  Associated symptoms include arthralgias and coughing. Pertinent negatives include no chest pain, chills, congestion, fever, headaches or rash. Hardened area on posterior calf with scab present. Healing no s/s infection. Established patient      Pt glyco today 5.6 today in office. Remains on metformin  Pt does not test  blood sugars at home. Wt Readings from Last 3 Encounters:   07/15/22 (!) 350 lb (158.8 kg)   06/27/22 (!) 383 lb (173.7 kg)   06/23/22 (!) 339 lb (153.8 kg)   Hx Gastric bypass. Taking 12 tums a day. -discussed too much calcium daily. Restart  famotidine, carafate   GERD concern. Eating late 7 pm goes to bed 10 pm.   Likes spicy does not eat   Reflux up when laying in bed. 2 pillows in bed. Consider blocks under head of bed. Uses symbicort copd   Hx CABG  on imdur, losartan, statin, BB    BPH - on flomax. , vesicare      Hx right knee OA knee replacement with dehiscence and redo of right knee still in therapy which recently stopped, limited ROM , likes knee more straight.      Anxiety on lexapro    Reviewed prior notes None, Cardiology, Orthopedics, and Previous PCP  Reviewed previous Labs and Hospital Records    Past Medical History:   Diagnosis Date    Arthritis     Ascending cholangitis 10/2/14     at [facility] assisted gastric remnant to open    Blood in stool 2011    CAD (coronary artery disease)     Chronic back pain     DDD    Chronic diastolic (congestive) heart failure ([facility] 11/5/2018    Chronic systolic congestive heart failure ([facility] 9/25/2018    COPD with acute exacerbation ([facility] 9/25/2018    Heart disease 5-9-12    History of angioplasty 7/2008    2 stents placed    Hx of CABG     x1 in 2015    Hyperlipidemia     Hypertension     onset age 41    Obesity     Stress incontinence, male 3/21/2017    Transfusion history     Unspecified sleep apnea     cpap-DOES NOT USE MACHINE      Past Surgical History:   Procedure Laterality Date    ANTERIOR CRUCIATE LIGAMENT REPAIR Right 2/9/2021    RIGHT KNEE INCISION AND DRAINAGE, POLY EXCHANGE,  WITH EXTENSOR MECHANISM REPAIR WITH ALLOGRAFT performed by Autumn Castaneda DO at [facility]  2005    DREW Ridley    BARIATRIC SURGERY  2001    Vijay Holt -EFRAIN at [facility] Left 05/09/2012    Left main normal.  Left anterior descending artery:  Plaque disease. Ramus: Plaque disease Circumflex:nondominant, plaque disease. OM1 normal. Right coronoary artery:  dominant & luminal irregularities. Left ventricular ejection fraction 60. Mitral valve normal with no insufficinecy of the mitral valve. Aortic valve normal with no stenosis of the aortic valve. Edp was normal.    CARDIAC CATHETERIZATION Left 03/04/2015    Dr. Ramirez --Severe CAD, 80% in-stent stenosis in the left anterior descending coronary artery in a rather long segment, very eccentric,extending almost to the ostium of the left anterior descending coronary artery. 70% in-stent stenosis of a very large dominant right coronary artery. Unremarkable small circumflex. Normal left ventricular function, ejection fraction of 60%.     CARDIAC CATHETERIZATION Left 12/05/2018    Dr. Nicole Blake @ [facility] 232 specific cardiac intervention    CARDIAC SURGERY  2008    2 unknown heart stents mri 1.5t only    CHOLECYSTECTOMY  05/30/2014    open [facility] Dr. Claudia Alonso    COLONOSCOPY  10/2015    repeat in 2022    CORONARY ARTERY BYPASS GRAFT      2 vessel March 3th 2015    CYSTOSCOPY  05/10/2016    with laser TURP    ERCP  10/02/2014    Dr. Vinay Vora with laparotomy    HERNIA REPAIR  09/2003    Hiatal    JOINT REPLACEMENT Bilateral 03/20/2013    bilateral hips    JOINT REPLACEMENT Right 01/27/2021    KNEE ARTHROSCOPY Right 09/26/2013    Dr. Arelis Rodrigues - [address] [place] [address]  11/02/2016    rt. L3-4, L4-5 decompression. L4-5 discectomy and fusion    SHOULDER SURGERY Left 2000    arthroscopy    SHOULDER SURGERY Left 2007    replaced humeral head    TOTAL HIP ARTHROPLASTY Right     02/03/2016    TOTAL KNEE ARTHROPLASTY Right 01/27/2021    RIGHT TOTAL KNEE ARTHOPLASTY COMPLICATED BY OBSEITY NEEDS A MARIANNE performed by Tamsen Apley, DO at [facility] OR       Family History   Problem Relation Age of Onset    Diabetes Mother     Heart Disease Mother     Arthritis Mother     High Blood Pressure Mother     High Cholesterol Mother     Cancer Father         lung black lung from coal mines    Heart Disease Brother         leaky heart    Liver Disease Paternal Grandfather         black lung    Obesity Sister         gastric bypass    Cancer Sister         lung, smoker       Social History     Tobacco Use    Smoking status: Never    Smokeless tobacco: Never   Substance Use Topics    Alcohol use: Never     Comment: Rare; LESS THEN 1X PER MONTH      Current Outpatient Medications   Medication Sig Dispense Refill    famotidine (PEPCID) 40 MG tablet Take 1 tablet by mouth every evening For GERD 30 tablet 0    sucralfate (CARAFATE) 1 GM tablet Take 1 tablet by mouth in the morning and 1 tablet in the evening. Take before meals.  60 tablet 1    metFORMIN (GLUCOPHAGE) 500 MG tablet Take 1 tablet by mouth daily (with breakfast) 90 tablet 2    solifenacin (VESICARE) 10 MG tablet Take 1 tablet by mouth daily 30 tablet 3    atorvastatin (LIPITOR) 80 MG tablet Take 1 tablet by mouth once daily 90 tablet 2    potassium chloride (KLOR-CON) 10 MEQ extended release tablet Take 1 tablet by mouth daily 90 tablet 1    hydroCHLOROthiazide (HYDRODIURIL) 25 MG tablet Take 1 tablet by mouth once daily 90 tablet 1    losartan (COZAAR) 25 MG tablet Take 0.5 tablets by mouth daily As of 03/14/2022 start taking 1/2 tablet daily 45 tablet 1    metoprolol succinate (TOPROL XL) 25 MG extended release tablet Take 1 tablet by mouth daily 90 tablet 1    isosorbide mononitrate (IMDUR) 30 MG extended release tablet Take 1 tablet by mouth daily 90 tablet 1    escitalopram (LEXAPRO) 20 MG tablet Take 20 mg by mouth daily      Probiotic Product (PROBIOTIC DAILY PO) Take by mouth      aspirin 325 MG EC tablet Take 325 mg by mouth daily      SYMBICORT 160-4.5 MCG/ACT AERO       albuterol sulfate  (90 Base) MCG/ACT inhaler INHALE 2 PUFFS BY MOUTH EVERY 4 HOURS AS NEEDED FOR WHEEZING      tamsulosin (FLOMAX) 0.4 MG capsule Take 1 capsule by mouth every evening 90 capsule 3    spironolactone (ALDACTONE) 25 MG tablet Take 1 tablet by mouth once daily 90 tablet 2    Blood Glucose Monitoring Suppl (FREESTYLE LITE) RADHA 1 Device by Does not apply route daily E11.9 1 Device 0    blood glucose monitor strips Test daily and as needed   E 11.9 200 strip 1    Lancets MISC 1 each by Does not apply route daily 200 each 1    docusate sodium (COLACE) 100 MG capsule Take 1 capsule by mouth 2 times daily 40 capsule 0    Ferrous Sulfate (IRON) 325 (65 Fe) MG TABS 65 mg daily       fluticasone (FLONASE) 50 MCG/ACT nasal spray 1 spray by Nasal route daily (Patient taking differently: 1 spray by Nasal route as needed) 16 g 2    Multiple Vitamin (THERA/BETA-CAROTENE) TABS Take 1 tablet by mouth daily      vitamin C (ASCORBIC ACID) 500 MG tablet Take 500 mg by mouth daily      Vitamin D, Cholecalciferol, 25 MCG (1000 UT) TABS Take by mouth daily        No current facility-administered medications for this visit. No Known Allergies    Health Maintenance   Topic Date Due    COVID-19 Vaccine (1) Never done    Diabetic retinal exam  Never done    DTaP/Tdap/Td vaccine (1 - Tdap) Never done    Shingles vaccine (1 of 2) Never done    Annual Wellness Visit (AWV)  04/30/2022    Diabetic microalbuminuria test  07/27/2022    Flu vaccine (1) 09/01/2022    Lipids  03/11/2023    Depression Monitoring  06/14/2023    A1C test (Diabetic or Prediabetic)  07/15/2023    Diabetic foot exam  07/17/2023    Colorectal Cancer Screen  10/26/2025    Pneumococcal 65+ years Vaccine  Completed    Hepatitis C screen  Completed    Hepatitis A vaccine  Aged Out    Hib vaccine  Aged Out    Meningococcal (ACWY) vaccine  Aged Out       Subjective:      Review of Systems   Constitutional:  Negative for activity change, chills and fever. HENT:  Negative for congestion and rhinorrhea. Eyes: Negative. Respiratory:  Positive for cough. Negative for chest tightness, shortness of breath and wheezing. Cardiovascular:  Positive for leg swelling. Negative for chest pain and palpitations. Gastrointestinal:  Negative for abdominal distention. Endocrine: Negative for cold intolerance and heat intolerance. Genitourinary:  Negative for difficulty urinating. Musculoskeletal:  Positive for arthralgias. Negative for gait problem. Skin:  Positive for wound (RLE back of leg improved). Negative for rash. Neurological:  Negative for dizziness and headaches. Psychiatric/Behavioral:  Negative for decreased concentration and sleep disturbance. The patient is nervous/anxious. Objective:     Physical Exam  Vitals and nursing note reviewed. Constitutional:       General: He is not in acute distress. Appearance: Normal appearance. He is well-developed. HENT:      Head: Normocephalic and atraumatic.       Right Ear: Tympanic membrane normal.      Left Ear: Tympanic membrane and external ear normal.      Mouth/Throat:      Mouth: Mucous membranes are dry. Pharynx: No oropharyngeal exudate. Eyes:      Pupils: Pupils are equal, round, and reactive to light. Cardiovascular:      Rate and Rhythm: Normal rate and regular rhythm. Pulses: Normal pulses. Heart sounds: Normal heart sounds. No murmur heard. Pulmonary:      Effort: Pulmonary effort is normal. No respiratory distress. Breath sounds: Normal breath sounds. Abdominal:      Palpations: Abdomen is soft. There is no mass. Tenderness: There is no abdominal tenderness. Musculoskeletal:         General: Normal range of motion. Cervical back: Normal range of motion and neck supple. Right lower leg: Edema (non pitting.) present. Left lower leg: Edema present. Comments: Microfilament foot exam with sensory deficit on bottom foot. No callus, no sores, pulses dorsalis pedis and posterior tibial intact pavel. Edema present ankles non pitting pavel. Vibratory sense appreciated 2 points each foot pavel      Lymphadenopathy:      Cervical: No cervical adenopathy. Skin:     General: Skin is warm. Findings: No rash. Comments: Posterior right leg wound healing. Slow scar present    Neurological:      Mental Status: He is alert and oriented to person, place, and time. Psychiatric:         Behavior: Behavior normal.         Thought Content:  Thought content normal.         Judgment: Judgment normal.     BP (!) 142/72   Pulse 66   Temp 98.3 °F (36.8 °C)   Wt (!) 350 lb (158.8 kg)   SpO2 97%   BMI 48.82 kg/m²     Data:     Lab Results   Component Value Date/Time     03/11/2022 02:41 PM    K 3.8 03/11/2022 02:41 PM    CL 99 03/11/2022 02:41 PM    CO2 24 03/11/2022 02:41 PM    BUN 21 03/11/2022 02:41 PM    CREATININE 1.03 03/11/2022 02:41 PM    GLUCOSE 101 03/11/2022 02:41 PM    GLUCOSE 132 05/09/2012 09:45 AM    PROT 6.6 03/11/2022 02:41 PM    LABALBU 4.1 03/11/2022 02:41 PM BILITOT 0.57 03/11/2022 02:41 PM    ALKPHOS 129 03/11/2022 02:41 PM    AST 25 03/11/2022 02:41 PM    ALT 15 03/11/2022 02:41 PM     Lab Results   Component Value Date/Time    WBC 6.3 03/11/2022 02:41 PM    RBC 4.83 03/11/2022 02:41 PM    RBC 4.72 05/09/2012 09:45 AM    HGB 10.7 03/11/2022 02:41 PM    HCT 33.7 03/11/2022 02:41 PM    MCV 69.8 03/11/2022 02:41 PM    MCH 22.1 03/11/2022 02:41 PM    MCHC 31.7 03/11/2022 02:41 PM    RDW 17.1 03/11/2022 02:41 PM     03/11/2022 02:41 PM     05/09/2012 09:45 AM    MPV NOT REPORTED 06/20/2021 05:50 AM     Lab Results   Component Value Date/Time    TSH 2.95 03/11/2022 02:41 PM     Lab Results   Component Value Date/Time    CHOL 107 03/11/2022 02:41 PM    HDL 65 03/11/2022 02:41 PM    PSA 0.61 07/27/2021 06:10 PM    LABA1C 5.6 07/15/2022 09:10 AM          Assessment & Plan       1. Controlled type 2 diabetes mellitus without complication, without long-term current use of insulin ([facility]  Well controlled  Foot exam done today    - POCT glycosylated hemoglobin (Hb A1C)  - Microalbumin, Ur; Future    2. Chronic obstructive pulmonary disease, unspecified COPD type ([facility]  On symbicort    3. Chronic diastolic (congestive) heart failure (HCC)  On lasix and spironolactone    4. Coagulation defect (HCC)   mg daily    5. Other iron deficiency anemia  On supplement . Attempt to draw blood today x 3 unsuccessful  - CBC with Auto Differential; Future    6. Gastroesophageal reflux disease without esophagitis  Uncontrolled will add H2 antihistamine and carafate.     - famotidine (PEPCID) 40 MG tablet; Take 1 tablet by mouth every evening For GERD  Dispense: 30 tablet; Refill: 0  - sucralfate (CARAFATE) 1 GM tablet; Take 1 tablet by mouth in the morning and 1 tablet in the evening. Take before meals. Dispense: 60 tablet; Refill: 1    7. Hx of gastric bypass  Continue vitamins for supplementation  - famotidine (PEPCID) 40 MG tablet;  Take 1 tablet by mouth every evening For GERD  Dispense: 30 tablet; Refill: 0  - sucralfate (CARAFATE) 1 GM tablet; Take 1 tablet by mouth in the morning and 1 tablet in the evening. Take before meals. Dispense: 60 tablet; Refill: 1    8. S/P total knee arthroplasty, right  Complex complications and revisions, due to septic knee, dehiscence. Healed but slow with movements        9. Obesity, Class III, BMI 40-49.9 (morbid obesity) ([facility]      10. Essential hypertension  Elevated on arrival    11. Anxiety  On SSRI     12. Benign prostatic hyperplasia (BPH) with urinary urge incontinence  Following with urology, on flomax and vesicare      CBC next week, hydrate well                Completed Refills   Requested Prescriptions     Signed Prescriptions Disp Refills    famotidine (PEPCID) 40 MG tablet 30 tablet 0     Sig: Take 1 tablet by mouth every evening For GERD    sucralfate (CARAFATE) 1 GM tablet 60 tablet 1     Sig: Take 1 tablet by mouth in the morning and 1 tablet in the evening. Take before meals. Return in about 3 months (around 10/15/2022) for medicare visit-AWV. Orders Placed This Encounter   Medications    famotidine (PEPCID) 40 MG tablet     Sig: Take 1 tablet by mouth every evening For GERD     Dispense:  30 tablet     Refill:  0    sucralfate (CARAFATE) 1 GM tablet     Sig: Take 1 tablet by mouth in the morning and 1 tablet in the evening. Take before meals.      Dispense:  60 tablet     Refill:  1     Orders Placed This Encounter   Procedures    CBC with Auto Differential     Standing Status:   Future     Standing Expiration Date:   7/15/2023    Microalbumin, Ur     Standing Status:   Future     Standing Expiration Date:   7/15/2023    POCT glycosylated hemoglobin (Hb A1C)    HM DIABETES FOOT EXAM                  On this date 7/15/2022 I have spent 35 minutes reviewing previous notes, test results and face to face with the patient discussing the diagnosis and importance of compliance with the treatment plan as well as documenting on the day of the visit.      Electronically signed by BESS Agarwal CNP on 7/17/2022 at 10:16 PM

## 2022-07-27 ENCOUNTER — HOSPITAL ENCOUNTER (OUTPATIENT)
Age: 77
Discharge: HOME | End: 2022-07-27
Payer: MEDICARE

## 2022-07-27 DIAGNOSIS — D50.8 OTHER IRON DEFICIENCY ANEMIA: ICD-10-CM

## 2022-07-27 LAB
ABSOLUTE EOS #: 0.4 K/UL (ref 0–0.4)
ABSOLUTE LYMPH #: 1.8 K/UL (ref 1–4.8)
ABSOLUTE MONO #: 0.7 K/UL (ref 0–1)
BASOPHILS # BLD: 1 % (ref 0–2)
BASOPHILS ABSOLUTE: 0 K/UL (ref 0–0.2)
EOSINOPHILS RELATIVE PERCENT: 5 % (ref 0–5)
HCT VFR BLD CALC: 44.4 % (ref 41–53)
HEMOGLOBIN: 14.4 G/DL (ref 13.5–17.5)
LYMPHOCYTES # BLD: 24 % (ref 13–44)
MCH RBC QN AUTO: 25.8 PG (ref 26–34)
MCHC RBC AUTO-ENTMCNC: 32.4 G/DL (ref 31–37)
MCV RBC AUTO: 79.8 FL (ref 80–100)
MONOCYTES # BLD: 10 % (ref 5–9)
MORPHOLOGY: ABNORMAL
PDW BLD-RTO: 20.4 % (ref 12.1–15.2)
PLATELET # BLD: 189 K/UL (ref 140–450)
RBC # BLD: 5.56 M/UL (ref 4.5–5.9)
SEG NEUTROPHILS: 60 % (ref 39–75)
SEGMENTED NEUTROPHILS ABSOLUTE COUNT: 4.4 K/UL (ref 2.1–6.5)
WBC # BLD: 7.2 K/UL (ref 3.5–11)

## 2022-07-27 PROCEDURE — 36415 COLL VENOUS BLD VENIPUNCTURE: CPT

## 2022-07-27 PROCEDURE — 85025 COMPLETE CBC W/AUTO DIFF WBC: CPT

## 2022-07-28 ENCOUNTER — APPOINTMENT (OUTPATIENT)
Dept: PHYSICAL THERAPY | Age: 77
End: 2022-07-28
Payer: MEDICARE

## 2022-07-29 ENCOUNTER — HOSPITAL ENCOUNTER (OUTPATIENT)
Age: 77
Setting detail: SPECIMEN
Discharge: HOME | End: 2022-07-29
Payer: MEDICARE

## 2022-07-29 DIAGNOSIS — E11.9 CONTROLLED TYPE 2 DIABETES MELLITUS WITHOUT COMPLICATION, WITHOUT LONG-TERM CURRENT USE OF INSULIN (HCC): ICD-10-CM

## 2022-07-29 LAB
CREATININE URINE: 73 MG/DL (ref 39–259)
MICROALBUMIN/CREAT 24H UR: <12 MG/L
MICROALBUMIN/CREAT UR-RTO: NORMAL MCG/MG CREAT

## 2022-07-29 PROCEDURE — 82570 ASSAY OF URINE CREATININE: CPT

## 2022-07-29 PROCEDURE — 82043 UR ALBUMIN QUANTITATIVE: CPT

## 2022-08-01 DIAGNOSIS — S81.801S LEG WOUND, RIGHT, SEQUELA: Primary | ICD-10-CM

## 2022-08-01 DIAGNOSIS — E11.9 CONTROLLED TYPE 2 DIABETES MELLITUS WITHOUT COMPLICATION, WITHOUT LONG-TERM CURRENT USE OF INSULIN (HCC): ICD-10-CM

## 2022-08-01 DIAGNOSIS — Z98.84 HX OF GASTRIC BYPASS: ICD-10-CM

## 2022-08-01 DIAGNOSIS — Z96.651 S/P TOTAL KNEE ARTHROPLASTY, RIGHT: ICD-10-CM

## 2022-08-11 ENCOUNTER — HOSPITAL ENCOUNTER (OUTPATIENT)
Dept: WOUND CARE | Age: 77
Discharge: HOME | End: 2022-08-11
Payer: MEDICARE

## 2022-08-11 VITALS
HEIGHT: 71 IN | WEIGHT: 315 LBS | TEMPERATURE: 97.4 F | DIASTOLIC BLOOD PRESSURE: 79 MMHG | HEART RATE: 76 BPM | SYSTOLIC BLOOD PRESSURE: 123 MMHG | RESPIRATION RATE: 22 BRPM | BODY MASS INDEX: 44.1 KG/M2

## 2022-08-11 DIAGNOSIS — I83.11 LIPODERMATOSCLEROSIS OF RIGHT LOWER EXTREMITY: ICD-10-CM

## 2022-08-11 DIAGNOSIS — L97.812 ULCER OF RIGHT PRETIBIAL REGION, WITH FAT LAYER EXPOSED (HCC): ICD-10-CM

## 2022-08-11 DIAGNOSIS — E11.620 NLD (NECROBIOSIS LIPOIDICA DIABETICORUM) (HCC): ICD-10-CM

## 2022-08-11 PROBLEM — M79.3 LIPODERMATOSCLEROSIS OF RIGHT LOWER EXTREMITY: Status: ACTIVE | Noted: 2022-08-11

## 2022-08-11 PROCEDURE — 99203 OFFICE O/P NEW LOW 30 MIN: CPT | Performed by: PODIATRIST

## 2022-08-11 PROCEDURE — 99204 OFFICE O/P NEW MOD 45 MIN: CPT

## 2022-08-11 NOTE — PLAN OF CARE
Problem: Chronic Conditions and Co-morbidities  Goal: Patient's chronic conditions and co-morbidity symptoms are monitored and maintained or improved  Outcome: Progressing     Problem: Cognitive:  Goal: Knowledge of wound care  Description: Knowledge of wound care  Outcome: Progressing  Goal: Understands risk factors for wounds  Description: Understands risk factors for wounds  Outcome: Progressing     Problem: Wound:  Goal: Will show signs of wound healing; wound closure and no evidence of infection  Description: Will show signs of wound healing; wound closure and no evidence of infection  Outcome: Progressing     Problem: Falls - Risk of:  Goal: Will remain free from falls  Description: Will remain free from falls  Outcome: Progressing

## 2022-08-11 NOTE — DISCHARGE INSTRUCTIONS
Wound Management Patient Discharge Instructions    CALL [phone] for questions regarding care of your wounds. OFFICE HOURS ARE TUESDAYS MORNINGS AND THURSDAYS(9-2:30) and subject to change without notice    PLEASE ARRIVE PRIOR TO APPOINTMENT TIME TO COMPLETE REGISTRATION PROCESS      YOU WILL RECEIVE AN AUTOMATED APPOINTMENT REMINDER CALL SEVERAL DAYS PRIOR TO YOUR APPOINTMENT       Discharge instructions for the patient's plan of care. Wound care: Right anterior lower leg  Cleanse with mild soap and water pat dry at dressing change time. Change dressing every 2 days. Apply small purocol plus AG to area cover with dry dressing, and secure with tubigrip stocking change every 2 days. Return to clinic Thursday 9/1/22 at 9:15AM      Next appointment:  Future Appointments   Date Time Provider Gerry Marin   8/16/2022  8:45 AM BESS Gasca CNP [facility]   9/1/2022  9:15 AM Tawnya Cole DPM [facility] [place]   9/14/2022 10:00 AM BESS Walsh CNP pc [facility]   10/19/2022  9:20 AM BESS Walsh CNP [facility]   3/13/2023  8:30 AM [facility]         SURVEY:    Ricardo Duong may be receiving a survey from [employer] regarding your visit today. Please complete the survey to enable us to provide the highest quality of care to you and your family. If you cannot score us a very good on any question, please call the office to discuss how we could have made your experience a very good one. Thank you. REMINDER:  You will receive 2 bills for each visit.   One is a professional fee charge for the physician and the other is a facility fee for the hospital.

## 2022-08-11 NOTE — PROGRESS NOTES
Ricarda Garcia is a 78 y.o. male who presents for initial evaluation & treatment. He  has pathology & pseudo- Wound(s) which are/is located on the  R lower leg anterior. MRR: TKR ; failed/infected - removal and ATB-spacer application              Pending re visional TKR , right , ? Within 3 weeks  CC; shin area; color and skin change ; superficial skin loss deficit-ulcer         HPI: > 3 weeks                 Minimal response to local care  N: Need skin closure , PRIOR to repeat knee surgery        PAST MEDICAL HISTORY     has a past medical history of Arthritis, Ascending cholangitis, Blood in stool, CAD (coronary artery disease), Chronic back pain, Chronic diastolic (congestive) heart failure (HCC), Chronic systolic congestive heart failure ([address] COPD with acute exacerbation ([facility] [address] Heart disease, History of angioplasty, Hx of CABG, Hyperlipidemia, Hypertension, Obesity, Stress incontinence, male, Transfusion history, and Unspecified sleep apnea. MEDICATIONS    Current Outpatient Medications   Medication Sig Dispense Refill    famotidine (PEPCID) 40 MG tablet Take 1 tablet by mouth every evening For GERD 30 tablet 0    sucralfate (CARAFATE) 1 GM tablet Take 1 tablet by mouth in the morning and 1 tablet in the evening. Take before meals.  60 tablet 1    metFORMIN (GLUCOPHAGE) 500 MG tablet Take 1 tablet by mouth daily (with breakfast) 90 tablet 2    solifenacin (VESICARE) 10 MG tablet Take 1 tablet by mouth daily 30 tablet 3    atorvastatin (LIPITOR) 80 MG tablet Take 1 tablet by mouth once daily 90 tablet 2    potassium chloride (KLOR-CON) 10 MEQ extended release tablet Take 1 tablet by mouth daily 90 tablet 1    hydroCHLOROthiazide (HYDRODIURIL) 25 MG tablet Take 1 tablet by mouth once daily 90 tablet 1    losartan (COZAAR) 25 MG tablet Take 0.5 tablets by mouth daily As of 03/14/2022 start taking 1/2 tablet daily 45 tablet 1    metoprolol succinate (TOPROL XL) 25 MG extended release tablet Take 1 tablet by mouth daily 90 tablet 1    isosorbide mononitrate (IMDUR) 30 MG extended release tablet Take 1 tablet by mouth daily 90 tablet 1    escitalopram (LEXAPRO) 20 MG tablet Take 20 mg by mouth daily      Probiotic Product (PROBIOTIC DAILY PO) Take by mouth      aspirin 325 MG EC tablet Take 325 mg by mouth daily      SYMBICORT 160-4.5 MCG/ACT AERO       albuterol sulfate  (90 Base) MCG/ACT inhaler INHALE 2 PUFFS BY MOUTH EVERY 4 HOURS AS NEEDED FOR WHEEZING      tamsulosin (FLOMAX) 0.4 MG capsule Take 1 capsule by mouth every evening 90 capsule 3    spironolactone (ALDACTONE) 25 MG tablet Take 1 tablet by mouth once daily 90 tablet 2    Blood Glucose Monitoring Suppl (FREESTYLE LITE) RADHA 1 Device by Does not apply route daily E11.9 1 Device 0    blood glucose monitor strips Test daily and as needed   E 11.9 200 strip 1    Lancets MISC 1 each by Does not apply route daily 200 each 1    docusate sodium (COLACE) 100 MG capsule Take 1 capsule by mouth 2 times daily 40 capsule 0    Ferrous Sulfate (IRON) 325 (65 Fe) MG TABS 65 mg daily       fluticasone (FLONASE) 50 MCG/ACT nasal spray 1 spray by Nasal route daily (Patient taking differently: 1 spray by Nasal route as needed) 16 g 2    Multiple Vitamin (THERA/BETA-CAROTENE) TABS Take 1 tablet by mouth daily      vitamin C (ASCORBIC ACID) 500 MG tablet Take 500 mg by mouth daily      Vitamin D, Cholecalciferol, 25 MCG (1000 UT) TABS Take by mouth daily        No current facility-administered medications for this encounter.        ALLERGIES    No Known Allergies    PAST SURGICAL HISTORY    Past Surgical History:   Procedure Laterality Date    ANTERIOR CRUCIATE LIGAMENT REPAIR Right 2/9/2021    RIGHT KNEE INCISION AND DRAINAGE, POLY EXCHANGE,  WITH EXTENSOR MECHANISM REPAIR WITH ALLOGRAFT performed by Oli Woodson DO at [facility]  2005    Cami Vang    BARIATRIC SURGERY  2001    Nicky HENRY at [facility] CARDIAC CATHETERIZATION Left 05/09/2012    Left main normal.  Left anterior descending artery:  Plaque disease. Ramus: Plaque disease Circumflex:nondominant, plaque disease. OM1 normal. Right coronoary artery:  dominant & luminal irregularities. Left ventricular ejection fraction 60. Mitral valve normal with no insufficinecy of the mitral valve. Aortic valve normal with no stenosis of the aortic valve. Edp was normal.    CARDIAC CATHETERIZATION Left 03/04/2015    Dr. Ramirez --Severe CAD, 80% in-stent stenosis in the left anterior descending coronary artery in a rather long segment, very eccentric,extending almost to the ostium of the left anterior descending coronary artery. 70% in-stent stenosis of a very large dominant right coronary artery. Unremarkable small circumflex. Normal left ventricular function, ejection fraction of 60%. CARDIAC CATHETERIZATION Left 12/05/2018    Dr. Melvina Ingram @ [facility]--Risk [place]ren[place] [address] specific cardiac intervention    CARDIAC SURGERY  2008    2 unknown heart stents mri 1.5t only    CHOLECYSTECTOMY  05/30/2014    open [facility] Dr. Inna Gutierrez  10/2015    repeat in 2022    CORONARY ARTERY BYPASS GRAFT      2 vessel March 3th 2015    CYSTOSCOPY  05/10/2016    with laser TURP    ERCP  10/02/2014    Dr. Sherry Ramsey with laparotomy    HERNIA REPAIR  09/2003    Hiatal    JOINT REPLACEMENT Bilateral 03/20/2013    bilateral hips    JOINT REPLACEMENT Right 01/27/2021    KNEE ARTHROSCOPY Right 09/26/2013    Dr. Jillian Price - [address] [place] [address] 13  11/02/2016    rt. L3-4, L4-5 decompression.  L4-5 discectomy and fusion    SHOULDER SURGERY Left 2000    arthroscopy    SHOULDER SURGERY Left 2007    replaced humeral head    TOTAL HIP ARTHROPLASTY Right     02/03/2016    TOTAL KNEE ARTHROPLASTY Right 01/27/2021    RIGHT TOTAL KNEE ARTHOPLASTY COMPLICATED BY OBSEITY NEEDS A MARIANNE performed by Kye Gil DO at [address] History   Problem Relation Age of Onset    Diabetes Mother     Heart Disease Mother     Arthritis Mother     High Blood Pressure Mother     High Cholesterol Mother     Cancer Father         lung black lung from coal mines    Heart Disease Brother         leaky heart    Liver Disease Paternal Grandfather         black lung    Obesity Sister         gastric bypass    Cancer Sister         lung, smoker       SOCIAL HISTORY    Social History     Socioeconomic History    Marital status:      Spouse name: Keyon Guerrero    Number of children: 4    Years of education: 11    Highest education level: Not on file   Occupational History    Occupation: meat packing, farming   Tobacco Use    Smoking status: Never    Smokeless tobacco: Never   Vaping Use    Vaping Use: Never used   Substance and Sexual Activity    Alcohol use: Never     Comment: Rare; LESS THEN 1X PER MONTH    Drug use: No    Sexual activity: Not on file   Other Topics Concern    Not on file   Social History Narrative    Not on file     Social Determinants of Health     Financial Resource Strain: Low Risk     Difficulty of Paying Living Expenses: Not hard at all   Food Insecurity: No Food Insecurity    Worried About Running Out of Food in the Last Year: Never true    Ran Out of Food in the Last Year: Never true   Transportation Needs: Not on file   Physical Activity: Not on file   Stress: Not on file   Social Connections: Not on file   Intimate Partner Violence: Not on file   Housing Stability: Not on file         REVIEW OF SYSTEMS    Review of Systems:       Negative for: fever/chills, headache, visual changes, chest pain, shortness of breath, nausea/vomiting/diarrhea, numbness/tingling, and weakness,claudication, smoking                                          Positive for [facility] & I, rash-dermatitis                                                             TKR                                                            COPD CABG / CHF / DAVID     Multidisciplinary assessment by members of the wound care team is carried out, including vitals and check of review of systems and accepted. PE: VSS, Afebrile, NAD, A&O x 3,         NWB ; ambulatory ; wheelchair transport        RLE > LLE -- +2/7 edema                              NLD / Lipodermatosclerosis of shin                               Area of linear skin loss ; PT , granular                              No erythema / no warmth                              Size and photo documentation         RLE = LLE ; +1/4 pedal pulses                              Epicritic 2-pt & light touch intact , without deficit , other than RLE , L4 over knee         Physical Exam   Musculoskeletal:        Legs:      Wound Care Documentation   Wound 08/11/22 Leg Anterior;Right; Lower #2 Right anterior lower leg (Active)   Wound Image   08/11/22 1337   Wound Etiology Other 08/11/22 1337   Dressing Status New dressing applied 08/11/22 1415   Wound Cleansed Soap and water 08/11/22 1337   Dressing/Treatment Collagen with Ag;Non adherent; Tubular bandage 08/11/22 1415   Dressing Change Due 08/13/22 08/11/22 1415   Wound Length (cm) 0.7 cm 08/11/22 1337   Wound Width (cm) 0.2 cm 08/11/22 1337   Wound Depth (cm) 0.1 cm 08/11/22 1337   Wound Surface Area (cm^2) 0.14 cm^2 08/11/22 1337   Wound Volume (cm^3) 0.014 cm^3 08/11/22 1337   Wound Assessment Pink/red 08/11/22 1337   Drainage Amount Moderate 08/11/22 1337   Drainage Description Clear 08/11/22 1337   Odor None 08/11/22 1337   Ngozi-wound Assessment Warm;Blanchable erythema;Edematous 08/11/22 1337   Margins Attached edges 08/11/22 1337   Number of days: 0            Debridement no  IMP: ulceration of shin ; complicating CVH & I , RLE           Complicating NLD / Lipoderm.            No s/s active local infection           Current complicating knee prothesis           Hx recent STSG around knee ; s/p I&D knee   REC: specialized dressing care **compression   Tx: counseling and coordination of algorithm , pre op         Time element ; 35 minutes   P: see orders / AVS     Today's dressing: Fibrocol AG collagen + foam and Other: Tubigrip compression   Today's orders: see AVS        Discharge Instructions         Wound Management Patient Discharge Instructions    CALL [phone] for questions regarding care of your wounds. OFFICE HOURS ARE TUESDAYS MORNINGS AND THURSDAYS(9-2:30) and subject to change without notice    PLEASE ARRIVE PRIOR TO APPOINTMENT TIME TO COMPLETE REGISTRATION PROCESS      YOU WILL RECEIVE AN AUTOMATED APPOINTMENT REMINDER CALL SEVERAL DAYS PRIOR TO YOUR APPOINTMENT       Discharge instructions for the patient's plan of care. Wound care: Right anterior lower leg  Cleanse with mild soap and water pat dry at dressing change time. Change dressing every 2 days. Apply small purocol plus AG to area cover with dry dressing, and secure with tubigrip stocking change every 2 days. Return to clinic Thursday 9/1/22 at 9:15AM      Next appointment:  Future Appointments   Date Time Provider Gerry Marin   8/16/2022  8:45 AM [facility]   9/1/2022  9:15 AM KATY Mayer WND [place]   9/14/2022 10:00 AM BESS Ojeda CNP pc [facility]   10/19/2022  9:20 AM BESS Ojeda CNP pc [facility]   3/13/2023  8:30 AM MD Alfred Bernal [facility]         SURVEY:    Darrel Curry may be receiving a survey from [employer] regarding your visit today. Please complete the survey to enable us to provide the highest quality of care to you and your family. If you cannot score us a very good on any question, please call the office to discuss how we could have made your experience a very good one. Thank you. REMINDER:  You will receive 2 bills for each visit.   One is a professional fee charge for the physician and the other is a facility fee for the hospital.            Socorro Garza Gerry bunch, KATY  8/11/2022

## 2022-08-11 NOTE — PROGRESS NOTES
Wound Management Medical Nutrition Therapy Assessment Chart Review     Age- 78 y.o. Sex- male      Chief Complaint- Wound Check (Right lower posterior leg)    Height- Height: 5' 11\" (180.3 cm)  Weight- Weight: (!) 359 lb 3.2 oz (162.9 kg)    IBW- 172 # %IBW- 209 %  Body mass index is 50.1 kg/m².  - Obese  Wt Readings from Last 10 Encounters:   08/11/22 (!) 359 lb 3.2 oz (162.9 kg)   07/15/22 (!) 350 lb (158.8 kg)   06/27/22 (!) 383 lb (173.7 kg)   06/23/22 (!) 339 lb (153.8 kg)   06/14/22 (!) 337 lb (152.9 kg)   04/18/22 (!) 341 lb (154.7 kg)   03/14/22 269 lb (122 kg)   02/28/22 (!) 313 lb (142 kg)   01/17/22 (!) 313 lb (142 kg)   07/27/21 (!) 313 lb (142 kg)      UBW- 313 # %UBW- 115 %      Significant Weight Change- Yes : 14.5% gain x 6 months  Unintentional- Yes    Estimated Needs-  BEE- 2400 kcal      Total Calorie Needs- 7490-4010(62-36 kcal/kg)     Total Protein Needs- 156-172 (2-2.2 g/kg)    Patient       Diagnosis Date    Arthritis     Ascending cholangitis 10/2/14     at [facility] assisted gastric remnant to open    Blood in stool 2011    CAD (coronary artery disease)     Chronic back pain     DDD    Chronic diastolic (congestive) heart failure ([address] 11/5/2018    Chronic systolic congestive heart failure ([address] 9/25/2018    COPD with acute exacerbation ([facility] [address] 9/25/2018    Heart disease 5-9-12    History of angioplasty 7/2008    2 stents placed    Hx of CABG     x1 in 2015    Hyperlipidemia     Hypertension     onset age 41    Obesity     Stress incontinence, male 3/21/2017    Transfusion history     Unspecified sleep apnea     cpap-DOES NOT USE MACHINE       Patient FreeStyle Lite, Iron, Lancets, Probiotic Product, Vitamin D (Cholecalciferol), albuterol sulfate HFA, aspirin, atorvastatin, blood glucose test strips, budesonide-formoterol, docusate sodium, escitalopram, famotidine, fluticasone, hydroCHLOROthiazide, isosorbide mononitrate, losartan, metFORMIN, metoprolol succinate, potassium chloride, solifenacin, spironolactone, sucralfate, tamsulosin, thera/beta-carotene, and vitamin C    Multivitamin/mineral Use-  no    Lab Results   Component Value Date/Time    LABALBU 4.1 03/11/2022 02:41 PM    GLUCOSE 101 03/11/2022 02:41 PM    GLUCOSE 132 05/09/2012 09:45 AM    LABA1C 5.6 07/15/2022 09:10 AM     Other Labs - none noted       Nutrition Therapy Recommendations-   Eat 3 well balanced meals with a variety of lean meats, fresh fruits, vegetables, whole grains and low fat dairy. 2. Add protein foods with meals and snacks to aid healing needs. 3. Start daily multivitamin with minerals to assist healing needs.      Follow-up- quarterly    Marlyn Wright RDN, XOCHITL 8/11/2022 2:30 PM          PQRS Measure: #1(Diabetes: Hemoglobin A1C Poor Control) - NA   PQRS Measure #130 (Documentation of Current Medications in Medical Record) - yes

## 2022-08-16 ENCOUNTER — OFFICE VISIT (OUTPATIENT)
Dept: UROLOGY | Age: 77
End: 2022-08-16
Payer: MEDICARE

## 2022-08-16 ENCOUNTER — TELEPHONE (OUTPATIENT)
Dept: UROLOGY | Age: 77
End: 2022-08-16

## 2022-08-16 VITALS — DIASTOLIC BLOOD PRESSURE: 70 MMHG | SYSTOLIC BLOOD PRESSURE: 138 MMHG | BODY MASS INDEX: 47.42 KG/M2 | WEIGHT: 315 LBS

## 2022-08-16 DIAGNOSIS — N13.8 BPH WITH OBSTRUCTION/LOWER URINARY TRACT SYMPTOMS: Primary | ICD-10-CM

## 2022-08-16 DIAGNOSIS — N40.1 BPH WITH OBSTRUCTION/LOWER URINARY TRACT SYMPTOMS: Primary | ICD-10-CM

## 2022-08-16 DIAGNOSIS — N32.81 OAB (OVERACTIVE BLADDER): ICD-10-CM

## 2022-08-16 DIAGNOSIS — N52.8 OTHER MALE ERECTILE DYSFUNCTION: ICD-10-CM

## 2022-08-16 PROCEDURE — PBSHW PR MEASUREMENT,POST-VOID RESIDUAL VOLUME BY US,NON-IMAGING: Performed by: PHYSICIAN ASSISTANT

## 2022-08-16 PROCEDURE — 3017F COLORECTAL CA SCREEN DOC REV: CPT | Performed by: PHYSICIAN ASSISTANT

## 2022-08-16 PROCEDURE — G8417 CALC BMI ABV UP PARAM F/U: HCPCS | Performed by: PHYSICIAN ASSISTANT

## 2022-08-16 PROCEDURE — 1036F TOBACCO NON-USER: CPT | Performed by: PHYSICIAN ASSISTANT

## 2022-08-16 PROCEDURE — 1123F ACP DISCUSS/DSCN MKR DOCD: CPT | Performed by: PHYSICIAN ASSISTANT

## 2022-08-16 PROCEDURE — 51798 US URINE CAPACITY MEASURE: CPT | Performed by: PHYSICIAN ASSISTANT

## 2022-08-16 PROCEDURE — G8427 DOCREV CUR MEDS BY ELIG CLIN: HCPCS | Performed by: PHYSICIAN ASSISTANT

## 2022-08-16 PROCEDURE — 99214 OFFICE O/P EST MOD 30 MIN: CPT | Performed by: PHYSICIAN ASSISTANT

## 2022-08-16 ASSESSMENT — ENCOUNTER SYMPTOMS
VOMITING: 0
SHORTNESS OF BREATH: 0
COUGH: 0
ABDOMINAL PAIN: 0
CONSTIPATION: 0
EYE REDNESS: 0
NAUSEA: 0
WHEEZING: 0
BACK PAIN: 0
COLOR CHANGE: 0

## 2022-08-16 NOTE — PROGRESS NOTES
HPI:      Patient is a 78 y.o. male in no acute distress. He is alert and oriented to person, place, and time. History  5/2016 PVP greenlight     3/2017 cystoscopy showed normal anatomy. PFR recommended. 6/2017 Completed seven PFR treatments. Daytime frequency improved from q1hr to q1-2 hrs, nocturia improved from 0-3 to 0-1, severe urgency improved to mild, LIBBY improved from 5-10x per day to 3-4x per day. 2/2022 frequency, nocturia, urgency and urge incontinence. Saturating 1 briefs per day. Taking Flomax daily. He is having daily bowel movements. He does consume 2 cups of coffee. He did stop drinking soda. Started oxybutynin 10mg XL     4/2022 increased oxybutynin to 15mg    6/2022 - Oxybutynin stopped, Jazmín Morse started    Today  Patient is here today to follow-up for OAB and BPH. At last visit we stopped oxybutynin and started Vesicare. He continues to take Flomax. He states that his urgency has improved with this medication. He states that he is only having urgency every few days. Overall happy with improvement. Nocturia x1. He is wearing one brief per day, he states they are wet but never saturated. Kennedy Gutter He is having a daily BM. Bladderscan performed in office today:  Pt voided - 175 mL, PVR - 0 mL    Patient states that he has an upcoming right leg surgery and he will need to be in a cast for 3 months. Patient states that he has erectile dysfunction. He states that this has been ongoing for many many years. He states that he occasionally gets an erection but it is not a full erection nor is he able to maintain it. Patient is on Imdur.     Past Medical History:   Diagnosis Date    Arthritis     Ascending cholangitis 10/2/14     at [facility] assisted gastric remnant to open    Blood in stool 2011    CAD (coronary artery disease)     Chronic back pain     DDD    Chronic diastolic (congestive) heart failure ([address] 11/5/2018    Chronic systolic congestive heart failure ([address] 9/25/2018 [facility] 9/25/2018    Heart disease 5-9-12    History of angioplasty 7/2008    2 stents placed    Hx of CABG     x1 in 2015    Hyperlipidemia     Hypertension     onset age 41    Obesity     Stress incontinence, male 3/21/2017    Transfusion history     Unspecified sleep apnea     cpap-DOES NOT USE MACHINE     Past Surgical History:   Procedure Laterality Date    ANTERIOR CRUCIATE LIGAMENT REPAIR Right 2/9/2021    RIGHT KNEE INCISION AND DRAINAGE, POLY EXCHANGE,  WITH EXTENSOR MECHANISM REPAIR WITH ALLOGRAFT performed by Tim De La Fuente DO at [facility]  2005    Kaylen Villafuerte    BARIATRIC SURGERY  2001    [facility] at [address] Left 05/09/2012    Left main normal.  Left anterior descending artery:  Plaque disease. Ramus: Plaque disease Circumflex:nondominant, plaque disease. OM1 normal. Right coronoary artery:  dominant & luminal irregularities. Left ventricular ejection fraction 60. Mitral valve normal with no insufficinecy of the mitral valve. Aortic valve normal with no stenosis of the aortic valve. Edp was normal.    CARDIAC CATHETERIZATION Left 03/04/2015    Dr. Ramirez --Severe CAD, 80% in-stent stenosis in the left anterior descending coronary artery in a rather long segment, very eccentric,extending almost to the ostium of the left anterior descending coronary artery. 70% in-stent stenosis of a very large dominant right coronary artery. Unremarkable small circumflex. Normal left ventricular function, ejection fraction of 60%.     CARDIAC CATHETERIZATION Left 12/05/2018    Dr. Salvatore Malcolm @ [facility]--Risk [place] [address] specific cardiac intervention    CARDIAC SURGERY  2008    2 unknown heart stents mri 1.5t only    CHOLECYSTECTOMY  05/30/2014    open [facility] Dr. Bernadette Olsen    COLONOSCOPY  10/2015    repeat in 2022    CORONARY ARTERY BYPASS GRAFT      2 vessel March 3th 2015    CYSTOSCOPY  05/10/2016    with laser mg by mouth daily      SYMBICORT 160-4.5 MCG/ACT AERO       albuterol sulfate  (90 Base) MCG/ACT inhaler INHALE 2 PUFFS BY MOUTH EVERY 4 HOURS AS NEEDED FOR WHEEZING      tamsulosin (FLOMAX) 0.4 MG capsule Take 1 capsule by mouth every evening 90 capsule 3    spironolactone (ALDACTONE) 25 MG tablet Take 1 tablet by mouth once daily 90 tablet 2    Blood Glucose Monitoring Suppl (FREESTYLE LITE) RADHA 1 Device by Does not apply route daily E11.9 1 Device 0    blood glucose monitor strips Test daily and as needed   E 11.9 200 strip 1    Lancets MISC 1 each by Does not apply route daily 200 each 1    docusate sodium (COLACE) 100 MG capsule Take 1 capsule by mouth 2 times daily 40 capsule 0    Ferrous Sulfate (IRON) 325 (65 Fe) MG TABS 65 mg daily       fluticasone (FLONASE) 50 MCG/ACT nasal spray 1 spray by Nasal route daily (Patient taking differently: 1 spray by Nasal route as needed) 16 g 2    Multiple Vitamin (THERA/BETA-CAROTENE) TABS Take 1 tablet by mouth daily      vitamin C (ASCORBIC ACID) 500 MG tablet Take 500 mg by mouth daily      Vitamin D, Cholecalciferol, 25 MCG (1000 UT) TABS Take by mouth daily        No facility-administered encounter medications on file as of 8/16/2022. Current Outpatient Medications on File Prior to Visit   Medication Sig Dispense Refill    famotidine (PEPCID) 40 MG tablet Take 1 tablet by mouth every evening For GERD 30 tablet 0    sucralfate (CARAFATE) 1 GM tablet Take 1 tablet by mouth in the morning and 1 tablet in the evening. Take before meals.  60 tablet 1    metFORMIN (GLUCOPHAGE) 500 MG tablet Take 1 tablet by mouth daily (with breakfast) 90 tablet 2    solifenacin (VESICARE) 10 MG tablet Take 1 tablet by mouth daily 30 tablet 3    atorvastatin (LIPITOR) 80 MG tablet Take 1 tablet by mouth once daily 90 tablet 2    potassium chloride (KLOR-CON) 10 MEQ extended release tablet Take 1 tablet by mouth daily 90 tablet 1    hydroCHLOROthiazide (HYDRODIURIL) 25 MG tablet Take 1 tablet by mouth once daily 90 tablet 1    losartan (COZAAR) 25 MG tablet Take 0.5 tablets by mouth daily As of 03/14/2022 start taking 1/2 tablet daily 45 tablet 1    metoprolol succinate (TOPROL XL) 25 MG extended release tablet Take 1 tablet by mouth daily 90 tablet 1    isosorbide mononitrate (IMDUR) 30 MG extended release tablet Take 1 tablet by mouth daily 90 tablet 1    escitalopram (LEXAPRO) 20 MG tablet Take 20 mg by mouth daily      Probiotic Product (PROBIOTIC DAILY PO) Take by mouth      aspirin 325 MG EC tablet Take 325 mg by mouth daily      SYMBICORT 160-4.5 MCG/ACT AERO       albuterol sulfate  (90 Base) MCG/ACT inhaler INHALE 2 PUFFS BY MOUTH EVERY 4 HOURS AS NEEDED FOR WHEEZING      tamsulosin (FLOMAX) 0.4 MG capsule Take 1 capsule by mouth every evening 90 capsule 3    spironolactone (ALDACTONE) 25 MG tablet Take 1 tablet by mouth once daily 90 tablet 2    Blood Glucose Monitoring Suppl (FREESTYLE LITE) RADHA 1 Device by Does not apply route daily E11.9 1 Device 0    blood glucose monitor strips Test daily and as needed   E 11.9 200 strip 1    Lancets MISC 1 each by Does not apply route daily 200 each 1    docusate sodium (COLACE) 100 MG capsule Take 1 capsule by mouth 2 times daily 40 capsule 0    Ferrous Sulfate (IRON) 325 (65 Fe) MG TABS 65 mg daily       fluticasone (FLONASE) 50 MCG/ACT nasal spray 1 spray by Nasal route daily (Patient taking differently: 1 spray by Nasal route as needed) 16 g 2    Multiple Vitamin (THERA/BETA-CAROTENE) TABS Take 1 tablet by mouth daily      vitamin C (ASCORBIC ACID) 500 MG tablet Take 500 mg by mouth daily      Vitamin D, Cholecalciferol, 25 MCG (1000 UT) TABS Take by mouth daily        No current facility-administered medications on file prior to visit. Patient has no known allergies.   Family History   Problem Relation Age of Onset    Diabetes Mother     Heart Disease Mother     Arthritis Mother     High Blood Pressure Mother     High Cholesterol Mother     Cancer Father         lung black lung from coal mines    Heart Disease Brother         leaky heart    Liver Disease Paternal Grandfather         black lung    Obesity Sister         gastric bypass    Cancer Sister         lung, smoker     Social History     Tobacco Use   Smoking Status Never   Smokeless Tobacco Never       Social History     Substance and Sexual Activity   Alcohol Use Never    Comment: Rare; LESS THEN 1X PER MONTH       Review of Systems   Constitutional:  Negative for appetite change, chills and fever. Eyes:  Negative for redness and visual disturbance. Respiratory:  Negative for cough, shortness of breath and wheezing. Cardiovascular:  Negative for chest pain and leg swelling. Gastrointestinal:  Negative for abdominal pain, constipation, nausea and vomiting. Genitourinary:  Positive for urgency. Negative for decreased urine volume, difficulty urinating, dysuria, enuresis, flank pain, frequency, hematuria, penile discharge, penile pain, scrotal swelling and testicular pain. Positive for urinary incontinence   Musculoskeletal:  Negative for back pain, joint swelling and myalgias. Skin:  Negative for color change, rash and wound. Neurological:  Negative for dizziness, tremors and numbness. Hematological:  Negative for adenopathy. Does not bruise/bleed easily. /70 (Site: Right Upper Arm, Position: Sitting, Cuff Size: Large Adult)   Wt (!) 340 lb (154.2 kg)   BMI 47.42 kg/m²       PHYSICAL EXAM:  Constitutional: Patient in no acute distress; Neuro: alert and oriented to person place and time.     Psych: Mood and affect normal.  Lungs: Respiratory effort normal  Abdomen: Soft, non-tender, non-distended   Rectal: deferred       Lab Results   Component Value Date    BUN 21 03/11/2022     Lab Results   Component Value Date    CREATININE 1.03 03/11/2022     Lab Results   Component Value Date    PSA 0.61 07/27/2021    PSA 0.55 07/07/2020    PSA 1.00 06/10/2019       ASSESSMENT:   Diagnosis Orders   1. BPH with obstruction/lower urinary tract symptoms  MI MEASUREMENT,POST-VOID RESIDUAL VOLUME BY US,NON-IMAGING      2. OAB (overactive bladder)            PLAN:  Continue Vesicare    Continue Flomax    We will let patient know that he cannot have PDE 5 inhibitors as he is on Imdur    Follow-up in 3 months with PVR    I had a long discussion about causes of ED including age, co-morbidities, and current medications. I did urge him to STOP smoking. I encouraged him to work to maintain a Hgb A1c of 7.0 or less. He was encouraged to exercise daily to improve erectile function; walking 30 minutes per day at a pace where it is difficult to maintain a conversation. Spent 30 mins, >50% time face-to-face, discussing diagnoses, any applicable test results, treatment plan/options, and prognosis.

## 2022-08-16 NOTE — TELEPHONE ENCOUNTER
Please let patient know that he is on Imdur which is a derivative nitroglycerin. He is unable to take Viagra or Cialis due to this medication.

## 2022-08-30 ENCOUNTER — TELEPHONE (OUTPATIENT)
Dept: UROLOGY | Age: 77
End: 2022-08-30

## 2022-08-30 ENCOUNTER — TELEPHONE (OUTPATIENT)
Dept: WOUND CARE | Age: 77
End: 2022-08-30

## 2022-08-30 ENCOUNTER — TELEPHONE (OUTPATIENT)
Dept: PRIMARY CARE CLINIC | Age: 77
End: 2022-08-30

## 2022-08-30 ENCOUNTER — TELEPHONE (OUTPATIENT)
Dept: CARDIOLOGY CLINIC | Age: 77
End: 2022-08-30

## 2022-08-30 NOTE — TELEPHONE ENCOUNTER
Received fax from Dr. Talya Valdez - needing clearance for sexual activity and PD5 inhibitor ( Viagra/Cialis)  PER DR King Howard  Will have him stop Imdur- if feeling no different in one   Week, can take drug of choice  Pt notified and letter faxed to Dr. Talya Valdez

## 2022-08-30 NOTE — TELEPHONE ENCOUNTER
Let patient know that it is okay with his cardiologist to stop Imdur/isosorbide mononitrate. This medication is related to nitroglycerin and therefore cannot be taken with oral medication for erectile dysfunction. Cardiology did state that if he is off of it for 1 week and notices no difference in his cardiac symptoms he should notify our office and then he can start PDE 5 inhibitor. Let me know what patient would like to do.

## 2022-08-30 NOTE — TELEPHONE ENCOUNTER
Patient notified of response and voiced he is currently wearing a cast due to having knee surgery. Patient says he will continue to take the imdur and re-address this concern when he returns to the office in November.

## 2022-08-30 NOTE — TELEPHONE ENCOUNTER
[facility] Transitions Initial Follow Up Call    Outreach made within 2 business days of discharge: Yes    Patient: Anny Noe Patient : 1945   MRN: [id number]  Reason for Admission: There are no discharge diagnoses documented for the most recent discharge. Discharge Date: 21       Spoke with: Patient    Discharge department/facility: [address] [place] [address] Interactive Patient Contact:  Was patient able to fill all prescriptions: Yes  Was patient instructed to bring all medications to the follow-up visit: Yes  Is patient taking all medications as directed in the discharge summary?  Yes  Does patient understand their discharge instructions: Yes  Does patient have questions or concerns that need addressed prior to 7-14 day follow up office visit: no    Scheduled appointment with PCP within 7-14 days    Follow Up  Future Appointments   Date Time Provider Gerry Marin   2022  9:15 AM Chandrika Degroot DPM [facility] Feliciano   2022 10:00 AM BESS Reardon CNP [facility]   10/19/2022  9:20 AM BESS Reardon CNP [facility]   2022  8:15 AM Warnell Eisenmenger Lollie Amabile, PA-C [facility]   3/13/2023  8:30 AM Tyson Morse MD [facility]       Missy Banda LPN

## 2022-09-07 ENCOUNTER — TELEPHONE (OUTPATIENT)
Dept: PRIMARY CARE CLINIC | Age: 77
End: 2022-09-07

## 2022-09-07 NOTE — TELEPHONE ENCOUNTER
Pt's wife called in stating she didn't feel comfortable removing pt's wound vac from his knee. They spoke with a nurse from ortho's office and were informed they could remove it. I called Dr Kandee Leventhal office to confirm and pt has an appt with the nurse 9/9/22. The office instructed pt to just leave it as is until they come in on Friday.  Pt voiced understanding

## 2022-09-20 DIAGNOSIS — K21.9 GASTROESOPHAGEAL REFLUX DISEASE WITHOUT ESOPHAGITIS: ICD-10-CM

## 2022-09-20 DIAGNOSIS — Z98.84 HX OF GASTRIC BYPASS: ICD-10-CM

## 2022-09-21 RX ORDER — SUCRALFATE 1 G/1
TABLET ORAL
Qty: 60 TABLET | Refills: 0 | Status: SHIPPED | OUTPATIENT
Start: 2022-09-21 | End: 2022-11-02 | Stop reason: SDUPTHER

## 2022-10-10 DIAGNOSIS — I10 ESSENTIAL HYPERTENSION: ICD-10-CM

## 2022-10-11 DIAGNOSIS — I50.32 CHRONIC DIASTOLIC (CONGESTIVE) HEART FAILURE (HCC): ICD-10-CM

## 2022-10-11 DIAGNOSIS — I10 ESSENTIAL HYPERTENSION: ICD-10-CM

## 2022-10-11 RX ORDER — SPIRONOLACTONE 25 MG/1
TABLET ORAL
Qty: 90 TABLET | Refills: 1 | Status: SHIPPED | OUTPATIENT
Start: 2022-10-11

## 2022-10-11 RX ORDER — LOSARTAN POTASSIUM 25 MG/1
TABLET ORAL
Qty: 45 TABLET | Refills: 1 | Status: SHIPPED | OUTPATIENT
Start: 2022-10-11

## 2022-10-11 RX ORDER — HYDROCHLOROTHIAZIDE 25 MG/1
TABLET ORAL
Qty: 90 TABLET | Refills: 1 | Status: SHIPPED | OUTPATIENT
Start: 2022-10-11

## 2022-10-18 RX ORDER — ESCITALOPRAM OXALATE 20 MG/1
TABLET ORAL
Qty: 90 TABLET | Refills: 0 | Status: SHIPPED | OUTPATIENT
Start: 2022-10-18

## 2022-10-19 ENCOUNTER — OFFICE VISIT (OUTPATIENT)
Dept: PRIMARY CARE CLINIC | Age: 77
End: 2022-10-19
Payer: MEDICARE

## 2022-10-19 ENCOUNTER — HOSPITAL ENCOUNTER (OUTPATIENT)
Age: 77
Setting detail: SPECIMEN
Discharge: HOME | End: 2022-10-19
Payer: MEDICARE

## 2022-10-19 VITALS
DIASTOLIC BLOOD PRESSURE: 78 MMHG | OXYGEN SATURATION: 98 % | HEART RATE: 72 BPM | BODY MASS INDEX: 44.1 KG/M2 | WEIGHT: 315 LBS | SYSTOLIC BLOOD PRESSURE: 112 MMHG | HEIGHT: 71 IN | TEMPERATURE: 98.3 F

## 2022-10-19 DIAGNOSIS — D50.9 MICROCYTIC ANEMIA: ICD-10-CM

## 2022-10-19 DIAGNOSIS — K14.4 SMOOTH TONGUE: ICD-10-CM

## 2022-10-19 DIAGNOSIS — E78.00 HYPERCHOLESTEROLEMIA: ICD-10-CM

## 2022-10-19 DIAGNOSIS — I50.32 CHRONIC DIASTOLIC (CONGESTIVE) HEART FAILURE (HCC): ICD-10-CM

## 2022-10-19 DIAGNOSIS — I10 PRIMARY HYPERTENSION: ICD-10-CM

## 2022-10-19 DIAGNOSIS — E11.9 CONTROLLED TYPE 2 DIABETES MELLITUS WITHOUT COMPLICATION, WITHOUT LONG-TERM CURRENT USE OF INSULIN (HCC): ICD-10-CM

## 2022-10-19 DIAGNOSIS — R25.2 MUSCLE CRAMPS: ICD-10-CM

## 2022-10-19 DIAGNOSIS — Z98.84 HX OF GASTRIC BYPASS: ICD-10-CM

## 2022-10-19 DIAGNOSIS — Z00.00 MEDICARE ANNUAL WELLNESS VISIT, SUBSEQUENT: Primary | ICD-10-CM

## 2022-10-19 DIAGNOSIS — Z12.5 PROSTATE CANCER SCREENING: ICD-10-CM

## 2022-10-19 DIAGNOSIS — Z23 NEED FOR VACCINATION: ICD-10-CM

## 2022-10-19 LAB
CHOLESTEROL/HDL RATIO: 1.9
CHOLESTEROL: 102 MG/DL
HBA1C MFR BLD: 5.9 %
HDLC SERPL-MCNC: 54 MG/DL
IRON SATURATION: 18 % (ref 20–55)
IRON: 54 UG/DL (ref 59–158)
LDL CHOLESTEROL: 31 MG/DL (ref 0–130)
MAGNESIUM: 1.6 MG/DL (ref 1.6–2.6)
TOTAL IRON BINDING CAPACITY: 300 UG/DL (ref 250–450)
TRIGL SERPL-MCNC: 85 MG/DL
UNSATURATED IRON BINDING CAPACITY: 246 UG/DL (ref 112–347)

## 2022-10-19 PROCEDURE — 83735 ASSAY OF MAGNESIUM: CPT

## 2022-10-19 PROCEDURE — G0008 ADMIN INFLUENZA VIRUS VAC: HCPCS | Performed by: NURSE PRACTITIONER

## 2022-10-19 PROCEDURE — G8484 FLU IMMUNIZE NO ADMIN: HCPCS | Performed by: NURSE PRACTITIONER

## 2022-10-19 PROCEDURE — 80061 LIPID PANEL: CPT

## 2022-10-19 PROCEDURE — 3044F HG A1C LEVEL LT 7.0%: CPT | Performed by: NURSE PRACTITIONER

## 2022-10-19 PROCEDURE — 83540 ASSAY OF IRON: CPT

## 2022-10-19 PROCEDURE — 90694 VACC AIIV4 NO PRSRV 0.5ML IM: CPT | Performed by: NURSE PRACTITIONER

## 2022-10-19 PROCEDURE — G0439 PPPS, SUBSEQ VISIT: HCPCS | Performed by: NURSE PRACTITIONER

## 2022-10-19 PROCEDURE — 83036 HEMOGLOBIN GLYCOSYLATED A1C: CPT | Performed by: NURSE PRACTITIONER

## 2022-10-19 PROCEDURE — G0103 PSA SCREENING: HCPCS

## 2022-10-19 PROCEDURE — 83550 IRON BINDING TEST: CPT

## 2022-10-19 PROCEDURE — 3017F COLORECTAL CA SCREEN DOC REV: CPT | Performed by: NURSE PRACTITIONER

## 2022-10-19 PROCEDURE — 1123F ACP DISCUSS/DSCN MKR DOCD: CPT | Performed by: NURSE PRACTITIONER

## 2022-10-19 RX ORDER — DOXYCYCLINE HYCLATE 100 MG/1
CAPSULE ORAL
COMMUNITY
Start: 2022-10-07

## 2022-10-19 ASSESSMENT — PATIENT HEALTH QUESTIONNAIRE - PHQ9
SUM OF ALL RESPONSES TO PHQ9 QUESTIONS 1 & 2: 3
3. TROUBLE FALLING OR STAYING ASLEEP: 0
5. POOR APPETITE OR OVEREATING: 1
SUM OF ALL RESPONSES TO PHQ QUESTIONS 1-9: 10
7. TROUBLE CONCENTRATING ON THINGS, SUCH AS READING THE NEWSPAPER OR WATCHING TELEVISION: 3
6. FEELING BAD ABOUT YOURSELF - OR THAT YOU ARE A FAILURE OR HAVE LET YOURSELF OR YOUR FAMILY DOWN: 0
8. MOVING OR SPEAKING SO SLOWLY THAT OTHER PEOPLE COULD HAVE NOTICED. OR THE OPPOSITE, BEING SO FIGETY OR RESTLESS THAT YOU HAVE BEEN MOVING AROUND A LOT MORE THAN USUAL: 0
2. FEELING DOWN, DEPRESSED OR HOPELESS: 3
SUM OF ALL RESPONSES TO PHQ QUESTIONS 1-9: 10
SUM OF ALL RESPONSES TO PHQ QUESTIONS 1-9: 10
4. FEELING TIRED OR HAVING LITTLE ENERGY: 3
9. THOUGHTS THAT YOU WOULD BE BETTER OFF DEAD, OR OF HURTING YOURSELF: 0
1. LITTLE INTEREST OR PLEASURE IN DOING THINGS: 0
SUM OF ALL RESPONSES TO PHQ QUESTIONS 1-9: 10
10. IF YOU CHECKED OFF ANY PROBLEMS, HOW DIFFICULT HAVE THESE PROBLEMS MADE IT FOR YOU TO DO YOUR WORK, TAKE CARE OF THINGS AT HOME, OR GET ALONG WITH OTHER PEOPLE: 2

## 2022-10-19 ASSESSMENT — LIFESTYLE VARIABLES
HOW MANY STANDARD DRINKS CONTAINING ALCOHOL DO YOU HAVE ON A TYPICAL DAY: PATIENT DOES NOT DRINK
HOW OFTEN DO YOU HAVE A DRINK CONTAINING ALCOHOL: NEVER

## 2022-10-19 NOTE — PROGRESS NOTES
Vaccine Information Sheet, \"Influenza - Inactivated\"  given to Kris Dutta, or parent/legal guardian of  Kris Dutta and verbalized understanding. Patient responses:    Have you ever had a reaction to a flu vaccine? No  Are you able to eat eggs without adverse effects? Yes  Do you have any current illness? No  Have you ever had Guillian Lakeland Syndrome? No    Flu vaccine given per order. Please see immunization tab.

## 2022-10-19 NOTE — PROGRESS NOTES
Medicare Annual Wellness Visit    Kaitlynn Ferrer is here for Medicare AWV (HTN, DM, COPD. Pt states no concerns at this time) and Diabetes    Assessment & Plan   Medicare annual wellness visit, subsequent  Controlled type 2 diabetes mellitus without complication, without long-term current use of insulin ([facility]  -     POCT glycosylated hemoglobin (Hb A1C)  -     Lipid Panel; Future  Muscle cramps  -     Magnesium; Future  Primary hypertension  Microcytic anemia  -     Iron and TIBC; Future  Hx of gastric bypass  Chronic diastolic (congestive) heart failure (HCC)  Hypercholesterolemia  -     Lipid Panel; Future  Need for vaccination  -     Influenza, FLUAD, (age 72 y+), IM, Preservative Free, 0.5 mL  Smooth tongue  Prostate cancer screening  -     PSA Screening; Future    Recommendations for Preventive Services Due: see orders and patient instructions/AVS.  Recommended screening schedule for the next 5-10 years is provided to the patient in written form: see Patient Instructions/AVS.     Return in 6 months (on 4/19/2023) for Medicare Annual Wellness Visit in 1 year, HTN check, hypercholesterolemia. Subjective     Blood draw done today during visit on 2nd attempt from left wrist pt tolerated well  Jarrod KELLOGG CNP    Patient's complete Health Risk Assessment and screening values have been reviewed and are found in Flowsheets. The following problems were reviewed today and where indicated follow up appointments were made and/or referrals ordered. Positive Risk Factor Screenings with Interventions:      Depression:  PHQ-2 Score: 3  PHQ-9 Total Score: 10    Severity:1-4 = minimal depression, 5-9 = mild depression, 10-14 = moderate depression, 15-19 = moderately severe depression, 20-27 = severe depression  Depression Interventions: On lexapro 20 mg daily wife states helps a lot.                General Health and ACP:  General  In general, how would you say your health is?: Good  In the past 7 days, have you experienced any of the following: New or Increased Pain, New or Increased Fatigue, Loneliness, Social Isolation, Stress or Anger?: (!) Yes  Select all that apply: (!) Stress, Anger  Do you get the social and emotional support that you need?: Yes  Do you have a Living Will?: (!) No    Advance Directives       Power of  Living Will ACP-Advance Directive ACP-Power of     Not on File Not on File Not on File Not on File        General Health Risk Interventions:  Pain issues: patient declines any further evaluation/treatment for this issue  Social isolation: patient's comments regarding inadequate social support: limited due to multiple procedures on right leg   No Living Will: Patient declines ACP discussion/assistance    Health Habits/Nutrition:  Physical Activity: Inactive    Days of Exercise per Week: 0 days    Minutes of Exercise per Session: 0 min     Have you lost any weight without trying in the past 3 months?: No  Body mass index: (!) 46.72  Have you seen the dentist within the past year?: N/A - wear dentures  Health Habits/Nutrition Interventions:  Inadequate physical activity:  patient is not ready to increase his/her physical activity level at this time, limited due to long cast on right leg   Nutritional issues:  educational materials for healthy, well-balanced diet provided  Dental exam overdue:  full dentures    Hearing/Vision:  Do you or your family notice any trouble with your hearing that hasn't been managed with hearing aids?: (!) Yes  Do you have difficulty driving, watching TV, or doing any of your daily activities because of your eyesight?: No  Have you had an eye exam within the past year?: Yes  No results found.   Hearing/Vision Interventions:  Hearing concerns:  patient declines any further evaluation/treatment for hearing issues  Vision concerns:  Dr. Olga Green in [facility] [address]:  Do you have working smoke detectors?: Yes  Do you have any tripping hazards - loose or unsecured carpets or rugs?: (!) Yes  Do you have any tripping hazards - clutter in doorways, halls, or stairs?: No  Do you have either shower bars, grab bars, non-slip mats or non-slip surfaces in your shower or bathtub?: Yes  Do all of your stairways have a railing or banister?: Yes  Do you always fasten your seatbelt when you are in a car?: Yes  Safety Interventions:  Home safety tips provided    ADLs:  In the past 7 days, did you need help from others to perform any of the following everyday activities: Eating, dressing, grooming, bathing, toileting, or walking/balance?: (!) Yes  Select all that apply: (!) Dressing, Bathing  In the past 7 days, did you need help from others to take care of any of the following: Laundry, housekeeping, banking/finances, shopping, telephone use, food preparation, transportation, or taking medications?: No  ADL Interventions:  Patient declines any further evaluation/treatment for this issue          Objective   Vitals:    10/19/22 0913   BP: 112/78   Site: Left Upper Arm   Position: Sitting   Pulse: 72   Temp: 98.3 °F (36.8 °C)   TempSrc: Oral   SpO2: 98%   Weight: (!) 335 lb (152 kg)   Height: 5' 11\" (1.803 m)      Body mass index is 46.72 kg/m².       General Appearance: alert and oriented to person, place and time, well developed and well- nourished, in no acute distress  Skin: warm and dry, no rash or erythema  Head: normocephalic and atraumatic  Eyes: pupils equal, round, and reactive to light, extraocular eye movements intact, conjunctivae normal  ENT: tympanic membrane, external ear and ear canal normal bilaterally, nose without deformity  Neck: supple and non-tender without mass, no thyromegaly or thyroid nodules, no cervical lymphadenopathy  Pulmonary/Chest: clear to auscultation bilaterally- no wheezes, rales or rhonchi, normal air movement, no respiratory distress  Cardiovascular: normal rate, regular rhythm, normal S1 and S2, no murmurs, rubs, clicks, or gallops, distal pulses intact, no carotid bruits  Abdomen: soft, non-tender, non-distended, normal bowel sounds  Extremities: no cyanosis, clubbing or edema  Musculoskeletal: right leg in long leg cast, left knee stiff,   Neurologic: coordination and speech normal       No Known Allergies  Prior to Visit Medications    Medication Sig Taking?  Authorizing Provider   doxycycline hyclate (VIBRAMYCIN) 100 MG capsule TAKE 1 CAPSULE BY MOUTH EVERY 12 HOURS , 6AM AND 6PM Yes Historical Provider, MD   escitalopram (LEXAPRO) 20 MG tablet Take 1 tablet by mouth once daily Yes BESS Bernal CNP   losartan (COZAAR) 25 MG tablet Take 1/2 (one-half) tablet by mouth once daily Yes BESS Bernal CNP   hydroCHLOROthiazide (HYDRODIURIL) 25 MG tablet Take 1 tablet by mouth once daily Yes BESS Bernal CNP   spironolactone (ALDACTONE) 25 MG tablet Take 1 tablet by mouth once daily Yes BESS Bernal CNP   sucralfate (CARAFATE) 1 GM tablet TAKE 1 TABLET BY MOUTH IN THE MORNING AND 1 IN THE EVENING BEFORE MEAL(S) Yes BESS Bernal CNP   famotidine (PEPCID) 40 MG tablet Take 1 tablet by mouth every evening For GERD Yes BESS Bernal CNP   metFORMIN (GLUCOPHAGE) 500 MG tablet Take 1 tablet by mouth daily (with breakfast) Yes BESS Bernal CNP   solifenacin (VESICARE) 10 MG tablet Take 1 tablet by mouth daily Yes BESS Beaulieu CNP   atorvastatin (LIPITOR) 80 MG tablet Take 1 tablet by mouth once daily Yes BESS Bernal CNP   potassium chloride (KLOR-CON) 10 MEQ extended release tablet Take 1 tablet by mouth daily Yes BESS Bernal CNP   metoprolol succinate (TOPROL XL) 25 MG extended release tablet Take 1 tablet by mouth daily Yes BESS Bernal CNP   isosorbide mononitrate (IMDUR) 30 MG extended release tablet Take 1 tablet by mouth daily Yes BESS Bernal CNP   Probiotic Product (PROBIOTIC DAILY PO) Take by mouth Yes Historical Provider, MD   aspirin 325 MG EC tablet Take 325 mg by mouth daily Yes Historical Provider, MD   SYMBICORT 160-4.5 MCG/ACT AERO  Yes Historical Provider, MD   albuterol sulfate  (90 Base) MCG/ACT inhaler INHALE 2 PUFFS BY MOUTH EVERY 4 HOURS AS NEEDED FOR WHEEZING Yes Historical Provider, MD   tamsulosin (FLOMAX) 0.4 MG capsule Take 1 capsule by mouth every evening Yes BESS Quigley CNP   docusate sodium (COLACE) 100 MG capsule Take 1 capsule by mouth 2 times daily Yes James Salazar DO   Ferrous Sulfate (IRON) 325 (65 Fe) MG TABS 65 mg daily  Yes Historical Provider, MD   fluticasone (FLONASE) 50 MCG/ACT nasal spray 1 spray by Nasal route daily  Patient taking differently: 1 spray by Nasal route as needed Yes BESS Martinez CNP   Multiple Vitamin (THERA/BETA-CAROTENE) TABS Take 1 tablet by mouth daily Yes Historical Provider, MD   vitamin C (ASCORBIC ACID) 500 MG tablet Take 500 mg by mouth daily Yes Historical Provider, MD   Vitamin D, Cholecalciferol, 25 MCG (1000 UT) TABS Take by mouth daily  Yes Historical Provider, MD   Blood Glucose Monitoring Suppl (FREESTYLE LITE) RADHA 1 Device by Does not apply route daily E11.9  BESS Martinez CNP   blood glucose monitor strips Test daily and as needed   E 11.9  BESS Martinez CNP   Lancets MISC 1 each by Does not apply route daily  BESS Martinez CNP       CareTeam (Including outside providers/suppliers regularly involved in providing care):   Patient Care Team:  BESS Martinez CNP as PCP - General  BESS Martinez CNP as PCP - [facility] Empaneled Provider     Reviewed and updated this visit:  Tobacco  Allergies  Meds  Problems  Med Hx  Surg Hx  Soc Hx  Fam Hx

## 2022-10-19 NOTE — PATIENT INSTRUCTIONS
Phone: [phone]  Fax: [address] [facility] Office Hours:  Monday: Micky Perez office location [phone] ([phone]) Offering additional late hours the first Monday of the month until 7 pm.   Tuesday: 8-5 Wednesday: 8-5 Thursday:  Additional hours offered 2 Thursdays a month. Please call to inquire those dates. Fridays: 7:30-4:30   SURVEY:    You may be receiving a survey from [employer]) regarding your visit today. Please complete the survey to enable us to provide the highest quality of care to you and your family. If you cannot score us a very good on any question, please call the office to discuss how we could have made your experience a very good one. Thank you. Magnesium oxide 250 mg tab daily OR   400mg tab once a day for 4 days a week , this is over the counter. Preventing falls is a special concern due to an increase in the likelihood of fracturing a bone. Factors affecting FALLS include: environmental factors, impaired vision or balance, chronic diseases that affect mental or physical functioning and certain medications (sedatives, antidepressants, benzodiazepines, narcotics and alcohol). Here are some tips to eliminate environmental factors that can lead to falls    Outdoors:  -use a cane or walker for added stability  -wear rubber-soled shoes for traction  -walk on grass when sidewalks are slippery  -In winter, carry salt or endy litter to sprinkle on slippery sidewalks.   -be careful on highly polished floors that become slick and dangerous when wet  -walk on even or level ground when able    Indoors:  -Keep rooms free of clutter, especially the floors  -Keep floor surfaces smooth but not slippery  -Wear supportive, low-heeled shoes even at home  -Avoid walking in socks, stockings or slippers.  -Be sure carpets and area rugs have skid-proof backing or are tacked to the floor  -Install grab bars on the bathroom walls near tub, shower and toilet.    -Use a rubber bath mat in shower or tub  -Keep a flashlight with fresh batteries beside your bed.   -If using a step stool for hard-to-reach places, use a sturdy one with a handrails on both sides. OR  As a family member to help.   -Add ceiling fixtures to rooms lit by lamps. -Consider purchasing a cordless phone so that you do not have to rush to answer the phone when it rings, or so that you can call for help if you do fall. Personalized Preventive Plan for Radu Zaman - 10/19/2022  Medicare offers a range of preventive health benefits. Some of the tests and screenings are paid in full while other may be subject to a deductible, co-insurance, and/or copay. Some of these benefits include a comprehensive review of your medical history including lifestyle, illnesses that may run in your family, and various assessments and screenings as appropriate. After reviewing your medical record and screening and assessments performed today your provider may have ordered immunizations, labs, imaging, and/or referrals for you. A list of these orders (if applicable) as well as your Preventive Care list are included within your After Visit Summary for your review. Other Preventive Recommendations:    A preventive eye exam performed by an eye specialist is recommended every 1-2 years to screen for glaucoma; cataracts, macular degeneration, and other eye disorders. A preventive dental visit is recommended every 6 months. Try to get at least 150 minutes of exercise per week or 10,000 steps per day on a pedometer . Order or download the FREE \"Exercise & Physical Activity: Your Everyday Guide\" from [employer]. Call 1-194.332.9046 or search [employer] online. You need 4704-0263 mg of calcium and 4156-8702 IU of vitamin D per day.  It is possible to meet your calcium requirement with diet alone, but a vitamin D supplement is usually necessary to meet this goal.  When exposed to the sun, use a sunscreen that protects against both UVA and UVB radiation with an SPF of 30 or greater. Reapply every 2 to 3 hours or after sweating, drying off with a towel, or swimming. Always wear a seat belt when traveling in a car. Always wear a helmet when riding a bicycle or motorcycle.

## 2022-10-20 LAB — PROSTATE SPECIFIC ANTIGEN: 0.82 NG/ML

## 2022-10-26 RX ORDER — METOPROLOL SUCCINATE 25 MG/1
TABLET, EXTENDED RELEASE ORAL
Qty: 90 TABLET | Refills: 1 | Status: SHIPPED | OUTPATIENT
Start: 2022-10-26

## 2022-10-26 RX ORDER — ISOSORBIDE MONONITRATE 30 MG/1
TABLET, EXTENDED RELEASE ORAL
Qty: 90 TABLET | Refills: 1 | Status: SHIPPED | OUTPATIENT
Start: 2022-10-26

## 2022-10-26 NOTE — TELEPHONE ENCOUNTER
Last OV: 10/19/2022 AWV   Last RX:    Next scheduled apt: 4/19/2023  6 months f/u           Surescript requesting a refill

## 2022-10-31 DIAGNOSIS — Z98.84 HX OF GASTRIC BYPASS: ICD-10-CM

## 2022-10-31 DIAGNOSIS — K21.9 GASTROESOPHAGEAL REFLUX DISEASE WITHOUT ESOPHAGITIS: ICD-10-CM

## 2022-11-01 DIAGNOSIS — K21.9 GASTROESOPHAGEAL REFLUX DISEASE WITHOUT ESOPHAGITIS: ICD-10-CM

## 2022-11-01 DIAGNOSIS — Z98.84 HX OF GASTRIC BYPASS: ICD-10-CM

## 2022-11-01 NOTE — TELEPHONE ENCOUNTER
Patient states [facility] DR [address]OS[address] MAYA in [place] [address] does not have Sucralfate and does not know when they will have. Patient wants to try [address] in [place] or try a different prescription for [facility].      Last OV:10/19/2022  Next OV:4/19/2023  Last Rx: 9/21/2022

## 2022-11-02 RX ORDER — SUCRALFATE 1 G/1
TABLET ORAL
Qty: 60 TABLET | Refills: 0 | OUTPATIENT
Start: 2022-11-02

## 2022-11-02 RX ORDER — SUCRALFATE 1 G/1
TABLET ORAL
Qty: 60 TABLET | Refills: 0 | Status: SHIPPED | OUTPATIENT
Start: 2022-11-02

## 2022-11-09 DIAGNOSIS — N40.1 BPH WITH OBSTRUCTION/LOWER URINARY TRACT SYMPTOMS: Primary | ICD-10-CM

## 2022-11-09 DIAGNOSIS — N13.8 BPH WITH OBSTRUCTION/LOWER URINARY TRACT SYMPTOMS: Primary | ICD-10-CM

## 2022-11-09 RX ORDER — POTASSIUM CHLORIDE 750 MG/1
TABLET, FILM COATED, EXTENDED RELEASE ORAL
Qty: 90 TABLET | Refills: 1 | Status: SHIPPED | OUTPATIENT
Start: 2022-11-09

## 2022-11-09 RX ORDER — SOLIFENACIN SUCCINATE 10 MG/1
TABLET, FILM COATED ORAL
Qty: 30 TABLET | Refills: 0 | Status: SHIPPED | OUTPATIENT
Start: 2022-11-09

## 2022-11-09 NOTE — TELEPHONE ENCOUNTER
Med Problem for Sucralfate:    Patients wife called in requesting advice on medicine due to manufacturing issue on Sucralfate. Patient can't get it at pharmacies due to this issue. What else can patient take? Patient uses [address] in [facility].     Last OV: 10/19/2022  Next OV: 4/19/2023  Last Rx: 11/2/2022

## 2022-12-05 ENCOUNTER — OFFICE VISIT (OUTPATIENT)
Dept: UROLOGY | Age: 77
End: 2022-12-05
Payer: MEDICARE

## 2022-12-05 VITALS
SYSTOLIC BLOOD PRESSURE: 148 MMHG | BODY MASS INDEX: 44.1 KG/M2 | TEMPERATURE: 98.6 F | HEIGHT: 71 IN | WEIGHT: 315 LBS | HEART RATE: 74 BPM | DIASTOLIC BLOOD PRESSURE: 85 MMHG

## 2022-12-05 DIAGNOSIS — N13.8 BPH WITH OBSTRUCTION/LOWER URINARY TRACT SYMPTOMS: ICD-10-CM

## 2022-12-05 DIAGNOSIS — N39.46 MIXED INCONTINENCE: ICD-10-CM

## 2022-12-05 DIAGNOSIS — N40.1 BPH WITH OBSTRUCTION/LOWER URINARY TRACT SYMPTOMS: ICD-10-CM

## 2022-12-05 DIAGNOSIS — R39.15 URGENCY OF URINATION: ICD-10-CM

## 2022-12-05 DIAGNOSIS — N32.81 OAB (OVERACTIVE BLADDER): Primary | ICD-10-CM

## 2022-12-05 PROCEDURE — 3078F DIAST BP <80 MM HG: CPT | Performed by: NURSE PRACTITIONER

## 2022-12-05 PROCEDURE — G8484 FLU IMMUNIZE NO ADMIN: HCPCS | Performed by: NURSE PRACTITIONER

## 2022-12-05 PROCEDURE — G8417 CALC BMI ABV UP PARAM F/U: HCPCS | Performed by: NURSE PRACTITIONER

## 2022-12-05 PROCEDURE — 51798 US URINE CAPACITY MEASURE: CPT | Performed by: NURSE PRACTITIONER

## 2022-12-05 PROCEDURE — 1123F ACP DISCUSS/DSCN MKR DOCD: CPT | Performed by: NURSE PRACTITIONER

## 2022-12-05 PROCEDURE — PBSHW PR MEASUREMENT,POST-VOID RESIDUAL VOLUME BY US,NON-IMAGING: Performed by: NURSE PRACTITIONER

## 2022-12-05 PROCEDURE — 3017F COLORECTAL CA SCREEN DOC REV: CPT | Performed by: NURSE PRACTITIONER

## 2022-12-05 PROCEDURE — 99213 OFFICE O/P EST LOW 20 MIN: CPT | Performed by: NURSE PRACTITIONER

## 2022-12-05 PROCEDURE — G8427 DOCREV CUR MEDS BY ELIG CLIN: HCPCS | Performed by: NURSE PRACTITIONER

## 2022-12-05 PROCEDURE — 3074F SYST BP LT 130 MM HG: CPT | Performed by: NURSE PRACTITIONER

## 2022-12-05 PROCEDURE — 1036F TOBACCO NON-USER: CPT | Performed by: NURSE PRACTITIONER

## 2022-12-05 RX ORDER — SOLIFENACIN SUCCINATE 10 MG/1
TABLET, FILM COATED ORAL
Qty: 90 TABLET | Refills: 3 | Status: SHIPPED | OUTPATIENT
Start: 2022-12-05

## 2022-12-05 RX ORDER — TAMSULOSIN HYDROCHLORIDE 0.4 MG/1
0.4 CAPSULE ORAL EVERY EVENING
Qty: 90 CAPSULE | Refills: 3 | Status: SHIPPED | OUTPATIENT
Start: 2022-12-05

## 2022-12-05 ASSESSMENT — ENCOUNTER SYMPTOMS
COLOR CHANGE: 0
COUGH: 0
NAUSEA: 0
SHORTNESS OF BREATH: 0
CONSTIPATION: 0
VOMITING: 0
WHEEZING: 0
BACK PAIN: 0
ABDOMINAL PAIN: 0
EYE REDNESS: 0

## 2022-12-05 NOTE — PROGRESS NOTES
HPI:          Patient is a 78 y.o. male in no acute distress. He is alert and oriented to person, place, and time. History  5/2016 PVP greenlight     3/2017 cystoscopy showed normal anatomy. PFR recommended. 6/2017 Completed seven PFR treatments. Daytime frequency improved from q1hr to q1-2 hrs, nocturia improved from 0-3 to 0-1, severe urgency improved to mild, LIBBY improved from 5-10x per day to 3-4x per day. 2/2022 frequency, nocturia, urgency and urge incontinence. Saturating 1 briefs per day. Taking Flomax daily. He is having daily bowel movements. He does consume 2 cups of coffee. He did stop drinking soda. Started oxybutynin 10mg XL     4/2022 increased oxybutynin to 15mg    6/2022 - Oxybutynin stopped  Vesicare started    Today  Here today to follow-up for overactive bladder and BPH. He has nocturia 1-2 times per night. He is urinating 4-5 times during the day. He does have urgency and urge incontinence. He does feel the UUI is minimal. He is using 1 briefs per day. He is having daily bowel movements. PVR is low. He denies any dysuria or gross hematuria.   Past Medical History:   Diagnosis Date    Arthritis     Ascending cholangitis 10/2/14     at [facility] assisted gastric remnant to open    Blood in stool 2011    CAD (coronary artery disease)     Chronic back pain     DDD    Chronic diastolic (congestive) heart failure ([address] 11/5/2018    Chronic systolic congestive heart failure ([address] 9/25/2018    COPD with acute exacerbation ([address] 9/25/2018    Heart disease 5-9-12    History of angioplasty 7/2008    2 stents placed    Hx of CABG     x1 in 2015    Hyperlipidemia     Hypertension     onset age 41    Obesity     Stress incontinence, male 3/21/2017    Transfusion history     Unspecified sleep apnea     cpap-DOES NOT USE MACHINE     Past Surgical History:   Procedure Laterality Date    ANTERIOR CRUCIATE LIGAMENT REPAIR Right 2/9/2021    RIGHT KNEE INCISION AND DRAINAGE, POLY EXCHANGE,  WITH EXTENSOR MECHANISM REPAIR WITH ALLOGRAFT performed by Rhys Nicholas DO at [facility]  2005    [address]    BARIATRIC SURGERY  2001    Donna Joy -Y at [address] Left 05/09/2012    Left main normal.  Left anterior descending artery:  Plaque disease. Ramus: Plaque disease Circumflex:nondominant, plaque disease. OM1 normal. Right coronoary artery:  dominant & luminal irregularities. Left ventricular ejection fraction 60. Mitral valve normal with no insufficinecy of the mitral valve. Aortic valve normal with no stenosis of the aortic valve. Edp was normal.    CARDIAC CATHETERIZATION Left 03/04/2015    Dr. Ramirez --Severe CAD, 80% in-stent stenosis in the left anterior descending coronary artery in a rather long segment, very eccentric,extending almost to the ostium of the left anterior descending coronary artery. 70% in-stent stenosis of a very large dominant right coronary artery. Unremarkable small circumflex. Normal left ventricular function, ejection fraction of 60%. CARDIAC CATHETERIZATION Left 12/05/2018    Dr. Jamshid Hebert @ [facility]--Risk Tren[address] specific cardiac intervention    CARDIAC SURGERY  2008    2 unknown heart stents mri 1.5t only    CHOLECYSTECTOMY  05/30/2014    open [facility] Dr. Maurisio Mendez  10/2015    repeat in 2022    CORONARY ARTERY BYPASS GRAFT      2 vessel March 3th 2015    CYSTOSCOPY  05/10/2016    with laser TURP    ERCP  10/02/2014    Dr. Serenity Farris with laparotomy    HERNIA REPAIR  09/2003    Hiatal    JOINT REPLACEMENT Bilateral 03/20/2013    bilateral hips    JOINT REPLACEMENT Right 01/27/2021    KNEE ARTHROSCOPY Right 09/26/2013    Dr. Toula Soulier - [address]  11/02/2016    rt. L3-4, L4-5 decompression.  L4-5 discectomy and fusion    SHOULDER SURGERY Left 2000    arthroscopy    SHOULDER SURGERY Left 2007    replaced humeral head    TOTAL HIP ARTHROPLASTY Right     02/03/2016    TOTAL KNEE ARTHROPLASTY Right 01/27/2021    RIGHT TOTAL KNEE ARTHOPLASTY COMPLICATED BY OBSEITY NEEDS A MARIANNE performed by Antoinette Palmer DO at [facility] OR     Outpatient Encounter Medications as of 12/5/2022   Medication Sig Dispense Refill    solifenacin (VESICARE) 10 MG tablet Take 1 tablet by mouth once daily 30 tablet 0    potassium chloride (KLOR-CON) 10 MEQ extended release tablet Take 1 tablet by mouth once daily 90 tablet 1    sucralfate (CARAFATE) 1 GM tablet TAKE 1 TABLET BY MOUTH IN THE MORNING AND 1 IN THE EVENING BEFORE MEAL(S) 60 tablet 0    isosorbide mononitrate (IMDUR) 30 MG extended release tablet Take 1 tablet by mouth once daily 90 tablet 1    metoprolol succinate (TOPROL XL) 25 MG extended release tablet Take 1 tablet by mouth once daily 90 tablet 1    escitalopram (LEXAPRO) 20 MG tablet Take 1 tablet by mouth once daily 90 tablet 0    losartan (COZAAR) 25 MG tablet Take 1/2 (one-half) tablet by mouth once daily 45 tablet 1    hydroCHLOROthiazide (HYDRODIURIL) 25 MG tablet Take 1 tablet by mouth once daily 90 tablet 1    spironolactone (ALDACTONE) 25 MG tablet Take 1 tablet by mouth once daily 90 tablet 1    famotidine (PEPCID) 40 MG tablet Take 1 tablet by mouth every evening For GERD 30 tablet 0    metFORMIN (GLUCOPHAGE) 500 MG tablet Take 1 tablet by mouth daily (with breakfast) 90 tablet 2    atorvastatin (LIPITOR) 80 MG tablet Take 1 tablet by mouth once daily 90 tablet 2    Probiotic Product (PROBIOTIC DAILY PO) Take by mouth      aspirin 325 MG EC tablet Take 325 mg by mouth daily      SYMBICORT 160-4.5 MCG/ACT AERO       albuterol sulfate  (90 Base) MCG/ACT inhaler INHALE 2 PUFFS BY MOUTH EVERY 4 HOURS AS NEEDED FOR WHEEZING      tamsulosin (FLOMAX) 0.4 MG capsule Take 1 capsule by mouth every evening 90 capsule 3    Blood Glucose Monitoring Suppl (FREESTYLE LITE) RADHA 1 Device by Does not apply route daily E11.9 1 Device 0    blood glucose monitor strips Test daily and as needed   E 11.9 200 strip 1    Lancets MISC 1 each by Does not apply route daily 200 each 1    docusate sodium (COLACE) 100 MG capsule Take 1 capsule by mouth 2 times daily 40 capsule 0    Ferrous Sulfate (IRON) 325 (65 Fe) MG TABS 65 mg daily       fluticasone (FLONASE) 50 MCG/ACT nasal spray 1 spray by Nasal route daily (Patient taking differently: 1 spray by Nasal route as needed) 16 g 2    Multiple Vitamin (THERA/BETA-CAROTENE) TABS Take 1 tablet by mouth daily      vitamin C (ASCORBIC ACID) 500 MG tablet Take 500 mg by mouth daily      Vitamin D, Cholecalciferol, 25 MCG (1000 UT) TABS Take by mouth daily       doxycycline hyclate (VIBRAMYCIN) 100 MG capsule TAKE 1 CAPSULE BY MOUTH EVERY 12 HOURS , 6AM AND 6PM (Patient not taking: Reported on 12/5/2022)       No facility-administered encounter medications on file as of 12/5/2022.       Current Outpatient Medications on File Prior to Visit   Medication Sig Dispense Refill    solifenacin (VESICARE) 10 MG tablet Take 1 tablet by mouth once daily 30 tablet 0    potassium chloride (KLOR-CON) 10 MEQ extended release tablet Take 1 tablet by mouth once daily 90 tablet 1    sucralfate (CARAFATE) 1 GM tablet TAKE 1 TABLET BY MOUTH IN THE MORNING AND 1 IN THE EVENING BEFORE MEAL(S) 60 tablet 0    isosorbide mononitrate (IMDUR) 30 MG extended release tablet Take 1 tablet by mouth once daily 90 tablet 1    metoprolol succinate (TOPROL XL) 25 MG extended release tablet Take 1 tablet by mouth once daily 90 tablet 1    escitalopram (LEXAPRO) 20 MG tablet Take 1 tablet by mouth once daily 90 tablet 0    losartan (COZAAR) 25 MG tablet Take 1/2 (one-half) tablet by mouth once daily 45 tablet 1    hydroCHLOROthiazide (HYDRODIURIL) 25 MG tablet Take 1 tablet by mouth once daily 90 tablet 1    spironolactone (ALDACTONE) 25 MG tablet Take 1 tablet by mouth once daily 90 tablet 1    famotidine (PEPCID) 40 MG tablet Take 1 tablet by mouth every evening For GERD 30 tablet 0    metFORMIN (GLUCOPHAGE) 500 MG tablet Take 1 tablet by mouth daily (with breakfast) 90 tablet 2    atorvastatin (LIPITOR) 80 MG tablet Take 1 tablet by mouth once daily 90 tablet 2    Probiotic Product (PROBIOTIC DAILY PO) Take by mouth      aspirin 325 MG EC tablet Take 325 mg by mouth daily      SYMBICORT 160-4.5 MCG/ACT AERO       albuterol sulfate  (90 Base) MCG/ACT inhaler INHALE 2 PUFFS BY MOUTH EVERY 4 HOURS AS NEEDED FOR WHEEZING      tamsulosin (FLOMAX) 0.4 MG capsule Take 1 capsule by mouth every evening 90 capsule 3    Blood Glucose Monitoring Suppl (FREESTYLE LITE) RADHA 1 Device by Does not apply route daily E11.9 1 Device 0    blood glucose monitor strips Test daily and as needed   E 11.9 200 strip 1    Lancets MISC 1 each by Does not apply route daily 200 each 1    docusate sodium (COLACE) 100 MG capsule Take 1 capsule by mouth 2 times daily 40 capsule 0    Ferrous Sulfate (IRON) 325 (65 Fe) MG TABS 65 mg daily       fluticasone (FLONASE) 50 MCG/ACT nasal spray 1 spray by Nasal route daily (Patient taking differently: 1 spray by Nasal route as needed) 16 g 2    Multiple Vitamin (THERA/BETA-CAROTENE) TABS Take 1 tablet by mouth daily      vitamin C (ASCORBIC ACID) 500 MG tablet Take 500 mg by mouth daily      Vitamin D, Cholecalciferol, 25 MCG (1000 UT) TABS Take by mouth daily       doxycycline hyclate (VIBRAMYCIN) 100 MG capsule TAKE 1 CAPSULE BY MOUTH EVERY 12 HOURS , 6AM AND 6PM (Patient not taking: Reported on 12/5/2022)       No current facility-administered medications on file prior to visit. Patient has no known allergies.   Family History   Problem Relation Age of Onset    Diabetes Mother     Heart Disease Mother     Arthritis Mother     High Blood Pressure Mother     High Cholesterol Mother     Cancer Father         lung black lung from coal mines    Heart Disease Brother         leaky heart    Liver Disease Paternal Grandfather         black lung    Obesity Sister         gastric bypass    Cancer Sister         lung, smoker     Social History     Tobacco Use   Smoking Status Never   Smokeless Tobacco Never       Social History     Substance and Sexual Activity   Alcohol Use Never    Comment: Rare; LESS THEN 1X PER MONTH       Review of Systems   Constitutional:  Negative for appetite change, chills and fever. Eyes:  Negative for redness and visual disturbance. Respiratory:  Negative for cough, shortness of breath and wheezing. Cardiovascular:  Negative for chest pain and leg swelling. Gastrointestinal:  Negative for abdominal pain, constipation, nausea and vomiting. Genitourinary:  Positive for enuresis (UUI) and urgency. Negative for decreased urine volume, difficulty urinating, dysuria, flank pain, frequency, hematuria, penile discharge, penile pain, scrotal swelling and testicular pain. Musculoskeletal:  Negative for back pain, joint swelling and myalgias. Skin:  Negative for color change, rash and wound. Neurological:  Negative for dizziness, tremors and numbness. Hematological:  Negative for adenopathy. Does not bruise/bleed easily. BP (!) 148/85 (Site: Right Upper Arm, Position: Sitting, Cuff Size: Large Adult)   Pulse 74   Temp 98.6 °F (37 °C)   Ht 5' 11\" (1.803 m)   Wt (!) 340 lb (154.2 kg)   BMI 47.42 kg/m²       PHYSICAL EXAM:  Constitutional: Patient in no acute distress; Neuro: alert and oriented to person place and time. Psych: Mood and affect normal.  Skin: Normal  Lungs: Respiratory effort normal  Cardiovascular:  Normal peripheral pulses  Abdomen: Soft, non-tender, non-distended with no CVA, flank pain  Bladder non-tender and not distended. Lab Results   Component Value Date    BUN 21 03/11/2022     Lab Results   Component Value Date    CREATININE 1.03 03/11/2022     Lab Results   Component Value Date    PSA 0.82 10/19/2022    PSA 0.61 07/27/2021    PSA 0.55 07/07/2020       ASSESSMENT:   Diagnosis Orders   1.  OAB (overactive bladder)  [place] MEASUREMENT,POST-VOID RESIDUAL VOLUME BY US,NON-IMAGING      2. BPH with obstruction/lower urinary tract symptoms  [place] MEASUREMENT,POST-VOID RESIDUAL VOLUME BY US,NON-IMAGING    solifenacin (VESICARE) 10 MG tablet      3. Urgency of urination  [place] MEASUREMENT,POST-VOID RESIDUAL VOLUME BY US,NON-IMAGING      4.  Mixed incontinence  [place] MEASUREMENT,POST-VOID RESIDUAL VOLUME BY US,NON-IMAGING            PLAN:  Continue flomax daily    Continue vesicare daily    F/U in 6 months or sooner if needed

## 2022-12-05 NOTE — PROGRESS NOTES
Bladderscan performed in office today:  Pt voided at home about 45 minutes ago, unable to void upon arrival of apt, PVR - 98 mL

## 2022-12-05 NOTE — PATIENT INSTRUCTIONS
SURVEY:    You may be receiving a survey from [employer] regarding your visit today. Please complete the survey to enable us to provide the highest quality of care to you and your family. If you cannot score us a very good on any question, please call the office to discuss how we could have made your experience a very good one. Thank you.

## 2023-01-16 ENCOUNTER — APPOINTMENT (OUTPATIENT)
Dept: GENERAL RADIOLOGY | Age: 78
End: 2023-01-16
Payer: MEDICARE

## 2023-01-16 ENCOUNTER — HOSPITAL ENCOUNTER (EMERGENCY)
Age: 78
Discharge: LEFT BEFORE DISCHARGE | End: 2023-01-16
Attending: EMERGENCY MEDICINE
Payer: MEDICARE

## 2023-01-16 VITALS
DIASTOLIC BLOOD PRESSURE: 80 MMHG | BODY MASS INDEX: 44.1 KG/M2 | WEIGHT: 315 LBS | OXYGEN SATURATION: 95 % | RESPIRATION RATE: 18 BRPM | TEMPERATURE: 98.1 F | HEART RATE: 91 BPM | HEIGHT: 71 IN | SYSTOLIC BLOOD PRESSURE: 141 MMHG

## 2023-01-16 DIAGNOSIS — L02.416 ABSCESS OF LEFT LOWER EXTREMITY: Primary | ICD-10-CM

## 2023-01-16 LAB
ABSOLUTE EOS #: 0.1 K/UL (ref 0–0.4)
ABSOLUTE LYMPH #: 0.5 K/UL (ref 1–4.8)
ABSOLUTE MONO #: 1.3 K/UL (ref 0–1)
ALBUMIN SERPL-MCNC: 3.3 G/DL (ref 3.5–5.2)
ALP BLD-CCNC: 125 U/L (ref 40–129)
ALT SERPL-CCNC: 14 U/L (ref 5–41)
ANION GAP SERPL CALCULATED.3IONS-SCNC: 10 MMOL/L (ref 9–17)
AST SERPL-CCNC: 21 U/L
BASOPHILS # BLD: 0 % (ref 0–2)
BASOPHILS ABSOLUTE: 0 K/UL (ref 0–0.2)
BILIRUB SERPL-MCNC: 1.8 MG/DL (ref 0.3–1.2)
BUN BLDV-MCNC: 12 MG/DL (ref 8–23)
BUN/CREAT BLD: 12 (ref 9–20)
C-REACTIVE PROTEIN: 295.7 MG/L (ref 0–5)
CALCIUM SERPL-MCNC: 9 MG/DL (ref 8.6–10.4)
CHLORIDE BLD-SCNC: 97 MMOL/L (ref 98–107)
CO2: 28 MMOL/L (ref 20–31)
CREAT SERPL-MCNC: 1.04 MG/DL (ref 0.7–1.2)
DIFFERENTIAL TYPE: YES
EOSINOPHILS RELATIVE PERCENT: 1 % (ref 0–5)
GFR SERPL CREATININE-BSD FRML MDRD: >60 ML/MIN/1.73M2
GLUCOSE BLD-MCNC: 161 MG/DL (ref 70–99)
HCT VFR BLD CALC: 39.9 % (ref 41–53)
HEMOGLOBIN: 13.3 G/DL (ref 13.5–17.5)
LYMPHOCYTES # BLD: 5 % (ref 13–44)
MCH RBC QN AUTO: 27.5 PG (ref 26–34)
MCHC RBC AUTO-ENTMCNC: 33.4 G/DL (ref 31–37)
MCV RBC AUTO: 82.2 FL (ref 80–100)
MONOCYTES # BLD: 11 % (ref 5–9)
PDW BLD-RTO: 17.5 % (ref 12.1–15.2)
PLATELET # BLD: 170 K/UL (ref 140–450)
POTASSIUM SERPL-SCNC: 3.5 MMOL/L (ref 3.7–5.3)
RBC # BLD: 4.85 M/UL (ref 4.5–5.9)
SEDIMENTATION RATE, ERYTHROCYTE: 46 MM/HR (ref 0–20)
SEG NEUTROPHILS: 83 % (ref 39–75)
SEGMENTED NEUTROPHILS ABSOLUTE COUNT: 9.8 K/UL (ref 2.1–6.5)
SODIUM BLD-SCNC: 135 MMOL/L (ref 135–144)
TOTAL PROTEIN: 6.7 G/DL (ref 6.4–8.3)
WBC # BLD: 11.8 K/UL (ref 3.5–11)

## 2023-01-16 PROCEDURE — 87070 CULTURE OTHR SPECIMN AEROBIC: CPT

## 2023-01-16 PROCEDURE — 87040 BLOOD CULTURE FOR BACTERIA: CPT

## 2023-01-16 PROCEDURE — 36415 COLL VENOUS BLD VENIPUNCTURE: CPT

## 2023-01-16 PROCEDURE — 87205 SMEAR GRAM STAIN: CPT

## 2023-01-16 PROCEDURE — 87186 SC STD MICRODIL/AGAR DIL: CPT

## 2023-01-16 PROCEDURE — 85025 COMPLETE CBC W/AUTO DIFF WBC: CPT

## 2023-01-16 PROCEDURE — 99284 EMERGENCY DEPT VISIT MOD MDM: CPT

## 2023-01-16 PROCEDURE — 87077 CULTURE AEROBIC IDENTIFY: CPT

## 2023-01-16 PROCEDURE — 85652 RBC SED RATE AUTOMATED: CPT

## 2023-01-16 PROCEDURE — 86403 PARTICLE AGGLUT ANTBDY SCRN: CPT

## 2023-01-16 PROCEDURE — 80053 COMPREHEN METABOLIC PANEL: CPT

## 2023-01-16 PROCEDURE — 73564 X-RAY EXAM KNEE 4 OR MORE: CPT

## 2023-01-16 PROCEDURE — 86140 C-REACTIVE PROTEIN: CPT

## 2023-01-16 ASSESSMENT — PAIN - FUNCTIONAL ASSESSMENT: PAIN_FUNCTIONAL_ASSESSMENT: NONE - DENIES PAIN

## 2023-01-16 NOTE — ED PROVIDER NOTES
[facility] ED  EMERGENCY DEPARTMENT ENCOUNTER      Pt Name: Kaylen Beckford  MRN: [id number]  [place] 1945  Date of evaluation: 1/16/2023  Provider: Belkis Vora, [address] [place] [address]       Chief Complaint   Patient presents with    Knee Pain     Pt had a right knee replacement done in [facility] for[MD] by Dr. Trinda Boast in 2022. Reports discomfort for the past 3 weeks. Today noticed an open wound on the back of right calf that had significant bleeding with clots. HISTORY OF PRESENT ILLNESS   (Location/Symptom, Timing/Onset, Context/Setting, Quality, Duration, Modifying Factors, Severity)  Note limiting factors. Kaylen Beckford is a 78 y.o. male who presents to the emergency department with bleeding from the proximal right calf that began last night. Patient has a complex right knee replacement history in January 2021 patient underwent right knee replacement that became infected and a temporary spacer was placed and eventually in November 2021 an orthopedic surgeon by the name of Dr. Trinda Boast per his history replaced the right knee with a prosthesis. Due to the previous infection patient required cadaver grafts of the patellar tendon and bone graft. In November 2022 patient underwent a procedure that required an incision just inferior to the popliteal space as part of this rehabilitation surgery of the joint. Approximately 1 week ago ago he noticed swelling and redness inferior to the incision and last night it began to bleed. He is denying fever or chills and he denies pain with right knee joint motion and no actual swelling of the right knee or redness of the right knee. HPI    Nursing Notes were reviewed. REVIEW OF SYSTEMS    (2-9 systems for level 4, 10 or more for level 5)     Review of Systems   Constitutional:  Negative for chills and fever. Except as noted above the remainder of the review of systems was reviewed and negative.        PAST MEDICAL HISTORY     Past Medical History: Diagnosis Date    Arthritis     Ascending cholangitis 10/2/14     at [facility] assisted gastric remnant to open    Blood in stool 2011    CAD (coronary artery disease)     Chronic back pain     DDD    Chronic diastolic (congestive) heart failure ([address] 11/5/2018    Chronic systolic congestive heart failure ([address] 9/25/2018    COPD with acute exacerbation ([facility] 9/25/2018    Heart disease 5-9-12    History of angioplasty 7/2008    2 stents placed    Hx of CABG     x1 in 2015    Hyperlipidemia     Hypertension     onset age 41    Obesity     Stress incontinence, male 3/21/2017    Transfusion history     Unspecified sleep apnea     cpap-DOES NOT USE MACHINE         SURGICAL HISTORY       Past Surgical History:   Procedure Laterality Date    ANTERIOR CRUCIATE LIGAMENT REPAIR Right 2/9/2021    RIGHT KNEE INCISION AND DRAINAGE, POLY EXCHANGE,  WITH EXTENSOR MECHANISM REPAIR WITH ALLOGRAFT performed by Lizette Garvey DO at [facility]  2005    [facility] Dr. Terence Murphy    BARIATRIC SURGERY  2001    Steven Joy -EFRAIN at [address] Left 05/09/2012    Left main normal.  Left anterior descending artery:  Plaque disease. Ramus: Plaque disease Circumflex:nondominant, plaque disease. OM1 normal. Right coronoary artery:  dominant & luminal irregularities. Left ventricular ejection fraction 60. Mitral valve normal with no insufficinecy of the mitral valve. Aortic valve normal with no stenosis of the aortic valve. Edp was normal.    CARDIAC CATHETERIZATION Left 03/04/2015    Dr. Ramirez --Severe CAD, 80% in-stent stenosis in the left anterior descending coronary artery in a rather long segment, very eccentric,extending almost to the ostium of the left anterior descending coronary artery. 70% in-stent stenosis of a very large dominant right coronary artery. Unremarkable small circumflex. Normal left ventricular function, ejection fraction of 60%.     CARDIAC CATHETERIZATION Left 12/05/2018    Dr. Shy Mccormick @ [facility]--Risk [place] [place] [address] specific cardiac intervention    CARDIAC SURGERY  2008    2 unknown heart stents mri 1.5t only    CHOLECYSTECTOMY  05/30/2014    open [facility] Dr. Dinh Hard    COLONOSCOPY  10/2015    repeat in 2022    CORONARY ARTERY BYPASS GRAFT      2 vessel March 3th 2015    CYSTOSCOPY  05/10/2016    with laser TURP    ERCP  10/02/2014    Dr. Shameka Kim with laparotomy    HERNIA REPAIR  09/2003    Hiatal    JOINT REPLACEMENT Bilateral 03/20/2013    bilateral hips    JOINT REPLACEMENT Right 01/27/2021    KNEE ARTHROSCOPY Right 09/26/2013    Dr. Sachi Tuttle - [address]  11/02/2016    rt. L3-4, L4-5 decompression.  L4-5 discectomy and fusion    SHOULDER SURGERY Left 2000    arthroscopy    SHOULDER SURGERY Left 2007    replaced humeral head    TOTAL HIP ARTHROPLASTY Right     02/03/2016    TOTAL KNEE ARTHROPLASTY Right 01/27/2021    RIGHT TOTAL KNEE ARTHOPLASTY COMPLICATED BY OBSEITY NEEDS A MARIANNE performed by Josee Washington DO at [address] [facility]       Discharge Medication List as of 1/16/2023  4:53 PM        CONTINUE these medications which have NOT CHANGED    Details   solifenacin (VESICARE) 10 MG tablet Take 1 tablet by mouth once daily, Disp-90 tablet, R-3Normal      tamsulosin (FLOMAX) 0.4 MG capsule Take 1 capsule by mouth every evening, Disp-90 capsule, R-3Normal      potassium chloride (KLOR-CON) 10 MEQ extended release tablet Take 1 tablet by mouth once daily, Disp-90 tablet, R-1Normal      sucralfate (CARAFATE) 1 GM tablet TAKE 1 TABLET BY MOUTH IN THE MORNING AND 1 IN THE EVENING BEFORE MEAL(S), Disp-60 tablet, R-0Normal      isosorbide mononitrate (IMDUR) 30 MG extended release tablet Take 1 tablet by mouth once daily, Disp-90 tablet, R-1Normal      metoprolol succinate (TOPROL XL) 25 MG extended release tablet Take 1 tablet by mouth once daily, Disp-90 tablet, R-1Normal      escitalopram (LEXAPRO) 20 MG tablet Take 1 tablet by mouth once daily, Disp-90 tablet, R-0Normal      losartan (COZAAR) 25 MG tablet Take 1/2 (one-half) tablet by mouth once daily, Disp-45 tablet, R-1Normal      hydroCHLOROthiazide (HYDRODIURIL) 25 MG tablet Take 1 tablet by mouth once daily, Disp-90 tablet, R-1Normal      spironolactone (ALDACTONE) 25 MG tablet Take 1 tablet by mouth once daily, Disp-90 tablet, R-1Normal      famotidine (PEPCID) 40 MG tablet Take 1 tablet by mouth every evening For GERD, Disp-30 tablet, R-0Normal      metFORMIN (GLUCOPHAGE) 500 MG tablet Take 1 tablet by mouth daily (with breakfast), Disp-90 tablet, R-2Normal      atorvastatin (LIPITOR) 80 MG tablet Take 1 tablet by mouth once daily, Disp-90 tablet, R-2Normal      Probiotic Product (PROBIOTIC DAILY PO) Take by mouthHistorical Med      aspirin 325 MG EC tablet Take 325 mg by mouth dailyHistorical Med      SYMBICORT 160-4.5 MCG/ACT AERO DAWHistorical Med      albuterol sulfate  (90 Base) MCG/ACT inhaler INHALE 2 PUFFS BY MOUTH EVERY 4 HOURS AS NEEDED FOR WHEEZINGHistorical Med      Blood Glucose Monitoring Suppl (FREESTYLE LITE) RADHA DAILY Starting Tue 5/4/2021, Disp-1 Device, R-0, [id number]      blood glucose monitor strips Test daily and as needed   E 11.9, Disp-200 strip, R-1, Normal      Lancets MISC DAILY Starting Tue 5/4/2021, Disp-200 each, R-1, Normal      docusate sodium (COLACE) 100 MG capsule Take 1 capsule by mouth 2 times daily, Disp-40 capsule, R-0Normal      Ferrous Sulfate (IRON) 325 (65 Fe) MG TABS 65 mg daily Historical Med      fluticasone (FLONASE) 50 MCG/ACT nasal spray 1 spray by Nasal route daily, Disp-16 g, R-2Normal      Multiple Vitamin (THERA/BETA-CAROTENE) TABS Take 1 tablet by mouth dailyHistorical Med      vitamin C (ASCORBIC ACID) 500 MG tablet Take 500 mg by mouth dailyHistorical Med      Vitamin D, Cholecalciferol, 25 MCG (1000 UT) TABS Take by mouth daily Historical Med             ALLERGIES     Patient has no known allergies. FAMILY HISTORY       Family History   Problem Relation Age of Onset    Diabetes Mother     Heart Disease Mother     Arthritis Mother     High Blood Pressure Mother     High Cholesterol Mother     Cancer Father         lung black lung from coal mines    Heart Disease Brother         leaky heart    Liver Disease Paternal Grandfather         black lung    Obesity Sister         gastric bypass    Cancer Sister         lung, smoker          SOCIAL HISTORY       Social History     Socioeconomic History    Marital status:      Spouse name: Julián Patel    Number of children: 4    Years of education: 11    Highest education level: None   Occupational History    Occupation: meat packing, farming   Tobacco Use    Smoking status: Never    Smokeless tobacco: Never   Vaping Use    Vaping Use: Never used   Substance and Sexual Activity    Alcohol use: Never     Comment: Rare; LESS THEN 1X PER MONTH    Drug use: No     Social Determinants of Health     Financial Resource Strain: Low Risk     Difficulty of Paying Living Expenses: Not hard at all   Food Insecurity: No Food Insecurity    Worried About [address] [place] [address] [place] in the Last Year: Never true    Ran Out of Food in the Last Year: Never true   Physical Activity: Inactive    Days of Exercise per Week: 0 days    Minutes of Exercise per Session: 0 min       SCREENINGS         [place] Coma Scale  Eye Opening: Spontaneous  Best Verbal Response: Oriented  Best Motor Response: Obeys commands  [place] Coma Scale Score: 15                     CIWA Assessment  BP: (!) 141/80  Heart Rate: 91                 PHYSICAL EXAM    (up to 7 for level 4, 8 or more for level 5)     ED Triage Vitals   BP Temp Temp src Pulse Resp SpO2 Height Weight   -- -- -- -- -- -- -- --       Physical Exam  Constitutional:       Appearance: Normal appearance. He is obese. Musculoskeletal:         General: Tenderness present. Comments:  The right knee is nontender without erythema and is not warm to touch. The joint replacement incision is well-healed and patient has active range of motion to about 60 degrees of flexion and full extension without pain and there is no palpatory tenderness. With the patient lying in the left lateral decubitus position there is a 1 cm cavity at the inferior edge of the posterior incision that is bleeding bright red blood and nurses reported some purulent material.  I was unable to produce any purulent material with palpation. There is no induration or fluctuance present after the spontaneous bleeding. This may be a fistula and possible tunneling infection as a result of the surgery performed in November but at this time the knee does not appear to be involved   Neurological:      Mental Status: He is alert. DIAGNOSTIC RESULTS     EKG: All EKG's are interpreted by the Emergency Department Physician who either signs or Co-signs this chart in the absence of a cardiologist.        RADIOLOGY:   Non-plain film images such as CT, Ultrasound and MRI are read by the radiologist. Plain radiographic images are visualized and preliminarily interpreted by the emergency physician with the below findings:    Please see final result and impression below. Interpretation per the Radiologist below, if available at the time of this note:    XR KNEE RIGHT (MIN 4 VIEWS)   Final Result   FINDINGS/IMPRESSION:    1. Loculated air is present in the medial proximal calf over a 12 cm length,    including an air-fluid level. 2. Interval placement of a long stemmed tibial and femoral component and a    different patellar button compared to 2021.   3. Diffuse soft tissue swelling. Consider CT scan right knee and calf without IV contrast for further    evaluation.                ED BEDSIDE ULTRASOUND:   Performed by ED Physician - none    LABS:  Labs Reviewed   CBC WITH AUTO DIFFERENTIAL - Abnormal; Notable for the following components:       Result Value    WBC 11.8 (*) Hemoglobin 13.3 (*)     Hematocrit 39.9 (*)     RDW 17.5 (*)     Seg Neutrophils 83 (*)     Lymphocytes 5 (*)     Monocytes 11 (*)     Segs Absolute 9.80 (*)     Absolute Lymph # 0.50 (*)     Absolute Mono # 1.30 (*)     All other components within normal limits   COMPREHENSIVE METABOLIC PANEL - Abnormal; Notable for the following components:    Glucose 161 (*)     Potassium 3.5 (*)     Chloride 97 (*)     Total Bilirubin 1.8 (*)     Albumin 3.3 (*)     All other components within normal limits   SEDIMENTATION RATE - Abnormal; Notable for the following components:    Sed Rate 46 (*)     All other components within normal limits   C-REACTIVE PROTEIN - Abnormal; Notable for the following components:    .7 (*)     All other components within normal limits   CULTURE, BLOOD 1   CULTURE, BLOOD 2   CULTURE, WOUND       All other labs were within normal range or not returned as of this dictation. EMERGENCY DEPARTMENT COURSE and DIFFERENTIAL DIAGNOSIS/MDM:   Vitals:    Vitals:    01/16/23 1319 01/16/23 1413 01/16/23 1600   BP: (!) 144/83  (!) 141/80   Pulse: 82 91    Resp: 18     Temp: 98.1 °F (36.7 °C)     TempSrc: Oral     SpO2: 97% 94% 95%   Weight: (!) 340 lb (154.2 kg)     Height: 5' 11\" (1.803 m)             MDM  I spoke with this patient's orthopedic surgeon and he requested that we urgently transfer this patient to the [facility]. Unfortunately no beds are available at the [facility] and the [facility] transfer line recommended that if the patient was stable he he could leave our facility [address] and proceed by private vehicle to the emergency department at [facility] main to be evaluated by orthopedic surgery for what appears to be postoperative infection of the right lower extremity and possibly involving joint space infection.       REASSESSMENT   Patient is stable at the time of transfer and they are fully in agreement with this plan and do not wish to wait for a bed that may take literally days for transfer. CRITICAL CARE TIME   Total Critical Care time was  minutes, excluding separately reportable procedures. There was a high probability of clinically significant/life threatening deterioration in the patient's condition which required my urgent intervention. CONSULTS:  None    PROCEDURES:  Unless otherwise noted below, none     Procedures      FINAL IMPRESSION      1. Abscess of left lower extremity New Problem         DISPOSITION/PLAN   DISPOSITION [place] 01/16/2023 04:53:27 PM      PATIENT REFERRED TO:  No follow-up provider specified. DISCHARGE MEDICATIONS:  Discharge Medication List as of 1/16/2023  4:53 PM        Controlled Substances Monitoring:     RX Monitoring 11/19/2019   Attestation -   Periodic Controlled Substance Monitoring Possible medication side effects, risk of tolerance/dependence & alternative treatments discussed. ;No signs of potential drug abuse or diversion identified. Summation      Patient Course:     ED Medications administered this visit:  Medications - No data to display    New Prescriptions from this visit:    Discharge Medication List as of 1/16/2023  4:53 PM          Follow-up:  No follow-up provider specified. Final Impression:   1.  Abscess of left lower extremity New Problem                (Please note that portions of this note were completed with a voice recognition program.  Efforts were made to edit the dictations but occasionally words are mis-transcribed.)    Burt Tidwell DO (electronically signed)  Attending Emergency Physician          Tawanda Back DO  01/16/23 [id number]

## 2023-01-16 NOTE — DISCHARGE INSTRUCTIONS
Follow-up with Dr. Ramy Harris at the [facility] as soon as possible. 66

## 2023-01-17 NOTE — PROGRESS NOTES
RN called N2N report to Brenda Person RN at [facility]. Pt left AMA. Wait for bed was suspected to be extensive and pt and family wanted to drive themself to [facility]. IV wrapped in [address] per Dr. Raquel Cam, pt is a hard stick. Wound dressed with gauze and secured in roll gauze. Sent with ED encounter summary.

## 2023-01-18 LAB
CULTURE: ABNORMAL
DIRECT EXAM: ABNORMAL
DIRECT EXAM: ABNORMAL
SPECIMEN DESCRIPTION: ABNORMAL

## 2023-01-24 ENCOUNTER — TELEPHONE (OUTPATIENT)
Dept: PRIMARY CARE CLINIC | Age: 78
End: 2023-01-24

## 2023-01-24 NOTE — TELEPHONE ENCOUNTER
Care Transitions Initial Follow Up Call    Outreach made within 2 business days of discharge: Yes    Patient: Alexa Ferrera Patient : 1945   MRN: [id number]  Reason for Admission: Right calf I&D  Discharge Date: 23       Spoke with: Patient    Discharge department/facility: [address] [place] [address] Interactive Patient Contact:  Was patient able to fill all prescriptions: Yes  Was patient instructed to bring all medications to the follow-up visit: Yes  Is patient taking all medications as directed in the discharge summary? Yes  Does patient understand their discharge instructions: Yes  Does patient have questions or concerns that need addressed prior to 7-14 day follow up office visit: no    Scheduled appointment with PCP within 7-14 days    Follow Up  Future Appointments   Date Time Provider Gerry Marin   3/13/2023  8:30 AM MD Yan Gonzalez W   2023  9:40 AM Zeny Overall, APRN - CNP nw pc [facility]   2023  9:00 AM SCHEDULE, [facility] TIFF UROLOGY GREEN PT SCHEDULE TIFF UROLOGY [facility]   10/20/2023  9:40 AM Zeny Overall, APRN - CNP nw pc [facility]       Haylee Urena LPN      Pt states he was d/c on 23 (later in day). States he is using walking, non weight-bearing to right leg. Has a wound vac on back of right calf, nursing coming to home 3 days a week [facility]).

## 2023-02-08 RX ORDER — ESCITALOPRAM OXALATE 20 MG/1
TABLET ORAL
Qty: 90 TABLET | Refills: 0 | Status: SHIPPED | OUTPATIENT
Start: 2023-02-08

## 2023-03-16 ENCOUNTER — TELEPHONE (OUTPATIENT)
Dept: PRIMARY CARE CLINIC | Age: 78
End: 2023-03-16

## 2023-03-16 DIAGNOSIS — R19.7 DIARRHEA, UNSPECIFIED TYPE: Primary | ICD-10-CM

## 2023-03-16 RX ORDER — LISINOPRIL 5 MG/1
TABLET ORAL
COMMUNITY
Start: 2023-03-15

## 2023-03-16 RX ORDER — APIXABAN 5 MG/1
TABLET, FILM COATED ORAL
COMMUNITY
Start: 2023-03-15

## 2023-03-16 RX ORDER — FLUCONAZOLE 200 MG/1
TABLET ORAL
COMMUNITY
Start: 2023-03-15

## 2023-03-16 RX ORDER — BUDESONIDE 0.5 MG/2ML
INHALANT ORAL
COMMUNITY
Start: 2023-03-15

## 2023-03-16 NOTE — TELEPHONE ENCOUNTER
Care Transitions Initial Follow Up Call    Outreach made within 2 business days of discharge: Yes    Patient: Tressa Foy Patient : 1945   MRN: [id number]  Reason for Admission:   Discharge Date: 23       Spoke with: Patient    Discharge department/facility: [facility]    TCM Interactive Patient Contact:  Was patient able to fill all prescriptions: Yes (Eliquis, fluconazole)  Was patient instructed to bring all medications to the follow-up visit: Yes  Is patient taking all medications as directed in the discharge summary? Yes  Does patient understand their discharge instructions: Yes  Does patient have questions or concerns that need addressed prior to 7-14 day follow up office visit: no    **Pt is going to call his other physican and get both appts scheduled on the same day.  He will let us know when other appt it made    Scheduled appointment with PCP within 7-14 days    Follow Up  Future Appointments   Date Time Provider Gerry Marin   2023  9:40 AM BESS Moon CNP [facility]   2023  9:00 AM SCHEDULE, [facility] TIFF UROLOGY GREEN PT SCHEDULE TIFF UROLOGY [facility]   10/20/2023  9:40 AM BESS Moon CNP pc [facility]       Debbi Castellano LPN

## 2023-03-17 NOTE — TELEPHONE ENCOUNTER
Please stay well hydrated . What is his sugar numbers today. Is he still on any antibiotics  Seen he is on diflucan, this yeast medication interacts with statin so please do not take cholesterol med atorvastatin while on the diflucan. Will call when stool culture back   Best not to plug anything up. Most diarrhea needs to get out. Please realize if he restarted the metformin upon being home that in itself can cause diarrhea for up to a week, let me know then I normally would not do sample. Eat yogurt with active cultures Lashay Mccann ) once a day.      Torie KELLOGG CNP

## 2023-03-17 NOTE — TELEPHONE ENCOUNTER
Patient states he will be on Diflucan for an extended time due to knee issue being permanent. Patient asking what other options for statin med due to interaction. Patient was on metformin in hospital.     Wife to  supplies for lab.

## 2023-03-17 NOTE — TELEPHONE ENCOUNTER
Patient having Diarrhea for 3 Days. Patient to be seen for HFU 3/23/2023.  Patient states he feels fatigued and would like provider's advise/script sent to  [address] [place] [address]

## 2023-03-21 NOTE — TELEPHONE ENCOUNTER
Stay off cholesterol med until post hospital visit , bring bottles of meds to visit please. Usually not on diflucan high dose for long periods of time.  Must watch serial liver enzymes

## 2023-03-23 ENCOUNTER — OFFICE VISIT (OUTPATIENT)
Dept: PRIMARY CARE CLINIC | Age: 78
End: 2023-03-23

## 2023-03-23 VITALS
HEIGHT: 71 IN | HEART RATE: 76 BPM | DIASTOLIC BLOOD PRESSURE: 78 MMHG | SYSTOLIC BLOOD PRESSURE: 122 MMHG | WEIGHT: 302 LBS | OXYGEN SATURATION: 98 % | BODY MASS INDEX: 42.28 KG/M2

## 2023-03-23 DIAGNOSIS — I20.9 ANGINA PECTORIS, UNSPECIFIED (HCC): ICD-10-CM

## 2023-03-23 DIAGNOSIS — F33.0 MAJOR DEPRESSIVE DISORDER, RECURRENT, MILD (HCC): ICD-10-CM

## 2023-03-23 DIAGNOSIS — I50.22 CHRONIC SYSTOLIC CONGESTIVE HEART FAILURE (HCC): ICD-10-CM

## 2023-03-23 DIAGNOSIS — D68.9 COAGULATION DEFECT (HCC): ICD-10-CM

## 2023-03-23 DIAGNOSIS — E66.01 OBESITY, CLASS III, BMI 40-49.9 (MORBID OBESITY) (HCC): ICD-10-CM

## 2023-03-23 DIAGNOSIS — F33.1 MAJOR DEPRESSIVE DISORDER, RECURRENT, MODERATE (HCC): ICD-10-CM

## 2023-03-23 DIAGNOSIS — I50.32 CHRONIC DIASTOLIC (CONGESTIVE) HEART FAILURE (HCC): ICD-10-CM

## 2023-03-23 DIAGNOSIS — I69.351 HEMIPARESIS AFFECTING RIGHT SIDE AS LATE EFFECT OF CEREBROVASCULAR ACCIDENT (CVA) (HCC): ICD-10-CM

## 2023-03-23 DIAGNOSIS — E11.620 NLD (NECROBIOSIS LIPOIDICA DIABETICORUM) (HCC): ICD-10-CM

## 2023-03-23 DIAGNOSIS — Z09 HOSPITAL DISCHARGE FOLLOW-UP: Primary | ICD-10-CM

## 2023-03-23 DIAGNOSIS — J44.9 CHRONIC OBSTRUCTIVE PULMONARY DISEASE, UNSPECIFIED COPD TYPE (HCC): ICD-10-CM

## 2023-03-23 DIAGNOSIS — E11.9 CONTROLLED TYPE 2 DIABETES MELLITUS WITHOUT COMPLICATION, WITHOUT LONG-TERM CURRENT USE OF INSULIN (HCC): ICD-10-CM

## 2023-03-23 DIAGNOSIS — I25.119 ATHEROSCLEROSIS OF NATIVE CORONARY ARTERY WITH ANGINA PECTORIS, UNSPECIFIED WHETHER NATIVE OR TRANSPLANTED HEART (HCC): ICD-10-CM

## 2023-03-23 DIAGNOSIS — L97.812 ULCER OF RIGHT PRETIBIAL REGION, WITH FAT LAYER EXPOSED (HCC): ICD-10-CM

## 2023-03-23 DIAGNOSIS — F33.41 RECURRENT MAJOR DEPRESSIVE DISORDER, IN PARTIAL REMISSION (HCC): ICD-10-CM

## 2023-03-23 LAB — HBA1C MFR BLD: 5.7 %

## 2023-03-23 RX ORDER — BACLOFEN 10 MG/1
10 TABLET ORAL 2 TIMES DAILY PRN
Qty: 10 TABLET | Refills: 0 | Status: SHIPPED | OUTPATIENT
Start: 2023-03-23

## 2023-03-23 SDOH — ECONOMIC STABILITY: FOOD INSECURITY: WITHIN THE PAST 12 MONTHS, THE FOOD YOU BOUGHT JUST DIDN'T LAST AND YOU DIDN'T HAVE MONEY TO GET MORE.: NEVER TRUE

## 2023-03-23 SDOH — ECONOMIC STABILITY: HOUSING INSECURITY
IN THE LAST 12 MONTHS, WAS THERE A TIME WHEN YOU DID NOT HAVE A STEADY PLACE TO SLEEP OR SLEPT IN A SHELTER (INCLUDING NOW)?: NO

## 2023-03-23 SDOH — ECONOMIC STABILITY: FOOD INSECURITY: WITHIN THE PAST 12 MONTHS, YOU WORRIED THAT YOUR FOOD WOULD RUN OUT BEFORE YOU GOT MONEY TO BUY MORE.: NEVER TRUE

## 2023-03-23 SDOH — ECONOMIC STABILITY: INCOME INSECURITY: HOW HARD IS IT FOR YOU TO PAY FOR THE VERY BASICS LIKE FOOD, HOUSING, MEDICAL CARE, AND HEATING?: NOT HARD AT ALL

## 2023-03-23 ASSESSMENT — PATIENT HEALTH QUESTIONNAIRE - PHQ9
1. LITTLE INTEREST OR PLEASURE IN DOING THINGS: 0
SUM OF ALL RESPONSES TO PHQ QUESTIONS 1-9: 0
SUM OF ALL RESPONSES TO PHQ9 QUESTIONS 1 & 2: 0
SUM OF ALL RESPONSES TO PHQ QUESTIONS 1-9: 0
SUM OF ALL RESPONSES TO PHQ QUESTIONS 1-9: 0
2. FEELING DOWN, DEPRESSED OR HOPELESS: 0
SUM OF ALL RESPONSES TO PHQ QUESTIONS 1-9: 0

## 2023-03-23 NOTE — PROGRESS NOTES
at time of hospital discharge: Yes    A comprehensive review of systems was negative except for what was noted in the HPI. Objective:    /78 (Site: Left Upper Arm, Position: Sitting)   Pulse 76   Ht 5' 11\" (1.803 m)   Wt (!) 302 lb (137 kg)   SpO2 98%   BMI 42.12 kg/m²   General Appearance: alert and oriented to person, place and time, well developed and well- nourished, in no acute distress  Skin: warm and dry, no rash or erythema  Head: normocephalic and atraumatic  Eyes: pupils equal, round, and reactive to light, extraocular eye movements intact, conjunctivae normal  ENT: tympanic membrane, external ear and ear canal normal bilaterally, nose without deformity  Neck: supple and non-tender without mass, no thyromegaly or thyroid nodules, no cervical lymphadenopathy  Pulmonary/Chest: clear to auscultation bilaterally- no wheezes, rales or rhonchi, normal air movement, no respiratory distress  Cardiovascular: normal rate, regular rhythm, normal S1 and S2, no murmurs, rubs, clicks, or gallops, distal pulses intact, no carotid bruits  Abdomen: soft, non-tender, non-distended, normal bowel sounds  Extremities: no cyanosis, clubbing or edema  Musculoskeletal: limited ROM right sided weakness. no joint swelling, deformity or tenderness, has brace non weight bearing   Neurologic: gait ataxic with right sided weakness,  coordination normal, speech      An electronic signature was used to authenticate this note.   --Raghavendra Graf, BESS - CNP

## 2023-03-28 PROBLEM — T81.31XA RUPTURE OF OPERATION WOUND: Status: RESOLVED | Noted: 2021-02-08 | Resolved: 2023-03-28

## 2023-03-28 PROBLEM — N32.81 OAB (OVERACTIVE BLADDER): Status: RESOLVED | Noted: 2022-01-17 | Resolved: 2023-03-28

## 2023-03-28 PROBLEM — Z96.651 PRESENCE OF RIGHT ARTIFICIAL KNEE JOINT: Status: RESOLVED | Noted: 2021-01-28 | Resolved: 2023-03-28

## 2023-03-28 PROBLEM — T81.31XA DEHISCENCE OF OPERATIVE WOUND: Status: RESOLVED | Noted: 2021-02-08 | Resolved: 2023-03-28

## 2023-03-28 PROBLEM — I25.119 ATHEROSCLEROTIC HEART DISEASE OF NATIVE CORONARY ARTERY WITH UNSPECIFIED ANGINA PECTORIS (HCC): Status: ACTIVE | Noted: 2023-03-28

## 2023-03-28 PROBLEM — F33.9 MAJOR DEPRESSIVE DISORDER, RECURRENT, UNSPECIFIED (HCC): Status: ACTIVE | Noted: 2023-03-28

## 2023-03-28 PROBLEM — R06.02 SOB (SHORTNESS OF BREATH): Status: RESOLVED | Noted: 2017-05-12 | Resolved: 2023-03-28

## 2023-03-28 PROBLEM — I20.9 ANGINA PECTORIS, UNSPECIFIED (HCC): Status: ACTIVE | Noted: 2023-03-28

## 2023-03-28 PROBLEM — F33.0 MAJOR DEPRESSIVE DISORDER, RECURRENT, MILD (HCC): Status: ACTIVE | Noted: 2023-03-28

## 2023-03-28 PROBLEM — F33.1 MAJOR DEPRESSIVE DISORDER, RECURRENT, MODERATE (HCC): Status: ACTIVE | Noted: 2023-03-28

## 2023-04-13 ENCOUNTER — TELEPHONE (OUTPATIENT)
Dept: PRIMARY CARE CLINIC | Age: 78
End: 2023-04-13

## 2023-04-17 RX ORDER — GLUCOSAMINE HCL/CHONDROITIN SU 500-400 MG
CAPSULE ORAL
Qty: 200 STRIP | Refills: 2 | Status: CANCELLED | OUTPATIENT
Start: 2023-04-17

## 2023-04-19 ENCOUNTER — TELEPHONE (OUTPATIENT)
Dept: PRIMARY CARE CLINIC | Age: 78
End: 2023-04-19

## 2023-04-19 DIAGNOSIS — I10 ESSENTIAL HYPERTENSION: Primary | ICD-10-CM

## 2023-04-19 DIAGNOSIS — I50.32 CHRONIC DIASTOLIC (CONGESTIVE) HEART FAILURE (HCC): ICD-10-CM

## 2023-04-19 LAB
ALANINE AMINOTRANSFERASE (SGPT) (U/L) IN SER/PLAS: 20 U/L (ref 10–52)
ALBUMIN (G/DL) IN SER/PLAS: 3.8 G/DL (ref 3.4–5)
ALBUMIN SERPL-MCNC: 3.8 G/DL
ALBUMIN: 3.8 G/DL (ref 3.4–5)
ALKALINE PHOSPHATASE (U/L) IN SER/PLAS: 67 U/L (ref 33–136)
ALP BLD-CCNC: 67 U/L
ALP BLD-CCNC: 67 U/L (ref 33–136)
ALT SERPL-CCNC: 20 U/L
ALT SERPL-CCNC: 20 U/L (ref 10–52)
ANION GAP IN SER/PLAS: 15 MMOL/L (ref 10–20)
ANION GAP SERPL CALCULATED.3IONS-SCNC: 15 MMOL/L
ANION GAP SERPL CALCULATED.3IONS-SCNC: 15 MMOL/L (ref 10–20)
ASPARTATE AMINOTRANSFERASE (SGOT) (U/L) IN SER/PLAS: 20 U/L (ref 9–39)
AST SERPL-CCNC: 20 U/L
AST SERPL-CCNC: 20 U/L (ref 9–39)
BICARBONATE: 21 MMOL/L (ref 21–32)
BILIRUB SERPL-MCNC: 0.6 MG/DL (ref 0.1–1.4)
BILIRUB SERPL-MCNC: 0.6 MG/DL (ref 0–1.2)
BILIRUBIN TOTAL (MG/DL) IN SER/PLAS: 0.6 MG/DL (ref 0–1.2)
BUN BLDV-MCNC: 24 MG/DL
CALCIUM (MG/DL) IN SER/PLAS: 8.7 MG/DL (ref 8.6–10.3)
CALCIUM SERPL-MCNC: 8.7 MG/DL
CALCIUM SERPL-MCNC: 8.7 MG/DL (ref 8.6–10.3)
CARBON DIOXIDE, TOTAL (MMOL/L) IN SER/PLAS: 21 MMOL/L (ref 21–32)
CHLORIDE (MMOL/L) IN SER/PLAS: 104 MMOL/L (ref 98–107)
CHLORIDE BLD-SCNC: 104 MMOL/L
CHLORIDE BLD-SCNC: 104 MMOL/L (ref 98–107)
CO2: 21 MMOL/L
CREAT SERPL-MCNC: 0.95 MG/DL
CREAT SERPL-MCNC: 0.95 MG/DL (ref 0.5–1.3)
CREATININE (MG/DL) IN SER/PLAS: 0.95 MG/DL (ref 0.5–1.3)
EGFR MALE: 83 ML/MIN/1.73M2
EGFR: NORMAL
GFR MALE: 83 ML/MIN/1.73M2
GLUCOSE (MG/DL) IN SER/PLAS: 123 MG/DL (ref 74–99)
GLUCOSE BLD-MCNC: 123 MG/DL
GLUCOSE: 123 MG/DL (ref 74–99)
MAGNESIUM (MG/DL) IN SER/PLAS: 1.63 MG/DL (ref 1.6–2.4)
MAGNESIUM: 1.63 MG/DL
MAGNESIUM: 1.63 MG/DL (ref 1.6–2.4)
POTASSIUM (MMOL/L) IN SER/PLAS: 4 MMOL/L (ref 3.5–5.3)
POTASSIUM SERPL-SCNC: 4 MMOL/L
POTASSIUM SERPL-SCNC: 4 MMOL/L (ref 3.5–5.3)
PROTEIN TOTAL: 5.8 G/DL (ref 6.4–8.2)
SODIUM (MMOL/L) IN SER/PLAS: 136 MMOL/L (ref 136–145)
SODIUM BLD-SCNC: 136 MMOL/L
SODIUM BLD-SCNC: 136 MMOL/L (ref 136–145)
TOTAL PROTEIN: 5.8
TOTAL PROTEIN: 5.8 G/DL (ref 6.4–8.2)
UREA NITROGEN (MG/DL) IN SER/PLAS: 24 MG/DL (ref 6–23)
UREA NITROGEN: 24 MG/DL (ref 6–23)

## 2023-04-20 DIAGNOSIS — I50.32 CHRONIC DIASTOLIC (CONGESTIVE) HEART FAILURE (HCC): ICD-10-CM

## 2023-04-20 DIAGNOSIS — I10 ESSENTIAL HYPERTENSION: ICD-10-CM

## 2023-04-28 ENCOUNTER — TELEPHONE (OUTPATIENT)
Dept: UROLOGY | Age: 78
End: 2023-04-28

## 2023-05-01 DIAGNOSIS — I50.32 CHRONIC DIASTOLIC (CONGESTIVE) HEART FAILURE (HCC): ICD-10-CM

## 2023-05-01 DIAGNOSIS — I10 ESSENTIAL HYPERTENSION: ICD-10-CM

## 2023-05-02 DIAGNOSIS — I10 ESSENTIAL HYPERTENSION: ICD-10-CM

## 2023-05-02 DIAGNOSIS — I25.10 CORONARY ARTERY DISEASE INVOLVING NATIVE CORONARY ARTERY OF NATIVE HEART WITHOUT ANGINA PECTORIS: ICD-10-CM

## 2023-05-02 DIAGNOSIS — E78.5 HYPERLIPIDEMIA, UNSPECIFIED HYPERLIPIDEMIA TYPE: ICD-10-CM

## 2023-05-02 DIAGNOSIS — E55.9 VITAMIN D DEFICIENCY DISEASE: ICD-10-CM

## 2023-05-02 DIAGNOSIS — R06.02 SOB (SHORTNESS OF BREATH): Primary | ICD-10-CM

## 2023-05-02 DIAGNOSIS — R53.83 OTHER FATIGUE: ICD-10-CM

## 2023-05-02 RX ORDER — HYDROCHLOROTHIAZIDE 25 MG/1
TABLET ORAL
Qty: 90 TABLET | Refills: 0 | Status: SHIPPED | OUTPATIENT
Start: 2023-05-02

## 2023-05-02 RX ORDER — SPIRONOLACTONE 25 MG/1
TABLET ORAL
Qty: 90 TABLET | Refills: 0 | Status: SHIPPED | OUTPATIENT
Start: 2023-05-02

## 2023-05-02 RX ORDER — APIXABAN 5 MG/1
5 TABLET, FILM COATED ORAL EVERY 12 HOURS
Qty: 60 TABLET | Refills: 11 | Status: SHIPPED | OUTPATIENT
Start: 2023-05-02

## 2023-05-02 RX ORDER — ATORVASTATIN CALCIUM 80 MG/1
80 TABLET, FILM COATED ORAL DAILY
Qty: 90 TABLET | Refills: 1 | Status: SHIPPED | OUTPATIENT
Start: 2023-05-02

## 2023-05-02 RX ORDER — LOSARTAN POTASSIUM 25 MG/1
TABLET ORAL
Qty: 45 TABLET | Refills: 0 | Status: SHIPPED | OUTPATIENT
Start: 2023-05-02

## 2023-05-06 DIAGNOSIS — I10 ESSENTIAL HYPERTENSION: ICD-10-CM

## 2023-05-08 RX ORDER — HYDROCHLOROTHIAZIDE 25 MG/1
TABLET ORAL
Qty: 90 TABLET | Refills: 0 | Status: SHIPPED | OUTPATIENT
Start: 2023-05-08

## 2023-05-08 RX ORDER — METOPROLOL SUCCINATE 25 MG/1
TABLET, EXTENDED RELEASE ORAL
Qty: 90 TABLET | Refills: 0 | Status: SHIPPED | OUTPATIENT
Start: 2023-05-08

## 2023-05-12 ENCOUNTER — TELEPHONE (OUTPATIENT)
Dept: FAMILY MEDICINE CLINIC | Age: 78
End: 2023-05-12

## 2023-06-02 ENCOUNTER — TELEPHONE (OUTPATIENT)
Dept: PRIMARY CARE CLINIC | Age: 78
End: 2023-06-02

## 2023-06-13 NOTE — TELEPHONE ENCOUNTER
Last OV: 3/23/2023  Last RX: 10/26/2022, 11/9/2022   Next scheduled apt: 6/14/2023        Surescripts requesting refills

## 2023-06-14 ENCOUNTER — OFFICE VISIT (OUTPATIENT)
Dept: PRIMARY CARE CLINIC | Age: 78
End: 2023-06-14
Payer: MEDICARE

## 2023-06-14 VITALS
SYSTOLIC BLOOD PRESSURE: 120 MMHG | BODY MASS INDEX: 41.86 KG/M2 | HEART RATE: 66 BPM | DIASTOLIC BLOOD PRESSURE: 80 MMHG | OXYGEN SATURATION: 95 % | HEIGHT: 71 IN | WEIGHT: 299 LBS

## 2023-06-14 DIAGNOSIS — Z13.29 SCREENING FOR THYROID DISORDER: ICD-10-CM

## 2023-06-14 DIAGNOSIS — G89.29 CHRONIC RIGHT SHOULDER PAIN: ICD-10-CM

## 2023-06-14 DIAGNOSIS — I69.359 CVA, OLD, HEMIPARESIS (HCC): ICD-10-CM

## 2023-06-14 DIAGNOSIS — M46.1 SACROILIITIS (HCC): ICD-10-CM

## 2023-06-14 DIAGNOSIS — E11.9 CONTROLLED TYPE 2 DIABETES MELLITUS WITHOUT COMPLICATION, WITHOUT LONG-TERM CURRENT USE OF INSULIN (HCC): ICD-10-CM

## 2023-06-14 DIAGNOSIS — I10 ESSENTIAL HYPERTENSION: Primary | ICD-10-CM

## 2023-06-14 DIAGNOSIS — E78.2 MIXED HYPERLIPIDEMIA: ICD-10-CM

## 2023-06-14 DIAGNOSIS — Z98.84 HX OF GASTRIC BYPASS: ICD-10-CM

## 2023-06-14 DIAGNOSIS — M25.511 CHRONIC RIGHT SHOULDER PAIN: ICD-10-CM

## 2023-06-14 DIAGNOSIS — Z96.651 S/P TOTAL KNEE ARTHROPLASTY, RIGHT: ICD-10-CM

## 2023-06-14 DIAGNOSIS — E11.620 NLD (NECROBIOSIS LIPOIDICA DIABETICORUM) (HCC): ICD-10-CM

## 2023-06-14 DIAGNOSIS — J44.9 CHRONIC OBSTRUCTIVE PULMONARY DISEASE, UNSPECIFIED COPD TYPE (HCC): ICD-10-CM

## 2023-06-14 PROCEDURE — 99214 OFFICE O/P EST MOD 30 MIN: CPT | Performed by: NURSE PRACTITIONER

## 2023-06-14 PROCEDURE — 3044F HG A1C LEVEL LT 7.0%: CPT | Performed by: NURSE PRACTITIONER

## 2023-06-14 PROCEDURE — G8417 CALC BMI ABV UP PARAM F/U: HCPCS | Performed by: NURSE PRACTITIONER

## 2023-06-14 PROCEDURE — 3023F SPIROM DOC REV: CPT | Performed by: NURSE PRACTITIONER

## 2023-06-14 PROCEDURE — 1036F TOBACCO NON-USER: CPT | Performed by: NURSE PRACTITIONER

## 2023-06-14 PROCEDURE — 2022F DILAT RTA XM EVC RTNOPTHY: CPT | Performed by: NURSE PRACTITIONER

## 2023-06-14 PROCEDURE — 3017F COLORECTAL CA SCREEN DOC REV: CPT | Performed by: NURSE PRACTITIONER

## 2023-06-14 PROCEDURE — 1123F ACP DISCUSS/DSCN MKR DOCD: CPT | Performed by: NURSE PRACTITIONER

## 2023-06-14 PROCEDURE — 3074F SYST BP LT 130 MM HG: CPT | Performed by: NURSE PRACTITIONER

## 2023-06-14 PROCEDURE — G8427 DOCREV CUR MEDS BY ELIG CLIN: HCPCS | Performed by: NURSE PRACTITIONER

## 2023-06-14 PROCEDURE — 3078F DIAST BP <80 MM HG: CPT | Performed by: NURSE PRACTITIONER

## 2023-06-14 RX ORDER — ASPIRIN 81 MG/1
81 TABLET ORAL DAILY
COMMUNITY

## 2023-06-14 RX ORDER — ISOSORBIDE MONONITRATE 30 MG/1
TABLET, EXTENDED RELEASE ORAL
Qty: 90 TABLET | Refills: 1 | Status: SHIPPED | OUTPATIENT
Start: 2023-06-14

## 2023-06-14 RX ORDER — POTASSIUM CHLORIDE 750 MG/1
TABLET, FILM COATED, EXTENDED RELEASE ORAL
Qty: 90 TABLET | Refills: 1 | Status: SHIPPED | OUTPATIENT
Start: 2023-06-14

## 2023-06-14 RX ORDER — ESCITALOPRAM OXALATE 20 MG/1
20 TABLET ORAL DAILY
Qty: 90 TABLET | Refills: 0 | Status: SHIPPED | OUTPATIENT
Start: 2023-06-14

## 2023-06-20 ASSESSMENT — ENCOUNTER SYMPTOMS
EYES NEGATIVE: 1
CHEST TIGHTNESS: 0
CONSTIPATION: 0
BACK PAIN: 1
COUGH: 0
RHINORRHEA: 0
WHEEZING: 0

## 2023-06-20 NOTE — PROGRESS NOTES
W/C order faxed. Spoke with Orpha Leader office and they will send a message to . She stated it will probably be tomorrow before he can put in updated orders. They were provided with our office phone and fax.

## 2023-06-28 ENCOUNTER — TELEPHONE (OUTPATIENT)
Dept: PRIMARY CARE CLINIC | Age: 78
End: 2023-06-28

## 2023-07-06 ENCOUNTER — HOSPITAL ENCOUNTER (OUTPATIENT)
Dept: PHYSICAL THERAPY | Age: 78
Setting detail: THERAPIES SERIES
Discharge: HOME | End: 2023-07-06
Payer: MEDICARE

## 2023-07-06 PROCEDURE — 97162 PT EVAL MOD COMPLEX 30 MIN: CPT

## 2023-07-06 PROCEDURE — 97110 THERAPEUTIC EXERCISES: CPT

## 2023-07-06 ASSESSMENT — PAIN SCALES - GENERAL: PAINLEVEL_OUTOF10: 0

## 2023-07-06 NOTE — PLAN OF CARE
[facility] NORMA ATKINS       Phone: [phone]   Date: 2023                      Outpatient Physical Therapy  Fax: [phone]    ACCT #: [de-identified]                     Plan of Care  [facility]#: [id number]  Patient: Evelyne Masters  : 1945    Referring Provider (secondary): Heath Zelaya    Diagnosis: Right TKA revision     Treatment Diagnosis: Right TKR revision, TIA    Assessment  Body Structures, Functions, Activity Limitations Requiring Skilled Therapeutic Intervention: Decreased functional mobility , Decreased ADL status, Decreased ROM, Decreased body mechanics, Decreased tolerance to work activity, Decreased strength, Decreased safe awareness, Increased pain, Decreased posture, Decreased balance, Decreased endurance  Assessment: Patient presents s/p multiple surgeries to right knee due to complications and infection. He also has significant right sided weakness due to TIA in February which is complicating his progress. ROM is restricted by surgeon and brace. He is allowed WBAT at this point. He should benefit from skilled PT to improve LE strength, balance, stability and ambulation ability.   Therapy Prognosis: Fair    Treatment Plan   Days: 2 times per week Weeks: 8 weeks Total # of Visits Approved: 16    Patient Education/HEP, Therapeutic Exercise, Neuro Re-ed, Gait Training, and Therapeutic Activity     Goals  Short Term Goals  Time Frame for Short Term Goals: 8 visits  Short Term Goal 1: Patient will demonstrate compliance with current HEP to optimize therapy progress  Short Term Goal 2: Patient will ambulate safely with FWW and SBA  Long Term Goals  Time Frame for Long Term Goals : 16 visits  Long Term Goal 1: Patient will improve right knee flexion and extension MMT to 4/5 within available ROM to improve ease of transfers  Long Term Goal 2: Patient will improve transfer ability to allow supine<>sit with SBA  Long Term Goal 3: Patient will improve transfer ability to allow sit<>stand with

## 2023-07-06 NOTE — THERAPY EVALUATION
Phone: [phone] [facility]         Fax: [phone]                      Outpatient Physical Therapy                                                                      Evaluation    Date: 2023  Patient: Bibi Carrillo  : 1945  ACCT #: [de-identified]    Referring Provider (secondary): Lee Rosas    Diagnosis: Right TKA revision    Treatment Diagnosis: Right TKR revision, TIA  PT Insurance Information: Medicare, Med Nec  Total # of Visits Approved: 16 Per Physician Order  Total # of Visits to Date: 1     Subjective  Subjective: Patient presents with c/o right knee pain. Had TKR in 2021. \"Then it burst open in February and they put it back together. \"  Then became infected. Went to [facility] and was recommended to have it fused. Then found Dr. Jeniffer Herring who removed prosthesis, put in spacer and then revised again. \"Then my muscles  in the calf, had cadaver parts put in but they failed. That's where we are now. \"  He dneies much pain in the knee, other than occasional \"electric shock from nerve regenerating. \"  He is doing well functionally around his home. He did also have TIA in February and has not full regained his strength yet. He reports having right drop foot and weakness in right leg. He continues to have stiffness and pain in shoulder and elbow on right. Shoulder feels Klever Flynn it is coming out of socket. \"  He is right hand dominant. Comments: AROM in brace only. 0-30 deg, no forced flexion. Limited arc isometrics quadricep and isotonic hamstring PRE, hamstring stretching, progress from walker to cane, cane for 1-2 months.   WBAT in brace  Additional Pertinent Hx: Arthritis, CAD, Chronic diastolic, Chronic systolic congestive heart failure, COPD, Hyperlipidemia,     Hypertension, Ascending cholangitis, Chronic back pain  DDD, Heart disease, History of angioplasty 2008 2 stents placed, Hx of CABG  x1 in , Major depressive disorder, Obesity,

## 2023-07-10 ENCOUNTER — HOSPITAL ENCOUNTER (OUTPATIENT)
Dept: PHYSICAL THERAPY | Age: 78
Setting detail: THERAPIES SERIES
Discharge: HOME | End: 2023-07-10
Payer: MEDICARE

## 2023-07-10 PROCEDURE — 97110 THERAPEUTIC EXERCISES: CPT

## 2023-07-10 NOTE — PROGRESS NOTES
Phone: [phone] [facility]      Fax: [phone]                            Outpatient Physical Therapy                                                                            Daily Note    Date: 7/10/2023  Patient Name: Cira Aguilar        MRN: [id number]   ACCT#:  [de-identified]  : 1945  (78 y.o.)    Referring Provider (secondary): Joey Matute         Diagnosis: Right TKA revision  Treatment Diagnosis: Right TKR revision, TIA    PT Insurance Information: Medicare, Med Nec  Total # of Visits Approved: 16 Per Physician Order  Total # of Visits to Date: 2  Plan of Care/Certification Expiration Date: 23    Pre-Treatment Pain:  0/10     Assessment  Assessment: Patient denies R knee pain this morning. Initiated seated and standing LE strengthening exercises per flowsheet. Patient continues to have siginificant quad weakness. Patient was able to complete ex in // bars, but limiting factor was R toe dragging on floor due to foot drop. At end of session patient was able to ambulate with FWW and CGA x75' before requesting w/c. Post session patient denies pain. Will continue to progress as able.     Plan  Continue with current plan of care    Exercises/Modalities/Manual:  See DocFlow Sheet    Education: Exercise form     Goals  (Total # of Visits to Date: 2)   Short Term Goals  Time Frame for Short Term Goals: 8 visits  Short Term Goal 1: Patient will demonstrate compliance with current HEP to optimize therapy progress  Short Term Goal 2: Patient will ambulate safely with FWW and SBA    Long Term Goals  Time Frame for Long Term Goals : 16 visits  Long Term Goal 1: Patient will improve right knee flexion and extension MMT to 4/5 within available ROM to improve ease of transfers  Long Term Goal 2: Patient will improve transfer ability to allow supine<>sit with SBA  Long Term Goal 3: Patient will improve transfer ability to allow sit<>stand with good control and SBA  LTG 4 Current

## 2023-07-11 ENCOUNTER — TELEPHONE (OUTPATIENT)
Dept: PRIMARY CARE CLINIC | Age: 78
End: 2023-07-11

## 2023-07-11 DIAGNOSIS — I69.359 CVA, OLD, HEMIPARESIS (HCC): Primary | ICD-10-CM

## 2023-07-11 DIAGNOSIS — J44.9 CHRONIC OBSTRUCTIVE PULMONARY DISEASE, UNSPECIFIED COPD TYPE (HCC): ICD-10-CM

## 2023-07-11 DIAGNOSIS — Z96.651 S/P TOTAL KNEE ARTHROPLASTY, RIGHT: ICD-10-CM

## 2023-07-11 DIAGNOSIS — I69.351 HEMIPARESIS AFFECTING RIGHT SIDE AS LATE EFFECT OF CEREBROVASCULAR ACCIDENT (CVA) (HCC): ICD-10-CM

## 2023-07-11 DIAGNOSIS — Z98.84 HX OF GASTRIC BYPASS: ICD-10-CM

## 2023-07-11 DIAGNOSIS — I10 ESSENTIAL HYPERTENSION: ICD-10-CM

## 2023-07-11 NOTE — TELEPHONE ENCOUNTER
Christiane Hull from PT called and stated pt was recently d/c from [address] [place] [address], and feels pt would benefit from outpatient OT.  Christiane Hull is asking if PCP fax over order for OT

## 2023-07-14 ENCOUNTER — HOSPITAL ENCOUNTER (OUTPATIENT)
Dept: OCCUPATIONAL THERAPY | Age: 78
Setting detail: THERAPIES SERIES
Discharge: HOME | End: 2023-07-14
Payer: MEDICARE

## 2023-07-14 PROCEDURE — 97166 OT EVAL MOD COMPLEX 45 MIN: CPT

## 2023-07-18 ENCOUNTER — HOSPITAL ENCOUNTER (OUTPATIENT)
Dept: PHYSICAL THERAPY | Age: 78
Setting detail: THERAPIES SERIES
Discharge: HOME | End: 2023-07-18
Payer: MEDICARE

## 2023-07-18 PROCEDURE — 97110 THERAPEUTIC EXERCISES: CPT

## 2023-07-18 NOTE — PROGRESS NOTES
Phone: [phone] [facility]      Fax: [phone]                            Outpatient Physical Therapy                                                                            Daily Note    Date: 2023  Patient Name: Johnnie Ribera        MRN: [id number]   ACCT#:  [de-identified]  : 1945  (78 y.o.)    Referring Provider (secondary): Alyssa Harris         Diagnosis: Right TKA revision  Treatment Diagnosis: Right TKR revision, TIA    PT Insurance Information: Medicare, Med Nec  Total # of Visits Approved: 16 Per Physician Order  Total # of Visits to Date: 3  Plan of Care/Certification Expiration Date: 23    Pre-Treatment Pain:  0/10     Assessment  Assessment: Patient denies pain this morning. Following last session patient notes he didn't have any increased pain, but notes he was pretty tired. Progressed exersises this morning with good tolerance from patient. At end of session patient ambulated with FWW and CGA x75'. Patient denies pain afterwards. Will continue to progress as able.     Plan  Continue with current plan of care    Exercises/Modalities/Manual:  See DocFlow Sheet    Education: Exercise form     Goals  (Total # of Visits to Date: 3)   Short Term Goals  Time Frame for Short Term Goals: 8 visits  Short Term Goal 1: Patient will demonstrate compliance with current HEP to optimize therapy progress  Short Term Goal 2: Patient will ambulate safely with FWW and SBA    Long Term Goals  Time Frame for Long Term Goals : 16 visits  Long Term Goal 1: Patient will improve right knee flexion and extension MMT to 4/5 within available ROM to improve ease of transfers  Long Term Goal 2: Patient will improve transfer ability to allow supine<>sit with SBA  Long Term Goal 3: Patient will improve transfer ability to allow sit<>stand with good control and SBA  LTG 4 Current Status[de-identified] Patient will demonstrate safe ambulation with cane    Post Treatment Pain:  0/10    Time In: 5030

## 2023-07-19 ENCOUNTER — HOSPITAL ENCOUNTER (OUTPATIENT)
Dept: OCCUPATIONAL THERAPY | Age: 78
Setting detail: THERAPIES SERIES
Discharge: HOME | End: 2023-07-19
Payer: MEDICARE

## 2023-07-19 PROCEDURE — 97112 NEUROMUSCULAR REEDUCATION: CPT

## 2023-07-19 PROCEDURE — 97110 THERAPEUTIC EXERCISES: CPT

## 2023-07-19 NOTE — PLAN OF CARE
9 [employer] and [address] [place] [address]         Phone: [phone]  Fax: [phone]    Outpatient Occupational Therapy Plan of Care    Date: 2023  Patient: Samuel Alan  : 1945  Account #: [de-identified]   Referring provider: Rufina Vu MD  CSN#: [id number]  Diagnosis: CVA    Objective:     N/A    Assessment:     Added an additional long term goal as documented below to address right elbow and shoulder AROM. Per KINGSLEY/L, patient emphasized the importance of functional elbow and shoulder AROM this date to resume driving tasks. Plan:     Frequency:  1-2 times/week  Duration:  12 visits    [x] HP/CP       [x] Electrical Stimulation   [x] Strengthening    [x] Active/Passive ROM  [] Muscle Re-education    [x] Fine Motor Coordination    [x] Ultrasound    [] Splinting   [] Developmental Therapy    [] Sensory Integration [x] Patient Education/HEP [] Visual Perception Retraining   [x] ADL Training    [] Cognitive Retraining  [x] Manual therapy  [] Paraffin    [x] Therapeutic Exercise  [x] Therapeutic Activity  [] Other:    Goals:     Time Frame for Short Term Goals: 6 visits  To increase independence with ADLs/IADLs, patient will:    1. Increase right wrist AROM:  Wrist extension to 25 degrees. Wrist flexion to 64 degrees. Wrist RD to 19 degrees. Wrist UD to 26 degrees. 2. Increase right forearm supination to 87 degrees. 3. Increase right 1st digit AROM:  MCP joint to 25 degrees. IP joint to 42 degrees. 4. Increase right 2nd digit AROM:  MCP flexion to 65 degrees. PIP flexion to 89 degrees. 5. Increase right 3rd digit AROM:  MCP flexion to 76 degrees. PIP flexion to 90 degrees. DIP flexion to 49 degrees. 6. Increase right 4th digit AROM:  PIP flexion to 89 degrees. DIP flexion to 50 degrees. 7. Increase right 5th digit AROM:  MCP flexion to 80 degrees. PIP flexion to 80 degrees. DIP flexion to 44 degrees.      8. Decrease right 4th digit extensor lag:  PIP

## 2023-07-19 NOTE — PROGRESS NOTES
9 [facility] and [address] [place] [address]         Phone: [phone]  Fax: [phone]    Outpatient Occupational Therapy Daily Note    Date:  2023  Patient Name:  Bryn Alan      :  1945    MRN: [id number]  Referring provider: Sacha Bacon MD    Insurance: Medicare  Diagnosis: CVA    Onset Date: 2023   Next Dr. Paez Punch: Dr. Jeffery Guthrie in Transfer (orthopedic surgeon) on 2023. Visit # / total visits: 2 /   Cancels/No Shows: 0    Subjective:    Patient presents to session independently in wheelchair. Reports pain on medial side of RUE when he squeezes a ball, or reaches away from the body . Describes it as a nerve pain (sharp and rough). Emphasizes to this therapist how important it is to him to regain a functional ROM in RUE so he can return driving when able. Also states that his shoulder and elbow are his biggest priority. Pre Treatment Pain:    [] Yes  [x] No Location: None    Pain Rating: (0-10 scale) 0/10      Post Treatment Pain:  [] Yes  [x] No  Location: None     Pain Rating: (0-10 scale) 0/10    Objective:     See Exercise Flowsheet regarding exercises completed this session      Assessment:     Completed exercises/modalities as documented in Exercise Flowsheet with good  tolerance overall. Provided patient with blue therapy sponge to practice grasp and release exercises on RUE   Educated patient that driving rehab would be beneficial (once cleared) to relearn how to drive with a therapist instruction and guidance   Will continue to address goals and progress patient as able. [x] Progressing toward goals  [] No change  [] Regressing from goals  [x] Patient would continue to benefit from skilled occupational therapy services in order to continue to address functional limitations of RUE which inhibits full engagement in ADL and IADl tasks.     Patient Education:   [x] Yes  [] No    Method of Education:   [x] Verbal  [] Demo  [] Written  Re: Grasp release

## 2023-07-21 ENCOUNTER — APPOINTMENT (OUTPATIENT)
Dept: PHYSICAL THERAPY | Age: 78
End: 2023-07-21
Payer: MEDICARE

## 2023-07-24 ENCOUNTER — HOSPITAL ENCOUNTER (OUTPATIENT)
Dept: PHYSICAL THERAPY | Age: 78
Setting detail: THERAPIES SERIES
Discharge: HOME | End: 2023-07-24
Payer: MEDICARE

## 2023-07-24 ENCOUNTER — HOSPITAL ENCOUNTER (OUTPATIENT)
Dept: OCCUPATIONAL THERAPY | Age: 78
Setting detail: THERAPIES SERIES
Discharge: HOME | End: 2023-07-24
Payer: MEDICARE

## 2023-07-24 PROCEDURE — 97110 THERAPEUTIC EXERCISES: CPT

## 2023-07-24 ASSESSMENT — PAIN SCALES - GENERAL: PAINLEVEL_OUTOF10: 1

## 2023-07-24 NOTE — PROGRESS NOTES
9 [facility] and [address] [place] [address]         Phone: [phone]  Fax: [phone]    Outpatient Occupational Therapy Daily Note    Date:  2023  Patient Name:  Wendi De Oliveira      :  1945    MRN: [id number]  Referring provider: Jamie Goel MD    Insurance: Medicare  Diagnosis: CVA    Onset Date: 2023   Next Dr. Blakely Flies: 2023  Visit # / total visits: 3/12  Cancels/No Shows: 0    Subjective:     Arrived to therapy session independently via wheelchair. Pre Treatment Pain:    [] Yes  [x] No Location: N/A     Pain Rating: (0-10 scale) 0/10  Post Treatment Pain:  [x] Yes  [] No  Location: right shoulder    Pain Rating: (0-10 scale) 2/10    Objective:     N/A    Assessment:     Completed exercises/modalities as documented in Exercise Flowsheet with fair tolerance overall. Initiated upper body ergometer and overhead pulley exercises this date to address right elbow and shoulder ROM respectively. Reported a 5/10 pain level in his right shoulder during the pulley exercises and described it as sharp. Encouraged patient to take rest breaks PRN during this exercise and discussed use of moist heat beforehand to prevent increased pain. Verbalized understanding and stated that he will only take pain medication as a last resort. Issued/reviewed a Thera-putty HEP and instructed patient to complete 10 repetitions of each exercise 2x/day to increase functional use of right hand. Will continue to address goals and progress patient as able. [x] Progressing toward goals  [] No change  [] Regressing from goals  [x] Patient would continue to benefit from skilled occupational therapy services in order to continue to address functional limitations of RUE which inhibits full engagement in ADL and IADl tasks.     Patient Education:     [x] Yes  [] No    Method of Education:   [x] Verbal  [] Demo  [] Written  Re: moist heat use prior to exercise and cold pack use post exercise  Comprehension of

## 2023-07-24 NOTE — PROGRESS NOTES
improve transfer ability to allow supine<>sit with SBA  Long Term Goal 3: Patient will improve transfer ability to allow sit<>stand with good control and SBA  LTG 4 Current Status[de-identified] Patient will demonstrate safe ambulation with cane    Post Treatment Pain:  1/10    Time In: 0705    Time Out : 0835        Timed Code Treatment Minutes: 40 Minutes  Total Treatment Time: 36 Minutes    Chris Mejía, PT     Date: 7/24/2023

## 2023-07-27 ENCOUNTER — HOSPITAL ENCOUNTER (OUTPATIENT)
Dept: OCCUPATIONAL THERAPY | Age: 78
Setting detail: THERAPIES SERIES
Discharge: HOME | End: 2023-07-27
Payer: MEDICARE

## 2023-07-27 ENCOUNTER — HOSPITAL ENCOUNTER (OUTPATIENT)
Dept: PHYSICAL THERAPY | Age: 78
Setting detail: THERAPIES SERIES
Discharge: HOME | End: 2023-07-27
Payer: MEDICARE

## 2023-07-27 PROCEDURE — 97110 THERAPEUTIC EXERCISES: CPT

## 2023-07-27 NOTE — PROGRESS NOTES
9 [facility] and [address] [place] [address]         Phone: [phone]  Fax: [phone]    Outpatient Occupational Therapy Daily Note    Date:  2023  Patient Name:  Rubens Vora      :  1945    MRN: [id number]  Referring provider: Bud Diaz MD    Insurance: Medicare  Diagnosis: CVA    Onset Date: 2023   Next Dr. Gordon Smoker: 2023  Visit # / total visits:   Cancels/No Shows: 0    Subjective:     Arrived to therapy session independently via wheelchair following PTA treatment session. Reported that he has a follow-up appointment with Missy Archuleta this afternoon. Pre Treatment Pain:    [x] Yes  [] No Location: right shoulder Pain Rating: (0-10 scale) 310  Post Treatment Pain:  [x] Yes  [] No  Location: right shoulder Pain Rating: (0-10 scale) 310  *stated that he attempted ice pack use at home following exercises, but that it did not provide any relief    Objective:     N/A    Assessment:     Completed exercises/modalities as documented in Exercise Flowsheet with good tolerance overall. Requested to review the last page of the [email or web address]. Required minimal verbal cues for the correct technique and reported increased right hand fatigue following these exercises. Will continue to address goals and progress patient as able. [x] Progressing toward goals  [] No change  [] Regressing from goals  [x] Patient would continue to benefit from skilled occupational therapy services in order to continue to address functional limitations of RUE which inhibits full engagement in ADL and IADl tasks. Patient Education:     [x] Yes  [] No    Method of Education:   [x] Verbal  [x] Demo  [x] Written  Re: "[employer] [address]  Comprehension of Education:  [x] Verbalizes understanding  [] Demonstrates understanding  [] Needs review    Goals:     Time Frame for Short Term Goals: 6 visits  To increase independence with ADLs/IADLs, patient will:     1.  Increase right wrist AROM:  Wrist extension

## 2023-07-27 NOTE — PROGRESS NOTES
Phone: [phone] [facility]      Fax: [phone]                            Outpatient Physical Therapy                                                                            Daily Note    Date: 2023  Patient Name: Bibi Carrillo        MRN: [id number]   ACCT#:  [de-identified]  : 1945  (78 y.o.)    Referring Provider (secondary): Lee Rosas         Diagnosis: Right TKA revision  Treatment Diagnosis: Right TKR revision, TIA    PT Insurance Information: Medicare, Med Nec  Total # of Visits Approved: 16 Per Physician Order  Total # of Visits to Date: 5  Plan of Care/Certification Expiration Date: 23    Pre-Treatment Pain:  0/10     Assessment  Assessment: Patient denies R knee pain this morning. Continued with LE strengthening and balance exercises as outlined with good tolerance from patient. Added NuStep this morning with good tolerance from patient. Adjusted seat position to avoid knee hyperextension. Patient was able to ambulate 48' x2 with FWW and no LOB. Following session patient remains in gym to work with OT.     Plan  Continue with current plan of care    Exercises/Modalities/Manual:  See DocFlow Sheet    Education: Exercise form     Goals  (Total # of Visits to Date: 5)   Short Term Goals  Time Frame for Short Term Goals: 8 visits  Short Term Goal 1: Patient will demonstrate compliance with current HEP to optimize therapy progress  Short Term Goal 2: Patient will ambulate safely with FWW and SBA    Long Term Goals  Time Frame for Long Term Goals : 16 visits  Long Term Goal 1: Patient will improve right knee flexion and extension MMT to 4/5 within available ROM to improve ease of transfers  Long Term Goal 2: Patient will improve transfer ability to allow supine<>sit with SBA  Long Term Goal 3: Patient will improve transfer ability to allow sit<>stand with good control and SBA  LTG 4 Current Status[de-identified] Patient will demonstrate safe ambulation with cane    Post

## 2023-07-28 ENCOUNTER — HOSPITAL ENCOUNTER (OUTPATIENT)
Age: 78
End: 2023-07-28
Payer: MEDICARE

## 2023-07-28 ENCOUNTER — HOSPITAL ENCOUNTER (OUTPATIENT)
Dept: GENERAL RADIOLOGY | Age: 78
End: 2023-07-28
Payer: MEDICARE

## 2023-07-28 ENCOUNTER — HOSPITAL ENCOUNTER (OUTPATIENT)
Age: 78
Discharge: HOME | End: 2023-07-28
Payer: MEDICARE

## 2023-07-28 DIAGNOSIS — R06.02 SOB (SHORTNESS OF BREATH): ICD-10-CM

## 2023-07-28 DIAGNOSIS — E55.9 VITAMIN D DEFICIENCY DISEASE: ICD-10-CM

## 2023-07-28 DIAGNOSIS — E78.5 HYPERLIPIDEMIA, UNSPECIFIED HYPERLIPIDEMIA TYPE: ICD-10-CM

## 2023-07-28 DIAGNOSIS — R53.83 OTHER FATIGUE: ICD-10-CM

## 2023-07-28 DIAGNOSIS — I10 ESSENTIAL HYPERTENSION: ICD-10-CM

## 2023-07-28 DIAGNOSIS — I25.10 CORONARY ARTERY DISEASE INVOLVING NATIVE CORONARY ARTERY OF NATIVE HEART WITHOUT ANGINA PECTORIS: ICD-10-CM

## 2023-07-28 LAB
25(OH)D3 SERPL-MCNC: 52.4 NG/ML
ALBUMIN SERPL-MCNC: 3.9 G/DL (ref 3.5–5.2)
ALP SERPL-CCNC: 98 U/L (ref 40–129)
ALT SERPL-CCNC: 23 U/L (ref 5–41)
ANION GAP SERPL CALCULATED.3IONS-SCNC: 13 MMOL/L (ref 9–17)
AST SERPL-CCNC: 25 U/L
BASOPHILS # BLD: 0 K/UL (ref 0–0.2)
BASOPHILS NFR BLD: 1 % (ref 0–2)
BILIRUB SERPL-MCNC: 0.6 MG/DL (ref 0.3–1.2)
BUN SERPL-MCNC: 13 MG/DL (ref 8–23)
BUN/CREAT SERPL: 14 (ref 9–20)
CALCIUM SERPL-MCNC: 9.1 MG/DL (ref 8.6–10.4)
CHLORIDE SERPL-SCNC: 97 MMOL/L (ref 98–107)
CHOLEST SERPL-MCNC: 103 MG/DL
CHOLESTEROL/HDL RATIO: 2.1
CO2 SERPL-SCNC: 24 MMOL/L (ref 20–31)
CREAT SERPL-MCNC: 0.9 MG/DL (ref 0.7–1.2)
DIFFERENTIAL TYPE: YES
EOSINOPHIL # BLD: 0.1 K/UL (ref 0–0.4)
EOSINOPHILS RELATIVE PERCENT: 1 % (ref 0–5)
ERYTHROCYTE [DISTWIDTH] IN BLOOD BY AUTOMATED COUNT: 14.7 % (ref 12.1–15.2)
GFR SERPL CREATININE-BSD FRML MDRD: >60 ML/MIN/1.73M2
GLUCOSE SERPL-MCNC: 113 MG/DL (ref 70–99)
HCT VFR BLD AUTO: 42.9 % (ref 41–53)
HDLC SERPL-MCNC: 50 MG/DL
HGB BLD-MCNC: 14.3 G/DL (ref 13.5–17.5)
LDLC SERPL CALC-MCNC: 37 MG/DL (ref 0–130)
LYMPHOCYTES NFR BLD: 1.5 K/UL (ref 1–4.8)
LYMPHOCYTES RELATIVE PERCENT: 26 % (ref 13–44)
MAGNESIUM SERPL-MCNC: 1.7 MG/DL (ref 1.6–2.6)
MCH RBC QN AUTO: 29.8 PG (ref 26–34)
MCHC RBC AUTO-ENTMCNC: 33.5 G/DL (ref 31–37)
MCV RBC AUTO: 89.1 FL (ref 80–100)
MONOCYTES NFR BLD: 0.5 K/UL (ref 0–1)
MONOCYTES NFR BLD: 8 % (ref 5–9)
NEUTROPHILS NFR BLD: 64 % (ref 39–75)
NEUTS SEG NFR BLD: 3.5 K/UL (ref 2.1–6.5)
PATIENT FASTING?: YES
PLATELET # BLD AUTO: 187 K/UL (ref 140–450)
POTASSIUM SERPL-SCNC: 4.4 MMOL/L (ref 3.7–5.3)
PROT SERPL-MCNC: 6.3 G/DL (ref 6.4–8.3)
RBC # BLD AUTO: 4.81 M/UL (ref 4.5–5.9)
SODIUM SERPL-SCNC: 134 MMOL/L (ref 135–144)
TRIGL SERPL-MCNC: 80 MG/DL
TSH SERPL DL<=0.05 MIU/L-ACNC: 2 UIU/ML (ref 0.3–5)
WBC OTHER # BLD: 5.5 K/UL (ref 3.5–11)

## 2023-07-28 PROCEDURE — 84443 ASSAY THYROID STIM HORMONE: CPT

## 2023-07-28 PROCEDURE — 85027 COMPLETE CBC AUTOMATED: CPT

## 2023-07-28 PROCEDURE — 71046 X-RAY EXAM CHEST 2 VIEWS: CPT

## 2023-07-28 PROCEDURE — 36415 COLL VENOUS BLD VENIPUNCTURE: CPT

## 2023-07-28 PROCEDURE — 80061 LIPID PANEL: CPT

## 2023-07-28 PROCEDURE — 80053 COMPREHEN METABOLIC PANEL: CPT

## 2023-07-28 PROCEDURE — 83735 ASSAY OF MAGNESIUM: CPT

## 2023-07-28 PROCEDURE — 93005 ELECTROCARDIOGRAM TRACING: CPT

## 2023-07-28 PROCEDURE — 82306 VITAMIN D 25 HYDROXY: CPT

## 2023-07-30 LAB
EKG Q-T INTERVAL: 426 MS
EKG QRS DURATION: 106 MS
EKG QTC CALCULATION (BAZETT): 462 MS
EKG R AXIS: -51 DEGREES
EKG T AXIS: 56 DEGREES
EKG VENTRICULAR RATE: 71 BPM

## 2023-07-31 ENCOUNTER — HOSPITAL ENCOUNTER (OUTPATIENT)
Dept: PHYSICAL THERAPY | Age: 78
Setting detail: THERAPIES SERIES
Discharge: HOME | End: 2023-07-31
Payer: MEDICARE

## 2023-07-31 ENCOUNTER — HOSPITAL ENCOUNTER (OUTPATIENT)
Dept: OCCUPATIONAL THERAPY | Age: 78
Setting detail: THERAPIES SERIES
Discharge: HOME | End: 2023-07-31
Payer: MEDICARE

## 2023-07-31 ENCOUNTER — OFFICE VISIT (OUTPATIENT)
Dept: CARDIOLOGY CLINIC | Age: 78
End: 2023-07-31
Payer: MEDICARE

## 2023-07-31 VITALS
WEIGHT: 295 LBS | DIASTOLIC BLOOD PRESSURE: 70 MMHG | HEART RATE: 70 BPM | OXYGEN SATURATION: 96 % | SYSTOLIC BLOOD PRESSURE: 120 MMHG | BODY MASS INDEX: 41.14 KG/M2

## 2023-07-31 DIAGNOSIS — I25.10 CORONARY ARTERY DISEASE INVOLVING NATIVE CORONARY ARTERY OF NATIVE HEART WITHOUT ANGINA PECTORIS: ICD-10-CM

## 2023-07-31 DIAGNOSIS — E55.9 VITAMIN D DEFICIENCY DISEASE: ICD-10-CM

## 2023-07-31 DIAGNOSIS — E78.5 HYPERLIPIDEMIA, UNSPECIFIED HYPERLIPIDEMIA TYPE: ICD-10-CM

## 2023-07-31 DIAGNOSIS — I10 ESSENTIAL HYPERTENSION: ICD-10-CM

## 2023-07-31 DIAGNOSIS — R06.02 SOB (SHORTNESS OF BREATH): Primary | ICD-10-CM

## 2023-07-31 PROCEDURE — 3074F SYST BP LT 130 MM HG: CPT | Performed by: INTERNAL MEDICINE

## 2023-07-31 PROCEDURE — 1123F ACP DISCUSS/DSCN MKR DOCD: CPT | Performed by: INTERNAL MEDICINE

## 2023-07-31 PROCEDURE — 1036F TOBACCO NON-USER: CPT | Performed by: INTERNAL MEDICINE

## 2023-07-31 PROCEDURE — 97110 THERAPEUTIC EXERCISES: CPT

## 2023-07-31 PROCEDURE — 3078F DIAST BP <80 MM HG: CPT | Performed by: INTERNAL MEDICINE

## 2023-07-31 PROCEDURE — 3017F COLORECTAL CA SCREEN DOC REV: CPT | Performed by: INTERNAL MEDICINE

## 2023-07-31 PROCEDURE — G8427 DOCREV CUR MEDS BY ELIG CLIN: HCPCS | Performed by: INTERNAL MEDICINE

## 2023-07-31 PROCEDURE — 99214 OFFICE O/P EST MOD 30 MIN: CPT | Performed by: INTERNAL MEDICINE

## 2023-07-31 PROCEDURE — G8417 CALC BMI ABV UP PARAM F/U: HCPCS | Performed by: INTERNAL MEDICINE

## 2023-07-31 NOTE — PROGRESS NOTES
Phone: [phone] [facility]      Fax: [phone]                            Outpatient Physical Therapy                                                                            Daily Note    Date: 2023  Patient Name: Francisca Gruber        MRN: [id number]   ACCT#:  [de-identified]  : 1945  (78 y.o.)    Referring Provider (secondary): Kris Mcneill         Diagnosis: Right TKA revision  Treatment Diagnosis: Right TKR revision, TIA    PT Insurance Information: Medicare, Med Nec  Total # of Visits Approved: 16 Per Physician Order  Total # of Visits to Date: 6  Plan of Care/Certification Expiration Date: 23    Pre-Treatment Pain:  0/10  Subjective: 1343:  Minimal pain in leg today. Just finished with OT, some fatigue noted. Assessment  Assessment: Increased fatigue noted today, likely due to have OT first.  He was able to complete all exercises today though. Increased rest break frequency. Still able to walk to waiting room from // bars, but noticeable SOB after. Will continue to progress strength, balance and ambulation ability. Plan  Continue with current plan of care    Exercises/Modalities/Manual:  See DocFlow Sheet    Education: Education on rest breaks and exercise technique.   Tends to tilt when doing standing hip extension and abduction          Goals  (Total # of Visits to Date: 6)   Short Term Goals  Time Frame for Short Term Goals: 8 visits  Short Term Goal 1: Patient will demonstrate compliance with current HEP to optimize therapy progress  Short Term Goal 2: Patient will ambulate safely with FWW and SBA    Long Term Goals  Time Frame for Long Term Goals : 16 visits  Long Term Goal 1: Patient will improve right knee flexion and extension MMT to 4/5 within available ROM to improve ease of transfers  Long Term Goal 2: Patient will improve transfer ability to allow supine<>sit with SBA  Long Term Goal 3: Patient will improve transfer ability to allow sit<>stand

## 2023-07-31 NOTE — PROGRESS NOTES
9 [facility] and [address] [place] [address]         Phone: [phone]  Fax: [phone]    Outpatient Occupational Therapy Daily Note    Date:  2023  Patient Name:  Juanito Merchant      :  1945    MRN: [id number]  Referring provider: Jessica Sam MD    Insurance: Medicare  Diagnosis: CVA    Onset Date: 2023   Next Dr. Pinto Opal: 10/18/23  Visit # / total visits: 5/12  Cancels/No Shows: 0/0    Subjective:     Arrived to therapy session independently via wheelchair. Reported that he had an appointment with his cardiologist earlier this morning and that he believes there's nerve involvement in the RUE. Pre Treatment Pain:    [x] Yes  [] No Location: right shoulder Pain Rating: (0-10 scale) 310  Post Treatment Pain:  [x] Yes  [] No  Location: right shoulder Pain Rating: (0-10 scale) 210      Objective:     N/A    Assessment:     Completed exercises/modalities as documented in Exercise Flowsheet with good tolerance overall. Initiated BUE ther ex via a 1 pound resistance bar in all planes to facilitate increased independence and ease with ADLs. Required minimal verbal/visual cues for the correct technique as well as minimal rest breaks secondary to fatigue. Discussed POC moving forward and the need to address both shoulder/elbow ROM and hand ROM/strengthening in same session. Reported that he would like to spend approximately 50% of session (~20 minutes) focusing on each area, but that he is most concerned about his upper arm and his ability to resume driving. Initiated steering wheel exercise on the BTE program this date with good ability noted. Will continue to address goals and progress patient as able. Remains with PT in therapy gym upon OT departure.     [x] Progressing toward goals  [] No change  [] Regressing from goals  [x] Patient would continue to benefit from skilled occupational therapy services in order to continue to address functional limitations of RUE which inhibits full

## 2023-08-02 ENCOUNTER — TELEPHONE (OUTPATIENT)
Dept: PRIMARY CARE CLINIC | Age: 78
End: 2023-08-02

## 2023-08-02 NOTE — TELEPHONE ENCOUNTER
Pt called in asking for a walker with wheels. He's going to be out and about more and a walker will be beneficial for him.  He would like an order sent to [facility] if possible

## 2023-08-03 ENCOUNTER — HOSPITAL ENCOUNTER (OUTPATIENT)
Dept: OCCUPATIONAL THERAPY | Age: 78
Setting detail: THERAPIES SERIES
Discharge: HOME | End: 2023-08-03
Payer: MEDICARE

## 2023-08-03 ENCOUNTER — HOSPITAL ENCOUNTER (OUTPATIENT)
Dept: PHYSICAL THERAPY | Age: 78
Setting detail: THERAPIES SERIES
Discharge: HOME | End: 2023-08-03
Payer: MEDICARE

## 2023-08-03 PROCEDURE — 97110 THERAPEUTIC EXERCISES: CPT

## 2023-08-03 NOTE — PROGRESS NOTES
9 [facility] and [address] [place] [address]         Phone: [phone]  Fax: [phone]    Outpatient Occupational Therapy Progress Note    Date: 8/3/2023      Patient: Britta Cooks    : 1945    MRN: [id number]    Referring provider: Mariann Lea MD   Insurance: Medicare   Diagnosis: CVA   Onset Date: 23    Next Dr. Gastelum Parlor: 10/18/23  Total visits attended: 6  Cancels/No shows: 0/0  Date range of services: 23 to 8/3/23    Subjective:     Pain:  [x] Yes  [] No   Location: right shoulder   Pain Rating: (0-10 scale) 2/10   *post activity only    Objective:     Hand dominance: Right  Affected extremity: Right     FINE MOTOR COORDINATION    Right (seconds)   9 Hole Peg Test 47.00      STRENGTH (AVERAGE SCORE)    Right   (pounds)    13.3*   Lateral pinch 14.0   Gutiérrez pinch 10.6   Tip pinch 10.0   *despite decreased  strength as compared to evaluation, patient reported that his  feels stronger and that he is not dropping items as frequently.       WRIST/FOREARM        Right ROM (degrees)   Extension (60-70) 37   Flexion (70-80) 60   Radial Deviation (20-25) 16*   Ulnar Deviation (30-40) 21*   Forearm Pronation (80-90) 90   Forearm Supination (80-90) 82   *difficulty noted performing the correct motion for accurate measurement     RIGHT DIGITS       2nd digit ROM (degrees) 3rd digit ROM (degrees) 4th digit ROM (degrees) 5th digit ROM (degrees)   MCP extension/flexion (0-90) 0 0 085 0/   PIP extension/flexion (0-100) 0/90 0 - -10/85   DIP extension/flexion (0-70) -* -* -10/45* 0   (+ is used for hyperextension / - is used for extensor lag per ASHT)   *extensor lag at DIP joints due to arthritis      THUMB                           Right  (degrees)   MCP extension/flexion (0-50) 0   IP extension/flexion (0-80) 037       UPPER EXTREMITY FUNCTIONAL SCALE (UEFS)    RUE   Score 37/80      Assessment:     Completed exercises/modalities as documented in
14. Patient will demonstrate functional AROM in right elbow/shoulder joints to increase ease with ADLs/IADLs.      Plan:     [x] Continue per current plan of care  [] Hold therapy due to:  [] Specific instructions for future sessions:    Time In: 1117  Time Out: 1212  Timed Coded Minutes: 52 (minus 3 minutes due to assisting patient out of therapy room and to exit door)  Total Treatment Time: LUCAS Devine/L    Date: 8/3/2023

## 2023-08-03 NOTE — PROGRESS NOTES
In: 1038    Time Out : 1118   Timed Code Treatment Minutes: 40 Minutes  Total Treatment Time: 36 Minutes    Isaura English     Date: 8/3/2023

## 2023-08-04 NOTE — TELEPHONE ENCOUNTER
Spoke with pt, he prefers to have a walker with a seat. Spoke to Dalia. He states pt has been using a regular front wheel walker, and doesn't feel he is ready for a rollator (w/seat).  Pt's next PT session is 8/7 and therapist will have discussion with patient, and get back to us with recommendation

## 2023-08-04 NOTE — TELEPHONE ENCOUNTER
When getting walker need a few more details. Does he plan on getting walker with seat on it? Due to weight would have to be heavy duty and may be increased work to transport. You typically only get one so want to order what he needs, also for people who have had a stroke often may need a jonnathan walker, to assist with weak arm his is right side.      Please check with pt or physical therapy which he is receiving for recommendations to get script proper details when written      Thanks

## 2023-08-07 ENCOUNTER — HOSPITAL ENCOUNTER (OUTPATIENT)
Dept: PHYSICAL THERAPY | Age: 78
Setting detail: THERAPIES SERIES
Discharge: HOME | End: 2023-08-07
Payer: MEDICARE

## 2023-08-07 ENCOUNTER — HOSPITAL ENCOUNTER (OUTPATIENT)
Dept: OCCUPATIONAL THERAPY | Age: 78
Setting detail: THERAPIES SERIES
Discharge: HOME | End: 2023-08-07
Payer: MEDICARE

## 2023-08-07 PROCEDURE — 97110 THERAPEUTIC EXERCISES: CPT

## 2023-08-07 NOTE — PROGRESS NOTES
Phone: 756 [facility]      Fax: [phone]                            Outpatient Physical Therapy                                                                            Daily Note    Date: 2023  Patient Name: Rigoberto Galvez        MRN: [id number]   ACCT#:  [de-identified]  : 1945  (78 y.o.)    Referring Provider (secondary): Leonie Lennon         Diagnosis: Right TKA revision  Treatment Diagnosis: Right TKR revision, TIA    PT Insurance Information: Medicare, Med Nec  Total # of Visits Approved: 16 Per Physician Order  Total # of Visits to Date: 8  Plan of Care/Certification Expiration Date: 23    Pre-Treatment Pain:  0/10  Subjective: 1030:  Denies pain again today. Nothing new to report today. Unable to come this Thursday, has appointment with knee surgeon that day. Assessment  Assessment: Improving ambulation ability. Able to walk around clinic with FWW and SBA today. Strength is improving but still quite weak. Will continue to benefit from skilled PT to improve funtional strength and ambulation ability.     Plan  Continue with current plan of care    Exercises/Modalities/Manual:  See DocFlow Sheet    Education: Education on continued HEP          Goals  (Total # of Visits to Date: 8)   Short Term Goals  Time Frame for Short Term Goals: 8 visits  Short Term Goal 1: Patient will demonstrate compliance with current HEP to optimize therapy progress - MET  Short Term Goal 2: Patient will ambulate safely with FWW and SBA - MET    Long Term Goals  Time Frame for Long Term Goals : 16 visits  Long Term Goal 1: Patient will improve right knee flexion and extension MMT to 4/5 within available ROM to improve ease of transfers  Long Term Goal 2: Patient will improve transfer ability to allow supine<>sit with SBA  Long Term Goal 3: Patient will improve transfer ability to allow sit<>stand with good control and SBA  LTG 4 Current Status[de-identified] Patient will demonstrate safe

## 2023-08-07 NOTE — PROGRESS NOTES
9 [facility] and [address] [place] [address]         Phone: [phone]  Fax: [phone]    Outpatient Occupational Therapy Daily Note    Date:  2023  Patient Name:  Johnnie Ribera      :  1945    MRN: [id number]  Referring provider: Augustin Harp MD    Insurance: Medicare  Diagnosis: CVA    Onset Date: 2023   Next Dr. Toney Graces: 10/18/23  Visit # / total visits:   Cancels/No Shows: 0/0    Subjective:     Arrived to therapy session independently via wheelchair following PT treatment session. Pre Treatment Pain:    [x] Yes  [] No Location: right shoulder Pain Rating: (0-10 scale) 10  Post Treatment Pain:  [x] Yes  [] No  Location: right shoulder Pain Rating: (0-10 scale) 1/10      Objective:     N/A     Assessment:     Completed exercises/modalities as documented in Exercise Flowsheet with good tolerance overall. Rescheduled patient's Thursday OT session to Wednesday, 23 at 1804 due to a conflicting doctor's appointment. Will continue to address goals and progress patient as able. [x] Progressing toward goals  [] No change  [] Regressing from goals  [x] Patient would continue to benefit from skilled occupational therapy services in order to continue to address functional limitations of RUE which inhibits full engagement in ADL and IADL tasks. Patient Education:     [] Yes  [x] No    Method of Education:   [] Verbal  [] Demo  [] Written  Re:   Comprehension of Education:  [] Verbalizes understanding  [] Demonstrates understanding  [] Needs review    Goals:     Time Frame for Long Term Goals: 12 visits  To increase independence with ADLs/IADLs, patient will:     1. Increase right wrist AROM:  Wrist extension to 30 degrees - MET  Wrist UD to 29 degrees. 2. Increase right forearm supination to 90 degrees. 3. Increase right 1st digit AROM:  IP joint to 47 degrees. 4. Increase right 2nd digit AROM:  PIP flexion to 94 degrees.      5. Increase right 3rd digit AROM:  PIP

## 2023-08-09 ENCOUNTER — HOSPITAL ENCOUNTER (OUTPATIENT)
Dept: PHYSICAL THERAPY | Age: 78
Setting detail: THERAPIES SERIES
Discharge: HOME | End: 2023-08-09
Payer: MEDICARE

## 2023-08-09 ENCOUNTER — HOSPITAL ENCOUNTER (OUTPATIENT)
Dept: OCCUPATIONAL THERAPY | Age: 78
Setting detail: THERAPIES SERIES
Discharge: HOME | End: 2023-08-09
Payer: MEDICARE

## 2023-08-09 DIAGNOSIS — I50.32 CHRONIC DIASTOLIC (CONGESTIVE) HEART FAILURE (HCC): ICD-10-CM

## 2023-08-09 PROCEDURE — 97110 THERAPEUTIC EXERCISES: CPT

## 2023-08-09 RX ORDER — SPIRONOLACTONE 25 MG/1
TABLET ORAL
Qty: 90 TABLET | Refills: 1 | Status: SHIPPED | OUTPATIENT
Start: 2023-08-09

## 2023-08-09 RX ORDER — METOPROLOL SUCCINATE 25 MG/1
TABLET, EXTENDED RELEASE ORAL
Qty: 90 TABLET | Refills: 1 | Status: SHIPPED | OUTPATIENT
Start: 2023-08-09

## 2023-08-09 NOTE — PROGRESS NOTES
Phone: 795 [facility]      Fax: [phone]                            Outpatient Physical Therapy                                                                            Daily Note    Date: 2023  Patient Name: Christiano Salazar        MRN: [id number]   ACCT#:  [de-identified]  : 1945  (78 y.o.)    Referring Provider (secondary): Veronica Mack         Diagnosis: Right TKA revision  Treatment Diagnosis: Right TKR revision, TIA    PT Insurance Information: Medicare, Med Nec  Total # of Visits Approved: 16 Per Physician Order  Total # of Visits to Date: 9  Plan of Care/Certification Expiration Date: 23    Pre-Treatment Pain:  0/10     Assessment  Assessment: Patient denies pain this afternoon. Progressed exercises; added Shuttle this afternoon to work on quad strength. Patient required cueing to avoid knee hyperextending. Post session patient denies pain. Will continue to progress strength.     Plan  Continue with current plan of care    Exercises/Modalities/Manual:  See DocFlow Sheet    Education: Avoid hyperextension on Shuttle     Goals  (Total # of Visits to Date: 5)   Short Term Goals  Time Frame for Short Term Goals: 8 visits  Short Term Goal 1: Patient will demonstrate compliance with current HEP to optimize therapy progress - MET  Short Term Goal 2: Patient will ambulate safely with FWW and SBA - MET    Long Term Goals  Time Frame for Long Term Goals : 16 visits  Long Term Goal 1: Patient will improve right knee flexion and extension MMT to 4/5 within available ROM to improve ease of transfers  Long Term Goal 2: Patient will improve transfer ability to allow supine<>sit with SBA  Long Term Goal 3: Patient will improve transfer ability to allow sit<>stand with good control and SBA  LTG 4 Current Status[de-identified] Patient will demonstrate safe ambulation with cane    Post Treatment Pain:  0/10    Time In: 1500    Time Out : 1544  Timed Code Treatment Minutes: 44 Minutes  Total

## 2023-08-09 NOTE — PROGRESS NOTES
[address] and [address] [place] [address]         Phone: [phone]  Fax: [phone]    Outpatient Occupational Therapy Daily Note    Date:  2023  Patient Name:  Rubens Vora      :  1945    MRN: [id number]  Referring provider: Bud Diaz MD    Insurance: Medicare  Diagnosis: CVA             Onset Date: 2023             Next Dr. Gordon Smoker: 10/18/23  Visit # / total visits:   Cancels/No Shows: 0/0    Subjective:    Patient presents to session independently this date. Reports no new questions, continues to voice desire to drive. Pre Treatment Pain:    [] Yes  [x] No   Location: None      Pain Rating: (0-10 scale) 0/10        Post Treatment Pain:  [x] Yes  [] No    Location: R shoulder       Pain Rating: (0-10 scale) 2/10 (at rest)  Pain Rating: (0-10 scale) 7/10 (with movement or exercise)  Objective:     See Exercise FlowSheet regarding exercises completed this session. Assessment:     Completed exercises/modalities as documented in Exercise Flowsheet with good tolerance overall. Reviewed and provided patient and caregivers (spouse and son-Hubert) with brochure regarding driving rehab program   Will continue to address goals and progress patient as able. Left in care of PTA at conclusion of session. [x] Progressing toward goals  [] No change  [] Regressing from goals  [x] Patient would continue to benefit from skilled occupational therapy services in order to continue to address functional limitations in RUE. Patient Education:   [x] Yes  [] No    Method of Education:[x] Verbal  [] Demo  [] Written   Re: Driving Rehab Program  Comprehension of Education:  [x] Verbalizes understanding  [] Demonstrates understanding  [] Needs review    Goals:   Time Frame for Long Term Goals: 12 visits  To increase independence with ADLs/IADLs, patient will:     1. Increase right wrist AROM:  Wrist extension to 30 degrees - MET  Wrist UD to 29 degrees.      2. Increase right forearm

## 2023-08-10 ENCOUNTER — APPOINTMENT (OUTPATIENT)
Dept: PHYSICAL THERAPY | Age: 78
End: 2023-08-10
Payer: MEDICARE

## 2023-08-10 ENCOUNTER — APPOINTMENT (OUTPATIENT)
Dept: OCCUPATIONAL THERAPY | Age: 78
End: 2023-08-10
Payer: MEDICARE

## 2023-08-14 ENCOUNTER — HOSPITAL ENCOUNTER (OUTPATIENT)
Age: 78
Discharge: HOME | End: 2023-08-16
Attending: INTERNAL MEDICINE
Payer: MEDICARE

## 2023-08-14 VITALS — BODY MASS INDEX: 40.36 KG/M2 | HEIGHT: 72 IN | WEIGHT: 298 LBS

## 2023-08-14 DIAGNOSIS — I25.10 CORONARY ARTERY DISEASE INVOLVING NATIVE CORONARY ARTERY OF NATIVE HEART WITHOUT ANGINA PECTORIS: ICD-10-CM

## 2023-08-14 DIAGNOSIS — R06.02 SOB (SHORTNESS OF BREATH): ICD-10-CM

## 2023-08-14 DIAGNOSIS — I10 ESSENTIAL HYPERTENSION: ICD-10-CM

## 2023-08-14 LAB
ECHO AO ASC DIAM: 2.5 CM
ECHO AO ASCENDING AORTA INDEX: 0.99 CM/M2
ECHO AO ROOT DIAM: 3 CM
ECHO AO ROOT INDEX: 1.19 CM/M2
ECHO AV AREA PEAK VELOCITY: 3.1 CM2
ECHO AV AREA/BSA PEAK VELOCITY: 1.2 CM2/M2
ECHO AV CUSP MM: 1.7 CM
ECHO AV PEAK GRADIENT: 8 MMHG
ECHO AV PEAK VELOCITY: 1.4 M/S
ECHO AV VELOCITY RATIO: 0.71
ECHO BSA: 2.62 M2
ECHO EST RA PRESSURE: 5 MMHG
ECHO LA DIAMETER INDEX: 1.67 CM/M2
ECHO LA DIAMETER: 4.2 CM
ECHO LA TO AORTIC ROOT RATIO: 1.4
ECHO LV E' LATERAL VELOCITY: 15 CM/S
ECHO LV EF PHYSICIAN: 55 %
ECHO LV INTERNAL DIMENSION DIASTOLIC MMODE: 3.7 CM (ref 4.2–5.9)
ECHO LV INTERNAL DIMENSION SYSTOLIC MMODE: 2.2 CM
ECHO LV IVSD MMODE: 1.2 CM (ref 0.6–1)
ECHO LV IVSS MMODE: 1.7 CM
ECHO LV POSTERIOR WALL DIASTOLIC MMODE: 1.5 CM (ref 0.6–1)
ECHO LV POSTERIOR WALL SYSTOLIC MMODE: 1.9 CM
ECHO LVOT AREA: 4.5 CM2
ECHO LVOT DIAM: 2.4 CM
ECHO LVOT MEAN GRADIENT: 2 MMHG
ECHO LVOT PEAK GRADIENT: 4 MMHG
ECHO LVOT PEAK VELOCITY: 1 M/S
ECHO LVOT STROKE VOLUME INDEX: 30.1 ML/M2
ECHO LVOT SV: 76 ML
ECHO LVOT VTI: 16.8 CM
ECHO MV A VELOCITY: 0 M/S
ECHO MV AREA PHT: 3.2 CM2
ECHO MV E DECELERATION TIME (DT): 235.9 MS
ECHO MV E VELOCITY: 1.08 M/S
ECHO MV E/E' LATERAL: 7.2
ECHO MV PRESSURE HALF TIME (PHT): 68.4 MS
ECHO PV MAX VELOCITY: 0.9 M/S
ECHO PV PEAK GRADIENT: 3 MMHG
ECHO RIGHT VENTRICULAR SYSTOLIC PRESSURE (RVSP): 21 MMHG
ECHO TV REGURGITANT MAX VELOCITY: 2.01 M/S
ECHO TV REGURGITANT PEAK GRADIENT: 16 MMHG

## 2023-08-14 PROCEDURE — 93306 TTE W/DOPPLER COMPLETE: CPT | Performed by: INTERNAL MEDICINE

## 2023-08-14 PROCEDURE — 93306 TTE W/DOPPLER COMPLETE: CPT

## 2023-08-16 ENCOUNTER — HOSPITAL ENCOUNTER (OUTPATIENT)
Dept: PHYSICAL THERAPY | Age: 78
Setting detail: THERAPIES SERIES
Discharge: HOME | End: 2023-08-16
Payer: MEDICARE

## 2023-08-16 ENCOUNTER — HOSPITAL ENCOUNTER (OUTPATIENT)
Dept: OCCUPATIONAL THERAPY | Age: 78
Setting detail: THERAPIES SERIES
Discharge: HOME | End: 2023-08-16
Payer: MEDICARE

## 2023-08-16 PROCEDURE — 97110 THERAPEUTIC EXERCISES: CPT
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
## 2023-08-16 NOTE — PROGRESS NOTES
Phone: [phone] [facility]      Fax: [phone]                            Outpatient Physical Therapy                                                                            Daily Note    Date: 2023  Patient Name: Melissa Vasquez        MRN: [id number]   ACCT#:  [de-identified]  : 1945  (78 y.o.)    Referring Provider (secondary): Maxwell Wilkinson         Diagnosis: Right TKA revision  Treatment Diagnosis: Right TKR revision, TIA    PT Insurance Information: Medicare, Med Nec  Total # of Visits Approved: 16 Per Physician Order  Total # of Visits to Date: 10  Plan of Care/Certification Expiration Date: 23    Pre-Treatment Pain:  0/10     Assessment  Assessment: Patient denies pain this afternoon, but does note distal quad pain in the evenings. Patient believes this may be from when brace slides down. Continued with balance and LE exercises to improve functional strength and balance. Patient was able to complete Shuttle with less cueing to avoid knee hyperextension. At end of session patient was able to ambulate x120' down hallway with no LOB.     Plan  Continue with current plan of care    Exercises/Modalities/Manual:  See DocFlow Sheet    Education: Exercise form     Goals  (Total # of Visits to Date: 8)   Short Term Goals  Time Frame for Short Term Goals: 8 visits  Short Term Goal 1: Patient will demonstrate compliance with current HEP to optimize therapy progress - MET  Short Term Goal 2: Patient will ambulate safely with FWW and SBA - MET    Long Term Goals  Time Frame for Long Term Goals : 16 visits  Long Term Goal 1: Patient will improve right knee flexion and extension MMT to 4/5 within available ROM to improve ease of transfers  Long Term Goal 2: Patient will improve transfer ability to allow supine<>sit with SBA  Long Term Goal 3: Patient will improve transfer ability to allow sit<>stand with good control and SBA  LTG 4 Current Status[de-identified] Patient will demonstrate

## 2023-08-18 ENCOUNTER — HOSPITAL ENCOUNTER (OUTPATIENT)
Dept: PHYSICAL THERAPY | Age: 78
Setting detail: THERAPIES SERIES
Discharge: HOME | End: 2023-08-18
Payer: MEDICARE

## 2023-08-18 ENCOUNTER — HOSPITAL ENCOUNTER (OUTPATIENT)
Dept: OCCUPATIONAL THERAPY | Age: 78
Setting detail: THERAPIES SERIES
Discharge: HOME | End: 2023-08-18
Payer: MEDICARE

## 2023-08-18 PROCEDURE — 97110 THERAPEUTIC EXERCISES: CPT

## 2023-08-18 NOTE — PROGRESS NOTES
9 [facility] and [address] [place] [address]         Phone: [phone]  Fax: [phone]    Outpatient Occupational Therapy Daily Note    Date:  2023  Patient Name:  Frank Bailey      :  1945    MRN: [id number]  Patient Name:  Frank Bailey                 :  1945                     MRN: [id number]  Referring provider: Rm Beard MD                   Insurance: Medicare  Diagnosis: CVA             Onset Date: 2023             Next  [facility]: 10/18/23  Visit # / total visits: 10/12  Cancels/No Shows: 0/0    Subjective:     Arrived to therapy session independently via wheelchair. Pre Treatment Pain:    [] Yes  [x] No Location: N/A   Pain Rating: (0-10 scale) 0/10  Post Treatment Pain:  [x] Yes  [] No  Location: right shoulder  Pain Rating: (0-10 scale) 1/10    Objective:     N/A    Assessment:     Completed exercises/modalities as documented in Exercise Flowsheet with good tolerance overall. Required minimal verbal/tactile cues to correctly perform resistive right wrist RD/UD with rubber mallet. Reported that he has difficulty retrieving water bottles from floor level, especially when this activity requires right shoulder abduction and rotation. Will plan to initiate pendulum exercises during the next session. Will continue to address goals and progress patient as able. Remains seated in wheelchair with PT present upon OT departure. [x] Progressing toward goals  [] No change  [] Regressing from goals  [x] Patient would continue to benefit from skilled occupational therapy services in order to continue to address RUE deficits which limits full engagement in ADL and IADL tasks.     Patient Education:     [x] Yes  [] No    Method of Education: [x] Verbal  [] Demo  [] Written  Re: HEP compliance   Comprehension of Education:  [x] Verbalizes understanding  [] Demonstrates understanding  [] Needs review    Goals:     Time Frame for Long Term Goals: 12 visits  To increase

## 2023-08-18 NOTE — PROGRESS NOTES
Phone: 177 [facility]      Fax: [phone]                            Outpatient Physical Therapy                                                                            Daily Note    Date: 2023  Patient Name: Rigoberto Galvez        MRN: [id number]   ACCT#:  [de-identified]  : 1945  (78 y.o.)    Referring Provider (secondary): Leonie Lennon         Diagnosis: Right TKA revision  Treatment Diagnosis: Right TKR revision, TIA    PT Insurance Information: Medicare, Med Nec  Total # of Visits Approved: 16 Per Physician Order  Total # of Visits to Date: 10  Plan of Care/Certification Expiration Date: 23    Pre-Treatment Pain:  0/10  Subjective: 4375:  Just finished with OT. Denies LE pain. He feels he is walking better. Notes his right hand \"distorts, kind of cramps every time I cough or yawn. \"  Assessment  Assessment: Ambulation is improving nicely. He is anxious to try using cane. Much more stable on his feet. He would benefit from continued skilled PT to progress strength and improve transfer and ambulation ability.     Plan  Continue with current plan of care    Exercises/Modalities/Manual:  See DocFlow Sheet    Education: Education on continued exercise          Goals  (Total # of Visits to Date: 8)   Short Term Goals  Time Frame for Short Term Goals: 8 visits  Short Term Goal 1: Patient will demonstrate compliance with current HEP to optimize therapy progress - MET  Short Term Goal 2: Patient will ambulate safely with FWW and SBA - MET    Long Term Goals  Time Frame for Long Term Goals : 16 visits  Long Term Goal 1: Patient will improve right knee flexion and extension MMT to 4/5 within available ROM to improve ease of transfers  Long Term Goal 2: Patient will improve transfer ability to allow supine<>sit with SBA  Long Term Goal 3: Patient will improve transfer ability to allow sit<>stand with good control and SBA  LTG 4 Current Status[de-identified] Patient will

## 2023-08-21 ENCOUNTER — HOSPITAL ENCOUNTER (OUTPATIENT)
Dept: PHYSICAL THERAPY | Age: 78
Setting detail: THERAPIES SERIES
Discharge: HOME | End: 2023-08-21
Payer: MEDICARE

## 2023-08-21 ENCOUNTER — HOSPITAL ENCOUNTER (OUTPATIENT)
Dept: OCCUPATIONAL THERAPY | Age: 78
Setting detail: THERAPIES SERIES
Discharge: HOME | End: 2023-08-21
Payer: MEDICARE

## 2023-08-21 PROCEDURE — 97110 THERAPEUTIC EXERCISES: CPT

## 2023-08-21 NOTE — PROGRESS NOTES
9 [facility] and [address] [place] [address]         Phone: [phone]  Fax: [phone]    Outpatient Occupational Therapy Daily Note    Date:  2023  Patient Name:  Mari Montes      :  1945    MRN: [id number]  Patient Name:  Mari Montes                 :  1945                     MRN: [id number]  Referring provider: Cammy Vargas MD                   Insurance: Medicare  Diagnosis: CVA             Onset Date: 2023             Next Dr. Monson Antoine: 10/18/23  Visit # / total visits:   Cancels/No Shows: 0/0    Subjective:     Arrived to therapy session independently via wheelchair. Reported that he has been completing the Thera-putty HEP, but that he has to tape his left 4th digit PIP joint while completing the grasp exercises to prevent increased pain. Stated that he is able to complete all 20 repetitions of this exercise, but that he has to take a short rest break after the first 10 repetitions. Instructed patient to discontinue this particular exercise if the pain becomes sharp. Pre Treatment Pain:    [] Yes  [x] No Location: N/A   Pain Rating: (0-10 scale) 0/10  Post Treatment Pain:  [x] Yes  [] No  Location: right shoulder  Pain Rating: (0-10 scale) 2/10    Objective:     N/A    Assessment:     Completed exercises/modalities as documented in Exercise Flowsheet with good tolerance overall. Initiated seated pendulum exercises this date, however patient reported increased right shoulder discomfort afterwards. Emphasized the importance of using his body to swing the RUE in clockwise and counterclockwise directions. Will continue to address goals and progress patient as able. Remains seated at arm bike machine with PT present upon OT departure.      [x] Progressing toward goals  [] No change  [] Regressing from goals  [x] Patient would continue to benefit from skilled occupational therapy services in order to continue to address RUE deficits which limits full engagement in ADL

## 2023-08-21 NOTE — PROGRESS NOTES
Phone: 842 [facility]      Fax: [phone]                            Outpatient Physical Therapy                                                                            Daily Note    Date: 2023  Patient Name: Frank Bailey        MRN: [id number]   ACCT#:  [de-identified]  : 1945  (78 y.o.)    Referring Provider (secondary): Oriana Mendoza         Diagnosis: Right TKA revision  Treatment Diagnosis: Right TKR revision, TIA    PT Insurance Information: Medicare, Med Nec  Total # of Visits Approved: 16 Per Physician Order  Total # of Visits to Date: 6  Plan of Care/Certification Expiration Date: 23    Pre-Treatment Pain:  0/10  Subjective: 1115:  Denies pain today. Feels he is moving better. Getting up from chairs easier. Having less frequent sharp pains over past 2-3 days. Assessment  Assessment: Continues to progress. Able to ambulate with jonnathan cane today. CGA and WC follow. Fatigued after 45 ft. Cues for sequencing. He would benefit from continued skilled PT to progress strength and improve transfer and ambulation ability.     Plan  Continue with current plan of care    Exercises/Modalities/Manual:  See DocFlow Sheet    Education: Education on jonnathan cane use, sequencing          Goals  (Total # of Visits to Date: 6)   Short Term Goals  Time Frame for Short Term Goals: 8 visits  Short Term Goal 1: Patient will demonstrate compliance with current HEP to optimize therapy progress - MET  Short Term Goal 2: Patient will ambulate safely with FWW and SBA - MET    Long Term Goals  Time Frame for Long Term Goals : 16 visits  Long Term Goal 1: Patient will improve right knee flexion and extension MMT to 4/5 within available ROM to improve ease of transfers  Long Term Goal 2: Patient will improve transfer ability to allow supine<>sit with SBA  Long Term Goal 3: Patient will improve transfer ability to allow sit<>stand with good control and SBA  LTG 4 Current Status[de-identified]
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
## 2023-08-23 NOTE — TELEPHONE ENCOUNTER
Last OV: 6/14/2023  Last RX: 5/2/2023   Next scheduled apt: 10/20/2023      Surescripts requesting refill

## 2023-08-24 ENCOUNTER — HOSPITAL ENCOUNTER (OUTPATIENT)
Dept: PHYSICAL THERAPY | Age: 78
Setting detail: THERAPIES SERIES
Discharge: HOME | End: 2023-08-24
Payer: MEDICARE

## 2023-08-24 ENCOUNTER — HOSPITAL ENCOUNTER (OUTPATIENT)
Dept: OCCUPATIONAL THERAPY | Age: 78
Setting detail: THERAPIES SERIES
Discharge: HOME | End: 2023-08-24
Payer: MEDICARE

## 2023-08-24 PROCEDURE — 97110 THERAPEUTIC EXERCISES: CPT

## 2023-08-24 NOTE — PLAN OF CARE
completion of right 9 hole peg test in 40 seconds or less. 13. Patient will demonstrate functional AROM in right elbow/shoulder joints to increase ease with ADLs/IADLs. LUCAS Cherry/JEAN-PAUL    8/24/2023    _____________________________________ 8/24/2023  Physician Signature    By signing above or cosigning electronically, I have reviewed this Plan of Care and certify a need for medically necessary rehabilitation services.

## 2023-08-24 NOTE — PROGRESS NOTES
In: 1120    Time Out : 1205   Timed Code Treatment Minutes: 45 Minutes  Total Treatment Time: 975 Isaura Randall     Date: 8/24/2023

## 2023-08-24 NOTE — PROGRESS NOTES
[address] [facility] and [address] [place] [address]         Phone: [phone]  Fax: [phone]    Outpatient Occupational Therapy Daily Note    Date:  2023  Patient Name:  Samuel Alan      :  1945    MRN: [id number]  Patient Name:  Samuel Alan                 :  1945                     MRN: [id number]  Referring provider: Rufina Vu MD                   Insurance: Medicare  Diagnosis: CVA             Onset Date: 2023             Next Dr. Bella Matas: 23  Visit # / total visits:   Cancels/No Shows: 0/0    Subjective:     Arrived to therapy session independently via wheelchair. Reported increased independence with shower transfers.       Pre Treatment Pain:    [] Yes  [x] No Location: N/A   Pain Rating: (0-10 scale) 0/10  Post Treatment Pain:  [] Yes  [x] No  Location: N/A   Pain Rating: (0-10 scale) 0/10    Objective:     Hand dominance: Right  Affected extremity: Right     FINE MOTOR COORDINATION    Right (seconds)   9 Hole Peg Test 55.00*   *despite decreased FMC score when compared to last reassessment, patient reported increased ease with manipulating pills in pill organizer    STRENGTH (AVERAGE SCORE)    Right   (pounds)    32.3   Lateral pinch 15.3   Phillips pinch 10.3*   Tip pinch 9.6*   *despite decreased phillips and tip pinch strengths when compared to last reassessment, patient reported increased ease with grasping/opening chip bag    ELBOW    Right ROM (degrees)   Flexion (145-150) 136       WRIST/FOREARM        Right ROM (degrees)   Extension (60-70) 45   Ulnar Deviation (30-40) 21   Forearm Supination (80-90) 82     RIGHT DIGITS       2nd digit ROM (degrees) 3rd digit ROM (degrees) 4th digit ROM (degrees) 5th digit ROM (degrees)   MCP extension/flexion (0-90) 0/63 0/74 0/85 0/89   PIP extension/flexion (0-100) 0/90 0/90 0/84 -10/85   DIP extension/flexion (0-70) -18/43 -14/46 -10* 0/44   (+ is used for hyperextension / - is used for extensor lag per ASHT)
Assessment:     Completed exercises/modalities as documented in Exercise Flowsheet with good tolerance overall. Reassessed goals with measurements outlined above. Continues to demonstrate decreased right shoulder and elbow ROM with increased pain reported (7-8/10) in shoulder joint during active flexion and abduction. Discussed taking pain medication 30-45 minutes prior to therapy sessions to maximize therapeutic benefit, however patient admitted that he does not like to take pain medicine. Complained of stiffness/swelling in right wrist and digits during active composite fist formation. Issued a right large-sized fingerless over-the-wrist heavy compression glove to assist with edema management and to facilitate increased ROM for ADLs. Denied any increased pain/numbness/tingling after ~5 minutes of wear and exhibited good capillary refill in the distal aspect of each digit. Reported that this garment felt good/supportive and this OT encouraged him to wear it as much as possible during functional activities. Reported increased ease with ADLs/IADLs overall since initiating OP OT services and stated that he is now able to complete shower transfers independently. Reported that he saw the neurologist the other day and that he discussed how his hand cramps when coughing or yawning. Stated that he was prescribed a muscle relaxer and another medication that he couldn't recall and that he will follow up with the neurologist in 3 weeks to determine if the medications are working or if a dose increase is warranted. Recommended extending OP OT services for an additional 6 visits at this time (2x/week for 3 weeks) to continue addressing above deficits. Verbalized understanding/agreement and stated that while he is concerned about his RUE as a whole, he is most concerned about the shoulder and elbow joints with the hopes of returning to driving.    Reported that he has difficulty opening his right hand

## 2023-08-24 NOTE — PROGRESS NOTES
[facility] ATKINS          Phone: [phone]      Outpatient Physical Therapy  Fax: [phone]                                 10th Visit Report    Date: 8/24/2023  ACCT #: [de-identified]      Referring Provider (secondary): Jackson Deal         Diagnosis: Right TKA revision      Treatment Diagnosis: Right TKR revision, TIA    Authorization Period: Start: 8/16/2023 End:  9/13/2023  PT Insurance Information: Medicare, Med Nec  Total # of Visits Approved: 16 Per Physician Order  Total # of Visits to Date: 11    Current Treatment:  Patient Education/HEP, Therapeutic Exercise, Neuro Re-ed, Gait Training, and Therapeutic Activity    Skilled Intervention:  Body Structures, Functions, Activity Limitations Requiring Skilled Therapeutic Intervention: Decreased functional mobility , Decreased ADL status, Decreased ROM, Decreased body mechanics, Decreased tolerance to work activity, Decreased strength, Decreased safe awareness, Increased pain, Decreased posture, Decreased balance, Decreased endurance  Assessment: Continues to progress. Able to ambulate with jonnathan cane today. CGA and WC follow. Fatigued after 45 ft. Cues for sequencing. He would benefit from continued skilled PT to progress strength and improve transfer and ambulation ability.   Therapy Prognosis: Fair  Reduce Falls   , Improve Ambulation, Complete Daily Tasks Safely, and Return to Prior Level of Function    Expectations for Improvement/Time Frame:  8 weeks    Plan  Continue with current plan of care    Goals  Short Term Goals  Time Frame for Short Term Goals: 8 visits  Short Term Goal 1: Patient will demonstrate compliance with current HEP to optimize therapy progress - MET  Short Term Goal 2: Patient will ambulate safely with FWW and SBA - MET    Long Term Goals  Time Frame for Long Term Goals : 16 visits  Long Term Goal 1: Patient will improve right knee flexion and extension MMT to 4/5 within Explanation of exam/test

## 2023-08-28 ENCOUNTER — HOSPITAL ENCOUNTER (OUTPATIENT)
Dept: PHYSICAL THERAPY | Age: 78
Setting detail: THERAPIES SERIES
Discharge: HOME | End: 2023-08-28
Payer: MEDICARE

## 2023-08-28 ENCOUNTER — HOSPITAL ENCOUNTER (OUTPATIENT)
Dept: OCCUPATIONAL THERAPY | Age: 78
Setting detail: THERAPIES SERIES
Discharge: HOME | End: 2023-08-28
Payer: MEDICARE

## 2023-08-28 PROCEDURE — 97110 THERAPEUTIC EXERCISES: CPT

## 2023-08-28 ASSESSMENT — PAIN SCALES - GENERAL: PAINLEVEL_OUTOF10: 1

## 2023-08-28 NOTE — THERAPY RECERTIFICATION
[facility]INS  Phone: [phone]   Date: 2023                  Outpatient Physical Therapy Fax: [phone]    ACCT #: [de-identified]                  Recertification  [facility]#: [id number]  Patient: Rigoberto Galvez  : 1945    Referring Provider (secondary): Leonie Lennon         Diagnosis: Right TKA revision     Treatment Diagnosis: Right TKR revision, TIA    Assessment  Body Structures, Functions, Activity Limitations Requiring Skilled Therapeutic Intervention: Decreased functional mobility , Decreased ADL status, Decreased ROM, Decreased body mechanics, Decreased tolerance to work activity, Decreased strength, Decreased safe awareness, Increased pain, Decreased posture, Decreased balance, Decreased endurance  Assessment: Goals re-assessed. Patient is making progress in regards to transfers and strength. Still with poor eccentric control for stand to sit. Requires Min A for supine>sit. He is ambulating further and safer with jonnathan cane. Drop foot continues to be an issue. Will look further into low profile AFO or connection between shoe and IROM to limit this. He demonstrates lack of hip extension while doing table work today, likely due to length of time sitting during his days. Limited ROM at knee remains. Focus on sitting and laying exercise today due to // bars and shuttle occupied. Will return to more WB based work at next visit. Will extend his POC for another 4 weeks after this POC expires in order to continue to progress toward functional goals.   Therapy Prognosis: Fair    Treatment Plan   Days: 2 times per week     Weeks: 8 weeks Total # of Visits Approved: 24  Patient Education/HEP, Therapeutic Exercise, Neuro Re-ed, Gait Training, and Therapeutic Activity    Goals  Short Term Goals  Time Frame for Short Term Goals: 8 visits  Short Term Goal 1: Patient will demonstrate compliance with current HEP to optimize therapy progress - MET  Short Term Goal 2: Patient will ambulate

## 2023-08-28 NOTE — PROGRESS NOTES
Phone: [facility]      Fax: [phone]                            Outpatient Physical Therapy                                                                            Daily Note    Date: 2023  Patient Name: Tyson Peña        MRN: [id number]   ACCT#:  [de-identified]  : 1945  (78 y.o.)    Referring Provider (secondary): Neil Head         Diagnosis: Right TKA revision  Treatment Diagnosis: Right TKR revision, TIA    PT Insurance Information: Medicare, Med Nec  Total # of Visits Approved: 24 Per Physician Order  Total # of Visits to Date: 15  Plan of Care/Certification Expiration Date: 23    Pre-Treatment Pain:  1/10  Subjective: 1115: Minimal pain in knee. Denies problems after last treatment. Assessment  Assessment: Goals re-assessed. Patient is making progress in regards to transfers and strength. Still with poor eccentric control for stand to sit. Requires Min A for supine>sit. He is ambulating further and safer with jonnathan cane. Drop foot continues to be an issue. Will look further into low profile AFO or connection between shoe and IROM to limit this. He demonstrates lack of hip extension while doing table work today, likely due to length of time sitting during his days. Limited ROM at knee remains. Focus on sitting and laying exercise today due to // bars and shuttle occupied. Will return to more WB based work at next visit. Will extend his POC for another 4 weeks after this POC expires in order to continue to progress toward functional goals.     Plan  Continue with current plan of care    Exercises/Modalities/Manual:  See DocFlow Sheet    Education: Education on extension of POC and new HEP with quad sets, heel slide and supine lying to improve hip extension          Goals  (Total # of Visits to Date: 15)   Short Term Goals  Time Frame for Short Term Goals: 8 visits  Short Term Goal 1: Patient will demonstrate compliance with current HEP

## 2023-08-28 NOTE — PROGRESS NOTES
[] Written  Re: N/A  Comprehension of Education:  [] Verbalizes understanding  [] Demonstrates understanding  [] Needs review    Goals:     Time Frame for Short Term Goals: 6 visits  Patient will:     1. Improve right wrist AROM to increase ease with self feeding tasks. Wrist extension to 60 degrees  Wrist UD to 29 degrees                                                 2. Improve right forearm supination to 90 degrees to increase ease with grooming tasks. 3. Improve right 1st digit AROM to increase ease with grasping steering wheel. IP joint to 47 degrees     4. Improve right 2nd digit AROM to increase ease with grasping steering wheel. PIP flexion to 94 degrees     5. Improve right 3rd digit AROM to increase ease with grasping steering wheel. PIP flexion to 95 degrees     6. Improve right 4th digit AROM to increase ease with grasping steering wheel. PIP flexion to 89 degrees  DIP flexion to 50 degrees     7. Improve right 5th digit AROM to increase ease with grasping steering wheel. PIP flexion to 89 degrees  DIP flexion to 49 degrees      8. Decrease right 5th digit extensor lag to increase ease with grasping steering wheel. PIP joint to 0 degrees     9. Improve right  strength to 43.3 pounds to increase ease with grasping/pulling a seat belt. 10. Improve right pinch strengths to increase ease with buckling seat belt. Lateral pinch to 19.3 pounds  Gutiérrez pinch to 14.0 pounds  Tip pinch to 12.6 pounds     11. Increase UEFS score to 55 or more points to demonstrate improved functional abilities. 12. Improve fine motor coordination for dressing tasks AEB completion of right 9 hole peg test in 40 seconds or less. 13. Patient will demonstrate functional AROM in right elbow/shoulder joints to increase ease with ADLs/IADLs.     Plan:     [x] Continue per current plan of care  [] Hold therapy due to:  [] Specific instructions for future sessions:    Time In: 1031  Time Out: 1114  Timed
7

## 2023-09-01 ENCOUNTER — HOSPITAL ENCOUNTER (OUTPATIENT)
Dept: OCCUPATIONAL THERAPY | Age: 78
Setting detail: THERAPIES SERIES
Discharge: HOME | End: 2023-09-01
Payer: MEDICARE

## 2023-09-01 ENCOUNTER — HOSPITAL ENCOUNTER (OUTPATIENT)
Dept: PHYSICAL THERAPY | Age: 78
Setting detail: THERAPIES SERIES
Discharge: HOME | End: 2023-09-01
Payer: MEDICARE

## 2023-09-01 PROCEDURE — 97110 THERAPEUTIC EXERCISES: CPT

## 2023-09-01 NOTE — PROGRESS NOTES
Phone: [phone] [facility]      Fax: [phone]                            Outpatient Physical Therapy                                                                            Daily Note    Date: 2023  Patient Name: Melissa Vasquez        MRN: [id number]   ACCT#:  [de-identified]  : 1945  (78 y.o.)    Referring Provider (secondary): Yoselin Serna         Diagnosis: Right TKA revision  Treatment Diagnosis: Right TKR revision, TIA    PT Insurance Information: Medicare, Med Nec  Total # of Visits Approved: 24 Per Physician Order  Total # of Visits to Date: 15  Plan of Care/Certification Expiration Date: 23    Pre-Treatment Pain:  0/10  Subjective: 1115: Minimal pain in knee. Denies problems after last treatment. Assessment  Assessment: Patient rates pain as 0/10 today in his knee. Completes session today with his shoe on and a make shift AFO tied to the bottome of his brace to his bottom shoe laces. This seems to help slightly with drop foot during gait.   Continue to progress towards goals    Plan  Continue with current plan of care    Exercises/Modalities/Manual:  See DocFlow Sheet    Education:           Goals  (Total # of Visits to Date: 13)   Short Term Goals  Time Frame for Short Term Goals: 8 visits  Short Term Goal 1: Patient will demonstrate compliance with current HEP to optimize therapy progress - MET  Short Term Goal 2: Patient will ambulate safely with FWW and SBA - MET    Long Term Goals  Time Frame for Long Term Goals : 16 visits  Long Term Goal 1: Patient will improve right knee flexion and extension MMT to 4/5 within available ROM to improve ease of transfers - Not Met (progressing)  Long Term Goal 2: Patient will improve transfer ability to allow supine<>sit with SBA  Long Term Goal 3: Patient will improve transfer ability to allow sit<>stand with good control and SBA - Not Met (progressing)  Long Term Goal 4: Patient will demonstrate safe ambulation

## 2023-09-01 NOTE — PROGRESS NOTES
Short Term Goals: 6 visits  Patient will:  1. Improve right wrist AROM to increase ease with self feeding tasks. Wrist extension to 60 degrees  Wrist UD to 29 degrees                                                 2. Improve right forearm supination to 90 degrees to increase ease with grooming tasks. 3. Improve right 1st digit AROM to increase ease with grasping steering wheel. IP joint to 47 degrees     4. Improve right 2nd digit AROM to increase ease with grasping steering wheel. PIP flexion to 94 degrees     5. Improve right 3rd digit AROM to increase ease with grasping steering wheel. PIP flexion to 95 degrees     6. Improve right 4th digit AROM to increase ease with grasping steering wheel. PIP flexion to 89 degrees  DIP flexion to 50 degrees     7. Improve right 5th digit AROM to increase ease with grasping steering wheel. PIP flexion to 89 degrees  DIP flexion to 49 degrees      8. Decrease right 5th digit extensor lag to increase ease with grasping steering wheel. PIP joint to 0 degrees     9. Improve right  strength to 43.3 pounds to increase ease with grasping/pulling a seat belt. 10. Improve right pinch strengths to increase ease with buckling seat belt. Lateral pinch to 19.3 pounds  Gutiérrez pinch to 14.0 pounds  Tip pinch to 12.6 pounds     11. Increase UEFS score to 55 or more points to demonstrate improved functional abilities. 12. Improve fine motor coordination for dressing tasks AEB completion of right 9 hole peg test in 40 seconds or less. 13. Patient will demonstrate functional AROM in right elbow/shoulder joints to increase ease with ADLs/IADLs.     Plan:     [x] Continue per current plan of care  [] Hold therapy due to:  [] Specific instructions for future sessions:    Time In: 1031  Time Out: 1116  Timed Coded Minutes: 45  Total Treatment Time: [address]   Date: 9/1/2023

## 2023-09-05 ENCOUNTER — HOSPITAL ENCOUNTER (OUTPATIENT)
Dept: PHYSICAL THERAPY | Age: 78
Setting detail: THERAPIES SERIES
Discharge: HOME | End: 2023-09-05
Payer: MEDICARE

## 2023-09-05 ENCOUNTER — HOSPITAL ENCOUNTER (OUTPATIENT)
Dept: OCCUPATIONAL THERAPY | Age: 78
Setting detail: THERAPIES SERIES
Discharge: HOME | End: 2023-09-05
Payer: MEDICARE

## 2023-09-05 PROCEDURE — 97110 THERAPEUTIC EXERCISES: CPT

## 2023-09-05 NOTE — PROGRESS NOTES
Phone: [phone] [facility]      Fax: [phone]                            Outpatient Physical Therapy                                                                            Daily Note    Date: 2023  Patient Name: Gerardo Ewing        MRN: [id number]   ACCT#:  [de-identified]  : 1945  (78 y.o.)    Referring Provider (secondary): Kunal Jasso         Diagnosis: Right TKA revision  Treatment Diagnosis: Right TKR revision, TIA    PT Insurance Information: Medicare, Med Nec  Total # of Visits Approved: 24 Per Physician Order  Total # of Visits to Date: 12  Plan of Care/Certification Expiration Date: 23    Pre-Treatment Pain:  0/10     Assessment  Assessment: Patient denies pain this afternoon. Patient has gauze and tape over R knee due to blisters per patient. Advised patient to monitor these blisters and if they worsen to get ahold of doctor. Continued with strengthening exercises as outlined with good tolerance from patient. Used make shift AFO again throughout session with less toe drag noted. Post session patient was able to ambulate 61' with jonnathan walker. Will continue to progress as able.     Plan  Continue with current plan of care    Exercises/Modalities/Manual:  See DocFlow Sheet    Education: Make shift AFO to help with foot drop     Goals  (Total # of Visits to Date: 12)   Short Term Goals  Time Frame for Short Term Goals: 8 visits  Short Term Goal 1: Patient will demonstrate compliance with current HEP to optimize therapy progress - MET  Short Term Goal 2: Patient will ambulate safely with FWW and SBA - MET    Long Term Goals  Time Frame for Long Term Goals : 16 visits  Long Term Goal 1: Patient will improve right knee flexion and extension MMT to 4/5 within available ROM to improve ease of transfers - Not Met (progressing)  Long Term Goal 2: Patient will improve transfer ability to allow supine<>sit with SBA  Long Term Goal 3: Patient will improve transfer

## 2023-09-05 NOTE — PROGRESS NOTES
9 [facility] and [address] [place] [address]         Phone: [phone]  Fax: [phone]    Outpatient Occupational Therapy Daily Note    Date:  2023  Patient Name:  Diego Lennon      :  1945                     MRN: [id number]  Referring provider: Errol Rojo MD                   Insurance: Medicare  Diagnosis: CVA             Onset Date: 2023               Next Dr. Ramona Albarer: 2023 @10:30 (X-Rays) doctor at 11:15. (Doctor moved appointment from  to ). Visit # / total visits: 3/6  Cancels/No Shows: 0 / 0    Subjective:     Pre Treatment Pain:    [] Yes  [x] No Location: None    Pain Rating: (0-10 scale) 0/10  Post Treatment Pain:  [] Yes  [x] No  Location: None     Pain Rating: (0-10 scale) 0/10    Objective:   See Exercise FlowSheet regarding exercises completed this date. Assessment:     Completed exercises/modalities as documented in Exercise Flowsheet with good tolerance overall. Continues to report improvements at home w/ functional tasks, especially when involving shoulder movements  Provided patient with blue t-band to continue exercises at home   Will continue to address goals and progress patient as able. [x] Progressing toward goals  [] No change  [] Regressing from goals  [x] Patient would continue to benefit from skilled occupational therapy services in order to continue to address functional limitations of RUE which inhibit full engagement in ADL and IADL tasks    Patient Education:   [x] Yes  [] No    Method of Education: [x] Verbal  [x] Demo  [] Written  Re: Theraband exercises  Comprehension of Education:  [x] Verbalizes understanding  [] Demonstrates understanding  [] Needs review    Goals:   Time Frame for Short Term Goals: 6 visits  Patient will:  1. Improve right wrist AROM to increase ease with self feeding tasks.   Wrist extension to 60 degrees  Wrist UD to 29 degrees                                                 2. Improve right forearm

## 2023-09-08 ENCOUNTER — HOSPITAL ENCOUNTER (OUTPATIENT)
Dept: PHYSICAL THERAPY | Age: 78
Setting detail: THERAPIES SERIES
Discharge: HOME | End: 2023-09-08
Payer: MEDICARE

## 2023-09-08 ENCOUNTER — HOSPITAL ENCOUNTER (OUTPATIENT)
Dept: OCCUPATIONAL THERAPY | Age: 78
Setting detail: THERAPIES SERIES
Discharge: HOME | End: 2023-09-08
Payer: MEDICARE

## 2023-09-08 PROCEDURE — 97110 THERAPEUTIC EXERCISES: CPT

## 2023-09-08 NOTE — PROGRESS NOTES
Phone: [address] [facility]           Fax: [phone]                            Outpatient Physical Therapy                                                                            Daily Note    Date: 2023  Patient Name: Francisca Gruber        MRN: [id number]   ACCT#:  [de-identified]  : 1945  (78 y.o.)    Referring Provider (secondary): Maxwell Wilkinson         Diagnosis: Right TKA revision  Treatment Diagnosis: Right TKR revision, TIA    PT Insurance Information: Medicare, Med Nec  Total # of Visits Approved: 24 Per Physician Order  Total # of Visits to Date: 16  Plan of Care/Certification Expiration Date: 23    Pre-Treatment Pain:  0/10     Assessment  Assessment: Patient tolerated progressions in strengthening well today. Patient did fatigue a little more than normal, but overall did well throughout the session. Patient continues to monitor the blisters which are covered with the tape. The make shift AFO continues to help patient improve ambulation and DF during exercises. Post session patient was able to ambulate 50 feet with jonnathan walker. Patient will continue to benefit from skilled PT to further progress strengthening and mobility. Plan  Continue with current plan of care    Exercises/Modalities/Manual:  See DocFlow Sheet    Education: Patient given cueing during exercise to maintain proper form.           Goals  (Total # of Visits to Date: 16)   Short Term Goals -   Short Term Goals  Time Frame for Short Term Goals: 8 visits  Short Term Goal 1: Patient will demonstrate compliance with current HEP to optimize therapy progress - MET  Short Term Goal 2: Patient will ambulate safely with FWW and SBA - MET    Long Term Goals -   Long Term Goals  Time Frame for Long Term Goals : 16 visits  Long Term Goal 1: Patient will improve right knee flexion and extension MMT to 4/5 within available ROM to improve ease of transfers - Not Met (progressing)  Long Term Goal 2:

## 2023-09-08 NOTE — PROGRESS NOTES
9 [facility]         Phone: [phone]  Fax: [phone]    Outpatient Occupational Therapy Daily Note    Date:  2023  Patient Name:  Rigoberto Galvez      :  1945                     MRN: [id number]  Referring provider: Sree Singleton MD                   Insurance: Medicare  Diagnosis: CVA             Onset Date: 2023          Next Dr. Vazquez Base: 2023  Visit # / total visits: 4/6  Cancels/No Shows: 0/0    Subjective:     Pre Treatment Pain:    [] Yes  [x] No Location: N/A     Pain Rating: (0-10 scale) 0/10  Post Treatment Pain:  [] Yes  [x] No  Location: N/A     Pain Rating: (0-10 scale) 0/10    Objective:     N/A    Assessment:     Completed exercises/modalities as documented in Exercise Flowsheet with good tolerance overall. Will continue to address goals and progress patient as able. [x] Progressing toward goals  [] No change  [] Regressing from goals  [x] Patient would continue to benefit from skilled occupational therapy services in order to continue to address functional limitations of RUE which inhibit full engagement in ADL and IADL tasks    Patient Education:     [] Yes  [x] No    Method of Education: [] Verbal  [] Demo  [] Written  Re:   Comprehension of Education:  [] Verbalizes understanding  [] Demonstrates understanding  [] Needs review    Goals:     Time Frame for Short Term Goals: 6 visits  Patient will:  1. Improve right wrist AROM to increase ease with self feeding tasks. Wrist extension to 60 degrees  Wrist UD to 29 degrees                                                 2. Improve right forearm supination to 90 degrees to increase ease with grooming tasks. 3. Improve right 1st digit AROM to increase ease with grasping steering wheel. IP joint to 47 degrees     4. Improve right 2nd digit AROM to increase ease with grasping steering wheel. PIP flexion to 94 degrees     5.  Improve right 3rd digit AROM to increase ease with grasping

## 2023-09-13 ENCOUNTER — HOSPITAL ENCOUNTER (OUTPATIENT)
Dept: PHYSICAL THERAPY | Age: 78
Setting detail: THERAPIES SERIES
Discharge: HOME | End: 2023-09-13
Payer: MEDICARE

## 2023-09-13 ENCOUNTER — HOSPITAL ENCOUNTER (OUTPATIENT)
Dept: OCCUPATIONAL THERAPY | Age: 78
Setting detail: THERAPIES SERIES
Discharge: HOME | End: 2023-09-13
Payer: MEDICARE

## 2023-09-13 PROCEDURE — 97110 THERAPEUTIC EXERCISES: CPT

## 2023-09-13 NOTE — PROGRESS NOTES
9 [facility] and [address] [facility]         Phone: [phone]  Fax: [phone]    Outpatient Occupational Therapy Daily Note    Date:  2023  Patient Name:  Samuel Alan      :  1945                     MRN: [id number]  Referring provider: Rufina Vu MD                   Insurance: Medicare  Diagnosis: CVA             Onset Date: 2023          Next  [address] [place] [address] North: 2024  Visit # / total visits: 5/6  Cancels/No Shows: 0/0    Subjective:     Reported that he had a follow-up appointment with Dr. Eric العلي on Monday and that Minh did not increase his knee flexion ROM. Reported that he is still at 30 degrees of flexion stating that the doctor wants him to strengthen his superficial muscles to reduce the risk of a tear before increasing the degrees. 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 Pre Treatment Pain:    [] Yes  [x] No Location: N/A     Pain Rating: (0-10 scale) 0/10  Post Treatment Pain:  [] Yes  [x] No  Location: N/A     Pain Rating: (0-10 scale) 0/10    Objective:     N/A    Assessment:     Completed exercises/modalities as documented in Exercise Flowsheet with good tolerance overall. Discussed discharging OP OT services during the next session due to reaching maximum therapeutic potential and patient verbalized agreement. Emphasized the importance of continuing with HEPs (orange Thera-putty, blue Thera-band and 3 pound weight for elbow/forearm/wrist exercises) post discharge as well as obtaining an over-the-door pulley to continue exercising the shoulder joint. Verbalized understanding/compliance with these HEPs and stated that he already owns an over-the-door pulley. Also stated that the orange Cris Bridgett still provides a just-right challenge for him. Will plan to discharge OP OT services during the next visit.    Remains seated on arm bike

## 2023-09-13 NOTE — PROGRESS NOTES
Phone: [phone] [facility]      Fax: [phone]                            Outpatient Physical Therapy                                                                            Daily Note    Date: 2023  Patient Name: Sara Knapp        MRN: [id number]   ACCT#:  [de-identified]  : 1945  (78 y.o.)    Referring Provider (secondary): Maxwell Wilkinson         Diagnosis: Right TKA revision  Treatment Diagnosis: Right TKR revision, TIA    PT Insurance Information: Medicare, Med Nec  Total # of Visits Approved: 24 Per Physician Order  Total # of Visits to Date: 25  Plan of Care/Certification Expiration Date: 23    Pre-Treatment Pain:  0/10     Assessment  Assessment: Patient reports following up with surgeon on Monday. He sent new PT orders, but there is no significant changes since last order. Patient reports surgeon wants to continue to be cautious regard knee flex and wants him to strengthen quad more. Patient reports ordering TENS unit to help with quad strengthening. Patient also order a shoe with AFO built in to help with foot drop during gait. Continued with strengthening exercises per flowsheet. Patient had one episode of catching toe while getting on Nu Step. At end of session patient ambulates x60' with jonnathan walker. Will continue to progress as able.     Plan  Continue with current plan of care    Exercises/Modalities/Manual:  See DocFlow Sheet    Education:      Goals  (Total # of Visits to Date: 25)   Short Term Goals  Time Frame for Short Term Goals: 8 visits  Short Term Goal 1: Patient will demonstrate compliance with current HEP to optimize therapy progress - MET  Short Term Goal 2: Patient will ambulate safely with FWW and SBA - MET    Long Term Goals  Time Frame for Long Term Goals : 16 visits  Long Term Goal 1: Patient will improve right knee flexion and extension MMT to 4/5 within available ROM to improve ease of transfers - Not Met (progressing)  Long Term Goal

## 2023-09-15 ENCOUNTER — HOSPITAL ENCOUNTER (OUTPATIENT)
Dept: OCCUPATIONAL THERAPY | Age: 78
Setting detail: THERAPIES SERIES
Discharge: HOME | End: 2023-09-15
Payer: MEDICARE

## 2023-09-15 ENCOUNTER — HOSPITAL ENCOUNTER (OUTPATIENT)
Dept: PHYSICAL THERAPY | Age: 78
Setting detail: THERAPIES SERIES
Discharge: HOME | End: 2023-09-15
Payer: MEDICARE

## 2023-09-15 PROCEDURE — 97110 THERAPEUTIC EXERCISES: CPT

## 2023-09-15 NOTE — PROGRESS NOTES
elbow/shoulder joints to increase ease with ADLs/IADLs - MET    Plan:     [] Continue per current plan of care  [] Hold therapy due to:  [x] Specific instructions for future sessions: N/A (discharged from OP OT services this date)    Time In: 1113  Time Out: 7058 (requested to conclude early)  Timed Coded Minutes: 34  Total Treatment Time: 76831 Kalen Saldaña OTR/L      Date: 9/15/2023

## 2023-09-15 NOTE — DISCHARGE SUMMARY
[facility] and [address] [place] [address]         Phone: [phone]  Fax: [phone]    Outpatient Occupational Therapy Discharge Summary     Date: 9/15/2023  Patient: Diego Lennon  : 1945  Account #: [de-identified]   Referring provider: BESS Mendoza  Diagnosis: CVA  Treatment Diagnosis: CVA    Date Treatment Initiated: 23  Date of Last Treatment: 9/15/23  Frequency: 1-2 times/wk  Duration: 18 visits    Current Treatment:     [x] HP/CP      [] Electrical Stimulation    [x] Strengthening    [x] Active/Passive ROM  [] Muscle Re-education    [x] Fine Motor Coordination    [] Ultrasound   [] Splinting  [] Developmental Therapy    [] Sensory Integration [x] Patient Education/HEP [] Visual Perception Retraining   [] ADL Training   [] Cognitive Retraining   [] Therapeutic Activity   [] Paraffin   [x] Therapeutic Exercise   [x] Manual Therapy  [] Other:    Objective:     FINE MOTOR COORDINATION    Right (seconds)   9 Hole Peg Test 38.00      STRENGTH (AVERAGE SCORE)    Right   (pounds)    37.6   Lateral pinch 16.3   Gutiérrez pinch 10.3   Tip pinch 10.0      WRIST/FOREARM        Right ROM (degrees)   Extension (60-70) 46*   Ulnar Deviation (30-40) 21   Forearm Supination (80-90) 82    *this degrees is functional per patient      RIGHT DIGITS       2nd digit ROM (degrees) 3rd digit ROM (degrees) 4th digit ROM (degrees) 5th digit ROM (degrees)   MCP extension/flexion (0-90) 0/63 0/74 0/85 0/89   PIP extension/flexion (0-100) 0/94 0/90 0/87 -10/   DIP extension/flexion (0-70) -18/43 -14/46 -10 0/50   (+ is used for hyperextension / - is used for extensor lag per ASHT)      THUMB                           Right  (degrees)   IP extension/flexion (0-80) 0/42       UPPER EXTREMITY FUNCTIONAL SCALE (UEFS)    RUE   Score 39/80     Assessment:     Completed exercises/modalities as documented in Exercise Flowsheet with good tolerance overall. Reassessed goals with measurements outlined above.   UEFS

## 2023-09-15 NOTE — PROGRESS NOTES
demonstrate safe ambulation with cane - Not Met (progressing)    Post Treatment Pain:  0/10    Time In: 1030    Time Out : 1115   Timed Code Treatment Minutes: 45 Minutes  Total Treatment Time: 975 Isaura Randall     Date: 9/15/2023

## 2023-09-19 ENCOUNTER — HOSPITAL ENCOUNTER (OUTPATIENT)
Dept: PHYSICAL THERAPY | Age: 78
Setting detail: THERAPIES SERIES
Discharge: HOME | End: 2023-09-19
Payer: MEDICARE

## 2023-09-19 PROCEDURE — 97110 THERAPEUTIC EXERCISES: CPT

## 2023-09-19 NOTE — PROGRESS NOTES
Phone: [phone] [facility]      Fax: [phone]                            Outpatient Physical Therapy                                                                            Daily Note    Date: 2023  Patient Name: Sara Knapp        MRN: [id number]   ACCT#:  [de-identified]  : 1945  (78 y.o.)    Referring Provider (secondary): Maxwell Wilkinson         Diagnosis: Right TKA revision  Treatment Diagnosis: Right TKR revision, TIA    PT Insurance Information: Medicare, Med Nec  Total # of Visits Approved: 24 Per Physician Order  Total # of Visits to Date: 20  Plan of Care/Certification Expiration Date: 23    Pre-Treatment Pain:  0/10     Assessment  Assessment: Patient denies pain this morning, but patient notes his legs have beern feeling a little more tired the past few days. Patient also has some redness at the distal aspect of his L lower leg. Patient notes this happens because they took a vein from that portion of his leg for his heart surgery in . Patient states he has talked to a doctor about this before and reports they don't worry about it too much. Continued with strengthening exercises per flowsheet. At end of session patient ambulates with FWW x45' before being wheeled rest of way to vehicle.     Plan  Continue with current plan of care    Exercises/Modalities/Manual:  See DocFlow Sheet    Education: HEP     Goals  (Total # of Visits to Date: 21)   Short Term Goals  Time Frame for Short Term Goals: 8 visits  Short Term Goal 1: Patient will demonstrate compliance with current HEP to optimize therapy progress - MET  Short Term Goal 2: Patient will ambulate safely with FWW and SBA - MET    Long Term Goals  Time Frame for Long Term Goals : 16 visits  Long Term Goal 1: Patient will improve right knee flexion and extension MMT to 4/5 within available ROM to improve ease of transfers - Not Met (progressing)  Long Term Goal 2: Patient will improve transfer ability to

## 2023-09-22 ENCOUNTER — HOSPITAL ENCOUNTER (OUTPATIENT)
Dept: PHYSICAL THERAPY | Age: 78
Setting detail: THERAPIES SERIES
Discharge: HOME | End: 2023-09-22
Payer: MEDICARE

## 2023-09-22 ENCOUNTER — APPOINTMENT (OUTPATIENT)
Dept: OCCUPATIONAL THERAPY | Age: 78
End: 2023-09-22
Payer: MEDICARE

## 2023-09-22 NOTE — PROGRESS NOTES
Physical Therapy  [address] [facility] and [facility]    Date: 2023  Patient Name: Francisca Gruber        : 1945       Patient was running late and decided to cancel his appointment. Will return to next scheduled appointment next week.        Luis Morejon Date: 2023

## 2023-09-26 ENCOUNTER — HOSPITAL ENCOUNTER (OUTPATIENT)
Dept: PHYSICAL THERAPY | Age: 78
Setting detail: THERAPIES SERIES
Discharge: HOME | End: 2023-09-26
Payer: MEDICARE

## 2023-09-26 PROCEDURE — 97110 THERAPEUTIC EXERCISES: CPT

## 2023-09-26 NOTE — PROGRESS NOTES
[facility]          Phone: [phone]      Outpatient Physical Therapy  Fax: [phone]                                 10th Visit Report    Date: 9/26/2023  ACCT #: [de-identified]      Referring Provider (secondary): Rosa Rodríguez         Diagnosis: Right TKA revision      Treatment Diagnosis: Right TKR revision, TIA    Authorization Period: Start: 9/19/23 End:  10/19/2023  PT Insurance Information: Medicare, Med Nec  Total # of Visits Approved: 24 Per Physician Order  Total # of Visits to Date: 21    Current Treatment:  Patient Education/HEP, Therapeutic Exercise, Neuro Re-ed, Gait Training, and Therapeutic Activity    Skilled Intervention:  Body Structures, Functions, Activity Limitations Requiring Skilled Therapeutic Intervention: Decreased functional mobility , Decreased ADL status, Decreased ROM, Decreased body mechanics, Decreased tolerance to work activity, Decreased strength, Decreased safe awareness, Increased pain, Decreased posture, Decreased balance, Decreased endurance  Assessment: Ambulated prior to exercise, which seemed to improve his distance and tolerance overall. Still fatigues quickly with session today, required increased rest breaks compared to vistis prior. Will re-assess at end of POC for possible extension versus d/c.   Therapy Prognosis: Fair  Reduce Falls   , Improve Ambulation, Complete Daily Tasks Safely, and Return to Prior Level of Function    Expectations for Improvement/Time Frame:  10 visits    Plan  Continue with current plan of care    Goals  Short Term Goals  Time Frame for Short Term Goals: 8 visits  Short Term Goal 1: Patient will demonstrate compliance with current HEP to optimize therapy progress - MET  Short Term Goal 2: Patient will ambulate safely with FWW and SBA - MET    Long Term Goals  Time Frame for Long Term Goals : 16 visits  Long Term Goal 1: Patient will improve right knee flexion and extension MMT to
(progressing)    Post Treatment Pain:  0/10    Time In: 1115    Time Out : 1200        Timed Code Treatment Minutes: 45 Minutes  Total Treatment Time: 39 Minutes    Harika Pierre PT     Date: 9/26/2023

## 2023-09-29 ENCOUNTER — HOSPITAL ENCOUNTER (OUTPATIENT)
Dept: PHYSICAL THERAPY | Age: 78
Setting detail: THERAPIES SERIES
Discharge: HOME | End: 2023-09-29
Payer: MEDICARE

## 2023-09-29 PROCEDURE — 97110 THERAPEUTIC EXERCISES: CPT

## 2023-10-03 ENCOUNTER — HOSPITAL ENCOUNTER (OUTPATIENT)
Dept: PHYSICAL THERAPY | Age: 78
Setting detail: THERAPIES SERIES
Discharge: HOME | End: 2023-10-03
Payer: MEDICARE

## 2023-10-03 PROCEDURE — 97110 THERAPEUTIC EXERCISES: CPT

## 2023-10-03 NOTE — PROGRESS NOTES
Phone: 7[address] [facility]      Fax: [phone]                            Outpatient Physical Therapy                                                                            Daily Note    Date: 10/3/2023  Patient Name: Johnnie Ribera        MRN: [id number]   ACCT#:  [de-identified]  : 1945  (78 y.o.)    Referring Provider (secondary): Alyssa Harris         Diagnosis: Right TKA revision  Treatment Diagnosis: Right TKR revision, TIA    PT Insurance Information: Medicare, Med Nec  Total # of Visits Approved: 24 Per Physician Order  Total # of Visits to Date: 23  Plan of Care/Certification Expiration Date: 23    Pre-Treatment Pain:  3/10  Subjective: 1045: Right shoulder pain, no changes here. Denies problems after last visit. Assessment  Assessment: Progress remains slow. Likely plateau recently. Toe drop and quad weakness remain limiting factors, but are likely due to CVA and question if any further progress can be made.   Will re-assess at end of POC for possible extension versus d/c.    Plan  Continue with current plan of care    Exercises/Modalities/Manual:  See DocFlow Sheet    Education: Education on likely d/c at next visit due to plateau in progress          Goals  (Total # of Visits to Date: 21)   Short Term Goals  Time Frame for Short Term Goals: 8 visits  Short Term Goal 1: Patient will demonstrate compliance with current HEP to optimize therapy progress - MET  Short Term Goal 2: Patient will ambulate safely with FWW and SBA - MET    Long Term Goals  Time Frame for Long Term Goals : 16 visits  Long Term Goal 1: Patient will improve right knee flexion and extension MMT to 4/5 within available ROM to improve ease of transfers - Not Met (progressing)  Long Term Goal 2: Patient will improve transfer ability to allow supine<>sit with SBA  Long Term Goal 3: Patient will improve transfer ability to allow sit<>stand with good control and SBA - Not Met (progressing)  Long

## 2023-10-05 ENCOUNTER — HOSPITAL ENCOUNTER (OUTPATIENT)
Dept: PHYSICAL THERAPY | Age: 78
Setting detail: THERAPIES SERIES
Discharge: HOME | End: 2023-10-05
Payer: MEDICARE

## 2023-10-05 PROCEDURE — 97110 THERAPEUTIC EXERCISES: CPT

## 2023-10-05 NOTE — DISCHARGE SUMMARY
Phone: [address] [facility]             Fax: [phone]                            Outpatient Physical Therapy                                                                    Discharge Summary    Patient: Romina Jasso  :   ACCT #: [de-identified]   Referring Provider (secondary): Mila Mello      Diagnosis: Right TKA revision    Date Treatment Initiated: 23  Date of Last Treatment: 10/5/23    PT Visit Information  PT Insurance Information: Medicare, Med Nec  Total # of Visits Approved: 24  Total # of Visits to Date: 24  Plan of Care/Certification Expiration Date: 23  Referring Provider (secondary): Mila Mello    Frequency/Duration  Days: 2 times per week  Weeks: 12 weeks    Treatment Received  Patient Education/HEP, Therapeutic Exercise, Neuro Re-ed, Gait Training, and Therapeutic Activity         Assessment  Assessment: No measureable improvement in quad or hip flexor strength. He is able to transfer with improved ease compared to last assessment, but still has difficult lifting leg up to bed. Overall, he has not shown much progress since last assessment. Will d/c patient but with heavy emphasis on continued HEP. No further progress expected with skilled PT versus an independent HEP. He would benefit from jonnathan walker and/or rollator use in his home though.          Goals  Short Term Goals  Time Frame for Short Term Goals: 8 visits  Short Term Goal 1: Patient will demonstrate compliance with current HEP to optimize therapy progress - MET  STG Goal 1 Status[de-identified] Met  Short Term Goal 2: Patient will ambulate safely with FWW and SBA - MET  STG Goal 2 Status[de-identified] Met    Long Term Goals  Time Frame for Long Term Goals : 16 visits  Long Term Goal 1: Patient will improve right knee flexion and extension MMT to 4/5 within available ROM to improve ease of transfers  LTG Goal 1 Status[de-identified] Not Met  Long Term Goal 2: Patient will improve transfer ability to allow

## 2023-10-05 NOTE — PROGRESS NOTES
[facility]      Fax: [phone]                            Outpatient Physical Therapy                                                                            Daily Note    Date: 10/5/2023  Patient Name: Frank Bailey        MRN: [id number]   ACCT#:  [de-identified]  : 1945  (78 y.o.)    Referring Provider (secondary): Oriana Mendzoa         Diagnosis: Right TKA revision  Treatment Diagnosis: Right TKR revision, TIA    PT Insurance Information: Medicare, Med Nec  Total # of Visits Approved: 24 Per Physician Order  Total # of Visits to Date: 24  Plan of Care/Certification Expiration Date: 23    Pre-Treatment Pain:  0/10  Subjective: 6078:  Patient continues to use walker at home but is doing well with this. Feels \"a little off balance once in a while. \"  This has been present since stroke. Assessment  Assessment: No measureable improvement in quad or hip flexor strength. He is able to transfer with improved ease compared to last assessment, but still has difficult lifting leg up to bed. Overall, he has not shown much progress since last assessment. Will d/c patient but with heavy emphasis on continued HEP. No further progress expected with skilled PT versus an independent HEP. He would benefit from jonnathan walker and/or rollator use in his home though. Plan  Discharge    Exercises/Modalities/Manual:  See DocFlow Sheet    Education: Education on CVA recovery timeline and expectations. Reasoning for d/c today due to lack of progress and insurance restrictions.           Goals  (Total # of Visits to Date: 25)   Short Term Goals  Time Frame for Short Term Goals: 8 visits  Short Term Goal 1: Patient will demonstrate compliance with current HEP to optimize therapy progress - MET  STG Goal 1 Status[de-identified] Met  Short Term Goal 2: Patient will ambulate safely with FWW and SBA - MET  STG Goal 2 Status[de-identified] Met    Long Term Goals  Time Frame for Long Term Goals : 16

## 2023-10-25 ENCOUNTER — OFFICE VISIT (OUTPATIENT)
Dept: PRIMARY CARE CLINIC | Age: 78
End: 2023-10-25
Payer: MEDICARE

## 2023-10-25 VITALS
HEIGHT: 72 IN | DIASTOLIC BLOOD PRESSURE: 78 MMHG | OXYGEN SATURATION: 98 % | HEART RATE: 90 BPM | WEIGHT: 295 LBS | SYSTOLIC BLOOD PRESSURE: 110 MMHG | BODY MASS INDEX: 39.96 KG/M2

## 2023-10-25 DIAGNOSIS — Z86.2 HX OF IRON DEFICIENCY ANEMIA: ICD-10-CM

## 2023-10-25 DIAGNOSIS — E11.9 CONTROLLED TYPE 2 DIABETES MELLITUS WITHOUT COMPLICATION, WITHOUT LONG-TERM CURRENT USE OF INSULIN (HCC): ICD-10-CM

## 2023-10-25 DIAGNOSIS — Z00.00 MEDICARE ANNUAL WELLNESS VISIT, SUBSEQUENT: Primary | ICD-10-CM

## 2023-10-25 DIAGNOSIS — E78.2 MIXED HYPERLIPIDEMIA: ICD-10-CM

## 2023-10-25 DIAGNOSIS — K14.4 SMOOTH TONGUE: ICD-10-CM

## 2023-10-25 DIAGNOSIS — Z12.5 PROSTATE CANCER SCREENING: ICD-10-CM

## 2023-10-25 DIAGNOSIS — I10 ESSENTIAL HYPERTENSION: ICD-10-CM

## 2023-10-25 LAB — HBA1C MFR BLD: 5.5 %

## 2023-10-25 PROCEDURE — 1123F ACP DISCUSS/DSCN MKR DOCD: CPT | Performed by: NURSE PRACTITIONER

## 2023-10-25 PROCEDURE — 83036 HEMOGLOBIN GLYCOSYLATED A1C: CPT | Performed by: NURSE PRACTITIONER

## 2023-10-25 PROCEDURE — 3044F HG A1C LEVEL LT 7.0%: CPT | Performed by: NURSE PRACTITIONER

## 2023-10-25 PROCEDURE — G8484 FLU IMMUNIZE NO ADMIN: HCPCS | Performed by: NURSE PRACTITIONER

## 2023-10-25 PROCEDURE — G0439 PPPS, SUBSEQ VISIT: HCPCS | Performed by: NURSE PRACTITIONER

## 2023-10-25 PROCEDURE — 3074F SYST BP LT 130 MM HG: CPT | Performed by: NURSE PRACTITIONER

## 2023-10-25 PROCEDURE — 3078F DIAST BP <80 MM HG: CPT | Performed by: NURSE PRACTITIONER

## 2023-10-25 RX ORDER — ATORVASTATIN CALCIUM 80 MG/1
80 TABLET, FILM COATED ORAL DAILY
Qty: 90 TABLET | Refills: 1 | Status: SHIPPED | OUTPATIENT
Start: 2023-10-25

## 2023-10-25 RX ORDER — LOSARTAN POTASSIUM 25 MG/1
12.5 TABLET ORAL DAILY
Qty: 45 TABLET | Refills: 1 | Status: SHIPPED | OUTPATIENT
Start: 2023-10-25

## 2023-10-25 RX ORDER — CEPHALEXIN 500 MG/1
CAPSULE ORAL
COMMUNITY
Start: 2023-10-17

## 2023-10-25 ASSESSMENT — PATIENT HEALTH QUESTIONNAIRE - PHQ9
10. IF YOU CHECKED OFF ANY PROBLEMS, HOW DIFFICULT HAVE THESE PROBLEMS MADE IT FOR YOU TO DO YOUR WORK, TAKE CARE OF THINGS AT HOME, OR GET ALONG WITH OTHER PEOPLE: 1
6. FEELING BAD ABOUT YOURSELF - OR THAT YOU ARE A FAILURE OR HAVE LET YOURSELF OR YOUR FAMILY DOWN: 0
SUM OF ALL RESPONSES TO PHQ QUESTIONS 1-9: 12
8. MOVING OR SPEAKING SO SLOWLY THAT OTHER PEOPLE COULD HAVE NOTICED. OR THE OPPOSITE, BEING SO FIGETY OR RESTLESS THAT YOU HAVE BEEN MOVING AROUND A LOT MORE THAN USUAL: 0
SUM OF ALL RESPONSES TO PHQ QUESTIONS 1-9: 12
5. POOR APPETITE OR OVEREATING: 0
SUM OF ALL RESPONSES TO PHQ QUESTIONS 1-9: 12
7. TROUBLE CONCENTRATING ON THINGS, SUCH AS READING THE NEWSPAPER OR WATCHING TELEVISION: 3
SUM OF ALL RESPONSES TO PHQ9 QUESTIONS 1 & 2: 6
4. FEELING TIRED OR HAVING LITTLE ENERGY: 3
9. THOUGHTS THAT YOU WOULD BE BETTER OFF DEAD, OR OF HURTING YOURSELF: 0
SUM OF ALL RESPONSES TO PHQ QUESTIONS 1-9: 12
3. TROUBLE FALLING OR STAYING ASLEEP: 0
2. FEELING DOWN, DEPRESSED OR HOPELESS: 3
1. LITTLE INTEREST OR PLEASURE IN DOING THINGS: 3

## 2023-10-25 NOTE — PROGRESS NOTES
Medicare Annual Wellness Visit    Matilde Vivas is here for Medicare AWV (HTN, LORI, DM) and Flu Vaccine (High dose)    Assessment & Plan   Medicare annual wellness visit, subsequent  Controlled type 2 diabetes mellitus without complication, without long-term current use of insulin (HCC)  -     POCT glycosylated hemoglobin (Hb A1C)  -     Magnesium; Future  -     Comprehensive Metabolic Panel; Future  Essential hypertension  -     losartan (COZAAR) 25 MG tablet; Take 0.5 tablets by mouth daily For hypertension and diabetic kidney protection. , Disp-45 tablet, R-1Normal  -     Magnesium; Future  -     Comprehensive Metabolic Panel; Future  Smooth tongue  -     Vitamin B12 & Folate; Future  Prostate cancer screening  -     PSA Screening; Future  Hx of iron deficiency anemia  -     Iron and TIBC; Future  -     Ferritin; Future  Mixed hyperlipidemia  -     atorvastatin (LIPITOR) 80 MG tablet; Take 1 tablet by mouth daily, Disp-90 tablet, R-1Normal    Recommendations for Preventive Services Due: see orders and patient instructions/AVS.  Recommended screening schedule for the next 5-10 years is provided to the patient in written form: see Patient Instructions/AVS.     Return in 6 months (on 4/25/2024) for diabetes. Subjective   The following acute and/or chronic problems were also addressed today:    Pt with hx stroke right sided weakness, has bunge band connecting foot to his brace of the right leg. Pt on keflex antibiotic currently    Hyperlipidemia on statin    Type e DM on metformin in am.   Hemoglobin A1C   Date Value Ref Range Status   10/25/2023 5.5 % Final     Htn /CAD nitrate, beta blocker, potassium, ASA , eliquis, hctz. Patient's complete Health Risk Assessment and screening values have been reviewed and are found in Flowsheets. The following problems were reviewed today and where indicated follow up appointments were made and/or referrals ordered.     Positive Risk Factor Screenings with

## 2023-11-07 ENCOUNTER — HOSPITAL ENCOUNTER (OUTPATIENT)
Age: 78
Discharge: HOME | End: 2023-11-07
Payer: MEDICARE

## 2023-11-07 DIAGNOSIS — K14.4 SMOOTH TONGUE: ICD-10-CM

## 2023-11-07 DIAGNOSIS — E11.9 CONTROLLED TYPE 2 DIABETES MELLITUS WITHOUT COMPLICATION, WITHOUT LONG-TERM CURRENT USE OF INSULIN (HCC): ICD-10-CM

## 2023-11-07 DIAGNOSIS — Z86.2 HX OF IRON DEFICIENCY ANEMIA: ICD-10-CM

## 2023-11-07 DIAGNOSIS — I10 ESSENTIAL HYPERTENSION: ICD-10-CM

## 2023-11-07 DIAGNOSIS — Z12.5 PROSTATE CANCER SCREENING: ICD-10-CM

## 2023-11-07 LAB
ALBUMIN SERPL-MCNC: 4 G/DL (ref 3.5–5.2)
ALP SERPL-CCNC: 123 U/L (ref 40–129)
ALT SERPL-CCNC: 22 U/L (ref 5–41)
ANION GAP SERPL CALCULATED.3IONS-SCNC: 15 MMOL/L (ref 9–17)
AST SERPL-CCNC: 31 U/L
BILIRUB SERPL-MCNC: 0.7 MG/DL (ref 0.3–1.2)
BUN SERPL-MCNC: 18 MG/DL (ref 8–23)
BUN/CREAT SERPL: 20 (ref 9–20)
CALCIUM SERPL-MCNC: 9.9 MG/DL (ref 8.6–10.4)
CHLORIDE SERPL-SCNC: 100 MMOL/L (ref 98–107)
CO2 SERPL-SCNC: 21 MMOL/L (ref 20–31)
CREAT SERPL-MCNC: 0.9 MG/DL (ref 0.7–1.2)
FERRITIN SERPL-MCNC: 58 NG/ML (ref 30–400)
FOLATE SERPL-MCNC: >20 NG/ML
GFR SERPL CREATININE-BSD FRML MDRD: >60 ML/MIN/1.73M2
GLUCOSE SERPL-MCNC: 130 MG/DL (ref 70–99)
IRON SATN MFR SERPL: 25 % (ref 20–55)
IRON SERPL-MCNC: 81 UG/DL (ref 59–158)
MAGNESIUM SERPL-MCNC: 2.1 MG/DL (ref 1.6–2.6)
POTASSIUM SERPL-SCNC: 4.2 MMOL/L (ref 3.7–5.3)
PROT SERPL-MCNC: 7 G/DL (ref 6.4–8.3)
PSA SERPL-MCNC: 0.72 NG/ML
SODIUM SERPL-SCNC: 136 MMOL/L (ref 135–144)
TIBC SERPL-MCNC: 324 UG/DL (ref 250–450)
UNSATURATED IRON BINDING CAPACITY: 243 UG/DL (ref 112–347)
VIT B12 SERPL-MCNC: 507 PG/ML (ref 232–1245)

## 2023-11-07 PROCEDURE — 82728 ASSAY OF FERRITIN: CPT

## 2023-11-07 PROCEDURE — 36415 COLL VENOUS BLD VENIPUNCTURE: CPT

## 2023-11-07 PROCEDURE — G0103 PSA SCREENING: HCPCS

## 2023-11-07 PROCEDURE — 82746 ASSAY OF FOLIC ACID SERUM: CPT

## 2023-11-07 PROCEDURE — 83735 ASSAY OF MAGNESIUM: CPT

## 2023-11-07 PROCEDURE — 83550 IRON BINDING TEST: CPT

## 2023-11-07 PROCEDURE — 83540 ASSAY OF IRON: CPT

## 2023-11-07 PROCEDURE — 80053 COMPREHEN METABOLIC PANEL: CPT

## 2023-11-07 PROCEDURE — 82607 VITAMIN B-12: CPT

## 2023-11-27 NOTE — TELEPHONE ENCOUNTER
Last OV: 10/25/2023  Last RX: 8/23/2023   Next scheduled apt: 4/25/2024      Surescripts requesting refill

## 2024-01-08 RX ORDER — POTASSIUM CHLORIDE 750 MG/1
TABLET, FILM COATED, EXTENDED RELEASE ORAL
Qty: 90 TABLET | Refills: 1 | Status: SHIPPED | OUTPATIENT
Start: 2024-01-08

## 2024-01-08 NOTE — TELEPHONE ENCOUNTER
Last OV: 10/25/2023  Last RX: 6/14/2023   Next scheduled apt: 4/25/2024      Surescripts requesting refill

## 2024-01-14 DIAGNOSIS — I10 ESSENTIAL HYPERTENSION: ICD-10-CM

## 2024-01-15 RX ORDER — METOPROLOL SUCCINATE 25 MG/1
TABLET, EXTENDED RELEASE ORAL
Qty: 90 TABLET | Refills: 1 | Status: SHIPPED | OUTPATIENT
Start: 2024-01-15

## 2024-01-15 RX ORDER — TAMSULOSIN HYDROCHLORIDE 0.4 MG/1
CAPSULE ORAL
Qty: 30 CAPSULE | Refills: 0 | Status: SHIPPED | OUTPATIENT
Start: 2024-01-15

## 2024-01-15 RX ORDER — HYDROCHLOROTHIAZIDE 25 MG/1
TABLET ORAL
Qty: 90 TABLET | Refills: 1 | Status: SHIPPED | OUTPATIENT
Start: 2024-01-15

## 2024-01-15 RX ORDER — ESCITALOPRAM OXALATE 20 MG/1
20 TABLET ORAL DAILY
Qty: 90 TABLET | Refills: 1 | Status: SHIPPED | OUTPATIENT
Start: 2024-01-15

## 2024-01-17 DIAGNOSIS — J44.1 COPD WITH ACUTE EXACERBATION (HCC): Primary | ICD-10-CM

## 2024-01-17 RX ORDER — IPRATROPIUM BROMIDE AND ALBUTEROL SULFATE 2.5; .5 MG/3ML; MG/3ML
1 SOLUTION RESPIRATORY (INHALATION) EVERY 6 HOURS PRN
Qty: 360 ML | Refills: 1 | Status: SHIPPED | OUTPATIENT
Start: 2024-01-17

## 2024-01-17 RX ORDER — IPRATROPIUM BROMIDE AND ALBUTEROL SULFATE 2.5; .5 MG/3ML; MG/3ML
1 SOLUTION RESPIRATORY (INHALATION) EVERY 6 HOURS PRN
Qty: 360 ML | Refills: 1 | Status: SHIPPED | OUTPATIENT
Start: 2024-01-17 | End: 2024-01-17 | Stop reason: SDUPTHER

## 2024-02-05 ENCOUNTER — OFFICE VISIT (OUTPATIENT)
Dept: UROLOGY | Age: 79
End: 2024-02-05
Payer: MEDICARE

## 2024-02-05 VITALS
TEMPERATURE: 98 F | SYSTOLIC BLOOD PRESSURE: 150 MMHG | WEIGHT: 285 LBS | DIASTOLIC BLOOD PRESSURE: 90 MMHG | BODY MASS INDEX: 38.65 KG/M2

## 2024-02-05 DIAGNOSIS — N32.81 OAB (OVERACTIVE BLADDER): Primary | ICD-10-CM

## 2024-02-05 DIAGNOSIS — N13.8 BPH WITH OBSTRUCTION/LOWER URINARY TRACT SYMPTOMS: ICD-10-CM

## 2024-02-05 DIAGNOSIS — N40.1 BPH WITH OBSTRUCTION/LOWER URINARY TRACT SYMPTOMS: ICD-10-CM

## 2024-02-05 DIAGNOSIS — R39.15 URGENCY OF URINATION: ICD-10-CM

## 2024-02-05 DIAGNOSIS — R35.0 FREQUENCY OF URINATION: ICD-10-CM

## 2024-02-05 PROCEDURE — 3080F DIAST BP >= 90 MM HG: CPT | Performed by: UROLOGY

## 2024-02-05 PROCEDURE — PBSHW PR MEAS POST-VOIDING RESIDUAL URINE&/BLADDER CAP: Performed by: UROLOGY

## 2024-02-05 PROCEDURE — G8417 CALC BMI ABV UP PARAM F/U: HCPCS | Performed by: UROLOGY

## 2024-02-05 PROCEDURE — G8427 DOCREV CUR MEDS BY ELIG CLIN: HCPCS | Performed by: UROLOGY

## 2024-02-05 PROCEDURE — 99214 OFFICE O/P EST MOD 30 MIN: CPT | Performed by: UROLOGY

## 2024-02-05 PROCEDURE — 3077F SYST BP >= 140 MM HG: CPT | Performed by: UROLOGY

## 2024-02-05 PROCEDURE — 51798 US URINE CAPACITY MEASURE: CPT | Performed by: UROLOGY

## 2024-02-05 PROCEDURE — 1036F TOBACCO NON-USER: CPT | Performed by: UROLOGY

## 2024-02-05 PROCEDURE — G8484 FLU IMMUNIZE NO ADMIN: HCPCS | Performed by: UROLOGY

## 2024-02-05 PROCEDURE — 1123F ACP DISCUSS/DSCN MKR DOCD: CPT | Performed by: UROLOGY

## 2024-02-05 RX ORDER — TAMSULOSIN HYDROCHLORIDE 0.4 MG/1
CAPSULE ORAL
Qty: 90 CAPSULE | Refills: 3 | Status: SHIPPED | OUTPATIENT
Start: 2024-02-05

## 2024-02-05 RX ORDER — SOLIFENACIN SUCCINATE 10 MG/1
TABLET, FILM COATED ORAL
Qty: 90 TABLET | Refills: 3 | Status: SHIPPED | OUTPATIENT
Start: 2024-02-05

## 2024-02-05 ASSESSMENT — ENCOUNTER SYMPTOMS
VOMITING: 0
WHEEZING: 0
SHORTNESS OF BREATH: 0
ABDOMINAL PAIN: 0
BACK PAIN: 0
EYE REDNESS: 0
COLOR CHANGE: 0
CONSTIPATION: 0
COUGH: 0
NAUSEA: 0

## 2024-02-05 NOTE — PROGRESS NOTES
Bladderscan performed in office today:  Pt voided - 200 mL, PVR - 57 mL   
   bilateral hips    JOINT REPLACEMENT Right 01/27/2021    KNEE ARTHROSCOPY Right 09/26/2013    Dr. Salazar - [facility]    LIVER SURGERY      LUMBAR SPINE SURGERY  11/02/2016    rt. L3-4, L4-5 decompression. L4-5 discectomy and fusion    SHOULDER SURGERY Left 2000    arthroscopy    SHOULDER SURGERY Left 2007    replaced humeral head    TOTAL HIP ARTHROPLASTY Right     02/03/2016    TOTAL KNEE ARTHROPLASTY Right 01/27/2021    RIGHT TOTAL KNEE ARTHOPLASTY COMPLICATED BY OBSEITY NEEDS A MARIANNE performed by Chong Salazar DO at [facility] OR     Outpatient Encounter Medications as of 2/5/2024   Medication Sig Dispense Refill    ipratropium 0.5 mg-albuterol 2.5 mg (DUONEB) 0.5-2.5 (3) MG/3ML SOLN nebulizer solution Inhale 3 mLs into the lungs every 6 hours as needed for Shortness of Breath Dx: J44 360 mL 1    escitalopram (LEXAPRO) 20 MG tablet Take 1 tablet by mouth once daily 90 tablet 1    metoprolol succinate (TOPROL XL) 25 MG extended release tablet Take 1 tablet by mouth once daily 90 tablet 1    hydroCHLOROthiazide (HYDRODIURIL) 25 MG tablet Take 1 tablet by mouth once daily 90 tablet 1    potassium chloride (KLOR-CON) 10 MEQ extended release tablet Take 1 tablet by mouth once daily 90 tablet 1    isosorbide mononitrate (IMDUR) 30 MG extended release tablet Take 1 tablet by mouth once daily 90 tablet 1    metFORMIN (GLUCOPHAGE) 500 MG tablet Take 1 tablet by mouth once daily with breakfast 90 tablet 1    cephALEXin (KEFLEX) 500 MG capsule Take 1 capsule by mouth 2 times daily      losartan (COZAAR) 25 MG tablet Take 0.5 tablets by mouth daily For hypertension and diabetic kidney protection. 45 tablet 1    atorvastatin (LIPITOR) 80 MG tablet Take 1 tablet by mouth daily 90 tablet 1    spironolactone (ALDACTONE) 25 MG tablet Take 1 tablet by mouth once daily 90 tablet 1    aspirin 81 MG EC tablet Take 1 tablet by mouth daily      ELIQUIS 5 MG TABS tablet Take 1 tablet by mouth in the morning and 1 tablet in the evening. 60

## 2024-02-11 DIAGNOSIS — I50.32 CHRONIC DIASTOLIC (CONGESTIVE) HEART FAILURE (HCC): ICD-10-CM

## 2024-02-12 RX ORDER — SPIRONOLACTONE 25 MG/1
TABLET ORAL
Qty: 90 TABLET | Refills: 1 | Status: SHIPPED | OUTPATIENT
Start: 2024-02-12

## 2024-02-12 NOTE — TELEPHONE ENCOUNTER
Last OV: 10/25/2023  Last RX: 8/9/2023   Next scheduled apt: 4/25/2024    Surescripts requesting refill

## 2024-03-27 ENCOUNTER — OFFICE VISIT (OUTPATIENT)
Dept: UROLOGY | Age: 79
End: 2024-03-27
Payer: MEDICARE

## 2024-03-27 VITALS
WEIGHT: 293 LBS | BODY MASS INDEX: 39.74 KG/M2 | HEART RATE: 82 BPM | TEMPERATURE: 98.6 F | DIASTOLIC BLOOD PRESSURE: 80 MMHG | SYSTOLIC BLOOD PRESSURE: 118 MMHG

## 2024-03-27 DIAGNOSIS — E66.01 SEVERE OBESITY (BMI 35.0-39.9) WITH COMORBIDITY (HCC): ICD-10-CM

## 2024-03-27 DIAGNOSIS — N32.81 OAB (OVERACTIVE BLADDER): ICD-10-CM

## 2024-03-27 DIAGNOSIS — R39.15 URGENCY OF URINATION: ICD-10-CM

## 2024-03-27 DIAGNOSIS — N40.1 BPH WITH OBSTRUCTION/LOWER URINARY TRACT SYMPTOMS: Primary | ICD-10-CM

## 2024-03-27 DIAGNOSIS — N13.8 BPH WITH OBSTRUCTION/LOWER URINARY TRACT SYMPTOMS: Primary | ICD-10-CM

## 2024-03-27 DIAGNOSIS — N39.46 MIXED INCONTINENCE: ICD-10-CM

## 2024-03-27 PROCEDURE — 1036F TOBACCO NON-USER: CPT | Performed by: PHYSICIAN ASSISTANT

## 2024-03-27 PROCEDURE — 3074F SYST BP LT 130 MM HG: CPT | Performed by: PHYSICIAN ASSISTANT

## 2024-03-27 PROCEDURE — G8484 FLU IMMUNIZE NO ADMIN: HCPCS | Performed by: PHYSICIAN ASSISTANT

## 2024-03-27 PROCEDURE — 1123F ACP DISCUSS/DSCN MKR DOCD: CPT | Performed by: PHYSICIAN ASSISTANT

## 2024-03-27 PROCEDURE — G8417 CALC BMI ABV UP PARAM F/U: HCPCS | Performed by: PHYSICIAN ASSISTANT

## 2024-03-27 PROCEDURE — 3079F DIAST BP 80-89 MM HG: CPT | Performed by: PHYSICIAN ASSISTANT

## 2024-03-27 PROCEDURE — 99213 OFFICE O/P EST LOW 20 MIN: CPT | Performed by: PHYSICIAN ASSISTANT

## 2024-03-27 PROCEDURE — PBSHW PR MEAS POST-VOIDING RESIDUAL URINE&/BLADDER CAP: Performed by: PHYSICIAN ASSISTANT

## 2024-03-27 PROCEDURE — G8427 DOCREV CUR MEDS BY ELIG CLIN: HCPCS | Performed by: PHYSICIAN ASSISTANT

## 2024-03-27 PROCEDURE — 51798 US URINE CAPACITY MEASURE: CPT | Performed by: PHYSICIAN ASSISTANT

## 2024-03-27 ASSESSMENT — ENCOUNTER SYMPTOMS
ABDOMINAL PAIN: 0
WHEEZING: 0
SHORTNESS OF BREATH: 0
EYE REDNESS: 0
CONSTIPATION: 0
BACK PAIN: 0
NAUSEA: 0
COUGH: 0
VOMITING: 0
COLOR CHANGE: 0

## 2024-03-27 NOTE — PROGRESS NOTES
Bladderscan performed in office today:  Pt voided - 200 mL, PVR - 123 mL   
fluticasone (FLONASE) 50 MCG/ACT nasal spray 1 spray by Nasal route daily (Patient taking differently: 1 spray by Nasal route as needed) 16 g 2    Multiple Vitamin (THERA/BETA-CAROTENE) TABS Take 1 tablet by mouth daily      vitamin C (ASCORBIC ACID) 500 MG tablet Take 1 tablet by mouth daily      Vitamin D, Cholecalciferol, 25 MCG (1000 UT) TABS Take by mouth daily        No current facility-administered medications on file prior to visit.     Patient has no known allergies.  Family History   Problem Relation Age of Onset    Diabetes Mother     Heart Disease Mother     Arthritis Mother     High Blood Pressure Mother     High Cholesterol Mother     Cancer Father         lung black lung from [employer]    Heart Disease Brother         leaky heart    Liver Disease Paternal Grandfather         black lung    Obesity Sister         gastric bypass    Cancer Sister         lung, smoker     Social History     Tobacco Use   Smoking Status Never   Smokeless Tobacco Never       Social History     Substance and Sexual Activity   Alcohol Use Never    Comment: Rare; LESS THEN 1X PER MONTH       Review of Systems   Constitutional:  Negative for appetite change, chills and fever.   Eyes:  Negative for redness and visual disturbance.   Respiratory:  Negative for cough, shortness of breath and wheezing.    Cardiovascular:  Negative for chest pain and leg swelling.   Gastrointestinal:  Negative for abdominal pain, constipation, nausea and vomiting.   Genitourinary:  Positive for frequency and urgency. Negative for decreased urine volume, difficulty urinating, dysuria, enuresis, flank pain, hematuria, penile discharge, penile pain, scrotal swelling and testicular pain.        Positive for nocturia and mixed incontinence   Musculoskeletal:  Negative for back pain, joint swelling and myalgias.   Skin:  Negative for color change, rash and wound.   Neurological:  Negative for dizziness, tremors and numbness.   Hematological:  Negative for

## 2024-04-26 ENCOUNTER — OFFICE VISIT (OUTPATIENT)
Dept: PRIMARY CARE CLINIC | Age: 79
End: 2024-04-26
Payer: MEDICARE

## 2024-04-26 VITALS
WEIGHT: 284 LBS | SYSTOLIC BLOOD PRESSURE: 118 MMHG | HEART RATE: 67 BPM | DIASTOLIC BLOOD PRESSURE: 78 MMHG | HEIGHT: 72 IN | BODY MASS INDEX: 38.47 KG/M2 | OXYGEN SATURATION: 97 %

## 2024-04-26 DIAGNOSIS — I69.359 CVA, OLD, HEMIPARESIS (HCC): ICD-10-CM

## 2024-04-26 DIAGNOSIS — E11.9 CONTROLLED TYPE 2 DIABETES MELLITUS WITHOUT COMPLICATION, WITHOUT LONG-TERM CURRENT USE OF INSULIN (HCC): Primary | ICD-10-CM

## 2024-04-26 DIAGNOSIS — D68.9 COAGULATION DEFECT (HCC): ICD-10-CM

## 2024-04-26 DIAGNOSIS — F33.1 MAJOR DEPRESSIVE DISORDER, RECURRENT, MODERATE (HCC): ICD-10-CM

## 2024-04-26 DIAGNOSIS — I25.119 ATHEROSCLEROSIS OF NATIVE CORONARY ARTERY WITH ANGINA PECTORIS, UNSPECIFIED WHETHER NATIVE OR TRANSPLANTED HEART (HCC): ICD-10-CM

## 2024-04-26 DIAGNOSIS — D50.8 OTHER IRON DEFICIENCY ANEMIA: ICD-10-CM

## 2024-04-26 DIAGNOSIS — I50.22 CHRONIC SYSTOLIC CONGESTIVE HEART FAILURE (HCC): ICD-10-CM

## 2024-04-26 DIAGNOSIS — F33.0 MAJOR DEPRESSIVE DISORDER, RECURRENT, MILD (HCC): ICD-10-CM

## 2024-04-26 DIAGNOSIS — M46.1 SACROILIITIS (HCC): ICD-10-CM

## 2024-04-26 DIAGNOSIS — J44.9 CHRONIC OBSTRUCTIVE PULMONARY DISEASE, UNSPECIFIED COPD TYPE (HCC): ICD-10-CM

## 2024-04-26 DIAGNOSIS — E11.620 NLD (NECROBIOSIS LIPOIDICA DIABETICORUM) (HCC): ICD-10-CM

## 2024-04-26 LAB — HBA1C MFR BLD: 6.2 %

## 2024-04-26 PROCEDURE — 83036 HEMOGLOBIN GLYCOSYLATED A1C: CPT | Performed by: NURSE PRACTITIONER

## 2024-04-26 RX ORDER — CEFADROXIL 500 MG/1
500 CAPSULE ORAL DAILY
COMMUNITY
Start: 2024-04-11

## 2024-04-26 SDOH — ECONOMIC STABILITY: FOOD INSECURITY: WITHIN THE PAST 12 MONTHS, YOU WORRIED THAT YOUR FOOD WOULD RUN OUT BEFORE YOU GOT MONEY TO BUY MORE.: SOMETIMES TRUE

## 2024-04-26 SDOH — ECONOMIC STABILITY: FOOD INSECURITY: WITHIN THE PAST 12 MONTHS, THE FOOD YOU BOUGHT JUST DIDN'T LAST AND YOU DIDN'T HAVE MONEY TO GET MORE.: SOMETIMES TRUE

## 2024-04-26 SDOH — ECONOMIC STABILITY: INCOME INSECURITY: HOW HARD IS IT FOR YOU TO PAY FOR THE VERY BASICS LIKE FOOD, HOUSING, MEDICAL CARE, AND HEATING?: HARD

## 2024-04-26 SDOH — ECONOMIC STABILITY: TRANSPORTATION INSECURITY
IN THE PAST 12 MONTHS, HAS LACK OF TRANSPORTATION KEPT YOU FROM MEETINGS, WORK, OR FROM GETTING THINGS NEEDED FOR DAILY LIVING?: YES

## 2024-04-26 ASSESSMENT — PATIENT HEALTH QUESTIONNAIRE - PHQ9
10. IF YOU CHECKED OFF ANY PROBLEMS, HOW DIFFICULT HAVE THESE PROBLEMS MADE IT FOR YOU TO DO YOUR WORK, TAKE CARE OF THINGS AT HOME, OR GET ALONG WITH OTHER PEOPLE: SOMEWHAT DIFFICULT
SUM OF ALL RESPONSES TO PHQ9 QUESTIONS 1 & 2: 6
5. POOR APPETITE OR OVEREATING: NOT AT ALL
SUM OF ALL RESPONSES TO PHQ QUESTIONS 1-9: 11
10. IF YOU CHECKED OFF ANY PROBLEMS, HOW DIFFICULT HAVE THESE PROBLEMS MADE IT FOR YOU TO DO YOUR WORK, TAKE CARE OF THINGS AT HOME, OR GET ALONG WITH OTHER PEOPLE: SOMEWHAT DIFFICULT
7. TROUBLE CONCENTRATING ON THINGS, SUCH AS READING THE NEWSPAPER OR WATCHING TELEVISION: NEARLY EVERY DAY
8. MOVING OR SPEAKING SO SLOWLY THAT OTHER PEOPLE COULD HAVE NOTICED. OR THE OPPOSITE, BEING SO FIGETY OR RESTLESS THAT YOU HAVE BEEN MOVING AROUND A LOT MORE THAN USUAL: NOT AT ALL
SUM OF ALL RESPONSES TO PHQ QUESTIONS 1-9: 11
2. FEELING DOWN, DEPRESSED OR HOPELESS: NEARLY EVERY DAY
4. FEELING TIRED OR HAVING LITTLE ENERGY: MORE THAN HALF THE DAYS
9. THOUGHTS THAT YOU WOULD BE BETTER OFF DEAD, OR OF HURTING YOURSELF: NOT AT ALL
3. TROUBLE FALLING OR STAYING ASLEEP: NOT AT ALL
8. MOVING OR SPEAKING SO SLOWLY THAT OTHER PEOPLE COULD HAVE NOTICED. OR THE OPPOSITE - BEING SO FIDGETY OR RESTLESS THAT YOU HAVE BEEN MOVING AROUND A LOT MORE THAN USUAL: NOT AT ALL
9. THOUGHTS THAT YOU WOULD BE BETTER OFF DEAD, OR OF HURTING YOURSELF: NOT AT ALL
5. POOR APPETITE OR OVEREATING: NOT AT ALL
7. TROUBLE CONCENTRATING ON THINGS, SUCH AS READING THE NEWSPAPER OR WATCHING TELEVISION: NEARLY EVERY DAY
SUM OF ALL RESPONSES TO PHQ QUESTIONS 1-9: 11
2. FEELING DOWN, DEPRESSED OR HOPELESS: NEARLY EVERY DAY
3. TROUBLE FALLING OR STAYING ASLEEP: NOT AT ALL
6. FEELING BAD ABOUT YOURSELF - OR THAT YOU ARE A FAILURE OR HAVE LET YOURSELF OR YOUR FAMILY DOWN: NOT AT ALL
6. FEELING BAD ABOUT YOURSELF - OR THAT YOU ARE A FAILURE OR HAVE LET YOURSELF OR YOUR FAMILY DOWN: NOT AT ALL
4. FEELING TIRED OR HAVING LITTLE ENERGY: MORE THAN HALF THE DAYS
1. LITTLE INTEREST OR PLEASURE IN DOING THINGS: NEARLY EVERY DAY

## 2024-04-26 NOTE — PATIENT INSTRUCTIONS
THANK YOU FOR TRUSTING US WITH YOUR HEALTHCARE !!    The associates caring for you today were:    Family Practice Provider: Kristina KELLOGG-DAPHNIE    Clinical Team Nurse: Marielos Beckford LPN    Clinical Team Medical Assistant: Radha Kelsey MA    Our goal is to provide you with EXCEPTIONAL patient care, and we strive to do this for EVERY patient, EVERY visit. Since we care about you and your experience, you may receive a patient satisfaction survey from Seun Watson by postal mail/email/text. We truly welcome your feedback and ask that you complete the survey to let us know how we are doing.    Important Numbers:  [facility] phone [phone]   [place] Office Nurse/MA Line: [phone]  Fax  [phone]   Central [phone]  Billing questions: [phone]  Medical Records Request: [phone]    Manage your healthcare  with [facility]. To activate your account, visit [email or web address]   DIGITAL scheduling available now through my chart.  Schedule your next appointment at your convenience through your my chart.       [place] Office Hours:  Monday: [place] office location 8-5 ([phone]) Offering additional late hours the first Monday of the month until 7 pm.   Tuesday: 8: :  812 Noon, closed  of the month   : 7:30-4:30   SURVEY:    You may be receiving a survey from Seun Watson regarding your visit today.    Please complete the survey to enable us to provide the highest quality of care to you and your family.    If you cannot score us a very good on any question, please call the office to discuss how we could have made your experience a very good one.    Thank you.

## 2024-04-26 NOTE — PROGRESS NOTES
Upper Arm, Position: Sitting)   Pulse 67   Ht 1.829 m (6')   Wt 128.8 kg (284 lb)   SpO2 97%   BMI 38.52 kg/m²     Data:     Lab Results   Component Value Date/Time     11/07/2023 10:57 AM    K 4.2 11/07/2023 10:57 AM     11/07/2023 10:57 AM    CO2 21 11/07/2023 10:57 AM    BUN 18 11/07/2023 10:57 AM    CREATININE 0.9 11/07/2023 10:57 AM    GLUCOSE 130 11/07/2023 10:57 AM    GLUCOSE 123 04/19/2023 02:04 PM    PROT 7.1 01/25/2013 09:16 AM    BILITOT 0.7 11/07/2023 10:57 AM    ALKPHOS 123 11/07/2023 10:57 AM    AST 31 11/07/2023 10:57 AM    ALT 22 11/07/2023 10:57 AM     Lab Results   Component Value Date/Time    WBC 5.5 07/28/2023 11:41 AM    RBC 4.81 07/28/2023 11:41 AM    RBC 4.72 05/09/2012 09:45 AM    HGB 14.3 07/28/2023 11:41 AM    HCT 42.9 07/28/2023 11:41 AM    MCV 89.1 07/28/2023 11:41 AM    MCH 29.8 07/28/2023 11:41 AM    MCHC 33.5 07/28/2023 11:41 AM    RDW 14.7 07/28/2023 11:41 AM     07/28/2023 11:41 AM     05/09/2012 09:45 AM    MPV NOT REPORTED 06/20/2021 05:50 AM     Lab Results   Component Value Date/Time    TSH 2.00 07/28/2023 11:41 AM     Lab Results   Component Value Date/Time    CHOL 103 07/28/2023 11:41 AM    LDL 37 07/28/2023 11:41 AM    HDL 50 07/28/2023 11:41 AM    PSA 0.72 11/07/2023 10:57 AM    LABA1C 6.2 04/26/2024 11:40 AM          Assessment & Plan       1. Controlled type 2 diabetes mellitus without complication, without long-term current use of insulin (HCC)    - POCT glycosylated hemoglobin (Hb A1C)    2. Ulcer of right pretibial region, with fat layer exposed (HCC)      3. Sacroiliitis (HCC)      4. CVA, old, hemiparesis (HCC)      5. Chronic systolic congestive heart failure (HCC)      6. Chronic obstructive pulmonary disease, unspecified COPD type (HCC)      7. Major depressive disorder, recurrent, mild (HCC)      8. Coagulation defect (HCC)      9. NLD (necrobiosis lipoidica diabeticorum) (HCC)      10. Atherosclerosis of native coronary artery with

## 2024-05-01 ENCOUNTER — HOSPITAL ENCOUNTER (OUTPATIENT)
Age: 79
Setting detail: SPECIMEN
Discharge: HOME | End: 2024-05-01
Payer: MEDICARE

## 2024-05-01 ENCOUNTER — NURSE ONLY (OUTPATIENT)
Dept: PRIMARY CARE CLINIC | Age: 79
End: 2024-05-01
Payer: MEDICARE

## 2024-05-01 DIAGNOSIS — E11.9 CONTROLLED TYPE 2 DIABETES MELLITUS WITHOUT COMPLICATION, WITHOUT LONG-TERM CURRENT USE OF INSULIN (HCC): ICD-10-CM

## 2024-05-01 DIAGNOSIS — D50.8 OTHER IRON DEFICIENCY ANEMIA: Primary | ICD-10-CM

## 2024-05-01 DIAGNOSIS — D50.8 OTHER IRON DEFICIENCY ANEMIA: ICD-10-CM

## 2024-05-01 PROBLEM — L97.812 ULCER OF RIGHT PRETIBIAL REGION, WITH FAT LAYER EXPOSED (HCC): Status: RESOLVED | Noted: 2022-08-11 | Resolved: 2024-05-01

## 2024-05-01 PROBLEM — D50.9 MICROCYTIC ANEMIA: Status: RESOLVED | Noted: 2022-10-19 | Resolved: 2024-05-01

## 2024-05-01 PROBLEM — F33.9 MAJOR DEPRESSIVE DISORDER, RECURRENT, UNSPECIFIED (HCC): Status: RESOLVED | Noted: 2023-03-28 | Resolved: 2024-05-01

## 2024-05-01 PROBLEM — M62.81 MUSCLE WEAKNESS: Status: RESOLVED | Noted: 2017-03-28 | Resolved: 2024-05-01
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
## 2024-05-01 PROBLEM — F33.1 MAJOR DEPRESSIVE DISORDER, RECURRENT, MODERATE (HCC): Status: RESOLVED | Noted: 2023-03-28 | Resolved: 2024-05-01

## 2024-05-01 PROBLEM — R73.01 IFG (IMPAIRED FASTING GLUCOSE): Status: RESOLVED | Noted: 2020-05-27 | Resolved: 2024-05-01

## 2024-05-01 LAB
BASOPHILS # BLD: 0.04 K/UL (ref 0–0.2)
BASOPHILS NFR BLD: 1 % (ref 0–2)
EOSINOPHIL # BLD: 0.1 K/UL (ref 0–0.44)
EOSINOPHILS RELATIVE PERCENT: 2 % (ref 1–4)
ERYTHROCYTE [DISTWIDTH] IN BLOOD BY AUTOMATED COUNT: 15.6 % (ref 11.8–14.4)
HCT VFR BLD AUTO: 41.8 % (ref 40.7–50.3)
HGB BLD-MCNC: 13.5 G/DL (ref 13–17)
IMM GRANULOCYTES # BLD AUTO: <0.03 K/UL (ref 0–0.3)
IMM GRANULOCYTES NFR BLD: 0 %
LYMPHOCYTES NFR BLD: 0.93 K/UL (ref 1.1–3.7)
LYMPHOCYTES RELATIVE PERCENT: 14 % (ref 24–43)
MCH RBC QN AUTO: 28.1 PG (ref 25.2–33.5)
MCHC RBC AUTO-ENTMCNC: 32.3 G/DL (ref 28.4–34.8)
MCV RBC AUTO: 87.1 FL (ref 82.6–102.9)
MONOCYTES NFR BLD: 0.61 K/UL (ref 0.1–1.2)
MONOCYTES NFR BLD: 9 % (ref 3–12)
NEUTROPHILS NFR BLD: 74 % (ref 36–65)
NEUTS SEG NFR BLD: 4.79 K/UL (ref 1.5–8.1)
NRBC BLD-RTO: 0 PER 100 WBC
PLATELET # BLD AUTO: 218 K/UL (ref 138–453)
PMV BLD AUTO: 10.1 FL (ref 8.1–13.5)
RBC # BLD AUTO: 4.8 M/UL (ref 4.21–5.77)
WBC OTHER # BLD: 6.5 K/UL (ref 3.5–11.3)

## 2024-05-01 PROCEDURE — 83540 ASSAY OF IRON: CPT

## 2024-05-01 PROCEDURE — 36415 COLL VENOUS BLD VENIPUNCTURE: CPT | Performed by: NURSE PRACTITIONER

## 2024-05-01 PROCEDURE — 82570 ASSAY OF URINE CREATININE: CPT

## 2024-05-01 PROCEDURE — 82746 ASSAY OF FOLIC ACID SERUM: CPT

## 2024-05-01 PROCEDURE — 85025 COMPLETE CBC W/AUTO DIFF WBC: CPT

## 2024-05-01 PROCEDURE — 82728 ASSAY OF FERRITIN: CPT

## 2024-05-01 PROCEDURE — 82043 UR ALBUMIN QUANTITATIVE: CPT

## 2024-05-01 PROCEDURE — 82607 VITAMIN B-12: CPT

## 2024-05-01 PROCEDURE — 83550 IRON BINDING TEST: CPT

## 2024-05-01 ASSESSMENT — ENCOUNTER SYMPTOMS
BLOOD IN STOOL: 0
CHEST TIGHTNESS: 0
RHINORRHEA: 0
SHORTNESS OF BREATH: 0
SORE THROAT: 0
BACK PAIN: 1
CONSTIPATION: 1
EYES NEGATIVE: 1
COUGH: 0
ABDOMINAL DISTENTION: 0
WHEEZING: 0

## 2024-05-02 LAB
CREAT UR-MCNC: 113 MG/DL (ref 39–259)
FERRITIN SERPL-MCNC: 65 NG/ML (ref 30–400)
FOLATE SERPL-MCNC: >20 NG/ML (ref 4.8–24.2)
IRON SATN MFR SERPL: 17 % (ref 20–55)
IRON SERPL-MCNC: 51 UG/DL (ref 61–157)
MICROALBUMIN UR-MCNC: <12 MG/L (ref 0–20)
MICROALBUMIN/CREAT UR-RTO: NORMAL MCG/MG CREAT (ref 0–17)
TIBC SERPL-MCNC: 294 UG/DL (ref 250–450)
UNSATURATED IRON BINDING CAPACITY: 243 UG/DL (ref 112–347)
VIT B12 SERPL-MCNC: 282 PG/ML (ref 232–1245)

## 2024-05-03 DIAGNOSIS — E11.9 CONTROLLED TYPE 2 DIABETES MELLITUS WITHOUT COMPLICATION, WITHOUT LONG-TERM CURRENT USE OF INSULIN (HCC): Primary | ICD-10-CM

## 2024-05-03 DIAGNOSIS — D50.8 OTHER IRON DEFICIENCY ANEMIA: ICD-10-CM

## 2024-05-03 DIAGNOSIS — Z98.84 HX OF GASTRIC BYPASS: ICD-10-CM

## 2024-05-03 DIAGNOSIS — L97.812 ULCER OF RIGHT PRETIBIAL REGION, WITH FAT LAYER EXPOSED (HCC): ICD-10-CM

## 2024-05-03 RX ORDER — LACTOSE-REDUCED FOOD
1 POWDER (GRAM) ORAL DAILY PRN
Qty: 3 BOX | Refills: 0 | Status: SHIPPED | COMMUNITY
Start: 2024-05-03

## 2024-05-03 RX ORDER — LACTOSE-REDUCED FOOD
1 POWDER (GRAM) ORAL DAILY PRN
Qty: 4 BOX | Refills: 0 | Status: SHIPPED | OUTPATIENT
Start: 2024-05-03 | End: 2024-08-01

## 2024-05-03 RX ORDER — INFANT FORM.IRON LAC-F/DHA/ARA 3.1 G/1
1 POWDER (GRAM) ORAL DAILY PRN
Qty: 8 EACH | Refills: 0 | Status: SHIPPED | COMMUNITY
Start: 2024-05-03

## 2024-05-07 ENCOUNTER — NURSE ONLY (OUTPATIENT)
Dept: PRIMARY CARE CLINIC | Age: 79
End: 2024-05-07

## 2024-05-07 DIAGNOSIS — E53.8 B12 DEFICIENCY: Primary | ICD-10-CM

## 2024-05-07 RX ORDER — CYANOCOBALAMIN 1000 UG/ML
1000 INJECTION, SOLUTION INTRAMUSCULAR; SUBCUTANEOUS ONCE
Status: COMPLETED | OUTPATIENT
Start: 2024-05-07 | End: 2024-05-07

## 2024-05-07 RX ADMIN — CYANOCOBALAMIN 1000 MCG: 1000 INJECTION, SOLUTION INTRAMUSCULAR; SUBCUTANEOUS at 16:14

## 2024-05-21 ENCOUNTER — NURSE ONLY (OUTPATIENT)
Dept: PRIMARY CARE CLINIC | Age: 79
End: 2024-05-21
Payer: MEDICARE

## 2024-05-21 DIAGNOSIS — E53.8 B12 DEFICIENCY: Primary | ICD-10-CM

## 2024-05-21 PROCEDURE — 96372 THER/PROPH/DIAG INJ SC/IM: CPT | Performed by: NURSE PRACTITIONER

## 2024-05-21 RX ORDER — CYANOCOBALAMIN 1000 UG/ML
1000 INJECTION, SOLUTION INTRAMUSCULAR; SUBCUTANEOUS ONCE
Status: COMPLETED | OUTPATIENT
Start: 2024-05-21 | End: 2024-05-21

## 2024-05-21 RX ADMIN — CYANOCOBALAMIN 1000 MCG: 1000 INJECTION, SOLUTION INTRAMUSCULAR; SUBCUTANEOUS at 16:58

## 2024-05-23 NOTE — TELEPHONE ENCOUNTER
Last OV: 4/26/2024  Last RX: 11/27/2023   Next scheduled apt: 6/11/2024    Surescripts requesting refill

## 2024-05-30 RX ORDER — APIXABAN 5 MG/1
TABLET, FILM COATED ORAL
Qty: 60 TABLET | Refills: 11 | Status: SHIPPED | OUTPATIENT
Start: 2024-05-30

## 2024-06-11 ENCOUNTER — NURSE ONLY (OUTPATIENT)
Dept: PRIMARY CARE CLINIC | Age: 79
End: 2024-06-11
Payer: MEDICARE

## 2024-06-11 VITALS — BODY MASS INDEX: 38.38 KG/M2 | WEIGHT: 283 LBS

## 2024-06-11 DIAGNOSIS — E53.8 B12 DEFICIENCY: Primary | ICD-10-CM

## 2024-06-11 PROCEDURE — 96372 THER/PROPH/DIAG INJ SC/IM: CPT | Performed by: NURSE PRACTITIONER

## 2024-06-11 RX ORDER — CYANOCOBALAMIN 1000 UG/ML
1000 INJECTION, SOLUTION INTRAMUSCULAR; SUBCUTANEOUS ONCE
Status: COMPLETED | OUTPATIENT
Start: 2024-06-11 | End: 2024-06-11

## 2024-06-11 RX ADMIN — CYANOCOBALAMIN 1000 MCG: 1000 INJECTION, SOLUTION INTRAMUSCULAR; SUBCUTANEOUS at 15:44

## 2024-06-19 DIAGNOSIS — E78.2 MIXED HYPERLIPIDEMIA: ICD-10-CM

## 2024-06-19 RX ORDER — ATORVASTATIN CALCIUM 80 MG/1
80 TABLET, FILM COATED ORAL DAILY
Qty: 90 TABLET | Refills: 0 | Status: SHIPPED | OUTPATIENT
Start: 2024-06-19

## 2024-06-19 NOTE — TELEPHONE ENCOUNTER
Last OV: 4/26/2024  Last RX: 10/25/2023   Next scheduled apt: 7/9/2024      Surescripts requesting refill

## 2024-07-09 ENCOUNTER — NURSE ONLY (OUTPATIENT)
Dept: PRIMARY CARE CLINIC | Age: 79
End: 2024-07-09
Payer: MEDICARE

## 2024-07-09 DIAGNOSIS — E53.8 B12 DEFICIENCY: Primary | ICD-10-CM

## 2024-07-09 PROCEDURE — 96372 THER/PROPH/DIAG INJ SC/IM: CPT | Performed by: NURSE PRACTITIONER

## 2024-07-09 RX ORDER — CYANOCOBALAMIN 1000 UG/ML
1000 INJECTION, SOLUTION INTRAMUSCULAR; SUBCUTANEOUS ONCE
Status: COMPLETED | OUTPATIENT
Start: 2024-07-09 | End: 2024-07-09

## 2024-07-09 RX ADMIN — CYANOCOBALAMIN 1000 MCG: 1000 INJECTION, SOLUTION INTRAMUSCULAR; SUBCUTANEOUS at 16:39

## 2024-07-10 ENCOUNTER — TELEPHONE (OUTPATIENT)
Dept: UROLOGY | Age: 79
End: 2024-07-10

## 2024-07-10 NOTE — TELEPHONE ENCOUNTER
Patient called this afternoon with the complaint of left groin swelling. He stated he woke up Monday morning and his left groin was swollen (very large) and painful. Mr Elder said he has been icing his groin. The swelling has gone down a little bit and the pain has eased some. He wasn't sure if he should make an appointment?    Please advise

## 2024-07-11 ENCOUNTER — OFFICE VISIT (OUTPATIENT)
Dept: UROLOGY | Age: 79
End: 2024-07-11
Payer: MEDICARE

## 2024-07-11 VITALS
DIASTOLIC BLOOD PRESSURE: 62 MMHG | WEIGHT: 283 LBS | SYSTOLIC BLOOD PRESSURE: 120 MMHG | HEIGHT: 72 IN | BODY MASS INDEX: 38.33 KG/M2

## 2024-07-11 DIAGNOSIS — N39.46 MIXED INCONTINENCE: ICD-10-CM

## 2024-07-11 DIAGNOSIS — N40.1 BPH WITH OBSTRUCTION/LOWER URINARY TRACT SYMPTOMS: ICD-10-CM

## 2024-07-11 DIAGNOSIS — N45.2 ORCHITIS OF LEFT TESTICLE: Primary | ICD-10-CM

## 2024-07-11 DIAGNOSIS — N13.8 BPH WITH OBSTRUCTION/LOWER URINARY TRACT SYMPTOMS: ICD-10-CM

## 2024-07-11 DIAGNOSIS — E66.01 SEVERE OBESITY (BMI 35.0-39.9) WITH COMORBIDITY (HCC): ICD-10-CM

## 2024-07-11 PROCEDURE — 99214 OFFICE O/P EST MOD 30 MIN: CPT | Performed by: PHYSICIAN ASSISTANT

## 2024-07-11 PROCEDURE — 3078F DIAST BP <80 MM HG: CPT | Performed by: PHYSICIAN ASSISTANT

## 2024-07-11 PROCEDURE — G8427 DOCREV CUR MEDS BY ELIG CLIN: HCPCS | Performed by: PHYSICIAN ASSISTANT

## 2024-07-11 PROCEDURE — 3074F SYST BP LT 130 MM HG: CPT | Performed by: PHYSICIAN ASSISTANT

## 2024-07-11 PROCEDURE — G8417 CALC BMI ABV UP PARAM F/U: HCPCS | Performed by: PHYSICIAN ASSISTANT

## 2024-07-11 PROCEDURE — 1123F ACP DISCUSS/DSCN MKR DOCD: CPT | Performed by: PHYSICIAN ASSISTANT

## 2024-07-11 PROCEDURE — 1036F TOBACCO NON-USER: CPT | Performed by: PHYSICIAN ASSISTANT

## 2024-07-11 RX ORDER — CIPROFLOXACIN 500 MG/1
500 TABLET, FILM COATED ORAL 2 TIMES DAILY
Qty: 20 TABLET | Refills: 0 | Status: SHIPPED | OUTPATIENT
Start: 2024-07-11 | End: 2024-07-21

## 2024-07-11 ASSESSMENT — ENCOUNTER SYMPTOMS
NAUSEA: 0
ABDOMINAL PAIN: 0
WHEEZING: 0
VOMITING: 0
COLOR CHANGE: 0
EYE REDNESS: 0
COUGH: 0
SHORTNESS OF BREATH: 0
CONSTIPATION: 0
BACK PAIN: 0

## 2024-07-11 NOTE — PROGRESS NOTES
HPI:      Patient is a 78 y.o. male in no acute distress.  He is alert and oriented to person, place, and time.       History  5/2016 PVP greenlight      3/2017 cystoscopy showed normal anatomy. PFR recommended.      6/2017 Completed seven PFR treatments. Daytime frequency improved from q1hr to q1-2 hrs, nocturia improved from 0-3 to 0-1, severe urgency improved to mild, LIBBY improved from 5-10x per day to 3-4x per day.     2/2022 frequency, nocturia, urgency and urge incontinence.  Saturating 1 briefs per day.  Taking Flomax daily. He is having daily bowel movements.  He does consume 2 cups of coffee. He did stop drinking soda.              Started oxybutynin 10mg XL      4/2022 increased oxybutynin to 15mg     6/2022 - Oxybutynin stopped  Vesicare started    Today:  Patient is here today secondary to left groin swelling for 3 to 4 days.  Patient states that he noticed swelling in his left side starting Monday.  He is having bowel movements every day.  He is accompanied with spouse.  Patient has been icing his left testicle.  He denies any fever or chills.  He complains of malodorous urine but other than that he is doing well from a urinary standpoint. He continues to take Flomax and Vesicare    Past Medical History:   Diagnosis Date    Arthritis     Ascending cholangitis 10/02/2014     at [facility] ERCP+lap assisted gastric remnant to open    Blood in stool 2011    CAD (coronary artery disease)     Chronic back pain     DDD    Chronic diastolic (congestive) heart failure (HCC) 11/05/2018    Chronic systolic congestive heart failure (HCC) 09/25/2018    COPD with acute exacerbation (HCC) 09/25/2018    Heart disease 05/09/2012    History of angioplasty 07/2008    2 stents placed    Hx of CABG     x1 in 2015    Hyperlipidemia     Hypertension     onset age 47    Major depressive disorder, recurrent, mild 03/28/2023    Obesity     Stress incontinence, male 03/21/2017    Transfusion history     Ulcer of right pretibial

## 2024-07-16 ENCOUNTER — HOSPITAL ENCOUNTER (OUTPATIENT)
Age: 79
Setting detail: SPECIMEN
Discharge: HOME | End: 2024-07-16
Payer: MEDICARE

## 2024-07-16 DIAGNOSIS — N45.2 ORCHITIS OF LEFT TESTICLE: ICD-10-CM

## 2024-07-16 DIAGNOSIS — I10 ESSENTIAL HYPERTENSION: ICD-10-CM

## 2024-07-16 DIAGNOSIS — N39.46 MIXED INCONTINENCE: ICD-10-CM

## 2024-07-16 PROCEDURE — 87086 URINE CULTURE/COLONY COUNT: CPT

## 2024-07-16 RX ORDER — LOSARTAN POTASSIUM 25 MG/1
TABLET ORAL
Qty: 45 TABLET | Refills: 1 | Status: SHIPPED | OUTPATIENT
Start: 2024-07-16

## 2024-07-16 RX ORDER — HYDROCHLOROTHIAZIDE 25 MG/1
TABLET ORAL
Qty: 90 TABLET | Refills: 1 | Status: SHIPPED | OUTPATIENT
Start: 2024-07-16

## 2024-07-16 RX ORDER — ISOSORBIDE MONONITRATE 30 MG/1
TABLET, EXTENDED RELEASE ORAL
Qty: 90 TABLET | Refills: 1 | Status: SHIPPED | OUTPATIENT
Start: 2024-07-16

## 2024-07-16 NOTE — TELEPHONE ENCOUNTER
Last OV: 4/26/2024  Last RX: 10/25/2023, 12/18/2023, 1/15/2024   Next scheduled apt: 8/13/2024      Surescripts requesting refill

## 2024-07-18 ENCOUNTER — TELEPHONE (OUTPATIENT)
Dept: UROLOGY | Age: 79
End: 2024-07-18

## 2024-07-18 LAB
MICROORGANISM SPEC CULT: NORMAL
SERVICE CMNT-IMP: NORMAL
SPECIMEN DESCRIPTION: NORMAL

## 2024-07-18 NOTE — TELEPHONE ENCOUNTER
----- Message from Benoit Wheeler PA-C sent at 7/18/2024  3:42 PM EDT -----  Please let him know urine culture was negative for UTI, continue antibiotics as discussed male with

## 2024-07-23 DIAGNOSIS — E78.5 HYPERLIPIDEMIA, UNSPECIFIED HYPERLIPIDEMIA TYPE: ICD-10-CM

## 2024-07-23 DIAGNOSIS — I10 ESSENTIAL HYPERTENSION: ICD-10-CM

## 2024-07-23 DIAGNOSIS — E55.9 VITAMIN D DEFICIENCY DISEASE: Primary | ICD-10-CM

## 2024-07-23 DIAGNOSIS — R06.02 SOB (SHORTNESS OF BREATH): ICD-10-CM

## 2024-07-24 ENCOUNTER — HOSPITAL ENCOUNTER (OUTPATIENT)
Age: 79
Discharge: HOME | End: 2024-07-24
Payer: MEDICARE

## 2024-07-24 DIAGNOSIS — I25.10 CORONARY ARTERY DISEASE INVOLVING NATIVE CORONARY ARTERY OF NATIVE HEART WITHOUT ANGINA PECTORIS: ICD-10-CM

## 2024-07-24 DIAGNOSIS — R06.02 SOB (SHORTNESS OF BREATH): ICD-10-CM

## 2024-07-24 DIAGNOSIS — E78.5 HYPERLIPIDEMIA, UNSPECIFIED HYPERLIPIDEMIA TYPE: ICD-10-CM

## 2024-07-24 DIAGNOSIS — E55.9 VITAMIN D DEFICIENCY DISEASE: ICD-10-CM

## 2024-07-24 DIAGNOSIS — I10 ESSENTIAL HYPERTENSION: ICD-10-CM

## 2024-07-24 PROCEDURE — 93005 ELECTROCARDIOGRAM TRACING: CPT

## 2024-07-25 ENCOUNTER — HOSPITAL ENCOUNTER (OUTPATIENT)
Age: 79
Discharge: HOME | End: 2024-07-25
Payer: MEDICARE

## 2024-07-25 DIAGNOSIS — I25.10 CORONARY ARTERY DISEASE INVOLVING NATIVE CORONARY ARTERY OF NATIVE HEART WITHOUT ANGINA PECTORIS: ICD-10-CM

## 2024-07-25 DIAGNOSIS — R06.02 SOB (SHORTNESS OF BREATH): ICD-10-CM

## 2024-07-25 DIAGNOSIS — I10 ESSENTIAL HYPERTENSION: ICD-10-CM

## 2024-07-25 DIAGNOSIS — E55.9 VITAMIN D DEFICIENCY DISEASE: ICD-10-CM

## 2024-07-25 DIAGNOSIS — E78.5 HYPERLIPIDEMIA, UNSPECIFIED HYPERLIPIDEMIA TYPE: ICD-10-CM

## 2024-07-25 LAB
ALBUMIN SERPL-MCNC: 3.6 G/DL (ref 3.5–5.2)
ALP SERPL-CCNC: 88 U/L (ref 40–129)
ALT SERPL-CCNC: 64 U/L (ref 5–41)
ANION GAP SERPL CALCULATED.3IONS-SCNC: 11 MMOL/L (ref 9–17)
AST SERPL-CCNC: 66 U/L
BASOPHILS # BLD: 0.03 K/UL (ref 0–0.2)
BASOPHILS NFR BLD: 1 % (ref 0–2)
BILIRUB SERPL-MCNC: 0.6 MG/DL (ref 0.3–1.2)
BUN SERPL-MCNC: 23 MG/DL (ref 8–23)
BUN/CREAT SERPL: 23 (ref 9–20)
CALCIUM SERPL-MCNC: 8.9 MG/DL (ref 8.6–10.4)
CHLORIDE SERPL-SCNC: 101 MMOL/L (ref 98–107)
CO2 SERPL-SCNC: 23 MMOL/L (ref 20–31)
CREAT SERPL-MCNC: 1 MG/DL (ref 0.7–1.2)
EKG Q-T INTERVAL: 422 MS
EKG QRS DURATION: 96 MS
EKG QTC CALCULATION (BAZETT): 435 MS
EKG R AXIS: -81 DEGREES
EKG T AXIS: 45 DEGREES
EKG VENTRICULAR RATE: 64 BPM
EOSINOPHIL # BLD: 0.1 K/UL (ref 0–0.4)
EOSINOPHILS RELATIVE PERCENT: 2 % (ref 0–5)
ERYTHROCYTE [DISTWIDTH] IN BLOOD BY AUTOMATED COUNT: 15.1 % (ref 12.1–15.2)
GFR, ESTIMATED: 78 ML/MIN/1.73M2
GLUCOSE SERPL-MCNC: 105 MG/DL (ref 70–99)
HCT VFR BLD AUTO: 39.5 % (ref 41–53)
HGB BLD-MCNC: 13.2 G/DL (ref 13.5–17.5)
IMM GRANULOCYTES # BLD AUTO: 0.01 K/UL (ref 0–0.3)
IMM GRANULOCYTES NFR BLD: 0 % (ref 0–5)
LYMPHOCYTES NFR BLD: 1.32 K/UL (ref 1–4.8)
LYMPHOCYTES RELATIVE PERCENT: 23 % (ref 13–44)
MAGNESIUM SERPL-MCNC: 1.8 MG/DL (ref 1.6–2.6)
MCH RBC QN AUTO: 28.4 PG (ref 26–34)
MCHC RBC AUTO-ENTMCNC: 33.4 G/DL (ref 31–37)
MCV RBC AUTO: 85.1 FL (ref 80–100)
MONOCYTES NFR BLD: 0.46 K/UL (ref 0–1)
MONOCYTES NFR BLD: 8 % (ref 5–9)
NEUTROPHILS NFR BLD: 66 % (ref 39–75)
NEUTS SEG NFR BLD: 3.87 K/UL (ref 2.1–6.5)
PLATELET # BLD AUTO: 202 K/UL (ref 140–450)
PMV BLD AUTO: 9.4 FL (ref 6–12)
POTASSIUM SERPL-SCNC: 4.5 MMOL/L (ref 3.7–5.3)
PROT SERPL-MCNC: 6 G/DL (ref 6.4–8.3)
RBC # BLD AUTO: 4.64 M/UL (ref 4.5–5.9)
SODIUM SERPL-SCNC: 135 MMOL/L (ref 135–144)
TSH SERPL DL<=0.05 MIU/L-ACNC: 1.64 UIU/ML (ref 0.3–5)
WBC OTHER # BLD: 5.8 K/UL (ref 3.5–11)

## 2024-07-25 PROCEDURE — 84443 ASSAY THYROID STIM HORMONE: CPT

## 2024-07-25 PROCEDURE — 82306 VITAMIN D 25 HYDROXY: CPT

## 2024-07-25 PROCEDURE — 36415 COLL VENOUS BLD VENIPUNCTURE: CPT

## 2024-07-25 PROCEDURE — 83735 ASSAY OF MAGNESIUM: CPT

## 2024-07-25 PROCEDURE — 80053 COMPREHEN METABOLIC PANEL: CPT

## 2024-07-25 PROCEDURE — 85025 COMPLETE CBC W/AUTO DIFF WBC: CPT

## 2024-07-25 PROCEDURE — 80061 LIPID PANEL: CPT

## 2024-07-26 LAB
25(OH)D3 SERPL-MCNC: 42.2 NG/ML (ref 30–100)
CHOLEST SERPL-MCNC: 93 MG/DL (ref 0–199)
CHOLESTEROL/HDL RATIO: 2
HDLC SERPL-MCNC: 50 MG/DL
LDLC SERPL CALC-MCNC: 27 MG/DL (ref 0–100)
TRIGL SERPL-MCNC: 83 MG/DL
VLDLC SERPL CALC-MCNC: 17 MG/DL

## 2024-07-30 ENCOUNTER — OFFICE VISIT (OUTPATIENT)
Dept: CARDIOLOGY CLINIC | Age: 79
End: 2024-07-30
Payer: MEDICARE

## 2024-07-30 VITALS
HEART RATE: 71 BPM | WEIGHT: 289 LBS | SYSTOLIC BLOOD PRESSURE: 104 MMHG | OXYGEN SATURATION: 97 % | DIASTOLIC BLOOD PRESSURE: 68 MMHG | RESPIRATION RATE: 16 BRPM | BODY MASS INDEX: 39.19 KG/M2

## 2024-07-30 DIAGNOSIS — I95.9 HYPOTENSION, UNSPECIFIED HYPOTENSION TYPE: ICD-10-CM

## 2024-07-30 DIAGNOSIS — E78.5 HYPERLIPIDEMIA, UNSPECIFIED HYPERLIPIDEMIA TYPE: ICD-10-CM

## 2024-07-30 DIAGNOSIS — E55.9 VITAMIN D DEFICIENCY DISEASE: ICD-10-CM

## 2024-07-30 DIAGNOSIS — I10 ESSENTIAL HYPERTENSION: Primary | ICD-10-CM

## 2024-07-30 DIAGNOSIS — R74.8 ELEVATED LIVER ENZYMES: ICD-10-CM

## 2024-07-30 DIAGNOSIS — I25.10 CORONARY ARTERY DISEASE INVOLVING NATIVE CORONARY ARTERY OF NATIVE HEART WITHOUT ANGINA PECTORIS: ICD-10-CM

## 2024-07-30 PROCEDURE — 3074F SYST BP LT 130 MM HG: CPT | Performed by: NURSE PRACTITIONER

## 2024-07-30 PROCEDURE — 1123F ACP DISCUSS/DSCN MKR DOCD: CPT | Performed by: NURSE PRACTITIONER

## 2024-07-30 PROCEDURE — G8427 DOCREV CUR MEDS BY ELIG CLIN: HCPCS | Performed by: NURSE PRACTITIONER

## 2024-07-30 PROCEDURE — 1036F TOBACCO NON-USER: CPT | Performed by: NURSE PRACTITIONER

## 2024-07-30 PROCEDURE — 3078F DIAST BP <80 MM HG: CPT | Performed by: NURSE PRACTITIONER

## 2024-07-30 PROCEDURE — 99214 OFFICE O/P EST MOD 30 MIN: CPT | Performed by: NURSE PRACTITIONER

## 2024-07-30 PROCEDURE — G8417 CALC BMI ABV UP PARAM F/U: HCPCS | Performed by: NURSE PRACTITIONER

## 2024-07-30 RX ORDER — METOPROLOL SUCCINATE 25 MG/1
12.5 TABLET, EXTENDED RELEASE ORAL DAILY
Qty: 90 TABLET | Refills: 1 | Status: SHIPPED | OUTPATIENT
Start: 2024-07-30

## 2024-07-30 RX ORDER — METOPROLOL SUCCINATE 25 MG/1
25 TABLET, EXTENDED RELEASE ORAL DAILY
Qty: 90 TABLET | Refills: 0 | OUTPATIENT
Start: 2024-07-30
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
## 2024-07-30 NOTE — PROGRESS NOTES
Mercedes Santoyo CNP 1 year follow up  Marjan wife  No chest pain   C/o sob not new   Occ dizziness sitting   To standing.   Finished physical therapy.    Stop lipitor repeat hepatic  In one month   Decrease metoprolol to   1/2 tab daily.      Will see in 1 year.

## 2024-07-30 NOTE — PATIENT INSTRUCTIONS
STOP atorvastatin  Repeat  Hepatic Function Panel in 1 month.  Decrease Metoprolol Succinate 25 mg, 1/2 tablet daily.  Continue BP and HR readings once daily, different times of day, record and call in 2 weeks with readings.

## 2024-08-08 ENCOUNTER — OFFICE VISIT (OUTPATIENT)
Dept: UROLOGY | Age: 79
End: 2024-08-08
Payer: MEDICARE

## 2024-08-08 VITALS
WEIGHT: 286 LBS | DIASTOLIC BLOOD PRESSURE: 84 MMHG | SYSTOLIC BLOOD PRESSURE: 140 MMHG | HEIGHT: 72 IN | BODY MASS INDEX: 38.74 KG/M2

## 2024-08-08 DIAGNOSIS — N13.8 BPH WITH OBSTRUCTION/LOWER URINARY TRACT SYMPTOMS: ICD-10-CM

## 2024-08-08 DIAGNOSIS — N39.46 MIXED INCONTINENCE: ICD-10-CM

## 2024-08-08 DIAGNOSIS — N40.1 BPH WITH OBSTRUCTION/LOWER URINARY TRACT SYMPTOMS: ICD-10-CM

## 2024-08-08 DIAGNOSIS — N45.2 ORCHITIS OF LEFT TESTICLE: Primary | ICD-10-CM

## 2024-08-08 DIAGNOSIS — E66.01 SEVERE OBESITY (BMI 35.0-39.9) WITH COMORBIDITY (HCC): ICD-10-CM

## 2024-08-08 PROCEDURE — G8427 DOCREV CUR MEDS BY ELIG CLIN: HCPCS | Performed by: PHYSICIAN ASSISTANT

## 2024-08-08 PROCEDURE — 1123F ACP DISCUSS/DSCN MKR DOCD: CPT | Performed by: PHYSICIAN ASSISTANT

## 2024-08-08 PROCEDURE — 1036F TOBACCO NON-USER: CPT | Performed by: PHYSICIAN ASSISTANT

## 2024-08-08 PROCEDURE — 3079F DIAST BP 80-89 MM HG: CPT | Performed by: PHYSICIAN ASSISTANT

## 2024-08-08 PROCEDURE — 3077F SYST BP >= 140 MM HG: CPT | Performed by: PHYSICIAN ASSISTANT

## 2024-08-08 PROCEDURE — G8417 CALC BMI ABV UP PARAM F/U: HCPCS | Performed by: PHYSICIAN ASSISTANT

## 2024-08-08 PROCEDURE — 99213 OFFICE O/P EST LOW 20 MIN: CPT | Performed by: PHYSICIAN ASSISTANT

## 2024-08-08 ASSESSMENT — ENCOUNTER SYMPTOMS
COLOR CHANGE: 0
ABDOMINAL PAIN: 0
NAUSEA: 0
CONSTIPATION: 0
WHEEZING: 0
EYE REDNESS: 0
SHORTNESS OF BREATH: 0
COUGH: 0
VOMITING: 0
BACK PAIN: 0

## 2024-08-08 NOTE — PROGRESS NOTES
HPI:      Patient is a 78 y.o. male in no acute distress.  He is alert and oriented to person, place, and time.       History  5/2016 PVP greenlight      3/2017 cystoscopy showed normal anatomy. PFR recommended.      6/2017 Completed seven PFR treatments. Daytime frequency improved from q1hr to q1-2 hrs, nocturia improved from 0-3 to 0-1, severe urgency improved to mild, LIBBY improved from 5-10x per day to 3-4x per day.     2/2022 frequency, nocturia, urgency and urge incontinence.  Saturating 1 briefs per day.  Taking Flomax daily. He is having daily bowel movements.  He does consume 2 cups of coffee. He did stop drinking soda.              Started oxybutynin 10mg XL      4/2022 increased oxybutynin to 15mg     6/2022 - Oxybutynin stopped  Vesicare started    Today:  Patient is here today for follow-up orchitis.  We did give him a course of antibiotics.  He states that his testicular swelling has greatly improved and no longer has any pain.  He does have urinary incontinence but is happy with his current management.  He is having a daily bowel movement.  He continues to take Flomax and Vesicare    Past Medical History:   Diagnosis Date    Arthritis     Ascending cholangitis 10/02/2014     at [facility] ERCP+lap assisted gastric remnant to open    Blood in stool 2011    CAD (coronary artery disease)     Chronic back pain     DDD    Chronic diastolic (congestive) heart failure (HCC) 11/05/2018    Chronic systolic congestive heart failure (HCC) 09/25/2018    COPD with acute exacerbation ([facility]) 09/25/2018    Heart disease 05/09/2012    History of angioplasty 07/2008    2 stents placed    Hx of CABG     x1 in 2015    Hyperlipidemia     Hypertension     onset age 47    Major depressive disorder, recurrent, mild 03/28/2023    Obesity     Stress incontinence, male 03/21/2017    Transfusion history     Ulcer of right pretibial region, with fat layer exposed ([facility]) 08/11/2022    anterior    Unspecified sleep apnea     cpap-DOES NOT

## 2024-08-13 ENCOUNTER — LAB (OUTPATIENT)
Dept: PRIMARY CARE CLINIC | Age: 79
End: 2024-08-13

## 2024-08-13 DIAGNOSIS — E55.9 VITAMIN D DEFICIENCY DISEASE: Primary | ICD-10-CM

## 2024-08-13 RX ORDER — CYANOCOBALAMIN 1000 UG/ML
1000 INJECTION, SOLUTION INTRAMUSCULAR; SUBCUTANEOUS ONCE
Status: COMPLETED | OUTPATIENT
Start: 2024-08-13 | End: 2024-08-13

## 2024-08-13 RX ADMIN — CYANOCOBALAMIN 1000 MCG: 1000 INJECTION, SOLUTION INTRAMUSCULAR; SUBCUTANEOUS at 15:35

## 2024-08-14 NOTE — TELEPHONE ENCOUNTER
Last OV: 4/26/2024  Last RX: 1815/2024   Next scheduled apt: 10/29/2024      Surescripts requesting refill

## 2024-08-15 RX ORDER — ESCITALOPRAM OXALATE 20 MG/1
20 TABLET ORAL DAILY
Qty: 90 TABLET | Refills: 1 | Status: SHIPPED | OUTPATIENT
Start: 2024-08-15

## 2024-09-11 DIAGNOSIS — I50.32 CHRONIC DIASTOLIC (CONGESTIVE) HEART FAILURE (HCC): ICD-10-CM

## 2024-09-11 RX ORDER — SPIRONOLACTONE 25 MG/1
TABLET ORAL
Qty: 90 TABLET | Refills: 0 | Status: SHIPPED | OUTPATIENT
Start: 2024-09-11

## 2024-09-25 ENCOUNTER — NURSE ONLY (OUTPATIENT)
Dept: PRIMARY CARE CLINIC | Age: 79
End: 2024-09-25
Payer: MEDICARE

## 2024-09-25 DIAGNOSIS — E53.8 B12 DEFICIENCY: Primary | ICD-10-CM

## 2024-09-25 PROCEDURE — 96372 THER/PROPH/DIAG INJ SC/IM: CPT | Performed by: NURSE PRACTITIONER

## 2024-09-25 RX ORDER — CYANOCOBALAMIN 1000 UG/ML
1000 INJECTION, SOLUTION INTRAMUSCULAR; SUBCUTANEOUS ONCE
Status: COMPLETED | OUTPATIENT
Start: 2024-09-25 | End: 2024-09-25

## 2024-09-25 RX ADMIN — CYANOCOBALAMIN 1000 MCG: 1000 INJECTION, SOLUTION INTRAMUSCULAR; SUBCUTANEOUS at 15:06

## 2024-10-04 ENCOUNTER — NURSE ONLY (OUTPATIENT)
Dept: PRIMARY CARE CLINIC | Age: 79
End: 2024-10-04

## 2024-10-04 DIAGNOSIS — Z23 NEED FOR INFLUENZA VACCINATION: Primary | ICD-10-CM

## 2024-10-04 PROCEDURE — G0008 ADMIN INFLUENZA VIRUS VAC: HCPCS | Performed by: NURSE PRACTITIONER

## 2024-10-04 PROCEDURE — 90653 IIV ADJUVANT VACCINE IM: CPT | Performed by: NURSE PRACTITIONER

## 2024-10-29 ENCOUNTER — OFFICE VISIT (OUTPATIENT)
Dept: PRIMARY CARE CLINIC | Age: 79
End: 2024-10-29
Payer: MEDICARE

## 2024-10-29 VITALS
OXYGEN SATURATION: 96 % | DIASTOLIC BLOOD PRESSURE: 80 MMHG | HEIGHT: 72 IN | SYSTOLIC BLOOD PRESSURE: 122 MMHG | BODY MASS INDEX: 39.14 KG/M2 | WEIGHT: 289 LBS | HEART RATE: 71 BPM

## 2024-10-29 DIAGNOSIS — E11.620 NLD (NECROBIOSIS LIPOIDICA DIABETICORUM) (HCC): ICD-10-CM

## 2024-10-29 DIAGNOSIS — J44.9 CHRONIC OBSTRUCTIVE PULMONARY DISEASE, UNSPECIFIED COPD TYPE (HCC): ICD-10-CM

## 2024-10-29 DIAGNOSIS — E66.01 SEVERE OBESITY (BMI 35.0-39.9) WITH COMORBIDITY: ICD-10-CM

## 2024-10-29 DIAGNOSIS — Z71.89 ACP (ADVANCE CARE PLANNING): ICD-10-CM

## 2024-10-29 DIAGNOSIS — I50.22 CHRONIC SYSTOLIC CONGESTIVE HEART FAILURE (HCC): ICD-10-CM

## 2024-10-29 DIAGNOSIS — Z96.651 S/P TOTAL KNEE REPLACEMENT, RIGHT: ICD-10-CM

## 2024-10-29 DIAGNOSIS — E11.9 CONTROLLED TYPE 2 DIABETES MELLITUS WITHOUT COMPLICATION, WITHOUT LONG-TERM CURRENT USE OF INSULIN (HCC): ICD-10-CM

## 2024-10-29 DIAGNOSIS — E53.8 B12 DEFICIENCY: ICD-10-CM

## 2024-10-29 DIAGNOSIS — Z00.00 MEDICARE ANNUAL WELLNESS VISIT, SUBSEQUENT: Primary | ICD-10-CM

## 2024-10-29 DIAGNOSIS — E53.8 VITAMIN B 12 DEFICIENCY: ICD-10-CM

## 2024-10-29 DIAGNOSIS — Z98.84 HX OF GASTRIC BYPASS: ICD-10-CM

## 2024-10-29 DIAGNOSIS — I10 ESSENTIAL HYPERTENSION: ICD-10-CM

## 2024-10-29 DIAGNOSIS — F33.0 MAJOR DEPRESSIVE DISORDER, RECURRENT, MILD (HCC): ICD-10-CM

## 2024-10-29 LAB — HBA1C MFR BLD: 5.8 %

## 2024-10-29 PROCEDURE — 83036 HEMOGLOBIN GLYCOSYLATED A1C: CPT | Performed by: NURSE PRACTITIONER

## 2024-10-29 RX ORDER — CYANOCOBALAMIN 1000 UG/ML
1000 INJECTION, SOLUTION INTRAMUSCULAR; SUBCUTANEOUS ONCE
Status: COMPLETED | OUTPATIENT
Start: 2024-10-29 | End: 2024-10-29

## 2024-10-29 RX ADMIN — CYANOCOBALAMIN 1000 MCG: 1000 INJECTION, SOLUTION INTRAMUSCULAR; SUBCUTANEOUS at 14:55

## 2024-10-29 ASSESSMENT — PATIENT HEALTH QUESTIONNAIRE - PHQ9
SUM OF ALL RESPONSES TO PHQ QUESTIONS 1-9: 0
7. TROUBLE CONCENTRATING ON THINGS, SUCH AS READING THE NEWSPAPER OR WATCHING TELEVISION: NOT AT ALL
3. TROUBLE FALLING OR STAYING ASLEEP: NOT AT ALL
SUM OF ALL RESPONSES TO PHQ QUESTIONS 1-9: 0
SUM OF ALL RESPONSES TO PHQ9 QUESTIONS 1 & 2: 0
9. THOUGHTS THAT YOU WOULD BE BETTER OFF DEAD, OR OF HURTING YOURSELF: NOT AT ALL
2. FEELING DOWN, DEPRESSED OR HOPELESS: NOT AT ALL
8. MOVING OR SPEAKING SO SLOWLY THAT OTHER PEOPLE COULD HAVE NOTICED. OR THE OPPOSITE, BEING SO FIGETY OR RESTLESS THAT YOU HAVE BEEN MOVING AROUND A LOT MORE THAN USUAL: NOT AT ALL
4. FEELING TIRED OR HAVING LITTLE ENERGY: NOT AT ALL
1. LITTLE INTEREST OR PLEASURE IN DOING THINGS: NOT AT ALL
SUM OF ALL RESPONSES TO PHQ QUESTIONS 1-9: 0
10. IF YOU CHECKED OFF ANY PROBLEMS, HOW DIFFICULT HAVE THESE PROBLEMS MADE IT FOR YOU TO DO YOUR WORK, TAKE CARE OF THINGS AT HOME, OR GET ALONG WITH OTHER PEOPLE: NOT DIFFICULT AT ALL
SUM OF ALL RESPONSES TO PHQ QUESTIONS 1-9: 0
6. FEELING BAD ABOUT YOURSELF - OR THAT YOU ARE A FAILURE OR HAVE LET YOURSELF OR YOUR FAMILY DOWN: NOT AT ALL
5. POOR APPETITE OR OVEREATING: NOT AT ALL

## 2024-10-29 ASSESSMENT — LIFESTYLE VARIABLES
HOW OFTEN DO YOU HAVE A DRINK CONTAINING ALCOHOL: NEVER
HOW MANY STANDARD DRINKS CONTAINING ALCOHOL DO YOU HAVE ON A TYPICAL DAY: PATIENT DOES NOT DRINK

## 2024-10-29 NOTE — PATIENT INSTRUCTIONS
you.  You can also get hearing aids over the counter for mild to moderate hearing loss.  Have hearing tests as your doctor suggests. They can show whether your hearing has changed. Your hearing aid may need to be adjusted.  Use other devices as needed. These may include:  Telephone amplifiers and hearing aids that can connect to a television, stereo, radio, or microphone.  Devices that use lights or vibrations. These alert you to the doorbell, a ringing telephone, or a baby monitor.  Television closed-captioning. This shows the words at the bottom of the screen. Most new TVs can do this.  TTY (text telephone). This lets you type messages back and forth on the telephone instead of talking or listening. These devices are also called TDD. When messages are typed on the keyboard, they are sent over the phone line to a receiving TTY. The message is shown on a monitor.  Use text messaging, social media, and email if it is hard for you to communicate by telephone.  Try to learn a listening technique called speechreading. It is not lipreading. You pay attention to people's gestures, expressions, posture, and tone of voice. These clues can help you understand what a person is saying. Face the person you are talking to, and have them face you. Make sure the lighting is good. You need to see the other person's face clearly.  Think about counseling if you need help to adjust to your hearing loss.  When should you call for help?  Watch closely for changes in your health, and be sure to contact your doctor if:    You think your hearing is getting worse.     You have new symptoms, such as dizziness or nausea.   Where can you learn more?  Go to https://[email or web address].net/patientEd and enter R798 to learn more about \"Hearing Loss: Care Instructions.\"  Current as of: September 27, 2023  Content Version: 14.2  © 2024 [employer].   Care instructions adapted under license by [employer]. If you have questions about a

## 2024-10-29 NOTE — PROGRESS NOTES
Medicare Annual Wellness Visit    Pete Elder is here for Medicare AWV (HTN, LORI, DM)    Assessment & Plan   Medicare annual wellness visit, subsequent  Controlled type 2 diabetes mellitus without complication, without long-term current use of insulin (HCC)  -     POCT glycosylated hemoglobin (Hb A1C)  B12 deficiency  -     cyanocobalamin injection 1,000 mcg; 1,000 mcg, IntraMUSCular, ONCE, 1 dose, On Tue 10/29/24 at 1445  ACP (advance care planning)  Essential hypertension  Chronic systolic congestive heart failure (HCC)  Major depressive disorder, recurrent, mild (HCC)  Chronic obstructive pulmonary disease, unspecified COPD type (HCC)  NLD (necrobiosis lipoidica diabeticorum) (HCC)  Hx of gastric bypass  Vitamin B 12 deficiency  S/P total knee replacement, right    Recommendations for Preventive Services Due: see orders and patient instructions/AVS.  Recommended screening schedule for the next 5-10 years is provided to the patient in written form: see Patient Instructions/AVS.     Return in 6 months (on 4/29/2025).     Subjective   The following acute and/or chronic problems were also addressed today:    Vitamin B 12 injections being given monthly    Chronic yeast infection on diflucan through [facility] Dr. House consistently   Also on duricef chronically from [facility] too with hx right knee recurrent infection, redone knee arthroplasty 3x with multiple complications. Dehiscence infeciton.   Currently closed and working on new brace for better mobility of the leg.     Pt with CVA with right sided paralysis uses walker to ambulate and improved mobility .     Type 2 DM on metformin in am     Iron deficiency anemia remains on iron supplement     Lab Results   Component Value Date    WBC 5.8 07/25/2024    HGB 13.2 (L) 07/25/2024    HCT 39.5 (L) 07/25/2024    MCV 85.1 07/25/2024     07/25/2024     Lab Results   Component Value Date    IRON 51 (L) 05/01/2024    TIBC 294 05/01/2024    FERRITIN 65 05/01/2024     Closer to

## 2024-11-03 RX ORDER — TIRZEPATIDE 2.5 MG/.5ML
2.5 INJECTION, SOLUTION SUBCUTANEOUS WEEKLY
Qty: 2 ML | Refills: 0 | Status: SHIPPED | OUTPATIENT
Start: 2024-11-03

## 2024-11-05 ENCOUNTER — TELEPHONE (OUTPATIENT)
Dept: PRIMARY CARE CLINIC | Age: 79
End: 2024-11-05

## 2024-11-05 NOTE — TELEPHONE ENCOUNTER
Pt called in stating that with the Mounjaro and the losartan/hctz combo pill, it was going to cost him $262/month. States he's unable to afford that

## 2024-11-06 NOTE — TELEPHONE ENCOUNTER
Yes not uncommon for the mounjaro to have a monthly cost as we discussed can be up to $1500. Thus pt can check on-line or with [place] care coordinator about indigent program if he is eligible through company or not for the Mounjaro. Also would have him look up   Zepbound too.     The losartan /hctz combo pill is usually not expensive please have them recheck this price again and see if copay for combo pill less than both copays for getting med separately     thanks

## 2024-11-12 NOTE — TELEPHONE ENCOUNTER
Spoke with spouse and she stated that it is cheaper to get the losartan and the hydrochlorothiazide separate. Co-pay for each of those is only $10. They will check on Zepbound also.

## 2024-11-26 ENCOUNTER — NURSE ONLY (OUTPATIENT)
Dept: PRIMARY CARE CLINIC | Age: 79
End: 2024-11-26
Payer: MEDICARE

## 2024-11-26 DIAGNOSIS — E53.8 B12 DEFICIENCY: Primary | ICD-10-CM

## 2024-11-26 PROCEDURE — 96372 THER/PROPH/DIAG INJ SC/IM: CPT | Performed by: NURSE PRACTITIONER

## 2024-11-26 RX ORDER — CYANOCOBALAMIN 1000 UG/ML
1000 INJECTION, SOLUTION INTRAMUSCULAR; SUBCUTANEOUS ONCE
Status: COMPLETED | OUTPATIENT
Start: 2024-11-26 | End: 2024-11-26

## 2024-11-26 RX ADMIN — CYANOCOBALAMIN 1000 MCG: 1000 INJECTION, SOLUTION INTRAMUSCULAR; SUBCUTANEOUS at 13:15

## 2024-11-26 NOTE — TELEPHONE ENCOUNTER
Last OV: 10/29/2024  Last RX: 5/23/2024   Next scheduled apt: 11/26/2024    Surescripts requesting refill

## 2024-12-12 ENCOUNTER — OFFICE VISIT (OUTPATIENT)
Dept: PRIMARY CARE CLINIC | Age: 79
End: 2024-12-12

## 2024-12-12 VITALS
OXYGEN SATURATION: 93 % | HEIGHT: 72 IN | HEART RATE: 71 BPM | DIASTOLIC BLOOD PRESSURE: 82 MMHG | BODY MASS INDEX: 39.96 KG/M2 | SYSTOLIC BLOOD PRESSURE: 130 MMHG | WEIGHT: 295 LBS

## 2024-12-12 DIAGNOSIS — M19.012 ARTHRITIS OF LEFT SHOULDER REGION: Primary | ICD-10-CM

## 2024-12-12 DIAGNOSIS — Z98.84 HX OF GASTRIC BYPASS: ICD-10-CM

## 2024-12-12 DIAGNOSIS — E11.9 CONTROLLED TYPE 2 DIABETES MELLITUS WITHOUT COMPLICATION, WITHOUT LONG-TERM CURRENT USE OF INSULIN (HCC): ICD-10-CM

## 2024-12-12 DIAGNOSIS — R22.32 MASS OF UPPER EXTREMITY, LEFT: ICD-10-CM

## 2024-12-12 DIAGNOSIS — M54.16 LUMBAR RADICULOPATHY, CHRONIC: ICD-10-CM

## 2024-12-12 DIAGNOSIS — Z71.89 ACP (ADVANCE CARE PLANNING): ICD-10-CM

## 2024-12-12 DIAGNOSIS — I69.359 CVA, OLD, HEMIPARESIS (HCC): ICD-10-CM

## 2024-12-12 RX ORDER — BUDESONIDE AND FORMOTEROL FUMARATE DIHYDRATE 160; 4.5 UG/1; UG/1
2 AEROSOL RESPIRATORY (INHALATION) 2 TIMES DAILY
Qty: 10.2 G | Refills: 1 | Status: SHIPPED | OUTPATIENT
Start: 2024-12-12

## 2024-12-12 ASSESSMENT — ENCOUNTER SYMPTOMS
ABDOMINAL DISTENTION: 0
SHORTNESS OF BREATH: 0
BACK PAIN: 1
WHEEZING: 0
EYES NEGATIVE: 1
COUGH: 0
CHEST TIGHTNESS: 0
ABDOMINAL PAIN: 0

## 2024-12-12 NOTE — PATIENT INSTRUCTIONS
THANK YOU FOR TRUSTING US WITH YOUR HEALTHCARE !!    The associates caring for you today were:    Family Practice Provider: Kristina KELLOGG-DAPHNIE    Clinical Team Nurse: Marielos Beckford LPN    Clinical Team Medical Assistant: Radha Kelsey MA    Our goal is to provide you with EXCEPTIONAL patient care, and we strive to do this for EVERY patient, EVERY visit. Since we care about you and your experience, you may receive a patient satisfaction survey from Seun Watson by postal mail/email/text. We truly welcome your feedback and ask that you complete the survey to let us know how we are doing.    Important Numbers:  [facility] phone [phone]   [place] Office Nurse/MA Line: [phone]  Fax  [phone]   Central [phone]  Billing questions: [phone]  Medical Records Request: [phone]    Manage your healthcare  with [facility] My[facility]. To activate your account, visit [email or web address]   DIGITAL scheduling available now through my chart.  Schedule your next appointment at your convenience through your my chart.       [place] Office Hours:  Monday: [place] office location 8-5 ([phone]) Offering additional late hours the first Monday of the month until 7 pm.   Tuesday: 8: :  812 Noon, closed  of the month   : 7:30-4:30   SURVEY:    You may be receiving a survey from Seun Watson regarding your visit today.    Please complete the survey to enable us to provide the highest quality of care to you and your family.    If you cannot score us a very good on any question, please call the office to discuss how we could have made your experience a very good one.    Thank you.   Advance Care Planning     Advance Care Planning opens a door to talk about and write down your wishes before a sudden accident or illness.  Make your goals, values, and preferences known.

## 2024-12-12 NOTE — PROGRESS NOTES
2025     Order Specific Question:   Reason for exam:     Answer:   left shoulder impingement , ROM limited no injury suspect advanced arthritis, impingement, possible bicep tendonitis    Ambulatory Referral to ACP Clinical Specialist     Referral Priority:   Routine     Referral Type:   Other     Referral Reason:   Specialty Services Required     Number of Visits Requested:   1    [facility] by Braden Antoine     Referral Priority:   Routine     Referral Type:   Eval and Treat     Referral Reason:   Specialty Services Required     Referral Location:   [facility] by Elina Feliciano     Requested Specialty:   Home Health Services     Number of Visits Requested:   1         Patient Instructions   THANK YOU FOR TRUSTING US WITH YOUR HEALTHCARE !!    The associates caring for you today were:    Family Practice Provider: Kristina KELLOGG-CNP    Clinical Team Nurse: Marielos Beckford LPN    Clinical Team Medical Assistant: Radha Kelsey MA    Our goal is to provide you with EXCEPTIONAL patient care, and we strive to do this for EVERY patient, EVERY visit. Since we care about you and your experience, you may receive a patient satisfaction survey from Seun Watson by postal mail/email/text. We truly welcome your feedback and ask that you complete the survey to let us know how we are doing.    Important Numbers:  [facility] phone [phone]   [place] Office Nurse/MA Line: [phone]  Fax  [phone]   Central [phone]  Billing questions: [phone]  Medical Records Request: [phone]    Manage your healthcare  with [facility] My[facility]. To activate your account, visit [email or web address]   DIGITAL scheduling available now through my chart.  Schedule your next appointment at your convenience through your my chart.       [place] Office Hours:  Monday: [place] office location 8-5

## 2024-12-13 ENCOUNTER — CLINICAL DOCUMENTATION (OUTPATIENT)
Dept: SPIRITUAL SERVICES | Age: 79
End: 2024-12-13

## 2024-12-13 NOTE — ACP (ADVANCE CARE PLANNING)
Advance Care Planning   Ambulatory ACP Specialist Patient Outreach    Date:  12/13/2024    ACP Specialist:  Sarah Carter LPN    Outreach call to patient in follow-up to ACP Specialist referral from:Kristina Wilkinson APRN - CNP    [] PCP  [x] Provider   [] Ambulatory Care Management [] Other     For:                  [x] Advance Directive Assistance              [] Complete Portable DNR order              [] Complete POST/POLST/MOST              [x] Code Status Discussion             [x] Discuss Goals of Care             [] Early ACP Decision-Making              [] Other (Specify)    Date Referral Received:12/12/24    Next Step:   [x] ACP scheduled conversation  [] Outreach again in one week               [x] Email / Mail ACP Info Sheets  [x] Email / Mail Advance Directive   [] Closing referral.  Routing closure to referring provider/staff and to ACP Specialist .    [] Closure letter mailed to patient with invitation to contact ACP Specialist if / when ready.   [] Other (Specify here):       [x] At this time, Healthcare Decision Maker Is:     Primary Decision Maker: Sarah Elder - Spouse - [phone]          [] Primary agent named in scanned advance directive.    [x] Legal Next of Kin.     [] Unable to determine legal decision maker at this time.    Outreaches:       [x] 1st -  Date:  12/13/24               Intervention:  [x] Spoke with Patient   [] Left Voice mail [x] Email / Mail    [] UrgentRxhart  [] Other (Specify) :     Outcomes:Writer contacted patient on home/mobile phone ([phone]) to discuss ACP.  Patient is agreeable to a conversation with ACP Specialist Lin Cobb on Friday, December 20, 2024 at 11:00 a.m.  A copy of [place] [employer] forms and ACP information sheets sent to patient's confirmed email address on file with ACP Specialist and Coordinator's contact information included.         Thank you for this referral.

## 2024-12-16 ENCOUNTER — TELEPHONE (OUTPATIENT)
Dept: PRIMARY CARE CLINIC | Age: 79
End: 2024-12-16

## 2024-12-17 ENCOUNTER — TELEPHONE (OUTPATIENT)
Dept: PRIMARY CARE CLINIC | Age: 79
End: 2024-12-17

## 2024-12-18 ENCOUNTER — HOSPITAL ENCOUNTER (OUTPATIENT)
Dept: GENERAL RADIOLOGY | Age: 79
Discharge: HOME | End: 2024-12-20
Payer: MEDICARE

## 2024-12-18 ENCOUNTER — HOSPITAL ENCOUNTER (OUTPATIENT)
Age: 79
Discharge: HOME | End: 2024-12-20
Payer: MEDICARE

## 2024-12-18 DIAGNOSIS — M19.012 ARTHRITIS OF LEFT SHOULDER REGION: ICD-10-CM

## 2024-12-18 DIAGNOSIS — R22.32 MASS OF UPPER EXTREMITY, LEFT: ICD-10-CM

## 2024-12-18 PROCEDURE — 73030 X-RAY EXAM OF SHOULDER: CPT

## 2024-12-20 ENCOUNTER — CLINICAL DOCUMENTATION (OUTPATIENT)
Dept: SPIRITUAL SERVICES | Age: 79
End: 2024-12-20

## 2024-12-20 DIAGNOSIS — I50.32 CHRONIC DIASTOLIC (CONGESTIVE) HEART FAILURE (HCC): ICD-10-CM

## 2024-12-20 RX ORDER — SPIRONOLACTONE 25 MG/1
TABLET ORAL
Qty: 90 TABLET | Refills: 1 | Status: SHIPPED | OUTPATIENT
Start: 2024-12-20

## 2024-12-20 NOTE — TELEPHONE ENCOUNTER
Last OV: 12/12/2024  Last RX: 9/11/2024   Next scheduled apt: 12/27/2024    Surescripts requesting refill

## 2024-12-20 NOTE — ACP (ADVANCE CARE PLANNING)
Advance Care Planning     Serious Illness Goals and Values Note       Date of Conversation: 2024     Purpose of conversation today: Address advance care planning     Individuals present for the conversation:  Patient with Decision Making Capacity  Other persons present: Spouse Sarah Elder    Healthcare Decision Maker:   Primary Decision Maker: Sarah Elder - Spouse - [phone]    Secondary Decision Maker: Claribel Elder D - Child - [phone]    Understanding of Illness:   Patient shared: \"I had a stroke and a bad knee replacement\"  Other participants shared:  Spouse shared \"right now we are fine, lots of pain\"    The patient or surrogate expressed the following: No new issues today, would like to complete ACP documents and focus on continued improvement with walking.      Most Important Goals if health worsens:   Achieve a particular life goal: be able to walk with a brace or without brace  Be at home     Worries and Concerns: \"Nope -I am thankful I am alive, I told everyone I would be gone by 60--most of my family  before 60\"   Sources of Strength (if expressed): Good attitude and family; \"I took 3-4 steps by myself without walker\"    If patient's health status worsens, he is willing to go through the following for the possibility of gaining more time:  Be in the hospital     Current Treatment Preferences (CPR): Yes, attempt to resuscitate.     When NOT in Cardiopulmonary Arrest Patient Elects: Full Treatments Goal (Required if chooses CPR) : Attempt to sustain life by all medically effective means, including intensive care  Selective Treatments Goal: Attempt to restore function while avoiding intensive care and resuscitative efforts (not available when patient elects CPR)  Comfort-Focused Treatments Goal: Maximize comfort through symptom management; allow natural death (not available when patient elects CPR)    Family/Support System Awareness of Patient Wishes: Spouse present for this

## 2024-12-24 ENCOUNTER — TELEPHONE (OUTPATIENT)
Dept: PRIMARY CARE CLINIC | Age: 79
End: 2024-12-24

## 2024-12-24 DIAGNOSIS — Z98.84 HX OF GASTRIC BYPASS: Primary | ICD-10-CM

## 2024-12-24 DIAGNOSIS — E53.8 B12 DEFICIENCY: ICD-10-CM

## 2024-12-24 DIAGNOSIS — E61.1 IRON DEFICIENCY: ICD-10-CM

## 2024-12-24 DIAGNOSIS — E53.8 VITAMIN B 12 DEFICIENCY: ICD-10-CM

## 2024-12-24 NOTE — TELEPHONE ENCOUNTER
Need to have Sarah come over for B12 supplies for HH nurse to give Pete his injection on Monday 12/30/2024. Was order put in for HH to be able to give?

## 2024-12-25 RX ORDER — CYANOCOBALAMIN 1000 UG/ML
1000 INJECTION, SOLUTION INTRAMUSCULAR; SUBCUTANEOUS ONCE
Qty: 1 ML | Refills: 0 | Status: SHIPPED | OUTPATIENT
Start: 2024-12-25 | End: 2024-12-27

## 2024-12-27 RX ORDER — BACLOFEN 20 MG/1
20 TABLET ORAL 3 TIMES DAILY
COMMUNITY
Start: 2024-12-14

## 2024-12-27 RX ORDER — BACLOFEN 5 MG/1
5 TABLET ORAL 3 TIMES DAILY
COMMUNITY
Start: 2024-12-14

## 2024-12-27 NOTE — TELEPHONE ENCOUNTER
Addressed in separate message. HH to draw vit b12 & folate, cbc to check level due to pt taking oral B12 as well as receiving injections. Supplies returned to shelf

## 2024-12-29 DIAGNOSIS — M19.012 LOCALIZED OSTEOARTHRITIS OF LEFT SHOULDER: ICD-10-CM

## 2024-12-29 DIAGNOSIS — G89.29 CHRONIC PAIN IN LEFT SHOULDER: Primary | ICD-10-CM

## 2024-12-29 DIAGNOSIS — M25.512 CHRONIC PAIN IN LEFT SHOULDER: Primary | ICD-10-CM

## 2024-12-30 ENCOUNTER — HOSPITAL ENCOUNTER (OUTPATIENT)
Age: 79
Setting detail: SPECIMEN
Discharge: HOME | End: 2024-12-30
Payer: MEDICARE

## 2024-12-30 LAB
BASOPHILS # BLD: 0.04 K/UL (ref 0–0.2)
BASOPHILS NFR BLD: 1 % (ref 0–2)
EOSINOPHIL # BLD: 0.11 K/UL (ref 0–0.4)
EOSINOPHILS RELATIVE PERCENT: 3 % (ref 0–5)
ERYTHROCYTE [DISTWIDTH] IN BLOOD BY AUTOMATED COUNT: 15 % (ref 12.1–15.2)
FOLATE SERPL-MCNC: 14.1 NG/ML (ref 4.8–24.2)
HCT VFR BLD AUTO: 41.2 % (ref 41–53)
HGB BLD-MCNC: 13.5 G/DL (ref 13.5–17.5)
IMM GRANULOCYTES # BLD AUTO: 0.01 K/UL (ref 0–0.3)
IMM GRANULOCYTES NFR BLD: 0 % (ref 0–5)
LYMPHOCYTES NFR BLD: 1.03 K/UL (ref 1–4.8)
LYMPHOCYTES RELATIVE PERCENT: 26 % (ref 13–44)
MCH RBC QN AUTO: 27.4 PG (ref 26–34)
MCHC RBC AUTO-ENTMCNC: 32.8 G/DL (ref 31–37)
MCV RBC AUTO: 83.6 FL (ref 80–100)
MONOCYTES NFR BLD: 0.36 K/UL (ref 0–1)
MONOCYTES NFR BLD: 9 % (ref 5–9)
NEUTROPHILS NFR BLD: 61 % (ref 39–75)
NEUTS SEG NFR BLD: 2.38 K/UL (ref 2.1–6.5)
PLATELET # BLD AUTO: 174 K/UL (ref 140–450)
PMV BLD AUTO: 10.3 FL (ref 6–12)
RBC # BLD AUTO: 4.93 M/UL (ref 4.5–5.9)
VIT B12 SERPL-MCNC: 627 PG/ML (ref 232–1245)
WBC OTHER # BLD: 3.9 K/UL (ref 3.5–11)

## 2024-12-30 PROCEDURE — 82746 ASSAY OF FOLIC ACID SERUM: CPT

## 2024-12-30 PROCEDURE — 85025 COMPLETE CBC W/AUTO DIFF WBC: CPT

## 2024-12-30 PROCEDURE — 82607 VITAMIN B-12: CPT

## 2025-01-13 LAB
A/G RATIO, EXTERNAL: 1.8
ALBUMIN, EXTERNAL: 4.2
ALK PHOS, EXTERNAL: 79
ANION GAP, EXTERNAL: NORMAL
BILI DIRECT, EXTERNAL: NORMAL
BILI TOTAL, EXTERNAL: 0.5
BUN, EXTERNAL: 27
BUN/CREATININE RATIO, EXTERNAL: NORMAL
CALCIUM, EXTERNAL: 9.2
CALCIUM, ION, EXTERNAL: NORMAL
CHLORIDE, EXTERNAL: 103
CO2, EXTERNAL: 19
CREATININE, EXTERNAL: 1.1
EGFR IF AFA, EXTERNAL: NORMAL
EGFR IF NONAFRICAN AMERICAN: 69
GLOBULIN, EXTERNAL: NORMAL
GLUCOSE SER, EXTERNAL: 91
POTASSIUM, EXTERNAL: 4.1
PROTEIN TOT, EXTERNAL: 6.5
SGOT (AST), EXTERNAL: 49
SGPT (ALT), EXTERNAL: 27
SODIUM, EXTERNAL: 136

## 2025-01-29 ENCOUNTER — TELEPHONE (OUTPATIENT)
Dept: PRIMARY CARE CLINIC | Age: 80
End: 2025-01-29

## 2025-01-29 DIAGNOSIS — J44.9 CHRONIC OBSTRUCTIVE PULMONARY DISEASE, UNSPECIFIED COPD TYPE (HCC): ICD-10-CM

## 2025-01-29 DIAGNOSIS — Z98.84 HX OF GASTRIC BYPASS: Primary | ICD-10-CM

## 2025-01-29 DIAGNOSIS — E11.9 CONTROLLED TYPE 2 DIABETES MELLITUS WITHOUT COMPLICATION, WITHOUT LONG-TERM CURRENT USE OF INSULIN (HCC): ICD-10-CM

## 2025-01-29 DIAGNOSIS — M19.012 ARTHRITIS OF LEFT SHOULDER REGION: ICD-10-CM

## 2025-01-29 DIAGNOSIS — I69.359 CVA, OLD, HEMIPARESIS (HCC): ICD-10-CM

## 2025-01-29 RX ORDER — BUDESONIDE AND FORMOTEROL FUMARATE DIHYDRATE 160; 4.5 UG/1; UG/1
2 AEROSOL RESPIRATORY (INHALATION) 2 TIMES DAILY
Qty: 10.2 G | Refills: 5 | Status: SHIPPED | OUTPATIENT
Start: 2025-01-29

## 2025-01-29 NOTE — TELEPHONE ENCOUNTER
Pt called unable to get symbicort inhaler refill.     Sent in Dec. Will send again check with pharmacy for fill availability     Also pt seen ortho and cannot do anything with left shoulder pt would like to see pain management Dr. Limon. [facility] KELLOGG CNP

## 2025-02-06 DIAGNOSIS — N13.8 BPH WITH OBSTRUCTION/LOWER URINARY TRACT SYMPTOMS: ICD-10-CM

## 2025-02-06 DIAGNOSIS — I10 ESSENTIAL HYPERTENSION: ICD-10-CM

## 2025-02-06 DIAGNOSIS — N40.1 BPH WITH OBSTRUCTION/LOWER URINARY TRACT SYMPTOMS: ICD-10-CM

## 2025-02-06 RX ORDER — SOLIFENACIN SUCCINATE 10 MG/1
TABLET, FILM COATED ORAL
Qty: 90 TABLET | Refills: 0 | Status: SHIPPED | OUTPATIENT
Start: 2025-02-06

## 2025-02-06 NOTE — TELEPHONE ENCOUNTER
Last appt   3/27/2024   Next appt   02/20/2025    Medication is active on current medication list.

## 2025-02-07 RX ORDER — LOSARTAN POTASSIUM 25 MG/1
TABLET ORAL
Qty: 45 TABLET | Refills: 1 | Status: SHIPPED | OUTPATIENT
Start: 2025-02-07

## 2025-02-07 RX ORDER — ISOSORBIDE MONONITRATE 30 MG/1
TABLET, EXTENDED RELEASE ORAL
Qty: 90 TABLET | Refills: 1 | Status: SHIPPED | OUTPATIENT
Start: 2025-02-07

## 2025-02-07 NOTE — TELEPHONE ENCOUNTER
Last OV: 12/12/2024  Last RX: 7/16/2024   Next scheduled apt: 4/29/2025    Surescripts requesting refill

## 2025-02-14 NOTE — PATIENT INSTRUCTIONS
Phone: [phone]  Fax: [facility] Office Hours:  Monday: [place] office location [phone] ([phone]) Offering additional late hours the first Monday of the month until 7 pm.   Tuesday: 8-5 Wednesday: 8-5 Thursday:  Additional hours offered 2 Thursdays a month. Please call to inquire those dates. Fridays: 7:30-4:30   SURVEY:    You may be receiving a survey from [employer] regarding your visit today. Please complete the survey to enable us to provide the highest quality of care to you and your family. If you cannot score us a very good on any question, please call the office to discuss how we could have made your experience a very good one. Thank you. Statement Selected Available

## 2025-02-25 ENCOUNTER — TELEPHONE (OUTPATIENT)
Dept: PRIMARY CARE CLINIC | Age: 80
End: 2025-02-25

## 2025-02-25 DIAGNOSIS — M19.012 ARTHRITIS OF LEFT SHOULDER REGION: ICD-10-CM

## 2025-02-25 DIAGNOSIS — I69.359 CVA, OLD, HEMIPARESIS (HCC): ICD-10-CM

## 2025-02-25 DIAGNOSIS — G89.29 CHRONIC LEFT SHOULDER PAIN: ICD-10-CM

## 2025-02-25 DIAGNOSIS — M54.16 LUMBAR RADICULOPATHY, CHRONIC: ICD-10-CM

## 2025-02-25 DIAGNOSIS — Z98.84 HX OF GASTRIC BYPASS: Primary | ICD-10-CM

## 2025-02-25 DIAGNOSIS — J44.9 CHRONIC OBSTRUCTIVE PULMONARY DISEASE, UNSPECIFIED COPD TYPE (HCC): ICD-10-CM

## 2025-02-25 DIAGNOSIS — M25.512 CHRONIC LEFT SHOULDER PAIN: ICD-10-CM

## 2025-02-25 NOTE — TELEPHONE ENCOUNTER
Last OV: 12/12/2024  Last RX: 8/15/2024   Next scheduled apt: 4/29/2025    Surescripts requesting refill

## 2025-02-25 NOTE — TELEPHONE ENCOUNTER
Pt states Pain management Dr he was referred to only goes to [facility], not [facility]. Pt is asking to see someone closer- [facility] [place] or [facility]. Please place referral of choice and office will notify pt

## 2025-02-26 RX ORDER — TAMSULOSIN HYDROCHLORIDE 0.4 MG/1
CAPSULE ORAL
Qty: 90 CAPSULE | Refills: 0 | Status: SHIPPED | OUTPATIENT
Start: 2025-02-26

## 2025-02-26 NOTE — TELEPHONE ENCOUNTER
Last appt   3/27/2024   Next appt   04/17/2025    Medication is active on current medication list.

## 2025-02-27 RX ORDER — ESCITALOPRAM OXALATE 20 MG/1
20 TABLET ORAL DAILY
Qty: 90 TABLET | Refills: 0 | Status: SHIPPED | OUTPATIENT
Start: 2025-02-27

## 2025-03-07 ENCOUNTER — TELEPHONE (OUTPATIENT)
Dept: PRIMARY CARE CLINIC | Age: 80
End: 2025-03-07

## 2025-03-07 NOTE — TELEPHONE ENCOUNTER
Sarah stopped in asking if you could order Alex a stand up walker. She said that it would help him to stand up straighter if he didn't have to bend over. Please advise.

## 2025-03-10 ENCOUNTER — TRANSCRIBE ORDERS (OUTPATIENT)
Dept: ADMINISTRATIVE | Age: 80
End: 2025-03-10

## 2025-03-10 DIAGNOSIS — I73.9 PAD (PERIPHERAL ARTERY DISEASE): Primary | ICD-10-CM

## 2025-03-11 NOTE — TELEPHONE ENCOUNTER
Yes a platform walker would be good if he has one sided upper extremity weakness , unsure with having PT 3 x why it was not offered in past.     Unsure how to have measured without a therapist, does he see Dr. Minh fleming in [place] soon?   If not please place call to physical therapist at to how to go about pt getting this.     Kristina

## 2025-03-13 ENCOUNTER — HOSPITAL ENCOUNTER (OUTPATIENT)
Dept: VASCULAR LAB | Age: 80
Discharge: HOME | End: 2025-03-15
Attending: PODIATRIST
Payer: MEDICARE

## 2025-03-13 DIAGNOSIS — I73.9 PAD (PERIPHERAL ARTERY DISEASE): ICD-10-CM

## 2025-03-13 LAB
VAS LEFT ABI: 1.35
VAS LEFT ARM BP: 112 MMHG
VAS LEFT CALF PRESSURE: 254 MMHG
VAS LEFT DORSALIS PEDIS BP: 147 MMHG
VAS LEFT LOW THIGH PRESSURE: 134 MMHG
VAS LEFT PTA BP: 153 MMHG
VAS LEFT TBI: 0.42
VAS LEFT TOE PRESSURE: 48 MMHG
VAS RIGHT ABI: 1.35
VAS RIGHT ARM BP: 113 MMHG
VAS RIGHT CALF PRESSURE: 175 MMHG
VAS RIGHT DORSALIS PEDIS BP: 149 MMHG
VAS RIGHT LOW THIGH PRESSURE: 142 MMHG
VAS RIGHT PTA BP: 153 MMHG
VAS RIGHT TBI: 0.88
VAS RIGHT TOE PRESSURE: 99 MMHG

## 2025-03-13 PROCEDURE — 93923 UPR/LXTR ART STDY 3+ LVLS: CPT

## 2025-03-19 NOTE — TELEPHONE ENCOUNTER
Need to check if any Medical Supply company carries these walkers and what is needed to see if insurance will cover.

## 2025-03-20 ENCOUNTER — OFFICE VISIT (OUTPATIENT)
Age: 80
End: 2025-03-20

## 2025-03-20 VITALS
HEART RATE: 56 BPM | DIASTOLIC BLOOD PRESSURE: 82 MMHG | BODY MASS INDEX: 41.35 KG/M2 | SYSTOLIC BLOOD PRESSURE: 118 MMHG | OXYGEN SATURATION: 96 % | WEIGHT: 305 LBS

## 2025-03-20 DIAGNOSIS — M51.369 DEGENERATION OF INTERVERTEBRAL DISC OF LUMBAR REGION, UNSPECIFIED WHETHER PAIN PRESENT: ICD-10-CM

## 2025-03-20 DIAGNOSIS — G56.82 SUPRASCAPULAR NERVE ENTRAPMENT, LEFT: Primary | ICD-10-CM

## 2025-03-20 DIAGNOSIS — M47.817 LUMBOSACRAL SPONDYLOSIS WITHOUT MYELOPATHY: ICD-10-CM

## 2025-03-20 DIAGNOSIS — M25.512 CHRONIC LEFT SHOULDER PAIN: ICD-10-CM

## 2025-03-20 DIAGNOSIS — G89.29 CHRONIC LEFT SHOULDER PAIN: ICD-10-CM

## 2025-03-20 NOTE — H&P (VIEW-ONLY)
Chronic Pain Clinic Note     Encounter Date: 3/20/2025     SUBJECTIVE:  Chief Complaint   Patient presents with    New Patient    Back Pain    Shoulder Pain       History of Present Illness:   Pete Elder is a 79 y.o. male who presents with back and shoulder pain. Patient has had multiple knee surgeries in the past and has been ambulating with a walker for 4 years. Patient had a stroke in 2024 which caused little to no use in his right arm, he is using his left arm more which is causing pain and soreness in his left arm and shoulder. Back pain has been going on for over a month.    Medication Refill: N/A    Current Complaints of Pain:   Location: Back and left shoulder   Radiation: None  Severity: moderate-severe   Pain Numerical Score - 5 back, 5 shoulder  Average: 5 for both     Highest: 8-9  Lowest: 3 with ice and heat   Character/Quality: Complains of pain that is aching  Timing: Keeps awake at night (back), Prevents or limits ADLs, Increases w/activity.  Explain - walking, Constant  Associated symptoms: none  Numbness: no  Weakness: no  Exacerbating factors: Reaching, exercising, unable to move arm above chest height, back worsens with sitting to standing position - always needs assistance   Alleviating factors: Ice, heat, baclofen takes the edge off but makes him sleepy  Length of time pain has been present: Started on 1 year (shoulder), 1-2 months (back)  Inciting event/injury: Stroke 2024  Bowel/Bladder incontinence: no  Falls: no  Physical Therapy: Yes - unsuccessful     History of Interventions:   Surgery: Back sx in 2003, Hip replacement 2021  Injections: None    Imaging:    XR shoudler left 12/2024    Past Medical History:   Diagnosis Date    Arthritis     Ascending cholangitis (HCC) 10/02/2014     at [facility] ERCP+lap assisted gastric remnant to open    Blood in stool 2011    CAD (coronary artery disease)     Chronic back pain     DDD    Chronic diastolic (congestive) heart failure (HCC) 11/05/2018     Chronic systolic congestive heart failure (HCC) 09/25/2018    COPD with acute exacerbation (HCC) 09/25/2018    Heart disease 05/09/2012    Hemiparesis affecting right side as late effect of cerebrovascular accident (HCC) 02/19/2023    History of angioplasty 07/2008    2 stents placed    Hx of CABG     x1 in 2015    Hyperlipidemia     Hypertension     onset age 47    Major depressive disorder, recurrent, mild 03/28/2023    Obesity     Stress incontinence, male 03/21/2017    Transfusion history     Ulcer of right pretibial region, with fat layer exposed (HCC) 08/11/2022    anterior    Unspecified sleep apnea     cpap-DOES NOT USE MACHINE       Past Surgical History:   Procedure Laterality Date    ANTERIOR CRUCIATE LIGAMENT REPAIR Right 2/9/2021    RIGHT KNEE INCISION AND DRAINAGE, POLY EXCHANGE,  WITH EXTENSOR MECHANISM REPAIR WITH ALLOGRAFT performed by Chong Salazar DO [facility] OR    BACK SURGERY  2005    [facility] Cespedes    BARIATRIC SURGERY  2001    roun-en -Y at [facility]    CARDIAC CATHETERIZATION Left 05/09/2012    Left main normal.  Left anterior descending artery:  Plaque disease.  Ramus: Plaque disease Circumflex:nondominant, plaque disease.  OM1 normal. Right coronoary artery:  dominant & luminal irregularities.  Left ventricular ejection fraction 60.  Mitral valve normal with no insufficinecy of the mitral valve.  Aortic valve normal with no stenosis of the aortic valve.  Edp was normal.    CARDIAC CATHETERIZATION Left 03/04/2015    Dr. Ramirez --Severe CAD, 80% in-stent stenosis in the left anterior descending coronary artery in a rather long segment, very eccentric,extending almost to the ostium of the left anterior descending coronary artery.  70% in-stent stenosis of a very large dominant right coronary artery.  Unremarkable small circumflex.  Normal left ventricular function, ejection fraction of 60%.    CARDIAC CATHETERIZATION Left 12/05/2018    Dr. Valenzuela @ [place]

## 2025-03-20 NOTE — PATIENT INSTRUCTIONS
SURVEY:    You may be receiving a survey from [facility] regarding your visit today.    You may get this in the mail, through your MyChart, or in your email.     Please complete the survey to enable us to provide the highest quality of care to you and your family.    If you cannot score us a very good (5 Stars) on any question, please call the office to discuss how we could of made your experience exceptional.    Thank you!    General Surgery    MD Dr. Georgette Eric, DO  Dr. Bulmaro Yan, DO  Michaela Hendrix, BESS-CNP    Pain Mgmt.  Dr. Frankie Ledezma, DO  DAPHNIE Henry, WHITLEY Márquez LPN Jena Adams, MA Emily Akers, MA    Phone: [phone]  Fax: [phone]    Office Hours:   M-TH 8-5, F: 8-12

## 2025-03-20 NOTE — PROGRESS NOTES
Chronic Pain Clinic Note     Encounter Date: 3/20/2025     SUBJECTIVE:  Chief Complaint   Patient presents with    New Patient    Back Pain    Shoulder Pain       History of Present Illness:   Pete Elder is a 79 y.o. male who presents with back and shoulder pain. Patient has had multiple knee surgeries in the past and has been ambulating with a walker for 4 years. Patient had a stroke in 2024 which caused little to no use in his right arm, he is using his left arm more which is causing pain and soreness in his left arm and shoulder. Back pain has been going on for over a month.    Medication Refill: N/A    Current Complaints of Pain:   Location: Back and left shoulder   Radiation: None  Severity: moderate-severe   Pain Numerical Score - 5 back, 5 shoulder  Average: 5 for both     Highest: 8-9  Lowest: 3 with ice and heat   Character/Quality: Complains of pain that is aching  Timing: Keeps awake at night (back), Prevents or limits ADLs, Increases w/activity.  Explain - walking, Constant  Associated symptoms: none  Numbness: no  Weakness: no  Exacerbating factors: Reaching, exercising, unable to move arm above chest height, back worsens with sitting to standing position - always needs assistance   Alleviating factors: Ice, heat, baclofen takes the edge off but makes him sleepy  Length of time pain has been present: Started on 1 year (shoulder), 1-2 months (back)  Inciting event/injury: Stroke 2024  Bowel/Bladder incontinence: no  Falls: no  Physical Therapy: Yes - unsuccessful     History of Interventions:   Surgery: Back sx in 2003, Hip replacement 2021  Injections: None    Imaging:    XR shoudler left 12/2024    Past Medical History:   Diagnosis Date    Arthritis     Ascending cholangitis (HCC) 10/02/2014     at [facility] ERCP+lap assisted gastric remnant to open    Blood in stool 2011    CAD (coronary artery disease)     Chronic back pain     DDD    Chronic diastolic (congestive) heart failure (HCC) 11/05/2018

## 2025-03-21 DIAGNOSIS — G56.82 SUPRASCAPULAR NERVE ENTRAPMENT, LEFT: ICD-10-CM

## 2025-04-07 NOTE — PROGRESS NOTES
[facility]   Preadmission Testing    Name: Pete Elder  : 1945  Patient Phone: [phone] (home) [phone] (work)    Procedure: LEFT SUPRASCAPULAR PERIPHERAL NERVE STIMULATOR IMPLANT WITH ULTRASOUND GUIDANCE - Left   Date of Procedure: 4/10/2025  Surgeon: Frankie Ledezma DO    Ht:  182.9 cm (6')  Wt: Weight - Scale: (!) 138.3 kg (305 lb)  Wt method: Stated    Allergies: No Known Allergies             There were no vitals filed for this visit.    No LMP for male patient.    Do you take blood thinners?   [x] Yes    [] No         Instructed to stop blood thinners prior to procedure?    [] Yes    [x] No      [] N/A    []Eliquis - 3 days  []Xarelto - 3 days  []Pradaxa - 5 days  []Coumadin - 5 days   []Plavix - 7 days  []ASA 325mg - 7 days  []Brillinta - 5 days  []Pletal - 2 days   Have you had a respiratory infection or sore throat in last 4 weeks before surgery?    [] Yes    [x] No     Patient instructed on: [] NPO Status - if receiving sedation  [x] Meds to Take  [x] Ride Home - sedation/ epidurals  [x]No Jewelry/Contact Lenses/Nail Polish     DOS Patient Needs [] HCG   [x] Blood Sugar  [] PT/INR                      indoor environmental allergies/outdoor environmental allergies

## 2025-04-10 ENCOUNTER — HOSPITAL ENCOUNTER (OUTPATIENT)
Age: 80
Setting detail: OUTPATIENT SURGERY
Discharge: HOME | End: 2025-04-10
Attending: STUDENT IN AN ORGANIZED HEALTH CARE EDUCATION/TRAINING PROGRAM | Admitting: STUDENT IN AN ORGANIZED HEALTH CARE EDUCATION/TRAINING PROGRAM
Payer: MEDICARE

## 2025-04-10 VITALS
SYSTOLIC BLOOD PRESSURE: 138 MMHG | TEMPERATURE: 98.3 F | BODY MASS INDEX: 40.5 KG/M2 | WEIGHT: 299 LBS | DIASTOLIC BLOOD PRESSURE: 94 MMHG | RESPIRATION RATE: 18 BRPM | HEART RATE: 76 BPM | HEIGHT: 72 IN | OXYGEN SATURATION: 96 %

## 2025-04-10 LAB — GLUCOSE BLD-MCNC: 106 MG/DL (ref 65–99)

## 2025-04-10 PROCEDURE — 76942 ECHO GUIDE FOR BIOPSY: CPT | Performed by: STUDENT IN AN ORGANIZED HEALTH CARE EDUCATION/TRAINING PROGRAM

## 2025-04-10 PROCEDURE — 82947 ASSAY GLUCOSE BLOOD QUANT: CPT

## 2025-04-10 PROCEDURE — 2720000010 HC SURG SUPPLY STERILE: Performed by: STUDENT IN AN ORGANIZED HEALTH CARE EDUCATION/TRAINING PROGRAM

## 2025-04-10 PROCEDURE — 64555 IMPLANT NEUROELECTRODES: CPT | Performed by: STUDENT IN AN ORGANIZED HEALTH CARE EDUCATION/TRAINING PROGRAM

## 2025-04-10 PROCEDURE — 7100000011 HC PHASE II RECOVERY - ADDTL 15 MIN: Performed by: STUDENT IN AN ORGANIZED HEALTH CARE EDUCATION/TRAINING PROGRAM

## 2025-04-10 PROCEDURE — 2709999900 HC NON-CHARGEABLE SUPPLY: Performed by: STUDENT IN AN ORGANIZED HEALTH CARE EDUCATION/TRAINING PROGRAM

## 2025-04-10 PROCEDURE — 3600000058 HC PAIN LEVEL 5 BASE: Performed by: STUDENT IN AN ORGANIZED HEALTH CARE EDUCATION/TRAINING PROGRAM

## 2025-04-10 PROCEDURE — 6360000002 HC RX W HCPCS: Performed by: STUDENT IN AN ORGANIZED HEALTH CARE EDUCATION/TRAINING PROGRAM

## 2025-04-10 PROCEDURE — 3600000059 HC PAIN LEVEL 5 ADDL 15 MIN: Performed by: STUDENT IN AN ORGANIZED HEALTH CARE EDUCATION/TRAINING PROGRAM

## 2025-04-10 PROCEDURE — 7100000010 HC PHASE II RECOVERY - FIRST 15 MIN: Performed by: STUDENT IN AN ORGANIZED HEALTH CARE EDUCATION/TRAINING PROGRAM

## 2025-04-10 RX ORDER — LIDOCAINE HYDROCHLORIDE 10 MG/ML
INJECTION, SOLUTION EPIDURAL; INFILTRATION; INTRACAUDAL; PERINEURAL PRN
Status: DISCONTINUED | OUTPATIENT
Start: 2025-04-10 | End: 2025-04-10 | Stop reason: ALTCHOICE

## 2025-04-10 RX ORDER — PENTOXIFYLLINE 400 MG/1
400 TABLET, EXTENDED RELEASE ORAL 2 TIMES DAILY
COMMUNITY

## 2025-04-10 ASSESSMENT — PAIN - FUNCTIONAL ASSESSMENT: PAIN_FUNCTIONAL_ASSESSMENT: 0-10

## 2025-04-10 NOTE — PROGRESS NOTES
IV Sedation Discharge Criteria    Inpatients must meet Criteria 1 through 7. All other patients are either YES or N/A. If a NO is chosen then Surgeon must be notified.      1.  Minimum 30 minutes after last dose of sedative medication, minimum 120 minutes after last dose of reversal agent.    Yes       2.  Systolic BP stable within 20 mmHg for 30 minutes & systolic BP between 90 & 180 or within 10 mmHg of baseline.    Yes      3.  Pulse between 60 and 100 or within 10 bpm of baseline.    Yes      4.  Spontaneous respiratory rate >/= 10 per minute.    Yes      5.  SaO2 >/= 95 or  >/= baseline.    Yes      6.  Able to cough and swallow or return to baseline function.    Yes      7.  Alert and oriented or return to baseline mental status.    Yes      8.  Demonstrates controlled, coordinated movements, ambulates with steady gait, or return to baseline activity function.    Yes      9.  Minimal or no pain or nausea, or at a level tolerable and acceptable to patient.    Yes      10. Takes and retains oral fluids as allowed.    Yes      11. Procedural / perioperative site stable.  Minimal or no bleeding.    Yes          12. If GI endoscopy procedure, minimal or no abdominal distention or passing flatus.    N/A      13. Written discharge instructions and emergency telephone number provided.    Yes      14. Accompanied by a responsible adult.    Yes

## 2025-04-10 NOTE — INTERVAL H&P NOTE
Update History & Physical    The patient's History and Physical of March 20, 2025 was reviewed with the patient and I examined the patient. There was no change. The surgical site was confirmed by the patient and me.     Plan: The risks, benefits, expected outcome, and alternative to the recommended procedure have been discussed with the patient. Patient understands and wants to proceed with the procedure.     ASA CLASSIFICATION  2  MP   CLASSIFICATION  2    Electronically signed by Frankie Ledezma DO on 4/10/2025 at 2:12 PM

## 2025-04-10 NOTE — DISCHARGE INSTRUCTIONS
You have received an injection of local anesthetic.    The local anesthetic effect typically last 4-6 hours.     Please pay close attention to the following information/instructions:      It is common to experience mild soreness at the injection site(s) for 24-48 hours. Ice is the best remedy. You may apply ice for 20 minutes at a time several times a day as needed.  Avoid heat to the injection area for 72 hours. No hot packs, saunas, or steam rooms during this time. A regular shower is OK.  You may immediately restart your regular medication regimen, including pain medications, anti-inflammatory, and blood thinners.  While it is extremely rare to get an infection after a spinal procedure, please call the office if you develop any signs of infection. These signs include fevers/chills, severely increased pain, redness at the injections site, or any drainage from the injection site.  Remember that the corticosteroid benefit (long-term pain relief) from an injection can take as long as 10 days to occur. You may have a period of slightly increased pain after your injection before the cortisone takes effect.  You may resume all of your normal daily activities 24 hours after your injection.  It is OK to restart your exercise or physical therapy program as soon as you feel comfortable doing so.  Follow all instructions on handouts from the stimulator rep that spoke with you prior to your procedure.

## 2025-04-17 ENCOUNTER — OFFICE VISIT (OUTPATIENT)
Dept: UROLOGY | Age: 80
End: 2025-04-17

## 2025-04-17 VITALS
BODY MASS INDEX: 40.5 KG/M2 | SYSTOLIC BLOOD PRESSURE: 128 MMHG | WEIGHT: 299 LBS | HEIGHT: 72 IN | DIASTOLIC BLOOD PRESSURE: 70 MMHG

## 2025-04-17 DIAGNOSIS — N13.8 BPH WITH OBSTRUCTION/LOWER URINARY TRACT SYMPTOMS: Primary | ICD-10-CM

## 2025-04-17 DIAGNOSIS — N40.1 BPH WITH OBSTRUCTION/LOWER URINARY TRACT SYMPTOMS: Primary | ICD-10-CM

## 2025-04-17 DIAGNOSIS — N32.81 OAB (OVERACTIVE BLADDER): ICD-10-CM

## 2025-04-17 RX ORDER — TAMSULOSIN HYDROCHLORIDE 0.4 MG/1
0.4 CAPSULE ORAL 2 TIMES DAILY
Qty: 180 CAPSULE | Refills: 0 | Status: SHIPPED | OUTPATIENT
Start: 2025-04-17

## 2025-04-17 NOTE — PROGRESS NOTES
Bladderscan performed in office today:  Pt voided - 35 mL, PVR - 1 mL   
daily      spironolactone (ALDACTONE) 25 MG tablet Take 1 tablet by mouth once daily 90 tablet 1    metFORMIN (GLUCOPHAGE) 500 MG tablet Take 1 tablet by mouth once daily with breakfast 90 tablet 2    metoprolol succinate (TOPROL XL) 25 MG extended release tablet Take 0.5 tablets by mouth daily 90 tablet 1    hydroCHLOROthiazide (HYDRODIURIL) 25 MG tablet Take 1 tablet by mouth once daily 90 tablet 1    ELIQUIS 5 MG TABS tablet TAKE 1 TABLET BY MOUTH IN THE MORNING AND 1 IN THE EVENING 60 tablet 11    cefadroxil (DURICEF) 500 MG capsule Take 1 capsule by mouth daily      potassium chloride (KLOR-CON) 10 MEQ extended release tablet Take 1 tablet by mouth once daily 90 tablet 1    aspirin 81 MG EC tablet Take 1 tablet by mouth daily      fluconazole (DIFLUCAN) 200 MG tablet Pt taking 1 tab daily      budesonide (PULMICORT) 0.5 MG/2ML nebulizer suspension USE 2 ML (1 VIAL) IN NEBULIZER TWICE DAILY      Probiotic Product (PROBIOTIC DAILY PO) Take by mouth      albuterol sulfate  (90 Base) MCG/ACT inhaler       Blood Glucose Monitoring Suppl (FREESTYLE LITE) RADHA 1 Device by Does not apply route daily E11.9 1 Device 0    docusate sodium (COLACE) 100 MG capsule Take 1 capsule by mouth 2 times daily 40 capsule 0    Multiple Vitamin (THERA/BETA-CAROTENE) TABS Take 1 tablet by mouth daily      vitamin C (ASCORBIC ACID) 500 MG tablet Take 1 tablet by mouth daily      [DISCONTINUED] tamsulosin (FLOMAX) 0.4 MG capsule TAKE 1 CAPSULE BY MOUTH ONCE DAILY IN THE EVENING 90 capsule 0    Ferrous Sulfate (IRON) 325 (65 Fe) MG TABS 65 mg daily  (Patient not taking: Reported on 4/17/2025)      Vitamin D, Cholecalciferol, 25 MCG (1000 UT) TABS Take by mouth daily        No facility-administered encounter medications on file as of 4/17/2025.      Current Outpatient Medications on File Prior to Visit   Medication Sig Dispense Refill    pentoxifylline (TRENTAL) 400 MG extended release tablet Take 1 tablet by mouth 2 times daily at

## 2025-05-06 ENCOUNTER — HOSPITAL ENCOUNTER (OUTPATIENT)
Age: 80
Setting detail: SPECIMEN
Discharge: HOME | End: 2025-05-06
Payer: MEDICARE

## 2025-05-06 ENCOUNTER — RESULTS FOLLOW-UP (OUTPATIENT)
Dept: PRIMARY CARE CLINIC | Age: 80
End: 2025-05-06

## 2025-05-06 ENCOUNTER — OFFICE VISIT (OUTPATIENT)
Dept: PRIMARY CARE CLINIC | Age: 80
End: 2025-05-06
Payer: MEDICARE

## 2025-05-06 VITALS
WEIGHT: 300 LBS | OXYGEN SATURATION: 97 % | BODY MASS INDEX: 40.63 KG/M2 | SYSTOLIC BLOOD PRESSURE: 120 MMHG | DIASTOLIC BLOOD PRESSURE: 80 MMHG | HEART RATE: 79 BPM | HEIGHT: 72 IN

## 2025-05-06 DIAGNOSIS — E11.620 NLD (NECROBIOSIS LIPOIDICA DIABETICORUM) (HCC): ICD-10-CM

## 2025-05-06 DIAGNOSIS — I50.22 CHRONIC SYSTOLIC CONGESTIVE HEART FAILURE (HCC): ICD-10-CM

## 2025-05-06 DIAGNOSIS — I10 ESSENTIAL HYPERTENSION: ICD-10-CM

## 2025-05-06 DIAGNOSIS — I69.359 CVA, OLD, HEMIPARESIS (HCC): ICD-10-CM

## 2025-05-06 DIAGNOSIS — N40.1 BENIGN PROSTATIC HYPERPLASIA WITH URINARY FREQUENCY: ICD-10-CM

## 2025-05-06 DIAGNOSIS — E11.9 CONTROLLED TYPE 2 DIABETES MELLITUS WITHOUT COMPLICATION, WITHOUT LONG-TERM CURRENT USE OF INSULIN (HCC): Primary | ICD-10-CM

## 2025-05-06 DIAGNOSIS — E78.2 MIXED HYPERLIPIDEMIA: ICD-10-CM

## 2025-05-06 DIAGNOSIS — R35.0 BENIGN PROSTATIC HYPERPLASIA WITH URINARY FREQUENCY: ICD-10-CM

## 2025-05-06 DIAGNOSIS — E11.9 CONTROLLED TYPE 2 DIABETES MELLITUS WITHOUT COMPLICATION, WITHOUT LONG-TERM CURRENT USE OF INSULIN (HCC): ICD-10-CM

## 2025-05-06 DIAGNOSIS — Z98.84 HX OF GASTRIC BYPASS: ICD-10-CM

## 2025-05-06 LAB — HBA1C MFR BLD: 5.6 %

## 2025-05-06 PROCEDURE — 3079F DIAST BP 80-89 MM HG: CPT | Performed by: NURSE PRACTITIONER

## 2025-05-06 PROCEDURE — 3044F HG A1C LEVEL LT 7.0%: CPT | Performed by: NURSE PRACTITIONER

## 2025-05-06 PROCEDURE — 3074F SYST BP LT 130 MM HG: CPT | Performed by: NURSE PRACTITIONER

## 2025-05-06 PROCEDURE — 83036 HEMOGLOBIN GLYCOSYLATED A1C: CPT | Performed by: NURSE PRACTITIONER

## 2025-05-06 PROCEDURE — 1036F TOBACCO NON-USER: CPT | Performed by: NURSE PRACTITIONER

## 2025-05-06 PROCEDURE — 99214 OFFICE O/P EST MOD 30 MIN: CPT | Performed by: NURSE PRACTITIONER

## 2025-05-06 PROCEDURE — G8417 CALC BMI ABV UP PARAM F/U: HCPCS | Performed by: NURSE PRACTITIONER

## 2025-05-06 PROCEDURE — 1159F MED LIST DOCD IN RCRD: CPT | Performed by: NURSE PRACTITIONER

## 2025-05-06 PROCEDURE — 82043 UR ALBUMIN QUANTITATIVE: CPT

## 2025-05-06 PROCEDURE — 82570 ASSAY OF URINE CREATININE: CPT

## 2025-05-06 PROCEDURE — G8427 DOCREV CUR MEDS BY ELIG CLIN: HCPCS | Performed by: NURSE PRACTITIONER

## 2025-05-06 PROCEDURE — 1160F RVW MEDS BY RX/DR IN RCRD: CPT | Performed by: NURSE PRACTITIONER

## 2025-05-06 PROCEDURE — 1123F ACP DISCUSS/DSCN MKR DOCD: CPT | Performed by: NURSE PRACTITIONER

## 2025-05-06 SDOH — ECONOMIC STABILITY: FOOD INSECURITY: WITHIN THE PAST 12 MONTHS, THE FOOD YOU BOUGHT JUST DIDN'T LAST AND YOU DIDN'T HAVE MONEY TO GET MORE.: NEVER TRUE

## 2025-05-06 SDOH — ECONOMIC STABILITY: FOOD INSECURITY: WITHIN THE PAST 12 MONTHS, YOU WORRIED THAT YOUR FOOD WOULD RUN OUT BEFORE YOU GOT MONEY TO BUY MORE.: NEVER TRUE

## 2025-05-06 ASSESSMENT — PATIENT HEALTH QUESTIONNAIRE - PHQ9
6. FEELING BAD ABOUT YOURSELF - OR THAT YOU ARE A FAILURE OR HAVE LET YOURSELF OR YOUR FAMILY DOWN: NOT AT ALL
3. TROUBLE FALLING OR STAYING ASLEEP: NOT AT ALL
9. THOUGHTS THAT YOU WOULD BE BETTER OFF DEAD, OR OF HURTING YOURSELF: NOT AT ALL
SUM OF ALL RESPONSES TO PHQ QUESTIONS 1-9: 0
4. FEELING TIRED OR HAVING LITTLE ENERGY: NOT AT ALL
2. FEELING DOWN, DEPRESSED OR HOPELESS: NOT AT ALL
SUM OF ALL RESPONSES TO PHQ QUESTIONS 1-9: 0
SUM OF ALL RESPONSES TO PHQ QUESTIONS 1-9: 0
5. POOR APPETITE OR OVEREATING: NOT AT ALL
10. IF YOU CHECKED OFF ANY PROBLEMS, HOW DIFFICULT HAVE THESE PROBLEMS MADE IT FOR YOU TO DO YOUR WORK, TAKE CARE OF THINGS AT HOME, OR GET ALONG WITH OTHER PEOPLE: NOT DIFFICULT AT ALL
1. LITTLE INTEREST OR PLEASURE IN DOING THINGS: NOT AT ALL
7. TROUBLE CONCENTRATING ON THINGS, SUCH AS READING THE NEWSPAPER OR WATCHING TELEVISION: NOT AT ALL
SUM OF ALL RESPONSES TO PHQ QUESTIONS 1-9: 0
8. MOVING OR SPEAKING SO SLOWLY THAT OTHER PEOPLE COULD HAVE NOTICED. OR THE OPPOSITE, BEING SO FIGETY OR RESTLESS THAT YOU HAVE BEEN MOVING AROUND A LOT MORE THAN USUAL: NOT AT ALL

## 2025-05-06 NOTE — PATIENT INSTRUCTIONS
THANK YOU FOR TRUSTING US WITH YOUR HEALTHCARE !!    The associates caring for you today were:    Family Practice Provider: Kristina KELLOGG-DAPHNIE    Clinical Team Nurse: Marielos Beckford LPN    Clinical Team Medical Assistant: Radha Kelsey MA    Our goal is to provide you with EXCEPTIONAL patient care, and we strive to do this for EVERY patient, EVERY visit. Since we care about you and your experience, you may receive a patient satisfaction survey from [employer] by postal mail/email/text. We truly welcome your feedback and ask that you complete the survey to let us know how we are doing.    Important Numbers:  [facility] phone [phone]   [facility] Office Nurse/MA Line: [phone]  Fax  [phone]   Central [phone]  Billing questions: [phone]  Medical Records Request: [phone]    Manage your healthcare  with [facility] MyChart. To activate your account, visit [email or web address]   DIGITAL scheduling available now through my chart.  Schedule your next appointment at your convenience through your my chart.       [facility] Office Hours:  Monday: [place] office location 8-5 ([phone]) Offering additional late hours the first Monday of the month until 7 pm.   Tuesday: 8: :  812 Noon, closed  of the month   : 7:30-4:30   SURVEY:    You may be receiving a survey from [employer] regarding your visit today.    Please complete the survey to enable us to provide the highest quality of care to you and your family.    If you cannot score us a very good on any question, please call the office to discuss how we could have made your experience a very good one.    Thank you.

## 2025-05-06 NOTE — PROGRESS NOTES
[facility] [place]  [address] [address]  Dept: [phone]  Dept Fax: [phone]    Last encounter 12/12/2024    Hypertension (6 month ) and Diabetes (6 month )       HPI:   Pete Elder is a 79 y.o. male who presentstoday for his medical conditions/complaints as noted below.  Pete Elder is c/o of Hypertension (6 month ) and Diabetes (6 month )      HPI  Established patient    79 year old male with hx CVA right sided weakness, chronic right leg weakness with hx right knee replacement /s/p dehiscensce redo, RLE healing with use trental recently with improvement in circulation , less swelling. Htn, type 2 DM     Bph Flomax twice a day, urinate okay   Diflucan per ID for high burden yeast.     PAD- trental started with podiatry     Htn well controlled on toprol, spironolactone, imdur, losartan,     Type 2 Dm on metformin     Asthma on symbicort   Anxiety on lexapro          Reviewed prior notes  infectious disease , Cardiology, Neurology, Orthopedics, and Pulmonary  Reviewed previous Labs, Imaging, and Hospital Records    Past Medical History:   Diagnosis Date    Arthritis     Ascending cholangitis (HCC) 10/02/2014     at [facility] ERCP+lap assisted gastric remnant to open    Blood in stool 2011    CAD (coronary artery disease)     Chronic back pain     DDD    Chronic diastolic (congestive) heart failure (HCC) 11/05/2018    Chronic systolic congestive heart failure (HCC) 09/25/2018    COPD with acute exacerbation (HCC) 09/25/2018    Heart disease 05/09/2012    Hemiparesis affecting right side as late effect of cerebrovascular accident (HCC) 02/19/2023    History of angioplasty 07/2008    2 stents placed    Hx of CABG     x1 in 2015    Hyperlipidemia     Hypertension     onset age 47    Major depressive disorder, recurrent, mild 03/28/2023    Obesity     Stress incontinence, male 03/21/2017    Transfusion history     Ulcer of right

## 2025-05-07 LAB
CREAT UR-MCNC: 115 MG/DL (ref 39–259)
MICROALBUMIN UR-MCNC: <12 MG/L (ref 0–20)
MICROALBUMIN/CREAT UR-RTO: NORMAL MCG/MG CREAT (ref 0–17)

## 2025-05-09 DIAGNOSIS — N40.1 BPH WITH OBSTRUCTION/LOWER URINARY TRACT SYMPTOMS: ICD-10-CM

## 2025-05-09 DIAGNOSIS — N13.8 BPH WITH OBSTRUCTION/LOWER URINARY TRACT SYMPTOMS: ICD-10-CM

## 2025-05-12 RX ORDER — SOLIFENACIN SUCCINATE 10 MG/1
10 TABLET, FILM COATED ORAL DAILY
Qty: 90 TABLET | Refills: 0 | Status: SHIPPED | OUTPATIENT
Start: 2025-05-12

## 2025-05-12 NOTE — TELEPHONE ENCOUNTER
Upcoming appointment, we will give 1 refill   Minocycline Counseling: Patient advised regarding possible photosensitivity and discoloration of the teeth, skin, lips, tongue and gums.  Patient instructed to avoid sunlight, if possible.  When exposed to sunlight, patients should wear protective clothing, sunglasses, and sunscreen.  The patient was instructed to call the office immediately if the following severe adverse effects occur:  hearing changes, easy bruising/bleeding, severe headache, or vision changes.  The patient verbalized understanding of the proper use and possible adverse effects of minocycline.  All of the patient's questions and concerns were addressed.

## 2025-05-12 NOTE — TELEPHONE ENCOUNTER
Last appt   3/27/2024   Next appt   06/19/2025    Medication is active on current medication list.

## 2025-05-19 ASSESSMENT — ENCOUNTER SYMPTOMS
WHEEZING: 0
SHORTNESS OF BREATH: 0
CHEST TIGHTNESS: 0
NAUSEA: 0
ABDOMINAL DISTENTION: 0
EYES NEGATIVE: 1
COUGH: 0
RHINORRHEA: 0
BACK PAIN: 1
ABDOMINAL PAIN: 0

## 2025-06-09 RX ORDER — APIXABAN 5 MG/1
TABLET, FILM COATED ORAL
Qty: 60 TABLET | Refills: 0 | Status: SHIPPED | OUTPATIENT
Start: 2025-06-09

## 2025-06-09 RX ORDER — ESCITALOPRAM OXALATE 20 MG/1
20 TABLET ORAL DAILY
Qty: 90 TABLET | Refills: 1 | Status: SHIPPED | OUTPATIENT
Start: 2025-06-09

## 2025-06-09 NOTE — TELEPHONE ENCOUNTER
Last OV: 5/6/2025  Last RX: 2/27/2025   Next scheduled apt: 8/8/2025    Surescripts requesting refill     Vaccine Information Statement(s) or the Emergency Use Authorization was given today. This has been reviewed, questions answered, and verbal consent given by Patient for injection(s) and administration of COVID-19 Immunization Moderna COVID-19.    Patient tolerated without incident. See immunization grid for documentation.

## 2025-06-19 ENCOUNTER — OFFICE VISIT (OUTPATIENT)
Dept: UROLOGY | Age: 80
End: 2025-06-19
Payer: MEDICARE

## 2025-06-19 DIAGNOSIS — N13.8 BPH WITH OBSTRUCTION/LOWER URINARY TRACT SYMPTOMS: Primary | ICD-10-CM

## 2025-06-19 DIAGNOSIS — N40.1 BPH WITH OBSTRUCTION/LOWER URINARY TRACT SYMPTOMS: Primary | ICD-10-CM

## 2025-06-19 DIAGNOSIS — N32.81 OAB (OVERACTIVE BLADDER): ICD-10-CM

## 2025-06-19 DIAGNOSIS — R39.15 URGENCY OF URINATION: ICD-10-CM

## 2025-06-19 PROCEDURE — G8417 CALC BMI ABV UP PARAM F/U: HCPCS | Performed by: PHYSICIAN ASSISTANT

## 2025-06-19 PROCEDURE — 1159F MED LIST DOCD IN RCRD: CPT | Performed by: PHYSICIAN ASSISTANT

## 2025-06-19 PROCEDURE — G8427 DOCREV CUR MEDS BY ELIG CLIN: HCPCS | Performed by: PHYSICIAN ASSISTANT

## 2025-06-19 PROCEDURE — 1036F TOBACCO NON-USER: CPT | Performed by: PHYSICIAN ASSISTANT

## 2025-06-19 PROCEDURE — 1123F ACP DISCUSS/DSCN MKR DOCD: CPT | Performed by: PHYSICIAN ASSISTANT

## 2025-06-19 PROCEDURE — 99214 OFFICE O/P EST MOD 30 MIN: CPT | Performed by: PHYSICIAN ASSISTANT

## 2025-06-19 PROCEDURE — 51798 US URINE CAPACITY MEASURE: CPT | Performed by: PHYSICIAN ASSISTANT

## 2025-06-19 RX ORDER — TROSPIUM CHLORIDE 20 MG/1
20 TABLET, FILM COATED ORAL 2 TIMES DAILY
Qty: 60 TABLET | Refills: 3 | Status: SHIPPED | OUTPATIENT
Start: 2025-06-19

## 2025-06-19 NOTE — PROGRESS NOTES
HPI:      Patient is a 79 y.o. male in no acute distress.  He is alert and oriented to person, place, and time.       History  5/2016 PVP greenlight      3/2017 cystoscopy showed normal anatomy. PFR recommended.      6/2017 Completed seven PFR treatments. Daytime frequency improved from q1hr to q1-2 hrs, nocturia improved from 0-3 to 0-1, severe urgency improved to mild, LIBBY improved from 5-10x per day to 3-4x per day.     2/2022 frequency, nocturia, urgency and urge incontinence.  Saturating 1 briefs per day.  Taking Flomax daily. He is having daily bowel movements.  He does consume 2 cups of coffee. He did stop drinking soda.              Started oxybutynin 10mg XL      4/2022 increased oxybutynin to 15mg     6/2022 - Oxybutynin stopped  Vesicare started    Flomax increased to twice a day    Today:  Patient is here today for follow-up overactive bladder and BPH.  Patient had a PVP greenlight approximately 9 years ago.  At last visit he was complaining of weak stream and significant urgency.  We did increase his Flomax twice a day.  He feels though his stream has improved and has nocturia has improved as well.  He continues to have urgency at least 4 or 5 times a day.  He has been on Vesicare for a number of years.  He does have a daily bowel movement.  He does drink coffee in the morning.  He does drink water the rest of the day.  Bladderscan performed in office today: PVR - 16 mL.    Past Medical History:   Diagnosis Date    Arthritis     Ascending cholangitis (HCC) 10/02/2014     at [facility] ERCP+lap assisted gastric remnant to open    Blood in stool 2011    CAD (coronary artery disease)     Chronic back pain     DDD    Chronic diastolic (congestive) heart failure (HCC) 11/05/2018    Chronic systolic congestive heart failure (HCC) 09/25/2018    COPD with acute exacerbation ([facility]) 09/25/2018    Heart disease 05/09/2012    Hemiparesis affecting right side as late effect of cerebrovascular accident ([facility]) 02/19/2023

## 2025-06-23 DIAGNOSIS — I50.32 CHRONIC DIASTOLIC (CONGESTIVE) HEART FAILURE (HCC): ICD-10-CM

## 2025-06-23 DIAGNOSIS — I10 ESSENTIAL HYPERTENSION: ICD-10-CM

## 2025-06-23 DIAGNOSIS — N13.8 BPH WITH OBSTRUCTION/LOWER URINARY TRACT SYMPTOMS: ICD-10-CM

## 2025-06-23 DIAGNOSIS — N40.1 BPH WITH OBSTRUCTION/LOWER URINARY TRACT SYMPTOMS: ICD-10-CM

## 2025-06-23 RX ORDER — SOLIFENACIN SUCCINATE 10 MG/1
10 TABLET, FILM COATED ORAL DAILY
Qty: 90 TABLET | Refills: 0 | OUTPATIENT
Start: 2025-06-23

## 2025-06-23 RX ORDER — HYDROCHLOROTHIAZIDE 25 MG/1
25 TABLET ORAL DAILY
Qty: 90 TABLET | Refills: 0 | OUTPATIENT
Start: 2025-06-23

## 2025-06-23 RX ORDER — SPIRONOLACTONE 25 MG/1
25 TABLET ORAL DAILY
Qty: 90 TABLET | Refills: 1 | Status: SHIPPED | OUTPATIENT
Start: 2025-06-23

## 2025-06-23 NOTE — TELEPHONE ENCOUNTER
Last OV: 5/6/2025  Last RX: 12/20/2024   Next scheduled apt: 8/8/2025    Surescripts requesting refill

## 2025-06-24 DIAGNOSIS — E78.5 HYPERLIPIDEMIA, UNSPECIFIED HYPERLIPIDEMIA TYPE: ICD-10-CM

## 2025-06-24 DIAGNOSIS — I10 PRIMARY HYPERTENSION: ICD-10-CM

## 2025-06-24 DIAGNOSIS — I25.10 CORONARY ARTERY DISEASE INVOLVING NATIVE CORONARY ARTERY OF NATIVE HEART WITHOUT ANGINA PECTORIS: ICD-10-CM

## 2025-06-24 DIAGNOSIS — Z95.1 HX OF CABG: ICD-10-CM

## 2025-06-24 DIAGNOSIS — E55.9 VITAMIN D DEFICIENCY DISEASE: Primary | ICD-10-CM

## 2025-07-06 ENCOUNTER — APPOINTMENT (OUTPATIENT)
Dept: GENERAL RADIOLOGY | Age: 80
End: 2025-07-06
Payer: MEDICARE

## 2025-07-06 ENCOUNTER — HOSPITAL ENCOUNTER (EMERGENCY)
Age: 80
Discharge: HOME | End: 2025-07-06
Attending: EMERGENCY MEDICINE
Payer: MEDICARE

## 2025-07-06 VITALS
HEART RATE: 83 BPM | TEMPERATURE: 97.9 F | OXYGEN SATURATION: 94 % | RESPIRATION RATE: 20 BRPM | HEIGHT: 72 IN | DIASTOLIC BLOOD PRESSURE: 53 MMHG | BODY MASS INDEX: 40.09 KG/M2 | WEIGHT: 296 LBS | SYSTOLIC BLOOD PRESSURE: 98 MMHG

## 2025-07-06 DIAGNOSIS — S90.31XA CONTUSION OF RIGHT FOOT, INITIAL ENCOUNTER: Primary | ICD-10-CM

## 2025-07-06 DIAGNOSIS — I95.9 HYPOTENSION, UNSPECIFIED HYPOTENSION TYPE: ICD-10-CM

## 2025-07-06 DIAGNOSIS — S90.821A BLISTER OF RIGHT FOOT, INITIAL ENCOUNTER: ICD-10-CM

## 2025-07-06 PROCEDURE — 99283 EMERGENCY DEPT VISIT LOW MDM: CPT

## 2025-07-06 PROCEDURE — 73630 X-RAY EXAM OF FOOT: CPT

## 2025-07-06 RX ORDER — CEPHALEXIN 500 MG/1
500 CAPSULE ORAL 2 TIMES DAILY
Qty: 14 CAPSULE | Refills: 0 | Status: SHIPPED | OUTPATIENT
Start: 2025-07-06 | End: 2025-07-13

## 2025-07-06 ASSESSMENT — PAIN SCALES - GENERAL: PAINLEVEL_OUTOF10: 7

## 2025-07-06 ASSESSMENT — PAIN DESCRIPTION - ORIENTATION: ORIENTATION: RIGHT

## 2025-07-06 ASSESSMENT — PAIN DESCRIPTION - LOCATION: LOCATION: FOOT

## 2025-07-06 ASSESSMENT — PAIN - FUNCTIONAL ASSESSMENT: PAIN_FUNCTIONAL_ASSESSMENT: 0-10

## 2025-07-06 NOTE — ED PROVIDER NOTES
rash    Cardiovascular: Heart RRR, no gallops or rubs, no aortic enlargement or bruits noted.    Respiratory: Lungs clear, no wheezing, no rales, normal breath sounds.    Gastrointestinal: Abdomen nontender, bowel sounds normal, no rebound/guarding/distention or mass    Musculoskeletal: Patient has swelling and a blister to the right foot.  There is no clear cellulitis but it is markedly swollen.  Normal capillary refill noted    Neurological: Patient is alert and oriented ×3, no focal motor or sensory deficits noted,      DIAGNOSTIC RESULTS             LABS:  Labs Reviewed - No data to display    All other labs were within normal range or not returned as of this dictation.    EMERGENCY DEPARTMENT COURSE and DIFFERENTIAL DIAGNOSIS/MDM:   Vitals:    Vitals:    07/06/25 1412 07/06/25 1418 07/06/25 1448 07/06/25 1518   BP: 135/68 111/73 (!) 105/58    Pulse: 83      Resp: 20      Temp: 97.9 °F (36.6 °C)      SpO2: 98% 97% 96% 94%   Weight: 134.3 kg (296 lb)      Height: 1.829 m (6')                    REASSESSMENT      Here in the ED x-ray of the foot shows no fracture or dislocation.  There is soft tissue swelling likely secondary to hematoma but there is no air or gas in the tissues and no sign of infection otherwise.  I did discuss with the patient and he will keep it elevated and only use ice and we will start him on Keflex because the wife tried to drain it today with a toothpick.  Patient will be discharged home to return immediately if any worsening.    PROCEDURES:  Unless otherwise noted below, none     Procedures    FINAL IMPRESSION      1. Contusion of right foot, initial encounter    2. Blister of right foot, initial encounter          DISPOSITION/PLAN   DISPOSITION Decision To Discharge 07/06/2025 03:31:30 PM   DISPOSITION CONDITION Stable           PATIENT REFERRED TO:  Kristina Wilkinson, APRN - CNP  [address] [address]  [phone]    In 3 days        DISCHARGE MEDICATIONS:  New

## 2025-07-07 RX ORDER — METOPROLOL SUCCINATE 25 MG/1
TABLET, EXTENDED RELEASE ORAL
Qty: 90 TABLET | Refills: 0 | Status: SHIPPED | OUTPATIENT
Start: 2025-07-07

## 2025-07-09 ENCOUNTER — TELEPHONE (OUTPATIENT)
Dept: PRIMARY CARE CLINIC | Age: 80
End: 2025-07-09

## 2025-07-09 NOTE — TELEPHONE ENCOUNTER
[facility] ED Follow up Call    Reason for ED visit: right foot injury        7/9/2025      Attempted to call. No answer and unable to leave voicemail

## 2025-07-24 ENCOUNTER — OFFICE VISIT (OUTPATIENT)
Dept: PAIN MANAGEMENT | Age: 80
End: 2025-07-24

## 2025-07-24 VITALS
DIASTOLIC BLOOD PRESSURE: 73 MMHG | WEIGHT: 296 LBS | OXYGEN SATURATION: 94 % | SYSTOLIC BLOOD PRESSURE: 111 MMHG | HEART RATE: 83 BPM | BODY MASS INDEX: 40.14 KG/M2 | RESPIRATION RATE: 18 BRPM

## 2025-07-24 DIAGNOSIS — G89.29 CHRONIC LEFT SHOULDER PAIN: ICD-10-CM

## 2025-07-24 DIAGNOSIS — G89.29 CHRONIC LEFT SHOULDER PAIN: Primary | ICD-10-CM

## 2025-07-24 DIAGNOSIS — M25.512 CHRONIC LEFT SHOULDER PAIN: Primary | ICD-10-CM

## 2025-07-24 DIAGNOSIS — G56.82 SUPRASCAPULAR NERVE ENTRAPMENT, LEFT: ICD-10-CM

## 2025-07-24 DIAGNOSIS — M51.369 DEGENERATION OF INTERVERTEBRAL DISC OF LUMBAR REGION, UNSPECIFIED WHETHER PAIN PRESENT: ICD-10-CM

## 2025-07-24 DIAGNOSIS — M25.512 CHRONIC LEFT SHOULDER PAIN: ICD-10-CM

## 2025-07-24 DIAGNOSIS — M47.817 LUMBOSACRAL SPONDYLOSIS WITHOUT MYELOPATHY: ICD-10-CM

## 2025-07-24 NOTE — PROGRESS NOTES
(3/23/2023)    Overall Financial Resource Strain (CARDIA)     Difficulty of Paying Living Expenses: Not hard at all   Food Insecurity: No Food Insecurity (5/6/2025)    Hunger Vital Sign     Worried About Running Out of Food in the Last Year: Never true     Ran Out of Food in the Last Year: Never true   Transportation Needs: No Transportation Needs (5/6/2025)    PRAPARE - Transportation     Lack of Transportation (Medical): No     Lack of Transportation (Non-Medical): No   Physical Activity: Sufficiently Active (10/29/2024)    Exercise Vital Sign     Days of Exercise per Week: 7 days     Minutes of Exercise per Session: 90 min   Stress: Not on file   Social Connections: Unknown (8/18/2020)    Received from [facility], [facility]    Social Connection and Isolation Panel [NHANES]     Frequency of Communication with Friends and Family: Patient declined     Frequency of Social Gatherings with Friends and Family: Patient declined     Attends Yarsani Services: Patient declined     Active Member of Clubs or Organizations: Patient declined     Attends Club or Organization Meetings: Patient declined     Marital Status: Patient declined   Intimate Partner Violence: Not on file   Housing Stability: Low Risk  (5/6/2025)    Housing Stability Vital Sign     Unable to Pay for Housing in the Last Year: No     Number of Times Moved in the Last Year: 0     Homeless in the Last Year: No       Medications & Allergies:   Current Outpatient Medications   Medication Instructions    albuterol sulfate  (90 Base) MCG/ACT inhaler     aspirin 81 mg, DAILY    baclofen (LIORESAL) 20 mg, 3 TIMES DAILY    Blood Glucose Monitoring Suppl (FREESTYLE LITE) RADHA 1 Device, Does not apply, DAILY, E11.9    budesonide-formoterol (SYMBICORT) 160-4.5 MCG/ACT AERO 2 puffs, Inhalation, 2 TIMES DAILY, For asthma/copd    cefadroxil (DURICEF) 500 mg, DAILY    ELIQUIS 5 MG TABS tablet

## 2025-07-24 NOTE — PATIENT INSTRUCTIONS
SURVEY:    You may be receiving a survey from [facility] regarding your visit today.    You may get this in the mail, through your MyChart, or in your email.     Please complete the survey to enable us to provide the highest quality of care to you and your family.    If you cannot score us a very good (5 Stars) on any question, please call the office to discuss how we could of made your experience exceptional.    Thank you!    General Surgery    MD Dr. Georgette Eric, DO  Dr. Bulmaro Yan, DO  Michaela Hendrix, BESS-CNP    Pain Mgmt.  Dr. Frankie Ledezma, DO  DAPHNIE Henry, WHITLEY Márquez LPN Jena Adams, MA Emily Akers, MA    Phone: [phone]  Fax: [phone]    Office Hours:   M-TH 8-5, F: 8-12

## 2025-08-07 ENCOUNTER — APPOINTMENT (OUTPATIENT)
Dept: GENERAL RADIOLOGY | Age: 80
End: 2025-08-07
Attending: STUDENT IN AN ORGANIZED HEALTH CARE EDUCATION/TRAINING PROGRAM
Payer: MEDICARE

## 2025-08-07 ENCOUNTER — HOSPITAL ENCOUNTER (OUTPATIENT)
Age: 80
Setting detail: OUTPATIENT SURGERY
Discharge: HOME | End: 2025-08-07
Attending: STUDENT IN AN ORGANIZED HEALTH CARE EDUCATION/TRAINING PROGRAM | Admitting: STUDENT IN AN ORGANIZED HEALTH CARE EDUCATION/TRAINING PROGRAM
Payer: MEDICARE

## 2025-08-07 ENCOUNTER — TELEPHONE (OUTPATIENT)
Dept: UROLOGY | Age: 80
End: 2025-08-07

## 2025-08-07 VITALS
SYSTOLIC BLOOD PRESSURE: 138 MMHG | OXYGEN SATURATION: 97 % | DIASTOLIC BLOOD PRESSURE: 87 MMHG | HEART RATE: 82 BPM | TEMPERATURE: 97.1 F | RESPIRATION RATE: 16 BRPM

## 2025-08-07 PROCEDURE — 2709999900 HC NON-CHARGEABLE SUPPLY: Performed by: STUDENT IN AN ORGANIZED HEALTH CARE EDUCATION/TRAINING PROGRAM

## 2025-08-07 PROCEDURE — 3600000050 HC PAIN LEVEL 1 BASE: Performed by: STUDENT IN AN ORGANIZED HEALTH CARE EDUCATION/TRAINING PROGRAM

## 2025-08-07 PROCEDURE — 64418 NJX AA&/STRD SPRSCAP NRV: CPT | Performed by: STUDENT IN AN ORGANIZED HEALTH CARE EDUCATION/TRAINING PROGRAM

## 2025-08-07 PROCEDURE — 6360000002 HC RX W HCPCS: Performed by: STUDENT IN AN ORGANIZED HEALTH CARE EDUCATION/TRAINING PROGRAM

## 2025-08-07 PROCEDURE — 7100000010 HC PHASE II RECOVERY - FIRST 15 MIN: Performed by: STUDENT IN AN ORGANIZED HEALTH CARE EDUCATION/TRAINING PROGRAM

## 2025-08-07 PROCEDURE — 77002 NEEDLE LOCALIZATION BY XRAY: CPT | Performed by: STUDENT IN AN ORGANIZED HEALTH CARE EDUCATION/TRAINING PROGRAM

## 2025-08-07 RX ORDER — TAMSULOSIN HYDROCHLORIDE 0.4 MG/1
0.4 CAPSULE ORAL 2 TIMES DAILY
Qty: 180 CAPSULE | Refills: 0 | Status: SHIPPED | OUTPATIENT
Start: 2025-08-07

## 2025-08-07 RX ORDER — DEXAMETHASONE SODIUM PHOSPHATE 10 MG/ML
INJECTION, SOLUTION INTRA-ARTICULAR; INTRALESIONAL; INTRAMUSCULAR; INTRAVENOUS; SOFT TISSUE PRN
Status: DISCONTINUED | OUTPATIENT
Start: 2025-08-07 | End: 2025-08-07 | Stop reason: ALTCHOICE

## 2025-08-07 RX ORDER — LIDOCAINE HYDROCHLORIDE 20 MG/ML
INJECTION, SOLUTION EPIDURAL; INFILTRATION; INTRACAUDAL; PERINEURAL PRN
Status: DISCONTINUED | OUTPATIENT
Start: 2025-08-07 | End: 2025-08-07 | Stop reason: ALTCHOICE

## 2025-08-08 ENCOUNTER — OFFICE VISIT (OUTPATIENT)
Dept: PRIMARY CARE CLINIC | Age: 80
End: 2025-08-08
Payer: MEDICARE

## 2025-08-08 VITALS
OXYGEN SATURATION: 97 % | SYSTOLIC BLOOD PRESSURE: 110 MMHG | WEIGHT: 297 LBS | HEIGHT: 72 IN | DIASTOLIC BLOOD PRESSURE: 70 MMHG | HEART RATE: 80 BPM | BODY MASS INDEX: 40.23 KG/M2

## 2025-08-08 DIAGNOSIS — G89.29 CHRONIC LEFT SHOULDER PAIN: ICD-10-CM

## 2025-08-08 DIAGNOSIS — I10 ESSENTIAL HYPERTENSION: ICD-10-CM

## 2025-08-08 DIAGNOSIS — I50.32 CHRONIC DIASTOLIC (CONGESTIVE) HEART FAILURE (HCC): ICD-10-CM

## 2025-08-08 DIAGNOSIS — M25.512 CHRONIC LEFT SHOULDER PAIN: ICD-10-CM

## 2025-08-08 DIAGNOSIS — E78.2 MIXED HYPERLIPIDEMIA: ICD-10-CM

## 2025-08-08 DIAGNOSIS — R35.0 BENIGN PROSTATIC HYPERPLASIA WITH URINARY FREQUENCY: ICD-10-CM

## 2025-08-08 DIAGNOSIS — Z95.1 HX OF CABG: ICD-10-CM

## 2025-08-08 DIAGNOSIS — E11.9 CONTROLLED TYPE 2 DIABETES MELLITUS WITHOUT COMPLICATION, WITHOUT LONG-TERM CURRENT USE OF INSULIN (HCC): Primary | ICD-10-CM

## 2025-08-08 DIAGNOSIS — I69.359 CVA, OLD, HEMIPARESIS (HCC): ICD-10-CM

## 2025-08-08 DIAGNOSIS — J44.9 CHRONIC OBSTRUCTIVE PULMONARY DISEASE, UNSPECIFIED COPD TYPE (HCC): ICD-10-CM

## 2025-08-08 DIAGNOSIS — E11.51 DIABETES MELLITUS TYPE 2 WITH PERIPHERAL ARTERY DISEASE (HCC): ICD-10-CM

## 2025-08-08 DIAGNOSIS — Z12.5 SCREENING FOR PROSTATE CANCER: ICD-10-CM

## 2025-08-08 DIAGNOSIS — Z98.84 HX OF GASTRIC BYPASS: ICD-10-CM

## 2025-08-08 DIAGNOSIS — N40.1 BENIGN PROSTATIC HYPERPLASIA WITH URINARY FREQUENCY: ICD-10-CM

## 2025-08-08 DIAGNOSIS — E66.01 MORBID OBESITY WITH BMI OF 40.0-44.9, ADULT (HCC): ICD-10-CM

## 2025-08-08 DIAGNOSIS — S91.301S OPEN WOUND OF RIGHT FOOT, SEQUELA: ICD-10-CM

## 2025-08-08 DIAGNOSIS — I25.10 CORONARY ARTERY DISEASE INVOLVING NATIVE HEART WITHOUT ANGINA PECTORIS, UNSPECIFIED VESSEL OR LESION TYPE: ICD-10-CM

## 2025-08-08 DIAGNOSIS — I25.810 CORONARY ARTERY DISEASE INVOLVING AUTOLOGOUS VEIN CORONARY BYPASS GRAFT WITHOUT ANGINA PECTORIS: ICD-10-CM

## 2025-08-08 LAB — HBA1C MFR BLD: 5.9 %

## 2025-08-08 PROCEDURE — 83036 HEMOGLOBIN GLYCOSYLATED A1C: CPT | Performed by: NURSE PRACTITIONER

## 2025-08-08 RX ORDER — DOXYCYCLINE HYCLATE 100 MG
100 TABLET ORAL 2 TIMES DAILY
Qty: 14 TABLET | Refills: 0 | Status: SHIPPED | OUTPATIENT
Start: 2025-08-08 | End: 2025-08-15

## 2025-08-08 RX ORDER — SILVER SULFADIAZINE 10 MG/G
1 CREAM TOPICAL DAILY
COMMUNITY
Start: 2025-07-28 | End: 2025-08-27

## 2025-08-08 RX ORDER — FLUCONAZOLE 100 MG/1
TABLET ORAL
COMMUNITY
Start: 2025-08-07

## 2025-08-16 ASSESSMENT — ENCOUNTER SYMPTOMS
EYES NEGATIVE: 1
COUGH: 0
CHEST TIGHTNESS: 0
ABDOMINAL DISTENTION: 0
WHEEZING: 0
ABDOMINAL PAIN: 0
SHORTNESS OF BREATH: 0

## 2025-08-19 DIAGNOSIS — I10 ESSENTIAL HYPERTENSION: ICD-10-CM

## 2025-08-19 RX ORDER — APIXABAN 5 MG/1
TABLET, FILM COATED ORAL
Qty: 60 TABLET | Refills: 0 | Status: SHIPPED | OUTPATIENT
Start: 2025-08-19

## 2025-08-19 RX ORDER — LOSARTAN POTASSIUM 25 MG/1
TABLET ORAL
Qty: 45 TABLET | Refills: 2 | Status: SHIPPED | OUTPATIENT
Start: 2025-08-19

## 2025-08-19 RX ORDER — ISOSORBIDE MONONITRATE 30 MG/1
30 TABLET, EXTENDED RELEASE ORAL DAILY
Qty: 90 TABLET | Refills: 1 | Status: SHIPPED | OUTPATIENT
Start: 2025-08-19

## 2025-08-28 ENCOUNTER — HOSPITAL ENCOUNTER (OUTPATIENT)
Age: 80
Discharge: HOME | End: 2025-08-30
Payer: MEDICARE

## 2025-08-28 VITALS — HEIGHT: 72 IN | WEIGHT: 297 LBS | BODY MASS INDEX: 40.23 KG/M2

## 2025-08-28 DIAGNOSIS — I50.32 CHRONIC DIASTOLIC (CONGESTIVE) HEART FAILURE (HCC): ICD-10-CM

## 2025-08-28 DIAGNOSIS — I10 ESSENTIAL HYPERTENSION: ICD-10-CM

## 2025-08-28 DIAGNOSIS — I69.359 CVA, OLD, HEMIPARESIS (HCC): ICD-10-CM

## 2025-08-28 DIAGNOSIS — E78.2 MIXED HYPERLIPIDEMIA: ICD-10-CM

## 2025-08-28 DIAGNOSIS — I25.810 CORONARY ARTERY DISEASE INVOLVING AUTOLOGOUS VEIN CORONARY BYPASS GRAFT WITHOUT ANGINA PECTORIS: ICD-10-CM

## 2025-08-28 DIAGNOSIS — Z95.1 HX OF CABG: ICD-10-CM

## 2025-08-28 DIAGNOSIS — E11.51 DIABETES MELLITUS TYPE 2 WITH PERIPHERAL ARTERY DISEASE (HCC): ICD-10-CM

## 2025-08-28 DIAGNOSIS — I25.10 CORONARY ARTERY DISEASE INVOLVING NATIVE HEART WITHOUT ANGINA PECTORIS, UNSPECIFIED VESSEL OR LESION TYPE: ICD-10-CM

## 2025-08-28 PROCEDURE — 93306 TTE W/DOPPLER COMPLETE: CPT

## 2025-08-31 LAB
ECHO AO ASC DIAM: 3.9 CM
ECHO AO ASCENDING AORTA INDEX: 1.55 CM/M2
ECHO AO ROOT DIAM: 3.6 CM
ECHO AO ROOT INDEX: 1.43 CM/M2
ECHO AV AREA PEAK VELOCITY: 1.4 CM2
ECHO AV AREA VTI: 1.6 CM2
ECHO AV AREA/BSA PEAK VELOCITY: 0.6 CM2/M2
ECHO AV AREA/BSA VTI: 0.6 CM2/M2
ECHO AV MEAN GRADIENT: 7 MMHG
ECHO AV MEAN VELOCITY: 1.3 M/S
ECHO AV PEAK GRADIENT: 14 MMHG
ECHO AV PEAK VELOCITY: 1.9 M/S
ECHO AV VELOCITY RATIO: 0.42
ECHO AV VTI: 35.9 CM
ECHO BSA: 2.62 M2
ECHO EST RA PRESSURE: 8 MMHG
ECHO LA DIAMETER INDEX: 2.06 CM/M2
ECHO LA DIAMETER: 5.2 CM
ECHO LA TO AORTIC ROOT RATIO: 1.44
ECHO LA VOL A-L A2C: 125 ML (ref 18–58)
ECHO LA VOL A-L A4C: 123 ML (ref 18–58)
ECHO LA VOL BP: 120 ML (ref 18–58)
ECHO LA VOL MOD A2C: 120 ML (ref 18–58)
ECHO LA VOL MOD A4C: 117 ML (ref 18–58)
ECHO LA VOL/BSA BIPLANE: 48 ML/M2 (ref 16–34)
ECHO LA VOLUME AREA LENGTH: 126 ML
ECHO LA VOLUME INDEX A-L A2C: 50 ML/M2 (ref 16–34)
ECHO LA VOLUME INDEX A-L A4C: 49 ML/M2 (ref 16–34)
ECHO LA VOLUME INDEX AREA LENGTH: 50 ML/M2 (ref 16–34)
ECHO LA VOLUME INDEX MOD A2C: 48 ML/M2 (ref 16–34)
ECHO LA VOLUME INDEX MOD A4C: 46 ML/M2 (ref 16–34)
ECHO LV E' LATERAL VELOCITY: 17.48 CM/S
ECHO LV E' SEPTAL VELOCITY: 10.78 CM/S
ECHO LV EDV A2C: 126 ML
ECHO LV EDV A4C: 140 ML
ECHO LV EDV BP: 136 ML (ref 67–155)
ECHO LV EDV INDEX A4C: 56 ML/M2
ECHO LV EDV INDEX BP: 54 ML/M2
ECHO LV EDV NDEX A2C: 50 ML/M2
ECHO LV EF PHYSICIAN: 60 %
ECHO LV EJECTION FRACTION A2C: 66 %
ECHO LV EJECTION FRACTION A4C: 61 %
ECHO LV EJECTION FRACTION BIPLANE: 65 % (ref 55–100)
ECHO LV ESV A2C: 43 ML
ECHO LV ESV A4C: 54 ML
ECHO LV ESV BP: 48 ML (ref 22–58)
ECHO LV ESV INDEX A2C: 17 ML/M2
ECHO LV ESV INDEX A4C: 21 ML/M2
ECHO LV ESV INDEX BP: 19 ML/M2
ECHO LV FRACTIONAL SHORTENING: 31 % (ref 28–44)
ECHO LV INTERNAL DIMENSION DIASTOLE INDEX: 2.02 CM/M2
ECHO LV INTERNAL DIMENSION DIASTOLIC: 5.1 CM (ref 4.2–5.9)
ECHO LV INTERNAL DIMENSION SYSTOLIC INDEX: 1.39 CM/M2
ECHO LV INTERNAL DIMENSION SYSTOLIC: 3.5 CM
ECHO LV IVSD: 1.2 CM (ref 0.6–1)
ECHO LV MASS 2D: 255.5 G (ref 88–224)
ECHO LV MASS INDEX 2D: 101.4 G/M2 (ref 49–115)
ECHO LV POSTERIOR WALL DIASTOLIC: 1.3 CM (ref 0.6–1)
ECHO LV RELATIVE WALL THICKNESS RATIO: 0.51
ECHO LVOT AREA: 3.5 CM2
ECHO LVOT AV VTI INDEX: 0.47
ECHO LVOT DIAM: 2.1 CM
ECHO LVOT MEAN GRADIENT: 1 MMHG
ECHO LVOT PEAK GRADIENT: 2 MMHG
ECHO LVOT PEAK VELOCITY: 0.8 M/S
ECHO LVOT STROKE VOLUME INDEX: 23.1 ML/M2
ECHO LVOT SV: 58.2 ML
ECHO LVOT VTI: 16.8 CM
ECHO MV EROA PISA: 0.2 CM2
ECHO MV REGURGITANT ALIASING (NYQUIST) VELOCITY: 31 CM/S
ECHO MV REGURGITANT RADIUS PISA: 0.82 CM
ECHO MV REGURGITANT VELOCITY PISA: 5.5 M/S
ECHO MV REGURGITANT VOLUME PISA: 42.51 ML
ECHO MV REGURGITANT VTIA: 178.6 CM
ECHO PULMONARY ARTERY END DIASTOLIC PRESSURE: 5 MMHG
ECHO PV MAX VELOCITY: 0.9 M/S
ECHO PV PEAK GRADIENT: 3 MMHG
ECHO PV REGURGITANT MAX VELOCITY: 1.1 M/S
ECHO RA VOLUME: 113 ML
ECHO RA VOLUME: 121 ML
ECHO RIGHT VENTRICULAR SYSTOLIC PRESSURE (RVSP): 31 MMHG
ECHO RV BASAL DIMENSION: 4.8 CM
ECHO RV LONGITUDINAL DIMENSION: 8.5 CM
ECHO RV MID DIMENSION: 3.7 CM
ECHO RV TAPSE: 1.3 CM (ref 1.7–?)
ECHO TV REGURGITANT MAX VELOCITY: 2.42 M/S
ECHO TV REGURGITANT PEAK GRADIENT: 24 MMHG

## (undated) DEVICE — BANDAGE COMPR W6INXL12FT SMOOTH FOR LIMB EXSANG ESMARCH

## (undated) DEVICE — APPLICATOR MEDICATED 10.5 CC SOLUTION HI LT ORNG CHLORAPREP

## (undated) DEVICE — 3M™ COBAN™ SELF-ADHERENT WRAP, 1586S, STERILE, 6 IN X 5 YD (15 CM X 4,5 M), 12 ROLLS/CASE: Brand: 3M™ COBAN™

## (undated) DEVICE — STRYKER PERFORMANCE SERIES SAGITTAL BLADE: Brand: STRYKER PERFORMANCE SERIES

## (undated) DEVICE — GAUZE,SPONGE,4"X4",16PLY,XRAY,STRL,LF: Brand: MEDLINE

## (undated) DEVICE — DRAPE SURG L 60X76IN SMS FAB UNIV ANCIL RESIST FLAME TEARING

## (undated) DEVICE — DECANTER FLD 9IN ST BG FOR ASEP TRNSF OF FLD

## (undated) DEVICE — SUTURE 2-0 L24CM ABSRB VIO L26MM 3/8 CIR DMND PT NDL RA1028Q

## (undated) DEVICE — SPONGE LAP W18XL18IN WHT COT 4 PLY FLD STRUNG RADPQ DISP ST

## (undated) DEVICE — DRAPE, HEAVY DUTY, STERILE. FOR A 6' BIG CASE BACK TABLE MODEL 429: Brand: OR SPECIFIC

## (undated) DEVICE — DRAPE SURG XL W76XL100IN UNIV SMS FAB CTRL + REINF FLAME

## (undated) DEVICE — NEEDLE HYPO 18GA L1IN PNK POLYPR HUB S STL REG BVL STR W/O

## (undated) DEVICE — Z DISCONTINUED PER MEDLINE USE 2741943 DRESSING AQUACEL 10 IN ALG W9XL25CM SIL CVR WTRPRF VIR BACT BARR ANTIMIC

## (undated) DEVICE — INTENDED FOR TISSUE SEPARATION, AND OTHER PROCEDURES THAT REQUIRE A SHARP SURGICAL BLADE TO PUNCTURE OR CUT.: Brand: BARD-PARKER ® CARBON RIB-BACK BLADES

## (undated) DEVICE — TOTAL KNEE-LF: Brand: MEDLINE INDUSTRIES, INC.

## (undated) DEVICE — INTEGUSEAL MICROBIAL SEALANT: Brand: AVANOS

## (undated) DEVICE — GOWN,AURORA,NONRNF,XL,30/CS: Brand: MEDLINE

## (undated) DEVICE — ZIMMER® STERILE DISPOSABLE TOURNIQUET CUFF WITH PLC, SINGLE PORT, SINGLE BLADDER, 34 IN. (86 CM)

## (undated) DEVICE — T4 HOOD

## (undated) DEVICE — SKIN AFFIX SURG ADHESIVE 72/CS 0.55ML: Brand: MEDLINE

## (undated) DEVICE — Z INACTIVE USE 2660664 SOLUTION IRRIG 3000ML 0.9% SOD CHL USP UROMATIC PLAS CONT

## (undated) DEVICE — SUTURE ETHLN SZ 3-0 L18IN NONABSORBABLE BLK PS-2 L19MM 3/8 1669H

## (undated) DEVICE — ELECTRODE ES AD CRD L15FT DISP FOR PT BELOW 30LB REM

## (undated) DEVICE — HOOK LOCK LATEX FREE ELASTIC BANDAGE 15 YD 6IN

## (undated) DEVICE — Device

## (undated) DEVICE — CONVERTORS STOCKINETTE: Brand: CONVERTORS

## (undated) DEVICE — 4-PORT MANIFOLD: Brand: NEPTUNE 2

## (undated) DEVICE — YANKAUER OPEN TIP WITH ON/OFF SWITCH: Brand: ARGYLE

## (undated) DEVICE — INTENT TO BE USED WITH SUTURE MATERIAL FOR TISSUE CLOSURE: Brand: RICHARD-ALLAN® NEEDLE 1/2 CIRCLE TAPER

## (undated) DEVICE — STERILE PVP: Brand: MEDLINE INDUSTRIES, INC.

## (undated) DEVICE — NEEDLE HYPO 18GA L1.5IN PNK POLYPR HUB S STL REG BVL STR

## (undated) DEVICE — DBD-PACK,LAPAROTOMY,2 REINFORCED GOWNS: Brand: MEDLINE

## (undated) DEVICE — 3M™ STERI-DRAPE™ U-DRAPE, LONG 1019: Brand: STERI-DRAPE™

## (undated) DEVICE — STANDARD HYPODERMIC NEEDLE,POLYPROPYLENE HUB: Brand: MONOJECT

## (undated) DEVICE — 2T11 #2 PDO 36 X 36: Brand: 2T11 #2 PDO 36 X 36

## (undated) DEVICE — SOLUTION IV IRRIG POUR BRL 0.9% SODIUM CHL 2F7124

## (undated) DEVICE — GLOVE SURG SZ 75 CRM LTX FREE POLYISOPRENE POLYMER BEAD ANTI

## (undated) DEVICE — GLOVE SURG SZ 8 CRM LTX FREE POLYISOPRENE POLYMER BEAD ANTI

## (undated) DEVICE — SUTURE ABSORBABLE BRAIDED 2-0 CT-1 27 IN UD VICRYL J259H

## (undated) DEVICE — PADDING UNDERCAST W6INXL4YD WYTEX 6 PER BG

## (undated) DEVICE — GLOVE SURG SZ 85 L12IN FNGR THK87MIL WHT LTX FREE

## (undated) DEVICE — SUTURE NONABSORBABLE MONOFILAMENT 3-0 PS-1 18 IN BLK ETHILON 1663H

## (undated) DEVICE — PAD,ABDOMINAL,8"X7.5",ST,LF,20/BX: Brand: MEDLINE INDUSTRIES, INC.

## (undated) DEVICE — DRAPE,U/ SHT,SPLIT,PLAS,STERIL: Brand: MEDLINE

## (undated) DEVICE — NEEDLE HYPO 22GA L1IN BLK POLYPR HUB S STL REG BVL STR W/O

## (undated) DEVICE — Device: Brand: 6" DLX ELASTIC BANDAGE W/CLIPS 10YD

## (undated) DEVICE — CONTROL SYRINGE LUER-LOCK TIP: Brand: MONOJECT

## (undated) DEVICE — SUTURE ETHLN SZ 3-0 L18IN NONABSORBABLE BLK L24MM PS-1 3/8 1663G

## (undated) DEVICE — CEMENT MIXING SYSTEM WITH FEMORAL BREAKWAY NOZZLE: Brand: REVOLUTION

## (undated) DEVICE — GLOVE SURG SZ 85 CRM LTX FREE POLYISOPRENE POLYMER BEAD ANTI

## (undated) DEVICE — HANDPIECE SET WITH HIGH FLOW TIP AND SUCTION TUBE: Brand: INTERPULSE

## (undated) DEVICE — FLUFF UNDERPAD,MODERATE: Brand: WINGS

## (undated) DEVICE — GLOVE SURG SZ 85 L12IN FNGR THK13MIL WHT ISOLEX POLYISOPRENE

## (undated) DEVICE — PROVE COVER: Brand: UNBRANDED

## (undated) DEVICE — TOWEL,OR,DSP,ST,NATURAL,DLX,4/PK,20PK/CS: Brand: MEDLINE

## (undated) DEVICE — SPONGE,LAP,18"X18",DLX,XR,ST,5/PK,40/PK: Brand: MEDLINE

## (undated) DEVICE — SYRINGE MED 10ML LUERLOCK TIP W/O SFTY DISP

## (undated) DEVICE — STAPLER SKIN H3.9MM WIRE DIA0.58MM CRWN 6.9MM 35 STPL ROT

## (undated) DEVICE — SUTURE VCRL SZ 0 L27IN ABSRB UD L36MM CT-1 1/2 CIR J260H

## (undated) DEVICE — BANDAGE ADH W1XL3IN NAT FAB WVN FLX DURABLE N ADH PD SEAL

## (undated) DEVICE — RECIPROCATING BLADE, DOUBLE SIDED, OFFSET  (70.0 X 0.64 X 12.6MM)

## (undated) DEVICE — CUFF CRYO 15-23IN CIRC M KNEE COOL PD TB GRAV FLO W/O BD

## (undated) DEVICE — 7 DAY SILVER-COATED ANTIMICROBIAL BARRIER DRESSING: Brand: ACTICOAT 7 2" X 2"

## (undated) DEVICE — GLOVE SURG SZ 7 L12IN FNGR THK79MIL GRN LTX FREE

## (undated) DEVICE — SKIN MARKER,REGULAR TIP WITH RULER: Brand: DEVON

## (undated) DEVICE — MEDI-VAC NON-CONDUCTIVE SUCTION TUBING 6MM X 6.1M (20 FT.) L: Brand: CARDINAL HEALTH

## (undated) DEVICE — PIN GUIDE ORTHO 3.2X89MM FLTD DISP PK OF 4 PER PATIENT

## (undated) DEVICE — CUSTOM PACK: Brand: UNBRANDED

## (undated) DEVICE — GOWN,AURORA,NON-REINFORCED,2XL: Brand: MEDLINE

## (undated) DEVICE — NEEDLE SPNL 25GA L3.5IN BLU HUB S STL REG WALL FIT STYL W/

## (undated) DEVICE — SOLUTION IV 100ML 0.9% SOD CHL PLAS CONT USP VIAFLX 1 PER

## (undated) DEVICE — SUTURE PDS II SZ 0 L27IN ABSRB VLT L36MM CT-1 1/2 CIR Z340H

## (undated) DEVICE — COOLER THER 20-31IN L CRYO COMB W/ PD KNEE TB GRAV FLO

## (undated) DEVICE — TABLE COVER: Brand: CONVERTORS

## (undated) DEVICE — 3M™ IOBAN™ 2 ANTIMICROBIAL INCISE DRAPE 6650EZ: Brand: IOBAN™ 2

## (undated) DEVICE — BLADE SURG NO10 S STL STR DISP GLASSVAN

## (undated) DEVICE — GLOVE SURG SZ 7 CRM LTX FREE POLYISOPRENE POLYMER BEAD ANTI

## (undated) DEVICE — 3M™ IOBAN™ 2 ANTIMICROBIAL INCISE DRAPE 6651EZ: Brand: IOBAN™ 2

## (undated) DEVICE — SUTURE ABSRB 2-0 BARB W14XL14CM L26MM CLR 3/8 CIR RVS CUT YA1015Q0

## (undated) DEVICE — ERASECAUTI INTERMIT TRAY: Brand: MEDLINE INDUSTRIES, INC.

## (undated) DEVICE — U-DRAPE: Brand: CONVERTORS

## (undated) DEVICE — GLOVE SURG SZ 75 L12IN FNGR THK79MIL GRN LTX FREE

## (undated) DEVICE — PADDING CAST W6INXL4YD POLY POR SPUN DACRON SYN VERSATILE

## (undated) DEVICE — TOWEL SURG W17XL27IN NAT COT DISP ST 80 PER CA

## (undated) DEVICE — Z DISCONTINUED BY MEDLINE USE 2711682 TRAY SKIN PREP DRY W/ PREM GLV

## (undated) DEVICE — BRIEF INCONT AD L FOR 45-58IN WAIST UNISX SUP ABSRB POLYMER